# Patient Record
Sex: MALE | Race: ASIAN | NOT HISPANIC OR LATINO | URBAN - METROPOLITAN AREA
[De-identification: names, ages, dates, MRNs, and addresses within clinical notes are randomized per-mention and may not be internally consistent; named-entity substitution may affect disease eponyms.]

---

## 2019-09-26 ENCOUNTER — EMERGENCY (EMERGENCY)
Facility: HOSPITAL | Age: 79
LOS: 1 days | Discharge: HOME | End: 2019-09-26
Attending: EMERGENCY MEDICINE
Payer: COMMERCIAL

## 2019-09-26 VITALS
DIASTOLIC BLOOD PRESSURE: 81 MMHG | HEART RATE: 75 BPM | SYSTOLIC BLOOD PRESSURE: 104 MMHG | TEMPERATURE: 98 F | RESPIRATION RATE: 18 BRPM | OXYGEN SATURATION: 97 %

## 2019-09-26 VITALS
SYSTOLIC BLOOD PRESSURE: 94 MMHG | TEMPERATURE: 99 F | OXYGEN SATURATION: 99 % | HEART RATE: 78 BPM | RESPIRATION RATE: 16 BRPM | HEIGHT: 68 IN | WEIGHT: 220.02 LBS | DIASTOLIC BLOOD PRESSURE: 60 MMHG

## 2019-09-26 DIAGNOSIS — Z90.79 ACQUIRED ABSENCE OF OTHER GENITAL ORGAN(S): Chronic | ICD-10-CM

## 2019-09-26 DIAGNOSIS — Z90.3 ACQUIRED ABSENCE OF STOMACH [PART OF]: Chronic | ICD-10-CM

## 2019-09-26 LAB
ALBUMIN SERPL ELPH-MCNC: 2.9 G/DL — LOW (ref 3.5–5)
ALP SERPL-CCNC: 96 U/L — SIGNIFICANT CHANGE UP (ref 40–120)
ALT FLD-CCNC: 22 U/L DA — SIGNIFICANT CHANGE UP (ref 10–60)
ANION GAP SERPL CALC-SCNC: 8 MMOL/L — SIGNIFICANT CHANGE UP (ref 5–17)
APPEARANCE UR: CLEAR — SIGNIFICANT CHANGE UP
AST SERPL-CCNC: 17 U/L — SIGNIFICANT CHANGE UP (ref 10–40)
BACTERIA # UR AUTO: ABNORMAL /HPF
BASOPHILS # BLD AUTO: 0 K/UL — SIGNIFICANT CHANGE UP (ref 0–0.2)
BASOPHILS NFR BLD AUTO: 0 % — SIGNIFICANT CHANGE UP (ref 0–2)
BILIRUB SERPL-MCNC: 0.6 MG/DL — SIGNIFICANT CHANGE UP (ref 0.2–1.2)
BILIRUB UR-MCNC: ABNORMAL
BUN SERPL-MCNC: 18 MG/DL — SIGNIFICANT CHANGE UP (ref 7–18)
BURR CELLS BLD QL SMEAR: PRESENT — SIGNIFICANT CHANGE UP
CALCIUM SERPL-MCNC: 9 MG/DL — SIGNIFICANT CHANGE UP (ref 8.4–10.5)
CHLORIDE SERPL-SCNC: 105 MMOL/L — SIGNIFICANT CHANGE UP (ref 96–108)
CO2 SERPL-SCNC: 27 MMOL/L — SIGNIFICANT CHANGE UP (ref 22–31)
COLOR SPEC: YELLOW — SIGNIFICANT CHANGE UP
CREAT SERPL-MCNC: 1.32 MG/DL — HIGH (ref 0.5–1.3)
DIFF PNL FLD: NEGATIVE — SIGNIFICANT CHANGE UP
EOSINOPHIL # BLD AUTO: 0 K/UL — SIGNIFICANT CHANGE UP (ref 0–0.5)
EOSINOPHIL NFR BLD AUTO: 0 % — SIGNIFICANT CHANGE UP (ref 0–6)
EPI CELLS # UR: ABNORMAL /HPF
GLUCOSE SERPL-MCNC: 101 MG/DL — HIGH (ref 70–99)
GLUCOSE UR QL: NEGATIVE — SIGNIFICANT CHANGE UP
HCT VFR BLD CALC: 41.1 % — SIGNIFICANT CHANGE UP (ref 39–50)
HGB BLD-MCNC: 13.4 G/DL — SIGNIFICANT CHANGE UP (ref 13–17)
HYALINE CASTS # UR AUTO: ABNORMAL /LPF
KETONES UR-MCNC: NEGATIVE — SIGNIFICANT CHANGE UP
LEUKOCYTE ESTERASE UR-ACNC: NEGATIVE — SIGNIFICANT CHANGE UP
LYMPHOCYTES # BLD AUTO: 0.39 K/UL — LOW (ref 1–3.3)
LYMPHOCYTES # BLD AUTO: 5 % — LOW (ref 13–44)
MAGNESIUM SERPL-MCNC: 1.8 MG/DL — SIGNIFICANT CHANGE UP (ref 1.6–2.6)
MANUAL SMEAR VERIFICATION: SIGNIFICANT CHANGE UP
MCHC RBC-ENTMCNC: 30.9 PG — SIGNIFICANT CHANGE UP (ref 27–34)
MCHC RBC-ENTMCNC: 32.6 GM/DL — SIGNIFICANT CHANGE UP (ref 32–36)
MCV RBC AUTO: 94.9 FL — SIGNIFICANT CHANGE UP (ref 80–100)
MONOCYTES # BLD AUTO: 0.24 K/UL — SIGNIFICANT CHANGE UP (ref 0–0.9)
MONOCYTES NFR BLD AUTO: 3 % — SIGNIFICANT CHANGE UP (ref 2–14)
NEUTROPHILS # BLD AUTO: 7.26 K/UL — SIGNIFICANT CHANGE UP (ref 1.8–7.4)
NEUTROPHILS NFR BLD AUTO: 92 % — HIGH (ref 43–77)
NITRITE UR-MCNC: NEGATIVE — SIGNIFICANT CHANGE UP
NRBC # BLD: 0 /100 — SIGNIFICANT CHANGE UP (ref 0–0)
NT-PROBNP SERPL-SCNC: 281 PG/ML — SIGNIFICANT CHANGE UP (ref 0–450)
PH UR: 5 — SIGNIFICANT CHANGE UP (ref 5–8)
PLAT MORPH BLD: NORMAL — SIGNIFICANT CHANGE UP
PLATELET # BLD AUTO: 120 K/UL — LOW (ref 150–400)
POTASSIUM SERPL-MCNC: 4.2 MMOL/L — SIGNIFICANT CHANGE UP (ref 3.5–5.3)
POTASSIUM SERPL-SCNC: 4.2 MMOL/L — SIGNIFICANT CHANGE UP (ref 3.5–5.3)
PROT SERPL-MCNC: 6.2 G/DL — SIGNIFICANT CHANGE UP (ref 6–8.3)
PROT UR-MCNC: 100
RBC # BLD: 4.33 M/UL — SIGNIFICANT CHANGE UP (ref 4.2–5.8)
RBC # FLD: 13.4 % — SIGNIFICANT CHANGE UP (ref 10.3–14.5)
RBC BLD AUTO: ABNORMAL
RBC CASTS # UR COMP ASSIST: SIGNIFICANT CHANGE UP /HPF (ref 0–2)
SODIUM SERPL-SCNC: 140 MMOL/L — SIGNIFICANT CHANGE UP (ref 135–145)
SP GR SPEC: 1.02 — SIGNIFICANT CHANGE UP (ref 1.01–1.02)
UROBILINOGEN FLD QL: NEGATIVE — SIGNIFICANT CHANGE UP
WBC # BLD: 7.89 K/UL — SIGNIFICANT CHANGE UP (ref 3.8–10.5)
WBC # FLD AUTO: 7.89 K/UL — SIGNIFICANT CHANGE UP (ref 3.8–10.5)
WBC UR QL: SIGNIFICANT CHANGE UP /HPF (ref 0–5)

## 2019-09-26 PROCEDURE — 36415 COLL VENOUS BLD VENIPUNCTURE: CPT

## 2019-09-26 PROCEDURE — 99285 EMERGENCY DEPT VISIT HI MDM: CPT

## 2019-09-26 PROCEDURE — 83735 ASSAY OF MAGNESIUM: CPT

## 2019-09-26 PROCEDURE — 71045 X-RAY EXAM CHEST 1 VIEW: CPT

## 2019-09-26 PROCEDURE — 83880 ASSAY OF NATRIURETIC PEPTIDE: CPT

## 2019-09-26 PROCEDURE — 71045 X-RAY EXAM CHEST 1 VIEW: CPT | Mod: 26

## 2019-09-26 PROCEDURE — 87086 URINE CULTURE/COLONY COUNT: CPT

## 2019-09-26 PROCEDURE — 81001 URINALYSIS AUTO W/SCOPE: CPT

## 2019-09-26 PROCEDURE — 96360 HYDRATION IV INFUSION INIT: CPT

## 2019-09-26 PROCEDURE — 99284 EMERGENCY DEPT VISIT MOD MDM: CPT | Mod: 25

## 2019-09-26 PROCEDURE — 82962 GLUCOSE BLOOD TEST: CPT

## 2019-09-26 PROCEDURE — 93005 ELECTROCARDIOGRAM TRACING: CPT

## 2019-09-26 PROCEDURE — 80053 COMPREHEN METABOLIC PANEL: CPT

## 2019-09-26 PROCEDURE — 85027 COMPLETE CBC AUTOMATED: CPT

## 2019-09-26 RX ORDER — SODIUM CHLORIDE 9 MG/ML
1000 INJECTION INTRAMUSCULAR; INTRAVENOUS; SUBCUTANEOUS
Refills: 0 | Status: DISCONTINUED | OUTPATIENT
Start: 2019-09-26 | End: 2019-10-06

## 2019-09-26 RX ADMIN — SODIUM CHLORIDE 1000 MILLILITER(S): 9 INJECTION INTRAMUSCULAR; INTRAVENOUS; SUBCUTANEOUS at 17:00

## 2019-09-26 RX ADMIN — SODIUM CHLORIDE 125 MILLILITER(S): 9 INJECTION INTRAMUSCULAR; INTRAVENOUS; SUBCUTANEOUS at 16:24

## 2019-09-26 NOTE — ED PROVIDER NOTE - PMH
CAD (coronary artery disease)    DM (diabetes mellitus)    HTN (hypertension)    Hypercholesterolemia

## 2019-09-26 NOTE — ED PROVIDER NOTE - PATIENT PORTAL LINK FT
You can access the FollowMyHealth Patient Portal offered by [facility] by registering at the following website: [email or web address] By joining [facility]’s FollowMyHealth portal, you will also be able to view your health information using other applications (apps) compatible with our system.

## 2019-09-26 NOTE — ED ADULT NURSE NOTE - NSIMPLEMENTINTERV_GEN_ALL_ED
Implemented All Fall Risk Interventions:  [place] to call system. Call bell, personal items and telephone within reach. Instruct patient to call for assistance. Room bathroom lighting operational. Non-slip footwear when patient is off stretcher. Physically safe environment: no spills, clutter or unnecessary equipment. Stretcher in lowest position, wheels locked, appropriate side rails in place. Provide visual cue, wrist band, yellow gown, etc. Monitor gait and stability. Monitor for mental status changes and reorient to person, place, and time. Review medications for side effects contributing to fall risk. Reinforce activity limits and safety measures with patient and family.

## 2019-09-26 NOTE — ED ADULT NURSE NOTE - CHPI ED NUR SYMPTOMS NEG
no weakness/no decreased eating/drinking/no fever/no pain/no chills/no tingling/no dizziness/no nausea/no vomiting

## 2019-09-26 NOTE — ED ADULT NURSE REASSESSMENT NOTE - NS ED NURSE REASSESS COMMENT FT1
1550 Received pt eyes closed, easily arousable eyes open and says his name with IVF NS infusing on left acf color good skin warm and dry voided to dark nandini urine and sent to lab  1600 FS repeated 110mg/dl, Dr. Diaz informed

## 2019-09-26 NOTE — ED PROVIDER NOTE - OBJECTIVE STATEMENT
78 y.o. male with h/o NIDDM, last dose 7am, pt went to [facility], had breakfast, BIBA pt c/o feeling lightheadedness, weakness, decreased appetite for past few days, chronic cough, no LOC, palpitations, fever, dysuria.  Pt BS was 40, given D50 & sugar pill

## 2019-09-26 NOTE — ED ADULT TRIAGE NOTE - CHIEF COMPLAINT QUOTE
coming from day care, c/o presyncopal episode, found to have low FS in 40's, amp of D50 ,a glucose tab given by ems.

## 2019-09-26 NOTE — ED PROVIDER NOTE - PROGRESS NOTE DETAILS
pt's BS stable, pt ate, no distress, will d/c home,.  Spoke with SW from [facility] [phone], will send a  here to hospital to  pt.

## 2019-09-28 LAB
CULTURE RESULTS: SIGNIFICANT CHANGE UP
SPECIMEN SOURCE: SIGNIFICANT CHANGE UP

## 2020-01-02 NOTE — ED ADULT NURSE NOTE - NSFALLRSKINDICTYPE_ED_ALL_ED
Quality 226: Preventive Care And Screening: Tobacco Use: Screening And Cessation Intervention: Patient screened for tobacco use and is an ex/non-smoker Quality 131: Pain Assessment And Follow-Up: Pain assessment NOT documented as being performed, documentation the patient is not eligible for a pain assessment using a standardized tool Quality 130: Documentation Of Current Medications In The Medical Record: Current Medications Documented Quality 431: Preventive Care And Screening: Unhealthy Alcohol Use - Screening: Patient screened for unhealthy alcohol use using a single question and scores less than 2 times per year Detail Level: Generalized Impaired Gait

## 2020-03-06 ENCOUNTER — INPATIENT (INPATIENT)
Facility: HOSPITAL | Age: 80
LOS: 3 days | Discharge: HOME | DRG: 312 | End: 2020-03-10
Attending: INTERNAL MEDICINE | Admitting: INTERNAL MEDICINE
Payer: COMMERCIAL

## 2020-03-06 VITALS
OXYGEN SATURATION: 96 % | RESPIRATION RATE: 16 BRPM | HEART RATE: 80 BPM | DIASTOLIC BLOOD PRESSURE: 66 MMHG | SYSTOLIC BLOOD PRESSURE: 103 MMHG

## 2020-03-06 DIAGNOSIS — Z86.79 PERSONAL HISTORY OF OTHER DISEASES OF THE CIRCULATORY SYSTEM: ICD-10-CM

## 2020-03-06 DIAGNOSIS — R53.1 WEAKNESS: ICD-10-CM

## 2020-03-06 DIAGNOSIS — E11.9 TYPE 2 DIABETES MELLITUS WITHOUT COMPLICATIONS: ICD-10-CM

## 2020-03-06 DIAGNOSIS — I95.1 ORTHOSTATIC HYPOTENSION: ICD-10-CM

## 2020-03-06 DIAGNOSIS — Z29.9 ENCOUNTER FOR PROPHYLACTIC MEASURES, UNSPECIFIED: ICD-10-CM

## 2020-03-06 DIAGNOSIS — Z90.79 ACQUIRED ABSENCE OF OTHER GENITAL ORGAN(S): Chronic | ICD-10-CM

## 2020-03-06 DIAGNOSIS — Z90.3 ACQUIRED ABSENCE OF STOMACH [PART OF]: Chronic | ICD-10-CM

## 2020-03-06 DIAGNOSIS — N17.9 ACUTE KIDNEY FAILURE, UNSPECIFIED: ICD-10-CM

## 2020-03-06 DIAGNOSIS — R42 DIZZINESS AND GIDDINESS: ICD-10-CM

## 2020-03-06 PROBLEM — E78.00 PURE HYPERCHOLESTEROLEMIA, UNSPECIFIED: Chronic | Status: ACTIVE | Noted: 2019-09-26

## 2020-03-06 PROBLEM — I10 ESSENTIAL (PRIMARY) HYPERTENSION: Chronic | Status: ACTIVE | Noted: 2019-09-26

## 2020-03-06 PROBLEM — I25.10 ATHEROSCLEROTIC HEART DISEASE OF NATIVE CORONARY ARTERY WITHOUT ANGINA PECTORIS: Chronic | Status: ACTIVE | Noted: 2019-09-26

## 2020-03-06 LAB
ALBUMIN SERPL ELPH-MCNC: 3.1 G/DL — LOW (ref 3.5–5)
ALP SERPL-CCNC: 118 U/L — SIGNIFICANT CHANGE UP (ref 40–120)
ALT FLD-CCNC: 44 U/L DA — SIGNIFICANT CHANGE UP (ref 10–60)
ANION GAP SERPL CALC-SCNC: 3 MMOL/L — LOW (ref 5–17)
APPEARANCE UR: CLEAR — SIGNIFICANT CHANGE UP
AST SERPL-CCNC: 26 U/L — SIGNIFICANT CHANGE UP (ref 10–40)
BACTERIA # UR AUTO: ABNORMAL /HPF
BASOPHILS # BLD AUTO: 0.02 K/UL — SIGNIFICANT CHANGE UP (ref 0–0.2)
BASOPHILS NFR BLD AUTO: 0.2 % — SIGNIFICANT CHANGE UP (ref 0–2)
BILIRUB SERPL-MCNC: 0.9 MG/DL — SIGNIFICANT CHANGE UP (ref 0.2–1.2)
BILIRUB UR-MCNC: NEGATIVE — SIGNIFICANT CHANGE UP
BUN SERPL-MCNC: 21 MG/DL — HIGH (ref 7–18)
CALCIUM SERPL-MCNC: 8.7 MG/DL — SIGNIFICANT CHANGE UP (ref 8.4–10.5)
CHLORIDE SERPL-SCNC: 107 MMOL/L — SIGNIFICANT CHANGE UP (ref 96–108)
CO2 SERPL-SCNC: 30 MMOL/L — SIGNIFICANT CHANGE UP (ref 22–31)
COLOR SPEC: YELLOW — SIGNIFICANT CHANGE UP
COMMENT - URINE: SIGNIFICANT CHANGE UP
CREAT SERPL-MCNC: 1.55 MG/DL — HIGH (ref 0.5–1.3)
DIFF PNL FLD: NEGATIVE — SIGNIFICANT CHANGE UP
EOSINOPHIL # BLD AUTO: 0.09 K/UL — SIGNIFICANT CHANGE UP (ref 0–0.5)
EOSINOPHIL NFR BLD AUTO: 1 % — SIGNIFICANT CHANGE UP (ref 0–6)
EPI CELLS # UR: SIGNIFICANT CHANGE UP /HPF
FLU A RESULT: SIGNIFICANT CHANGE UP
FLU A RESULT: SIGNIFICANT CHANGE UP
FLUAV AG NPH QL: SIGNIFICANT CHANGE UP
FLUBV AG NPH QL: SIGNIFICANT CHANGE UP
GLUCOSE BLDC GLUCOMTR-MCNC: 143 MG/DL — HIGH (ref 70–99)
GLUCOSE SERPL-MCNC: 111 MG/DL — HIGH (ref 70–99)
GLUCOSE UR QL: 50 MG/DL
HCT VFR BLD CALC: 42.1 % — SIGNIFICANT CHANGE UP (ref 39–50)
HGB BLD-MCNC: 14 G/DL — SIGNIFICANT CHANGE UP (ref 13–17)
HYALINE CASTS # UR AUTO: ABNORMAL /LPF
IMM GRANULOCYTES NFR BLD AUTO: 0.5 % — SIGNIFICANT CHANGE UP (ref 0–1.5)
KETONES UR-MCNC: ABNORMAL
LACTATE SERPL-SCNC: 2.1 MMOL/L — HIGH (ref 0.7–2)
LACTATE SERPL-SCNC: 2.2 MMOL/L — HIGH (ref 0.7–2)
LEUKOCYTE ESTERASE UR-ACNC: NEGATIVE — SIGNIFICANT CHANGE UP
LIDOCAIN IGE QN: 75 U/L — SIGNIFICANT CHANGE UP (ref 73–393)
LYMPHOCYTES # BLD AUTO: 0.42 K/UL — LOW (ref 1–3.3)
LYMPHOCYTES # BLD AUTO: 4.6 % — LOW (ref 13–44)
MCHC RBC-ENTMCNC: 31.7 PG — SIGNIFICANT CHANGE UP (ref 27–34)
MCHC RBC-ENTMCNC: 33.3 GM/DL — SIGNIFICANT CHANGE UP (ref 32–36)
MCV RBC AUTO: 95.2 FL — SIGNIFICANT CHANGE UP (ref 80–100)
MONOCYTES # BLD AUTO: 0.57 K/UL — SIGNIFICANT CHANGE UP (ref 0–0.9)
MONOCYTES NFR BLD AUTO: 6.2 % — SIGNIFICANT CHANGE UP (ref 2–14)
NEUTROPHILS # BLD AUTO: 8 K/UL — HIGH (ref 1.8–7.4)
NEUTROPHILS NFR BLD AUTO: 87.5 % — HIGH (ref 43–77)
NITRITE UR-MCNC: NEGATIVE — SIGNIFICANT CHANGE UP
NRBC # BLD: 0 /100 WBCS — SIGNIFICANT CHANGE UP (ref 0–0)
PH UR: 5 — SIGNIFICANT CHANGE UP (ref 5–8)
PLATELET # BLD AUTO: 144 K/UL — LOW (ref 150–400)
POTASSIUM SERPL-MCNC: 4.6 MMOL/L — SIGNIFICANT CHANGE UP (ref 3.5–5.3)
POTASSIUM SERPL-SCNC: 4.6 MMOL/L — SIGNIFICANT CHANGE UP (ref 3.5–5.3)
PROT SERPL-MCNC: 6.4 G/DL — SIGNIFICANT CHANGE UP (ref 6–8.3)
PROT UR-MCNC: 100
RBC # BLD: 4.42 M/UL — SIGNIFICANT CHANGE UP (ref 4.2–5.8)
RBC # FLD: 12.6 % — SIGNIFICANT CHANGE UP (ref 10.3–14.5)
RBC CASTS # UR COMP ASSIST: SIGNIFICANT CHANGE UP /HPF (ref 0–2)
RSV RESULT: SIGNIFICANT CHANGE UP
RSV RNA RESP QL NAA+PROBE: SIGNIFICANT CHANGE UP
SODIUM SERPL-SCNC: 140 MMOL/L — SIGNIFICANT CHANGE UP (ref 135–145)
SP GR SPEC: 1.01 — SIGNIFICANT CHANGE UP (ref 1.01–1.02)
TROPONIN I SERPL-MCNC: <0.015 NG/ML — SIGNIFICANT CHANGE UP (ref 0–0.04)
UROBILINOGEN FLD QL: NEGATIVE — SIGNIFICANT CHANGE UP
WBC # BLD: 9.15 K/UL — SIGNIFICANT CHANGE UP (ref 3.8–10.5)
WBC # FLD AUTO: 9.15 K/UL — SIGNIFICANT CHANGE UP (ref 3.8–10.5)
WBC UR QL: SIGNIFICANT CHANGE UP /HPF (ref 0–5)

## 2020-03-06 PROCEDURE — 99285 EMERGENCY DEPT VISIT HI MDM: CPT

## 2020-03-06 PROCEDURE — 71045 X-RAY EXAM CHEST 1 VIEW: CPT | Mod: 26

## 2020-03-06 PROCEDURE — 70450 CT HEAD/BRAIN W/O DYE: CPT | Mod: 26

## 2020-03-06 PROCEDURE — 93010 ELECTROCARDIOGRAM REPORT: CPT

## 2020-03-06 RX ORDER — HEPARIN SODIUM 5000 [USP'U]/ML
5000 INJECTION INTRAVENOUS; SUBCUTANEOUS EVERY 8 HOURS
Refills: 0 | Status: DISCONTINUED | OUTPATIENT
Start: 2020-03-06 | End: 2020-03-10

## 2020-03-06 RX ORDER — ATORVASTATIN CALCIUM 80 MG/1
20 TABLET, FILM COATED ORAL AT BEDTIME
Refills: 0 | Status: DISCONTINUED | OUTPATIENT
Start: 2020-03-06 | End: 2020-03-10

## 2020-03-06 RX ORDER — SENNA PLUS 8.6 MG/1
2 TABLET ORAL AT BEDTIME
Refills: 0 | Status: DISCONTINUED | OUTPATIENT
Start: 2020-03-06 | End: 2020-03-10

## 2020-03-06 RX ORDER — OXYBUTYNIN CHLORIDE 5 MG
5 TABLET ORAL DAILY
Refills: 0 | Status: DISCONTINUED | OUTPATIENT
Start: 2020-03-06 | End: 2020-03-10

## 2020-03-06 RX ORDER — INSULIN LISPRO 100/ML
VIAL (ML) SUBCUTANEOUS
Refills: 0 | Status: DISCONTINUED | OUTPATIENT
Start: 2020-03-06 | End: 2020-03-10

## 2020-03-06 RX ORDER — ACETAMINOPHEN 500 MG
650 TABLET ORAL EVERY 6 HOURS
Refills: 0 | Status: DISCONTINUED | OUTPATIENT
Start: 2020-03-06 | End: 2020-03-10

## 2020-03-06 RX ORDER — TAMSULOSIN HYDROCHLORIDE 0.4 MG/1
0.4 CAPSULE ORAL AT BEDTIME
Refills: 0 | Status: DISCONTINUED | OUTPATIENT
Start: 2020-03-06 | End: 2020-03-10

## 2020-03-06 RX ORDER — SODIUM CHLORIDE 9 MG/ML
1000 INJECTION INTRAMUSCULAR; INTRAVENOUS; SUBCUTANEOUS
Refills: 0 | Status: COMPLETED | OUTPATIENT
Start: 2020-03-06 | End: 2020-03-07

## 2020-03-06 RX ORDER — MECLIZINE HCL 12.5 MG
25 TABLET ORAL THREE TIMES A DAY
Refills: 0 | Status: DISCONTINUED | OUTPATIENT
Start: 2020-03-06 | End: 2020-03-10

## 2020-03-06 RX ORDER — SODIUM CHLORIDE 9 MG/ML
1000 INJECTION INTRAMUSCULAR; INTRAVENOUS; SUBCUTANEOUS ONCE
Refills: 0 | Status: COMPLETED | OUTPATIENT
Start: 2020-03-06 | End: 2020-03-06

## 2020-03-06 RX ORDER — ATENOLOL 25 MG/1
25 TABLET ORAL DAILY
Refills: 0 | Status: DISCONTINUED | OUTPATIENT
Start: 2020-03-06 | End: 2020-03-06

## 2020-03-06 RX ORDER — PANTOPRAZOLE SODIUM 20 MG/1
40 TABLET, DELAYED RELEASE ORAL
Refills: 0 | Status: DISCONTINUED | OUTPATIENT
Start: 2020-03-06 | End: 2020-03-10

## 2020-03-06 RX ORDER — ASPIRIN/CALCIUM CARB/MAGNESIUM 324 MG
81 TABLET ORAL DAILY
Refills: 0 | Status: DISCONTINUED | OUTPATIENT
Start: 2020-03-06 | End: 2020-03-10

## 2020-03-06 RX ORDER — FINASTERIDE 5 MG/1
5 TABLET, FILM COATED ORAL DAILY
Refills: 0 | Status: DISCONTINUED | OUTPATIENT
Start: 2020-03-06 | End: 2020-03-10

## 2020-03-06 RX ADMIN — SODIUM CHLORIDE 100 MILLILITER(S): 9 INJECTION INTRAMUSCULAR; INTRAVENOUS; SUBCUTANEOUS at 20:40

## 2020-03-06 RX ADMIN — SODIUM CHLORIDE 1000 MILLILITER(S): 9 INJECTION INTRAMUSCULAR; INTRAVENOUS; SUBCUTANEOUS at 12:30

## 2020-03-06 RX ADMIN — HEPARIN SODIUM 5000 UNIT(S): 5000 INJECTION INTRAVENOUS; SUBCUTANEOUS at 22:06

## 2020-03-06 RX ADMIN — Medication 25 MILLIGRAM(S): at 22:06

## 2020-03-06 RX ADMIN — TAMSULOSIN HYDROCHLORIDE 0.4 MILLIGRAM(S): 0.4 CAPSULE ORAL at 22:06

## 2020-03-06 RX ADMIN — SENNA PLUS 2 TABLET(S): 8.6 TABLET ORAL at 22:06

## 2020-03-06 RX ADMIN — ATORVASTATIN CALCIUM 20 MILLIGRAM(S): 80 TABLET, FILM COATED ORAL at 22:06

## 2020-03-06 NOTE — ED ADULT NURSE NOTE - NSIMPLEMENTINTERV_GEN_ALL_ED
Implemented All Fall Risk Interventions:  [place] to call system. Call bell, personal items and telephone within reach. Instruct patient to call for assistance. Room bathroom lighting operational. Non-slip footwear when patient is off stretcher. Physically safe environment: no spills, clutter or unnecessary equipment. Stretcher in lowest position, wheels locked, appropriate side rails in place. Provide visual cue, wrist band, yellow gown, etc. Monitor gait and stability. Monitor for mental status changes and reorient to person, place, and time. Review medications for side effects contributing to fall risk. Reinforce activity limits and safety measures with patient and family.

## 2020-03-06 NOTE — H&P ADULT - PROBLEM SELECTOR PLAN 1
Cr/BUN: 1.55/21  Likely pre renal from dehydration  However, possible CKD  F/u Urine electrolytes  Monitor BMP  Avoid nephrotoxic medications

## 2020-03-06 NOTE — H&P ADULT - ASSESSMENT
Pt is an 78 y/o Cantonese speaking male, from home, walks independently,  with PMH of  CAD, DM, HTN, HLD who presented with lightheadedness since today.

## 2020-03-06 NOTE — ED PROVIDER NOTE - PHYSICAL EXAMINATION
Pt moving all extremities, gait not evaluated until after hydration, no focal deficits noted at this time

## 2020-03-06 NOTE — H&P ADULT - NSHPSOCIALHISTORY_GEN_ALL_CORE
Pt lives with family. Goes to day care center . He is a former smoker. He smoked cigarettes for 20 years. Quit about 10 years ago.

## 2020-03-06 NOTE — H&P ADULT - NEGATIVE OPHTHALMOLOGIC SYMPTOMS
no photophobia/no blurred vision L/no discharge L/no lacrimation L/no lacrimation R/no diplopia/no blurred vision R/no discharge R

## 2020-03-06 NOTE — H&P ADULT - ATTENDING COMMENTS
Seen and examined earlier . Agree with above A/P . Likely cause Orthostasis so IVF . CT6 head noted . TTE pending.

## 2020-03-06 NOTE — H&P ADULT - NSHPLABSRESULTS_GEN_ALL_CORE
14.0   9.15  )-----------( 144      ( 06 Mar 2020 12:28 )             42.1       03-06    140  |  107  |  21<H>  ----------------------------<  111<H>  4.6   |  30  |  1.55<H>    Ca    8.7      06 Mar 2020 12:28    TPro  6.4  /  Alb  3.1<L>  /  TBili  0.9  /  DBili  x   /  AST  26  /  ALT  44  /  AlkPhos  118  03-06

## 2020-03-06 NOTE — H&P ADULT - NSICDXNOFAMILYHX_GEN_ALL_CORE
Addendum  created 05/29/18 0727 by Ankit Angiln MD    Diagnosis association updated, Order list changed       <-- Click to add NO pertinent Family History

## 2020-03-06 NOTE — H&P ADULT - NEGATIVE CARDIOVASCULAR SYMPTOMS
no dyspnea on exertion/no peripheral edema/no paroxysmal nocturnal dyspnea/no palpitations/no orthopnea/no chest pain

## 2020-03-06 NOTE — H&P ADULT - PROBLEM SELECTOR PLAN 5
Pt takes Janumet at home  Continue Sliding scale Humalog  F/u HBA1C Pt takes Janumet, treseba and Humalog at home  Continue Sliding scale Humalog  F/u HBA1C Pt takes Janumet, treseba 50 U once a week. Also takkes Novolog 17 U at morning and 20 U at bedtime.  Continue Sliding scale Humalog and lantus 8 U at bedtime   F/u HBA1C

## 2020-03-06 NOTE — H&P ADULT - HISTORY OF PRESENT ILLNESS
Pt is an 78 y/o Cantonese speaking male, from home, walks independently,  with PMH of  CAD, DM, HTN, HLD who presented with lightheadedness since today. According to the pt, since today  morning he has been experiencing lightheadedness, vertigo and generalized weakness.   he denied palpitation fever, chills, chest pain, runny nose, abdominal pain and other complains. Pt goes to senior care center. As per senior day care center his blood pressure was low while he was experiencing dizziness.

## 2020-03-06 NOTE — ED ADULT NURSE NOTE - OBJECTIVE STATEMENT
pt is here for hypotension.  pt stated that dizziness, denied pain or sob, difficulty walking, denied chest pain, no distress noted at this time.

## 2020-03-06 NOTE — H&P ADULT - NEUROLOGICAL DETAILS
responds to verbal commands/alert and oriented x 3/superficial reflexes intact/sensation intact/normal strength

## 2020-03-06 NOTE — H&P ADULT - NSICDXPASTMEDICALHX_GEN_ALL_CORE_FT
PAST MEDICAL HISTORY:  CAD (coronary artery disease)     DM (diabetes mellitus)     HTN (hypertension)     Hypercholesterolemia

## 2020-03-06 NOTE — H&P ADULT - PROBLEM SELECTOR PLAN 8
IMPROVE VTE Individual Risk Assessment   RISK                                                          Points  [  ] Previous VTE                                                3  [  ] Thrombophilia                                             2  [  ] Lower limb paralysis                                    2        (unable to hold up >15 seconds)    [  ] Current Cancer                                             2         (within 6 months)  [  x] Immobilization > 24 hrs                              1  [  ] ICU/CCU stay > 24 hours                            1  [x  ] Age > 60                                                    1  IMPROVE VTE Score _________2  Heparin s/c for DVT prophylaxis

## 2020-03-06 NOTE — ED PROVIDER NOTE - OBJECTIVE STATEMENT
78 y/o male with PMHx of CAD, DM, HTN, HLD presents to the ED c/o dizziness x today. Pt notes room-spinning dizziness and generalized weakness. Pt denies N/V/D, fever, chest pain, shortness of breath, or any other complaints. NKDA.

## 2020-03-06 NOTE — ED PROVIDER NOTE - MUSCULOSKELETAL, MLM
Jt, ERT is at the bedside for direct observation.    Spine appears normal, range of motion is not limited, no muscle or joint tenderness

## 2020-03-06 NOTE — H&P ADULT - NEGATIVE ENMT SYMPTOMS
no nasal congestion/no hearing difficulty/no vertigo/no ear pain/no tinnitus/no nasal discharge/no sinus symptoms

## 2020-03-07 LAB
-  COAGULASE NEGATIVE STAPHYLOCOCCUS: SIGNIFICANT CHANGE UP
24R-OH-CALCIDIOL SERPL-MCNC: 22.1 NG/ML — LOW (ref 30–80)
ALBUMIN SERPL ELPH-MCNC: 2.7 G/DL — LOW (ref 3.5–5)
ALP SERPL-CCNC: 95 U/L — SIGNIFICANT CHANGE UP (ref 40–120)
ALT FLD-CCNC: 34 U/L DA — SIGNIFICANT CHANGE UP (ref 10–60)
ANION GAP SERPL CALC-SCNC: 5 MMOL/L — SIGNIFICANT CHANGE UP (ref 5–17)
AST SERPL-CCNC: 20 U/L — SIGNIFICANT CHANGE UP (ref 10–40)
BASOPHILS # BLD AUTO: 0.03 K/UL — SIGNIFICANT CHANGE UP (ref 0–0.2)
BASOPHILS NFR BLD AUTO: 0.5 % — SIGNIFICANT CHANGE UP (ref 0–2)
BILIRUB SERPL-MCNC: 1 MG/DL — SIGNIFICANT CHANGE UP (ref 0.2–1.2)
BUN SERPL-MCNC: 19 MG/DL — HIGH (ref 7–18)
CALCIUM SERPL-MCNC: 8.3 MG/DL — LOW (ref 8.4–10.5)
CHLORIDE SERPL-SCNC: 107 MMOL/L — SIGNIFICANT CHANGE UP (ref 96–108)
CHLORIDE UR-SCNC: 149 MMOL/L — SIGNIFICANT CHANGE UP
CHOLEST SERPL-MCNC: 75 MG/DL — SIGNIFICANT CHANGE UP (ref 10–199)
CO2 SERPL-SCNC: 26 MMOL/L — SIGNIFICANT CHANGE UP (ref 22–31)
CREAT ?TM UR-MCNC: 85 MG/DL — SIGNIFICANT CHANGE UP
CREAT SERPL-MCNC: 1.18 MG/DL — SIGNIFICANT CHANGE UP (ref 0.5–1.3)
EOSINOPHIL # BLD AUTO: 0.3 K/UL — SIGNIFICANT CHANGE UP (ref 0–0.5)
EOSINOPHIL NFR BLD AUTO: 4.6 % — SIGNIFICANT CHANGE UP (ref 0–6)
FOLATE SERPL-MCNC: 6.6 NG/ML — SIGNIFICANT CHANGE UP
GLUCOSE BLDC GLUCOMTR-MCNC: 192 MG/DL — HIGH (ref 70–99)
GLUCOSE BLDC GLUCOMTR-MCNC: 195 MG/DL — HIGH (ref 70–99)
GLUCOSE BLDC GLUCOMTR-MCNC: 216 MG/DL — HIGH (ref 70–99)
GLUCOSE BLDC GLUCOMTR-MCNC: 231 MG/DL — HIGH (ref 70–99)
GLUCOSE SERPL-MCNC: 168 MG/DL — HIGH (ref 70–99)
GRAM STN FLD: SIGNIFICANT CHANGE UP
HBA1C BLD-MCNC: 8 % — HIGH (ref 4–5.6)
HCT VFR BLD CALC: 37.3 % — LOW (ref 39–50)
HDLC SERPL-MCNC: 31 MG/DL — LOW
HGB BLD-MCNC: 12.6 G/DL — LOW (ref 13–17)
IMM GRANULOCYTES NFR BLD AUTO: 0.6 % — SIGNIFICANT CHANGE UP (ref 0–1.5)
LACTATE SERPL-SCNC: 1.7 MMOL/L — SIGNIFICANT CHANGE UP (ref 0.7–2)
LIPID PNL WITH DIRECT LDL SERPL: 20 MG/DL — SIGNIFICANT CHANGE UP
LYMPHOCYTES # BLD AUTO: 0.85 K/UL — LOW (ref 1–3.3)
LYMPHOCYTES # BLD AUTO: 13.1 % — SIGNIFICANT CHANGE UP (ref 13–44)
MAGNESIUM SERPL-MCNC: 1.6 MG/DL — SIGNIFICANT CHANGE UP (ref 1.6–2.6)
MCHC RBC-ENTMCNC: 31.8 PG — SIGNIFICANT CHANGE UP (ref 27–34)
MCHC RBC-ENTMCNC: 33.8 GM/DL — SIGNIFICANT CHANGE UP (ref 32–36)
MCV RBC AUTO: 94.2 FL — SIGNIFICANT CHANGE UP (ref 80–100)
METHOD TYPE: SIGNIFICANT CHANGE UP
MONOCYTES # BLD AUTO: 0.52 K/UL — SIGNIFICANT CHANGE UP (ref 0–0.9)
MONOCYTES NFR BLD AUTO: 8 % — SIGNIFICANT CHANGE UP (ref 2–14)
NEUTROPHILS # BLD AUTO: 4.77 K/UL — SIGNIFICANT CHANGE UP (ref 1.8–7.4)
NEUTROPHILS NFR BLD AUTO: 73.2 % — SIGNIFICANT CHANGE UP (ref 43–77)
NRBC # BLD: 0 /100 WBCS — SIGNIFICANT CHANGE UP (ref 0–0)
OSMOLALITY UR: 626 MOS/KG — SIGNIFICANT CHANGE UP (ref 50–1200)
PHOSPHATE SERPL-MCNC: 2.6 MG/DL — SIGNIFICANT CHANGE UP (ref 2.5–4.5)
PLATELET # BLD AUTO: 112 K/UL — LOW (ref 150–400)
POTASSIUM SERPL-MCNC: 4 MMOL/L — SIGNIFICANT CHANGE UP (ref 3.5–5.3)
POTASSIUM SERPL-SCNC: 4 MMOL/L — SIGNIFICANT CHANGE UP (ref 3.5–5.3)
PROT ?TM UR-MCNC: 31 MG/DL — HIGH (ref 0–12)
PROT SERPL-MCNC: 5.5 G/DL — LOW (ref 6–8.3)
RBC # BLD: 3.96 M/UL — LOW (ref 4.2–5.8)
RBC # FLD: 12.5 % — SIGNIFICANT CHANGE UP (ref 10.3–14.5)
SODIUM SERPL-SCNC: 138 MMOL/L — SIGNIFICANT CHANGE UP (ref 135–145)
SODIUM UR-SCNC: 135 MMOL/L — SIGNIFICANT CHANGE UP
SPECIMEN SOURCE: SIGNIFICANT CHANGE UP
TOTAL CHOLESTEROL/HDL RATIO MEASUREMENT: 2.4 RATIO — LOW (ref 3.4–9.6)
TRIGL SERPL-MCNC: 118 MG/DL — SIGNIFICANT CHANGE UP (ref 10–149)
TSH SERPL-MCNC: 1.26 UU/ML — SIGNIFICANT CHANGE UP (ref 0.34–4.82)
VIT B12 SERPL-MCNC: 290 PG/ML — SIGNIFICANT CHANGE UP (ref 232–1245)
WBC # BLD: 6.51 K/UL — SIGNIFICANT CHANGE UP (ref 3.8–10.5)
WBC # FLD AUTO: 6.51 K/UL — SIGNIFICANT CHANGE UP (ref 3.8–10.5)

## 2020-03-07 RX ORDER — INSULIN DEGLUDEC 100 U/ML
0 INJECTION, SOLUTION SUBCUTANEOUS
Qty: 0 | Refills: 0 | DISCHARGE

## 2020-03-07 RX ORDER — INSULIN GLARGINE 100 [IU]/ML
8 INJECTION, SOLUTION SUBCUTANEOUS AT BEDTIME
Refills: 0 | Status: DISCONTINUED | OUTPATIENT
Start: 2020-03-07 | End: 2020-03-10

## 2020-03-07 RX ADMIN — SODIUM CHLORIDE 100 MILLILITER(S): 9 INJECTION INTRAMUSCULAR; INTRAVENOUS; SUBCUTANEOUS at 10:43

## 2020-03-07 RX ADMIN — HEPARIN SODIUM 5000 UNIT(S): 5000 INJECTION INTRAVENOUS; SUBCUTANEOUS at 21:35

## 2020-03-07 RX ADMIN — SENNA PLUS 2 TABLET(S): 8.6 TABLET ORAL at 21:35

## 2020-03-07 RX ADMIN — Medication 81 MILLIGRAM(S): at 11:31

## 2020-03-07 RX ADMIN — Medication 5 MILLIGRAM(S): at 11:31

## 2020-03-07 RX ADMIN — HEPARIN SODIUM 5000 UNIT(S): 5000 INJECTION INTRAVENOUS; SUBCUTANEOUS at 05:11

## 2020-03-07 RX ADMIN — Medication 1: at 08:19

## 2020-03-07 RX ADMIN — Medication 2: at 11:36

## 2020-03-07 RX ADMIN — SODIUM CHLORIDE 100 MILLILITER(S): 9 INJECTION INTRAMUSCULAR; INTRAVENOUS; SUBCUTANEOUS at 21:36

## 2020-03-07 RX ADMIN — HEPARIN SODIUM 5000 UNIT(S): 5000 INJECTION INTRAVENOUS; SUBCUTANEOUS at 13:13

## 2020-03-07 RX ADMIN — INSULIN GLARGINE 8 UNIT(S): 100 INJECTION, SOLUTION SUBCUTANEOUS at 21:35

## 2020-03-07 RX ADMIN — Medication 1: at 21:36

## 2020-03-07 RX ADMIN — ATORVASTATIN CALCIUM 20 MILLIGRAM(S): 80 TABLET, FILM COATED ORAL at 21:35

## 2020-03-07 RX ADMIN — Medication 25 MILLIGRAM(S): at 13:13

## 2020-03-07 RX ADMIN — Medication 25 MILLIGRAM(S): at 21:35

## 2020-03-07 RX ADMIN — FINASTERIDE 5 MILLIGRAM(S): 5 TABLET, FILM COATED ORAL at 11:31

## 2020-03-07 RX ADMIN — TAMSULOSIN HYDROCHLORIDE 0.4 MILLIGRAM(S): 0.4 CAPSULE ORAL at 21:35

## 2020-03-07 RX ADMIN — Medication 2: at 16:16

## 2020-03-07 RX ADMIN — PANTOPRAZOLE SODIUM 40 MILLIGRAM(S): 20 TABLET, DELAYED RELEASE ORAL at 05:11

## 2020-03-07 RX ADMIN — Medication 25 MILLIGRAM(S): at 05:11

## 2020-03-07 NOTE — PHYSICAL THERAPY INITIAL EVALUATION ADULT - GENERAL OBSERVATIONS, REHAB EVAL
Pt. found lying supine; on IV; cooperative and motivated during eval. Pt. speaks Cantonese. Offered and accepted  phone services (Betty ID #[id number])

## 2020-03-07 NOTE — CONSULT NOTE ADULT - SUBJECTIVE AND OBJECTIVE BOX
Hayden Ocampo MD  Interventional Cardiology / Advance Heart Failure and Cardiac Transplant Specialist  [place] Office : [phone] [place] [address] [place]. [address]  Tel:   [place] Office : [address] [facility] [place] [address]  Tel: [phone]  Cell : [phone]      HISTORY OF PRESENTING ILLNESS:  HPI:  Pt is an 80 y/o Cantonese speaking male, from home, walks independently,  with PMH of  CAD, DM, HTN, HLD who presented with lightheadedness since today. According to the pt, since today  morning he has been experiencing lightheadedness, vertigo and generalized weakness.  he denied palpitation fever, chills, chest pain, runny nose, abdominal pain and other complains. Pt goes to [facility]. As per [facility] day care center his blood pressure was low while he was experiencing dizziness.  orthostatic +    PAST MEDICAL & SURGICAL HISTORY:  CAD (coronary artery disease)  Hypercholesterolemia  HTN (hypertension)  DM (diabetes mellitus)  S/P gastrectomy  S/P TURP      SOCIAL HISTORY: Substance Use (street drugs): ( x ) never used  (  ) other:    FAMILY HISTORY:  No pertinent family history in first degree relatives      MEDICATIONS:  aspirin enteric coated 81 milliGRAM(s) Oral daily  heparin  Injectable 5000 Unit(s) SubCutaneous every 8 hours  tamsulosin 0.4 milliGRAM(s) Oral at bedtime  acetaminophen   Tablet .. 650 milliGRAM(s) Oral every 6 hours PRN  meclizine 25 milliGRAM(s) Oral three times a day  pantoprazole    Tablet 40 milliGRAM(s) Oral before breakfast  senna 2 Tablet(s) Oral at bedtime  atorvastatin 20 milliGRAM(s) Oral at bedtime  finasteride 5 milliGRAM(s) Oral daily  insulin glargine Injectable (LANTUS) 8 Unit(s) SubCutaneous at bedtime  insulin lispro (HumaLOG) corrective regimen sliding scale   SubCutaneous Before meals and at bedtime    oxybutynin 5 milliGRAM(s) Oral daily  sodium chloride 0.9%. 1000 milliLiter(s) IV Continuous <Continuous>      FAMILY HISTORY:  No pertinent family history in first degree relatives        Allergies    No Known Allergies    Intolerances    	      PHYSICAL EXAM:  T(C): 36.6 (03-07-20 @ 12:58), Max: 36.9 (03-06-20 @ 16:36)  HR: 83 (03-07-20 @ 12:59) (79 - 95)  BP: 148/80 (03-07-20 @ 12:59) (127/94 - 155/98)  RR: 18 (03-07-20 @ 12:58) (17 - 18)  SpO2: 99% (03-07-20 @ 12:59) (98% - 100%)  Wt(kg): --  I&O's Summary        EYES: EOMI, PERRLA, conjunctiva and sclera clear  ENMT: No tonsillar erythema, exudates, or enlargement; Moist mucous membranes, Good dentition, No lesions  Cardiovascular: Normal S1 S2, No JVD, No murmurs, No edema  Respiratory: Lungs clear to auscultation	  Gastrointestinal:  Soft, Non-tender, + BS	  Extremities: no edema      LABS:	 	    CARDIAC MARKERS:                                  12.6   6.51  )-----------( 112      ( 07 Mar 2020 06:30 )             37.3     03-07    138  |  107  |  19<H>  ----------------------------<  168<H>  4.0   |  26  |  1.18    Ca    8.3<L>      07 Mar 2020 06:30  Phos  2.6     03-07  Mg     1.6     03-07    TPro  5.5<L>  /  Alb  2.7<L>  /  TBili  1.0  /  DBili  x   /  AST  20  /  ALT  34  /  AlkPhos  95  03-07    proBNP:   Lipid Profile:   HgA1c: Hemoglobin A1C, Whole Blood: 8.0 % (03-07 @ 10:00)    TSH: Thyroid Stimulating Hormone, Serum: 1.26 uU/mL (03-07 @ 06:30)      Consultant(s) Notes Reviewed:  [x ] YES  [ ] NO    Care Discussed with Consultants/Other Providers [ x] YES  [ ] NO    Imaging Personally Reviewed independently:  [x] YES  [ ] NO    All labs, radiologic studies, vitals, orders and medications list reviewed. Patient is seen and examined at bedside. Case discussed with medical team.    ASSESSMENT/PLAN:

## 2020-03-07 NOTE — PROGRESS NOTE ADULT - ASSESSMENT
80 y/o Cantonese speaking male, from home, walks independently,  with PMH of  CAD, DM, HTN, HLD who presented with lightheadedness since today.     Problem/Plan - 1:  ·  Problem: ANTON (acute kidney injury).  Plan: Resolved.   Likely pre renal from dehydration  F/u Urine electrolytes  Avoid nephrotoxic medications.      Problem/Plan - 2:  ·  Problem: Generalized weakness.  Plan: Pt feels weak and dehydrated  Continue IV fluids  PT evaluation.      Problem/Plan - 3:  ·  Problem: Vertigo.  Plan: Continue home dose of meclizine.      Problem/Plan - 4:  ·  Problem: Orthostatic hypotension.  Plan: Pt was orthostatic positive  Continue IV fluids  Monitor orthostatics.      Problem/Plan - 5:  ·  Problem: DM (diabetes mellitus).  Plan: Sugars fine.   Pt takes Janumet, treseba 50 U once a week. Also takkes Novolog 17 U at morning and 20 U at bedtime.  Continue Sliding scale Humalog and lantus 8 U at bedtime   F/u HBA1C.     Problem/Plan - 6:  Problem: History of hypertension. Plan: Now Orthostatic .   Held atenolol as pt has orthostatic hypotension  Monitor blood presure.     Problem/Plan - 7:  ·  Problem: History of coronary artery disease. Plan : No cp or sob. ASA,Statin but holding BB.      Problem/Plan - 8:  ·  Problem: Prophylactic measure.  Plan:                                              1  IMPROVE VTE Score _________2  Heparin s/c for DVT prophylaxis.

## 2020-03-07 NOTE — PROGRESS NOTE ADULT - SUBJECTIVE AND OBJECTIVE BOX
INTERVAL HPI/OVERNIGHT EVENTS: Ate breakfast and doing good.   Vital Signs Last 24 Hrs  T(C): 36.4 (07 Mar 2020 05:12), Max: 37.2 (06 Mar 2020 11:24)  T(F): 97.5 (07 Mar 2020 05:12), Max: 99 (06 Mar 2020 11:24)  HR: 95 (07 Mar 2020 05:12) (79 - 95)  BP: 130/82 (07 Mar 2020 05:12) (103/66 - 155/98)  BP(mean): --  RR: 17 (07 Mar 2020 05:12) (16 - 18)  SpO2: 98% (07 Mar 2020 05:12) (96% - 100%)  I&O's Summary    MEDICATIONS  (STANDING):  aspirin enteric coated 81 milliGRAM(s) Oral daily  atorvastatin 20 milliGRAM(s) Oral at bedtime  finasteride 5 milliGRAM(s) Oral daily  heparin  Injectable 5000 Unit(s) SubCutaneous every 8 hours  insulin glargine Injectable (LANTUS) 8 Unit(s) SubCutaneous at bedtime  insulin lispro (HumaLOG) corrective regimen sliding scale   SubCutaneous Before meals and at bedtime  meclizine 25 milliGRAM(s) Oral three times a day  oxybutynin 5 milliGRAM(s) Oral daily  pantoprazole    Tablet 40 milliGRAM(s) Oral before breakfast  senna 2 Tablet(s) Oral at bedtime  sodium chloride 0.9%. 1000 milliLiter(s) (100 mL/Hr) IV Continuous <Continuous>  tamsulosin 0.4 milliGRAM(s) Oral at bedtime    MEDICATIONS  (PRN):  acetaminophen   Tablet .. 650 milliGRAM(s) Oral every 6 hours PRN Mild Pain (1 - 3)    LABS:                        12.6   6.51  )-----------( 112      ( 07 Mar 2020 06:30 )             37.3     03-07    138  |  107  |  19<H>  ----------------------------<  168<H>  4.0   |  26  |  1.18    Ca    8.3<L>      07 Mar 2020 06:30  Phos  2.6     03-07  Mg     1.6     03-07    TPro  5.5<L>  /  Alb  2.7<L>  /  TBili  1.0  /  DBili  x   /  AST  20  /  ALT  34  /  AlkPhos  95  03-07      Urinalysis Basic - ( 06 Mar 2020 16:09 )    Color: Yellow / Appearance: Clear / S.015 / pH: x  Gluc: x / Ketone: Trace  / Bili: Negative / Urobili: Negative   Blood: x / Protein: 100 / Nitrite: Negative   Leuk Esterase: Negative / RBC: 0-2 /HPF / WBC 3-5 /HPF   Sq Epi: x / Non Sq Epi: Few /HPF / Bacteria: Trace /HPF      CAPILLARY BLOOD GLUCOSE      POCT Blood Glucose.: 192 mg/dL (07 Mar 2020 07:45)  POCT Blood Glucose.: 143 mg/dL (06 Mar 2020 21:55)        Urinalysis Basic - ( 06 Mar 2020 16:09 )    Color: Yellow / Appearance: Clear / S.015 / pH: x  Gluc: x / Ketone: Trace  / Bili: Negative / Urobili: Negative   Blood: x / Protein: 100 / Nitrite: Negative   Leuk Esterase: Negative / RBC: 0-2 /HPF / WBC 3-5 /HPF   Sq Epi: x / Non Sq Epi: Few /HPF / Bacteria: Trace /HPF      REVIEW OF SYSTEMS:  CONSTITUTIONAL: No fever, weight loss, or fatigue  EYES: No eye pain, visual disturbances, or discharge  ENMT:  No difficulty hearing, tinnitus, vertigo; No sinus or throat pain  RESPIRATORY: No cough, wheezing, chills or hemoptysis; No shortness of breath  CARDIOVASCULAR: No chest pain, palpitations, dizziness, or leg swelling  GASTROINTESTINAL: No abdominal or epigastric pain. No nausea, vomiting, or hematemesis; No diarrhea or constipation. No melena or hematochezia.  GENITOURINARY: No dysuria, frequency, hematuria, or incontinence  NEUROLOGICAL: No headaches, memory loss, loss of strength, numbness, or tremors      Consultant(s) Notes Reviewed:  [x ] YES  [ ] NO    PHYSICAL EXAM:  GENERAL: NAD, well-groomed, well-developed,not in any distress ,  HEAD:  Atraumatic, Normocephalic  EYES: EOMI, PERRLA, conjunctiva and sclera clear  ENMT: No tonsillar erythema, exudates, or enlargement; Moist mucous membranes, Good dentition, No lesions  NECK: Supple, No JVD, Normal thyroid  NERVOUS SYSTEM:  Alert & Oriented X3, No focal deficit   CHEST/LUNG: Good air entry bilateral with no  rales, rhonchi, wheezing, or rubs  HEART: Regular rate and rhythm; No murmurs, rubs, or gallops  ABDOMEN: Soft, Nontender, Nondistended; Bowel sounds present  EXTREMITIES:  2+ Peripheral Pulses, No clubbing, cyanosis, or edema  SKIN: No rashes or lesions    Care Discussed with Consultants/Other Providers [ x] YES  [ ] NO

## 2020-03-07 NOTE — CONSULT NOTE ADULT - ASSESSMENT
ekg - nsr non specific stt changes  echo - pending     a/p     1) Presyncope - orthostatic + , give IV fluids, get 2d echo , pt told to drink 4 - 6 cups of water daily , is orthostatics stay + d/c flomax     2) BPH - on flomax     3) DVT prophylasix - sc heparin

## 2020-03-07 NOTE — PHYSICAL THERAPY INITIAL EVALUATION ADULT - CRITERIA FOR SKILLED THERAPEUTIC INTERVENTIONS
anticipated equipment needs at discharge/impairments found/functional limitations in following categories/risk reduction/prevention/therapy frequency/rehab potential/predicted duration of therapy intervention/anticipated discharge recommendation

## 2020-03-08 LAB
ALBUMIN SERPL ELPH-MCNC: 2.6 G/DL — LOW (ref 3.5–5)
ALP SERPL-CCNC: 104 U/L — SIGNIFICANT CHANGE UP (ref 40–120)
ALT FLD-CCNC: 42 U/L DA — SIGNIFICANT CHANGE UP (ref 10–60)
ANION GAP SERPL CALC-SCNC: 6 MMOL/L — SIGNIFICANT CHANGE UP (ref 5–17)
AST SERPL-CCNC: 28 U/L — SIGNIFICANT CHANGE UP (ref 10–40)
BASOPHILS # BLD AUTO: 0.01 K/UL — SIGNIFICANT CHANGE UP (ref 0–0.2)
BASOPHILS NFR BLD AUTO: 0.2 % — SIGNIFICANT CHANGE UP (ref 0–2)
BILIRUB SERPL-MCNC: 1.2 MG/DL — SIGNIFICANT CHANGE UP (ref 0.2–1.2)
BUN SERPL-MCNC: 17 MG/DL — SIGNIFICANT CHANGE UP (ref 7–18)
CALCIUM SERPL-MCNC: 8.4 MG/DL — SIGNIFICANT CHANGE UP (ref 8.4–10.5)
CHLORIDE SERPL-SCNC: 107 MMOL/L — SIGNIFICANT CHANGE UP (ref 96–108)
CO2 SERPL-SCNC: 25 MMOL/L — SIGNIFICANT CHANGE UP (ref 22–31)
CREAT SERPL-MCNC: 1.14 MG/DL — SIGNIFICANT CHANGE UP (ref 0.5–1.3)
CULTURE RESULTS: SIGNIFICANT CHANGE UP
EOSINOPHIL # BLD AUTO: 0.27 K/UL — SIGNIFICANT CHANGE UP (ref 0–0.5)
EOSINOPHIL NFR BLD AUTO: 5 % — SIGNIFICANT CHANGE UP (ref 0–6)
GLUCOSE BLDC GLUCOMTR-MCNC: 173 MG/DL — HIGH (ref 70–99)
GLUCOSE BLDC GLUCOMTR-MCNC: 207 MG/DL — HIGH (ref 70–99)
GLUCOSE BLDC GLUCOMTR-MCNC: 210 MG/DL — HIGH (ref 70–99)
GLUCOSE BLDC GLUCOMTR-MCNC: 224 MG/DL — HIGH (ref 70–99)
GLUCOSE SERPL-MCNC: 171 MG/DL — HIGH (ref 70–99)
HCT VFR BLD CALC: 36.5 % — LOW (ref 39–50)
HGB BLD-MCNC: 12.2 G/DL — LOW (ref 13–17)
IMM GRANULOCYTES NFR BLD AUTO: 0.6 % — SIGNIFICANT CHANGE UP (ref 0–1.5)
LYMPHOCYTES # BLD AUTO: 0.76 K/UL — LOW (ref 1–3.3)
LYMPHOCYTES # BLD AUTO: 14.1 % — SIGNIFICANT CHANGE UP (ref 13–44)
MAGNESIUM SERPL-MCNC: 1.7 MG/DL — SIGNIFICANT CHANGE UP (ref 1.6–2.6)
MCHC RBC-ENTMCNC: 31 PG — SIGNIFICANT CHANGE UP (ref 27–34)
MCHC RBC-ENTMCNC: 33.4 GM/DL — SIGNIFICANT CHANGE UP (ref 32–36)
MCV RBC AUTO: 92.9 FL — SIGNIFICANT CHANGE UP (ref 80–100)
MONOCYTES # BLD AUTO: 0.56 K/UL — SIGNIFICANT CHANGE UP (ref 0–0.9)
MONOCYTES NFR BLD AUTO: 10.4 % — SIGNIFICANT CHANGE UP (ref 2–14)
NEUTROPHILS # BLD AUTO: 3.77 K/UL — SIGNIFICANT CHANGE UP (ref 1.8–7.4)
NEUTROPHILS NFR BLD AUTO: 69.7 % — SIGNIFICANT CHANGE UP (ref 43–77)
NRBC # BLD: 0 /100 WBCS — SIGNIFICANT CHANGE UP (ref 0–0)
ORGANISM # SPEC MICROSCOPIC CNT: SIGNIFICANT CHANGE UP
ORGANISM # SPEC MICROSCOPIC CNT: SIGNIFICANT CHANGE UP
PHOSPHATE SERPL-MCNC: 2.7 MG/DL — SIGNIFICANT CHANGE UP (ref 2.5–4.5)
PLATELET # BLD AUTO: 115 K/UL — LOW (ref 150–400)
POTASSIUM SERPL-MCNC: 3.9 MMOL/L — SIGNIFICANT CHANGE UP (ref 3.5–5.3)
POTASSIUM SERPL-SCNC: 3.9 MMOL/L — SIGNIFICANT CHANGE UP (ref 3.5–5.3)
PROT SERPL-MCNC: 5.6 G/DL — LOW (ref 6–8.3)
RBC # BLD: 3.93 M/UL — LOW (ref 4.2–5.8)
RBC # FLD: 12.5 % — SIGNIFICANT CHANGE UP (ref 10.3–14.5)
SODIUM SERPL-SCNC: 138 MMOL/L — SIGNIFICANT CHANGE UP (ref 135–145)
SPECIMEN SOURCE: SIGNIFICANT CHANGE UP
WBC # BLD: 5.4 K/UL — SIGNIFICANT CHANGE UP (ref 3.8–10.5)
WBC # FLD AUTO: 5.4 K/UL — SIGNIFICANT CHANGE UP (ref 3.8–10.5)

## 2020-03-08 RX ORDER — VANCOMYCIN HCL 1 G
750 VIAL (EA) INTRAVENOUS ONCE
Refills: 0 | Status: COMPLETED | OUTPATIENT
Start: 2020-03-08 | End: 2020-03-08

## 2020-03-08 RX ORDER — VANCOMYCIN HCL 1 G
750 VIAL (EA) INTRAVENOUS EVERY 12 HOURS
Refills: 0 | Status: DISCONTINUED | OUTPATIENT
Start: 2020-03-08 | End: 2020-03-08

## 2020-03-08 RX ORDER — VANCOMYCIN HCL 1 G
VIAL (EA) INTRAVENOUS
Refills: 0 | Status: DISCONTINUED | OUTPATIENT
Start: 2020-03-08 | End: 2020-03-08

## 2020-03-08 RX ORDER — CHOLECALCIFEROL (VITAMIN D3) 125 MCG
400 CAPSULE ORAL DAILY
Refills: 0 | Status: DISCONTINUED | OUTPATIENT
Start: 2020-03-08 | End: 2020-03-10

## 2020-03-08 RX ADMIN — Medication 25 MILLIGRAM(S): at 06:00

## 2020-03-08 RX ADMIN — TAMSULOSIN HYDROCHLORIDE 0.4 MILLIGRAM(S): 0.4 CAPSULE ORAL at 22:09

## 2020-03-08 RX ADMIN — ATORVASTATIN CALCIUM 20 MILLIGRAM(S): 80 TABLET, FILM COATED ORAL at 22:09

## 2020-03-08 RX ADMIN — FINASTERIDE 5 MILLIGRAM(S): 5 TABLET, FILM COATED ORAL at 11:13

## 2020-03-08 RX ADMIN — Medication 250 MILLIGRAM(S): at 03:37

## 2020-03-08 RX ADMIN — Medication 2: at 16:30

## 2020-03-08 RX ADMIN — Medication 81 MILLIGRAM(S): at 11:13

## 2020-03-08 RX ADMIN — PANTOPRAZOLE SODIUM 40 MILLIGRAM(S): 20 TABLET, DELAYED RELEASE ORAL at 06:00

## 2020-03-08 RX ADMIN — HEPARIN SODIUM 5000 UNIT(S): 5000 INJECTION INTRAVENOUS; SUBCUTANEOUS at 22:09

## 2020-03-08 RX ADMIN — Medication 400 UNIT(S): at 11:13

## 2020-03-08 RX ADMIN — Medication 5 MILLIGRAM(S): at 11:13

## 2020-03-08 RX ADMIN — Medication 2: at 11:59

## 2020-03-08 RX ADMIN — SENNA PLUS 2 TABLET(S): 8.6 TABLET ORAL at 22:09

## 2020-03-08 RX ADMIN — Medication 2: at 22:20

## 2020-03-08 RX ADMIN — HEPARIN SODIUM 5000 UNIT(S): 5000 INJECTION INTRAVENOUS; SUBCUTANEOUS at 13:07

## 2020-03-08 RX ADMIN — INSULIN GLARGINE 8 UNIT(S): 100 INJECTION, SOLUTION SUBCUTANEOUS at 22:19

## 2020-03-08 RX ADMIN — Medication 25 MILLIGRAM(S): at 22:09

## 2020-03-08 RX ADMIN — Medication 1: at 07:59

## 2020-03-08 RX ADMIN — Medication 25 MILLIGRAM(S): at 13:07

## 2020-03-08 RX ADMIN — HEPARIN SODIUM 5000 UNIT(S): 5000 INJECTION INTRAVENOUS; SUBCUTANEOUS at 06:00

## 2020-03-08 NOTE — CONSULT NOTE ADULT - SUBJECTIVE AND OBJECTIVE BOX
Patient is a 79y old  Male, from home, walks independently,  with PMH of  CAD, DM, HTN, HLD who presented to the ER for evaluation of lightheadedness, vertigo and generalized weakness.   He has no palpitation, fever, or chest pain. Pt goes to [facility]. As per [facility] center his blood pressure was low while he was experiencing dizziness. On admission, he has no fever or Leukocytosis. The ID consult requested today, 3/7/20, to assist with further evaluation of positive blood culture.      REVIEW OF SYSTEMS: Total of twelve systems have been reviewed with patient and found to be negative unless mentioned in HPI      PAST MEDICAL & SURGICAL HISTORY:  CAD (coronary artery disease)  Hypercholesterolemia  HTN (hypertension)  DM (diabetes mellitus)  S/P gastrectomy  S/P TURP        SOCIAL HISTORY  Alcohol: Does not drink  Tobacco: Does not smoke  Illicit substance use: None      FAMILY HISTORY: Non contributory to the present illness        ALLERGIES: No Known Allergies        Vital Signs Last 24 Hrs  T(C): 36.6 (08 Mar 2020 12:56), Max: 37 (07 Mar 2020 20:38)  T(F): 97.8 (08 Mar 2020 12:56), Max: 98.6 (07 Mar 2020 20:38)  HR: 75 (08 Mar 2020 12:56) (72 - 75)  BP: 155/94 (08 Mar 2020 12:56) (132/74 - 155/94)  BP(mean): --  RR: 18 (08 Mar 2020 12:56) (18 - 18)  SpO2: 95% (08 Mar 2020 12:56) (95% - 97%)      PHYSICAL EXAM:  GENERAL: Not in distress   CHEST/LUNG:  Air entry bilaterally  HEART: s1 and s2 present  ABDOMEN:  Nontender and  Nondistended  EXTREMITIES: No pedal  edema  CNS: Awake and Alert      LABS:                        12.2   5.40  )-----------( 115      ( 08 Mar 2020 07:24 )             36.5         03-08    138  |  107  |  17  ----------------------------<  171<H>  3.9   |  25  |  1.14    Ca    8.4      08 Mar 2020 07:24  Phos  2.7     03-08  Mg     1.7     03-08    TPro  5.6<L>  /  Alb  2.6<L>  /  TBili  1.2  /  DBili  x   /  AST  28  /  ALT  42  /  AlkPhos  104  03-08      CAPILLARY BLOOD GLUCOSE  POCT Blood Glucose.: 207 mg/dL (08 Mar 2020 16:23)  POCT Blood Glucose.: 210 mg/dL (08 Mar 2020 11:40)  POCT Blood Glucose.: 173 mg/dL (08 Mar 2020 07:38)  POCT Blood Glucose.: 195 mg/dL (07 Mar 2020 21:22)      MEDICATIONS  (STANDING):  aspirin enteric coated 81 milliGRAM(s) Oral daily  atorvastatin 20 milliGRAM(s) Oral at bedtime  cholecalciferol 400 Unit(s) Oral daily  finasteride 5 milliGRAM(s) Oral daily  heparin  Injectable 5000 Unit(s) SubCutaneous every 8 hours  insulin glargine Injectable (LANTUS) 8 Unit(s) SubCutaneous at bedtime  insulin lispro (HumaLOG) corrective regimen sliding scale   SubCutaneous Before meals and at bedtime  meclizine 25 milliGRAM(s) Oral three times a day  oxybutynin 5 milliGRAM(s) Oral daily  pantoprazole    Tablet 40 milliGRAM(s) Oral before breakfast  senna 2 Tablet(s) Oral at bedtime  tamsulosin 0.4 milliGRAM(s) Oral at bedtime    MEDICATIONS  (PRN):  acetaminophen   Tablet .. 650 milliGRAM(s) Oral every 6 hours PRN Mild Pain (1 - 3)        RADIOLOGY & ADDITIONAL TESTS:    3/6/20 : CT Head No Cont (03.06.20 @ 15:29) No acute intracranial findings.      3/6/20 : Xray Chest 1 View-PORTABLE IMMEDIATE (03.06.20 @ 12:49) : Grossly clear lungs.        MICROBIOLOGY DATA:    Culture - Blood (03.07.20 @ 00:48)    Gram Stain:   Growth in aerobic bottle: Gram Positive Cocci in Clusters    -  Coagulase negative Staphylococcus: Detec    Specimen Source: .Blood    Organism: Blood Culture PCR    Culture Results:   Growth in aerobic bottle: Gram Positive Cocci in Clusters  ***Blood Panel PCR results on this specimen are available  approximately 3 hours after the Gram stain result.***  Gram stain, PCR, and/or culture results may not always  correspond due to difference in methodologies.  ************************************************************  This PCR assay was performed using [employer].  The following targets are tested for: Enterococcus,  vancomycin resistant enterococci, Listeria monocytogenes,  coagulase negative staphylococci, S. aureus,  methicillin resistant S. aureus, Streptococcus agalactiae  (Group B), S. pneumoniae, S. pyogenes (Group A),  Acinetobacter baumannii, Enterobacter cloacae, E. coli,  Klebsiella oxytoca, K. pneumoniae, Proteus sp.,  Serratia marcescens, Haemophilus influenzae,  Neisseria meningitidis, Pseudomonas aeruginosa, Candida  albicans, C. glabrata, C krusei, C parapsilosis,  C. tropicalis and the KPC resistance gene.    Organism Identification: Blood Culture PCR    Method Type: PCR      Culture - Blood (03.07.20 @ 00:48)    Specimen Source: .Blood    Culture Results:   No growth to date.    FLU A B RSV Detection by PCR (03.06.20 @ 20:47)    Flu A Result: NotDetec: The Flu A B RSV assay is a Real-Time PCR test for the qualitative  detection and differentiation of Influenza A, Influenza B, and  Respiratory Syncytial Virus on nasopharyngeal swabs. The results should  be interpreted in the context of all clinical and laboratory findings.    Flu B Result: NotDetec    RSV Result: NotDetec Patient is a 79y old  Male, from home, walks independently,  with PMH of  CAD, DM, HTN, HLD who presented to the ER for evaluation of lightheadedness, vertigo and generalized weakness. He has no palpitation, fever, or chest pain. Pt goes to . As per [facility] center his blood pressure was low while he was experiencing dizziness. On admission, he has no fever or Leukocytosis. The ID consult requested today, 3/7/20, to assist with further evaluation of positive blood culture.      REVIEW OF SYSTEMS: Total of twelve systems have been reviewed with patient and found to be negative unless mentioned in HPI      PAST MEDICAL & SURGICAL HISTORY:  CAD (coronary artery disease)  Hypercholesterolemia  HTN (hypertension)  DM (diabetes mellitus)  S/P gastrectomy  S/P TURP        SOCIAL HISTORY  Alcohol: Does not drink  Tobacco: Does not smoke  Illicit substance use: None      FAMILY HISTORY: Non contributory to the present illness        ALLERGIES: No Known Allergies        Vital Signs Last 24 Hrs  T(C): 36.6 (08 Mar 2020 12:56), Max: 37 (07 Mar 2020 20:38)  T(F): 97.8 (08 Mar 2020 12:56), Max: 98.6 (07 Mar 2020 20:38)  HR: 75 (08 Mar 2020 12:56) (72 - 75)  BP: 155/94 (08 Mar 2020 12:56) (132/74 - 155/94)  BP(mean): --  RR: 18 (08 Mar 2020 12:56) (18 - 18)  SpO2: 95% (08 Mar 2020 12:56) (95% - 97%)      PHYSICAL EXAM:  GENERAL: Not in distress   CHEST/LUNG:  Air entry bilaterally  HEART: s1 and s2 present  ABDOMEN:  Nontender and  Nondistended  EXTREMITIES: No pedal  edema  CNS: Awake and Alert      LABS:                        12.2   5.40  )-----------( 115      ( 08 Mar 2020 07:24 )             36.5         03-08    138  |  107  |  17  ----------------------------<  171<H>  3.9   |  25  |  1.14    Ca    8.4      08 Mar 2020 07:24  Phos  2.7     03-08  Mg     1.7     03-08    TPro  5.6<L>  /  Alb  2.6<L>  /  TBili  1.2  /  DBili  x   /  AST  28  /  ALT  42  /  AlkPhos  104  03-08      CAPILLARY BLOOD GLUCOSE  POCT Blood Glucose.: 207 mg/dL (08 Mar 2020 16:23)  POCT Blood Glucose.: 210 mg/dL (08 Mar 2020 11:40)  POCT Blood Glucose.: 173 mg/dL (08 Mar 2020 07:38)  POCT Blood Glucose.: 195 mg/dL (07 Mar 2020 21:22)      MEDICATIONS  (STANDING):  aspirin enteric coated 81 milliGRAM(s) Oral daily  atorvastatin 20 milliGRAM(s) Oral at bedtime  cholecalciferol 400 Unit(s) Oral daily  finasteride 5 milliGRAM(s) Oral daily  heparin  Injectable 5000 Unit(s) SubCutaneous every 8 hours  insulin glargine Injectable (LANTUS) 8 Unit(s) SubCutaneous at bedtime  insulin lispro (HumaLOG) corrective regimen sliding scale   SubCutaneous Before meals and at bedtime  meclizine 25 milliGRAM(s) Oral three times a day  oxybutynin 5 milliGRAM(s) Oral daily  pantoprazole    Tablet 40 milliGRAM(s) Oral before breakfast  senna 2 Tablet(s) Oral at bedtime  tamsulosin 0.4 milliGRAM(s) Oral at bedtime    MEDICATIONS  (PRN):  acetaminophen   Tablet .. 650 milliGRAM(s) Oral every 6 hours PRN Mild Pain (1 - 3)        RADIOLOGY & ADDITIONAL TESTS:    3/6/20 : CT Head No Cont (03.06.20 @ 15:29) No acute intracranial findings.      3/6/20 : Xray Chest 1 View-PORTABLE IMMEDIATE (03.06.20 @ 12:49) : Grossly clear lungs.        MICROBIOLOGY DATA:    Culture - Blood (03.07.20 @ 00:48)    Gram Stain:   Growth in aerobic bottle: Gram Positive Cocci in Clusters    -  Coagulase negative Staphylococcus: Detec    Specimen Source: .Blood    Organism: Blood Culture PCR    Culture Results:   Growth in aerobic bottle: Gram Positive Cocci in Clusters  ***Blood Panel PCR results on this specimen are available  approximately 3 hours after the Gram stain result.***  Gram stain, PCR, and/or culture results may not always  correspond due to difference in methodologies.  ************************************************************  This PCR assay was performed using [employer].  The following targets are tested for: Enterococcus,  vancomycin resistant enterococci, Listeria monocytogenes,  coagulase negative staphylococci, S. aureus,  methicillin resistant S. aureus, Streptococcus agalactiae  (Group B), S. pneumoniae, S. pyogenes (Group A),  Acinetobacter baumannii, Enterobacter cloacae, E. coli,  Klebsiella oxytoca, K. pneumoniae, Proteus sp.,  Serratia marcescens, Haemophilus influenzae,  Neisseria meningitidis, Pseudomonas aeruginosa, Candida  albicans, C. glabrata, C krusei, C parapsilosis,  C. tropicalis and the KPC resistance gene.    Organism Identification: Blood Culture PCR    Method Type: PCR      Culture - Blood (03.07.20 @ 00:48)    Specimen Source: .Blood    Culture Results:   No growth to date.    FLU A B RSV Detection by PCR (03.06.20 @ 20:47)    Flu A Result: NotDetec: The Flu A B RSV assay is a Real-Time PCR test for the qualitative  detection and differentiation of Influenza A, Influenza B, and  Respiratory Syncytial Virus on nasopharyngeal swabs. The results should  be interpreted in the context of all clinical and laboratory findings.    Flu B Result: NotDetec    RSV Result: NotDetec

## 2020-03-08 NOTE — CONSULT NOTE ADULT - ASSESSMENT
Patient is a 79y old  Male, from home, walks independently,  with PMH of  CAD, DM, HTN, HLD who presented to the ER for evaluation of lightheadedness, vertigo and generalized weakness.   He has no palpitation, fever, or chest pain. Pt goes to [facility]. As per [facility] center his blood pressure was low while he was experiencing dizziness. On admission, he has no fever or Leukocytosis. The ID consult requested today, 3/7/20, to assist with further evaluation of positive blood culture.    # Bacteremia- Coagulase Negative staph.- most likely a contaminant in the setting of No implanted prosthesis  # ANTON  # Dizziness - CT head is negative    would recommend;    1. Repeat Blood cultures x 2 in AM to document clearing the blood stream  2. Obtain TTE  3. Monitor kidney function and IVF  4. management of Dizziness as per Primary    will follow the patient with you and make further recommendation based on the clinical course and Lab results  Thank you for the opportunity to participate in Mr. CALDERON's care Patient is a 79y old  Male, from home, walks independently,  with PMH of  CAD, DM, HTN, HLD who presented to the ER for evaluation of lightheadedness, vertigo and generalized weakness. He has no palpitation, fever, or chest pain. Pt goes to [facility]. As per [facility] center his blood pressure was low while he was experiencing dizziness. On admission, he has no fever or Leukocytosis. The ID consult requested today, 3/7/20, to assist with further evaluation of positive blood culture.    # Bacteremia- Coagulase Negative staph.- most likely a contaminant in the setting of No implanted prosthesis   # ANTON  # Dizziness - CT head is negative    would recommend;    1. Repeat Blood cultures x 2 in AM to document clearing the blood stream  2. Obtain TTE  3. Monitor kidney function and IVF  4. Management of Dizziness as per Primary    will follow the patient with you and make further recommendation based on the clinical course and Lab results  Thank you for the opportunity to participate in Mr. CALDERON's care      Attending Attestation:    Spent more than 65 minutes on total encounter, more than 50 % of the visit was spent counseling and/or coordinating care by the Attending physician.

## 2020-03-08 NOTE — PROGRESS NOTE ADULT - SUBJECTIVE AND OBJECTIVE BOX
Hayden Ocampo MD  Interventional Cardiology / Advance Heart Failure and Cardiac Transplant Specialist  [place] Office : [phone] [place] [address] [place] [address]  Tel:   [place] Office : [phone] [facility] [place] [address]  Tel: [phone]  Cell : [phone]    Pt is lying in bed comfortable not in distress, no chest pains no SOB no palpitations  	  MEDICATIONS:  aspirin enteric coated 81 milliGRAM(s) Oral daily  heparin  Injectable 5000 Unit(s) SubCutaneous every 8 hours  tamsulosin 0.4 milliGRAM(s) Oral at bedtime  acetaminophen   Tablet .. 650 milliGRAM(s) Oral every 6 hours PRN  meclizine 25 milliGRAM(s) Oral three times a day  pantoprazole    Tablet 40 milliGRAM(s) Oral before breakfast  senna 2 Tablet(s) Oral at bedtime  atorvastatin 20 milliGRAM(s) Oral at bedtime  finasteride 5 milliGRAM(s) Oral daily  insulin glargine Injectable (LANTUS) 8 Unit(s) SubCutaneous at bedtime  insulin lispro (HumaLOG) corrective regimen sliding scale   SubCutaneous Before meals and at bedtime  cholecalciferol 400 Unit(s) Oral daily  oxybutynin 5 milliGRAM(s) Oral daily    PAST MEDICAL/SURGICAL HISTORY  PAST MEDICAL & SURGICAL HISTORY:  CAD (coronary artery disease)  Hypercholesterolemia  HTN (hypertension)  DM (diabetes mellitus)  S/P gastrectomy  S/P TURP      SOCIAL HISTORY: Substance Use (street drugs): ( x ) never used  (  ) other:    FAMILY HISTORY:  No pertinent family history in first degree relatives      REVIEW OF SYSTEMS:  CONSTITUTIONAL: No fever, weight loss, or fatigue  EYES: No eye pain, visual disturbances, or discharge  ENMT:  No difficulty hearing, tinnitus, vertigo; No sinus or throat pain  BREASTS: No pain, masses, or nipple discharge  GASTROINTESTINAL: No abdominal or epigastric pain. No nausea, vomiting, or hematemesis; No diarrhea or constipation. No melena or hematochezia.  GENITOURINARY: No dysuria, frequency, hematuria, or incontinence  NEUROLOGICAL: No headaches, memory loss, loss of strength, numbness, or tremors  ENDOCRINE: No heat or cold intolerance; No hair loss  MUSCULOSKELETAL: No joint pain or swelling; No muscle, back, or extremity pain  PSYCHIATRIC: No depression, anxiety, mood swings, or difficulty sleeping  HEME/LYMPH: No easy bruising, or bleeding gums  All others negative    PHYSICAL EXAM:  T(C): 36.6 (03-08-20 @ 12:56), Max: 37 (03-07-20 @ 20:38)  HR: 75 (03-08-20 @ 12:56) (72 - 75)  BP: 155/94 (03-08-20 @ 12:56) (132/74 - 155/94)  RR: 18 (03-08-20 @ 12:56) (18 - 18)  SpO2: 95% (03-08-20 @ 12:56) (95% - 97%)  Wt(kg): --  I&O's Summary          EYES: EOMI, PERRLA, conjunctiva and sclera clear  ENMT: No tonsillar erythema, exudates, or enlargement; Moist mucous membranes, Good dentition, No lesions  Cardiovascular: Normal S1 S2, No JVD, No murmurs, No edema  Respiratory: Lungs clear to auscultation	  Gastrointestinal:  Soft, Non-tender, + BS	  Extremities: no edema                                    12.2   5.40  )-----------( 115      ( 08 Mar 2020 07:24 )             36.5     03-08    138  |  107  |  17  ----------------------------<  171<H>  3.9   |  25  |  1.14    Ca    8.4      08 Mar 2020 07:24  Phos  2.7     03-08  Mg     1.7     03-08    TPro  5.6<L>  /  Alb  2.6<L>  /  TBili  1.2  /  DBili  x   /  AST  28  /  ALT  42  /  AlkPhos  104  03-08    proBNP:   Lipid Profile:   HgA1c:   TSH:     Consultant(s) Notes Reviewed:  [x ] YES  [ ] NO    Care Discussed with Consultants/Other Providers [ x] YES  [ ] NO    Imaging Personally Reviewed independently:  [x] YES  [ ] NO    All labs, radiologic studies, vitals, orders and medications list reviewed. Patient is seen and examined at bedside. Case discussed with medical team.

## 2020-03-08 NOTE — PROGRESS NOTE ADULT - ASSESSMENT
78 y/o Cantonese speaking male, from home, walks independently,  with PMH of  CAD, DM, HTN, HLD who presented with lightheadedness since today.     Problem/Plan - 1:  ·  Problem: ANTON (acute kidney injury).  Plan: Resolved.   Likely pre renal from dehydration  F/u Urine electrolytes  Avoid nephrotoxic medications.      Problem/Plan - 2:  ·  Problem: Bacteremia   Plan: Likely contaminant so rpting blood cultures .     ID helping.       Problem/Plan - 3:  ·  Problem: Dizziness.  Plan: Sec to Orthostasis .      Problem/Plan - 4:  ·  Problem: Orthostatic hypotension.  Plan: Pt was orthostatic positive  Continue IV fluids  Monitor orthostatics.      Problem/Plan - 5:  ·  Problem: DM (diabetes mellitus).  Plan: Sugars fine.   Pt takes Janumet, treseba 50 U once a week. Also takkes Novolog 17 U at morning and 20 U at bedtime.  Continue Sliding scale Humalog and lantus 8 U at bedtime   F/u HBA1C.     Problem/Plan - 6:  Problem: History of hypertension. Plan: Now Orthostatic .   Held atenolol as pt has orthostatic hypotension  Monitor blood presure.     Problem/Plan - 7:  ·  Problem: History of coronary artery disease. Plan : No cp or sob. ASA,Statin but holding BB.      Problem/Plan - 8:  ·  Problem: Prophylactic measure.  Plan:                                              1  IMPROVE VTE Score _________2  Heparin s/c for DVT prophylaxis.

## 2020-03-08 NOTE — PROGRESS NOTE ADULT - SUBJECTIVE AND OBJECTIVE BOX
INTERVAL HPI/OVERNIGHT EVENTS: Still feeling dizzy   Vital Signs Last 24 Hrs  T(C): 36.6 (08 Mar 2020 12:56), Max: 37 (07 Mar 2020 20:38)  T(F): 97.8 (08 Mar 2020 12:56), Max: 98.6 (07 Mar 2020 20:38)  HR: 75 (08 Mar 2020 12:56) (72 - 75)  BP: 155/94 (08 Mar 2020 12:56) (132/74 - 155/94)  BP(mean): --  RR: 18 (08 Mar 2020 12:56) (18 - 18)  SpO2: 95% (08 Mar 2020 12:56) (95% - 97%)  I&O's Summary    MEDICATIONS  (STANDING):  aspirin enteric coated 81 milliGRAM(s) Oral daily  atorvastatin 20 milliGRAM(s) Oral at bedtime  cholecalciferol 400 Unit(s) Oral daily  finasteride 5 milliGRAM(s) Oral daily  heparin  Injectable 5000 Unit(s) SubCutaneous every 8 hours  insulin glargine Injectable (LANTUS) 8 Unit(s) SubCutaneous at bedtime  insulin lispro (HumaLOG) corrective regimen sliding scale   SubCutaneous Before meals and at bedtime  meclizine 25 milliGRAM(s) Oral three times a day  oxybutynin 5 milliGRAM(s) Oral daily  pantoprazole    Tablet 40 milliGRAM(s) Oral before breakfast  senna 2 Tablet(s) Oral at bedtime  tamsulosin 0.4 milliGRAM(s) Oral at bedtime    MEDICATIONS  (PRN):  acetaminophen   Tablet .. 650 milliGRAM(s) Oral every 6 hours PRN Mild Pain (1 - 3)    LABS:                        12.2   5.40  )-----------( 115      ( 08 Mar 2020 07:24 )             36.5     03-08    138  |  107  |  17  ----------------------------<  171<H>  3.9   |  25  |  1.14    Ca    8.4      08 Mar 2020 07:24  Phos  2.7     03-08  Mg     1.7     03-08    TPro  5.6<L>  /  Alb  2.6<L>  /  TBili  1.2  /  DBili  x   /  AST  28  /  ALT  42  /  AlkPhos  104  03-08      Urinalysis Basic - ( 06 Mar 2020 16:09 )    Color: Yellow / Appearance: Clear / S.015 / pH: x  Gluc: x / Ketone: Trace  / Bili: Negative / Urobili: Negative   Blood: x / Protein: 100 / Nitrite: Negative   Leuk Esterase: Negative / RBC: 0-2 /HPF / WBC 3-5 /HPF   Sq Epi: x / Non Sq Epi: Few /HPF / Bacteria: Trace /HPF      CAPILLARY BLOOD GLUCOSE      POCT Blood Glucose.: 210 mg/dL (08 Mar 2020 11:40)  POCT Blood Glucose.: 173 mg/dL (08 Mar 2020 07:38)  POCT Blood Glucose.: 195 mg/dL (07 Mar 2020 21:22)  POCT Blood Glucose.: 231 mg/dL (07 Mar 2020 16:12)        Urinalysis Basic - ( 06 Mar 2020 16:09 )    Color: Yellow / Appearance: Clear / S.015 / pH: x  Gluc: x / Ketone: Trace  / Bili: Negative / Urobili: Negative   Blood: x / Protein: 100 / Nitrite: Negative   Leuk Esterase: Negative / RBC: 0-2 /HPF / WBC 3-5 /HPF   Sq Epi: x / Non Sq Epi: Few /HPF / Bacteria: Trace /HPF      REVIEW OF SYSTEMS:  CONSTITUTIONAL: No fever, weight loss, or fatigue  EYES: No eye pain, visual disturbances, or discharge  BREASTS: No pain, masses, or nipple discharge  RESPIRATORY: No cough, wheezing, chills or hemoptysis; No shortness of breath  CARDIOVASCULAR: No chest pain, palpitations, dizziness, or leg swelling  GASTROINTESTINAL: No abdominal or epigastric pain. No nausea, vomiting, or hematemesis; No diarrhea or constipation. No melena or hematochezia.  GENITOURINARY: No dysuria, frequency, hematuria, or incontinence  NEUROLOGICAL: No headaches, memory loss, loss of strength, numbness, or tremors      Consultant(s) Notes Reviewed:  [x ] YES  [ ] NO    PHYSICAL EXAM:  GENERAL: NAD, well-groomed, well-developed,not in any distress ,  HEAD:  Atraumatic, Normocephalic  EYES: EOMI, PERRLA, conjunctiva and sclera clear  ENMT: No tonsillar erythema, exudates, or enlargement; Moist mucous membranes, Good dentition, No lesions  NECK: Supple, No JVD, Normal thyroid  NERVOUS SYSTEM:  Alert & Oriented X3, No focal deficit   CHEST/LUNG: Good air entry bilateral with no  rales, rhonchi, wheezing, or rubs  HEART: Regular rate and rhythm; No murmurs, rubs, or gallops  ABDOMEN: Soft, Nontender, Nondistended; Bowel sounds present  EXTREMITIES:  2+ Peripheral Pulses, No clubbing, cyanosis, or edema    Care Discussed with Consultants/Other Providers [ x] YES  [ ] NO

## 2020-03-08 NOTE — CHART NOTE - NSCHARTNOTEFT_GEN_A_CORE
EVENT:  Culture - Blood (collected 07 Mar 2020 00:48) Growth in aerobic bottle: Gram Positive Cocci in Clusters            OBJECTIVE:  Vital Signs Last 24 Hrs  T(C): 37 (07 Mar 2020 20:38), Max: 37 (07 Mar 2020 20:38)  T(F): 98.6 (07 Mar 2020 20:38), Max: 98.6 (07 Mar 2020 20:38)  HR: 75 (07 Mar 2020 20:38) (75 - 95)  BP: 132/74 (07 Mar 2020 20:38) (130/82 - 154/79)  BP(mean): --  RR: 18 (07 Mar 2020 20:38) (17 - 18)  SpO2: 97% (07 Mar 2020 20:38) (97% - 99%)    FOCUSED PHYSICAL EXAM:    LABS:                        12.6   6.51  )-----------( 112      ( 07 Mar 2020 06:30 )             37.3   CARDIAC MARKERS ( 06 Mar 2020 12:28 )  <0.015 ng/mL / x     / x     / x     / x        03-07    138  |  107  |  19<H>  ----------------------------<  168<H>  4.0   |  26  |  1.18    Ca    8.3<L>      07 Mar 2020 06:30  Phos  2.6     03-07  Mg     1.6     03-07    TPro  5.5<L>  /  Alb  2.7<L>  /  TBili  1.0  /  DBili  x   /  AST  20  /  ALT  34  /  AlkPhos  95  03-07      EKG:   IMGAGING:    ASSESSMENT:  HPI:  Pt is an 78 y/o Cantonese speaking male, from home, walks independently,  with PMH of  CAD, DM, HTN, HLD who presented with lightheadedness since today. According to the pt, since today  morning he has been experiencing lightheadedness, vertigo and generalized weakness.   he denied palpitation fever, chills, chest pain, runny nose, abdominal pain and other complains. [facility]. As per senior day care center his blood pressure was low while he was experiencing dizziness. (06 Mar 2020 19:05)      PLAN:   MEDICATIONS  (STANDING):  aspirin enteric coated 81 milliGRAM(s) Oral daily  atorvastatin 20 milliGRAM(s) Oral at bedtime  finasteride 5 milliGRAM(s) Oral daily  heparin  Injectable 5000 Unit(s) SubCutaneous every 8 hours  insulin glargine Injectable (LANTUS) 8 Unit(s) SubCutaneous at bedtime  insulin lispro (HumaLOG) corrective regimen sliding scale   SubCutaneous Before meals and at bedtime  meclizine 25 milliGRAM(s) Oral three times a day  oxybutynin 5 milliGRAM(s) Oral daily  pantoprazole    Tablet 40 milliGRAM(s) Oral before breakfast  senna 2 Tablet(s) Oral at bedtime  tamsulosin 0.4 milliGRAM(s) Oral at bedtime  vancomycin  IVPB 750 milliGRAM(s) IV Intermittent once  vancomycin  IVPB 750 milliGRAM(s) IV Intermittent every 12 hours  vancomycin  IVPB        MEDICATIONS  (PRN):  acetaminophen   Tablet .. 650 milliGRAM(s) Oral every 6 hours PRN Mild Pain (1 - 3)    FOLLOW UP / RESULT: EVENT:  Culture - Blood (collected 07 Mar 2020 00:48) Growth in aerobic bottle: Gram Positive Cocci in Clusters - coag neg staph    BRIEF HPI: 78 y/o Cantonese speaking male, from home, walks independently,  with PMH of  CAD, DM, HTN, HLD who presented with lightheadedness since today.  probably due to dehydration. PTA vetigo on meclizine      OBJECTIVE:  Vital Signs Last 24 Hrs  T(C): 37 (07 Mar 2020 20:38), Max: 37 (07 Mar 2020 20:38)  T(F): 98.6 (07 Mar 2020 20:38), Max: 98.6 (07 Mar 2020 20:38)  HR: 75 (07 Mar 2020 20:38) (75 - 95)  BP: 132/74 (07 Mar 2020 20:38) (130/82 - 154/79)  BP(mean): --  RR: 18 (07 Mar 2020 20:38) (17 - 18)  SpO2: 97% (07 Mar 2020 20:38) (97% - 99%)    FOCUSED PHYSICAL EXAM:    LABS:                        12.6   6.51  )-----------( 112      ( 07 Mar 2020 06:30 )             37.3   CARDIAC MARKERS ( 06 Mar 2020 12:28 )  <0.015 ng/mL / x     / x     / x     / x        03-07    138  |  107  |  19<H>  ----------------------------<  168<H>  4.0   |  26  |  1.18    Ca    8.3<L>      07 Mar 2020 06:30  Phos  2.6     03-07  Mg     1.6     03-07    TPro  5.5<L>  /  Alb  2.7<L>  /  TBili  1.0  /  DBili  x   /  AST  20  /  ALT  34  /  AlkPhos  95  03-07      EKG:   IMGAGING:    ASSESSMENT:  HPI:  Pt is an 78 y/o Cantonese speaking male, from home, walks independently,  with PMH of  CAD, DM, HTN, HLD who presented with lightheadedness since today. According to the pt, since today  morning he has been experiencing lightheadedness, vertigo and generalized weakness.   he denied palpitation fever, chills, chest pain, runny nose, abdominal pain and other complains. Pt goes to [facility]. As per senior day care center his blood pressure was low while he was experiencing dizziness. (06 Mar 2020 19:05)      PLAN:   MEDICATIONS  (STANDING):  aspirin enteric coated 81 milliGRAM(s) Oral daily  atorvastatin 20 milliGRAM(s) Oral at bedtime  finasteride 5 milliGRAM(s) Oral daily  heparin  Injectable 5000 Unit(s) SubCutaneous every 8 hours  insulin glargine Injectable (LANTUS) 8 Unit(s) SubCutaneous at bedtime  insulin lispro (HumaLOG) corrective regimen sliding scale   SubCutaneous Before meals and at bedtime  meclizine 25 milliGRAM(s) Oral three times a day  oxybutynin 5 milliGRAM(s) Oral daily  pantoprazole    Tablet 40 milliGRAM(s) Oral before breakfast  senna 2 Tablet(s) Oral at bedtime  tamsulosin 0.4 milliGRAM(s) Oral at bedtime  vancomycin  IVPB 750 milliGRAM(s) IV Intermittent once  vancomycin  IVPB 750 milliGRAM(s) IV Intermittent every 12 hours  vancomycin  IVPB        MEDICATIONS  (PRN):  acetaminophen   Tablet .. 650 milliGRAM(s) Oral every 6 hours PRN Mild Pain (1 - 3)    FOLLOW UP / RESULT: EVENT:  Culture - Blood (collected 07 Mar 2020 00:48) Growth in aerobic bottle: Gram Positive Cocci in Clusters - coag neg staph    BRIEF HPI: 80 y/o Cantonese speaking male, from home, walks independently,  with PMH of  CAD, DM, HTN, HLD who presented with lightheadedness X 1 day probably due to dehydration. PTA vertigo on meclizine. Blood culture coag neg staph, sensitivity pending.    OBJECTIVE:  Vital Signs Last 24 Hrs  T(C): 37 (07 Mar 2020 20:38), Max: 37 (07 Mar 2020 20:38)  T(F): 98.6 (07 Mar 2020 20:38), Max: 98.6 (07 Mar 2020 20:38)  HR: 75 (07 Mar 2020 20:38) (75 - 95)  BP: 132/74 (07 Mar 2020 20:38) (130/82 - 154/79)  BP(mean): --  RR: 18 (07 Mar 2020 20:38) (17 - 18)  SpO2: 97% (07 Mar 2020 20:38) (97% - 99%)    FOCUSED PHYSICAL EXAM:  GENERAL: Obese male sitting up in bed  HEAD:  Atraumatic, Normocephalic  EYES: EOMI, PERRLA, conjunctiva and sclera clear  NECK: Supple, No JVD  CHEST/LUNG: Clear to auscultation bilaterally; No wheezes or rales  HEART: Regular rate and rhythm; No murmurs, rubs, or gallops  ABDOMEN: Soft, Nontender, Nondistended, Normal bowel sounds  EXTREMITIES:  2+ Peripheral Pulses, No clubbing, cyanosis, or edema  Neurological: AOx3  Skin: Warm and dry  Psychiatric: Normal affect    LABS:                        12.6   6.51  )-----------( 112      ( 07 Mar 2020 06:30 )             37.3   CARDIAC MARKERS ( 06 Mar 2020 12:28 )  <0.015 ng/mL / x     / x     / x     / x        03-07    138  |  107  |  19<H>  ----------------------------<  168<H>  4.0   |  26  |  1.18    Ca    8.3<L>      07 Mar 2020 06:30  Phos  2.6     03-07  Mg     1.6     03-07    TPro  5.5<L>  /  Alb  2.7<L>  /  TBili  1.0  /  DBili  x   /  AST  20  /  ALT  34  /  AlkPhos  95  03-07    EKG: 3/6/20  Ventricular Rate 79 BPM  Atrial Rate 79 BPM  P-R Interval 174 ms  QRS Duration 90 ms  Q-T Interval 406 ms  QTC Calculation(Bezet) 465 ms  P Axis 10 degrees  R Axis 7 degrees  T Axis 67 degrees  Diagnosis Line Normal sinus rhythm  Incomplete right bundle branch block  Nonspecific T wave abnormality  Abnormal ECG    IMAGING:  EXAM:  XR CHEST PORTABLE IMMED 1V                        PROCEDURE DATE:  03/06/2020    INTERPRETATION:  Weakness and dizziness.  AP chest. Prior 9/26/2019.  Heart enlarged despite AP film shallow inspiration. Calcified aortic arch. Grossly clear lungs. No consolidation or effusion.  Impression: Grossly clear lungs.    PROBLEM: Gram Positive Cocci in Clusters - coag neg staph probably due to contamination vs ?  PLAN:   1. Vancomycin  IVPB 750 milliGRAM(s) IV Intermittent once then vancomycin  IVPB 750 milliGRAM(s) IV Intermittent every 12 hours ordered    FOLLOW UP / RESULT: BC sensitivity results EVENT:  Culture - Blood (collected 07 Mar 2020 00:48) Growth in aerobic bottle: Gram Positive Cocci in Clusters - coag neg staph    BRIEF HPI: 78 y/o Cantonese speaking male, from home, walks independently,  with PMH of  CAD, DM, HTN, HLD who presented with lightheadedness X 1 day probably due to dehydration. PTA vertigo on meclizine. Blood culture coag neg staph, sensitivity pending.    OBJECTIVE:  Vital Signs Last 24 Hrs  T(C): 37 (07 Mar 2020 20:38), Max: 37 (07 Mar 2020 20:38)  T(F): 98.6 (07 Mar 2020 20:38), Max: 98.6 (07 Mar 2020 20:38)  HR: 75 (07 Mar 2020 20:38) (75 - 95)  BP: 132/74 (07 Mar 2020 20:38) (130/82 - 154/79)  BP(mean): --  RR: 18 (07 Mar 2020 20:38) (17 - 18)  SpO2: 97% (07 Mar 2020 20:38) (97% - 99%)    FOCUSED PHYSICAL EXAM:  GENERAL: Obese male sitting up in bed  HEAD:  Atraumatic, Normocephalic  EYES: EOMI, PERRLA, conjunctiva and sclera clear  NECK: Supple, No JVD  CHEST/LUNG: Clear to auscultation bilaterally; No wheezes or rales  HEART: Regular rate and rhythm; No murmurs, rubs, or gallops  ABDOMEN: Soft, Nontender, Nondistended, Normal bowel sounds  EXTREMITIES:  2+ Peripheral Pulses, No clubbing, cyanosis, or edema  Neurological: AOx3  Skin: Warm and dry  Psychiatric: Normal affect    LABS:                        12.6   6.51  )-----------( 112      ( 07 Mar 2020 06:30 )             37.3   CARDIAC MARKERS ( 06 Mar 2020 12:28 )  <0.015 ng/mL / x     / x     / x     / x        03-07    138  |  107  |  19<H>  ----------------------------<  168<H>  4.0   |  26  |  1.18    Ca    8.3<L>      07 Mar 2020 06:30  Phos  2.6     03-07  Mg     1.6     03-07    TPro  5.5<L>  /  Alb  2.7<L>  /  TBili  1.0  /  DBili  x   /  AST  20  /  ALT  34  /  AlkPhos  95  03-07    EKG: 3/6/20  Ventricular Rate 79 BPM  Atrial Rate 79 BPM  P-R Interval 174 ms  QRS Duration 90 ms  Q-T Interval 406 ms  QTC Calculation(Bezet) 465 ms  P Axis 10 degrees  R Axis 7 degrees  T Axis 67 degrees  Diagnosis Line Normal sinus rhythm  Incomplete right bundle branch block  Nonspecific T wave abnormality  Abnormal ECG    IMAGING:  EXAM:  XR CHEST PORTABLE IMMED 1V                        PROCEDURE DATE:  03/06/2020    INTERPRETATION:  Weakness and dizziness.  AP chest. Prior 9/26/2019.  Heart enlarged despite AP film shallow inspiration. Calcified aortic arch. Grossly clear lungs. No consolidation or effusion.  Impression: Grossly clear lungs.    PROBLEM: Gram Positive Cocci in Clusters - coag neg staph probably due to contamination vs ?  PLAN:   1. Vancomycin  IVPB 750 milliGRAM(s) IV Intermittent once then vancomycin  IVPB 750 milliGRAM(s) IV Intermittent every 12 hours ordered  2. Vanco trough after 3rd dose    FOLLOW UP / RESULT: BC sensitivity results EVENT:  Culture - Blood (collected 07 Mar 2020 00:48) Growth in aerobic bottle: Gram Positive Cocci in Clusters - coag neg staph    BRIEF HPI: 78 y/o Cantonese speaking male, from home, walks independently,  with PMH of  CAD, DM, HTN, HLD who presented with lightheadedness X 1 day probably due to dehydration. PTA vertigo on meclizine. Blood culture coag neg staph, sensitivity pending.    OBJECTIVE:  Vital Signs Last 24 Hrs  T(C): 37 (07 Mar 2020 20:38), Max: 37 (07 Mar 2020 20:38)  T(F): 98.6 (07 Mar 2020 20:38), Max: 98.6 (07 Mar 2020 20:38)  HR: 75 (07 Mar 2020 20:38) (75 - 95)  BP: 132/74 (07 Mar 2020 20:38) (130/82 - 154/79)  BP(mean): --  RR: 18 (07 Mar 2020 20:38) (17 - 18)  SpO2: 97% (07 Mar 2020 20:38) (97% - 99%)    FOCUSED PHYSICAL EXAM:  GENERAL: Obese male sitting up in bed  HEAD:  Atraumatic, Normocephalic  EYES: EOMI, PERRLA, conjunctiva and sclera clear  NECK: Supple, No JVD  CHEST/LUNG: Clear to auscultation bilaterally; No wheezes or rales  HEART: Regular rate and rhythm; No murmurs, rubs, or gallops  ABDOMEN: Soft, Nontender, Nondistended, Normal bowel sounds  EXTREMITIES:  2+ Peripheral Pulses, No clubbing, cyanosis, or edema  Neurological: AOx3  Skin: Warm and dry  Psychiatric: Normal affect    LABS:                        12.6   6.51  )-----------( 112      ( 07 Mar 2020 06:30 )             37.3   CARDIAC MARKERS ( 06 Mar 2020 12:28 )  <0.015 ng/mL / x     / x     / x     / x        03-07    138  |  107  |  19<H>  ----------------------------<  168<H>  4.0   |  26  |  1.18    Ca    8.3<L>      07 Mar 2020 06:30  Phos  2.6     03-07  Mg     1.6     03-07    TPro  5.5<L>  /  Alb  2.7<L>  /  TBili  1.0  /  DBili  x   /  AST  20  /  ALT  34  /  AlkPhos  95  03-07    EKG: 3/6/20  Ventricular Rate 79 BPM  Atrial Rate 79 BPM  P-R Interval 174 ms  QRS Duration 90 ms  Q-T Interval 406 ms  QTC Calculation(Bezet) 465 ms  P Axis 10 degrees  R Axis 7 degrees  T Axis 67 degrees  Diagnosis Line Normal sinus rhythm  Incomplete right bundle branch block  Nonspecific T wave abnormality  Abnormal ECG    IMAGING:  EXAM:  XR CHEST PORTABLE IMMED 1V                        PROCEDURE DATE:  03/06/2020    INTERPRETATION:  Weakness and dizziness.  AP chest. Prior 9/26/2019.  Heart enlarged despite AP film shallow inspiration. Calcified aortic arch. Grossly clear lungs. No consolidation or effusion.  Impression: Grossly clear lungs.    PROBLEM: Gram Positive Cocci in Clusters - coag neg staph probably due to contamination vs nosocomial  PLAN:   1. Vancomycin  IVPB 750 milliGRAM(s) IV Intermittent once then vancomycin  IVPB 750 milliGRAM(s) IV Intermittent every 12 hours ordered  2. Vanco trough after 3rd dose    FOLLOW UP / RESULT: BC sensitivity results

## 2020-03-08 NOTE — CHART NOTE - NSCHARTNOTEFT_GEN_A_CORE
EVENT:  Culture - Blood (collected 07 Mar 2020 00:48) Growth in aerobic bottle: Gram Positive Cocci in Clusters - coag neg staph.    BRIEF HPI: 78 y/o Cantonese speaking male, from home, walks independently,  with PMH of  CAD, DM, HTN, HLD who presented with lightheadedness X 1 day probably due to dehydration. PTA vertigo on meclizine. Blood culture coag neg staph, sensitivity pending.  Pt was started on Vanc.      Objective:  Vital Signs Last 24 Hrs  T(C): 36.6 (08 Mar 2020 05:09), Max: 37 (07 Mar 2020 20:38)  T(F): 97.8 (08 Mar 2020 05:09), Max: 98.6 (07 Mar 2020 20:38)  HR: 72 (08 Mar 2020 05:09) (72 - 83)  BP: 150/85 (08 Mar 2020 05:09) (132/74 - 154/79)  BP(mean): --  RR: 18 (08 Mar 2020 05:09) (18 - 18)  SpO2: 97% (08 Mar 2020 05:09) (97% - 99%)    Labs:             12.2   5.40  )-----------( 115      ( 08 Mar 2020 07:24 )             36.5   03-08    138  |  107  |  17  ----------------------------<  171<H>  3.9   |  25  |  1.14    Ca    8.4      08 Mar 2020 07:24  Phos  2.7     03-08  Mg     1.7     03-08    TPro  5.6<L>  /  Alb  2.6<L>  /  TBili  1.2  /  DBili  x   /  AST  28  /  ALT  42  /  AlkPhos  104  03-08    A/P- 78 y/o male w/ hx as above admitted for weakness w/ Staph coag neg blood culture w/o leukocytosis and a relatively okay lactate  Plan:  Blood culture result discussed w/ Dr. Rocha at time of his evaluation- bld cx is likely contaminant. Recs to D/C antibiotic.             TTE ordered per Attending's request.           Will continue to monitor.

## 2020-03-08 NOTE — PROGRESS NOTE ADULT - ASSESSMENT
ekg - nsr non specific stt changes  echo - pending     a/p     1) Presyncope - orthostatic + , give IV fluids, get 2d echo , pt told to drink 4 - 6 cups of water daily      2) BPH - on flomax     3) DVT prophylasix - sc heparin

## 2020-03-09 DIAGNOSIS — R78.81 BACTEREMIA: ICD-10-CM

## 2020-03-09 DIAGNOSIS — N40.0 BENIGN PROSTATIC HYPERPLASIA WITHOUT LOWER URINARY TRACT SYMPTOMS: ICD-10-CM

## 2020-03-09 LAB
ALBUMIN SERPL ELPH-MCNC: 2.9 G/DL — LOW (ref 3.5–5)
ALP SERPL-CCNC: 95 U/L — SIGNIFICANT CHANGE UP (ref 40–120)
ALT FLD-CCNC: 38 U/L DA — SIGNIFICANT CHANGE UP (ref 10–60)
ANION GAP SERPL CALC-SCNC: 5 MMOL/L — SIGNIFICANT CHANGE UP (ref 5–17)
AST SERPL-CCNC: 25 U/L — SIGNIFICANT CHANGE UP (ref 10–40)
BASOPHILS # BLD AUTO: 0.01 K/UL — SIGNIFICANT CHANGE UP (ref 0–0.2)
BASOPHILS NFR BLD AUTO: 0.2 % — SIGNIFICANT CHANGE UP (ref 0–2)
BILIRUB SERPL-MCNC: 1.2 MG/DL — SIGNIFICANT CHANGE UP (ref 0.2–1.2)
BUN SERPL-MCNC: 18 MG/DL — SIGNIFICANT CHANGE UP (ref 7–18)
CALCIUM SERPL-MCNC: 8.7 MG/DL — SIGNIFICANT CHANGE UP (ref 8.4–10.5)
CHLORIDE SERPL-SCNC: 107 MMOL/L — SIGNIFICANT CHANGE UP (ref 96–108)
CO2 SERPL-SCNC: 28 MMOL/L — SIGNIFICANT CHANGE UP (ref 22–31)
CREAT SERPL-MCNC: 1.14 MG/DL — SIGNIFICANT CHANGE UP (ref 0.5–1.3)
EOSINOPHIL # BLD AUTO: 0.23 K/UL — SIGNIFICANT CHANGE UP (ref 0–0.5)
EOSINOPHIL NFR BLD AUTO: 4.2 % — SIGNIFICANT CHANGE UP (ref 0–6)
GLUCOSE BLDC GLUCOMTR-MCNC: 154 MG/DL — HIGH (ref 70–99)
GLUCOSE BLDC GLUCOMTR-MCNC: 189 MG/DL — HIGH (ref 70–99)
GLUCOSE BLDC GLUCOMTR-MCNC: 191 MG/DL — HIGH (ref 70–99)
GLUCOSE BLDC GLUCOMTR-MCNC: 247 MG/DL — HIGH (ref 70–99)
GLUCOSE SERPL-MCNC: 164 MG/DL — HIGH (ref 70–99)
HCT VFR BLD CALC: 37.6 % — LOW (ref 39–50)
HGB BLD-MCNC: 12.7 G/DL — LOW (ref 13–17)
IMM GRANULOCYTES NFR BLD AUTO: 0.6 % — SIGNIFICANT CHANGE UP (ref 0–1.5)
LYMPHOCYTES # BLD AUTO: 0.73 K/UL — LOW (ref 1–3.3)
LYMPHOCYTES # BLD AUTO: 13.4 % — SIGNIFICANT CHANGE UP (ref 13–44)
MAGNESIUM SERPL-MCNC: 1.7 MG/DL — SIGNIFICANT CHANGE UP (ref 1.6–2.6)
MCHC RBC-ENTMCNC: 31.4 PG — SIGNIFICANT CHANGE UP (ref 27–34)
MCHC RBC-ENTMCNC: 33.8 GM/DL — SIGNIFICANT CHANGE UP (ref 32–36)
MCV RBC AUTO: 93.1 FL — SIGNIFICANT CHANGE UP (ref 80–100)
MONOCYTES # BLD AUTO: 0.56 K/UL — SIGNIFICANT CHANGE UP (ref 0–0.9)
MONOCYTES NFR BLD AUTO: 10.3 % — SIGNIFICANT CHANGE UP (ref 2–14)
NEUTROPHILS # BLD AUTO: 3.89 K/UL — SIGNIFICANT CHANGE UP (ref 1.8–7.4)
NEUTROPHILS NFR BLD AUTO: 71.3 % — SIGNIFICANT CHANGE UP (ref 43–77)
NRBC # BLD: 0 /100 WBCS — SIGNIFICANT CHANGE UP (ref 0–0)
PHOSPHATE SERPL-MCNC: 2.7 MG/DL — SIGNIFICANT CHANGE UP (ref 2.5–4.5)
PLATELET # BLD AUTO: 114 K/UL — LOW (ref 150–400)
POTASSIUM SERPL-MCNC: 3.8 MMOL/L — SIGNIFICANT CHANGE UP (ref 3.5–5.3)
POTASSIUM SERPL-SCNC: 3.8 MMOL/L — SIGNIFICANT CHANGE UP (ref 3.5–5.3)
PROT SERPL-MCNC: 5.7 G/DL — LOW (ref 6–8.3)
RBC # BLD: 4.04 M/UL — LOW (ref 4.2–5.8)
RBC # FLD: 12.6 % — SIGNIFICANT CHANGE UP (ref 10.3–14.5)
SODIUM SERPL-SCNC: 140 MMOL/L — SIGNIFICANT CHANGE UP (ref 135–145)
WBC # BLD: 5.45 K/UL — SIGNIFICANT CHANGE UP (ref 3.8–10.5)
WBC # FLD AUTO: 5.45 K/UL — SIGNIFICANT CHANGE UP (ref 3.8–10.5)

## 2020-03-09 RX ADMIN — Medication 25 MILLIGRAM(S): at 15:31

## 2020-03-09 RX ADMIN — TAMSULOSIN HYDROCHLORIDE 0.4 MILLIGRAM(S): 0.4 CAPSULE ORAL at 21:50

## 2020-03-09 RX ADMIN — Medication 1: at 21:50

## 2020-03-09 RX ADMIN — Medication 1: at 08:15

## 2020-03-09 RX ADMIN — Medication 1: at 11:54

## 2020-03-09 RX ADMIN — HEPARIN SODIUM 5000 UNIT(S): 5000 INJECTION INTRAVENOUS; SUBCUTANEOUS at 21:50

## 2020-03-09 RX ADMIN — SENNA PLUS 2 TABLET(S): 8.6 TABLET ORAL at 21:50

## 2020-03-09 RX ADMIN — FINASTERIDE 5 MILLIGRAM(S): 5 TABLET, FILM COATED ORAL at 11:54

## 2020-03-09 RX ADMIN — HEPARIN SODIUM 5000 UNIT(S): 5000 INJECTION INTRAVENOUS; SUBCUTANEOUS at 15:32

## 2020-03-09 RX ADMIN — Medication 5 MILLIGRAM(S): at 11:54

## 2020-03-09 RX ADMIN — HEPARIN SODIUM 5000 UNIT(S): 5000 INJECTION INTRAVENOUS; SUBCUTANEOUS at 06:29

## 2020-03-09 RX ADMIN — PANTOPRAZOLE SODIUM 40 MILLIGRAM(S): 20 TABLET, DELAYED RELEASE ORAL at 06:29

## 2020-03-09 RX ADMIN — INSULIN GLARGINE 8 UNIT(S): 100 INJECTION, SOLUTION SUBCUTANEOUS at 21:50

## 2020-03-09 RX ADMIN — Medication 2: at 17:00

## 2020-03-09 RX ADMIN — Medication 25 MILLIGRAM(S): at 06:29

## 2020-03-09 RX ADMIN — Medication 81 MILLIGRAM(S): at 11:54

## 2020-03-09 RX ADMIN — ATORVASTATIN CALCIUM 20 MILLIGRAM(S): 80 TABLET, FILM COATED ORAL at 21:55

## 2020-03-09 RX ADMIN — Medication 25 MILLIGRAM(S): at 21:55

## 2020-03-09 RX ADMIN — Medication 400 UNIT(S): at 11:54

## 2020-03-09 NOTE — PROGRESS NOTE ADULT - PROBLEM SELECTOR PLAN 6
HgnA1C 8.0, 's  -Pt takes Janumet, treseba 50 U once a week. Also takkes Novolog 17 U at morning and 20 U at bedtime.  -Continue Sliding scale Humalog and lantus 8 U at bedtime

## 2020-03-09 NOTE — PROGRESS NOTE ADULT - PROBLEM SELECTOR PLAN 3
Denies dizziness, mild orthostatic changes at this time  -Resume IVF if orthostatic hypotension worsens

## 2020-03-09 NOTE — PROGRESS NOTE ADULT - SUBJECTIVE AND OBJECTIVE BOX
INTERVAL HPI/OVERNIGHT EVENTS: I feel less dizzy   Vital Signs Last 24 Hrs  T(C): 36.4 (09 Mar 2020 12:49), Max: 36.4 (09 Mar 2020 05:10)  T(F): 97.5 (09 Mar 2020 12:49), Max: 97.5 (09 Mar 2020 05:10)  HR: 95 (09 Mar 2020 12:49) (68 - 95)  BP: 151/88 (09 Mar 2020 12:49) (147/97 - 154/97)  BP(mean): --  RR: 18 (09 Mar 2020 12:49) (18 - 18)  SpO2: 94% (09 Mar 2020 12:49) (94% - 96%)  I&O's Summary    MEDICATIONS  (STANDING):  aspirin enteric coated 81 milliGRAM(s) Oral daily  atorvastatin 20 milliGRAM(s) Oral at bedtime  cholecalciferol 400 Unit(s) Oral daily  finasteride 5 milliGRAM(s) Oral daily  heparin  Injectable 5000 Unit(s) SubCutaneous every 8 hours  insulin glargine Injectable (LANTUS) 8 Unit(s) SubCutaneous at bedtime  insulin lispro (HumaLOG) corrective regimen sliding scale   SubCutaneous Before meals and at bedtime  meclizine 25 milliGRAM(s) Oral three times a day  oxybutynin 5 milliGRAM(s) Oral daily  pantoprazole    Tablet 40 milliGRAM(s) Oral before breakfast  senna 2 Tablet(s) Oral at bedtime  tamsulosin 0.4 milliGRAM(s) Oral at bedtime    MEDICATIONS  (PRN):  acetaminophen   Tablet .. 650 milliGRAM(s) Oral every 6 hours PRN Mild Pain (1 - 3)    LABS:                        12.7   5.45  )-----------( 114      ( 09 Mar 2020 07:13 )             37.6     03-09    140  |  107  |  18  ----------------------------<  164<H>  3.8   |  28  |  1.14    Ca    8.7      09 Mar 2020 07:13  Phos  2.7     03-09  Mg     1.7     03-09    TPro  5.7<L>  /  Alb  2.9<L>  /  TBili  1.2  /  DBili  x   /  AST  25  /  ALT  38  /  AlkPhos  95  03-09        CAPILLARY BLOOD GLUCOSE      POCT Blood Glucose.: 189 mg/dL (09 Mar 2020 11:28)  POCT Blood Glucose.: 154 mg/dL (09 Mar 2020 07:44)  POCT Blood Glucose.: 224 mg/dL (08 Mar 2020 21:06)  POCT Blood Glucose.: 207 mg/dL (08 Mar 2020 16:23)          REVIEW OF SYSTEMS:  CONSTITUTIONAL: No fever, weight loss, or fatigue  EYES: No eye pain, visual disturbances, or discharge  ENMT:  No difficulty hearing, tinnitus, vertigo; No sinus or throat pain  NECK: No pain or stiffness  RESPIRATORY: No cough, wheezing, chills or hemoptysis; No shortness of breath  CARDIOVASCULAR: No chest pain, palpitations, dizziness, or leg swelling  GASTROINTESTINAL: No abdominal or epigastric pain. No nausea, vomiting, or hematemesis; No diarrhea or constipation. No melena or hematochezia.  GENITOURINARY: No dysuria, frequency, hematuria, or incontinence  NEUROLOGICAL: No headaches, memory loss, loss of strength, numbness, or tremors    Consultant(s) Notes Reviewed:  [x ] YES  [ ] NO    PHYSICAL EXAM:  GENERAL: NAD, well-groomed, well-developed,not in any distress ,  HEAD:  Atraumatic, Normocephalic  EYES: EOMI, PERRLA, conjunctiva and sclera clear  ENMT: No tonsillar erythema, exudates, or enlargement; Moist mucous membranes, Good dentition, No lesions  NECK: Supple, No JVD, Normal thyroid  NERVOUS SYSTEM:  Alert & Oriented X3, No focal deficit   CHEST/LUNG: Good air entry bilateral with no  rales, rhonchi, wheezing, or rubs  HEART: Regular rate and rhythm; No murmurs, rubs, or gallops  ABDOMEN: Soft, Nontender, Nondistended; Bowel sounds present  EXTREMITIES:  2+ Peripheral Pulses, No clubbing, cyanosis, or edema  SKIN: No rashes or lesions    Care Discussed with Consultants/Other Providers [ x] YES  [ ] NO

## 2020-03-09 NOTE — SWALLOW BEDSIDE ASSESSMENT ADULT - ORAL PHASE
Within functional limits Stasis in anterior sulcus/Stasis in lateral sulci/Lingual stasis Lingual stasis

## 2020-03-09 NOTE — PROGRESS NOTE ADULT - ASSESSMENT
Pt is an 78 y/o Cantonese speaking male, from home, walks independently,  with PMH of  CAD, DM, HTN, HLD who presented with lightheadedness since today. According to the pt, since today  morning he has been experiencing lightheadedness, vertigo and generalized weakness.   he denied palpitation fever, chills, chest pain, runny nose, abdominal pain and other complains. Pt goes to [facility]. As per [facility] day [facility] center his blood pressure was low while he was experiencing dizziness.  Pt. admitted to medicine for further evaluation of lightheadedness, noted with orthostatic hypotension and ANTON, both improved with IVF hydration.  Pt. with positive blood culture, likely contaminated as he is asymptomatic for bacteremia, not currently on Abx, awaiting repeat BC results, ID Dr. Olivo following.  Pt. for possible discharge to home tomorrow.

## 2020-03-09 NOTE — SWALLOW BEDSIDE ASSESSMENT ADULT - ASR SWALLOW ASPIRATION MONITOR
change of breathing pattern/position upright (90Y)/oral hygiene/fever/upper respiratory infection/cough

## 2020-03-09 NOTE — SWALLOW BEDSIDE ASSESSMENT ADULT - SLP PERTINENT HISTORY OF CURRENT PROBLEM
79y old  Male, from home, walks independently,  with PMH of  CAD, DM, HTN, HLD who presented to the ER for evaluation of lightheadedness, vertigo and generalized weakness, possibly due to dehydration. He has no palpitation, fever, or chest pain. [facility] to senior care center. As per senior day care center his blood pressure was low while he was experiencing dizziness. On admission, he has no fever or Leukocytosis.

## 2020-03-09 NOTE — PROGRESS NOTE ADULT - PROBLEM SELECTOR PLAN 7
Pt was on atenolol at home   -atenolol on hold for now due to orthostatic hypotension  -Resume atenolol when b/p stable

## 2020-03-09 NOTE — SWALLOW BEDSIDE ASSESSMENT ADULT - SWALLOW EVAL: DIAGNOSIS
Pt p/w s&s oropharyngeal dysphagia, c/b prolonged mastication on regualr solids, oral stasis, and cough seen post-swallow on regular-crunchy solids only. No overt s/s aspiration on puree, mechanical soft, or thin liquid trials

## 2020-03-09 NOTE — PROGRESS NOTE ADULT - ASSESSMENT
78 y/o Cantonese speaking male, from home, walks independently,  with PMH of  CAD, DM, HTN, HLD who presented with lightheadedness since today.     Problem/Plan - 1:  ·  Problem: ANTON (acute kidney injury).  Plan: Resolved.   Likely pre renal from dehydration  F/u Urine electrolytes  Avoid nephrotoxic medications.      Problem/Plan - 2:  ·  Problem: Bacteremia   Plan: Likely contaminant so rpting blood cultures .     ID helping.    Rpt Blood cultures pending.      Problem/Plan - 3:  ·  Problem: Dizziness.  Plan: Sec to Orthostasis .      Problem/Plan - 4:  ·  Problem: Orthostatic hypotension.  Plan: Pt was orthostatic positive.   Continue IV fluids  Monitor orthostatics.      Problem/Plan - 5:  ·  Problem: DM (diabetes mellitus).  Plan: Sugars fine.   Pt takes Janumet, treseba 50 U once a week. Also takkes Novolog 17 U at morning and 20 U at bedtime.  Continue Sliding scale Humalog and lantus 8 U at bedtime   F/u HBA1C.     Problem/Plan - 6:  Problem: History of hypertension. Plan: Now Orthostatic .   Held atenolol as pt has orthostatic hypotension  Monitor blood presure.     Problem/Plan - 7:  ·  Problem: History of coronary artery disease. Plan : No cp or sob. ASA,Statin but holding BB. Cardiology helping.      Problem/Plan - 8:  ·  Problem: Prophylactic measure.  Plan:                                              1  IMPROVE VTE Score _________2  Heparin s/c for DVT prophylaxis.

## 2020-03-09 NOTE — PROGRESS NOTE ADULT - SUBJECTIVE AND OBJECTIVE BOX
Patient is seen and examined at the bed side, is afebrile.      REVIEW OF SYSTEMS: All other review systems are negative      ALLERGIES: No Known Allergies        Vital Signs Last 24 Hrs  T(C): 36.4 (09 Mar 2020 12:49), Max: 36.4 (09 Mar 2020 05:10)  T(F): 97.5 (09 Mar 2020 12:49), Max: 97.5 (09 Mar 2020 05:10)  HR: 95 (09 Mar 2020 12:49) (68 - 95)  BP: 151/88 (09 Mar 2020 12:49) (147/97 - 154/97)  BP(mean): --  RR: 18 (09 Mar 2020 12:49) (18 - 18)  SpO2: 94% (09 Mar 2020 12:49) (94% - 96%)      PHYSICAL EXAM:  GENERAL: Not in distress   CHEST/LUNG:  Air entry bilaterally  HEART: s1 and s2 present  ABDOMEN:  Nontender and  Nondistended  EXTREMITIES: No pedal  edema  CNS: Awake and Alert      LABS:                        12.7   5.45  )-----------( 114      ( 09 Mar 2020 07:13 )             37.6                           12.2   5.40  )-----------( 115      ( 08 Mar 2020 07:24 )             36.5         03-09    140  |  107  |  18  ----------------------------<  164<H>  3.8   |  28  |  1.14    Ca    8.7      09 Mar 2020 07:13  Phos  2.7     03-09  Mg     1.7     03-09    TPro  5.7<L>  /  Alb  2.9<L>  /  TBili  1.2  /  DBili  x   /  AST  25  /  ALT  38  /  AlkPhos  95  03-09    03-08    138  |  107  |  17  ----------------------------<  171<H>  3.9   |  25  |  1.14    Ca    8.4      08 Mar 2020 07:24  Phos  2.7     03-08  Mg     1.7     03-08    TPro  5.6<L>  /  Alb  2.6<L>  /  TBili  1.2  /  DBili  x   /  AST  28  /  ALT  42  /  AlkPhos  104  03-08      CAPILLARY BLOOD GLUCOSE  POCT Blood Glucose.: 207 mg/dL (08 Mar 2020 16:23)  POCT Blood Glucose.: 210 mg/dL (08 Mar 2020 11:40)  POCT Blood Glucose.: 173 mg/dL (08 Mar 2020 07:38)  POCT Blood Glucose.: 195 mg/dL (07 Mar 2020 21:22)      MEDICATIONS  (STANDING):  aspirin enteric coated 81 milliGRAM(s) Oral daily  atorvastatin 20 milliGRAM(s) Oral at bedtime  cholecalciferol 400 Unit(s) Oral daily  finasteride 5 milliGRAM(s) Oral daily  heparin  Injectable 5000 Unit(s) SubCutaneous every 8 hours  insulin glargine Injectable (LANTUS) 8 Unit(s) SubCutaneous at bedtime  insulin lispro (HumaLOG) corrective regimen sliding scale   SubCutaneous Before meals and at bedtime  meclizine 25 milliGRAM(s) Oral three times a day  oxybutynin 5 milliGRAM(s) Oral daily  pantoprazole    Tablet 40 milliGRAM(s) Oral before breakfast  senna 2 Tablet(s) Oral at bedtime  tamsulosin 0.4 milliGRAM(s) Oral at bedtime    MEDICATIONS  (PRN):  acetaminophen   Tablet .. 650 milliGRAM(s) Oral every 6 hours PRN Mild Pain (1 - 3)        RADIOLOGY & ADDITIONAL TESTS:    3/6/20 : CT Head No Cont (03.06.20 @ 15:29) No acute intracranial findings.      3/6/20 : Xray Chest 1 View-PORTABLE IMMEDIATE (03.06.20 @ 12:49) : Grossly clear lungs.        MICROBIOLOGY DATA:        Culture - Blood (03.07.20 @ 00:48)    Gram Stain:   Growth in aerobic bottle: Gram Positive Cocci in Clusters    -  Coagulase negative Staphylococcus: Detec    Specimen Source: .Blood    Organism: Blood Culture PCR    Culture Results:   Growth in aerobic bottle: Gram Positive Cocci in Clusters  ***Blood Panel PCR results on this specimen are available  approximately 3 hours after the Gram stain result.***  Gram stain, PCR, and/or culture results may not always  correspond due to difference in methodologies.  ************************************************************  This PCR assay was performed using [employer].  The following targets are tested for: Enterococcus,  vancomycin resistant enterococci, Listeria monocytogenes,  coagulase negative staphylococci, S. aureus,  methicillin resistant S. aureus, Streptococcus agalactiae  (Group B), S. pneumoniae, S. pyogenes (Group A),  Acinetobacter baumannii, Enterobacter cloacae, E. coli,  Klebsiella oxytoca, K. pneumoniae, Proteus sp.,  Serratia marcescens, Haemophilus influenzae,  Neisseria meningitidis, Pseudomonas aeruginosa, Candida  albicans, C. glabrata, C krusei, C parapsilosis,  C. tropicalis and the KPC resistance gene.    Organism Identification: Blood Culture PCR    Method Type: PCR      Culture - Blood (03.07.20 @ 00:48)    Specimen Source: .Blood    Culture Results:   No growth to date.    FLU A B RSV Detection by PCR (03.06.20 @ 20:47)    Flu A Result: NotDetec: The Flu A B RSV assay is a Real-Time PCR test for the qualitative  detection and differentiation of Influenza A, Influenza B, and  Respiratory Syncytial Virus on nasopharyngeal swabs. The results should  be interpreted in the context of all clinical and laboratory findings.    Flu B Result: NotDetec    RSV Result: NotDetec Patient is seen and examined at the bed side, is afebrile. He is doing better, no new complaints.       REVIEW OF SYSTEMS: All other review systems are negative      ALLERGIES: No Known Allergies        Vital Signs Last 24 Hrs  T(C): 36.4 (09 Mar 2020 12:49), Max: 36.4 (09 Mar 2020 05:10)  T(F): 97.5 (09 Mar 2020 12:49), Max: 97.5 (09 Mar 2020 05:10)  HR: 95 (09 Mar 2020 12:49) (68 - 95)  BP: 151/88 (09 Mar 2020 12:49) (147/97 - 154/97)  BP(mean): --  RR: 18 (09 Mar 2020 12:49) (18 - 18)  SpO2: 94% (09 Mar 2020 12:49) (94% - 96%)      PHYSICAL EXAM:  GENERAL: Not in distress   CHEST/LUNG:  Air entry bilaterally  HEART: s1 and s2 present  ABDOMEN:  Nontender and  Nondistended  EXTREMITIES: No pedal  edema  CNS: Awake and Alert        LABS:                        12.7   5.45  )-----------( 114      ( 09 Mar 2020 07:13 )             37.6                           12.2   5.40  )-----------( 115      ( 08 Mar 2020 07:24 )             36.5         03-09    140  |  107  |  18  ----------------------------<  164<H>  3.8   |  28  |  1.14    Ca    8.7      09 Mar 2020 07:13  Phos  2.7     03-09  Mg     1.7     03-09    TPro  5.7<L>  /  Alb  2.9<L>  /  TBili  1.2  /  DBili  x   /  AST  25  /  ALT  38  /  AlkPhos  95  03-09    03-08    138  |  107  |  17  ----------------------------<  171<H>  3.9   |  25  |  1.14    Ca    8.4      08 Mar 2020 07:24  Phos  2.7     03-08  Mg     1.7     03-08    TPro  5.6<L>  /  Alb  2.6<L>  /  TBili  1.2  /  DBili  x   /  AST  28  /  ALT  42  /  AlkPhos  104  03-08        CAPILLARY BLOOD GLUCOSE  POCT Blood Glucose.: 207 mg/dL (08 Mar 2020 16:23)  POCT Blood Glucose.: 210 mg/dL (08 Mar 2020 11:40)  POCT Blood Glucose.: 173 mg/dL (08 Mar 2020 07:38)  POCT Blood Glucose.: 195 mg/dL (07 Mar 2020 21:22)        MEDICATIONS  (STANDING):  aspirin enteric coated 81 milliGRAM(s) Oral daily  atorvastatin 20 milliGRAM(s) Oral at bedtime  cholecalciferol 400 Unit(s) Oral daily  finasteride 5 milliGRAM(s) Oral daily  heparin  Injectable 5000 Unit(s) SubCutaneous every 8 hours  insulin glargine Injectable (LANTUS) 8 Unit(s) SubCutaneous at bedtime  insulin lispro (HumaLOG) corrective regimen sliding scale   SubCutaneous Before meals and at bedtime  meclizine 25 milliGRAM(s) Oral three times a day  oxybutynin 5 milliGRAM(s) Oral daily  pantoprazole    Tablet 40 milliGRAM(s) Oral before breakfast  senna 2 Tablet(s) Oral at bedtime  tamsulosin 0.4 milliGRAM(s) Oral at bedtime    MEDICATIONS  (PRN):  acetaminophen   Tablet .. 650 milliGRAM(s) Oral every 6 hours PRN Mild Pain (1 - 3)        RADIOLOGY & ADDITIONAL TESTS:    3/6/20 : CT Head No Cont (03.06.20 @ 15:29) No acute intracranial findings.      3/6/20 : Xray Chest 1 View-PORTABLE IMMEDIATE (03.06.20 @ 12:49) : Grossly clear lungs.        MICROBIOLOGY DATA:        Culture - Blood (03.07.20 @ 00:48)    Gram Stain:   Growth in aerobic bottle: Gram Positive Cocci in Clusters    -  Coagulase negative Staphylococcus: Detec    Specimen Source: .Blood    Organism: Blood Culture PCR    Culture Results:   Growth in aerobic bottle: Gram Positive Cocci in Clusters  ***Blood Panel PCR results on this specimen are available  approximately 3 hours after the Gram stain result.***  Gram stain, PCR, and/or culture results may not always  correspond due to difference in methodologies.  ************************************************************  This PCR assay was performed using [employer].  The following targets are tested for: Enterococcus,  vancomycin resistant enterococci, Listeria monocytogenes,  coagulase negative staphylococci, S. aureus,  methicillin resistant S. aureus, Streptococcus agalactiae  (Group B), S. pneumoniae, S. pyogenes (Group A),  Acinetobacter baumannii, Enterobacter cloacae, E. coli,  Klebsiella oxytoca, K. pneumoniae, Proteus sp.,  Serratia marcescens, Haemophilus influenzae,  Neisseria meningitidis, Pseudomonas aeruginosa, Candida  albicans, C. glabrata, C krusei, C parapsilosis,  C. tropicalis and the KPC resistance gene.    Organism Identification: Blood Culture PCR    Method Type: PCR      Culture - Blood (03.07.20 @ 00:48)    Specimen Source: .Blood    Culture Results:   No growth to date.    FLU A B RSV Detection by PCR (03.06.20 @ 20:47)    Flu A Result: NotDetec: The Flu A B RSV assay is a Real-Time PCR test for the qualitative  detection and differentiation of Influenza A, Influenza B, and  Respiratory Syncytial Virus on nasopharyngeal swabs. The results should  be interpreted in the context of all clinical and laboratory findings.    Flu B Result: NotDetec    RSV Result: NotDetec

## 2020-03-09 NOTE — PROGRESS NOTE ADULT - PROBLEM SELECTOR PLAN 1
Blood Cx 3/7 Coagulase Negative Staph likely contamination as pt. is afebrile with no leukocytosis  -ID Dr. Olivo  -f/u repeat B/C  -f/u TTE  -f/u CBC

## 2020-03-09 NOTE — PROGRESS NOTE ADULT - ASSESSMENT
Patient is a 79y old  Male, from home, walks independently,  with PMH of  CAD, DM, HTN, HLD who presented to the ER for evaluation of lightheadedness, vertigo and generalized weakness. He has no palpitation, fever, or chest pain. Pt goes to [facility]. As per [facility] his blood pressure was low while he was experiencing dizziness. On admission, he has no fever or Leukocytosis. The ID consult requested today, 3/7/20, to assist with further evaluation of positive blood culture.    # Bacteremia- Coagulase Negative staph.- most likely a contaminant in the setting of No implanted prosthesis   # ANTON  # Dizziness - CT head is negative    would recommend;    1. Follow up Repeat Blood cultures  to document clearing the blood stream  2. Obtain TTE  3. Monitor kidney function and IVF  4. Management of Dizziness as per Primary      Attending Attestation:   Spent  40 minutes on total encounter, more than 50 % of the visit was spent counseling and/or coordinating care by the Attending physician.

## 2020-03-09 NOTE — PROGRESS NOTE ADULT - SUBJECTIVE AND OBJECTIVE BOX
NP Note discussed with  Primary Attending    Patient is a 79y old  Male who presents with a chief complaint of Lightheadedness (09 Mar 2020 09:48)      INTERVAL HPI/OVERNIGHT EVENTS: no new complaints    MEDICATIONS  (STANDING):  aspirin enteric coated 81 milliGRAM(s) Oral daily  atorvastatin 20 milliGRAM(s) Oral at bedtime  cholecalciferol 400 Unit(s) Oral daily  finasteride 5 milliGRAM(s) Oral daily  heparin  Injectable 5000 Unit(s) SubCutaneous every 8 hours  insulin glargine Injectable (LANTUS) 8 Unit(s) SubCutaneous at bedtime  insulin lispro (HumaLOG) corrective regimen sliding scale   SubCutaneous Before meals and at bedtime  meclizine 25 milliGRAM(s) Oral three times a day  oxybutynin 5 milliGRAM(s) Oral daily  pantoprazole    Tablet 40 milliGRAM(s) Oral before breakfast  senna 2 Tablet(s) Oral at bedtime  tamsulosin 0.4 milliGRAM(s) Oral at bedtime    MEDICATIONS  (PRN):  acetaminophen   Tablet .. 650 milliGRAM(s) Oral every 6 hours PRN Mild Pain (1 - 3)      __________________________________________________  REVIEW OF SYSTEMS:    CONSTITUTIONAL: No fever,   EYES: no acute visual disturbances  NECK: No pain or stiffness  RESPIRATORY: No cough; No shortness of breath  CARDIOVASCULAR: No chest pain, no palpitations  GASTROINTESTINAL: No pain. No nausea or vomiting; No diarrhea   NEUROLOGICAL: No headache or numbness, no tremors  MUSCULOSKELETAL: No joint pain, no muscle pain  GENITOURINARY: no dysuria, no frequency, no hesitancy  PSYCHIATRY: no depression , no anxiety  ALL OTHER  ROS negative        Vital Signs Last 24 Hrs  T(C): 36.4 (09 Mar 2020 12:49), Max: 36.4 (09 Mar 2020 05:10)  T(F): 97.5 (09 Mar 2020 12:49), Max: 97.5 (09 Mar 2020 05:10)  HR: 95 (09 Mar 2020 12:49) (68 - 95)  BP: 151/88 (09 Mar 2020 12:49) (147/97 - 154/97)  BP(mean): --  RR: 18 (09 Mar 2020 12:49) (18 - 18)  SpO2: 94% (09 Mar 2020 12:49) (94% - 96%)    ________________________________________________  PHYSICAL EXAM:  obese, well groomed, Cantonese speaking   GENERAL: NAD  HEENT: Normocephalic;  conjunctivae and sclerae clear; moist mucous membranes;   NECK : supple  CHEST/LUNG: Clear to auscultation bilaterally with good air entry   HEART: S1 S2  regular; no murmurs, gallops or rubs  ABDOMEN: Soft, Nontender, Nondistended; Bowel sounds present  EXTREMITIES: no cyanosis; no edema; no calf tenderness  SKIN: warm and dry; no rash  NERVOUS SYSTEM:  Awake and alert; Oriented  to place, person and time ; no new deficits    _________________________________________________  LABS:                        12.7   5.45  )-----------( 114      ( 09 Mar 2020 07:13 )             37.6     03-09    140  |  107  |  18  ----------------------------<  164<H>  3.8   |  28  |  1.14    Ca    8.7      09 Mar 2020 07:13  Phos  2.7     03-09  Mg     1.7     03-09    TPro  5.7<L>  /  Alb  2.9<L>  /  TBili  1.2  /  DBili  x   /  AST  25  /  ALT  38  /  AlkPhos  95  03-09        CAPILLARY BLOOD GLUCOSE      POCT Blood Glucose.: 247 mg/dL (09 Mar 2020 16:17)  POCT Blood Glucose.: 189 mg/dL (09 Mar 2020 11:28)  POCT Blood Glucose.: 154 mg/dL (09 Mar 2020 07:44)  POCT Blood Glucose.: 224 mg/dL (08 Mar 2020 21:06)    RADIOLOGY & ADDITIONAL TESTS:    CT Head No Cont (03.06.20 @ 15:29) >  EXAM:  CT BRAIN                            PROCEDURE DATE:  03/06/2020          INTERPRETATION:  CLINICAL INDICATION: Dizziness and weakness.    TECHNIQUE: CT of the head was performed without the administration of intravenous contrast.    COMPARISON: None available.    FINDINGS:    There is no evidence of acute infarction, intracranial hemorrhage or mass lesion.     There is hypoattenuation of the subcortical and periventricular white matter, which is nonspecific finding, but most likely represents sequela of moderate chronic microvascular ischemic disease. There are atherosclerotic calcifications of the cavernous and supraclinoid internal carotid arteries bilaterally. 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     There is diffuse prominence of the cortical sulci related to underlying brain parenchymal volume loss.  There is no evidence of hydrocephalus. There are no extra-axial fluid collections.    The patient is status post right lens replacement. Visualized intraorbital contents are otherwise unremarkable. There are polyps/mucus retention cysts in the bilateral maxillary sinuses and mild mucosal thickening in the left frontal sinus and left anterior ethmoid air cells. The mastoid air cells are clear.The visualized soft tissues and osseous structures appear unremarkable.      IMPRESSION:    No acute intracranial findings.    < end of copied text >    Imaging Personally Reviewed:  YES/NO    Consultant(s) Notes Reviewed:   YES/ No    Care Discussed with Consultants :     Plan of care was discussed with patient and /or primary care giver; all questions and concerns were addressed and care was aligned with patient's wishes.

## 2020-03-09 NOTE — SWALLOW BEDSIDE ASSESSMENT ADULT - SWALLOW EVAL: RECOMMENDED FEEDING/EATING TECHNIQUES
oral hygiene/alternate food with liquid/allow for swallow between intakes/position upright (90 degrees)/small sips/bites

## 2020-03-10 ENCOUNTER — TRANSCRIPTION ENCOUNTER (OUTPATIENT)
Age: 80
End: 2020-03-10

## 2020-03-10 VITALS
RESPIRATION RATE: 17 BRPM | HEART RATE: 76 BPM | TEMPERATURE: 98 F | SYSTOLIC BLOOD PRESSURE: 130 MMHG | DIASTOLIC BLOOD PRESSURE: 82 MMHG | OXYGEN SATURATION: 96 %

## 2020-03-10 LAB
ANION GAP SERPL CALC-SCNC: 7 MMOL/L — SIGNIFICANT CHANGE UP (ref 5–17)
BUN SERPL-MCNC: 17 MG/DL — SIGNIFICANT CHANGE UP (ref 7–18)
CALCIUM SERPL-MCNC: 9.2 MG/DL — SIGNIFICANT CHANGE UP (ref 8.4–10.5)
CHLORIDE SERPL-SCNC: 106 MMOL/L — SIGNIFICANT CHANGE UP (ref 96–108)
CO2 SERPL-SCNC: 27 MMOL/L — SIGNIFICANT CHANGE UP (ref 22–31)
CREAT SERPL-MCNC: 1.17 MG/DL — SIGNIFICANT CHANGE UP (ref 0.5–1.3)
GLUCOSE BLDC GLUCOMTR-MCNC: 162 MG/DL — HIGH (ref 70–99)
GLUCOSE BLDC GLUCOMTR-MCNC: 231 MG/DL — HIGH (ref 70–99)
GLUCOSE SERPL-MCNC: 165 MG/DL — HIGH (ref 70–99)
HCT VFR BLD CALC: 38.6 % — LOW (ref 39–50)
HGB BLD-MCNC: 12.9 G/DL — LOW (ref 13–17)
MCHC RBC-ENTMCNC: 30.9 PG — SIGNIFICANT CHANGE UP (ref 27–34)
MCHC RBC-ENTMCNC: 33.4 GM/DL — SIGNIFICANT CHANGE UP (ref 32–36)
MCV RBC AUTO: 92.3 FL — SIGNIFICANT CHANGE UP (ref 80–100)
NRBC # BLD: 0 /100 WBCS — SIGNIFICANT CHANGE UP (ref 0–0)
PLATELET # BLD AUTO: 122 K/UL — LOW (ref 150–400)
POTASSIUM SERPL-MCNC: 3.8 MMOL/L — SIGNIFICANT CHANGE UP (ref 3.5–5.3)
POTASSIUM SERPL-SCNC: 3.8 MMOL/L — SIGNIFICANT CHANGE UP (ref 3.5–5.3)
RBC # BLD: 4.18 M/UL — LOW (ref 4.2–5.8)
RBC # FLD: 12.6 % — SIGNIFICANT CHANGE UP (ref 10.3–14.5)
SODIUM SERPL-SCNC: 140 MMOL/L — SIGNIFICANT CHANGE UP (ref 135–145)
WBC # BLD: 5.8 K/UL — SIGNIFICANT CHANGE UP (ref 3.8–10.5)
WBC # FLD AUTO: 5.8 K/UL — SIGNIFICANT CHANGE UP (ref 3.8–10.5)

## 2020-03-10 PROCEDURE — 82436 ASSAY OF URINE CHLORIDE: CPT

## 2020-03-10 PROCEDURE — 97116 GAIT TRAINING THERAPY: CPT

## 2020-03-10 PROCEDURE — 97530 THERAPEUTIC ACTIVITIES: CPT

## 2020-03-10 PROCEDURE — 84484 ASSAY OF TROPONIN QUANT: CPT

## 2020-03-10 PROCEDURE — 97110 THERAPEUTIC EXERCISES: CPT

## 2020-03-10 PROCEDURE — 83036 HEMOGLOBIN GLYCOSYLATED A1C: CPT

## 2020-03-10 PROCEDURE — 82962 GLUCOSE BLOOD TEST: CPT

## 2020-03-10 PROCEDURE — 80048 BASIC METABOLIC PNL TOTAL CA: CPT

## 2020-03-10 PROCEDURE — 87040 BLOOD CULTURE FOR BACTERIA: CPT

## 2020-03-10 PROCEDURE — 87150 DNA/RNA AMPLIFIED PROBE: CPT

## 2020-03-10 PROCEDURE — 81001 URINALYSIS AUTO W/SCOPE: CPT

## 2020-03-10 PROCEDURE — 83690 ASSAY OF LIPASE: CPT

## 2020-03-10 PROCEDURE — 80053 COMPREHEN METABOLIC PANEL: CPT

## 2020-03-10 PROCEDURE — 83605 ASSAY OF LACTIC ACID: CPT

## 2020-03-10 PROCEDURE — 80061 LIPID PANEL: CPT

## 2020-03-10 PROCEDURE — 83935 ASSAY OF URINE OSMOLALITY: CPT

## 2020-03-10 PROCEDURE — 87631 RESP VIRUS 3-5 TARGETS: CPT

## 2020-03-10 PROCEDURE — 82570 ASSAY OF URINE CREATININE: CPT

## 2020-03-10 PROCEDURE — 82746 ASSAY OF FOLIC ACID SERUM: CPT

## 2020-03-10 PROCEDURE — 92610 EVALUATE SWALLOWING FUNCTION: CPT

## 2020-03-10 PROCEDURE — 84156 ASSAY OF PROTEIN URINE: CPT

## 2020-03-10 PROCEDURE — 93005 ELECTROCARDIOGRAM TRACING: CPT

## 2020-03-10 PROCEDURE — 71045 X-RAY EXAM CHEST 1 VIEW: CPT

## 2020-03-10 PROCEDURE — 97162 PT EVAL MOD COMPLEX 30 MIN: CPT

## 2020-03-10 PROCEDURE — 70450 CT HEAD/BRAIN W/O DYE: CPT

## 2020-03-10 PROCEDURE — 99285 EMERGENCY DEPT VISIT HI MDM: CPT

## 2020-03-10 PROCEDURE — 36415 COLL VENOUS BLD VENIPUNCTURE: CPT

## 2020-03-10 PROCEDURE — 82306 VITAMIN D 25 HYDROXY: CPT

## 2020-03-10 PROCEDURE — 93306 TTE W/DOPPLER COMPLETE: CPT

## 2020-03-10 PROCEDURE — 82607 VITAMIN B-12: CPT

## 2020-03-10 PROCEDURE — 84443 ASSAY THYROID STIM HORMONE: CPT

## 2020-03-10 PROCEDURE — 85027 COMPLETE CBC AUTOMATED: CPT

## 2020-03-10 PROCEDURE — 84300 ASSAY OF URINE SODIUM: CPT

## 2020-03-10 PROCEDURE — 84100 ASSAY OF PHOSPHORUS: CPT

## 2020-03-10 PROCEDURE — 83735 ASSAY OF MAGNESIUM: CPT

## 2020-03-10 RX ORDER — INSULIN DEGLUDEC 100 U/ML
50 INJECTION, SOLUTION SUBCUTANEOUS
Qty: 0 | Refills: 0 | DISCHARGE

## 2020-03-10 RX ORDER — MULTIVIT WITH MIN/MFOLATE/K2 340-15/3 G
1 POWDER (GRAM) ORAL ONCE
Refills: 0 | Status: COMPLETED | OUTPATIENT
Start: 2020-03-10 | End: 2020-03-10

## 2020-03-10 RX ORDER — ASPIRIN/CALCIUM CARB/MAGNESIUM 324 MG
1 TABLET ORAL
Qty: 30 | Refills: 0
Start: 2020-03-10 | End: 2020-04-08

## 2020-03-10 RX ORDER — CHOLECALCIFEROL (VITAMIN D3) 125 MCG
400 CAPSULE ORAL
Qty: 0 | Refills: 0 | DISCHARGE
Start: 2020-03-10

## 2020-03-10 RX ORDER — ATENOLOL 25 MG/1
1 TABLET ORAL
Qty: 0 | Refills: 0 | DISCHARGE

## 2020-03-10 RX ORDER — TAMSULOSIN HYDROCHLORIDE 0.4 MG/1
1 CAPSULE ORAL
Qty: 0 | Refills: 0 | DISCHARGE

## 2020-03-10 RX ADMIN — Medication 5 MILLIGRAM(S): at 11:59

## 2020-03-10 RX ADMIN — FINASTERIDE 5 MILLIGRAM(S): 5 TABLET, FILM COATED ORAL at 12:00

## 2020-03-10 RX ADMIN — Medication 81 MILLIGRAM(S): at 11:59

## 2020-03-10 RX ADMIN — PANTOPRAZOLE SODIUM 40 MILLIGRAM(S): 20 TABLET, DELAYED RELEASE ORAL at 05:42

## 2020-03-10 RX ADMIN — HEPARIN SODIUM 5000 UNIT(S): 5000 INJECTION INTRAVENOUS; SUBCUTANEOUS at 14:09

## 2020-03-10 RX ADMIN — Medication 25 MILLIGRAM(S): at 05:41

## 2020-03-10 RX ADMIN — Medication 2: at 12:00

## 2020-03-10 RX ADMIN — Medication 25 MILLIGRAM(S): at 14:09

## 2020-03-10 RX ADMIN — Medication 1: at 07:39

## 2020-03-10 RX ADMIN — Medication 400 UNIT(S): at 11:59

## 2020-03-10 RX ADMIN — HEPARIN SODIUM 5000 UNIT(S): 5000 INJECTION INTRAVENOUS; SUBCUTANEOUS at 05:41

## 2020-03-10 NOTE — PROGRESS NOTE ADULT - ASSESSMENT
78 y/o Cantonese speaking male, from home, walks independently,  with PMH of  CAD, DM, HTN, HLD who presented with lightheadedness since today.     Problem/Plan - 1:  ·  Problem: ANTON (acute kidney injury).  Plan: Resolved.   Likely pre renal from dehydration  F/u Urine electrolytes  Avoid nephrotoxic medications.      Problem/Plan - 2:  ·  Problem: Bacteremia   Plan: Likely contaminant so rpting blood cultures .     ID helping.    Rpt Blood cultures pending.      Problem/Plan - 3:  ·  Problem: Dizziness.  Plan: Sec to Orthostasis . Resolved . TTE noted and mild aortic dilatation . Spoke to cardiologist and outpt CTA .      Problem/Plan - 4:  ·  Problem: Orthostatic hypotension.  Plan: Resolved and walked fine today .Pt was orthostatic positive.   Continue IV fluids  Monitor orthostatics.      Problem/Plan - 5:  ·  Problem: DM (diabetes mellitus).  Plan: Sugars fine.   Pt takes Janumet, treseba 50 U once a week. Also takkes Novolog 17 U at morning and 20 U at bedtime.  Continue Sliding scale Humalog and lantus 8 U at bedtime   F/u HBA1C.     Problem/Plan - 6:  Problem: History of hypertension. Plan: Now Orthostatic .   Held atenolol as pt has orthostatic hypotension  Monitor blood presure.     Problem/Plan - 7:  ·  Problem: History of coronary artery disease. Plan : No cp or sob. ASA,Statin but holding BB. Cardiology helping.      Problem/Plan - 8:  ·  Problem: Prophylactic measure.  Plan:                                              1  IMPROVE VTE Score _________2  Heparin s/c for DVT prophylaxis.

## 2020-03-10 NOTE — DISCHARGE NOTE PROVIDER - NSDCCPCAREPLAN_GEN_ALL_CORE_FT
PRINCIPAL DISCHARGE DIAGNOSIS  Diagnosis: Orthostatic hypotension  Assessment and Plan of Treatment: you were admitted for complaints of weakness which was likely due to orthostatic hypotenion which is drop in BP while standing. your BP meds were held and you were started on IV fluids  continue current medications   follow up with PCP and cardiology within 1 week of discharge   hese steps can help manage your condition:  -Rest in bed and ask for help with daily activities until you feel better. You may need to slowly increase the amount of time you spend sitting or doing light activity.  -Don’t drive while your blood pressure is not controlled.  -Be careful when you get up from sitting or lying down.Take your time. Sudden movements can cause dizziness or fainting.  -When you first sit up after lying down, be sure to sit in bed for 30 seconds or so before getting up to walk.  -Tell your health care provider about the medicines you are taking. some medicines trigger low blood pressure.  -Prevent dehydration by drinking plenty of fluids, unless otherwise instructed by your health care provider.  -Learn to take your own blood pressure. Keep a record of your results. Ask your health care provider which readings mean that you need medical attention.        SECONDARY DISCHARGE DIAGNOSES  Diagnosis: BPH (benign prostatic hyperplasia)  Assessment and Plan of Treatment: You have an enlarged prostate gland which gets bigger as men get older - it is a very common problem and has nothing to do with prostate cancer  Call your doctor if you are urinating more frequently, have trouble starting to urinate, have weak stream, urine leaking or dribbling, and feeling as though bladder is not empty after urination  Take your medication as prescribed - one medication helps to relax the muscle around the urethra and the other medication you may take prevents the prostate from growing more or even shrinking the prostate      Diagnosis: ANTON (acute kidney injury)  Assessment and Plan of Treatment: your kidney function was elevated on admission which was likely due to dehydration. ypu received IV fluids and your kidney function improved and remained stable   follow up with PCP within 1 week    Diagnosis: Hypotension, unspecified hypotension type  Assessment and Plan of Treatment: PRINCIPAL DISCHARGE DIAGNOSIS  Diagnosis: Orthostatic hypotension  Assessment and Plan of Treatment: you were admitted for complaints of weakness which was likely due to orthostatic hypotenion which is drop in BP while standing. your BP meds were held and you were started on IV fluids  continue current medications   follow up with PCP and cardiology within 1 week of discharge   hese steps can help manage your condition:  -Rest in bed and ask for help with daily activities until you feel better. You may need to slowly increase the amount of time you spend sitting or doing light activity.  -Don’t drive while your blood pressure is not controlled.  -Be careful when you get up from sitting or lying down.Take your time. Sudden movements can cause dizziness or fainting.  -When you first sit up after lying down, be sure to sit in bed for 30 seconds or so before getting up to walk.  -Tell your health care provider about the medicines you are taking. some medicines trigger low blood pressure.  -Prevent dehydration by drinking plenty of fluids, unless otherwise instructed by your health care provider.  -Learn to take your own blood pressure. Keep a record of your results. Ask your health care provider which readings mean that you need medical attention.        SECONDARY DISCHARGE DIAGNOSES  Diagnosis: BPH (benign prostatic hyperplasia)  Assessment and Plan of Treatment: You have an enlarged prostate gland which gets bigger as men get older - it is a very common problem and has nothing to do with prostate cancer  Call your doctor if you are urinating more frequently, have trouble starting to urinate, have weak stream, urine leaking or dribbling, and feeling as though bladder is not empty after urination  Take your medication as prescribed - one medication helps to relax the muscle around the urethra and the other medication you may take prevents the prostate from growing more or even shrinking the prostate      Diagnosis: ANTON (acute kidney injury)  Assessment and Plan of Treatment: your kidney function was elevated on admission which was likely due to dehydration. you received IV fluids and your kidney function improved and remained stable   follow up with PCP within 1 week    Diagnosis: Hypotension, unspecified hypotension type  Assessment and Plan of Treatment:

## 2020-03-10 NOTE — DISCHARGE NOTE PROVIDER - HOSPITAL COURSE
80 y/o Cantonese speaking male, from home, walks independently,  with PMH of  CAD, DM, HTN, HLD who presented with lightheadedness since today. According to the pt, since today  morning he has been experiencing lightheadedness, vertigo and generalized weakness.     Pt. admitted to medicine for further evaluation of lightheadedness, noted with orthostatic hypotension and ANTON, both improved with IVF hydration.  followed by cardio dr. Ocampo.     Echo completed and showed     1. Normal mitral valve.    2. Normal trileaflet aortic valve. Mild aortic    regurgitation.    3. Mild aortic root dilatation, consider CT of chest for    further evaluation.    4. Normal left atrium.    5. Normal left ventricular internal dimensions and wall    thicknesses.    6. Endocardium not well visualized; grossly normal left    ventricular systolic function.    7. Grade I diastolic dysfunction (Impaired relaxation).    8. Normal right atrium.    9. Normal right ventricular size and systolic function    (TAPSE 2.2 cm).    10. Unable to estimate RVSP.    11. Normal tricuspid valve.    12. Normal pulmonic valve.    13. Normal pericardium with no pericardial effusion.        also found to have  positive blood culture with coag negative staph,  likely contaminated as he is asymptomatic for bacteremia, not currently on Abx, repeat BC sent ID Dr. Olivo followed    clinically improving, orthostatics and ANTON resolved, medically optimized for discharge with outpt follow up 80 y/o Cantonese speaking male, from home, walks independently,  with PMH of  CAD, DM, HTN, HLD who presented with lightheadedness since today. According to the pt, since today  morning he has been experiencing lightheadedness, vertigo and generalized weakness.     Pt. admitted to medicine for further evaluation of lightheadedness, noted with orthostatic hypotension and ANTON, both improved with IVF hydration.  followed by cardio dr. Ocampo.     Echo completed and showed     1. Normal mitral valve.    2. Normal trileaflet aortic valve. Mild aortic    regurgitation.    3. Mild aortic root dilatation, consider CT of chest for    further evaluation.    4. Normal left atrium.    5. Normal left ventricular internal dimensions and wall    thicknesses.    6. Endocardium not well visualized; grossly normal left    ventricular systolic function.    7. Grade I diastolic dysfunction (Impaired relaxation).    8. Normal right atrium.    9. Normal right ventricular size and systolic function    (TAPSE 2.2 cm).    10. Unable to estimate RVSP.    11. Normal tricuspid valve.    12. Normal pulmonic valve.    13. Normal pericardium with no pericardial effusion.        also found to have  positive blood culture with coag negative staph,  likely contaminated as he is asymptomatic for bacteremia, not currently on Abx, repeat BC sent ID Dr. Olivo following.    clinically improving, orthostatics and ANTON resolved, medically optimized for discharge with outpt follow up

## 2020-03-10 NOTE — PROGRESS NOTE ADULT - SUBJECTIVE AND OBJECTIVE BOX
Patient is seen and examined at the bed side, is afebrile. He is doing better, no new complaints.       REVIEW OF SYSTEMS: All other review systems are negative      ALLERGIES: No Known Allergies      Vital Signs Last 24 Hrs  T(C): 36.7 (10 Mar 2020 16:15), Max: 36.8 (10 Mar 2020 12:46)  T(F): 98 (10 Mar 2020 16:15), Max: 98.3 (10 Mar 2020 12:46)  HR: 76 (10 Mar 2020 16:15) (76 - 97)  BP: 130/82 (10 Mar 2020 16:15) (112/65 - 130/82)  BP(mean): --  RR: 17 (10 Mar 2020 16:15) (17 - 17)  SpO2: 96% (10 Mar 2020 16:15) (96% - 99%)    PHYSICAL EXAM:  GENERAL: Not in distress   CHEST/LUNG:  Air entry bilaterally  HEART: s1 and s2 present  ABDOMEN:  Nontender and  Nondistended  EXTREMITIES: No pedal  edema  CNS: Awake and Alert        LABS:                        12.9   5.80  )-----------( 122      ( 10 Mar 2020 06:31 )             38.6                           12.7   5.45  )-----------( 114      ( 09 Mar 2020 07:13 )             37.6                  03-10    140  |  106  |  17  ----------------------------<  165<H>  3.8   |  27  |  1.17    Ca    9.2      10 Mar 2020 06:31  Phos  2.7     03-09  Mg     1.7     03-09    TPro  5.7<L>  /  Alb  2.9<L>  /  TBili  1.2  /  DBili  x   /  AST  25  /  ALT  38  /  AlkPhos  95  03-09      03-09    140  |  107  |  18  ----------------------------<  164<H>  3.8   |  28  |  1.14    Ca    8.7      09 Mar 2020 07:13  Phos  2.7     03-09  Mg     1.7     03-09    TPro  5.7<L>  /  Alb  2.9<L>  /  TBili  1.2  /  DBili  x   /  AST  25  /  ALT  38  /  AlkPhos  95  03-09          CAPILLARY BLOOD GLUCOSE  POCT Blood Glucose.: 207 mg/dL (08 Mar 2020 16:23)  POCT Blood Glucose.: 210 mg/dL (08 Mar 2020 11:40)  POCT Blood Glucose.: 173 mg/dL (08 Mar 2020 07:38)  POCT Blood Glucose.: 195 mg/dL (07 Mar 2020 21:22)        MEDICATIONS  (STANDING):    MEDICATIONS  (STANDING):  aspirin enteric coated 81 milliGRAM(s) Oral daily  atorvastatin 20 milliGRAM(s) Oral at bedtime  cholecalciferol 400 Unit(s) Oral daily  finasteride 5 milliGRAM(s) Oral daily  heparin  Injectable 5000 Unit(s) SubCutaneous every 8 hours  insulin glargine Injectable (LANTUS) 8 Unit(s) SubCutaneous at bedtime  insulin lispro (HumaLOG) corrective regimen sliding scale   SubCutaneous Before meals and at bedtime  meclizine 25 milliGRAM(s) Oral three times a day  oxybutynin 5 milliGRAM(s) Oral daily  pantoprazole    Tablet 40 milliGRAM(s) Oral before breakfast  senna 2 Tablet(s) Oral at bedtime  tamsulosin 0.4 milliGRAM(s) Oral at bedtime    MEDICATIONS  (PRN):  acetaminophen   Tablet .. 650 milliGRAM(s) Oral every 6 hours PRN Mild Pain (1 - 3)        RADIOLOGY & ADDITIONAL TESTS:    3/6/20 : CT Head No Cont (03.06.20 @ 15:29) No acute intracranial findings.      3/6/20 : Xray Chest 1 View-PORTABLE IMMEDIATE (03.06.20 @ 12:49) : Grossly clear lungs.        MICROBIOLOGY DATA:    Culture - Blood in AM (03.09.20 @ 11:07)    Specimen Source: .Blood    Culture Results:   No growth to date.        Culture - Blood in AM (03.09.20 @ 11:07)    Specimen Source: .Blood    Culture Results:   No growth to date.      Culture - Blood (03.07.20 @ 00:48)    Gram Stain:   Growth in aerobic bottle: Gram Positive Cocci in Clusters    -  Coagulase negative Staphylococcus: Detec    Specimen Source: .Blood    Organism: Blood Culture PCR    Culture Results:   Growth in aerobic bottle: Gram Positive Cocci in Clusters  ***Blood Panel PCR results on this specimen are available  approximately 3 hours after the Gram stain result.***  Gram stain, PCR, and/or culture results may not always  correspond due to difference in methodologies.  ************************************************************  This PCR assay was performed using [employer].  The following targets are tested for: Enterococcus,  vancomycin resistant enterococci, Listeria monocytogenes,  coagulase negative staphylococci, S. aureus,  methicillin resistant S. aureus, Streptococcus agalactiae  (Group B), S. pneumoniae, S. pyogenes (Group A),  Acinetobacter baumannii, Enterobacter cloacae, E. coli,  Klebsiella oxytoca, K. pneumoniae, Proteus sp.,  Serratia marcescens, Haemophilus influenzae,  Neisseria meningitidis, Pseudomonas aeruginosa, Candida  albicans, C. glabrata, C krusei, C parapsilosis,  C. tropicalis and the KPC resistance gene.    Organism Identification: Blood Culture PCR    Method Type: PCR      Culture - Blood (03.07.20 @ 00:48)    Specimen Source: .Blood    Culture Results:   No growth to date.    FLU A B RSV Detection by PCR (03.06.20 @ 20:47)    Flu A Result: NotDetec: The Flu A B RSV assay is a Real-Time PCR test for the qualitative  detection and differentiation of Influenza A, Influenza B, and  Respiratory Syncytial Virus on nasopharyngeal swabs. The results should  be interpreted in the context of all clinical and laboratory findings.    Flu B Result: NotDetec    RSV Result: NotDetec Patient is seen and examined at the bed side, is afebrile. No new events. The repeat Blood cultures from 3/9/20 are negative to date. The TTE is negative for vegetation.        REVIEW OF SYSTEMS: All other review systems are negative        ALLERGIES: No Known Allergies      Vital Signs Last 24 Hrs  T(C): 36.7 (10 Mar 2020 16:15), Max: 36.8 (10 Mar 2020 12:46)  T(F): 98 (10 Mar 2020 16:15), Max: 98.3 (10 Mar 2020 12:46)  HR: 76 (10 Mar 2020 16:15) (76 - 97)  BP: 130/82 (10 Mar 2020 16:15) (112/65 - 130/82)  BP(mean): --  RR: 17 (10 Mar 2020 16:15) (17 - 17)  SpO2: 96% (10 Mar 2020 16:15) (96% - 99%)      PHYSICAL EXAM:  GENERAL: Not in distress   CHEST/LUNG:  Air entry bilaterally  HEART: s1 and s2 present  ABDOMEN:  Nontender and  Nondistended  EXTREMITIES: No pedal  edema  CNS: Awake and Alert      < from: Transthoracic Echocardiogram (03.09.20 @ 11:11) >  CONCLUSIONS:  1. Normal mitral valve.  2. Normal trileaflet aortic valve. Mild aortic  regurgitation.  3. Mild aortic root dilatation, consider CT of chest for  further evaluation.  4. Normal left atrium.  5. Normal left ventricular internal dimensions and wall  thicknesses.  6. Endocardium not well visualized; grossly normal left  ventricular systolic function.  7. Grade I diastolic dysfunction (Impaired relaxation).  8. Normal right atrium.  9. Normal right ventricular size and systolic function  (TAPSE 2.2 cm).  10. Unable to estimate RVSP.  11. Normal tricuspid valve.  12. Normal pulmonic valve.  13. Normal pericardium with no pericardial effusion.    < end of copied text >        LABS:                        12.9   5.80  )-----------( 122      ( 10 Mar 2020 06:31 )             38.6                           12.7   5.45  )-----------( 114      ( 09 Mar 2020 07:13 )             37.6                  03-10    140  |  106  |  17  ----------------------------<  165<H>  3.8   |  27  |  1.17    Ca    9.2      10 Mar 2020 06:31  Phos  2.7     03-09  Mg     1.7     03-09    TPro  5.7<L>  /  Alb  2.9<L>  /  TBili  1.2  /  DBili  x   /  AST  25  /  ALT  38  /  AlkPhos  95  03-09      03-09    140  |  107  |  18  ----------------------------<  164<H>  3.8   |  28  |  1.14    Ca    8.7      09 Mar 2020 07:13  Phos  2.7     03-09  Mg     1.7     03-09    TPro  5.7<L>  /  Alb  2.9<L>  /  TBili  1.2  /  DBili  x   /  AST  25  /  ALT  38  /  AlkPhos  95  03-09          CAPILLARY BLOOD GLUCOSE  POCT Blood Glucose.: 207 mg/dL (08 Mar 2020 16:23)  POCT Blood Glucose.: 210 mg/dL (08 Mar 2020 11:40)  POCT Blood Glucose.: 173 mg/dL (08 Mar 2020 07:38)  POCT Blood Glucose.: 195 mg/dL (07 Mar 2020 21:22)        MEDICATIONS  (STANDING):    MEDICATIONS  (STANDING):  aspirin enteric coated 81 milliGRAM(s) Oral daily  atorvastatin 20 milliGRAM(s) Oral at bedtime  cholecalciferol 400 Unit(s) Oral daily  finasteride 5 milliGRAM(s) Oral daily  heparin  Injectable 5000 Unit(s) SubCutaneous every 8 hours  insulin glargine Injectable (LANTUS) 8 Unit(s) SubCutaneous at bedtime  insulin lispro (HumaLOG) corrective regimen sliding scale   SubCutaneous Before meals and at bedtime  meclizine 25 milliGRAM(s) Oral three times a day  oxybutynin 5 milliGRAM(s) Oral daily  pantoprazole    Tablet 40 milliGRAM(s) Oral before breakfast  senna 2 Tablet(s) Oral at bedtime  tamsulosin 0.4 milliGRAM(s) Oral at bedtime    MEDICATIONS  (PRN):  acetaminophen   Tablet .. 650 milliGRAM(s) Oral every 6 hours PRN Mild Pain (1 - 3)        RADIOLOGY & ADDITIONAL TESTS:    3/6/20 : CT Head No Cont (03.06.20 @ 15:29) No acute intracranial findings.      3/6/20 : Xray Chest 1 View-PORTABLE IMMEDIATE (03.06.20 @ 12:49) : Grossly clear lungs.        MICROBIOLOGY DATA:    Culture - Blood in AM (03.09.20 @ 11:07)    Specimen Source: .Blood    Culture Results:   No growth to date.        Culture - Blood in AM (03.09.20 @ 11:07)    Specimen Source: .Blood    Culture Results:   No growth to date.      Culture - Blood (03.07.20 @ 00:48)    Gram Stain:   Growth in aerobic bottle: Gram Positive Cocci in Clusters    -  Coagulase negative Staphylococcus: Detec    Specimen Source: .Blood    Organism: Blood Culture PCR    Culture Results:   Growth in aerobic bottle: Gram Positive Cocci in Clusters  ***Blood Panel PCR results on this specimen are available  approximately 3 hours after the Gram stain result.***  Gram stain, PCR, and/or culture results may not always  correspond due to difference in methodologies.  ************************************************************  This PCR assay was performed using [employer].  The following targets are tested for: Enterococcus,  vancomycin resistant enterococci, Listeria monocytogenes,  coagulase negative staphylococci, S. aureus,  methicillin resistant S. aureus, Streptococcus agalactiae  (Group B), S. pneumoniae, S. pyogenes (Group A),  Acinetobacter baumannii, Enterobacter cloacae, E. coli,  Klebsiella oxytoca, K. pneumoniae, Proteus sp.,  Serratia marcescens, Haemophilus influenzae,  Neisseria meningitidis, Pseudomonas aeruginosa, Candida  albicans, C. glabrata, C krusei, C parapsilosis,  C. tropicalis and the KPC resistance gene.    Organism Identification: Blood Culture PCR    Method Type: PCR      Culture - Blood (03.07.20 @ 00:48)    Specimen Source: .Blood    Culture Results:   No growth to date.    FLU A B RSV Detection by PCR (03.06.20 @ 20:47)    Flu A Result: NotDetec: The Flu A B RSV assay is a Real-Time PCR test for the qualitative  detection and differentiation of Influenza A, Influenza B, and  Respiratory Syncytial Virus on nasopharyngeal swabs. The results should  be interpreted in the context of all clinical and laboratory findings.    Flu B Result: NotDetec    RSV Result: NotDetec

## 2020-03-10 NOTE — PROGRESS NOTE ADULT - SUBJECTIVE AND OBJECTIVE BOX
INTERVAL HPI/OVERNIGHT EVENTS: I feel fine and was excited to go home.  #[id number]  Vital Signs Last 24 Hrs  T(C): 36.4 (10 Mar 2020 05:50), Max: 36.7 (09 Mar 2020 20:53)  T(F): 97.5 (10 Mar 2020 05:50), Max: 98.1 (09 Mar 2020 20:53)  HR: 95 (10 Mar 2020 11:50) (78 - 95)  BP: 112/65 (10 Mar 2020 11:50) (112/65 - 151/88)  BP(mean): --  RR: 17 (10 Mar 2020 05:50) (17 - 18)  SpO2: 98% (10 Mar 2020 11:50) (94% - 99%)  I&O's Summary    MEDICATIONS  (STANDING):  aspirin enteric coated 81 milliGRAM(s) Oral daily  atorvastatin 20 milliGRAM(s) Oral at bedtime  cholecalciferol 400 Unit(s) Oral daily  finasteride 5 milliGRAM(s) Oral daily  heparin  Injectable 5000 Unit(s) SubCutaneous every 8 hours  insulin glargine Injectable (LANTUS) 8 Unit(s) SubCutaneous at bedtime  insulin lispro (HumaLOG) corrective regimen sliding scale   SubCutaneous Before meals and at bedtime  magnesium citrate Oral Solution 1 Bottle Oral once  meclizine 25 milliGRAM(s) Oral three times a day  oxybutynin 5 milliGRAM(s) Oral daily  pantoprazole    Tablet 40 milliGRAM(s) Oral before breakfast  senna 2 Tablet(s) Oral at bedtime  tamsulosin 0.4 milliGRAM(s) Oral at bedtime    MEDICATIONS  (PRN):  acetaminophen   Tablet .. 650 milliGRAM(s) Oral every 6 hours PRN Mild Pain (1 - 3)    LABS:                        12.9   5.80  )-----------( 122      ( 10 Mar 2020 06:31 )             38.6     03-10    140  |  106  |  17  ----------------------------<  165<H>  3.8   |  27  |  1.17    Ca    9.2      10 Mar 2020 06:31  Phos  2.7     03-09  Mg     1.7     03-09    TPro  5.7<L>  /  Alb  2.9<L>  /  TBili  1.2  /  DBili  x   /  AST  25  /  ALT  38  /  AlkPhos  95  03-09        CAPILLARY BLOOD GLUCOSE      POCT Blood Glucose.: 231 mg/dL (10 Mar 2020 11:30)  POCT Blood Glucose.: 162 mg/dL (10 Mar 2020 07:14)  POCT Blood Glucose.: 191 mg/dL (09 Mar 2020 21:25)  POCT Blood Glucose.: 247 mg/dL (09 Mar 2020 16:17)          REVIEW OF SYSTEMS:  CONSTITUTIONAL: No fever, weight loss, or fatigue  EYES: No eye pain, visual disturbances, or discharge  ENMT:  No difficulty hearing, tinnitus, vertigo; No sinus or throat pain  NECK: No pain or stiffness  RESPIRATORY: No cough, wheezing, chills or hemoptysis; No shortness of breath  CARDIOVASCULAR: No chest pain, palpitations, dizziness, or leg swelling  GASTROINTESTINAL: No abdominal or epigastric pain. No nausea, vomiting, or hematemesis; No diarrhea or constipation. No melena or hematochezia.  GENITOURINARY: No dysuria, frequency, hematuria, or incontinence  NEUROLOGICAL: No headaches, memory loss, loss of strength, numbness, or tremors    Consultant(s) Notes Reviewed:  [x ] YES  [ ] NO    PHYSICAL EXAM:  GENERAL: NAD, well-groomed, well-developed ,not in any distress ,  HEAD:  Atraumatic, Normocephalic  EYES: EOMI, PERRLA, conjunctiva and sclera clear  ENMT: No tonsillar erythema, exudates, or enlargement; Moist mucous membranes, Good dentition, No lesions  NECK: Supple, No JVD, Normal thyroid  NERVOUS SYSTEM:  Alert & Oriented X3, No focal deficit   CHEST/LUNG: Good air entry bilateral with no  rales, rhonchi, wheezing, or rubs  HEART: Regular rate and rhythm; No murmurs, rubs, or gallops  ABDOMEN: Soft, Nontender, Nondistended; Bowel sounds present  EXTREMITIES:  2+ Peripheral Pulses, No clubbing, cyanosis, or edema    Care Discussed with Consultants/Other Providers [ x] YES  [ ] NO

## 2020-03-10 NOTE — DISCHARGE NOTE NURSING/CASE MANAGEMENT/SOCIAL WORK - PATIENT PORTAL LINK FT
You can access the FollowMyHealth Patient Portal offered by [facility] by registering at the following website: [email or web address]. By joining [employer]’s FollowMyHealth portal, you will also be able to view your health information using other applications (apps) compatible with our system.

## 2020-03-10 NOTE — PROGRESS NOTE ADULT - ASSESSMENT
Patient is a 79y old  Male, from home, walks independently,  with PMH of  CAD, DM, HTN, HLD who presented to the ER for evaluation of lightheadedness, vertigo and generalized weakness. He has no palpitation, fever, or chest pain. Pt goes to [facility]. As per [facility] his blood pressure was low while he was experiencing dizziness. On admission, he has no fever or Leukocytosis. The ID consult requested today, 3/7/20, to assist with further evaluation of positive blood culture.    # Bacteremia- Coagulase Negative staph.- most likely a contaminant in the setting of No implanted prosthesis   # ANTON  # Dizziness - CT head is negative    would recommend;    1. Follow up Repeat Blood cultures  to document clearing the blood stream  2. Obtain TTE  3. Monitor kidney function and IVF  4. Management of Dizziness as per Primary      Attending Attestation:   Spent  40 minutes on total encounter, more than 50 % of the visit was spent counseling and/or coordinating care by the Attending physician. Patient is a 79y old  Male, from home, walks independently,  with PMH of  CAD, DM, HTN, HLD who presented to the ER for evaluation of lightheadedness, vertigo and generalized weakness. He has no palpitation, fever, or chest pain. Pt goes to [facility]. As per [facility] his blood pressure was low while he was experiencing dizziness. On admission, he has no fever or Leukocytosis. The ID consult requested today, 3/7/20, to assist with further evaluation of positive blood culture.    # Bacteremia- Coagulase Negative staph.- most likely a contaminant in the setting of No implanted prosthesis   # ANTON  # Dizziness - CT head is negative    would recommend;    1. Monitor OFF Abx  2. Monitor kidney function and IVF  3. Management of Dizziness as per Primary  4. OOB to chair       Attending Attestation:   Spent  35 minutes on total encounter, more than 50 % of the visit was spent counseling and/or coordinating care by the Attending physician.

## 2020-03-10 NOTE — DISCHARGE NOTE PROVIDER - CARE PROVIDER_API CALL
Edda Mae; PhD)  Medicine  Follow Up Time: 1 week Edda Mae; PhD)  Medicine  Follow Up Time: 1 week    Rachel Heller  Phone: [phone]  Fax: (   )    -  Follow Up Time:

## 2020-03-10 NOTE — PROGRESS NOTE ADULT - REASON FOR ADMISSION
Lightheadedness

## 2020-03-10 NOTE — DISCHARGE NOTE PROVIDER - PROVIDER TOKENS
PROVIDER:[TOKEN:[[id number]],FOLLOWUP:[1 week]] PROVIDER:[TOKEN:[[id number]],FOLLOWUP:[1 week]],FREE:[LAST:[Gu],FIRST:[Rachel],PHONE:[phone]],FAX:[(   )    -]]

## 2020-03-10 NOTE — DISCHARGE NOTE PROVIDER - NSDCMRMEDTOKEN_GEN_ALL_CORE_FT
atenolol 25 mg oral tablet: 1 tab(s) orally once a day  atorvastatin 20 mg oral tablet: 1 tab(s) orally once a day  cholecalciferol oral tablet: 400 unit(s) orally once a day  finasteride 5 mg oral tablet: 1 tab(s) orally once a day  Janumet XR 50 mg-500 mg oral tablet, extended release: 1 tab(s) orally once a day (in the evening)  meclizine 25 mg oral tablet: 1 tab(s) orally 3 times a day  Myrbetriq 50 mg oral tablet, extended release: 1 tab(s) orally once a day  NovoLOG FlexPen 100 units/mL injectable solution: 20 unit(s) injectable once a day (at bedtime)  NovoLOG FlexPen 100 units/mL injectable solution: 17 unit(s) injectable once a day at AM  omeprazole 20 mg oral delayed release capsule: 1 cap(s) orally once a day  Senna 8.6 mg oral tablet: 1 tab(s) orally once a day (at bedtime)  tamsulosin 0.4 mg oral capsule: 1 cap(s) orally once a day  Tresiba 100 units/mL subcutaneous solution: 50  subcutaneous once a week aspirin 81 mg oral delayed release tablet: 1 tab(s) orally once a day  atorvastatin 20 mg oral tablet: 1 tab(s) orally once a day  cholecalciferol oral tablet: 400 unit(s) orally once a day  finasteride 5 mg oral tablet: 1 tab(s) orally once a day  Janumet XR 50 mg-500 mg oral tablet, extended release: 1 tab(s) orally once a day (in the evening)  meclizine 25 mg oral tablet: 1 tab(s) orally 3 times a day  Myrbetriq 50 mg oral tablet, extended release: 1 tab(s) orally once a day  NovoLOG FlexPen 100 units/mL injectable solution: 17 unit(s) injectable once a day at AM  NovoLOG FlexPen 100 units/mL injectable solution: 20 unit(s) injectable once a day (at bedtime)  omeprazole 20 mg oral delayed release capsule: 1 cap(s) orally once a day  Senna 8.6 mg oral tablet: 1 tab(s) orally once a day (at bedtime)  tamsoulosin: 2 cap(s) orally once a day  Tresiba 100 units/mL subcutaneous solution: 50  subcutaneous once a day

## 2020-03-12 LAB
CULTURE RESULTS: SIGNIFICANT CHANGE UP
SPECIMEN SOURCE: SIGNIFICANT CHANGE UP

## 2020-03-14 LAB
CULTURE RESULTS: SIGNIFICANT CHANGE UP
CULTURE RESULTS: SIGNIFICANT CHANGE UP
SPECIMEN SOURCE: SIGNIFICANT CHANGE UP
SPECIMEN SOURCE: SIGNIFICANT CHANGE UP

## 2020-10-26 NOTE — ED PROVIDER NOTE - CROS ED RESP ALL NEG
Latitude alert for 3 shocks on 10- - latitude consult. On Lopressor. Presenting: AP/AS with VS and PVC's. Phone call to patient who states he was walking across his yard and got shocked 3 times on 10-. States he may have missed some doses of medications. Went to [facility] (latitude consult). States he feels fine now. Reinforced the importance of strict medication compliance for the avoidance of therapies. Also reviewed shock protocol and in the future should go to [facility]. He verbalized understanding. Will review with Dr. Ean Kelley.      Adair Younger RN, BSN  [facility] negative...

## 2021-03-05 NOTE — PROVIDER CONTACT NOTE (CRITICAL VALUE NOTIFICATION) - NAME OF MD/NP/PA/DO NOTIFIED:
MICHELLE Hansen made awared Cephalexin Counseling: I counseled the patient regarding use of cephalexin as an antibiotic for prophylactic and/or therapeutic purposes. Cephalexin (commonly prescribed under brand name Keflex) is a cephalosporin antibiotic which is active against numerous classes of bacteria, including most skin bacteria. Side effects may include nausea, diarrhea, gastrointestinal upset, rash, hives, yeast infections, and in rare cases, hepatitis, kidney disease, seizures, fever, confusion, neurologic symptoms, and others. Patients with severe allergies to penicillin medications are cautioned that there is about a 10% incidence of cross-reactivity with cephalosporins. When possible, patients with penicillin allergies should use alternatives to cephalosporins for antibiotic therapy.

## 2021-07-27 NOTE — ED ADULT TRIAGE NOTE - NS ED NURSE BANDS TYPE
TRANSFER - OUT REPORT:    Verbal report given to Jen H Street on [employer] Page  being transferred to [address] [place] [address] for routine progression of care       Report consisted of patients Situation, Background, Assessment and   Recommendations(SBAR). Information from the following report(s) SBAR, ED Summary, MAR, Recent Results and Quality Measures was reviewed with the receiving nurse. Lines:   Peripheral IV 07/27/21 Right Antecubital (Active)       Peripheral IV 07/27/21 Left Antecubital (Active)   Site Assessment Clean, dry, & intact 07/27/21 1849   Phlebitis Assessment 0 07/27/21 1849   Infiltration Assessment 0 07/27/21 1849   Action Taken Blood drawn 07/27/21 1849        Opportunity for questions and clarification was provided.       Patient transported with:   O2 @ 2 liters Name band;

## 2021-10-13 ENCOUNTER — EMERGENCY (EMERGENCY)
Facility: HOSPITAL | Age: 81
LOS: 1 days | Discharge: HOME | End: 2021-10-13
Attending: EMERGENCY MEDICINE
Payer: COMMERCIAL

## 2021-10-13 VITALS
HEIGHT: 70 IN | RESPIRATION RATE: 16 BRPM | HEART RATE: 83 BPM | OXYGEN SATURATION: 93 % | WEIGHT: 190.04 LBS | TEMPERATURE: 98 F | SYSTOLIC BLOOD PRESSURE: 131 MMHG | DIASTOLIC BLOOD PRESSURE: 83 MMHG

## 2021-10-13 VITALS
DIASTOLIC BLOOD PRESSURE: 99 MMHG | SYSTOLIC BLOOD PRESSURE: 154 MMHG | OXYGEN SATURATION: 94 % | HEART RATE: 88 BPM | TEMPERATURE: 98 F | RESPIRATION RATE: 16 BRPM

## 2021-10-13 DIAGNOSIS — Z90.3 ACQUIRED ABSENCE OF STOMACH [PART OF]: Chronic | ICD-10-CM

## 2021-10-13 DIAGNOSIS — Z90.79 ACQUIRED ABSENCE OF OTHER GENITAL ORGAN(S): Chronic | ICD-10-CM

## 2021-10-13 LAB
ANION GAP SERPL CALC-SCNC: 5 MMOL/L — SIGNIFICANT CHANGE UP (ref 5–17)
APPEARANCE UR: ABNORMAL
BACTERIA # UR AUTO: ABNORMAL /HPF
BILIRUB UR-MCNC: NEGATIVE — SIGNIFICANT CHANGE UP
BUN SERPL-MCNC: 21 MG/DL — HIGH (ref 7–18)
CALCIUM SERPL-MCNC: 9.1 MG/DL — SIGNIFICANT CHANGE UP (ref 8.4–10.5)
CHLORIDE SERPL-SCNC: 103 MMOL/L — SIGNIFICANT CHANGE UP (ref 96–108)
CO2 SERPL-SCNC: 31 MMOL/L — SIGNIFICANT CHANGE UP (ref 22–31)
COLOR SPEC: YELLOW — SIGNIFICANT CHANGE UP
CREAT SERPL-MCNC: 1.49 MG/DL — HIGH (ref 0.5–1.3)
DIFF PNL FLD: ABNORMAL
EPI CELLS # UR: SIGNIFICANT CHANGE UP /HPF
GLUCOSE SERPL-MCNC: 152 MG/DL — HIGH (ref 70–99)
GLUCOSE UR QL: 250
HCT VFR BLD CALC: 46 % — SIGNIFICANT CHANGE UP (ref 39–50)
HGB BLD-MCNC: 15.2 G/DL — SIGNIFICANT CHANGE UP (ref 13–17)
HYALINE CASTS # UR AUTO: ABNORMAL /LPF
KETONES UR-MCNC: ABNORMAL
LEUKOCYTE ESTERASE UR-ACNC: NEGATIVE — SIGNIFICANT CHANGE UP
MCHC RBC-ENTMCNC: 30.2 PG — SIGNIFICANT CHANGE UP (ref 27–34)
MCHC RBC-ENTMCNC: 33 GM/DL — SIGNIFICANT CHANGE UP (ref 32–36)
MCV RBC AUTO: 91.5 FL — SIGNIFICANT CHANGE UP (ref 80–100)
NITRITE UR-MCNC: NEGATIVE — SIGNIFICANT CHANGE UP
NRBC # BLD: 0 /100 WBCS — SIGNIFICANT CHANGE UP (ref 0–0)
PH UR: 6.5 — SIGNIFICANT CHANGE UP (ref 5–8)
PLATELET # BLD AUTO: 138 K/UL — LOW (ref 150–400)
POTASSIUM SERPL-MCNC: 4.3 MMOL/L — SIGNIFICANT CHANGE UP (ref 3.5–5.3)
POTASSIUM SERPL-SCNC: 4.3 MMOL/L — SIGNIFICANT CHANGE UP (ref 3.5–5.3)
PROT UR-MCNC: 500 MG/DL
RBC # BLD: 5.03 M/UL — SIGNIFICANT CHANGE UP (ref 4.2–5.8)
RBC # FLD: 12.6 % — SIGNIFICANT CHANGE UP (ref 10.3–14.5)
RBC CASTS # UR COMP ASSIST: ABNORMAL /HPF (ref 0–2)
SODIUM SERPL-SCNC: 139 MMOL/L — SIGNIFICANT CHANGE UP (ref 135–145)
SP GR SPEC: 1.01 — SIGNIFICANT CHANGE UP (ref 1.01–1.02)
TROPONIN I SERPL-MCNC: <0.015 NG/ML — SIGNIFICANT CHANGE UP (ref 0–0.04)
TROPONIN I SERPL-MCNC: <0.015 NG/ML — SIGNIFICANT CHANGE UP (ref 0–0.04)
UROBILINOGEN FLD QL: NEGATIVE — SIGNIFICANT CHANGE UP
WBC # BLD: 10.49 K/UL — SIGNIFICANT CHANGE UP (ref 3.8–10.5)
WBC # FLD AUTO: 10.49 K/UL — SIGNIFICANT CHANGE UP (ref 3.8–10.5)
WBC UR QL: SIGNIFICANT CHANGE UP /HPF (ref 0–5)

## 2021-10-13 PROCEDURE — 70450 CT HEAD/BRAIN W/O DYE: CPT | Mod: MG

## 2021-10-13 PROCEDURE — 80048 BASIC METABOLIC PNL TOTAL CA: CPT

## 2021-10-13 PROCEDURE — 74177 CT ABD & PELVIS W/CONTRAST: CPT | Mod: MG

## 2021-10-13 PROCEDURE — 83690 ASSAY OF LIPASE: CPT

## 2021-10-13 PROCEDURE — 74177 CT ABD & PELVIS W/CONTRAST: CPT | Mod: 26,MG

## 2021-10-13 PROCEDURE — G1004: CPT

## 2021-10-13 PROCEDURE — 81001 URINALYSIS AUTO W/SCOPE: CPT

## 2021-10-13 PROCEDURE — 82962 GLUCOSE BLOOD TEST: CPT

## 2021-10-13 PROCEDURE — 93005 ELECTROCARDIOGRAM TRACING: CPT

## 2021-10-13 PROCEDURE — 36415 COLL VENOUS BLD VENIPUNCTURE: CPT

## 2021-10-13 PROCEDURE — 80076 HEPATIC FUNCTION PANEL: CPT

## 2021-10-13 PROCEDURE — 84484 ASSAY OF TROPONIN QUANT: CPT

## 2021-10-13 PROCEDURE — 85027 COMPLETE CBC AUTOMATED: CPT

## 2021-10-13 PROCEDURE — 70450 CT HEAD/BRAIN W/O DYE: CPT | Mod: 26,MG

## 2021-10-13 PROCEDURE — 99284 EMERGENCY DEPT VISIT MOD MDM: CPT | Mod: 25

## 2021-10-13 PROCEDURE — 93010 ELECTROCARDIOGRAM REPORT: CPT

## 2021-10-13 PROCEDURE — 99284 EMERGENCY DEPT VISIT MOD MDM: CPT

## 2021-10-13 NOTE — ED PROVIDER NOTE - ATTENDING CONTRIBUTION TO CARE
I conducted a face-to-face interaction with patient and I agree with resident documentation and plan.  Pt presents with N/V, generalized weakness  Exam:  General:  NAD, well-appearing  Heart:  RRR, normal S1/S2  Lungs:  CTAB  Abd:  soft, mild upper abdominal and periumbilical tenderness  Neuro:  no weakness/numbness  A/P:  N/V, generalized weakness--will check CT head/abd/pelvis, labs, EKG, urine, reassess I conducted a face-to-face interaction with patient and I agree with resident documentation and plan.  Pt presents with N/V, generalized weakness;  [employer]  #[id number]  Exam:  General:  NAD, well-appearing  Heart:  RRR, normal S1/S2  Lungs:  CTAB  Abd:  soft, mild upper abdominal and periumbilical tenderness  Neuro:  no weakness/numbness  A/P:  N/V, generalized weakness--will check CT head/abd/pelvis, labs, EKG, urine, reassess

## 2021-10-13 NOTE — ED ADULT NURSE NOTE - NSIMPLEMENTINTERV_GEN_ALL_ED
Implemented All Fall Risk Interventions:  [place] to call system. Call bell, personal items and telephone within reach. Instruct patient to call for assistance. Room bathroom lighting operational. Non-slip footwear when patient is off stretcher. Physically safe environment: no spills, clutter or unnecessary equipment. Stretcher in lowest position, wheels locked, appropriate side rails in place. Provide visual cue, wrist band, yellow gown, etc. Monitor gait and stability. Monitor for mental status changes and reorient to person, place, and time. Review medications for side effects contributing to fall risk. Reinforce activity limits and safety measures with patient and family.

## 2021-10-13 NOTE — ED PROVIDER NOTE - CLINICAL SUMMARY MEDICAL DECISION MAKING FREE TEXT BOX
78 y/o male, from home, ambulates with cane, with PMH of CAD, DM, HTN, HLD p/w nausea, vomiting, weakness and dizziness. 78 y/o male, from home, ambulates with cane, with PMH of CAD, DM, HTN, HLD p/w nausea, vomiting, weakness and dizziness. Denies CP, SOB. Poor historian. Will get labs, trop and CT

## 2021-10-13 NOTE — ED PROVIDER NOTE - NSFOLLOWUPINSTRUCTIONS_ED_ALL_ED_FT
Abdominal Pain, Adult     Many things can cause belly (abdominal) pain. Most times, belly pain is not dangerous. Many cases of belly pain can be watched and treated at home. Sometimes belly pain is serious, though. Your doctor will try to find the cause of your belly pain.    Follow these instructions at home:  Take over-the-counter and prescription medicines only as told by your doctor. Do not take medicines that help you poop (laxatives) unless told to by your doctor. Drink enough fluid to keep your pee (urine) clear or pale yellow. Watch your belly pain for any changes. Keep all follow-up visits as told by your doctor. This is important.    Contact a doctor if:  Your belly pain changes or gets worse. You are not hungry, or you lose weight without trying. You are having trouble pooping (constipated) or have watery poop (diarrhea) for more than 2–3 days. You have pain when you pee or poop. Your belly pain wakes you up at night. Your pain gets worse with meals, after eating, or with certain foods. You are throwing up and cannot keep anything down. You have a fever.     Get help right away if:  Your pain does not go away as soon as your doctor says it should. You cannot stop throwing up. Your pain is only in areas of your belly, such as the right side or the left lower part of the belly. You have bloody or black poop, or poop that looks like tar. You have very bad pain, cramping, or bloating in your belly.   You have signs of not having enough fluid or water in your body (dehydration), such as:  Dark pee, very little pee, or no pee. Cracked lips. Dry mouth. Sunken eyes. Sleepiness. Weakness.     This information is not intended to replace advice given to you by your health care provider. Make sure you discuss any questions you have with your health care provider.    Document Released: 06/05/2009 Document Revised: 07/07/2017 Document Reviewed: 05/31/2017  [employer] Patient Education © 2020 [employer]

## 2021-10-13 NOTE — ED PROVIDER NOTE - PROGRESS NOTE DETAILS
(Alejandro) - s/o to fu CT which shows trace pericardial effusion and CT head shows tortuosity of vertebrobasilar system  Repeat trop negative  Pt ate meal, no vomiting. Abdo soft, repeat neuro exam wnl. Ambulatory around ED asking to go home. Will dc. Discussed indications for patient return to ED. Patient understood.

## 2021-10-13 NOTE — ED ADULT NURSE NOTE - OBJECTIVE STATEMENT
BIB EMS from half-way with c/o generalized weakness x days, nausea dizziness x today, no chest pain/ back pain, extremities weakness noted.

## 2021-10-13 NOTE — ED PROVIDER NOTE - PATIENT PORTAL LINK FT
You can access the FollowMyHealth Patient Portal offered by [facility] by registering at the following website: [email or web address]. By joining [employer]’s FollowMyHealth portal, you will also be able to view your health information using other applications (apps) compatible with our system.

## 2021-10-13 NOTE — ED PROVIDER NOTE - OBJECTIVE STATEMENT
80 y/o male, from home, ambulates with cane, with PMH of CAD, DM, HTN, HLD p/w nausea, vomiting, weakness and dizziness since eating breakfast this morning. Denies fall, head trauma, CP, SOB, cough, diarrhea, hematemesis

## 2022-01-11 ENCOUNTER — INPATIENT (INPATIENT)
Facility: HOSPITAL | Age: 82
LOS: 5 days | Discharge: NURSING, REHAB OR LONG-TERM CARE | DRG: 871 | End: 2022-01-17
Attending: INTERNAL MEDICINE | Admitting: INTERNAL MEDICINE
Payer: COMMERCIAL

## 2022-01-11 VITALS
HEART RATE: 98 BPM | HEIGHT: 71 IN | RESPIRATION RATE: 20 BRPM | TEMPERATURE: 99 F | WEIGHT: 250 LBS | SYSTOLIC BLOOD PRESSURE: 129 MMHG | DIASTOLIC BLOOD PRESSURE: 84 MMHG

## 2022-01-11 DIAGNOSIS — Z90.79 ACQUIRED ABSENCE OF OTHER GENITAL ORGAN(S): Chronic | ICD-10-CM

## 2022-01-11 DIAGNOSIS — N39.0 URINARY TRACT INFECTION, SITE NOT SPECIFIED: ICD-10-CM

## 2022-01-11 DIAGNOSIS — Z90.3 ACQUIRED ABSENCE OF STOMACH [PART OF]: Chronic | ICD-10-CM

## 2022-01-11 LAB
ALBUMIN SERPL ELPH-MCNC: 3.5 G/DL — SIGNIFICANT CHANGE UP (ref 3.3–5)
ALP SERPL-CCNC: 153 U/L — HIGH (ref 40–120)
ALT FLD-CCNC: 40 U/L — SIGNIFICANT CHANGE UP (ref 10–45)
ANION GAP SERPL CALC-SCNC: 17 MMOL/L — SIGNIFICANT CHANGE UP (ref 5–17)
APPEARANCE UR: CLEAR — SIGNIFICANT CHANGE UP
AST SERPL-CCNC: 37 U/L — SIGNIFICANT CHANGE UP (ref 10–40)
BACTERIA # UR AUTO: ABNORMAL
BASOPHILS # BLD AUTO: 0.02 K/UL — SIGNIFICANT CHANGE UP (ref 0–0.2)
BASOPHILS NFR BLD AUTO: 0.2 % — SIGNIFICANT CHANGE UP (ref 0–2)
BILIRUB SERPL-MCNC: 1.8 MG/DL — HIGH (ref 0.2–1.2)
BILIRUB UR-MCNC: NEGATIVE — SIGNIFICANT CHANGE UP
BUN SERPL-MCNC: 22 MG/DL — SIGNIFICANT CHANGE UP (ref 7–23)
CALCIUM SERPL-MCNC: 9.1 MG/DL — SIGNIFICANT CHANGE UP (ref 8.4–10.5)
CHLORIDE SERPL-SCNC: 96 MMOL/L — SIGNIFICANT CHANGE UP (ref 96–108)
CO2 SERPL-SCNC: 20 MMOL/L — LOW (ref 22–31)
COLOR SPEC: YELLOW — SIGNIFICANT CHANGE UP
CREAT SERPL-MCNC: 1.4 MG/DL — HIGH (ref 0.5–1.3)
DIFF PNL FLD: ABNORMAL
EOSINOPHIL # BLD AUTO: 0.02 K/UL — SIGNIFICANT CHANGE UP (ref 0–0.5)
EOSINOPHIL NFR BLD AUTO: 0.2 % — SIGNIFICANT CHANGE UP (ref 0–6)
EPI CELLS # UR: SIGNIFICANT CHANGE UP
GLUCOSE BLDC GLUCOMTR-MCNC: 285 MG/DL — HIGH (ref 70–99)
GLUCOSE SERPL-MCNC: 303 MG/DL — HIGH (ref 70–99)
GLUCOSE UR QL: ABNORMAL
HCT VFR BLD CALC: 43.7 % — SIGNIFICANT CHANGE UP (ref 39–50)
HGB BLD-MCNC: 14.4 G/DL — SIGNIFICANT CHANGE UP (ref 13–17)
HYALINE CASTS # UR AUTO: 5 /LPF — HIGH (ref 0–7)
IMM GRANULOCYTES NFR BLD AUTO: 0.8 % — SIGNIFICANT CHANGE UP (ref 0–1.5)
KETONES UR-MCNC: NEGATIVE — SIGNIFICANT CHANGE UP
LEUKOCYTE ESTERASE UR-ACNC: ABNORMAL
LIDOCAIN IGE QN: 11 U/L — SIGNIFICANT CHANGE UP (ref 7–60)
LYMPHOCYTES # BLD AUTO: 0.34 K/UL — LOW (ref 1–3.3)
LYMPHOCYTES # BLD AUTO: 2.7 % — LOW (ref 13–44)
MCHC RBC-ENTMCNC: 29.8 PG — SIGNIFICANT CHANGE UP (ref 27–34)
MCHC RBC-ENTMCNC: 33 GM/DL — SIGNIFICANT CHANGE UP (ref 32–36)
MCV RBC AUTO: 90.5 FL — SIGNIFICANT CHANGE UP (ref 80–100)
MONOCYTES # BLD AUTO: 0.98 K/UL — HIGH (ref 0–0.9)
MONOCYTES NFR BLD AUTO: 7.9 % — SIGNIFICANT CHANGE UP (ref 2–14)
NEUTROPHILS # BLD AUTO: 10.98 K/UL — HIGH (ref 1.8–7.4)
NEUTROPHILS NFR BLD AUTO: 88.2 % — HIGH (ref 43–77)
NITRITE UR-MCNC: NEGATIVE — SIGNIFICANT CHANGE UP
NRBC # BLD: 0 /100 WBCS — SIGNIFICANT CHANGE UP (ref 0–0)
PH UR: 6.5 — SIGNIFICANT CHANGE UP (ref 5–8)
PLATELET # BLD AUTO: 120 K/UL — LOW (ref 150–400)
POTASSIUM SERPL-MCNC: 4.5 MMOL/L — SIGNIFICANT CHANGE UP (ref 3.5–5.3)
POTASSIUM SERPL-SCNC: 4.5 MMOL/L — SIGNIFICANT CHANGE UP (ref 3.5–5.3)
PROT SERPL-MCNC: 6.6 G/DL — SIGNIFICANT CHANGE UP (ref 6–8.3)
PROT UR-MCNC: 100 — SIGNIFICANT CHANGE UP
RBC # BLD: 4.83 M/UL — SIGNIFICANT CHANGE UP (ref 4.2–5.8)
RBC # FLD: 13 % — SIGNIFICANT CHANGE UP (ref 10.3–14.5)
RBC CASTS # UR COMP ASSIST: 50 /HPF — HIGH (ref 0–4)
SARS-COV-2 RNA SPEC QL NAA+PROBE: SIGNIFICANT CHANGE UP
SODIUM SERPL-SCNC: 133 MMOL/L — LOW (ref 135–145)
SP GR SPEC: 1.05 — HIGH (ref 1.01–1.02)
UROBILINOGEN FLD QL: ABNORMAL
WBC # BLD: 12.44 K/UL — HIGH (ref 3.8–10.5)
WBC # FLD AUTO: 12.44 K/UL — HIGH (ref 3.8–10.5)
WBC UR QL: 58 /HPF — HIGH (ref 0–5)

## 2022-01-11 PROCEDURE — 71045 X-RAY EXAM CHEST 1 VIEW: CPT | Mod: 26

## 2022-01-11 PROCEDURE — 70450 CT HEAD/BRAIN W/O DYE: CPT | Mod: 26,MA

## 2022-01-11 PROCEDURE — 93010 ELECTROCARDIOGRAM REPORT: CPT

## 2022-01-11 PROCEDURE — 74177 CT ABD & PELVIS W/CONTRAST: CPT | Mod: 26,MA

## 2022-01-11 PROCEDURE — 99285 EMERGENCY DEPT VISIT HI MDM: CPT

## 2022-01-11 RX ORDER — FINASTERIDE 5 MG/1
5 TABLET, FILM COATED ORAL DAILY
Refills: 0 | Status: DISCONTINUED | OUTPATIENT
Start: 2022-01-11 | End: 2022-01-17

## 2022-01-11 RX ORDER — METOPROLOL TARTRATE 50 MG
25 TABLET ORAL DAILY
Refills: 0 | Status: DISCONTINUED | OUTPATIENT
Start: 2022-01-11 | End: 2022-01-17

## 2022-01-11 RX ORDER — INSULIN LISPRO 100/ML
VIAL (ML) SUBCUTANEOUS
Refills: 0 | Status: DISCONTINUED | OUTPATIENT
Start: 2022-01-11 | End: 2022-01-17

## 2022-01-11 RX ORDER — SODIUM CHLORIDE 9 MG/ML
1000 INJECTION INTRAMUSCULAR; INTRAVENOUS; SUBCUTANEOUS ONCE
Refills: 0 | Status: COMPLETED | OUTPATIENT
Start: 2022-01-11 | End: 2022-01-11

## 2022-01-11 RX ORDER — DEXTROSE 50 % IN WATER 50 %
12.5 SYRINGE (ML) INTRAVENOUS ONCE
Refills: 0 | Status: DISCONTINUED | OUTPATIENT
Start: 2022-01-11 | End: 2022-01-17

## 2022-01-11 RX ORDER — HEPARIN SODIUM 5000 [USP'U]/ML
5000 INJECTION INTRAVENOUS; SUBCUTANEOUS EVERY 8 HOURS
Refills: 0 | Status: DISCONTINUED | OUTPATIENT
Start: 2022-01-11 | End: 2022-01-17

## 2022-01-11 RX ORDER — CEFTRIAXONE 500 MG/1
1000 INJECTION, POWDER, FOR SOLUTION INTRAMUSCULAR; INTRAVENOUS ONCE
Refills: 0 | Status: COMPLETED | OUTPATIENT
Start: 2022-01-11 | End: 2022-01-11

## 2022-01-11 RX ORDER — GLUCAGON INJECTION, SOLUTION 0.5 MG/.1ML
1 INJECTION, SOLUTION SUBCUTANEOUS ONCE
Refills: 0 | Status: DISCONTINUED | OUTPATIENT
Start: 2022-01-11 | End: 2022-01-17

## 2022-01-11 RX ORDER — SODIUM CHLORIDE 9 MG/ML
1000 INJECTION, SOLUTION INTRAVENOUS
Refills: 0 | Status: DISCONTINUED | OUTPATIENT
Start: 2022-01-11 | End: 2022-01-17

## 2022-01-11 RX ORDER — DEXTROSE 50 % IN WATER 50 %
25 SYRINGE (ML) INTRAVENOUS ONCE
Refills: 0 | Status: DISCONTINUED | OUTPATIENT
Start: 2022-01-11 | End: 2022-01-17

## 2022-01-11 RX ORDER — ACETAMINOPHEN 500 MG
650 TABLET ORAL ONCE
Refills: 0 | Status: COMPLETED | OUTPATIENT
Start: 2022-01-11 | End: 2022-01-11

## 2022-01-11 RX ORDER — ATORVASTATIN CALCIUM 80 MG/1
20 TABLET, FILM COATED ORAL AT BEDTIME
Refills: 0 | Status: DISCONTINUED | OUTPATIENT
Start: 2022-01-11 | End: 2022-01-17

## 2022-01-11 RX ORDER — TAMSULOSIN HYDROCHLORIDE 0.4 MG/1
0.4 CAPSULE ORAL AT BEDTIME
Refills: 0 | Status: DISCONTINUED | OUTPATIENT
Start: 2022-01-11 | End: 2022-01-17

## 2022-01-11 RX ORDER — CEFTRIAXONE 500 MG/1
1000 INJECTION, POWDER, FOR SOLUTION INTRAMUSCULAR; INTRAVENOUS EVERY 24 HOURS
Refills: 0 | Status: DISCONTINUED | OUTPATIENT
Start: 2022-01-11 | End: 2022-01-17

## 2022-01-11 RX ORDER — ONDANSETRON 8 MG/1
4 TABLET, FILM COATED ORAL ONCE
Refills: 0 | Status: COMPLETED | OUTPATIENT
Start: 2022-01-11 | End: 2022-01-11

## 2022-01-11 RX ORDER — DEXTROSE 50 % IN WATER 50 %
15 SYRINGE (ML) INTRAVENOUS ONCE
Refills: 0 | Status: DISCONTINUED | OUTPATIENT
Start: 2022-01-11 | End: 2022-01-17

## 2022-01-11 RX ADMIN — ONDANSETRON 4 MILLIGRAM(S): 8 TABLET, FILM COATED ORAL at 18:12

## 2022-01-11 RX ADMIN — Medication 3: at 23:28

## 2022-01-11 RX ADMIN — Medication 650 MILLIGRAM(S): at 09:09

## 2022-01-11 RX ADMIN — SODIUM CHLORIDE 1000 MILLILITER(S): 9 INJECTION INTRAMUSCULAR; INTRAVENOUS; SUBCUTANEOUS at 09:08

## 2022-01-11 RX ADMIN — CEFTRIAXONE 100 MILLIGRAM(S): 500 INJECTION, POWDER, FOR SOLUTION INTRAMUSCULAR; INTRAVENOUS at 18:10

## 2022-01-11 NOTE — H&P ADULT - ASSESSMENT
82 yo male h/o htn, dm, here with ams, found to have uti    metabolic encephalopathy 2/2 uti  treat uti    uti  ceftriax iv  f/u cult and sens    dm  iss  monitor fs    cont other home meds      PT      Advanced care planning was discussed with patient and family.  Advanced care planning forms were reviewed and discussed as appropriate.  Differential diagnosis and plan of care discussed with patient after the evaluation.   Pain assessed and judicious use of narcotics when appropriate was discussed.  Importance of Fall prevention discussed.  Counseling on Smoking and Alcohol cessation was offered when appropriate.  Counseling on Diet, exercise, and medication compliance was done.       Approx 65 minutes spent. 82 yo male h/o htn, dm, here with ams, found to have uti  pt with sepsis present on admission     metabolic encephalopathy 2/2 uti  treat uti    uti  ceftriax iv  f/u cult and sens    dm  iss  monitor fs    cont other home meds      PT      Advanced care planning was discussed with patient and family.  Advanced care planning forms were reviewed and discussed as appropriate.  Differential diagnosis and plan of care discussed with patient after the evaluation.   Pain assessed and judicious use of narcotics when appropriate was discussed.  Importance of Fall prevention discussed.  Counseling on Smoking and Alcohol cessation was offered when appropriate.  Counseling on Diet, exercise, and medication compliance was done.       Approx 65 minutes spent.

## 2022-01-11 NOTE — ED PROVIDER NOTE - NS ED ROS FT
CONST: no fevers, no chills  EYES: no pain, no vision changes  ENT: no sore throat, no ear pain, no change in hearing  CV: no chest pain, no leg swelling  RESP: + SOB. EMS refers cough during transfer. Patient denies  ABD: +abd pain, n/v/d  : no dysuria, no flank pain, no hematuria  MSK: no back pain, no extremity pain  NEURO: no headache or additional neurologic complaints  SKIN:  no rash

## 2022-01-11 NOTE — ED ADULT NURSE NOTE - NSIMPLEMENTINTERV_GEN_ALL_ED
Implemented All Universal Safety Interventions:  [place] to call system. Call bell, personal items and telephone within reach. Instruct patient to call for assistance. Room bathroom lighting operational. Non-slip footwear when patient is off stretcher. Physically safe environment: no spills, clutter or unnecessary equipment. Stretcher in lowest position, wheels locked, appropriate side rails in place.

## 2022-01-11 NOTE — ED PROVIDER NOTE - PROGRESS NOTE DETAILS
UA shows acute UTI at this time will start antibiotics. Will admit for further management given not able to take care of himself; affected daily living CT imaging neg for stroke or intraabdominal pathology. Covid neg. Awaiting UA

## 2022-01-11 NOTE — ED PROVIDER NOTE - CLINICAL SUMMARY MEDICAL DECISION MAKING FREE TEXT BOX
Per EMS story patient w 3 day hx of lethargy, body aches, cough, and now febrile. O2 sat decreased on the way here and started on non rebreather. Now breathing @ RA comfortably. Will acquire collateral from family members. Already attempted x2. Most likely viral illness/covid vs sepsis/infectious vs Per EMS story patient w 3 day hx of lethargy, body aches, cough, and now febrile. O2 sat decreased on the way here and started on non rebreather. Now breathing @ RA comfortably. Will acquire collateral from family members. Already attempted x2. Family refers unwitnessed fall. Will assess with imaging of head and abdomen given abd pain, n/v/d. Most likely viral illness/covid vs sepsis/infectious vs

## 2022-01-11 NOTE — ED ADULT NURSE NOTE - OBJECTIVE STATEMENT
Patient presents to ED via EMS from home due to lethargy + cough, generalized body aches. Patient was found febrile on arrival. 2x vaccinated. Patient AAOx2, poor historian. Safety parameters in place. Will continue to assess.

## 2022-01-11 NOTE — ED PROVIDER NOTE - PHYSICAL EXAMINATION
GENERAL: brought in sleeping and was arousable on exam. Alert and awake now. Brought in w non rebreather. But sustaining O2 sat above 92 @ RA  HEENT: NC/AT, EOMI  LUNGS: CTAB, no wheezes or crackles   CARDIAC: RRR, no m/r/g  ABDOMEN: Soft, non tender, no rebound, no guarding  BACK: No midline spinal tenderness   EXT: No edema, no calf tenderness, 2+ DP pulses bilaterally, no deformities.  NEURO:  Moving all extremities.  SKIN: Warm and sweaty

## 2022-01-11 NOTE — ED PROVIDER NOTE - ATTENDING CONTRIBUTION TO CARE
I personally saw the patient and performed a substantive portion of the visit including all aspects of the medical decision making.     Patient presenting complaining of malaise, fevers, cough, weakness.  Vaccinated against COVID-19 but no booster.  Noted by EMS to be satting in low 90's.  On exam satting low 90's tachypneic, rales at bases, warm to touch.  Suspect COVID-19 vs other respiratory infection - plan for labs, CXR, RVP, admission.

## 2022-01-11 NOTE — ED ADULT TRIAGE NOTE - CHIEF COMPLAINT QUOTE
"weakness for 3 days, today could not get out of bed."  cough noticed "weakness for 3 days, today could not get out of bed."   cough noticed / temp 99.3 axiliary

## 2022-01-11 NOTE — H&P ADULT - NSHPPHYSICALEXAM_GEN_ALL_CORE
Vital Signs Last 24 Hrs  T(C): 36.9 (11 Jan 2022 19:52), Max: 37.4 (11 Jan 2022 08:38)  T(F): 98.4 (11 Jan 2022 19:52), Max: 99.3 (11 Jan 2022 08:38)  HR: 76 (11 Jan 2022 19:52) (69 - 98)  BP: 127/76 (11 Jan 2022 19:52) (114/79 - 129/84)  BP(mean): --  RR: 19 (11 Jan 2022 19:52) (19 - 24)  SpO2: 97% (11 Jan 2022 19:52) (94% - 97%)    PHYSICAL EXAM:  GENERAL: NAD, well-developed  HEAD:  Atraumatic, Normocephalic  EYES: EOMI, PERRLA, conjunctiva and sclera clear  NECK: Supple, No JVD  CHEST/LUNG: Clear to auscultation bilaterally; No wheeze  HEART: Regular rate and rhythm; No murmurs, rubs, or gallops  ABDOMEN: Soft, Nontender, Nondistended; Bowel sounds present  EXTREMITIES:  2+ Peripheral Pulses, No clubbing, cyanosis, or edema  PSYCH: AAOx3  NEUROLOGY: non-focal  SKIN: No rashes or lesions

## 2022-01-11 NOTE — H&P ADULT - HISTORY OF PRESENT ILLNESS
80 yo male pt PMH DM, HTN who presents to ED BIBEMS from home for lethargy 3 days. Per EMS pt had been coughing during transfer. Found diaphoretic and febrile. Pt at home had been complaining of body aches. Pfizer x2. Patient refers n/v/d since 3 days ago. Poor appetite 2/2 abdominal pain that is diffuse. Complaining of SOB but breathing comfortable at RA. Denies sick contacts. Nonbloody emesis/BM. Denies chest pain, fever. Collateral from family: patient called family after fall on his way to toilet this morning. Unwitnessed fall. unknown in head trauma or LOC  found to have uti

## 2022-01-11 NOTE — ED PROVIDER NOTE - OBJECTIVE STATEMENT
82 yo male pt PMH DM, HTN who presents to ED BIBEMS from home for lethargy 3 days. Per EMS pt had been coughing during transfer. Found diaphoretic and febrile. Pt at home had been complaining of body aches. Pfizer x2. 80 yo male pt PMH DM, HTN who presents to ED BIBEMS from home for lethargy 3 days. Per EMS pt had been coughing during transfer. Found diaphoretic and febrile. Pt at home had been complaining of body aches. Pfizer x2. Patient refers n/v/d since 3 days ago. Poor appetite 2/2 abdominal pain that is diffuse. Complaining of SOB but breathing comfortable at RA. Denies sick contacts. Nonbloody emesis/BM. Denies chest pain, fever. Collateral from family: patient called family after fall on his way to toilet this morning. Unwitnessed fall. unknown in head trauma or LOC.

## 2022-01-11 NOTE — H&P ADULT - NSICDXPASTMEDICALHX_GEN_ALL_CORE_FT
Patient/Caregiver provided printed discharge information. PAST MEDICAL HISTORY:  DM (diabetes mellitus)     HTN (hypertension)      No

## 2022-01-12 DIAGNOSIS — I10 ESSENTIAL (PRIMARY) HYPERTENSION: ICD-10-CM

## 2022-01-12 DIAGNOSIS — N39.0 URINARY TRACT INFECTION, SITE NOT SPECIFIED: ICD-10-CM

## 2022-01-12 DIAGNOSIS — E11.9 TYPE 2 DIABETES MELLITUS WITHOUT COMPLICATIONS: ICD-10-CM

## 2022-01-12 LAB
A1C WITH ESTIMATED AVERAGE GLUCOSE RESULT: 11.1 % — HIGH (ref 4–5.6)
ANION GAP SERPL CALC-SCNC: 10 MMOL/L — SIGNIFICANT CHANGE UP (ref 5–17)
BUN SERPL-MCNC: 23 MG/DL — SIGNIFICANT CHANGE UP (ref 7–23)
CALCIUM SERPL-MCNC: 8.8 MG/DL — SIGNIFICANT CHANGE UP (ref 8.4–10.5)
CHLORIDE SERPL-SCNC: 100 MMOL/L — SIGNIFICANT CHANGE UP (ref 96–108)
CO2 SERPL-SCNC: 24 MMOL/L — SIGNIFICANT CHANGE UP (ref 22–31)
CREAT SERPL-MCNC: 1.41 MG/DL — HIGH (ref 0.5–1.3)
ESTIMATED AVERAGE GLUCOSE: 272 MG/DL — HIGH (ref 68–114)
GLUCOSE BLDC GLUCOMTR-MCNC: 205 MG/DL — HIGH (ref 70–99)
GLUCOSE BLDC GLUCOMTR-MCNC: 225 MG/DL — HIGH (ref 70–99)
GLUCOSE BLDC GLUCOMTR-MCNC: 320 MG/DL — HIGH (ref 70–99)
GLUCOSE BLDC GLUCOMTR-MCNC: 328 MG/DL — HIGH (ref 70–99)
GLUCOSE SERPL-MCNC: 222 MG/DL — HIGH (ref 70–99)
HCT VFR BLD CALC: 39.8 % — SIGNIFICANT CHANGE UP (ref 39–50)
HGB BLD-MCNC: 13.2 G/DL — SIGNIFICANT CHANGE UP (ref 13–17)
MCHC RBC-ENTMCNC: 29.9 PG — SIGNIFICANT CHANGE UP (ref 27–34)
MCHC RBC-ENTMCNC: 33.2 GM/DL — SIGNIFICANT CHANGE UP (ref 32–36)
MCV RBC AUTO: 90.2 FL — SIGNIFICANT CHANGE UP (ref 80–100)
NRBC # BLD: 0 /100 WBCS — SIGNIFICANT CHANGE UP (ref 0–0)
PLATELET # BLD AUTO: 118 K/UL — LOW (ref 150–400)
POTASSIUM SERPL-MCNC: 4.1 MMOL/L — SIGNIFICANT CHANGE UP (ref 3.5–5.3)
POTASSIUM SERPL-SCNC: 4.1 MMOL/L — SIGNIFICANT CHANGE UP (ref 3.5–5.3)
RBC # BLD: 4.41 M/UL — SIGNIFICANT CHANGE UP (ref 4.2–5.8)
RBC # FLD: 12.9 % — SIGNIFICANT CHANGE UP (ref 10.3–14.5)
SODIUM SERPL-SCNC: 134 MMOL/L — LOW (ref 135–145)
WBC # BLD: 8.48 K/UL — SIGNIFICANT CHANGE UP (ref 3.8–10.5)
WBC # FLD AUTO: 8.48 K/UL — SIGNIFICANT CHANGE UP (ref 3.8–10.5)

## 2022-01-12 RX ORDER — INSULIN GLARGINE 100 [IU]/ML
13 INJECTION, SOLUTION SUBCUTANEOUS AT BEDTIME
Refills: 0 | Status: DISCONTINUED | OUTPATIENT
Start: 2022-01-12 | End: 2022-01-13

## 2022-01-12 RX ORDER — SENNA PLUS 8.6 MG/1
2 TABLET ORAL AT BEDTIME
Refills: 0 | Status: DISCONTINUED | OUTPATIENT
Start: 2022-01-12 | End: 2022-01-17

## 2022-01-12 RX ORDER — POLYETHYLENE GLYCOL 3350 17 G/17G
17 POWDER, FOR SOLUTION ORAL ONCE
Refills: 0 | Status: COMPLETED | OUTPATIENT
Start: 2022-01-12 | End: 2022-01-12

## 2022-01-12 RX ORDER — SIMETHICONE 80 MG/1
80 TABLET, CHEWABLE ORAL ONCE
Refills: 0 | Status: COMPLETED | OUTPATIENT
Start: 2022-01-12 | End: 2022-01-12

## 2022-01-12 RX ORDER — INSULIN LISPRO 100/ML
7 VIAL (ML) SUBCUTANEOUS
Refills: 0 | Status: DISCONTINUED | OUTPATIENT
Start: 2022-01-12 | End: 2022-01-13

## 2022-01-12 RX ORDER — INSULIN LISPRO 100/ML
VIAL (ML) SUBCUTANEOUS AT BEDTIME
Refills: 0 | Status: DISCONTINUED | OUTPATIENT
Start: 2022-01-12 | End: 2022-01-17

## 2022-01-12 RX ADMIN — Medication 25 MILLIGRAM(S): at 06:34

## 2022-01-12 RX ADMIN — ATORVASTATIN CALCIUM 20 MILLIGRAM(S): 80 TABLET, FILM COATED ORAL at 21:47

## 2022-01-12 RX ADMIN — INSULIN GLARGINE 13 UNIT(S): 100 INJECTION, SOLUTION SUBCUTANEOUS at 21:47

## 2022-01-12 RX ADMIN — Medication 2: at 12:40

## 2022-01-12 RX ADMIN — Medication 2: at 08:43

## 2022-01-12 RX ADMIN — POLYETHYLENE GLYCOL 3350 17 GRAM(S): 17 POWDER, FOR SOLUTION ORAL at 15:04

## 2022-01-12 RX ADMIN — TAMSULOSIN HYDROCHLORIDE 0.4 MILLIGRAM(S): 0.4 CAPSULE ORAL at 21:47

## 2022-01-12 RX ADMIN — Medication 2: at 22:15

## 2022-01-12 RX ADMIN — HEPARIN SODIUM 5000 UNIT(S): 5000 INJECTION INTRAVENOUS; SUBCUTANEOUS at 13:24

## 2022-01-12 RX ADMIN — CEFTRIAXONE 100 MILLIGRAM(S): 500 INJECTION, POWDER, FOR SOLUTION INTRAMUSCULAR; INTRAVENOUS at 17:35

## 2022-01-12 RX ADMIN — SIMETHICONE 80 MILLIGRAM(S): 80 TABLET, CHEWABLE ORAL at 15:05

## 2022-01-12 RX ADMIN — Medication 4: at 18:49

## 2022-01-12 RX ADMIN — HEPARIN SODIUM 5000 UNIT(S): 5000 INJECTION INTRAVENOUS; SUBCUTANEOUS at 06:34

## 2022-01-12 RX ADMIN — FINASTERIDE 5 MILLIGRAM(S): 5 TABLET, FILM COATED ORAL at 11:19

## 2022-01-12 RX ADMIN — POLYETHYLENE GLYCOL 3350 17 GRAM(S): 17 POWDER, FOR SOLUTION ORAL at 01:19

## 2022-01-12 RX ADMIN — SENNA PLUS 2 TABLET(S): 8.6 TABLET ORAL at 21:47

## 2022-01-12 RX ADMIN — HEPARIN SODIUM 5000 UNIT(S): 5000 INJECTION INTRAVENOUS; SUBCUTANEOUS at 21:48

## 2022-01-12 NOTE — CONSULT NOTE ADULT - PROBLEM SELECTOR RECOMMENDATION 9
Will start Lantus 13 units at bed time.  Will start Admelog 7 units before each meal in addition to Admelog correction scale coverage.  Patient counseled for compliance with consistent low carb diet.

## 2022-01-12 NOTE — CONSULT NOTE ADULT - SUBJECTIVE AND OBJECTIVE BOX
HPI:  82 yo male pt PMH DM, HT[facility]. Per EMS pt had been coughing during transfer. Found diaphoretic and febrile. Pt at home had been complaining of body aches. Pfizer x2. Patient refers n/v/d since 3 days ago. Poor appetite 2/2 abdominal pain that is diffuse. Complaining of SOB but breathing comfortable at RA. Denies sick contacts. Nonbloody emesis/BM. Denies chest pain, fever. Collateral from family: patient called family after fall on his way to toilet this morning. Unwitnessed fall. unknown in head trauma or LOC  found to have uti (11 Jan 2022 21:57)  Patient has history of diabetes, on oral meds at home, no recent hypoglycemic episodes, no polyuria polydipsia. Patient follows up with PCP for diabetes management.    PAST MEDICAL & SURGICAL HISTORY:  HTN (hypertension)    DM (diabetes mellitus)        FAMILY HISTORY:      Social History:    Outpatient Medications:    MEDICATIONS  (STANDING):  atorvastatin 20 milliGRAM(s) Oral at bedtime  cefTRIAXone   IVPB 1000 milliGRAM(s) IV Intermittent every 24 hours  dextrose 40% Gel 15 Gram(s) Oral once  dextrose 5%. 1000 milliLiter(s) (50 mL/Hr) IV Continuous <Continuous>  dextrose 5%. 1000 milliLiter(s) (100 mL/Hr) IV Continuous <Continuous>  dextrose 50% Injectable 25 Gram(s) IV Push once  dextrose 50% Injectable 12.5 Gram(s) IV Push once  dextrose 50% Injectable 25 Gram(s) IV Push once  finasteride 5 milliGRAM(s) Oral daily  glucagon  Injectable 1 milliGRAM(s) IntraMuscular once  heparin   Injectable 5000 Unit(s) SubCutaneous every 8 hours  insulin lispro (ADMELOG) corrective regimen sliding scale   SubCutaneous three times a day before meals  metoprolol succinate ER 25 milliGRAM(s) Oral daily  senna 2 Tablet(s) Oral at bedtime  tamsulosin 0.4 milliGRAM(s) Oral at bedtime    MEDICATIONS  (PRN):      Allergies    No Known Allergies    Intolerances      Review of Systems:  Constitutional: No fever, no chills  Eyes: No blurry vision  Neuro: No tremors  HEENT: No pain, no neck swelling  Cardiovascular: No chest pain, no palpitations  Respiratory: No SOB, no cough  GI: No nausea, vomiting, abdominal pain  : No dysuria  Skin: no rash  MSK: Has leg swelling.  Psych: no depression  Endocrine: no polyuria, polydipsia    UNABLE TO OBTAIN    ALL OTHER SYSTEMS REVIEWED AND NEGATIVE        PHYSICAL EXAM:  VITALS: T(C): 37.2 (01-12-22 @ 16:47)  T(F): 99 (01-12-22 @ 16:47), Max: 99 (01-12-22 @ 16:47)  HR: 83 (01-12-22 @ 16:47) (62 - 90)  BP: 136/87 (01-12-22 @ 16:47) (122/71 - 137/80)  RR:  (17 - 18)  SpO2:  (94% - 96%)  Wt(kg): --  GENERAL: NAD, well-groomed, well-developed  EYES: No proptosis, no lid lag  HEENT:  Atraumatic, Normocephalic  THYROID: Normal size, no palpable nodules  RESPIRATORY: Clear to auscultation bilaterally; No rales, rhonchi, wheezing  CARDIOVASCULAR: Si S2, No murmurs;  GI: Soft, non distended, normal bowel sounds  SKIN: Dry, intact, No rashes or lesions  MUSCULOSKELETAL: Has BL lower extremity edema.  NEURO:  no tremor, sensation decreased in feet BL,    POCT Blood Glucose.: 328 mg/dL (01-12-22 @ 18:46)  POCT Blood Glucose.: 225 mg/dL (01-12-22 @ 12:34)  POCT Blood Glucose.: 205 mg/dL (01-12-22 @ 08:33)  POCT Blood Glucose.: 285 mg/dL (01-11-22 @ 23:16)  POCT Blood Glucose.: 276 mg/dL (01-11-22 @ 08:36)                            13.2   8.48  )-----------( 118      ( 12 Jan 2022 06:12 )             39.8       01-12    134<L>  |  100  |  23  ----------------------------<  222<H>  4.1   |  24  |  1.41<H>    EGFR if : 53<L>  EGFR if non : 46<L>    Ca    8.8      01-12    TPro  6.6  /  Alb  3.5  /  TBili  1.8<H>  /  DBili  x   /  AST  37  /  ALT  40  /  AlkPhos  153<H>  01-11      Thyroid Function Tests:              Radiology:

## 2022-01-12 NOTE — CONSULT NOTE ADULT - CONSULT REQUESTED DATE/TIME
pt presents to ED due to complaints of flu like sxs with fever and cough  Patient is an employee of [employer]. Pt has given verbal consent for employee health to call them with results.
12-Jan-2022 15:56
12-Jan-2022 20:33

## 2022-01-12 NOTE — PROGRESS NOTE ADULT - SUBJECTIVE AND OBJECTIVE BOX
KAITLIN CALDERON  81y Male  MRN:[id number]    Patient is a 81y old  Male who presents with a chief complaint of   HPI:  82 yo male pt PMH DM, HTN who presents to ED BIBEMS from home for lethargy 3 days. Per EMS pt had been coughing during transfer. Found diaphoretic and febrile. Pt at home had been complaining of body aches. Pfizer x2. Patient refers n/v/d since 3 days ago. Poor appetite 2/2 abdominal pain that is diffuse. Complaining of SOB but breathing comfortable at RA. Denies sick contacts. Nonbloody emesis/BM. Denies chest pain, fever. Collateral from family: patient called family after fall on his way to toilet this morning. Unwitnessed fall. unknown in head trauma or LOC  found to have uti (2022 21:57)      Patient seen and evaluated at bedside. No acute events overnight except as noted.    Interval HPI:  no acute events o/n     PAST MEDICAL & SURGICAL HISTORY:  HTN (hypertension)    DM (diabetes mellitus)        REVIEW OF SYSTEMS:  as per hpi         VITALS:  Vital Signs Last 24 Hrs  T(C): 36.6 (2022 07:55), Max: 37.2 (2022 13:05)  T(F): 97.9 (2022 07:55), Max: 99 (2022 13:05)  HR: 85 (2022 07:55) (62 - 90)  BP: 125/80 (2022 07:55) (122/71 - 137/80)  BP(mean): --  RR: 18 (2022 07:55) (17 - 24)  SpO2: 96% (2022 07:55) (94% - 97%)  CAPILLARY BLOOD GLUCOSE      POCT Blood Glucose.: 205 mg/dL (2022 08:33)  POCT Blood Glucose.: 285 mg/dL (2022 23:16)    I&O's Summary      PHYSICAL EXAM:  GENERAL: NAD, well-developed  HEAD:  Atraumatic, Normocephalic  EYES: EOMI, PERRLA, conjunctiva and sclera clear  NECK: Supple, No JVD  CHEST/LUNG: Clear to auscultation bilaterally; No wheeze  HEART: S1, S2; No murmurs, rubs, or gallops  ABDOMEN: Soft, Nontender, Nondistended; Bowel sounds present  EXTREMITIES:  2+ Peripheral Pulses, No clubbing, cyanosis, or edema  PSYCH: Normal affect  NEUROLOGY: AAOX3; non-focal  SKIN: No rashes or lesions    Consultant(s) Notes Reviewed:  [x ] YES  [ ] NO  Care Discussed with Consultants/Other Providers [ x] YES  [ ] NO    MEDS:  MEDICATIONS  (STANDING):  atorvastatin 20 milliGRAM(s) Oral at bedtime  cefTRIAXone   IVPB 1000 milliGRAM(s) IV Intermittent every 24 hours  dextrose 40% Gel 15 Gram(s) Oral once  dextrose 5%. 1000 milliLiter(s) (50 mL/Hr) IV Continuous <Continuous>  dextrose 5%. 1000 milliLiter(s) (100 mL/Hr) IV Continuous <Continuous>  dextrose 50% Injectable 25 Gram(s) IV Push once  dextrose 50% Injectable 12.5 Gram(s) IV Push once  dextrose 50% Injectable 25 Gram(s) IV Push once  finasteride 5 milliGRAM(s) Oral daily  glucagon  Injectable 1 milliGRAM(s) IntraMuscular once  heparin   Injectable 5000 Unit(s) SubCutaneous every 8 hours  insulin lispro (ADMELOG) corrective regimen sliding scale   SubCutaneous three times a day before meals  metoprolol succinate ER 25 milliGRAM(s) Oral daily  senna 2 Tablet(s) Oral at bedtime  tamsulosin 0.4 milliGRAM(s) Oral at bedtime    MEDICATIONS  (PRN):    ALLERGIES:  No Known Allergies      LABS:                        13.2   8.48  )-----------( 118      ( 2022 06:12 )             39.8     01-12    134<L>  |  100  |  23  ----------------------------<  222<H>  4.1   |  24  |  1.41<H>    Ca    8.8      2022 06:12    TPro  6.6  /  Alb  3.5  /  TBili  1.8<H>  /  DBili  x   /  AST  37  /  ALT  40  /  AlkPhos  153<H>  11          LIVER FUNCTIONS - ( 2022 09:22 )  Alb: 3.5 g/dL / Pro: 6.6 g/dL / ALK PHOS: 153 U/L / ALT: 40 U/L / AST: 37 U/L / GGT: x           Urinalysis Basic - ( 2022 16:30 )    Color: Yellow / Appearance: Clear / S.052 / pH: x  Gluc: x / Ketone: Negative  / Bili: Negative / Urobili: 2 mg/dL   Blood: x / Protein: 100 / Nitrite: Negative   Leuk Esterase: Moderate / RBC: 50 /hpf / WBC 58 /HPF   Sq Epi: x / Non Sq Epi: Few / Bacteria: Many     < from: CT Abdomen and Pelvis w/ IV Cont (22 @ 12:16) >  IMPRESSION:  No bowel obstruction.    Aneurysmal dilatation of the ascending thoracic aorta to 4.7 cm.    Bilateral indeterminate subcentimeter pulmonary nodules. This could be   further evaluated with dedicated chest CT.    Small pericardial effusion.      < end of copied text >  < from: CT Head No Cont (22 @ 12:13) >  IMPRESSION: Age-appropriate involutional and ischemic gliotic changes. No   hemorrhage.    --- End of Report ---    < end of copied text >

## 2022-01-12 NOTE — PROGRESS NOTE ADULT - ASSESSMENT
82 yo male h/o htn, dm, here with ams, found to have uti  pt with sepsis present on admission     metabolic encephalopathy 2/2 uti  treat uti    uti  ceftriax iv  f/u cult and sens    dm  iss  monitor fs  endo consulted     cont other home meds      PT      Advanced care planning was discussed with patient and family.  Advanced care planning forms were reviewed and discussed as appropriate.  Differential diagnosis and plan of care discussed with patient after the evaluation.   Pain assessed and judicious use of narcotics when appropriate was discussed.  Importance of Fall prevention discussed.  Counseling on Smoking and Alcohol cessation was offered when appropriate.  Counseling on Diet, exercise, and medication compliance was done.       Approx 65 minutes spent. 82 yo male h/o htn, dm, here with ams, found to have uti  pt with sepsis present on admission     metabolic encephalopathy 2/2 uti  treat uti    uti  ceftriax iv  f/u cult and sens    dm  uncontrolled with a1c 11  iss  monitor fs  endo consulted     chanel  unknown baseline  likely ckd  avoid nephrotoxins  renal f/u    cont other home meds      PT      Advanced care planning was discussed with patient and family.  Advanced care planning forms were reviewed and discussed as appropriate.  Differential diagnosis and plan of care discussed with patient after the evaluation.   Pain assessed and judicious use of narcotics when appropriate was discussed.  Importance of Fall prevention discussed.  Counseling on Smoking and Alcohol cessation was offered when appropriate.  Counseling on Diet, exercise, and medication compliance was done.       Approx 65 minutes spent.

## 2022-01-12 NOTE — CONSULT NOTE ADULT - ASSESSMENT
Assessment  DMT2: 81y Male with DM T2 with hyperglycemia admitted with UTI , A1C is 11.1%, patient was on oral hypoglycemic agents at home, now on insulin coverage, blood sugars running high, no hypoglycemic episode,  eating meals.  UTI: On treatment, monitored, stable.  HTN: On antihypertensive medications, monitored, asymptomatic.            Melba Reyes MD  Cell: [phone]  Office: [phone]

## 2022-01-12 NOTE — CONSULT NOTE ADULT - SUBJECTIVE AND OBJECTIVE BOX
HPI: Mr. Evans is an 81 year-old man with history of hypertension and diabetes mellitus, who presented yesterday to the [facility] ER s/p unwitnessed fall. He complained of lethargy for 3 days as well as myalgias. He complained as well of abdominal pain and shortness of breath. Laboratory workup was suggestive of UTI. Admission labwork was notable for azotemia with creatinine of 1.4mg/dL; in light of this, a renal consultation was requested.      PAST MEDICAL & SURGICAL HISTORY:  HTN (hypertension)  DM (diabetes mellitus)    Allergies  No Known Allergies    SOCIAL HISTORY:  Denies ETOh,Smoking,     FAMILY HISTORY:  No CKD    REVIEW OF SYSTEMS:  CONSTITUTIONAL: (+)generalized weakness, (+)myalgias, (+)sweats, (+)fever  EYES/ENT: No visual changes;  No vertigo or throat pain   NECK: No pain or stiffness  RESPIRATORY: No cough, wheezing, hemoptysis; (+) shortness of breath  CARDIOVASCULAR: No chest pain or palpitations  GASTROINTESTINAL: (+)abdominal pain  GENITOURINARY: No dysuria, frequency or hematuria  NEUROLOGICAL: No numbness or weakness  SKIN: No itching, burning, rashes, or lesions   All other review of systems is negative unless indicated above.    VITAL:  T(C): , Max: 36.9 (22 @ 19:52)  T(F): , Max: 98.4 (22 @ 19:52)  HR: 85 (22 @ 07:55)  BP: 125/80 (22 @ 07:55)  RR: 18 (22 @ 07:55)  SpO2: 96% (22 @ 07:55)    PHYSICAL EXAM:  Constitutional: NAD, Alert  HEENT: NCAT, MMM  Neck: Supple, No JVD  Respiratory: CTA-b/l  Cardiovascular: RRR s1s2, no m/r/g  Gastrointestinal: BS+, soft, NT/ND  Extremities: No peripheral edema b/l  Neurological: no focal deficits; strength grossly intact  Back: no CVAT b/l  Skin: No rashes, no nevi    LABS:                        13.2   8.48  )-----------( 118      ( 2022 06:12 )             39.8     Na(134)/K(4.1)/Cl(100)/HCO3(24)/BUN(23)/Cr(1.41)Glu(222)/Ca(8.8)/Mg(--)/PO4(--)     @ 06:12  Na(133)/K(4.5)/Cl(96)/HCO3(20)/BUN(22)/Cr(1.40)Glu(303)/Ca(9.1)/Mg(--)/PO4(--)     @ 09:22    Urinalysis Basic - ( 2022 16:30 )  Color: Yellow / Appearance: Clear / S.052 / pH: x  Gluc: x / Ketone: Negative  / Bili: Negative / Urobili: 2 mg/dL   Blood: x / Protein: 100 / Nitrite: Negative   Leuk Esterase: Moderate / RBC: 50 /hpf / WBC 58 /HPF   Sq Epi: x / Non Sq Epi: Few / Bacteria: Many    A1c 11.1    (2021) - BUN/creatinine: 21/1.49      IMAGING:  < from: CT Abdomen and Pelvis w/ IV Cont (22 @ 12:16) >  No bowel obstruction.  Aneurysmal dilatation of the ascending thoracic aorta to 4.7 cm.  Bilateral indeterminate subcentimeter pulmonary nodules. This could be further evaluated with dedicated chest CT.  Small pericardial effusion.      ASSESSMENT:  (1)Renal - CKD3 - diabetic nephropathy - no evidence to date of contrast nephropathy, s/p CT I+ yesterday  (2)Lytes - acceptable  (3)CV - acceptable BP/volume  (4)ID - UTI - on IV Rocephin    RECOMMEND:  (1)No need for further workup of azotemia for now  (2)No IVF/No diuretics  (3)Could add low-dose ACEI vs ARB as of tomorrow if creatinine remains stable  (4)Dose new meds for GFR 40-50ml/min (present dosing is acceptable)  (5)BMP daily    Thank you for involving [facility] in this patient's care.    With warm regards,    Maco Bhatia MD   [facility]  Office: [phone]  Cell: [phone]             HPI: Mr. Evans is an 81 year-old man with history of hypertension and diabetes mellitus, who presented yesterday to the [facility] ER s/p unwitnessed fall. He complained of lethargy for 3 days as well as myalgias. He complained as well of abdominal pain and shortness of breath. Laboratory workup was suggestive of UTI. Admission labwork was notable for azotemia with creatinine of 1.4mg/dL; in light of this, a renal consultation was requested.      PAST MEDICAL & SURGICAL HISTORY:  HTN (hypertension)  DM (diabetes mellitus)    Allergies  No Known Allergies    SOCIAL HISTORY:  Denies ETOh,Smoking,     FAMILY HISTORY:  No CKD    REVIEW OF SYSTEMS:  CONSTITUTIONAL: (+)generalized weakness, (+)myalgias, (+)sweats, (+)fever  EYES/ENT: No visual changes;  No vertigo or throat pain   NECK: No pain or stiffness  RESPIRATORY: No cough, wheezing, hemoptysis; (+) shortness of breath  CARDIOVASCULAR: No chest pain or palpitations  GASTROINTESTINAL: (+)abdominal pain  GENITOURINARY: No dysuria, frequency or hematuria  NEUROLOGICAL: No numbness or weakness  SKIN: No itching, burning, rashes, or lesions   All other review of systems is negative unless indicated above.    VITAL:  T(C): , Max: 36.9 (22 @ 19:52)  T(F): , Max: 98.4 (22 @ 19:52)  HR: 85 (22 @ 07:55)  BP: 125/80 (22 @ 07:55)  RR: 18 (22 @ 07:55)  SpO2: 96% (22 @ 07:55)    PHYSICAL EXAM:  Constitutional: NAD, Alert  HEENT: NCAT, MMM  Neck: Supple, No JVD  Respiratory: CTA-b/l  Cardiovascular: RRR s1s2, no m/r/g  Gastrointestinal: BS+, soft, NT/ND  Extremities: No peripheral edema b/l  Neurological: no focal deficits; strength grossly intact  Back: no CVAT b/l  Skin: No rashes, no nevi    LABS:                        13.2   8.48  )-----------( 118      ( 2022 06:12 )             39.8     Na(134)/K(4.1)/Cl(100)/HCO3(24)/BUN(23)/Cr(1.41)Glu(222)/Ca(8.8)/Mg(--)/PO4(--)     @ 06:12  Na(133)/K(4.5)/Cl(96)/HCO3(20)/BUN(22)/Cr(1.40)Glu(303)/Ca(9.1)/Mg(--)/PO4(--)     @ 09:22    Urinalysis Basic - ( 2022 16:30 )  Color: Yellow / Appearance: Clear / S.052 / pH: x  Gluc: x / Ketone: Negative  / Bili: Negative / Urobili: 2 mg/dL   Blood: x / Protein: 100 / Nitrite: Negative   Leuk Esterase: Moderate / RBC: 50 /hpf / WBC 58 /HPF   Sq Epi: x / Non Sq Epi: Few / Bacteria: Many    A1c 11.1    (2021) - BUN/creatinine: 21/1.49      IMAGING:  < from: CT Abdomen and Pelvis w/ IV Cont (22 @ 12:16) >  No bowel obstruction.  Aneurysmal dilatation of the ascending thoracic aorta to 4.7 cm.  Bilateral indeterminate subcentimeter pulmonary nodules. This could be further evaluated with dedicated chest CT.  Small pericardial effusion.      ASSESSMENT:  (1)Renal - CKD3 - diabetic nephropathy - no evidence to date of contrast nephropathy, s/p CT I+ yesterday  (2)Lytes - acceptable  (3)CV - acceptable BP/volume  (4)ID - UTI - on IV Rocephin    RECOMMEND:  (1)No need for further workup of azotemia for now  (2)No IVF/No diuretics  (3)Could add low-dose ACEI vs ARB as of tomorrow if creatinine remains stable  (4)Favor avoidance of SGLT2 inhibitors for now/in the near future, given that he is prone to UTIs  (5)Dose new meds for GFR 40-50ml/min (present dosing is acceptable)  (6)BMP daily    Thank you for involving [facility] in this patient's care.    With warm regards,    Maco Bhatia MD   [facility]  Office: [phone]  Cell: [phone]             HPI: Mr. Evans is an 81 year-old man with history of hypertension and diabetes mellitus, who presented yesterday to the [facility] ER s/p unwitnessed fall. He complained of lethargy for 3 days as well as myalgias. He complained as well of abdominal pain and shortness of breath. Laboratory workup was suggestive of UTI. Admission labwork was notable for azotemia with creatinine of 1.4mg/dL; in light of this, a renal consultation was requested.    Limited history due to delirium.    PAST MEDICAL & SURGICAL HISTORY:  HTN (hypertension)  DM (diabetes mellitus)    Allergies  No Known Allergies    SOCIAL HISTORY:  Denies ETOh,Smoking,     FAMILY HISTORY:  No CKD    REVIEW OF SYSTEMS:  CONSTITUTIONAL: (+)generalized weakness, (+)myalgias, (+)sweats, (+)fever  EYES/ENT: No visual changes;  No vertigo or throat pain   NECK: No pain or stiffness  RESPIRATORY: No cough, wheezing, hemoptysis; (+) shortness of breath  CARDIOVASCULAR: No chest pain or palpitations  GASTROINTESTINAL: (+)abdominal pain  GENITOURINARY: No dysuria, frequency or hematuria  NEUROLOGICAL: No numbness or weakness  SKIN: No itching, burning, rashes, or lesions   All other review of systems is negative unless indicated above.    VITAL:  T(C): , Max: 36.9 (22 @ 19:52)  T(F): , Max: 98.4 (22 @ 19:52)  HR: 85 (22 @ 07:55)  BP: 125/80 (22 @ 07:55)  RR: 18 (22 @ 07:55)  SpO2: 96% (22 @ 07:55)    PHYSICAL EXAM:  Constitutional: Lethargic/confused  HEENT: NCAT, DMM  Neck: Supple, No JVD  Respiratory: CTA-b/l  Cardiovascular: RRR s1s2, no m/r/g  Gastrointestinal: BS+, soft, NT/ND  Extremities: No peripheral edema b/l  Neurological: no focal deficits; strength grossly intact  Back: no CVAT b/l  Skin: No rashes, no nevi    LABS:                        13.2   8.48  )-----------( 118      ( 2022 06:12 )             39.8     Na(134)/K(4.1)/Cl(100)/HCO3(24)/BUN(23)/Cr(1.41)Glu(222)/Ca(8.8)/Mg(--)/PO4(--)     @ 06:12  Na(133)/K(4.5)/Cl(96)/HCO3(20)/BUN(22)/Cr(1.40)Glu(303)/Ca(9.1)/Mg(--)/PO4(--)     @ 09:22    Urinalysis Basic - ( 2022 16:30 )  Color: Yellow / Appearance: Clear / S.052 / pH: x  Gluc: x / Ketone: Negative  / Bili: Negative / Urobili: 2 mg/dL   Blood: x / Protein: 100 / Nitrite: Negative   Leuk Esterase: Moderate / RBC: 50 /hpf / WBC 58 /HPF   Sq Epi: x / Non Sq Epi: Few / Bacteria: Many    A1c 11.1    (2021) - BUN/creatinine: 21/1.49      IMAGING:  < from: CT Abdomen and Pelvis w/ IV Cont (22 @ 12:16) >  No bowel obstruction.  Aneurysmal dilatation of the ascending thoracic aorta to 4.7 cm.  Bilateral indeterminate subcentimeter pulmonary nodules. This could be further evaluated with dedicated chest CT.  Small pericardial effusion.      ASSESSMENT:  (1)Renal - CKD3 - diabetic nephropathy - no evidence to date of contrast nephropathy, s/p CT I+ yesterday  (2)Lytes - acceptable  (3)CV - acceptable BP/volume  (4)ID - UTI - on IV Rocephin    RECOMMEND:  (1)No need for further workup of azotemia for now  (2)No IVF/No diuretics  (3)Could add low-dose ACEI vs ARB as of tomorrow if creatinine remains stable  (4)Favor avoidance of SGLT2 inhibitors for now/in the near future, given that he is prone to UTIs  (5)Dose new meds for GFR 40-50ml/min (present dosing is acceptable)  (6)BMP daily    Thank you for involving [facility] in this patient's care.    With warm regards,    Maco Bhatia MD   [facility]  Office: [phone]  Cell: [phone]

## 2022-01-13 LAB
GLUCOSE BLDC GLUCOMTR-MCNC: 197 MG/DL — HIGH (ref 70–99)
GLUCOSE BLDC GLUCOMTR-MCNC: 225 MG/DL — HIGH (ref 70–99)
GLUCOSE BLDC GLUCOMTR-MCNC: 267 MG/DL — HIGH (ref 70–99)
GLUCOSE BLDC GLUCOMTR-MCNC: 304 MG/DL — HIGH (ref 70–99)
GLUCOSE BLDC GLUCOMTR-MCNC: 313 MG/DL — HIGH (ref 70–99)
GLUCOSE BLDC GLUCOMTR-MCNC: 322 MG/DL — HIGH (ref 70–99)
GLUCOSE BLDC GLUCOMTR-MCNC: 389 MG/DL — HIGH (ref 70–99)
T4 FREE SERPL-MCNC: 1.4 NG/DL — SIGNIFICANT CHANGE UP (ref 0.9–1.8)
TSH SERPL-MCNC: 1.77 UIU/ML — SIGNIFICANT CHANGE UP (ref 0.27–4.2)

## 2022-01-13 RX ORDER — INSULIN GLARGINE 100 [IU]/ML
18 INJECTION, SOLUTION SUBCUTANEOUS AT BEDTIME
Refills: 0 | Status: DISCONTINUED | OUTPATIENT
Start: 2022-01-13 | End: 2022-01-14

## 2022-01-13 RX ORDER — TAMSULOSIN HYDROCHLORIDE 0.4 MG/1
1 CAPSULE ORAL
Qty: 0 | Refills: 0 | DISCHARGE

## 2022-01-13 RX ORDER — INFLUENZA VIRUS VACCINE 15; 15; 15; 15 UG/.5ML; UG/.5ML; UG/.5ML; UG/.5ML
0.7 SUSPENSION INTRAMUSCULAR ONCE
Refills: 0 | Status: DISCONTINUED | OUTPATIENT
Start: 2022-01-13 | End: 2022-01-17

## 2022-01-13 RX ORDER — INSULIN LISPRO 100/ML
9 VIAL (ML) SUBCUTANEOUS
Refills: 0 | Status: DISCONTINUED | OUTPATIENT
Start: 2022-01-13 | End: 2022-01-14

## 2022-01-13 RX ORDER — POLYETHYLENE GLYCOL 3350 17 G/17G
17 POWDER, FOR SOLUTION ORAL ONCE
Refills: 0 | Status: COMPLETED | OUTPATIENT
Start: 2022-01-13 | End: 2022-01-13

## 2022-01-13 RX ORDER — LACTULOSE 10 G/15ML
10 SOLUTION ORAL ONCE
Refills: 0 | Status: COMPLETED | OUTPATIENT
Start: 2022-01-13 | End: 2022-01-13

## 2022-01-13 RX ADMIN — Medication 5: at 13:43

## 2022-01-13 RX ADMIN — Medication 2: at 22:10

## 2022-01-13 RX ADMIN — INSULIN GLARGINE 18 UNIT(S): 100 INJECTION, SOLUTION SUBCUTANEOUS at 22:09

## 2022-01-13 RX ADMIN — Medication 1: at 08:44

## 2022-01-13 RX ADMIN — CEFTRIAXONE 100 MILLIGRAM(S): 500 INJECTION, POWDER, FOR SOLUTION INTRAMUSCULAR; INTRAVENOUS at 18:23

## 2022-01-13 RX ADMIN — HEPARIN SODIUM 5000 UNIT(S): 5000 INJECTION INTRAVENOUS; SUBCUTANEOUS at 22:13

## 2022-01-13 RX ADMIN — POLYETHYLENE GLYCOL 3350 17 GRAM(S): 17 POWDER, FOR SOLUTION ORAL at 05:52

## 2022-01-13 RX ADMIN — SENNA PLUS 2 TABLET(S): 8.6 TABLET ORAL at 22:13

## 2022-01-13 RX ADMIN — FINASTERIDE 5 MILLIGRAM(S): 5 TABLET, FILM COATED ORAL at 12:21

## 2022-01-13 RX ADMIN — Medication 25 MILLIGRAM(S): at 05:47

## 2022-01-13 RX ADMIN — HEPARIN SODIUM 5000 UNIT(S): 5000 INJECTION INTRAVENOUS; SUBCUTANEOUS at 05:47

## 2022-01-13 RX ADMIN — Medication 2: at 18:21

## 2022-01-13 RX ADMIN — ATORVASTATIN CALCIUM 20 MILLIGRAM(S): 80 TABLET, FILM COATED ORAL at 22:13

## 2022-01-13 RX ADMIN — LACTULOSE 10 GRAM(S): 10 SOLUTION ORAL at 13:49

## 2022-01-13 RX ADMIN — Medication 9 UNIT(S): at 13:32

## 2022-01-13 RX ADMIN — TAMSULOSIN HYDROCHLORIDE 0.4 MILLIGRAM(S): 0.4 CAPSULE ORAL at 22:14

## 2022-01-13 RX ADMIN — HEPARIN SODIUM 5000 UNIT(S): 5000 INJECTION INTRAVENOUS; SUBCUTANEOUS at 13:49

## 2022-01-13 RX ADMIN — Medication 9 UNIT(S): at 18:21

## 2022-01-13 NOTE — PHYSICAL THERAPY INITIAL EVALUATION ADULT - DISCHARGE DISPOSITION, PT EVAL
TBD pending further mobility if patient returns home, will require home with home PT and assist for all functional mobility/rehabilitation facility

## 2022-01-13 NOTE — PROGRESS NOTE ADULT - SUBJECTIVE AND OBJECTIVE BOX
Chief complaint    Patient is a 81y old  Male who presents with a chief complaint of  Review of systems  Patient in bed, appears comfortable.    Labs and Fingersticks  CAPILLARY BLOOD GLUCOSE      POCT Blood Glucose.: 197 mg/dL (13 Jan 2022 08:20)  POCT Blood Glucose.: 320 mg/dL (12 Jan 2022 21:24)  POCT Blood Glucose.: 328 mg/dL (12 Jan 2022 18:46)  POCT Blood Glucose.: 267 mg/dL (12 Jan 2022 17:44)  POCT Blood Glucose.: 225 mg/dL (12 Jan 2022 12:34)      Anion Gap, Serum: 10 (01-12 @ 06:12)      Calcium, Total Serum: 8.8 (01-12 @ 06:12)          01-12    134<L>  |  100  |  23  ----------------------------<  222<H>  4.1   |  24  |  1.41<H>    Ca    8.8      12 Jan 2022 06:12                          13.2   8.48  )-----------( 118      ( 12 Jan 2022 06:12 )             39.8     Medications  MEDICATIONS  (STANDING):  atorvastatin 20 milliGRAM(s) Oral at bedtime  cefTRIAXone   IVPB 1000 milliGRAM(s) IV Intermittent every 24 hours  dextrose 40% Gel 15 Gram(s) Oral once  dextrose 5%. 1000 milliLiter(s) (50 mL/Hr) IV Continuous <Continuous>  dextrose 5%. 1000 milliLiter(s) (100 mL/Hr) IV Continuous <Continuous>  dextrose 50% Injectable 25 Gram(s) IV Push once  dextrose 50% Injectable 12.5 Gram(s) IV Push once  dextrose 50% Injectable 25 Gram(s) IV Push once  finasteride 5 milliGRAM(s) Oral daily  glucagon  Injectable 1 milliGRAM(s) IntraMuscular once  heparin   Injectable 5000 Unit(s) SubCutaneous every 8 hours  influenza  Vaccine (HIGH DOSE) 0.7 milliLiter(s) IntraMuscular once  insulin glargine Injectable (LANTUS) 18 Unit(s) SubCutaneous at bedtime  insulin lispro (ADMELOG) corrective regimen sliding scale   SubCutaneous at bedtime  insulin lispro (ADMELOG) corrective regimen sliding scale   SubCutaneous three times a day before meals  insulin lispro Injectable (ADMELOG) 9 Unit(s) SubCutaneous three times a day before meals  metoprolol succinate ER 25 milliGRAM(s) Oral daily  senna 2 Tablet(s) Oral at bedtime  tamsulosin 0.4 milliGRAM(s) Oral at bedtime      Physical Exam  General: Patient comfortable in bed  Vital Signs Last 12 Hrs  T(F): 98 (01-13-22 @ 04:21), Max: 98 (01-13-22 @ 04:21)  HR: 81 (01-13-22 @ 04:21) (81 - 96)  BP: 148/78 (01-13-22 @ 04:21) (142/85 - 148/78)  BP(mean): --  RR: 18 (01-13-22 @ 04:21) (18 - 19)  SpO2: 93% (01-13-22 @ 04:21) (93% - 94%)  Neck: No palpable thyroid nodules.  CVS: S1S2, No murmurs  Respiratory: No wheezing, no crepitations  GI: Abdomen soft, bowel sounds positive  Musculoskeletal:  edema lower extremities.     Diagnostics    Free Thyroxine, Serum: AM Sched. Collection: 13-Jan-2022 06:00 (01-12 @ 20:37)  Thyroid Stimulating Hormone, Serum: AM Sched. Collection: 13-Jan-2022 06:00 (01-12 @ 20:37)

## 2022-01-13 NOTE — PATIENT PROFILE ADULT - FALL HARM RISK - HARM RISK INTERVENTIONS
Assistance with ambulation/Assistance OOB with selected safe patient handling equipment/Communicate Risk of Fall with Harm to all staff/Discuss with provider need for PT consult/Monitor gait and stability/Provide patient with walking aids - walker, cane, crutches/Reinforce activity limits and safety measures with patient and family/Tailored Fall Risk Interventions/Use of alarms - bed, chair and/or voice tab/Visual Cue: Yellow wristband and red socks/Bed in lowest position, wheels locked, appropriate side rails in place/Call bell, personal items and telephone in reach/Instruct patient to call for assistance before getting out of bed or chair/Non-slip footwear when patient is out of bed/[place] to call system/Physically safe environment - no spills, clutter or unnecessary equipment/Purposeful Proactive Rounding/Room/bathroom lighting operational, light cord in reach

## 2022-01-13 NOTE — PROGRESS NOTE ADULT - SUBJECTIVE AND OBJECTIVE BOX
Overnight events noted      VITAL:  T(C): , Max: 37.2 (22 @ 16:47)  T(F): , Max: 99 (22 @ 16:47)  HR: 81 (22 @ 04:21)  BP: 148/78 (22 @ 04:21)  BP(mean): --  RR: 18 (22 @ 04:21)  SpO2: 93% (22 @ 04:21)      PHYSICAL EXAM:  Constitutional: Lethargic/confused  HEENT: NCAT, DMM  Neck: Supple, No JVD  Respiratory: CTA-b/l  Cardiovascular: RRR s1s2, no m/r/g  Gastrointestinal: BS+, soft, NT/ND  Extremities: No peripheral edema b/l  Neurological: no focal deficits; strength grossly intact  Back: no CVAT b/l  Skin: No rashes, no nevi    LABS:                        13.2   8.48  )-----------( 118      ( 2022 06:12 )             39.8     Na(134)/K(4.1)/Cl(100)/HCO3(24)/BUN(23)/Cr(1.41)Glu(222)/Ca(8.8)/Mg(--)/PO4(--)     @ 06:12  Na(133)/K(4.5)/Cl(96)/HCO3(20)/BUN(22)/Cr(1.40)Glu(303)/Ca(9.1)/Mg(--)/PO4(--)     @ 09:22    Urinalysis Basic - ( 2022 16:30 )    Color: Yellow / Appearance: Clear / S.052 / pH: x  Gluc: x / Ketone: Negative  / Bili: Negative / Urobili: 2 mg/dL   Blood: x / Protein: 100 / Nitrite: Negative   Leuk Esterase: Moderate / RBC: 50 /hpf / WBC 58 /HPF   Sq Epi: x / Non Sq Epi: Few / Bacteria: Many      IMPRESSION: 81M w/ HTN, DM2, and CKD3, 22 presented s/p fall    (1)Renal - CKD3 - diabetic nephropathy - no evidence to date of contrast nephropathy, s/p CT I+ ; labs pending for today  (2)Lytes - acceptable  (3)CV - acceptable BP/volume  (4)ID - UTI - on IV Rocephin    RECOMMEND:  (1)No need for further workup of azotemia for now  (2)No IVF/No diuretics  (3)Could add low-dose ACEI vs ARB today if creat from today <1.5  (4)Favor avoidance of SGLT2 inhibitors for now/in the near future, given that he is prone to UTIs  (5)Dose new meds for GFR 40-50ml/min (present dosing is acceptable)  (6)BMP daily        Maco Bhatia MD  [facility]  Office: [phone]  Cell: [phone]       no complaints      VITAL:  T(C): , Max: 37.2 (22 @ 16:47)  T(F): , Max: 99 (22 @ 16:47)  HR: 81 (22 @ 04:21)  BP: 148/78 (22 @ 04:21)  BP(mean): --  RR: 18 (22 @ 04:21)  SpO2: 93% (22 @ 04:21)      PHYSICAL EXAM:  Constitutional: pleasantly demented  HEENT: NCAT, DMM  Neck: Supple, No JVD  Respiratory: CTA-b/l  Cardiovascular: RRR s1s2, no m/r/g  Gastrointestinal: BS+, soft, NT/ND  Extremities: No peripheral edema b/l  Neurological: no focal deficits; strength grossly intact  Back: no CVAT b/l  Skin: No rashes, no nevi    LABS:                        13.2   8.48  )-----------( 118      ( 2022 06:12 )             39.8     Na(134)/K(4.1)/Cl(100)/HCO3(24)/BUN(23)/Cr(1.41)Glu(222)/Ca(8.8)/Mg(--)/PO4(--)     @ 06:12  Na(133)/K(4.5)/Cl(96)/HCO3(20)/BUN(22)/Cr(1.40)Glu(303)/Ca(9.1)/Mg(--)/PO4(--)     @ 09:22    Urinalysis Basic - ( 2022 16:30 )    Color: Yellow / Appearance: Clear / S.052 / pH: x  Gluc: x / Ketone: Negative  / Bili: Negative / Urobili: 2 mg/dL   Blood: x / Protein: 100 / Nitrite: Negative   Leuk Esterase: Moderate / RBC: 50 /hpf / WBC 58 /HPF   Sq Epi: x / Non Sq Epi: Few / Bacteria: Many      IMPRESSION: 81M w/ HTN, DM2, and CKD3, 22 presented s/p fall    (1)Renal - CKD3 - diabetic nephropathy - no evidence to date of contrast nephropathy, s/p CT I+ ; labs pending for today  (2)Lytes - acceptable  (3)CV - acceptable BP/volume  (4)ID - UTI - on IV Rocephin    RECOMMEND:  (1)No need for further workup of azotemia for now  (2)No IVF/No diuretics  (3)Could add low-dose ACEI vs ARB today if creat from today <1.5  (4)Favor avoidance of SGLT2 inhibitors for now/in the near future, given that he is prone to UTIs  (5)Dose new meds for GFR 40-50ml/min (present dosing is acceptable)  (6)BMP daily        Maco Bhatia MD  [facility]  Office: [phone]  Cell: [phone]

## 2022-01-13 NOTE — PROGRESS NOTE ADULT - ASSESSMENT
Assessment  DMT2: 81y Male with DM T2 with hyperglycemia admitted with UTI , A1C is 11.1%, patient was on oral hypoglycemic agents at home, started on basal bolus insulin  blood sugars are trending down now, no hypoglycemic episode,  eating meals.  UTI: On treatment, monitored, stable.  HTN: On antihypertensive medications, monitored, asymptomatic.            Melba Reyes MD  Cell: [phone]  Office: [phone]

## 2022-01-13 NOTE — PROGRESS NOTE ADULT - SUBJECTIVE AND OBJECTIVE BOX
KAITLIN CALDERON  81y Male  MRN:[id number]    Patient is a 81y old  Male who presents with a chief complaint of   HPI:  82 yo male pt PMH DM, HTN who presents to ED BIBEMS from home for lethargy 3 days. Per EMS pt had been coughing during transfer. Found diaphoretic and febrile. Pt at home had been complaining of body aches. Pfizer x2. Patient refers n/v/d since 3 days ago. Poor appetite 2/2 abdominal pain that is diffuse. Complaining of SOB but breathing comfortable at RA. Denies sick contacts. Nonbloody emesis/BM. Denies chest pain, fever. Collateral from family: patient called family after fall on his way to toilet this morning. Unwitnessed fall. unknown in head trauma or LOC  found to have uti (11 Jan 2022 21:57)      Patient seen and evaluated at bedside. No acute events overnight except as noted.    Interval HPI:  no acute events o/n     PAST MEDICAL & SURGICAL HISTORY:  HTN (hypertension)    DM (diabetes mellitus)        REVIEW OF SYSTEMS:  as per hpi         VITALS:   Vital Signs Last 24 Hrs  T(C): 36.7 (13 Jan 2022 04:21), Max: 37.2 (12 Jan 2022 16:47)  T(F): 98 (13 Jan 2022 04:21), Max: 99 (12 Jan 2022 16:47)  HR: 81 (13 Jan 2022 04:21) (70 - 96)  BP: 148/78 (13 Jan 2022 04:21) (122/70 - 148/78)  BP(mean): --  RR: 18 (13 Jan 2022 04:21) (18 - 19)  SpO2: 93% (13 Jan 2022 04:21) (93% - 94%)    PHYSICAL EXAM:  GENERAL: NAD, well-developed  HEAD:  Atraumatic, Normocephalic  EYES: EOMI, PERRLA, conjunctiva and sclera clear  NECK: Supple, No JVD  CHEST/LUNG: Clear to auscultation bilaterally; No wheeze  HEART: S1, S2; No murmurs, rubs, or gallops  ABDOMEN: Soft, Nontender, Nondistended; Bowel sounds present  EXTREMITIES:  2+ Peripheral Pulses, No clubbing, cyanosis, or edema  PSYCH: Normal affect  NEUROLOGY: AAOX3; non-focal  SKIN: No rashes or lesions    Consultant(s) Notes Reviewed:  [x ] YES  [ ] NO  Care Discussed with Consultants/Other Providers [ x] YES  [ ] NO    MEDS:   MEDICATIONS  (STANDING):  atorvastatin 20 milliGRAM(s) Oral at bedtime  cefTRIAXone   IVPB 1000 milliGRAM(s) IV Intermittent every 24 hours  dextrose 40% Gel 15 Gram(s) Oral once  dextrose 5%. 1000 milliLiter(s) (50 mL/Hr) IV Continuous <Continuous>  dextrose 5%. 1000 milliLiter(s) (100 mL/Hr) IV Continuous <Continuous>  dextrose 50% Injectable 25 Gram(s) IV Push once  dextrose 50% Injectable 12.5 Gram(s) IV Push once  dextrose 50% Injectable 25 Gram(s) IV Push once  finasteride 5 milliGRAM(s) Oral daily  glucagon  Injectable 1 milliGRAM(s) IntraMuscular once  heparin   Injectable 5000 Unit(s) SubCutaneous every 8 hours  influenza  Vaccine (HIGH DOSE) 0.7 milliLiter(s) IntraMuscular once  insulin glargine Injectable (LANTUS) 18 Unit(s) SubCutaneous at bedtime  insulin lispro (ADMELOG) corrective regimen sliding scale   SubCutaneous at bedtime  insulin lispro (ADMELOG) corrective regimen sliding scale   SubCutaneous three times a day before meals  insulin lispro Injectable (ADMELOG) 9 Unit(s) SubCutaneous three times a day before meals  metoprolol succinate ER 25 milliGRAM(s) Oral daily  senna 2 Tablet(s) Oral at bedtime  tamsulosin 0.4 milliGRAM(s) Oral at bedtime    MEDICATIONS  (PRN):      ALLERGIES:  No Known Allergies      LABS:                                             13.2   8.48  )-----------( 118      ( 12 Jan 2022 06:12 )             39.8   01-12    134<L>  |  100  |  23  ----------------------------<  222<H>  4.1   |  24  |  1.41<H>    Ca    8.8      12 Jan 2022 06:12              < from: CT Abdomen and Pelvis w/ IV Cont (01.11.22 @ 12:16) >  IMPRESSION:  No bowel obstruction.    Aneurysmal dilatation of the ascending thoracic aorta to 4.7 cm.    Bilateral indeterminate subcentimeter pulmonary nodules. This could be   further evaluated with dedicated chest CT.    Small pericardial effusion.      < end of copied text >  < from: CT Head No Cont (01.11.22 @ 12:13) >  IMPRESSION: Age-appropriate involutional and ischemic gliotic changes. No   hemorrhage.    --- End of Report ---    < end of copied text >

## 2022-01-13 NOTE — PHYSICAL THERAPY INITIAL EVALUATION ADULT - PERTINENT HX OF CURRENT PROBLEM, REHAB EVAL
82 y/o M h/o htn, dm, here with AMS, found to have metabolic encephalopathy in setting of sepsis due to UTI. CTH: Age-appropriate involutional and ischemic gliotic changes. No

## 2022-01-13 NOTE — PHYSICAL THERAPY INITIAL EVALUATION ADULT - ADDITIONAL COMMENTS
PTA patient  lives alone in a basement apartment, daughter in law & son lives upstairs. 5  Steps to enter private house & 1 flight down to apartment. Patient ambulates  with a RW or cane as needed. Pt lives alone in a basement apt, son and family lives upstairs, pt has no steps at entry once inside 1 flight to the basement, pt ambulated w/ a SC/RW as needed. Pt has 6hrs of HHA x3days a week.

## 2022-01-13 NOTE — PROGRESS NOTE ADULT - ASSESSMENT
80 yo male h/o htn, dm, here with ams, found to have uti  pt with sepsis present on admission     metabolic encephalopathy 2/2 uti  treat uti  improved mental status    uti  ceftriax iv  f/u cult and sens    dm  uncontrolled with a1c 11  iss  monitor fs  endo consulted     chanel  unknown baseline  likely ckd  avoid nephrotoxins  renal f/u    cont other home meds      PT      Advanced care planning was discussed with patient and family.  Advanced care planning forms were reviewed and discussed as appropriate.  Differential diagnosis and plan of care discussed with patient after the evaluation.   Pain assessed and judicious use of narcotics when appropriate was discussed.  Importance of Fall prevention discussed.  Counseling on Smoking and Alcohol cessation was offered when appropriate.  Counseling on Diet, exercise, and medication compliance was done.       Approx 65 minutes spent.

## 2022-01-13 NOTE — PATIENT PROFILE ADULT - NSPROHMDIABETBLDGLCTST_GEN_A_NUR
Female born at 38.1 weeks gestation via a spontaneous vaginal delivery to a 22 y/o  mother. Mother with adequate prenatal care. All maternal labs negative. GBS unknown, untreated prior to delivery. Mother's blood type O+. Mother with no significant past medical history. No maternal pyrexia noted during/after delivery. Membranes ruptured 30 minutes prior to delivery, noted to be clear. EOS 0.03. Delivery complicated by nuchal x1. Apgars 9 and 9 at 1 and 5 minutes of life. Erythromycin and Vitamin K given by OB team. Admitted to  nursery for routine care.    Daily Height/Length in cm: 51 (2022 20:17)    Daily Weight Gm: 2930 (2022 20:24)  Head Circumference (cm): 32 (2022 20:24)    Vital Signs Last 24 Hrs  T(C): 36.8 (2022 00:41), Max: 36.8 (2022 00:41)  T(F): 98.2 (2022 00:41), Max: 98.2 (2022 00:41)  HR: 136 (2022 00:41) (130 - 146)  RR: 40 (2022 00:41) (38 - 46)    General: no apparent distress, pink   HEENT: AFOF, Eyes: RR+ b/l, Ears: normal set bilaterally, no pits or tags, Nose: patent, Mouth: clear, no cleft lip or palate, tongue normal, Neck: clavicles intact bilaterally  Lungs: Clear to auscultation bilaterally, no wheezes, no crackles  CVS: S1,S2 normal, no murmur, femoral pulses palpable bilaterally, cap refill <2 seconds  Abdomen: soft, no masses, no organomegaly, not distended, umbilical stump intact, dry, without erythema  :  edson 1, normal for sex, anus patent  Extremities: FROM x 4, no hip clicks bilaterally, Back: spine straight, no dimples/pits  Skin: intact, no rashes  Neuro: awake, alert, reactive, symmetric dariela, good tone, + suck reflex, + grasp reflex Female born at 38.1 weeks gestation via a spontaneous vaginal delivery to a 22 y/o  mother. Mother with adequate prenatal care. All maternal labs negative. GBS unknown, untreated prior to delivery. Mother's blood type O+. Mother with no significant past medical history. No maternal pyrexia noted during/after delivery. Membranes ruptured 30 minutes prior to delivery, noted to be clear. EOS 0.03. Delivery complicated by nuchal x1. Apgars 9 and 9 at 1 and 5 minutes of life. Erythromycin and Vitamin K given by OB team. Admitted to  nursery for routine care.    Mother with positive COVID-19 PCR, currently asymptomatic, no history of previous symptoms or exposures.     Daily Height/Length in cm: 51 (2022 20:17)    Daily Weight Gm: 2930 (2022 20:24)  Head Circumference (cm): 32 (2022 20:24)    Vital Signs Last 24 Hrs  T(C): 36.8 (2022 00:41), Max: 36.8 (2022 00:41)  T(F): 98.2 (2022 00:41), Max: 98.2 (2022 00:41)  HR: 136 (2022 00:41) (130 - 146)  RR: 40 (2022 00:41) (38 - 46)    General: no apparent distress, pink   HEENT: AFOF, Eyes: RR+ b/l, Ears: normal set bilaterally, no pits or tags, Nose: patent, Mouth: clear, no cleft lip or palate, tongue normal, Neck: clavicles intact bilaterally  Lungs: Clear to auscultation bilaterally, no wheezes, no crackles  CVS: S1,S2 normal, no murmur, femoral pulses palpable bilaterally, cap refill <2 seconds  Abdomen: soft, no masses, no organomegaly, not distended, umbilical stump intact, dry, without erythema  :  edson 1, normal for sex, anus patent  Extremities: FROM x 4, no hip clicks bilaterally, Back: spine straight, no dimples/pits  Skin: intact, no rashes  Neuro: awake, alert, reactive, symmetric dariela, good tone, + suck reflex, + grasp reflex Pt states he no longer checks his blood sugar.

## 2022-01-13 NOTE — PATIENT PROFILE ADULT - VISION (WITH CORRECTIVE LENSES IF THE PATIENT USUALLY WEARS THEM):
Pt states he wears reading glasses./Normal vision: sees adequately in most situations; can see medication labels, newsprint

## 2022-01-13 NOTE — PHYSICAL THERAPY INITIAL EVALUATION ADULT - PLANNED THERAPY INTERVENTIONS, PT EVAL
GOAL: Pt will perform 10 steps with U HR as needed within 1-2 weeks/balance training/bed mobility training/gait training/transfer training

## 2022-01-14 LAB
-  AMIKACIN: SIGNIFICANT CHANGE UP
-  AMOXICILLIN/CLAVULANIC ACID: SIGNIFICANT CHANGE UP
-  AMPICILLIN/SULBACTAM: SIGNIFICANT CHANGE UP
-  AMPICILLIN: SIGNIFICANT CHANGE UP
-  AZTREONAM: SIGNIFICANT CHANGE UP
-  CEFAZOLIN: SIGNIFICANT CHANGE UP
-  CEFEPIME: SIGNIFICANT CHANGE UP
-  CEFOXITIN: SIGNIFICANT CHANGE UP
-  CEFTRIAXONE: SIGNIFICANT CHANGE UP
-  CIPROFLOXACIN: SIGNIFICANT CHANGE UP
-  ERTAPENEM: SIGNIFICANT CHANGE UP
-  GENTAMICIN: SIGNIFICANT CHANGE UP
-  IMIPENEM: SIGNIFICANT CHANGE UP
-  LEVOFLOXACIN: SIGNIFICANT CHANGE UP
-  MEROPENEM: SIGNIFICANT CHANGE UP
-  NITROFURANTOIN: SIGNIFICANT CHANGE UP
-  PIPERACILLIN/TAZOBACTAM: SIGNIFICANT CHANGE UP
-  TIGECYCLINE: SIGNIFICANT CHANGE UP
-  TOBRAMYCIN: SIGNIFICANT CHANGE UP
-  TRIMETHOPRIM/SULFAMETHOXAZOLE: SIGNIFICANT CHANGE UP
ANION GAP SERPL CALC-SCNC: 14 MMOL/L — SIGNIFICANT CHANGE UP (ref 5–17)
BUN SERPL-MCNC: 22 MG/DL — SIGNIFICANT CHANGE UP (ref 7–23)
CALCIUM SERPL-MCNC: 9.5 MG/DL — SIGNIFICANT CHANGE UP (ref 8.4–10.5)
CHLORIDE SERPL-SCNC: 102 MMOL/L — SIGNIFICANT CHANGE UP (ref 96–108)
CO2 SERPL-SCNC: 22 MMOL/L — SIGNIFICANT CHANGE UP (ref 22–31)
CREAT SERPL-MCNC: 1.38 MG/DL — HIGH (ref 0.5–1.3)
CULTURE RESULTS: SIGNIFICANT CHANGE UP
GLUCOSE BLDC GLUCOMTR-MCNC: 195 MG/DL — HIGH (ref 70–99)
GLUCOSE BLDC GLUCOMTR-MCNC: 248 MG/DL — HIGH (ref 70–99)
GLUCOSE BLDC GLUCOMTR-MCNC: 278 MG/DL — HIGH (ref 70–99)
GLUCOSE BLDC GLUCOMTR-MCNC: 290 MG/DL — HIGH (ref 70–99)
GLUCOSE SERPL-MCNC: 274 MG/DL — HIGH (ref 70–99)
HCT VFR BLD CALC: 41.8 % — SIGNIFICANT CHANGE UP (ref 39–50)
HGB BLD-MCNC: 13.6 G/DL — SIGNIFICANT CHANGE UP (ref 13–17)
MCHC RBC-ENTMCNC: 29.4 PG — SIGNIFICANT CHANGE UP (ref 27–34)
MCHC RBC-ENTMCNC: 32.5 GM/DL — SIGNIFICANT CHANGE UP (ref 32–36)
MCV RBC AUTO: 90.3 FL — SIGNIFICANT CHANGE UP (ref 80–100)
METHOD TYPE: SIGNIFICANT CHANGE UP
NRBC # BLD: 0 /100 WBCS — SIGNIFICANT CHANGE UP (ref 0–0)
ORGANISM # SPEC MICROSCOPIC CNT: SIGNIFICANT CHANGE UP
ORGANISM # SPEC MICROSCOPIC CNT: SIGNIFICANT CHANGE UP
PLATELET # BLD AUTO: 147 K/UL — LOW (ref 150–400)
POTASSIUM SERPL-MCNC: 4.2 MMOL/L — SIGNIFICANT CHANGE UP (ref 3.5–5.3)
POTASSIUM SERPL-SCNC: 4.2 MMOL/L — SIGNIFICANT CHANGE UP (ref 3.5–5.3)
RBC # BLD: 4.63 M/UL — SIGNIFICANT CHANGE UP (ref 4.2–5.8)
RBC # FLD: 13 % — SIGNIFICANT CHANGE UP (ref 10.3–14.5)
SODIUM SERPL-SCNC: 138 MMOL/L — SIGNIFICANT CHANGE UP (ref 135–145)
SPECIMEN SOURCE: SIGNIFICANT CHANGE UP
WBC # BLD: 6.8 K/UL — SIGNIFICANT CHANGE UP (ref 3.8–10.5)
WBC # FLD AUTO: 6.8 K/UL — SIGNIFICANT CHANGE UP (ref 3.8–10.5)

## 2022-01-14 RX ORDER — INSULIN GLARGINE 100 [IU]/ML
24 INJECTION, SOLUTION SUBCUTANEOUS AT BEDTIME
Refills: 0 | Status: DISCONTINUED | OUTPATIENT
Start: 2022-01-14 | End: 2022-01-17

## 2022-01-14 RX ORDER — INSULIN LISPRO 100/ML
11 VIAL (ML) SUBCUTANEOUS
Refills: 0 | Status: DISCONTINUED | OUTPATIENT
Start: 2022-01-14 | End: 2022-01-17

## 2022-01-14 RX ADMIN — Medication 1: at 09:06

## 2022-01-14 RX ADMIN — Medication 3: at 17:36

## 2022-01-14 RX ADMIN — Medication 25 MILLIGRAM(S): at 05:26

## 2022-01-14 RX ADMIN — HEPARIN SODIUM 5000 UNIT(S): 5000 INJECTION INTRAVENOUS; SUBCUTANEOUS at 21:51

## 2022-01-14 RX ADMIN — Medication 11 UNIT(S): at 09:19

## 2022-01-14 RX ADMIN — Medication 11 UNIT(S): at 17:36

## 2022-01-14 RX ADMIN — ATORVASTATIN CALCIUM 20 MILLIGRAM(S): 80 TABLET, FILM COATED ORAL at 21:51

## 2022-01-14 RX ADMIN — HEPARIN SODIUM 5000 UNIT(S): 5000 INJECTION INTRAVENOUS; SUBCUTANEOUS at 14:21

## 2022-01-14 RX ADMIN — Medication 3: at 13:32

## 2022-01-14 RX ADMIN — FINASTERIDE 5 MILLIGRAM(S): 5 TABLET, FILM COATED ORAL at 11:14

## 2022-01-14 RX ADMIN — CEFTRIAXONE 100 MILLIGRAM(S): 500 INJECTION, POWDER, FOR SOLUTION INTRAMUSCULAR; INTRAVENOUS at 17:35

## 2022-01-14 RX ADMIN — TAMSULOSIN HYDROCHLORIDE 0.4 MILLIGRAM(S): 0.4 CAPSULE ORAL at 21:51

## 2022-01-14 RX ADMIN — INSULIN GLARGINE 24 UNIT(S): 100 INJECTION, SOLUTION SUBCUTANEOUS at 21:51

## 2022-01-14 RX ADMIN — HEPARIN SODIUM 5000 UNIT(S): 5000 INJECTION INTRAVENOUS; SUBCUTANEOUS at 05:26

## 2022-01-14 RX ADMIN — Medication 11 UNIT(S): at 13:33

## 2022-01-14 RX ADMIN — SENNA PLUS 2 TABLET(S): 8.6 TABLET ORAL at 21:51

## 2022-01-14 NOTE — PROGRESS NOTE ADULT - SUBJECTIVE AND OBJECTIVE BOX
KAITLIN CALDERON  81y Male  MRN:[id number]    Patient is a 81y old  Male who presents with a chief complaint of   HPI:  82 yo male pt PMH DM, HTN who presents to ED BIBEMS from home for lethargy 3 days. Per EMS pt had been coughing during transfer. Found diaphoretic and febrile. Pt at home had been complaining of body aches. Pfizer x2. Patient refers n/v/d since 3 days ago. Poor appetite 2/2 abdominal pain that is diffuse. Complaining of SOB but breathing comfortable at RA. Denies sick contacts. Nonbloody emesis/BM. Denies chest pain, fever. Collateral from family: patient called family after fall on his way to toilet this morning. Unwitnessed fall. unknown in head trauma or LOC  found to have uti (11 Jan 2022 21:57)      Patient seen and evaluated at bedside. No acute events overnight except as noted.    Interval HPI:  no acute events o/n     PAST MEDICAL & SURGICAL HISTORY:  HTN (hypertension)    DM (diabetes mellitus)        REVIEW OF SYSTEMS:  as per hpi         VITALS:   Vital Signs Last 24 Hrs  T(C): 36.7 (14 Jan 2022 04:33), Max: 36.7 (13 Jan 2022 20:57)  T(F): 98 (14 Jan 2022 04:33), Max: 98.1 (13 Jan 2022 20:57)  HR: 72 (14 Jan 2022 04:33) (72 - 85)  BP: 133/77 (14 Jan 2022 04:33) (133/77 - 144/84)  BP(mean): --  RR: 18 (14 Jan 2022 04:33) (17 - 18)  SpO2: 93% (14 Jan 2022 04:33) (93% - 94%)    PHYSICAL EXAM:  GENERAL: NAD, well-developed  HEAD:  Atraumatic, Normocephalic  EYES: EOMI, PERRLA, conjunctiva and sclera clear  NECK: Supple, No JVD  CHEST/LUNG: Clear to auscultation bilaterally; No wheeze  HEART: S1, S2; No murmurs, rubs, or gallops  ABDOMEN: Soft, Nontender, Nondistended; Bowel sounds present  EXTREMITIES:  2+ Peripheral Pulses, No clubbing, cyanosis, or edema  PSYCH: Normal affect  NEUROLOGY: AAOX3; non-focal  SKIN: No rashes or lesions    Consultant(s) Notes Reviewed:  [x ] YES  [ ] NO  Care Discussed with Consultants/Other Providers [ x] YES  [ ] NO    MEDS:   MEDICATIONS  (STANDING):  atorvastatin 20 milliGRAM(s) Oral at bedtime  cefTRIAXone   IVPB 1000 milliGRAM(s) IV Intermittent every 24 hours  dextrose 40% Gel 15 Gram(s) Oral once  dextrose 5%. 1000 milliLiter(s) (50 mL/Hr) IV Continuous <Continuous>  dextrose 5%. 1000 milliLiter(s) (100 mL/Hr) IV Continuous <Continuous>  dextrose 50% Injectable 25 Gram(s) IV Push once  dextrose 50% Injectable 12.5 Gram(s) IV Push once  dextrose 50% Injectable 25 Gram(s) IV Push once  finasteride 5 milliGRAM(s) Oral daily  glucagon  Injectable 1 milliGRAM(s) IntraMuscular once  heparin   Injectable 5000 Unit(s) SubCutaneous every 8 hours  influenza  Vaccine (HIGH DOSE) 0.7 milliLiter(s) IntraMuscular once  insulin glargine Injectable (LANTUS) 24 Unit(s) SubCutaneous at bedtime  insulin lispro (ADMELOG) corrective regimen sliding scale   SubCutaneous at bedtime  insulin lispro (ADMELOG) corrective regimen sliding scale   SubCutaneous three times a day before meals  insulin lispro Injectable (ADMELOG) 11 Unit(s) SubCutaneous three times a day before meals  metoprolol succinate ER 25 milliGRAM(s) Oral daily  senna 2 Tablet(s) Oral at bedtime  tamsulosin 0.4 milliGRAM(s) Oral at bedtime    MEDICATIONS  (PRN):        ALLERGIES:  No Known Allergies      LABS:                                           < from: CT Abdomen and Pelvis w/ IV Cont (01.11.22 @ 12:16) >  IMPRESSION:  No bowel obstruction.    Aneurysmal dilatation of the ascending thoracic aorta to 4.7 cm.    Bilateral indeterminate subcentimeter pulmonary nodules. This could be   further evaluated with dedicated chest CT.    Small pericardial effusion.      < end of copied text >  < from: CT Head No Cont (01.11.22 @ 12:13) >  IMPRESSION: Age-appropriate involutional and ischemic gliotic changes. No   hemorrhage.    --- End of Report ---    < end of copied text >

## 2022-01-14 NOTE — PROGRESS NOTE ADULT - SUBJECTIVE AND OBJECTIVE BOX
[facility] [phone]        Patient seen and examined on room air no distress noted.         MEDICATIONS  (STANDING):  atorvastatin 20 milliGRAM(s) Oral at bedtime  cefTRIAXone   IVPB 1000 milliGRAM(s) IV Intermittent every 24 hours  dextrose 40% Gel 15 Gram(s) Oral once  dextrose 5%. 1000 milliLiter(s) (50 mL/Hr) IV Continuous <Continuous>  dextrose 5%. 1000 milliLiter(s) (100 mL/Hr) IV Continuous <Continuous>  dextrose 50% Injectable 25 Gram(s) IV Push once  dextrose 50% Injectable 12.5 Gram(s) IV Push once  dextrose 50% Injectable 25 Gram(s) IV Push once  finasteride 5 milliGRAM(s) Oral daily  glucagon  Injectable 1 milliGRAM(s) IntraMuscular once  heparin   Injectable 5000 Unit(s) SubCutaneous every 8 hours  influenza  Vaccine (HIGH DOSE) 0.7 milliLiter(s) IntraMuscular once  insulin glargine Injectable (LANTUS) 24 Unit(s) SubCutaneous at bedtime  insulin lispro (ADMELOG) corrective regimen sliding scale   SubCutaneous at bedtime  insulin lispro (ADMELOG) corrective regimen sliding scale   SubCutaneous three times a day before meals  insulin lispro Injectable (ADMELOG) 11 Unit(s) SubCutaneous three times a day before meals  metoprolol succinate ER 25 milliGRAM(s) Oral daily  senna 2 Tablet(s) Oral at bedtime  tamsulosin 0.4 milliGRAM(s) Oral at bedtime      VITAL:  T(C): , Max: 36.7 (01-13-22 @ 20:57)  T(F): , Max: 98.1 (01-13-22 @ 20:57)  HR: 72 (01-14-22 @ 04:33)  BP: 133/77 (01-14-22 @ 04:33)  BP(mean): --  RR: 18 (01-14-22 @ 04:33)  SpO2: 93% (01-14-22 @ 04:33)  Wt(kg): --    I and O's:    01-13 @ 07:01  -  01-14 @ 07:00  --------------------------------------------------------  IN: 240 mL / OUT: 1 mL / NET: 239 mL          PHYSICAL EXAM:    Constitutional: NAD, pleasantly confused   Neck:  No JVD  Respiratory: CTAB/L  Cardiovascular: S1 and S2  Gastrointestinal: BS+, soft, NT/ND  Extremities: No peripheral edema  Neurological: strength intact   : No Allen  Skin: No rashes  Access: Not applicable

## 2022-01-14 NOTE — PROGRESS NOTE ADULT - ASSESSMENT
Assessment  DMT2: 81y Male with DM T2 with hyperglycemia admitted with UTI , A1C is 11.1%, patient was on oral hypoglycemic agents at home, started on basal bolus insulin  blood sugars are running high, no hypoglycemic episode,  eating meals.  UTI: On treatment, monitored, stable.  HTN: On antihypertensive medications, monitored, asymptomatic.            Melba Reyes MD  Cell: [phone]  Office: [phone]

## 2022-01-14 NOTE — PROGRESS NOTE ADULT - SUBJECTIVE AND OBJECTIVE BOX
Chief complaint    Patient is a 81y old  Male who presents with a chief complaint of  Review of systems  Patient in bed, appears comfortable.    Labs and Fingersticks  CAPILLARY BLOOD GLUCOSE      POCT Blood Glucose.: 278 mg/dL (14 Jan 2022 17:24)  POCT Blood Glucose.: 290 mg/dL (14 Jan 2022 13:22)  POCT Blood Glucose.: 195 mg/dL (14 Jan 2022 09:02)  POCT Blood Glucose.: 304 mg/dL (13 Jan 2022 22:03)  POCT Blood Glucose.: 225 mg/dL (13 Jan 2022 17:50)      Anion Gap, Serum: 14 (01-14 @ 11:43)      Calcium, Total Serum: 9.5 (01-14 @ 11:43)          01-14    138  |  102  |  22  ----------------------------<  274<H>  4.2   |  22  |  1.38<H>    Ca    9.5      14 Jan 2022 11:43                          13.6   6.80  )-----------( 147      ( 14 Jan 2022 13:15 )             41.8     Medications  MEDICATIONS  (STANDING):  atorvastatin 20 milliGRAM(s) Oral at bedtime  cefTRIAXone   IVPB 1000 milliGRAM(s) IV Intermittent every 24 hours  dextrose 40% Gel 15 Gram(s) Oral once  dextrose 5%. 1000 milliLiter(s) (50 mL/Hr) IV Continuous <Continuous>  dextrose 5%. 1000 milliLiter(s) (100 mL/Hr) IV Continuous <Continuous>  dextrose 50% Injectable 25 Gram(s) IV Push once  dextrose 50% Injectable 12.5 Gram(s) IV Push once  dextrose 50% Injectable 25 Gram(s) IV Push once  finasteride 5 milliGRAM(s) Oral daily  glucagon  Injectable 1 milliGRAM(s) IntraMuscular once  heparin   Injectable 5000 Unit(s) SubCutaneous every 8 hours  influenza  Vaccine (HIGH DOSE) 0.7 milliLiter(s) IntraMuscular once  insulin glargine Injectable (LANTUS) 24 Unit(s) SubCutaneous at bedtime  insulin lispro (ADMELOG) corrective regimen sliding scale   SubCutaneous at bedtime  insulin lispro (ADMELOG) corrective regimen sliding scale   SubCutaneous three times a day before meals  insulin lispro Injectable (ADMELOG) 11 Unit(s) SubCutaneous three times a day before meals  metoprolol succinate ER 25 milliGRAM(s) Oral daily  senna 2 Tablet(s) Oral at bedtime  tamsulosin 0.4 milliGRAM(s) Oral at bedtime      Physical Exam  General: Patient comfortable in bed  Vital Signs Last 12 Hrs  T(F): 98 (01-14-22 @ 13:52), Max: 98 (01-14-22 @ 13:52)  HR: 82 (01-14-22 @ 13:52) (82 - 82)  BP: 149/85 (01-14-22 @ 13:52) (149/85 - 149/85)  BP(mean): --  RR: 18 (01-14-22 @ 13:52) (18 - 18)  SpO2: 92% (01-14-22 @ 13:52) (92% - 92%)  Neck: No palpable thyroid nodules.  CVS: S1S2, No murmurs  Respiratory: No wheezing, no crepitations  GI: Abdomen soft, bowel sounds positive  Musculoskeletal:  edema lower extremities.     Diagnostics    Free Thyroxine, Serum: AM Sched. Collection: 13-Jan-2022 06:00 (01-12 @ 20:37)  Thyroid Stimulating Hormone, Serum: AM Sched. Collection: 13-Jan-2022 06:00 (01-12 @ 20:37)

## 2022-01-14 NOTE — PROGRESS NOTE ADULT - ASSESSMENT
IMPRESSION: 81M w/ HTN, DM2, and CKD3, 1/11/22 presented s/p fall    Labs pending     (1)Renal - CKD3 - diabetic nephropathy - no evidence to date of contrast nephropathy, s/p CT I+ 1/11; labs pending for today  (2)Lytes - acceptable as of late   (3)CV - acceptable BP/volume   (4)ID - UTI - on IV Rocephin    RECOMMEND:  (1)No need for further workup of azotemia for now  (2)No IVF/No diuretics  (3)No objection to adding low-dose ACEI vs ARB today if creat from today <1.5  (4)Favor avoidance of SGLT2 inhibitors for now/in the near future, given that he is prone to UTIs  (5)Dose new meds for GFR 40-50ml/min (present dosing is acceptable)  (6)BMP daily    Rema Lay NP  [facility]  Office: [phone]

## 2022-01-15 LAB
ANION GAP SERPL CALC-SCNC: 12 MMOL/L — SIGNIFICANT CHANGE UP (ref 5–17)
BUN SERPL-MCNC: 25 MG/DL — HIGH (ref 7–23)
CALCIUM SERPL-MCNC: 9.3 MG/DL — SIGNIFICANT CHANGE UP (ref 8.4–10.5)
CHLORIDE SERPL-SCNC: 101 MMOL/L — SIGNIFICANT CHANGE UP (ref 96–108)
CO2 SERPL-SCNC: 22 MMOL/L — SIGNIFICANT CHANGE UP (ref 22–31)
CREAT SERPL-MCNC: 1.4 MG/DL — HIGH (ref 0.5–1.3)
GLUCOSE BLDC GLUCOMTR-MCNC: 238 MG/DL — HIGH (ref 70–99)
GLUCOSE BLDC GLUCOMTR-MCNC: 238 MG/DL — HIGH (ref 70–99)
GLUCOSE BLDC GLUCOMTR-MCNC: 248 MG/DL — HIGH (ref 70–99)
GLUCOSE BLDC GLUCOMTR-MCNC: 261 MG/DL — HIGH (ref 70–99)
GLUCOSE SERPL-MCNC: 224 MG/DL — HIGH (ref 70–99)
POTASSIUM SERPL-MCNC: 4 MMOL/L — SIGNIFICANT CHANGE UP (ref 3.5–5.3)
POTASSIUM SERPL-SCNC: 4 MMOL/L — SIGNIFICANT CHANGE UP (ref 3.5–5.3)
SARS-COV-2 RNA SPEC QL NAA+PROBE: SIGNIFICANT CHANGE UP
SODIUM SERPL-SCNC: 135 MMOL/L — SIGNIFICANT CHANGE UP (ref 135–145)

## 2022-01-15 RX ADMIN — HEPARIN SODIUM 5000 UNIT(S): 5000 INJECTION INTRAVENOUS; SUBCUTANEOUS at 21:40

## 2022-01-15 RX ADMIN — Medication 2: at 08:49

## 2022-01-15 RX ADMIN — HEPARIN SODIUM 5000 UNIT(S): 5000 INJECTION INTRAVENOUS; SUBCUTANEOUS at 12:32

## 2022-01-15 RX ADMIN — FINASTERIDE 5 MILLIGRAM(S): 5 TABLET, FILM COATED ORAL at 12:33

## 2022-01-15 RX ADMIN — INSULIN GLARGINE 24 UNIT(S): 100 INJECTION, SOLUTION SUBCUTANEOUS at 21:39

## 2022-01-15 RX ADMIN — HEPARIN SODIUM 5000 UNIT(S): 5000 INJECTION INTRAVENOUS; SUBCUTANEOUS at 05:22

## 2022-01-15 RX ADMIN — Medication 11 UNIT(S): at 12:33

## 2022-01-15 RX ADMIN — Medication 3: at 17:28

## 2022-01-15 RX ADMIN — Medication 11 UNIT(S): at 17:29

## 2022-01-15 RX ADMIN — TAMSULOSIN HYDROCHLORIDE 0.4 MILLIGRAM(S): 0.4 CAPSULE ORAL at 21:40

## 2022-01-15 RX ADMIN — Medication 650 MILLIGRAM(S): at 12:26

## 2022-01-15 RX ADMIN — CEFTRIAXONE 100 MILLIGRAM(S): 500 INJECTION, POWDER, FOR SOLUTION INTRAMUSCULAR; INTRAVENOUS at 17:28

## 2022-01-15 RX ADMIN — SENNA PLUS 2 TABLET(S): 8.6 TABLET ORAL at 21:40

## 2022-01-15 RX ADMIN — ATORVASTATIN CALCIUM 20 MILLIGRAM(S): 80 TABLET, FILM COATED ORAL at 21:39

## 2022-01-15 RX ADMIN — Medication 2: at 12:33

## 2022-01-15 RX ADMIN — Medication 25 MILLIGRAM(S): at 05:22

## 2022-01-15 NOTE — DIETITIAN INITIAL EVALUATION ADULT. - PERSON TAUGHT/METHOD
verbal instruction/written material/patient instructed/son instructed/teach back - (Patient repeats in own words)

## 2022-01-15 NOTE — PROGRESS NOTE ADULT - ASSESSMENT
Assessment  DMT2: 81y Male with DM T2 with hyperglycemia admitted with UTI , A1C is 11.1%, patient was on oral hypoglycemic agents at home, on basal bolus insulin  blood sugars are trending down, no hypoglycemic episode, eating meals.  UTI: On treatment, monitored, stable.  HTN: On antihypertensive medications, monitored, asymptomatic.            Melba Reyes MD  Cell: [phone]  Office: [phone]

## 2022-01-15 NOTE — DIETITIAN INITIAL EVALUATION ADULT. - PERTINENT LABORATORY DATA
01-15 @ 07:36: Na 135, BUN 25<H>, Cr 1.40<H>, <H>, K+ 4.0, Phos --, Mg --, Alk Phos --, ALT/SGPT --, AST/SGOT --, HbA1c --  01-14 @ 11:43: Na 138, BUN 22, Cr 1.38<H>, <H>, K+ 4.2, Phos --, Mg --, Alk Phos --, ALT/SGPT --, AST/SGOT --, HbA1c --

## 2022-01-15 NOTE — PROGRESS NOTE ADULT - SUBJECTIVE AND OBJECTIVE BOX
KAITLIN CALDERON  81y Male  MRN:[id number]    Patient is a 81y old  Male who presents with a chief complaint of   HPI:  82 yo male pt PMH DM, HTN who presents to ED BIBEMS from home for lethargy 3 days. Per EMS pt had been coughing during transfer. Found diaphoretic and febrile. Pt at home had been complaining of body aches. Pfizer x2. Patient refers n/v/d since 3 days ago. Poor appetite 2/2 abdominal pain that is diffuse. Complaining of SOB but breathing comfortable at RA. Denies sick contacts. Nonbloody emesis/BM. Denies chest pain, fever. Collateral from family: patient called family after fall on his way to toilet this morning. Unwitnessed fall. unknown in head trauma or LOC  found to have uti (11 Jan 2022 21:57)      Patient seen and evaluated at bedside. No acute events overnight except as noted.    Interval HPI:  no acute events o/n     PAST MEDICAL & SURGICAL HISTORY:  HTN (hypertension)    DM (diabetes mellitus)        REVIEW OF SYSTEMS:  as per hpi         VITALS:    Vital Signs Last 24 Hrs  T(C): 36.8 (15 Chuy 2022 14:46), Max: 36.8 (15 Chuy 2022 14:46)  T(F): 98.2 (15 Chuy 2022 14:46), Max: 98.2 (15 Chuy 2022 14:46)  HR: 82 (15 Chuy 2022 14:46) (71 - 84)  BP: 146/82 (15 Chuy 2022 14:46) (129/79 - 146/82)  BP(mean): --  RR: 18 (15 Chuy 2022 14:46) (18 - 18)  SpO2: 92% (15 Chuy 2022 14:46) (92% - 95%)    PHYSICAL EXAM:  GENERAL: NAD, well-developed  HEAD:  Atraumatic, Normocephalic  EYES: EOMI, PERRLA, conjunctiva and sclera clear  NECK: Supple, No JVD  CHEST/LUNG: Clear to auscultation bilaterally; No wheeze  HEART: S1, S2; No murmurs, rubs, or gallops  ABDOMEN: Soft, Nontender, Nondistended; Bowel sounds present  EXTREMITIES:  2+ Peripheral Pulses, No clubbing, cyanosis, or edema  PSYCH: Normal affect  NEUROLOGY: AAOX3; non-focal  SKIN: No rashes or lesions    Consultant(s) Notes Reviewed:  [x ] YES  [ ] NO  Care Discussed with Consultants/Other Providers [ x] YES  [ ] NO    MEDS:   MEDICATIONS  (STANDING):  atorvastatin 20 milliGRAM(s) Oral at bedtime  cefTRIAXone   IVPB 1000 milliGRAM(s) IV Intermittent every 24 hours  dextrose 40% Gel 15 Gram(s) Oral once  dextrose 5%. 1000 milliLiter(s) (50 mL/Hr) IV Continuous <Continuous>  dextrose 5%. 1000 milliLiter(s) (100 mL/Hr) IV Continuous <Continuous>  dextrose 50% Injectable 25 Gram(s) IV Push once  dextrose 50% Injectable 12.5 Gram(s) IV Push once  dextrose 50% Injectable 25 Gram(s) IV Push once  finasteride 5 milliGRAM(s) Oral daily  glucagon  Injectable 1 milliGRAM(s) IntraMuscular once  heparin   Injectable 5000 Unit(s) SubCutaneous every 8 hours  influenza  Vaccine (HIGH DOSE) 0.7 milliLiter(s) IntraMuscular once  insulin glargine Injectable (LANTUS) 24 Unit(s) SubCutaneous at bedtime  insulin lispro (ADMELOG) corrective regimen sliding scale   SubCutaneous at bedtime  insulin lispro (ADMELOG) corrective regimen sliding scale   SubCutaneous three times a day before meals  insulin lispro Injectable (ADMELOG) 11 Unit(s) SubCutaneous three times a day before meals  metoprolol succinate ER 25 milliGRAM(s) Oral daily  senna 2 Tablet(s) Oral at bedtime  tamsulosin 0.4 milliGRAM(s) Oral at bedtime    MEDICATIONS  (PRN):        ALLERGIES:  No Known Allergies      LABS:                                                         13.6   6.80  )-----------( 147      ( 14 Jan 2022 13:15 )             41.8   01-15    135  |  101  |  25<H>  ----------------------------<  224<H>  4.0   |  22  |  1.40<H>    Ca    9.3      15 Chuy 2022 07:36            < from: CT Abdomen and Pelvis w/ IV Cont (01.11.22 @ 12:16) >  IMPRESSION:  No bowel obstruction.    Aneurysmal dilatation of the ascending thoracic aorta to 4.7 cm.    Bilateral indeterminate subcentimeter pulmonary nodules. This could be   further evaluated with dedicated chest CT.    Small pericardial effusion.      < end of copied text >  < from: CT Head No Cont (01.11.22 @ 12:13) >  IMPRESSION: Age-appropriate involutional and ischemic gliotic changes. No   hemorrhage.    --- End of Report ---    < end of copied text >   Pharmacy has started a Prior Authorization request via Cover My Meds for -Testosterone Cypionate 200MG/ML intramuscular solution, Key: [id number]

## 2022-01-15 NOTE — DIETITIAN INITIAL EVALUATION ADULT. - PERTINENT MEDS FT
MEDICATIONS  (STANDING):  atorvastatin 20 milliGRAM(s) Oral at bedtime  cefTRIAXone   IVPB 1000 milliGRAM(s) IV Intermittent every 24 hours  dextrose 40% Gel 15 Gram(s) Oral once  dextrose 5%. 1000 milliLiter(s) (50 mL/Hr) IV Continuous <Continuous>  dextrose 5%. 1000 milliLiter(s) (100 mL/Hr) IV Continuous <Continuous>  dextrose 50% Injectable 25 Gram(s) IV Push once  dextrose 50% Injectable 12.5 Gram(s) IV Push once  dextrose 50% Injectable 25 Gram(s) IV Push once  finasteride 5 milliGRAM(s) Oral daily  glucagon  Injectable 1 milliGRAM(s) IntraMuscular once  heparin   Injectable 5000 Unit(s) SubCutaneous every 8 hours  influenza  Vaccine (HIGH DOSE) 0.7 milliLiter(s) IntraMuscular once  insulin glargine Injectable (LANTUS) 24 Unit(s) SubCutaneous at bedtime  insulin lispro (ADMELOG) corrective regimen sliding scale   SubCutaneous at bedtime  insulin lispro (ADMELOG) corrective regimen sliding scale   SubCutaneous three times a day before meals  insulin lispro Injectable (ADMELOG) 11 Unit(s) SubCutaneous three times a day before meals  metoprolol succinate ER 25 milliGRAM(s) Oral daily  senna 2 Tablet(s) Oral at bedtime  tamsulosin 0.4 milliGRAM(s) Oral at bedtime    MEDICATIONS  (PRN):

## 2022-01-15 NOTE — DIETITIAN INITIAL EVALUATION ADULT. - OTHER INFO
No nausea/vomit, difficulty chewing /swallow, diarrhea/constipation noted  Last BM : none noted since admission  NKFA  Ht:  Ht taken from   Dosing ht:  Dosing wt:  Ht used for BMI  Wt used for BMI  Education Provided : pt was educated on the importance of supplements to increase calorie and protein intake in light of RD's nutritional findings.   pressure injury: pt speaks Cantonese,  used.   No nausea/vomit, difficulty chewing /swallow, diarrhea/constipation noted  Last BM : none noted since admission  NKFA   Ht: 180.3cm  Ht taken from dosing  Dosing wt: 113.4kg  Ht used for BMI dosing  Wt used for .4kg  Education Provided : pt was educated on the importance of supplements to increase calorie and protein intake in light of RD's nutritional findings. CHO counting (chinese) , diabetes label reading tips (english) -pt lives with his son and his son is able to translate for pt   pressure injury:

## 2022-01-15 NOTE — PROGRESS NOTE ADULT - ASSESSMENT
82 yo male h/o htn, dm, here with ams, found to have uti  pt with sepsis present on admission     metabolic encephalopathy 2/2 uti  treat uti  improved mental status    uti  ceftriax iv  cult noted  treat 7 days    dm  uncontrolled with a1c 11  iss  monitor fs  endo consulted     chanel  unknown baseline  likely ckd  avoid nephrotoxins  renal f/u    cont other home meds      PT  dc planning rehab    Advanced care planning was discussed with patient and family.  Advanced care planning forms were reviewed and discussed as appropriate.  Differential diagnosis and plan of care discussed with patient after the evaluation.   Pain assessed and judicious use of narcotics when appropriate was discussed.  Importance of Fall prevention discussed.  Counseling on Smoking and Alcohol cessation was offered when appropriate.  Counseling on Diet, exercise, and medication compliance was done.       Approx 65 minutes spent.

## 2022-01-15 NOTE — PROGRESS NOTE ADULT - ASSESSMENT
81M w/ HTN, DM2, and CKD3, 1/11/22 presented s/p fall    (1)Renal - CKD3 - diabetic nephropathy - no evidence to date of contrast nephropathy, s/p CT,  crt slightly up today  (2)Lytes - acceptable  (3)CV - bp stable   (4)ID - UTI - on IV Rocephin    RECOMMEND:  BMP daily  Hold IVF/No diuretics  No objection to adding low-dose ACEI vs ARB   Favor avoidance of SGLT2 inhibitors for now/in the near future, given that he is prone to UTIs  Dose new meds for GFR 40-50ml/min (present dosing is acceptable)      January Snell NP  [facility]  Office: [phone]

## 2022-01-15 NOTE — PROGRESS NOTE ADULT - SUBJECTIVE AND OBJECTIVE BOX
Chief complaint    Patient is a 81y old  Male who presents with a chief complaint of  Review of systems  Patient in bed, appears comfortable.    Labs and Fingersticks  CAPILLARY BLOOD GLUCOSE      POCT Blood Glucose.: 248 mg/dL (15 Chuy 2022 12:28)  POCT Blood Glucose.: 238 mg/dL (15 Chuy 2022 08:44)  POCT Blood Glucose.: 248 mg/dL (14 Jan 2022 21:44)  POCT Blood Glucose.: 278 mg/dL (14 Jan 2022 17:24)      Anion Gap, Serum: 12 (01-15 @ 07:36)  Anion Gap, Serum: 14 (01-14 @ 11:43)      Calcium, Total Serum: 9.3 (01-15 @ 07:36)  Calcium, Total Serum: 9.5 (01-14 @ 11:43)          01-15    135  |  101  |  25<H>  ----------------------------<  224<H>  4.0   |  22  |  1.40<H>    Ca    9.3      15 Chuy 2022 07:36                          13.6   6.80  )-----------( 147      ( 14 Jan 2022 13:15 )             41.8     Medications  MEDICATIONS  (STANDING):  atorvastatin 20 milliGRAM(s) Oral at bedtime  cefTRIAXone   IVPB 1000 milliGRAM(s) IV Intermittent every 24 hours  dextrose 40% Gel 15 Gram(s) Oral once  dextrose 5%. 1000 milliLiter(s) (50 mL/Hr) IV Continuous <Continuous>  dextrose 5%. 1000 milliLiter(s) (100 mL/Hr) IV Continuous <Continuous>  dextrose 50% Injectable 25 Gram(s) IV Push once  dextrose 50% Injectable 12.5 Gram(s) IV Push once  dextrose 50% Injectable 25 Gram(s) IV Push once  finasteride 5 milliGRAM(s) Oral daily  glucagon  Injectable 1 milliGRAM(s) IntraMuscular once  heparin   Injectable 5000 Unit(s) SubCutaneous every 8 hours  influenza  Vaccine (HIGH DOSE) 0.7 milliLiter(s) IntraMuscular once  insulin glargine Injectable (LANTUS) 24 Unit(s) SubCutaneous at bedtime  insulin lispro (ADMELOG) corrective regimen sliding scale   SubCutaneous at bedtime  insulin lispro (ADMELOG) corrective regimen sliding scale   SubCutaneous three times a day before meals  insulin lispro Injectable (ADMELOG) 11 Unit(s) SubCutaneous three times a day before meals  metoprolol succinate ER 25 milliGRAM(s) Oral daily  senna 2 Tablet(s) Oral at bedtime  tamsulosin 0.4 milliGRAM(s) Oral at bedtime      Physical Exam  General: Patient comfortable in bed  Vital Signs Last 12 Hrs  T(F): 98.2 (01-15-22 @ 14:46), Max: 98.2 (01-15-22 @ 14:46)  HR: 82 (01-15-22 @ 14:46) (71 - 84)  BP: 146/82 (01-15-22 @ 14:46) (129/79 - 146/82)  BP(mean): --  RR: 18 (01-15-22 @ 14:46) (18 - 18)  SpO2: 92% (01-15-22 @ 14:46) (92% - 95%)  Neck: No palpable thyroid nodules.  CVS: S1S2, No murmurs  Respiratory: No wheezing, no crepitations  GI: Abdomen soft, bowel sounds positive  Musculoskeletal:  edema lower extremities.     Diagnostics    Free Thyroxine, Serum: AM Sched. Collection: 13-Jan-2022 06:00 (01-12 @ 20:37)  Thyroid Stimulating Hormone, Serum: AM Sched. Collection: 13-Jan-2022 06:00 (01-12 @ 20:37)

## 2022-01-15 NOTE — PROGRESS NOTE ADULT - SUBJECTIVE AND OBJECTIVE BOX
Nephrology Progress Note    Patient is a 81y male with    Allergies:  No Known Allergies    Hospital Medications:   MEDICATIONS  (STANDING):  atorvastatin 20 milliGRAM(s) Oral at bedtime  cefTRIAXone   IVPB 1000 milliGRAM(s) IV Intermittent every 24 hours  dextrose 40% Gel 15 Gram(s) Oral once  dextrose 5%. 1000 milliLiter(s) (50 mL/Hr) IV Continuous <Continuous>  dextrose 5%. 1000 milliLiter(s) (100 mL/Hr) IV Continuous <Continuous>  dextrose 50% Injectable 25 Gram(s) IV Push once  dextrose 50% Injectable 12.5 Gram(s) IV Push once  dextrose 50% Injectable 25 Gram(s) IV Push once  finasteride 5 milliGRAM(s) Oral daily  glucagon  Injectable 1 milliGRAM(s) IntraMuscular once  heparin   Injectable 5000 Unit(s) SubCutaneous every 8 hours  influenza  Vaccine (HIGH DOSE) 0.7 milliLiter(s) IntraMuscular once  insulin glargine Injectable (LANTUS) 24 Unit(s) SubCutaneous at bedtime  insulin lispro (ADMELOG) corrective regimen sliding scale   SubCutaneous at bedtime  insulin lispro (ADMELOG) corrective regimen sliding scale   SubCutaneous three times a day before meals  insulin lispro Injectable (ADMELOG) 11 Unit(s) SubCutaneous three times a day before meals  metoprolol succinate ER 25 milliGRAM(s) Oral daily  senna 2 Tablet(s) Oral at bedtime  tamsulosin 0.4 milliGRAM(s) Oral at bedtime      VITALS:  T(F): 97.8 (01-15-22 @ 04:58), Max: 98 (22 @ 13:52)  HR: 71 (01-15-22 @ 04:58)  BP: 136/93 (01-15-22 @ 04:58)  RR: 18 (01-15-22 @ 04:58)  SpO2: 95% (01-15-22 @ 04:58)       @ 07:  -   @ 07:00  --------------------------------------------------------  IN: 240 mL / OUT: 1 mL / NET: 239 mL        PHYSICAL EXAM:  Constitutional: NAD  HEENT: anicteric sclera, oropharynx clear, MMM  Neck: No JVD  Respiratory: CTAB, no wheezes, rales or rhonchi  Cardiovascular: S1, S2, RRR  Gastrointestinal: BS+, soft, NT/ND  Extremities: No cyanosis or clubbing. No peripheral edema  Psychiatric: Normal mood, normal affect  : No CVA tenderness. No traore.   Skin: No rashes    LABS:  01-15    135  |  101  |  25<H>  ----------------------------<  224<H>  4.0   |  22  |  1.40<H>    Ca    9.3      15 Chuy 2022 07:36                            13.6   6.80  )-----------( 147      ( 2022 13:15 )             41.8       Urine Studies:  Urinalysis Basic - ( 2022 16:30 )    Color: Yellow / Appearance: Clear / S.052 / pH:   Gluc:  / Ketone: Negative  / Bili: Negative / Urobili: 2 mg/dL   Blood:  / Protein: 100 / Nitrite: Negative   Leuk Esterase: Moderate / RBC: 50 /hpf / WBC 58 /HPF   Sq Epi:  / Non Sq Epi: Few / Bacteria: Many        RADIOLOGY & ADDITIONAL STUDIES:  ACC: [id number] EXAM:  CT ABDOMEN AND PELVIS IC                          PROCEDURE DATE:  2022          INTERPRETATION:  CLINICAL INFORMATION: Abdominal pain, nausea vomiting   and diarrhea    COMPARISON: None.    CONTRAST/COMPLICATIONS:  IV Contrast: Omnipaque 350  90 cc administered   10 cc discarded  Oral Contrast: NONE  Complications: None reported at time of study completion    PROCEDURE:  CT of the Abdomen and Pelvis was performed.  Sagittal and coronal reformats were performed.    FINDINGS:  LOWER CHEST: Limited by respiratory motion artifact. A 0.4 cm pulmonary   nodule in the right lower lobe (series 3, image 8) and a 0.6 cm   subpleural pulmonary nodule in the left lower lobe (series 3, image 6).   Bibasilar linear atelectasis. Coronary artery calcification. Small   pericardial effusion. Aneurysmal dilatation of the ascending thoracic   aorta to 4.7 cm.    LIVER: Within normal limits.  BILE DUCTS: Normal caliber.  GALLBLADDER: Within normal limits.  SPLEEN: Within normal limits.  PANCREAS: Diffusely atrophic.  ADRENALS: Within normal limits.  KIDNEYS/URETERS: No hydronephrosis. Multiple bilateral renal cysts, the   largest of which measures 6.7 cm in the left upper pole. Additional   bilateral subcentimeter hypodense foci are too small to characterize.    BLADDER: Minimally distended.  REPRODUCTIVE ORGANS: Prostate gland is mildly enlarged.    BOWEL: No bowel obstruction. Appendix is normal.  PERITONEUM: No ascites.  VESSELS: Atherosclerotic changes.  RETROPERITONEUM/LYMPH NODES: No lymphadenopathy.  ABDOMINAL WALL: Bilateral fat-containing inguinal hernias.  BONES: Degenerative changes.    IMPRESSION:  No bowel obstruction.    Aneurysmal dilatation of the ascending thoracic aorta to 4.7 cm.    Bilateral indeterminate subcentimeter pulmonary nodules. This could be   further evaluated with dedicated chest CT.     Nephrology Progress Note    Patient is a 81y male with no issues overnight.    Allergies:  No Known Allergies    Hospital Medications:   MEDICATIONS  (STANDING):  atorvastatin 20 milliGRAM(s) Oral at bedtime  cefTRIAXone   IVPB 1000 milliGRAM(s) IV Intermittent every 24 hours  dextrose 40% Gel 15 Gram(s) Oral once  dextrose 5%. 1000 milliLiter(s) (50 mL/Hr) IV Continuous <Continuous>  dextrose 5%. 1000 milliLiter(s) (100 mL/Hr) IV Continuous <Continuous>  dextrose 50% Injectable 25 Gram(s) IV Push once  dextrose 50% Injectable 12.5 Gram(s) IV Push once  dextrose 50% Injectable 25 Gram(s) IV Push once  finasteride 5 milliGRAM(s) Oral daily  glucagon  Injectable 1 milliGRAM(s) IntraMuscular once  heparin   Injectable 5000 Unit(s) SubCutaneous every 8 hours  influenza  Vaccine (HIGH DOSE) 0.7 milliLiter(s) IntraMuscular once  insulin glargine Injectable (LANTUS) 24 Unit(s) SubCutaneous at bedtime  insulin lispro (ADMELOG) corrective regimen sliding scale   SubCutaneous at bedtime  insulin lispro (ADMELOG) corrective regimen sliding scale   SubCutaneous three times a day before meals  insulin lispro Injectable (ADMELOG) 11 Unit(s) SubCutaneous three times a day before meals  metoprolol succinate ER 25 milliGRAM(s) Oral daily  senna 2 Tablet(s) Oral at bedtime  tamsulosin 0.4 milliGRAM(s) Oral at bedtime      VITALS:  T(F): 97.8 (01-15-22 @ 04:58), Max: 98 (22 @ 13:52)  HR: 71 (01-15-22 @ 04:58)  BP: 136/93 (01-15-22 @ 04:58)  RR: 18 (01-15-22 @ 04:58)  SpO2: 95% (01-15-22 @ 04:58)       @ 07:  -   @ 07:00  --------------------------------------------------------  IN: 240 mL / OUT: 1 mL / NET: 239 mL        PHYSICAL EXAM:  Constitutional: NAD  HEENT: anicteric sclera, oropharynx clear, MMM  Neck: No JVD  Respiratory: CTAB, no wheezes, rales or rhonchi  Cardiovascular: S1, S2, RRR  Gastrointestinal: BS+, soft, NT/ND  Extremities: No cyanosis or clubbing. No peripheral edema  Psychiatric: Normal mood, normal affect  : No CVA tenderness. No traore.   Skin: No rashes    LABS:  01-15    135  |  101  |  25<H>  ----------------------------<  224<H>  4.0   |  22  |  1.40<H>    Ca    9.3      15 Chuy 2022 07:36                            13.6   6.80  )-----------( 147      ( 2022 13:15 )             41.8       Urine Studies:  Urinalysis Basic - ( 2022 16:30 )    Color: Yellow / Appearance: Clear / S.052 / pH:   Gluc:  / Ketone: Negative  / Bili: Negative / Urobili: 2 mg/dL   Blood:  / Protein: 100 / Nitrite: Negative   Leuk Esterase: Moderate / RBC: 50 /hpf / WBC 58 /HPF   Sq Epi:  / Non Sq Epi: Few / Bacteria: Many        RADIOLOGY & ADDITIONAL STUDIES:  ACC: [id number] EXAM:  CT ABDOMEN AND PELVIS IC                          PROCEDURE DATE:  2022          INTERPRETATION:  CLINICAL INFORMATION: Abdominal pain, nausea vomiting   and diarrhea    COMPARISON: None.    CONTRAST/COMPLICATIONS:  IV Contrast: Omnipaque 350  90 cc administered   10 cc discarded  Oral Contrast: NONE  Complications: None reported at time of study completion    PROCEDURE:  CT of the Abdomen and Pelvis was performed.  Sagittal and coronal reformats were performed.    FINDINGS:  LOWER CHEST: Limited by respiratory motion artifact. A 0.4 cm pulmonary   nodule in the right lower lobe (series 3, image 8) and a 0.6 cm   subpleural pulmonary nodule in the left lower lobe (series 3, image 6).   Bibasilar linear atelectasis. Coronary artery calcification. Small   pericardial effusion. Aneurysmal dilatation of the ascending thoracic   aorta to 4.7 cm.    LIVER: Within normal limits.  BILE DUCTS: Normal caliber.  GALLBLADDER: Within normal limits.  SPLEEN: Within normal limits.  PANCREAS: Diffusely atrophic.  ADRENALS: Within normal limits.  KIDNEYS/URETERS: No hydronephrosis. Multiple bilateral renal cysts, the   largest of which measures 6.7 cm in the left upper pole. Additional   bilateral subcentimeter hypodense foci are too small to characterize.    BLADDER: Minimally distended.  REPRODUCTIVE ORGANS: Prostate gland is mildly enlarged.    BOWEL: No bowel obstruction. Appendix is normal.  PERITONEUM: No ascites.  VESSELS: Atherosclerotic changes.  RETROPERITONEUM/LYMPH NODES: No lymphadenopathy.  ABDOMINAL WALL: Bilateral fat-containing inguinal hernias.  BONES: Degenerative changes.    IMPRESSION:  No bowel obstruction.    Aneurysmal dilatation of the ascending thoracic aorta to 4.7 cm.    Bilateral indeterminate subcentimeter pulmonary nodules. This could be   further evaluated with dedicated chest CT.

## 2022-01-16 LAB
ANION GAP SERPL CALC-SCNC: 14 MMOL/L — SIGNIFICANT CHANGE UP (ref 5–17)
BUN SERPL-MCNC: 22 MG/DL — SIGNIFICANT CHANGE UP (ref 7–23)
CALCIUM SERPL-MCNC: 9.2 MG/DL — SIGNIFICANT CHANGE UP (ref 8.4–10.5)
CHLORIDE SERPL-SCNC: 96 MMOL/L — SIGNIFICANT CHANGE UP (ref 96–108)
CO2 SERPL-SCNC: 21 MMOL/L — LOW (ref 22–31)
CREAT SERPL-MCNC: 1.36 MG/DL — HIGH (ref 0.5–1.3)
GLUCOSE BLDC GLUCOMTR-MCNC: 231 MG/DL — HIGH (ref 70–99)
GLUCOSE BLDC GLUCOMTR-MCNC: 238 MG/DL — HIGH (ref 70–99)
GLUCOSE BLDC GLUCOMTR-MCNC: 244 MG/DL — HIGH (ref 70–99)
GLUCOSE BLDC GLUCOMTR-MCNC: 271 MG/DL — HIGH (ref 70–99)
GLUCOSE BLDC GLUCOMTR-MCNC: 274 MG/DL — HIGH (ref 70–99)
GLUCOSE SERPL-MCNC: 264 MG/DL — HIGH (ref 70–99)
POTASSIUM SERPL-MCNC: 3.9 MMOL/L — SIGNIFICANT CHANGE UP (ref 3.5–5.3)
POTASSIUM SERPL-SCNC: 3.9 MMOL/L — SIGNIFICANT CHANGE UP (ref 3.5–5.3)
SODIUM SERPL-SCNC: 131 MMOL/L — LOW (ref 135–145)

## 2022-01-16 RX ADMIN — HEPARIN SODIUM 5000 UNIT(S): 5000 INJECTION INTRAVENOUS; SUBCUTANEOUS at 14:14

## 2022-01-16 RX ADMIN — SENNA PLUS 2 TABLET(S): 8.6 TABLET ORAL at 22:25

## 2022-01-16 RX ADMIN — TAMSULOSIN HYDROCHLORIDE 0.4 MILLIGRAM(S): 0.4 CAPSULE ORAL at 22:24

## 2022-01-16 RX ADMIN — HEPARIN SODIUM 5000 UNIT(S): 5000 INJECTION INTRAVENOUS; SUBCUTANEOUS at 05:14

## 2022-01-16 RX ADMIN — ATORVASTATIN CALCIUM 20 MILLIGRAM(S): 80 TABLET, FILM COATED ORAL at 22:24

## 2022-01-16 RX ADMIN — INSULIN GLARGINE 24 UNIT(S): 100 INJECTION, SOLUTION SUBCUTANEOUS at 22:52

## 2022-01-16 RX ADMIN — Medication 11 UNIT(S): at 08:58

## 2022-01-16 RX ADMIN — Medication 3: at 17:36

## 2022-01-16 RX ADMIN — CEFTRIAXONE 100 MILLIGRAM(S): 500 INJECTION, POWDER, FOR SOLUTION INTRAMUSCULAR; INTRAVENOUS at 17:35

## 2022-01-16 RX ADMIN — Medication 25 MILLIGRAM(S): at 05:14

## 2022-01-16 RX ADMIN — Medication 11 UNIT(S): at 12:24

## 2022-01-16 RX ADMIN — Medication 11 UNIT(S): at 17:37

## 2022-01-16 RX ADMIN — Medication 2: at 08:58

## 2022-01-16 RX ADMIN — Medication 3: at 12:24

## 2022-01-16 RX ADMIN — HEPARIN SODIUM 5000 UNIT(S): 5000 INJECTION INTRAVENOUS; SUBCUTANEOUS at 22:24

## 2022-01-16 RX ADMIN — FINASTERIDE 5 MILLIGRAM(S): 5 TABLET, FILM COATED ORAL at 12:24

## 2022-01-16 NOTE — PROGRESS NOTE ADULT - ASSESSMENT
80 yo male h/o htn, dm, here with ams, found to have uti  pt with sepsis present on admission     metabolic encephalopathy 2/2 uti  treat uti  improved mental status    uti  ceftriax iv  cult noted  treat 7 days    dm  uncontrolled with a1c 11  iss  monitor fs  endo consulted     chanel  unknown baseline  likely ckd  avoid nephrotoxins  renal f/u    cont other home meds      PT  dc planning. pending rehab    Advanced care planning was discussed with patient and family.  Advanced care planning forms were reviewed and discussed as appropriate.  Differential diagnosis and plan of care discussed with patient after the evaluation.   Pain assessed and judicious use of narcotics when appropriate was discussed.  Importance of Fall prevention discussed.  Counseling on Smoking and Alcohol cessation was offered when appropriate.  Counseling on Diet, exercise, and medication compliance was done.       Approx 65 minutes spent.

## 2022-01-16 NOTE — PROGRESS NOTE ADULT - SUBJECTIVE AND OBJECTIVE BOX
Chief complaint    Patient is a 81y old  Male who presents with a chief complaint of  Review of systems  Patient in bed, appears comfortable.    Labs and Fingersticks  CAPILLARY BLOOD GLUCOSE      POCT Blood Glucose.: 271 mg/dL (16 Jan 2022 17:23)  POCT Blood Glucose.: 274 mg/dL (16 Jan 2022 11:58)  POCT Blood Glucose.: 238 mg/dL (16 Jan 2022 08:14)  POCT Blood Glucose.: 238 mg/dL (15 Chuy 2022 21:30)      Anion Gap, Serum: 14 (01-16 @ 07:37)  Anion Gap, Serum: 12 (01-15 @ 07:36)      Calcium, Total Serum: 9.2 (01-16 @ 07:37)  Calcium, Total Serum: 9.3 (01-15 @ 07:36)          01-16    131<L>  |  96  |  22  ----------------------------<  264<H>  3.9   |  21<L>  |  1.36<H>    Ca    9.2      16 Jan 2022 07:37      Medications  MEDICATIONS  (STANDING):  atorvastatin 20 milliGRAM(s) Oral at bedtime  cefTRIAXone   IVPB 1000 milliGRAM(s) IV Intermittent every 24 hours  dextrose 40% Gel 15 Gram(s) Oral once  dextrose 5%. 1000 milliLiter(s) (50 mL/Hr) IV Continuous <Continuous>  dextrose 5%. 1000 milliLiter(s) (100 mL/Hr) IV Continuous <Continuous>  dextrose 50% Injectable 25 Gram(s) IV Push once  dextrose 50% Injectable 12.5 Gram(s) IV Push once  dextrose 50% Injectable 25 Gram(s) IV Push once  finasteride 5 milliGRAM(s) Oral daily  glucagon  Injectable 1 milliGRAM(s) IntraMuscular once  heparin   Injectable 5000 Unit(s) SubCutaneous every 8 hours  influenza  Vaccine (HIGH DOSE) 0.7 milliLiter(s) IntraMuscular once  insulin glargine Injectable (LANTUS) 24 Unit(s) SubCutaneous at bedtime  insulin lispro (ADMELOG) corrective regimen sliding scale   SubCutaneous three times a day before meals  insulin lispro (ADMELOG) corrective regimen sliding scale   SubCutaneous at bedtime  insulin lispro Injectable (ADMELOG) 11 Unit(s) SubCutaneous three times a day before meals  metoprolol succinate ER 25 milliGRAM(s) Oral daily  senna 2 Tablet(s) Oral at bedtime  tamsulosin 0.4 milliGRAM(s) Oral at bedtime      Physical Exam  General: Patient comfortable in bed  Vital Signs Last 12 Hrs  T(F): 97.9 (01-16-22 @ 13:31), Max: 97.9 (01-16-22 @ 13:31)  HR: 84 (01-16-22 @ 13:31) (84 - 84)  BP: 118/71 (01-16-22 @ 13:31) (118/71 - 118/71)  BP(mean): --  RR: 18 (01-16-22 @ 13:31) (18 - 18)  SpO2: 93% (01-16-22 @ 13:31) (93% - 93%)  Neck: No palpable thyroid nodules.  CVS: S1S2, No murmurs  Respiratory: No wheezing, no crepitations  GI: Abdomen soft, bowel sounds positive  Musculoskeletal:  edema lower extremities.     Diagnostics    Free Thyroxine, Serum: AM Sched. Collection: 13-Jan-2022 06:00 (01-12 @ 20:37)  Thyroid Stimulating Hormone, Serum: AM Sched. Collection: 13-Jan-2022 06:00 (01-12 @ 20:37)

## 2022-01-16 NOTE — PROGRESS NOTE ADULT - SUBJECTIVE AND OBJECTIVE BOX
KAITLIN CALDERON  81y Male  MRN:[id number]    Patient is a 81y old  Male who presents with a chief complaint of   HPI:  82 yo male pt PMH DM, HTN who presents to ED BIBEMS from home for lethargy 3 days. Per EMS pt had been coughing during transfer. Found diaphoretic and febrile. Pt at home had been complaining of body aches. Pfizer x2. Patient refers n/v/d since 3 days ago. Poor appetite 2/2 abdominal pain that is diffuse. Complaining of SOB but breathing comfortable at RA. Denies sick contacts. Nonbloody emesis/BM. Denies chest pain, fever. Collateral from family: patient called family after fall on his way to toilet this morning. Unwitnessed fall. unknown in head trauma or LOC  found to have uti (11 Jan 2022 21:57)      Patient seen and evaluated at bedside. No acute events overnight except as noted.    Interval HPI:  no acute events o/n     PAST MEDICAL & SURGICAL HISTORY:  HTN (hypertension)    DM (diabetes mellitus)        REVIEW OF SYSTEMS:  as per hpi         VITALS:   Vital Signs Last 24 Hrs  T(C): 36.3 (16 Jan 2022 05:10), Max: 37.3 (15 Chuy 2022 20:51)  T(F): 97.3 (16 Jan 2022 05:10), Max: 99.1 (15 Chuy 2022 20:51)  HR: 82 (16 Jan 2022 05:10) (82 - 84)  BP: 123/84 (16 Jan 2022 05:10) (116/82 - 146/82)  BP(mean): --  RR: 17 (16 Jan 2022 05:10) (17 - 18)  SpO2: 93% (16 Jan 2022 05:10) (92% - 93%)      PHYSICAL EXAM:  GENERAL: NAD, well-developed  HEAD:  Atraumatic, Normocephalic  EYES: EOMI, PERRLA, conjunctiva and sclera clear  NECK: Supple, No JVD  CHEST/LUNG: Clear to auscultation bilaterally; No wheeze  HEART: S1, S2; No murmurs, rubs, or gallops  ABDOMEN: Soft, Nontender, Nondistended; Bowel sounds present  EXTREMITIES:  2+ Peripheral Pulses, No clubbing, cyanosis, or edema  PSYCH: Normal affect  NEUROLOGY: AAOX3; non-focal  SKIN: No rashes or lesions    Consultant(s) Notes Reviewed:  [x ] YES  [ ] NO  Care Discussed with Consultants/Other Providers [ x] YES  [ ] NO    MEDS:   MEDICATIONS  (STANDING):  atorvastatin 20 milliGRAM(s) Oral at bedtime  cefTRIAXone   IVPB 1000 milliGRAM(s) IV Intermittent every 24 hours  dextrose 40% Gel 15 Gram(s) Oral once  dextrose 5%. 1000 milliLiter(s) (50 mL/Hr) IV Continuous <Continuous>  dextrose 5%. 1000 milliLiter(s) (100 mL/Hr) IV Continuous <Continuous>  dextrose 50% Injectable 25 Gram(s) IV Push once  dextrose 50% Injectable 12.5 Gram(s) IV Push once  dextrose 50% Injectable 25 Gram(s) IV Push once  finasteride 5 milliGRAM(s) Oral daily  glucagon  Injectable 1 milliGRAM(s) IntraMuscular once  heparin   Injectable 5000 Unit(s) SubCutaneous every 8 hours  influenza  Vaccine (HIGH DOSE) 0.7 milliLiter(s) IntraMuscular once  insulin glargine Injectable (LANTUS) 24 Unit(s) SubCutaneous at bedtime  insulin lispro (ADMELOG) corrective regimen sliding scale   SubCutaneous at bedtime  insulin lispro (ADMELOG) corrective regimen sliding scale   SubCutaneous three times a day before meals  insulin lispro Injectable (ADMELOG) 11 Unit(s) SubCutaneous three times a day before meals  metoprolol succinate ER 25 milliGRAM(s) Oral daily  senna 2 Tablet(s) Oral at bedtime  tamsulosin 0.4 milliGRAM(s) Oral at bedtime    MEDICATIONS  (PRN):        ALLERGIES:  No Known Allergies      LABS:                                                                             13.6   6.80  )-----------( 147      ( 14 Jan 2022 13:15 )             41.8   01-16    131<L>  |  96  |  22  ----------------------------<  264<H>  3.9   |  21<L>  |  1.36<H>    Ca    9.2      16 Jan 2022 07:37              < from: CT Abdomen and Pelvis w/ IV Cont (01.11.22 @ 12:16) >  IMPRESSION:  No bowel obstruction.    Aneurysmal dilatation of the ascending thoracic aorta to 4.7 cm.    Bilateral indeterminate subcentimeter pulmonary nodules. This could be   further evaluated with dedicated chest CT.    Small pericardial effusion.      < end of copied text >  < from: CT Head No Cont (01.11.22 @ 12:13) >  IMPRESSION: Age-appropriate involutional and ischemic gliotic changes. No   hemorrhage.    --- End of Report ---    < end of copied text >

## 2022-01-16 NOTE — PROGRESS NOTE ADULT - ASSESSMENT
Assessment  DMT2: 81y Male with DM T2 with hyperglycemia admitted with UTI , A1C is 11.1%, patient was on oral hypoglycemic agents at home, on basal bolus insulin  blood sugars, no hypoglycemic episode, eating meals.  UTI: On treatment, monitored, stable.  HTN: On antihypertensive medications, monitored, asymptomatic.            Melba Reyes MD  Cell: [phone]  Office: [phone]

## 2022-01-17 ENCOUNTER — TRANSCRIPTION ENCOUNTER (OUTPATIENT)
Age: 82
End: 2022-01-17

## 2022-01-17 VITALS
RESPIRATION RATE: 18 BRPM | TEMPERATURE: 98 F | SYSTOLIC BLOOD PRESSURE: 117 MMHG | OXYGEN SATURATION: 98 % | DIASTOLIC BLOOD PRESSURE: 84 MMHG | HEART RATE: 75 BPM

## 2022-01-17 LAB
ANION GAP SERPL CALC-SCNC: 11 MMOL/L — SIGNIFICANT CHANGE UP (ref 5–17)
BUN SERPL-MCNC: 18 MG/DL — SIGNIFICANT CHANGE UP (ref 7–23)
CALCIUM SERPL-MCNC: 9.4 MG/DL — SIGNIFICANT CHANGE UP (ref 8.4–10.5)
CHLORIDE SERPL-SCNC: 98 MMOL/L — SIGNIFICANT CHANGE UP (ref 96–108)
CO2 SERPL-SCNC: 24 MMOL/L — SIGNIFICANT CHANGE UP (ref 22–31)
CREAT SERPL-MCNC: 1.24 MG/DL — SIGNIFICANT CHANGE UP (ref 0.5–1.3)
GLUCOSE BLDC GLUCOMTR-MCNC: 179 MG/DL — HIGH (ref 70–99)
GLUCOSE BLDC GLUCOMTR-MCNC: 292 MG/DL — HIGH (ref 70–99)
GLUCOSE SERPL-MCNC: 217 MG/DL — HIGH (ref 70–99)
POTASSIUM SERPL-MCNC: 3.8 MMOL/L — SIGNIFICANT CHANGE UP (ref 3.5–5.3)
POTASSIUM SERPL-SCNC: 3.8 MMOL/L — SIGNIFICANT CHANGE UP (ref 3.5–5.3)
SODIUM SERPL-SCNC: 133 MMOL/L — LOW (ref 135–145)

## 2022-01-17 PROCEDURE — 36415 COLL VENOUS BLD VENIPUNCTURE: CPT

## 2022-01-17 PROCEDURE — 71045 X-RAY EXAM CHEST 1 VIEW: CPT

## 2022-01-17 PROCEDURE — 84439 ASSAY OF FREE THYROXINE: CPT

## 2022-01-17 PROCEDURE — 87186 SC STD MICRODIL/AGAR DIL: CPT

## 2022-01-17 PROCEDURE — 70450 CT HEAD/BRAIN W/O DYE: CPT | Mod: MA

## 2022-01-17 PROCEDURE — 97530 THERAPEUTIC ACTIVITIES: CPT

## 2022-01-17 PROCEDURE — 80053 COMPREHEN METABOLIC PANEL: CPT

## 2022-01-17 PROCEDURE — 83690 ASSAY OF LIPASE: CPT

## 2022-01-17 PROCEDURE — 97162 PT EVAL MOD COMPLEX 30 MIN: CPT

## 2022-01-17 PROCEDURE — 99285 EMERGENCY DEPT VISIT HI MDM: CPT

## 2022-01-17 PROCEDURE — 83036 HEMOGLOBIN GLYCOSYLATED A1C: CPT

## 2022-01-17 PROCEDURE — 74177 CT ABD & PELVIS W/CONTRAST: CPT | Mod: MA

## 2022-01-17 PROCEDURE — 87086 URINE CULTURE/COLONY COUNT: CPT

## 2022-01-17 PROCEDURE — U0003: CPT

## 2022-01-17 PROCEDURE — 80048 BASIC METABOLIC PNL TOTAL CA: CPT

## 2022-01-17 PROCEDURE — 96374 THER/PROPH/DIAG INJ IV PUSH: CPT

## 2022-01-17 PROCEDURE — 87077 CULTURE AEROBIC IDENTIFY: CPT

## 2022-01-17 PROCEDURE — 97116 GAIT TRAINING THERAPY: CPT

## 2022-01-17 PROCEDURE — 96372 THER/PROPH/DIAG INJ SC/IM: CPT | Mod: XU

## 2022-01-17 PROCEDURE — 84443 ASSAY THYROID STIM HORMONE: CPT

## 2022-01-17 PROCEDURE — 85027 COMPLETE CBC AUTOMATED: CPT

## 2022-01-17 PROCEDURE — 96375 TX/PRO/DX INJ NEW DRUG ADDON: CPT

## 2022-01-17 PROCEDURE — 81001 URINALYSIS AUTO W/SCOPE: CPT

## 2022-01-17 PROCEDURE — 82962 GLUCOSE BLOOD TEST: CPT

## 2022-01-17 PROCEDURE — U0005: CPT

## 2022-01-17 PROCEDURE — 85025 COMPLETE CBC W/AUTO DIFF WBC: CPT

## 2022-01-17 RX ORDER — CEFUROXIME AXETIL 250 MG
500 TABLET ORAL EVERY 12 HOURS
Refills: 0 | Status: DISCONTINUED | OUTPATIENT
Start: 2022-01-18 | End: 2022-01-17

## 2022-01-17 RX ADMIN — Medication 11 UNIT(S): at 08:34

## 2022-01-17 RX ADMIN — Medication 3: at 12:26

## 2022-01-17 RX ADMIN — CEFTRIAXONE 100 MILLIGRAM(S): 500 INJECTION, POWDER, FOR SOLUTION INTRAMUSCULAR; INTRAVENOUS at 12:59

## 2022-01-17 RX ADMIN — Medication 1: at 08:34

## 2022-01-17 RX ADMIN — HEPARIN SODIUM 5000 UNIT(S): 5000 INJECTION INTRAVENOUS; SUBCUTANEOUS at 05:37

## 2022-01-17 RX ADMIN — Medication 25 MILLIGRAM(S): at 05:37

## 2022-01-17 RX ADMIN — Medication 11 UNIT(S): at 12:27

## 2022-01-17 RX ADMIN — FINASTERIDE 5 MILLIGRAM(S): 5 TABLET, FILM COATED ORAL at 12:28

## 2022-01-17 NOTE — PROGRESS NOTE ADULT - SUBJECTIVE AND OBJECTIVE BOX
Overnight events noted      VITAL:  T(C): , Max: 36.6 (01-16-22 @ 20:43)  T(F): , Max: 97.9 (01-16-22 @ 20:43)  HR: 73 (01-17-22 @ 05:04)  BP: 146/87 (01-17-22 @ 05:04)  RR: 18 (01-17-22 @ 05:04)  SpO2: 95% (01-17-22 @ 05:04)      PHYSICAL EXAM:  Constitutional: NAD, pleasantly confused   Neck:  No JVD  Respiratory: CTAB/L  Cardiovascular: S1 and S2  Gastrointestinal: BS+, soft, NT/ND  Extremities: No peripheral edema  Neurological: strength intact   : No Allen  Skin: No rashes    LABS:    Na(133)/K(3.8)/Cl(98)/HCO3(24)/BUN(18)/Cr(1.24)Glu(217)/Ca(9.4)/Mg(--)/PO4(--)    01-17 @ 06:27  Na(131)/K(3.9)/Cl(96)/HCO3(21)/BUN(22)/Cr(1.36)Glu(264)/Ca(9.2)/Mg(--)/PO4(--)    01-16 @ 07:37  Na(135)/K(4.0)/Cl(101)/HCO3(22)/BUN(25)/Cr(1.40)Glu(224)/Ca(9.3)/Mg(--)/PO4(--)    01-15 @ 07:36      IMPRESSION: 81M w/ HTN, DM2, and CKD3, 1/11/22 presented s/p fall    (1)Renal - CKD3 - diabetic nephropathy - fluctuating numbers based on hemodynamic status   (2)ID - UTI - now advanced to PO regimen      RECOMMEND:  (1)No renal objection to d/c; can f/u at my office on a prn basis              Maco Bhatia MD  [facility]  Office: [phone]  Cell: [phone]       No complaints      VITAL:  T(C): , Max: 36.6 (01-16-22 @ 20:43)  T(F): , Max: 97.9 (01-16-22 @ 20:43)  HR: 73 (01-17-22 @ 05:04)  BP: 146/87 (01-17-22 @ 05:04)  RR: 18 (01-17-22 @ 05:04)  SpO2: 95% (01-17-22 @ 05:04)      PHYSICAL EXAM:  Constitutional: NAD, pleasantly confused   Neck:  No JVD  Respiratory: CTAB/L  Cardiovascular: S1 and S2  Gastrointestinal: BS+, soft, NT/ND  Extremities: No peripheral edema  Neurological: strength intact   : No Allen  Skin: No rashes    LABS:    Na(133)/K(3.8)/Cl(98)/HCO3(24)/BUN(18)/Cr(1.24)Glu(217)/Ca(9.4)/Mg(--)/PO4(--)    01-17 @ 06:27  Na(131)/K(3.9)/Cl(96)/HCO3(21)/BUN(22)/Cr(1.36)Glu(264)/Ca(9.2)/Mg(--)/PO4(--)    01-16 @ 07:37  Na(135)/K(4.0)/Cl(101)/HCO3(22)/BUN(25)/Cr(1.40)Glu(224)/Ca(9.3)/Mg(--)/PO4(--)    01-15 @ 07:36      IMPRESSION: 81M w/ HTN, DM2, and CKD3, 1/11/22 presented s/p fall    (1)Renal - CKD3 - diabetic nephropathy - fluctuating numbers based on hemodynamic status   (2)ID - UTI - now advanced to PO regimen      RECOMMEND:  (1)No renal objection to d/c; can f/u at my office on a prn basis              Maco Bhatia MD  [facility]  Office: [phone]  Cell: [phone]

## 2022-01-17 NOTE — DISCHARGE NOTE NURSING/CASE MANAGEMENT/SOCIAL WORK - PATIENT PORTAL LINK FT
You can access the FollowMyHealth Patient Portal offered by [facility] by registering at the following website: [email or web address]. By joining [facility]’s FollowMyHealth portal, you will also be able to view your health information using other applications (apps) compatible with our system.

## 2022-01-17 NOTE — PROGRESS NOTE ADULT - PROBLEM SELECTOR PLAN 3
Continue medications, monitoring, FU primary team recommendations. .

## 2022-01-17 NOTE — PROGRESS NOTE ADULT - ASSESSMENT
Assessment  DMT2: 81y Male with DM T2 with hyperglycemia admitted with UTI , A1C is 11.1%, patient was on oral hypoglycemic agents at home, on basal bolus insulin  blood sugars are fluctuating due to inconsistent carb intake,  no hypoglycemic episode, eating meals.  UTI: On treatment, monitored, stable.,   HTN: On antihypertensive medications, monitored, asymptomatic.            Melba Reyes MD  Cell: [phone]  Office: [phone]

## 2022-01-17 NOTE — DISCHARGE NOTE PROVIDER - HOSPITAL COURSE
80 yo male pt PMH DM, HTN who presents to ED [facility] from home for lethargy 3 days. Per EMS pt had been coughing during transfer. Found diaphoretic and febrile. Pt at home had been complaining of body aches. Pfizer x2. Patient refers n/v/d since 3 days ago. Poor appetite 2/2 abdominal pain that is diffuse. Complaining of SOB but breathing comfortable at RA. Denies sick contacts. Nonbloody emesis/BM. Denies chest pain, fever. Collateral from family: patient called family after fall on his way to toilet this morning. Unwitnessed fall. unknown in head trauma or LOC  found to have uti    Advanced care planning was discussed with patient and family.  Advanced care planning forms were reviewed and discussed as appropriate.  Differential diagnosis and plan of care discussed with patient after the evaluation.   Pain assessed and judicious use of narcotics when appropriate was discussed.  Importance of Fall prevention discussed.      metabolic encephalopathy 2/2 uti -> 1/17: mentation has improved.  treat uti -> 1/17: treated for 7 day course of IVAbx of Rocephin. Will be discharged with one dose of Ceftin 500mg to complete antibiotics.      dm - uncontrolled with a1c 11  iss - monitored closely with finger sticks; sugars have ranged from 179-271 and has received   coverage with insulin   endo consulted - pt to resume home oral hypoglycemics upon discharge    chanel - unknown baseline -> 1/17: discharge creatinine 1.24 improved from admission  avoid nephrotoxins  renal f/u     Labs:  COVID-19 PCR . (01.15.22 @ 17:40)    COVID-19 PCR: NotDetec: You can help in the fight against COVID-19. [facility] may contact  you to see if you are interested in voluntarily participating in one of  our clinical trials.  Testing is performed using polymerase chain reaction (PCR) or  transcription mediated amplification (TMA). This COVID-19 (SARS-CoV-2)  nucleic acid amplification test was validated by [facility] and is  in use under the FDA Emergency Use Authorization (EUA) for clinical labs  CLIA-certified to perform high complexity testing. Test results should be  correlated with clinical presentation, patient history, and epidemiology.                   13.6   6.80  )-----------( 147      ( 01-14 @ 13:15 )             41.8     Vital Signs Last 24 Hrs  T(C): 36.4 (17 Jan 2022 05:04), Max: 36.6 (16 Jan 2022 13:31)  T(F): 97.5 (17 Jan 2022 05:04), Max: 97.9 (16 Jan 2022 13:31)  HR: 73 (17 Jan 2022 05:04) (73 - 84)  BP: 146/87 (17 Jan 2022 05:04) (118/71 - 146/87)  RR: 18 (17 Jan 2022 05:04) (18 - 18)  SpO2: 95% (17 Jan 2022 05:04) (93% - 95%)

## 2022-01-17 NOTE — PROGRESS NOTE ADULT - PROBLEM SELECTOR PROBLEM 4
R/O HTN (hypertension)

## 2022-01-17 NOTE — DISCHARGE NOTE PROVIDER - CARE PROVIDER_API CALL
Endocrine,   Please follow up with your out patient endocrinologist  Phone: (   )    -  Fax: (   )    -  Follow Up Time:    Endocrine,   Please follow up with your out patient endocrinologist  Phone: (   )    -  Fax: (   )    -  Follow Up Time:     primary care provider,   follow up with your primary care provider in 1-2 weeks after discharge  Phone: (   )    -  Fax: (   )    -  Follow Up Time:

## 2022-01-17 NOTE — DISCHARGE NOTE PROVIDER - NSDCCPCAREPLAN_GEN_ALL_CORE_FT
PRINCIPAL DISCHARGE DIAGNOSIS  Diagnosis: Acute UTI  Assessment and Plan of Treatment: resolved

## 2022-01-17 NOTE — DISCHARGE NOTE NURSING/CASE MANAGEMENT/SOCIAL WORK - NSDCPEFALRISK_GEN_ALL_CORE
For information on Fall & Injury Prevention, visit: [email or web address] OR  [email or web address] OR  https://www.cdc.gov/steadi/patient.html

## 2022-01-17 NOTE — DISCHARGE NOTE PROVIDER - NSDCMRMEDTOKEN_GEN_ALL_CORE_FT
aspirin 81 mg oral delayed release tablet: 1 tab(s) orally once a day  atorvastatin 20 mg oral tablet: 1 tab(s) orally once a day  atorvastatin 20 mg oral tablet: 1 tab(s) orally once a day  cetirizine: 1 tab(s) orally once a day  cholecalciferol oral tablet: 400 unit(s) orally once a day  Dexilant 60 mg oral delayed release capsule: 1 cap(s) orally once a day  finasteride 5 mg oral tablet: 1 tab(s) orally once a day  finasteride 5 mg oral tablet: 1 tab(s) orally once a day  Janumet XR 50 mg-500 mg oral tablet, extended release: 1 tab(s) orally once a day (in the evening)  Janumet XR 50 mg-500 mg oral tablet, extended release: 1 tab(s) orally once a day (in the evening)  meclizine 25 mg oral tablet: 1 tab(s) orally 3 times a day  metoprolol: 1 tab(s) orally once a day (25 mg)    NOTE: Unable to determine if succinate or tartrate.   Myrbetriq 50 mg oral tablet, extended release: 1 tab(s) orally once a day  NovoLOG FlexPen 100 units/mL injectable solution: 17 unit(s) injectable once a day at AM  NovoLOG FlexPen 100 units/mL injectable solution: 20 unit(s) injectable once a day (at bedtime)  NovoLOG FlexPen 100 units/mL injectable solution: 50 unit(s) injectable in the morning and at bedtime    NOTE: DIrections above as per patient. Prescription label was in another language but shows the number 17, 20, 20  omeprazole 20 mg oral delayed release capsule: 1 cap(s) orally once a day  Senna 8.6 mg oral tablet: 1 tab(s) orally once a day (at bedtime)  Senna 8.6 mg oral tablet: 1 tab(s) orally once a day (at bedtime)  tamsoulosin: 2 cap(s) orally once a day  tamsulosin 0.4 mg oral capsule: 2 cap(s) orally once a day  Tresiba 100 units/mL subcutaneous solution: 50  subcutaneous once a day   aspirin 81 mg oral delayed release tablet: 1 tab(s) orally once a day  atorvastatin 20 mg oral tablet: 1 tab(s) orally once a day  atorvastatin 20 mg oral tablet: 1 tab(s) orally once a day  cefuroxime 500 mg oral tablet: 1 tab(s) orally every 12 hours starting 1/18/2022 for 2 doses only.  cetirizine: 1 tab(s) orally once a day  cholecalciferol oral tablet: 400 unit(s) orally once a day  Dexilant 60 mg oral delayed release capsule: 1 cap(s) orally once a day  finasteride 5 mg oral tablet: 1 tab(s) orally once a day  finasteride 5 mg oral tablet: 1 tab(s) orally once a day  Janumet XR 50 mg-500 mg oral tablet, extended release: 1 tab(s) orally once a day (in the evening)  Janumet XR 50 mg-500 mg oral tablet, extended release: 1 tab(s) orally once a day (in the evening)  meclizine 25 mg oral tablet: 1 tab(s) orally 3 times a day  metoprolol: 1 tab(s) orally once a day (25 mg)    NOTE: Unable to determine if succinate or tartrate.   Myrbetriq 50 mg oral tablet, extended release: 1 tab(s) orally once a day  NovoLOG FlexPen 100 units/mL injectable solution: 17 unit(s) injectable once a day at AM  NovoLOG FlexPen 100 units/mL injectable solution: 20 unit(s) injectable once a day (at bedtime)  NovoLOG FlexPen 100 units/mL injectable solution: 50 unit(s) injectable in the morning and at bedtime    NOTE: DIrections above as per patient. Prescription label was in another language but shows the number 17, 20, 20  omeprazole 20 mg oral delayed release capsule: 1 cap(s) orally once a day  Senna 8.6 mg oral tablet: 1 tab(s) orally once a day (at bedtime)  Senna 8.6 mg oral tablet: 1 tab(s) orally once a day (at bedtime)  tamsoulosin: 2 cap(s) orally once a day  tamsulosin 0.4 mg oral capsule: 2 cap(s) orally once a day  Tresiba 100 units/mL subcutaneous solution: 50  subcutaneous once a day

## 2022-01-17 NOTE — PROGRESS NOTE ADULT - PROBLEM SELECTOR PLAN 2
Suggest to continue medications, monitoring, FU primary team recommendations. .

## 2022-01-17 NOTE — DISCHARGE NOTE PROVIDER - PROVIDER TOKENS
FREE:[LAST:[Endocrine],PHONE:[(   )    -],FAX:[(   )    -],ADDRESS:[Please follow up with your out patient endocrinologist]] FREE:[LAST:[Endocrine],PHONE:[(   )    -],FAX:[(   )    -],ADDRESS:[Please follow up with your out patient endocrinologist]],FREE:[LAST:[primary care provider],PHONE:[(   )    -],FAX:[(   )    -],ADDRESS:[follow up with your primary care provider in 1-2 weeks after discharge]]

## 2022-01-17 NOTE — PROGRESS NOTE ADULT - PROVIDER SPECIALTY LIST ADULT
Internal Medicine
Nephrology
Internal Medicine
Internal Medicine
Nephrology
Internal Medicine
Internal Medicine
Nephrology
Nephrology
Endocrinology

## 2022-01-17 NOTE — DISCHARGE NOTE PROVIDER - NSDCFUADDINST_GEN_ALL_CORE_FT
1. Please take Ceftin 500mg by mouth on 1/18/2022 for one dose only. This will complete the 7 day course.  2. Activity as tolerated    1. Please take Ceftin 500mg by mouth on 1/18/2022 for one dose only. This will complete the 7 day course.  2. Activity as tolerated   3. monitor your finger sticks; goal is between 140-200

## 2022-01-17 NOTE — PROGRESS NOTE ADULT - PROBLEM SELECTOR PLAN 1
Will continue current insulin regimen for now. Will continue monitoring  blood sugars, will Follow up.  Patient counseled for compliance with consistent low carb diet.
Will increase Lantus to 24 units at bed time.  Will increase decrease Admelog to 11 units before each meal in addition to Admelog correction scale coverage.  Patient counseled for compliance with consistent low carb diet.
Will continue current insulin regimen for now. Will continue monitoring  blood sugars, will Follow up.  Patient counseled for compliance with consistent low carb diet.

## 2022-01-17 NOTE — PROGRESS NOTE ADULT - SUBJECTIVE AND OBJECTIVE BOX
Chief complaint    Patient is a 81y old  Male who presents with a chief complaint of UTI (17 Jan 2022 11:48)   Review of systems  Patient in bed, appears comfortable.    Labs and Fingersticks  CAPILLARY BLOOD GLUCOSE      POCT Blood Glucose.: 292 mg/dL (17 Jan 2022 12:20)  POCT Blood Glucose.: 179 mg/dL (17 Jan 2022 08:28)  POCT Blood Glucose.: 231 mg/dL (16 Jan 2022 22:35)  POCT Blood Glucose.: 244 mg/dL (16 Jan 2022 21:38)  POCT Blood Glucose.: 271 mg/dL (16 Jan 2022 17:23)      Anion Gap, Serum: 11 (01-17 @ 06:27)  Anion Gap, Serum: 14 (01-16 @ 07:37)      Calcium, Total Serum: 9.4 (01-17 @ 06:27)  Calcium, Total Serum: 9.2 (01-16 @ 07:37)          01-17    133<L>  |  98  |  18  ----------------------------<  217<H>  3.8   |  24  |  1.24    Ca    9.4      17 Jan 2022 06:27      Medications  MEDICATIONS  (STANDING):  atorvastatin 20 milliGRAM(s) Oral at bedtime  dextrose 40% Gel 15 Gram(s) Oral once  dextrose 5%. 1000 milliLiter(s) (50 mL/Hr) IV Continuous <Continuous>  dextrose 5%. 1000 milliLiter(s) (100 mL/Hr) IV Continuous <Continuous>  dextrose 50% Injectable 25 Gram(s) IV Push once  dextrose 50% Injectable 12.5 Gram(s) IV Push once  dextrose 50% Injectable 25 Gram(s) IV Push once  finasteride 5 milliGRAM(s) Oral daily  glucagon  Injectable 1 milliGRAM(s) IntraMuscular once  heparin   Injectable 5000 Unit(s) SubCutaneous every 8 hours  influenza  Vaccine (HIGH DOSE) 0.7 milliLiter(s) IntraMuscular once  insulin glargine Injectable (LANTUS) 24 Unit(s) SubCutaneous at bedtime  insulin lispro (ADMELOG) corrective regimen sliding scale   SubCutaneous three times a day before meals  insulin lispro (ADMELOG) corrective regimen sliding scale   SubCutaneous at bedtime  insulin lispro Injectable (ADMELOG) 11 Unit(s) SubCutaneous three times a day before meals  metoprolol succinate ER 25 milliGRAM(s) Oral daily  senna 2 Tablet(s) Oral at bedtime  tamsulosin 0.4 milliGRAM(s) Oral at bedtime      Physical Exam  General: Patient comfortable in bed  Vital Signs Last 12 Hrs  T(F): 97.8 (01-17-22 @ 14:36), Max: 97.8 (01-17-22 @ 14:36)  HR: 75 (01-17-22 @ 14:36) (73 - 75)  BP: 117/84 (01-17-22 @ 14:36) (117/84 - 146/87)  BP(mean): --  RR: 18 (01-17-22 @ 14:36) (18 - 18)  SpO2: 98% (01-17-22 @ 14:36) (95% - 98%)  Neck: No palpable thyroid nodules.  CVS: S1S2, No murmurs  Respiratory: No wheezing, no crepitations  GI: Abdomen soft, bowel sounds positive  Musculoskeletal:  edema lower extremities.     Diagnostics    Free Thyroxine, Serum: AM Sched. Collection: 13-Jan-2022 06:00 (01-12 @ 20:37)  Thyroid Stimulating Hormone, Serum: AM Sched. Collection: 13-Jan-2022 06:00 (01-12 @ 20:37)

## 2022-01-18 RX ORDER — CEFUROXIME AXETIL 250 MG
1 TABLET ORAL
Qty: 0 | Refills: 0 | DISCHARGE
Start: 2022-01-18 | End: 2022-01-18

## 2022-02-16 ENCOUNTER — INPATIENT (INPATIENT)
Facility: HOSPITAL | Age: 82
LOS: 11 days | Discharge: NURSING, REHAB OR LONG-TERM CARE | DRG: 871 | End: 2022-02-28
Attending: INTERNAL MEDICINE | Admitting: HOSPITALIST
Payer: COMMERCIAL

## 2022-02-16 VITALS
HEIGHT: 71 IN | TEMPERATURE: 98 F | SYSTOLIC BLOOD PRESSURE: 145 MMHG | RESPIRATION RATE: 24 BRPM | HEART RATE: 125 BPM | DIASTOLIC BLOOD PRESSURE: 79 MMHG | OXYGEN SATURATION: 93 %

## 2022-02-16 DIAGNOSIS — Z90.3 ACQUIRED ABSENCE OF STOMACH [PART OF]: Chronic | ICD-10-CM

## 2022-02-16 DIAGNOSIS — Z90.79 ACQUIRED ABSENCE OF OTHER GENITAL ORGAN(S): Chronic | ICD-10-CM

## 2022-02-16 LAB
ALBUMIN SERPL ELPH-MCNC: 3.2 G/DL — LOW (ref 3.3–5)
ALP SERPL-CCNC: 135 U/L — HIGH (ref 40–120)
ALT FLD-CCNC: 20 U/L — SIGNIFICANT CHANGE UP (ref 10–45)
ANION GAP SERPL CALC-SCNC: 14 MMOL/L — SIGNIFICANT CHANGE UP (ref 5–17)
APPEARANCE UR: ABNORMAL
AST SERPL-CCNC: 22 U/L — SIGNIFICANT CHANGE UP (ref 10–40)
B-OH-BUTYR SERPL-SCNC: 0.6 MMOL/L — HIGH
BILIRUB SERPL-MCNC: 1.7 MG/DL — HIGH (ref 0.2–1.2)
BILIRUB UR-MCNC: NEGATIVE — SIGNIFICANT CHANGE UP
BUN SERPL-MCNC: 24 MG/DL — HIGH (ref 7–23)
CALCIUM SERPL-MCNC: 8.6 MG/DL — SIGNIFICANT CHANGE UP (ref 8.4–10.5)
CHLORIDE SERPL-SCNC: 98 MMOL/L — SIGNIFICANT CHANGE UP (ref 96–108)
CO2 SERPL-SCNC: 20 MMOL/L — LOW (ref 22–31)
COLOR SPEC: YELLOW — SIGNIFICANT CHANGE UP
CREAT SERPL-MCNC: 1.32 MG/DL — HIGH (ref 0.5–1.3)
DIFF PNL FLD: ABNORMAL
GAS PNL BLDV: SIGNIFICANT CHANGE UP
GLUCOSE SERPL-MCNC: 299 MG/DL — HIGH (ref 70–99)
GLUCOSE UR QL: ABNORMAL
HCT VFR BLD CALC: 40.7 % — SIGNIFICANT CHANGE UP (ref 39–50)
HGB BLD-MCNC: 13.4 G/DL — SIGNIFICANT CHANGE UP (ref 13–17)
KETONES UR-MCNC: ABNORMAL
LEUKOCYTE ESTERASE UR-ACNC: ABNORMAL
MCHC RBC-ENTMCNC: 29.3 PG — SIGNIFICANT CHANGE UP (ref 27–34)
MCHC RBC-ENTMCNC: 32.9 GM/DL — SIGNIFICANT CHANGE UP (ref 32–36)
MCV RBC AUTO: 89.1 FL — SIGNIFICANT CHANGE UP (ref 80–100)
NITRITE UR-MCNC: POSITIVE
PH UR: 6 — SIGNIFICANT CHANGE UP (ref 5–8)
PLATELET # BLD AUTO: 101 K/UL — LOW (ref 150–400)
POTASSIUM SERPL-MCNC: 3.6 MMOL/L — SIGNIFICANT CHANGE UP (ref 3.5–5.3)
POTASSIUM SERPL-SCNC: 3.6 MMOL/L — SIGNIFICANT CHANGE UP (ref 3.5–5.3)
PROT SERPL-MCNC: 6.3 G/DL — SIGNIFICANT CHANGE UP (ref 6–8.3)
PROT UR-MCNC: ABNORMAL
RBC # BLD: 4.57 M/UL — SIGNIFICANT CHANGE UP (ref 4.2–5.8)
RBC # FLD: 13 % — SIGNIFICANT CHANGE UP (ref 10.3–14.5)
SODIUM SERPL-SCNC: 132 MMOL/L — LOW (ref 135–145)
SP GR SPEC: 1.01 — SIGNIFICANT CHANGE UP (ref 1.01–1.02)
UROBILINOGEN FLD QL: NEGATIVE — SIGNIFICANT CHANGE UP
WBC # BLD: 8.42 K/UL — SIGNIFICANT CHANGE UP (ref 3.8–10.5)
WBC # FLD AUTO: 8.42 K/UL — SIGNIFICANT CHANGE UP (ref 3.8–10.5)

## 2022-02-16 PROCEDURE — 71045 X-RAY EXAM CHEST 1 VIEW: CPT | Mod: 26

## 2022-02-16 PROCEDURE — 99285 EMERGENCY DEPT VISIT HI MDM: CPT | Mod: 25

## 2022-02-16 PROCEDURE — 93010 ELECTROCARDIOGRAM REPORT: CPT

## 2022-02-16 RX ORDER — SODIUM CHLORIDE 9 MG/ML
1000 INJECTION, SOLUTION INTRAVENOUS ONCE
Refills: 0 | Status: COMPLETED | OUTPATIENT
Start: 2022-02-16 | End: 2022-02-16

## 2022-02-16 RX ORDER — ACETAMINOPHEN 500 MG
1000 TABLET ORAL ONCE
Refills: 0 | Status: COMPLETED | OUTPATIENT
Start: 2022-02-16 | End: 2022-02-16

## 2022-02-16 RX ORDER — PIPERACILLIN AND TAZOBACTAM 4; .5 G/20ML; G/20ML
3.38 INJECTION, POWDER, LYOPHILIZED, FOR SOLUTION INTRAVENOUS ONCE
Refills: 0 | Status: COMPLETED | OUTPATIENT
Start: 2022-02-16 | End: 2022-02-16

## 2022-02-16 RX ADMIN — PIPERACILLIN AND TAZOBACTAM 200 GRAM(S): 4; .5 INJECTION, POWDER, LYOPHILIZED, FOR SOLUTION INTRAVENOUS at 23:11

## 2022-02-16 RX ADMIN — SODIUM CHLORIDE 1000 MILLILITER(S): 9 INJECTION, SOLUTION INTRAVENOUS at 23:10

## 2022-02-16 RX ADMIN — SODIUM CHLORIDE 1000 MILLILITER(S): 9 INJECTION, SOLUTION INTRAVENOUS at 23:36

## 2022-02-16 RX ADMIN — Medication 400 MILLIGRAM(S): at 23:37

## 2022-02-16 NOTE — ED PROVIDER NOTE - PHYSICAL EXAMINATION
Physical Exam:  General: minimally responsive, alert to painful stimuli  Eyes: EOMI, Conjunctiva and sclera clear  Neck: No JVD  Lungs: R. lower Rhonchi  Heart: Normal S1, S2, no murmurs  Abdomen: Soft, nontender, nondistended  Extremities: 2+ peripheral pulses, no edema  Psych: AAO X0  Neurologic: Non responsive, NARVAEZ X4,

## 2022-02-16 NOTE — ED PROVIDER NOTE - OBJECTIVE STATEMENT
81 Y M H/O intermittent UTIs with AMs presenting with AMs. Per EMS and family patient has had 1 day of worsened AMS, disorientation similar to prior episode of UTIs. Presenting minimally responsive, unable to converse, responsive to painful stimuli. Unable to interact with ROS, Per EMS concern for potential aspiration while in transport

## 2022-02-16 NOTE — ED ADULT NURSE NOTE - CHIEF COMPLAINT QUOTE
EXAM:

MRI BRAIN WITHOUT CONTRAST

 

EXAM DATE: 6/27/2017 10:47 AM.

 

CLINICAL HISTORY: 81-year-old woman with altered mental status and facial droop. Concern for stroke.

 

COMPARISON: Noncontrast head CT on 06/26/2017.

 

TECHNIQUE: Multiplanar, multisequence T1-weighted and fluid-sensitive MR sequences of the brain were 
performed. Sequences optimized for routine evaluation. Other: None. IV Contrast: None.

 

FINDINGS:

Parenchyma: Scattered restricted diffusion with corresponding FLAIR hyperintensity is present in the 
right area trigonal white matter and right posterior basal ganglia and adjacent corona radiata/fronta
l paraventricular white matter. No evidence of corresponding hemorrhage on susceptibility weighted se
quence.

 

The remainder of the parenchyma demonstrates minimal periventricular white matter, less than commonly
 seen in this age group. Susceptibility weighted sequence demonstrates a remote microhemorrhage in th
e right medial temporal lobe.  

 

Pituitary: Unremarkable.

 

Ventricles and Extra-axial Spaces: Ventricles are symmetric and normal in size for age. Extra-axial s
paces are unremarkable. 

 

Orbits: Unremarkable except for left-sided lens or placement surgery.

 

Sinuses: Paranasal sinuses and mastoid air cells are clear.

 

Major Vascular Flow Voids: The right MCA flow void is not visualized, concerning for occlusion. 

 

IMPRESSION: 

1. Scattered acute infarct (6 hours to 7 days) in the right frontal periventricular white matter and 
right peritrigonal white matter. Distribution corresponds to right MCA and/or right MCA watershed ter
ritories. No evidence of corresponding hemorrhage. 

2. Absence of the right MCA flow void, concerning for occlusion.

 

RAD[facility]

 

The above findings  were discussed with  by Dr. Maxime Vargas at 11:50 hrs on 6/27/17.

Referring Provider Line: [phone]

 

SITE ID: [id number] vomiting, diarrhea and no urine output for 1 day

## 2022-02-16 NOTE — ED PROVIDER NOTE - ATTENDING CONTRIBUTION TO CARE
Attending MD Blank:  I personally have seen and examined this patient. I have performed a substantive portion of the visit including all aspects of the medical decision making.  Resident note reviewed and agree on plan of care and except where noted.  See HPI, PE, and MDM for details.

## 2022-02-16 NOTE — ED PROVIDER NOTE - PROGRESS NOTE DETAILS
Franki Zapata MD:  Patient significantly improved S/P antibiotics fluids, able to converse, improved AMS.

## 2022-02-16 NOTE — ED PROVIDER NOTE - CLINICAL SUMMARY MEDICAL DECISION MAKING FREE TEXT BOX
Attending MD Blank:  81M with DM, HTN presenting with confusion, n/v/d. Arrives with EMS sp multiple episode of emesis, patient lethargic but protecting airway. Rouses to voice, follows simple commands, moves all extremities spontaneously, covered in urine and feces. Feels warm to the touch, concern for sepsis mediated encephalopathy. Plan for pan culture, rectal temp, CT head, CT a/p, empiric abx, attempt to obtain collateral from family given limited history      *The above represents an initial assessment/impression. Please refer to progress notes for potential changes in patient clinical course*

## 2022-02-16 NOTE — ED ADULT NURSE NOTE - OBJECTIVE STATEMENT
Pt is an 80 y/o male BIBEMS for weakness, decreased urinary output, lethargy Pt is an 80 y/o male BIBEMS for weakness, decreased urinary output, lethargy, nausea/vomiting. Pt lethargic on arrival, only responding to some touch (sternal rub). EMS states pt appeared to have possibly aspirated prior to their arrival. Pt arrived in ED with emesis covering shirt and urine/stool on pants. Pt hypoxic on RA placed on o2 and cardiac monitor. EMS states family were poor historians, unable to provide any history. EMS did not have phone # for pt family. EMS did not know language spoken by pt, attempted Mandarin  who also spoke Cantonese. Attempted to use  iPad x2, on second attempt pt slightly more awake, responding to touch but falling asleep while  talking to pt,  states only word pt verbalized when asked what was wrong is "fever". Only other response pt gave was moaning.  Straight cath performed with second RN to confirm sterility, approx 100ml cloudy yellow urine drainage present.

## 2022-02-16 NOTE — ED ADULT NURSE NOTE - NSIMPLEMENTINTERV_GEN_ALL_ED
Implemented All Fall Risk Interventions:  [place] to call system. Call bell, personal items and telephone within reach. Instruct patient to call for assistance. Room bathroom lighting operational. Non-slip footwear when patient is off stretcher. Physically safe environment: no spills, clutter or unnecessary equipment. Stretcher in lowest position, wheels locked, appropriate side rails in place. Provide visual cue, wrist band, yellow gown, etc. Monitor gait and stability. Monitor for mental status changes and reorient to person, place, and time. Review medications for side effects contributing to fall risk. Reinforce activity limits and safety measures with patient and family.

## 2022-02-17 DIAGNOSIS — E11.9 TYPE 2 DIABETES MELLITUS WITHOUT COMPLICATIONS: ICD-10-CM

## 2022-02-17 DIAGNOSIS — I10 ESSENTIAL (PRIMARY) HYPERTENSION: ICD-10-CM

## 2022-02-17 DIAGNOSIS — N39.0 URINARY TRACT INFECTION, SITE NOT SPECIFIED: ICD-10-CM

## 2022-02-17 DIAGNOSIS — N18.9 CHRONIC KIDNEY DISEASE, UNSPECIFIED: ICD-10-CM

## 2022-02-17 DIAGNOSIS — G93.40 ENCEPHALOPATHY, UNSPECIFIED: ICD-10-CM

## 2022-02-17 DIAGNOSIS — R50.9 FEVER, UNSPECIFIED: ICD-10-CM

## 2022-02-17 LAB
ANION GAP SERPL CALC-SCNC: 10 MMOL/L — SIGNIFICANT CHANGE UP (ref 5–17)
BASOPHILS # BLD AUTO: 0 K/UL — SIGNIFICANT CHANGE UP (ref 0–0.2)
BASOPHILS NFR BLD AUTO: 0 % — SIGNIFICANT CHANGE UP (ref 0–2)
BUN SERPL-MCNC: 21 MG/DL — SIGNIFICANT CHANGE UP (ref 7–23)
CALCIUM SERPL-MCNC: 8.6 MG/DL — SIGNIFICANT CHANGE UP (ref 8.4–10.5)
CHLORIDE SERPL-SCNC: 101 MMOL/L — SIGNIFICANT CHANGE UP (ref 96–108)
CO2 SERPL-SCNC: 23 MMOL/L — SIGNIFICANT CHANGE UP (ref 22–31)
CREAT SERPL-MCNC: 1.28 MG/DL — SIGNIFICANT CHANGE UP (ref 0.5–1.3)
EOSINOPHIL # BLD AUTO: 0 K/UL — SIGNIFICANT CHANGE UP (ref 0–0.5)
EOSINOPHIL NFR BLD AUTO: 0 % — SIGNIFICANT CHANGE UP (ref 0–6)
FLUAV AG NPH QL: SIGNIFICANT CHANGE UP
FLUBV AG NPH QL: SIGNIFICANT CHANGE UP
GLUCOSE BLDC GLUCOMTR-MCNC: 173 MG/DL — HIGH (ref 70–99)
GLUCOSE BLDC GLUCOMTR-MCNC: 232 MG/DL — HIGH (ref 70–99)
GLUCOSE BLDC GLUCOMTR-MCNC: 243 MG/DL — HIGH (ref 70–99)
GLUCOSE BLDC GLUCOMTR-MCNC: 266 MG/DL — HIGH (ref 70–99)
GLUCOSE SERPL-MCNC: 220 MG/DL — HIGH (ref 70–99)
GRAM STN FLD: SIGNIFICANT CHANGE UP
HCT VFR BLD CALC: 40.8 % — SIGNIFICANT CHANGE UP (ref 39–50)
HGB BLD-MCNC: 12.9 G/DL — LOW (ref 13–17)
LYMPHOCYTES # BLD AUTO: 0 % — LOW (ref 13–44)
LYMPHOCYTES # BLD AUTO: 0 K/UL — LOW (ref 1–3.3)
MCHC RBC-ENTMCNC: 30 PG — SIGNIFICANT CHANGE UP (ref 27–34)
MCHC RBC-ENTMCNC: 31.6 GM/DL — LOW (ref 32–36)
MCV RBC AUTO: 94.9 FL — SIGNIFICANT CHANGE UP (ref 80–100)
MONOCYTES # BLD AUTO: 0.67 K/UL — SIGNIFICANT CHANGE UP (ref 0–0.9)
MONOCYTES NFR BLD AUTO: 7.9 % — SIGNIFICANT CHANGE UP (ref 2–14)
NEUTROPHILS # BLD AUTO: 7.75 K/UL — HIGH (ref 1.8–7.4)
NEUTROPHILS NFR BLD AUTO: 85.1 % — HIGH (ref 43–77)
NRBC # BLD: 0 /100 WBCS — SIGNIFICANT CHANGE UP (ref 0–0)
PLATELET # BLD AUTO: 103 K/UL — LOW (ref 150–400)
POTASSIUM SERPL-MCNC: 4.5 MMOL/L — SIGNIFICANT CHANGE UP (ref 3.5–5.3)
POTASSIUM SERPL-SCNC: 4.5 MMOL/L — SIGNIFICANT CHANGE UP (ref 3.5–5.3)
RBC # BLD: 4.3 M/UL — SIGNIFICANT CHANGE UP (ref 4.2–5.8)
RBC # FLD: 13.2 % — SIGNIFICANT CHANGE UP (ref 10.3–14.5)
RSV RNA NPH QL NAA+NON-PROBE: SIGNIFICANT CHANGE UP
SARS-COV-2 RNA SPEC QL NAA+PROBE: SIGNIFICANT CHANGE UP
SODIUM SERPL-SCNC: 134 MMOL/L — LOW (ref 135–145)
SPECIMEN SOURCE: SIGNIFICANT CHANGE UP
WBC # BLD: 8.05 K/UL — SIGNIFICANT CHANGE UP (ref 3.8–10.5)
WBC # FLD AUTO: 8.05 K/UL — SIGNIFICANT CHANGE UP (ref 3.8–10.5)

## 2022-02-17 PROCEDURE — 74177 CT ABD & PELVIS W/CONTRAST: CPT | Mod: 26,MA

## 2022-02-17 PROCEDURE — 99222 1ST HOSP IP/OBS MODERATE 55: CPT

## 2022-02-17 RX ORDER — DEXTROSE 50 % IN WATER 50 %
15 SYRINGE (ML) INTRAVENOUS ONCE
Refills: 0 | Status: DISCONTINUED | OUTPATIENT
Start: 2022-02-17 | End: 2022-02-28

## 2022-02-17 RX ORDER — DEXTROSE 50 % IN WATER 50 %
25 SYRINGE (ML) INTRAVENOUS ONCE
Refills: 0 | Status: DISCONTINUED | OUTPATIENT
Start: 2022-02-17 | End: 2022-02-28

## 2022-02-17 RX ORDER — METOPROLOL TARTRATE 50 MG
25 TABLET ORAL DAILY
Refills: 0 | Status: DISCONTINUED | OUTPATIENT
Start: 2022-02-17 | End: 2022-02-28

## 2022-02-17 RX ORDER — CETIRIZINE HYDROCHLORIDE 10 MG/1
1 TABLET ORAL
Qty: 0 | Refills: 0 | DISCHARGE

## 2022-02-17 RX ORDER — SENNA PLUS 8.6 MG/1
1 TABLET ORAL
Qty: 0 | Refills: 0 | DISCHARGE

## 2022-02-17 RX ORDER — PANTOPRAZOLE SODIUM 20 MG/1
40 TABLET, DELAYED RELEASE ORAL
Refills: 0 | Status: DISCONTINUED | OUTPATIENT
Start: 2022-02-17 | End: 2022-02-28

## 2022-02-17 RX ORDER — INSULIN GLARGINE 100 [IU]/ML
16 INJECTION, SOLUTION SUBCUTANEOUS AT BEDTIME
Refills: 0 | Status: DISCONTINUED | OUTPATIENT
Start: 2022-02-17 | End: 2022-02-20

## 2022-02-17 RX ORDER — SODIUM CHLORIDE 9 MG/ML
1000 INJECTION, SOLUTION INTRAVENOUS
Refills: 0 | Status: DISCONTINUED | OUTPATIENT
Start: 2022-02-17 | End: 2022-02-28

## 2022-02-17 RX ORDER — FINASTERIDE 5 MG/1
1 TABLET, FILM COATED ORAL
Qty: 0 | Refills: 0 | DISCHARGE

## 2022-02-17 RX ORDER — SENNA PLUS 8.6 MG/1
2 TABLET ORAL AT BEDTIME
Refills: 0 | Status: DISCONTINUED | OUTPATIENT
Start: 2022-02-17 | End: 2022-02-28

## 2022-02-17 RX ORDER — DEXTROSE 50 % IN WATER 50 %
12.5 SYRINGE (ML) INTRAVENOUS ONCE
Refills: 0 | Status: DISCONTINUED | OUTPATIENT
Start: 2022-02-17 | End: 2022-02-28

## 2022-02-17 RX ORDER — MIRABEGRON 50 MG/1
1 TABLET, EXTENDED RELEASE ORAL
Qty: 0 | Refills: 0 | DISCHARGE

## 2022-02-17 RX ORDER — ATORVASTATIN CALCIUM 80 MG/1
1 TABLET, FILM COATED ORAL
Qty: 0 | Refills: 0 | DISCHARGE

## 2022-02-17 RX ORDER — GLUCAGON INJECTION, SOLUTION 0.5 MG/.1ML
1 INJECTION, SOLUTION SUBCUTANEOUS ONCE
Refills: 0 | Status: DISCONTINUED | OUTPATIENT
Start: 2022-02-17 | End: 2022-02-28

## 2022-02-17 RX ORDER — INFLUENZA VIRUS VACCINE 15; 15; 15; 15 UG/.5ML; UG/.5ML; UG/.5ML; UG/.5ML
0.7 SUSPENSION INTRAMUSCULAR ONCE
Refills: 0 | Status: DISCONTINUED | OUTPATIENT
Start: 2022-02-17 | End: 2022-02-28

## 2022-02-17 RX ORDER — INSULIN ASPART 100 [IU]/ML
20 INJECTION, SOLUTION SUBCUTANEOUS
Qty: 0 | Refills: 0 | DISCHARGE

## 2022-02-17 RX ORDER — ASPIRIN/CALCIUM CARB/MAGNESIUM 324 MG
81 TABLET ORAL DAILY
Refills: 0 | Status: DISCONTINUED | OUTPATIENT
Start: 2022-02-17 | End: 2022-02-28

## 2022-02-17 RX ORDER — SITAGLIPTIN AND METFORMIN HYDROCHLORIDE 500; 50 MG/1; MG/1
1 TABLET, FILM COATED ORAL
Qty: 0 | Refills: 0 | DISCHARGE

## 2022-02-17 RX ORDER — INSULIN LISPRO 100/ML
VIAL (ML) SUBCUTANEOUS
Refills: 0 | Status: DISCONTINUED | OUTPATIENT
Start: 2022-02-17 | End: 2022-02-28

## 2022-02-17 RX ORDER — METOPROLOL TARTRATE 50 MG
1 TABLET ORAL
Qty: 0 | Refills: 0 | DISCHARGE

## 2022-02-17 RX ORDER — DEXLANSOPRAZOLE 30 MG/1
1 CAPSULE, DELAYED RELEASE ORAL
Qty: 0 | Refills: 0 | DISCHARGE

## 2022-02-17 RX ORDER — HEPARIN SODIUM 5000 [USP'U]/ML
5000 INJECTION INTRAVENOUS; SUBCUTANEOUS EVERY 8 HOURS
Refills: 0 | Status: DISCONTINUED | OUTPATIENT
Start: 2022-02-17 | End: 2022-02-28

## 2022-02-17 RX ORDER — INSULIN DEGLUDEC 100 U/ML
50 INJECTION, SOLUTION SUBCUTANEOUS
Qty: 0 | Refills: 0 | DISCHARGE

## 2022-02-17 RX ORDER — CEFTRIAXONE 500 MG/1
1000 INJECTION, POWDER, FOR SOLUTION INTRAMUSCULAR; INTRAVENOUS EVERY 24 HOURS
Refills: 0 | Status: DISCONTINUED | OUTPATIENT
Start: 2022-02-17 | End: 2022-02-19

## 2022-02-17 RX ORDER — TAMSULOSIN HYDROCHLORIDE 0.4 MG/1
0.8 CAPSULE ORAL AT BEDTIME
Refills: 0 | Status: DISCONTINUED | OUTPATIENT
Start: 2022-02-17 | End: 2022-02-28

## 2022-02-17 RX ORDER — INSULIN LISPRO 100/ML
7 VIAL (ML) SUBCUTANEOUS
Refills: 0 | Status: DISCONTINUED | OUTPATIENT
Start: 2022-02-17 | End: 2022-02-18

## 2022-02-17 RX ORDER — BUDESONIDE AND FORMOTEROL FUMARATE DIHYDRATE 160; 4.5 UG/1; UG/1
2 AEROSOL RESPIRATORY (INHALATION)
Refills: 0 | Status: DISCONTINUED | OUTPATIENT
Start: 2022-02-17 | End: 2022-02-28

## 2022-02-17 RX ORDER — INSULIN ASPART 100 [IU]/ML
50 INJECTION, SOLUTION SUBCUTANEOUS
Qty: 0 | Refills: 0 | DISCHARGE

## 2022-02-17 RX ORDER — MECLIZINE HCL 12.5 MG
1 TABLET ORAL
Qty: 0 | Refills: 0 | DISCHARGE

## 2022-02-17 RX ORDER — INSULIN ASPART 100 [IU]/ML
17 INJECTION, SOLUTION SUBCUTANEOUS
Qty: 0 | Refills: 0 | DISCHARGE

## 2022-02-17 RX ORDER — FINASTERIDE 5 MG/1
5 TABLET, FILM COATED ORAL DAILY
Refills: 0 | Status: DISCONTINUED | OUTPATIENT
Start: 2022-02-17 | End: 2022-02-28

## 2022-02-17 RX ADMIN — HEPARIN SODIUM 5000 UNIT(S): 5000 INJECTION INTRAVENOUS; SUBCUTANEOUS at 14:15

## 2022-02-17 RX ADMIN — PANTOPRAZOLE SODIUM 40 MILLIGRAM(S): 20 TABLET, DELAYED RELEASE ORAL at 11:18

## 2022-02-17 RX ADMIN — Medication 1000 MILLIGRAM(S): at 01:30

## 2022-02-17 RX ADMIN — TAMSULOSIN HYDROCHLORIDE 0.8 MILLIGRAM(S): 0.4 CAPSULE ORAL at 21:58

## 2022-02-17 RX ADMIN — BUDESONIDE AND FORMOTEROL FUMARATE DIHYDRATE 2 PUFF(S): 160; 4.5 AEROSOL RESPIRATORY (INHALATION) at 17:14

## 2022-02-17 RX ADMIN — PIPERACILLIN AND TAZOBACTAM 3.38 GRAM(S): 4; .5 INJECTION, POWDER, LYOPHILIZED, FOR SOLUTION INTRAVENOUS at 00:00

## 2022-02-17 RX ADMIN — FINASTERIDE 5 MILLIGRAM(S): 5 TABLET, FILM COATED ORAL at 11:18

## 2022-02-17 RX ADMIN — SODIUM CHLORIDE 1000 MILLILITER(S): 9 INJECTION, SOLUTION INTRAVENOUS at 01:30

## 2022-02-17 RX ADMIN — SENNA PLUS 2 TABLET(S): 8.6 TABLET ORAL at 21:58

## 2022-02-17 RX ADMIN — Medication 7 UNIT(S): at 18:08

## 2022-02-17 RX ADMIN — Medication 25 MILLIGRAM(S): at 11:18

## 2022-02-17 RX ADMIN — Medication 4: at 13:50

## 2022-02-17 RX ADMIN — HEPARIN SODIUM 5000 UNIT(S): 5000 INJECTION INTRAVENOUS; SUBCUTANEOUS at 21:58

## 2022-02-17 RX ADMIN — INSULIN GLARGINE 16 UNIT(S): 100 INJECTION, SOLUTION SUBCUTANEOUS at 21:58

## 2022-02-17 RX ADMIN — Medication 81 MILLIGRAM(S): at 11:18

## 2022-02-17 RX ADMIN — CEFTRIAXONE 100 MILLIGRAM(S): 500 INJECTION, POWDER, FOR SOLUTION INTRAMUSCULAR; INTRAVENOUS at 17:14

## 2022-02-17 RX ADMIN — Medication 4: at 18:09

## 2022-02-17 RX ADMIN — Medication 7 UNIT(S): at 14:15

## 2022-02-17 NOTE — H&P ADULT - NSHPPHYSICALEXAM_GEN_ALL_CORE
Vital Signs Last 24 Hrs  T(C): 36.5 (17 Feb 2022 10:55), Max: 38.9 (16 Feb 2022 22:30)  T(F): 97.7 (17 Feb 2022 10:55), Max: 102.1 (16 Feb 2022 22:30)  HR: 81 (17 Feb 2022 10:55) (77 - 135)  BP: 132/85 (17 Feb 2022 10:55) (100/62 - 145/79)  BP(mean): --  RR: 20 (17 Feb 2022 10:55) (20 - 24)  SpO2: 98% (17 Feb 2022 10:55) (89% - 100%) Vital Signs Last 24 Hrs  T(C): 36.5 (17 Feb 2022 10:55), Max: 38.9 (16 Feb 2022 22:30)  T(F): 97.7 (17 Feb 2022 10:55), Max: 102.1 (16 Feb 2022 22:30)  HR: 81 (17 Feb 2022 10:55) (77 - 135)  BP: 132/85 (17 Feb 2022 10:55) (100/62 - 145/79)  BP(mean): --  RR: 20 (17 Feb 2022 10:55) (20 - 24)  SpO2: 98% (17 Feb 2022 10:55) (89% - 100%)      PHYSICAL EXAM:  GENERAL: NAD, well-developed, lethargic  HEAD:  Atraumatic, Normocephalic  EYES: EOMI, PERRLA, conjunctiva and sclera clear  NECK: Supple, No JVD  CHEST/LUNG: Clear to auscultation bilaterally; No wheeze  HEART: Regular rate and rhythm; No murmurs, rubs, or gallops  ABDOMEN: Soft, Nontender, Nondistended; Bowel sounds present  EXTREMITIES:  2+ Peripheral Pulses, No clubbing, cyanosis, or edema  PSYCH: AAOx2-3  NEUROLOGY: non-focal  SKIN: No rashes or lesions

## 2022-02-17 NOTE — PROVIDER CONTACT NOTE (CRITICAL VALUE NOTIFICATION) - ACTION/TREATMENT ORDERED:
PA made aware, PA ordered to continue to give ordered antibiotics and will wait for other results of BC.

## 2022-02-17 NOTE — CONSULT NOTE ADULT - SUBJECTIVE AND OBJECTIVE BOX
HPI:   81 Y M H/O DM, bph, htn, chol, ,intermittent UTIs with AMs presenting with AMs. Per EMS and family patient has had 1 day of worsened AMS, disorientation similar to prior episode of UTIs. in er pt was minimally responsive, unable to converse, responsive to painful stimuli. Unable to interact with ROS, Per EMS concern for potential aspiration while in transport.  mental status improved weith iv abx and fluids.  being admitted for sepsis 2/2 uti (17 Feb 2022 11:06)  Patient has history of diabetes, A1C 11.1%, per chart-review was on oral meds and insulin at home, ?compliance.  Endo was consulted for glycemic control.    PAST MEDICAL & SURGICAL HISTORY:  DM (diabetes mellitus)    HTN (hypertension)    Hypercholesterolemia    CAD (coronary artery disease)    HTN (hypertension)    DM (diabetes mellitus)    S/P TURP    S/P gastrectomy        FAMILY HISTORY:      Social History:    Outpatient Medications:    MEDICATIONS  (STANDING):  aspirin enteric coated 81 milliGRAM(s) Oral daily  budesonide 160 MICROgram(s)/formoterol 4.5 MICROgram(s) Inhaler 2 Puff(s) Inhalation two times a day  dextrose 40% Gel 15 Gram(s) Oral once  dextrose 5%. 1000 milliLiter(s) (50 mL/Hr) IV Continuous <Continuous>  dextrose 5%. 1000 milliLiter(s) (100 mL/Hr) IV Continuous <Continuous>  dextrose 50% Injectable 25 Gram(s) IV Push once  dextrose 50% Injectable 12.5 Gram(s) IV Push once  dextrose 50% Injectable 25 Gram(s) IV Push once  finasteride 5 milliGRAM(s) Oral daily  glucagon  Injectable 1 milliGRAM(s) IntraMuscular once  heparin   Injectable 5000 Unit(s) SubCutaneous every 8 hours  influenza  Vaccine (HIGH DOSE) 0.7 milliLiter(s) IntraMuscular once  insulin glargine Injectable (LANTUS) 16 Unit(s) SubCutaneous at bedtime  insulin lispro (ADMELOG) corrective regimen sliding scale   SubCutaneous three times a day before meals  insulin lispro Injectable (ADMELOG) 7 Unit(s) SubCutaneous three times a day before meals  metoprolol succinate ER 25 milliGRAM(s) Oral daily  pantoprazole    Tablet 40 milliGRAM(s) Oral before breakfast  senna 2 Tablet(s) Oral at bedtime  tamsulosin 0.8 milliGRAM(s) Oral at bedtime    MEDICATIONS  (PRN):      Allergies    No Known Allergies    Intolerances      Review of Systems:  Constitutional: No fever, no chills  Eyes: No blurry vision  Neuro: No tremors  HEENT: No pain, no neck swelling  Cardiovascular: No chest pain, no palpitations  Respiratory: Has SOB, no cough  GI: No nausea, vomiting, abdominal pain  : No dysuria  Skin: no rash  MSK: Has leg swelling.  Psych: no depression  Endocrine: no polyuria, polydipsia    ALL OTHER SYSTEMS REVIEWED AND NEGATIVE    UNABLE TO OBTAIN    PHYSICAL EXAM:  VITALS: T(C): 36.5 (02-17-22 @ 10:55)  T(F): 97.7 (02-17-22 @ 10:55), Max: 102.1 (02-16-22 @ 22:30)  HR: 81 (02-17-22 @ 10:55) (77 - 135)  BP: 132/85 (02-17-22 @ 10:55) (100/62 - 145/79)  RR:  (20 - 24)  SpO2:  (89% - 100%)  Wt(kg): --  GENERAL: NAD, well-groomed, well-developed  EYES: No proptosis, no lid lag  HEENT:  Atraumatic, Normocephalic  THYROID: Normal size, no palpable nodules  RESPIRATORY: Clear to auscultation bilaterally; No rales, rhonchi, wheezing  CARDIOVASCULAR: Si S2, No murmurs;  GI: Soft, non distended, normal bowel sounds  SKIN: Dry, intact, No rashes or lesions  MUSCULOSKELETAL: Has BL lower extremity edema.  NEURO:  no tremor, sensation decreased in feet BL,    POCT Blood Glucose.: 243 mg/dL (02-17-22 @ 13:28)  POCT Blood Glucose.: 173 mg/dL (02-17-22 @ 09:52)  POCT Blood Glucose.: 283 mg/dL (02-16-22 @ 22:21)                            12.9   8.05  )-----------( 103      ( 17 Feb 2022 10:55 )             40.8       02-17    134<L>  |  101  |  21  ----------------------------<  220<H>  4.5   |  23  |  1.28    EGFR if : 60  EGFR if non : 51<L>    Ca    8.6      02-17    TPro  6.3  /  Alb  3.2<L>  /  TBili  1.7<H>  /  DBili  x   /  AST  22  /  ALT  20  /  AlkPhos  135<H>  02-16      Thyroid Function Tests:          02-17 Chol 102 Direct LDL -- LDL calculated 34 HDL 48 Trig 100    Radiology:                HPI:   81 Y M H/O DM, bph, htn, chol, ,intermittent UTIs with AMs presenting with AMs. Per EMS and family patient has had 1 day of worsened AMS, disorientation similar to prior episode of UTIs. in er pt was minimally responsive, unable to converse, responsive to painful stimuli. Unable to interact with ROS, Per EMS concern for potential aspiration while in transport.  mental status improved weith iv abx and fluids.  being admitted for sepsis 2/2 uti (17 Feb 2022 11:06)  Patient has history of diabetes, A1C 11.1%, per chart-review  was on oral meds and insulin at home, ?compliance.  Endo was consulted for glycemic control.    PAST MEDICAL & SURGICAL HISTORY:  DM (diabetes mellitus)    HTN (hypertension)    Hypercholesterolemia    CAD (coronary artery disease)    HTN (hypertension)    DM (diabetes mellitus)    S/P TURP    S/P gastrectomy        FAMILY HISTORY:      Social History:    Outpatient Medications:    MEDICATIONS  (STANDING):  aspirin enteric coated 81 milliGRAM(s) Oral daily  budesonide 160 MICROgram(s)/formoterol 4.5 MICROgram(s) Inhaler 2 Puff(s) Inhalation two times a day  dextrose 40% Gel 15 Gram(s) Oral once  dextrose 5%. 1000 milliLiter(s) (50 mL/Hr) IV Continuous <Continuous>  dextrose 5%. 1000 milliLiter(s) (100 mL/Hr) IV Continuous <Continuous>  dextrose 50% Injectable 25 Gram(s) IV Push once  dextrose 50% Injectable 12.5 Gram(s) IV Push once  dextrose 50% Injectable 25 Gram(s) IV Push once  finasteride 5 milliGRAM(s) Oral daily  glucagon  Injectable 1 milliGRAM(s) IntraMuscular once  heparin   Injectable 5000 Unit(s) SubCutaneous every 8 hours  influenza  Vaccine (HIGH DOSE) 0.7 milliLiter(s) IntraMuscular once  insulin glargine Injectable (LANTUS) 16 Unit(s) SubCutaneous at bedtime  insulin lispro (ADMELOG) corrective regimen sliding scale   SubCutaneous three times a day before meals  insulin lispro Injectable (ADMELOG) 7 Unit(s) SubCutaneous three times a day before meals  metoprolol succinate ER 25 milliGRAM(s) Oral daily  pantoprazole    Tablet 40 milliGRAM(s) Oral before breakfast  senna 2 Tablet(s) Oral at bedtime  tamsulosin 0.8 milliGRAM(s) Oral at bedtime    MEDICATIONS  (PRN):      Allergies    No Known Allergies    Intolerances      Review of Systems:  Constitutional: No fever, no chills  Eyes: No blurry vision  Neuro: No tremors  HEENT: No pain, no neck swelling  Cardiovascular: No chest pain, no palpitations  Respiratory: Has SOB, no cough  GI: No nausea, vomiting, abdominal pain  : No dysuria  Skin: no rash  MSK: Has leg swelling.  Psych: no depression  Endocrine: no polyuria, polydipsia    ALL OTHER SYSTEMS REVIEWED AND NEGATIVE    UNABLE TO OBTAIN    PHYSICAL EXAM:  VITALS: T(C): 36.5 (02-17-22 @ 10:55)  T(F): 97.7 (02-17-22 @ 10:55), Max: 102.1 (02-16-22 @ 22:30)  HR: 81 (02-17-22 @ 10:55) (77 - 135)  BP: 132/85 (02-17-22 @ 10:55) (100/62 - 145/79)  RR:  (20 - 24)  SpO2:  (89% - 100%)  Wt(kg): --  GENERAL: NAD, well-groomed, well-developed  EYES: No proptosis, no lid lag  HEENT:  Atraumatic, Normocephalic  THYROID: Normal size, no palpable nodules  RESPIRATORY: Clear to auscultation bilaterally; No rales, rhonchi, wheezing  CARDIOVASCULAR: Si S2, No murmurs;  GI: Soft, non distended, normal bowel sounds  SKIN: Dry, intact, No rashes or lesions  MUSCULOSKELETAL: Has BL lower extremity edema.  NEURO:  no tremor, sensation decreased in feet BL,    POCT Blood Glucose.: 243 mg/dL (02-17-22 @ 13:28)  POCT Blood Glucose.: 173 mg/dL (02-17-22 @ 09:52)  POCT Blood Glucose.: 283 mg/dL (02-16-22 @ 22:21)                            12.9   8.05  )-----------( 103      ( 17 Feb 2022 10:55 )             40.8       02-17    134<L>  |  101  |  21  ----------------------------<  220<H>  4.5   |  23  |  1.28    EGFR if : 60  EGFR if non : 51<L>    Ca    8.6      02-17    TPro  6.3  /  Alb  3.2<L>  /  TBili  1.7<H>  /  DBili  x   /  AST  22  /  ALT  20  /  AlkPhos  135<H>  02-16      Thyroid Function Tests:          02-17 Chol 102 Direct LDL -- LDL calculated 34 HDL 48 Trig 100    Radiology:

## 2022-02-17 NOTE — CONSULT NOTE ADULT - ASSESSMENT
81 Y M H/O intermittent UTIs with AMs presenting with AMS with encephalopathy, UTI, bandemia, fever    Naren Cabezas  Attending Physician   Division of Infectious Disease  Office #[phone]  Available on Microsoft Teams also  After 5pm/weekend or no response, call #[phone]

## 2022-02-17 NOTE — CONSULT NOTE ADULT - SUBJECTIVE AND OBJECTIVE BOX
KAITLIN CALDERON 81y Male  MRN-[id number]    Patient is a 81y old  Male who presents with a chief complaint of AMS    HPI:   81 Y M H/O intermittent UTIs with AMs presenting with AMs. Per EMS and family patient has had 1 day of worsened AMS, disorientation similar to prior episode of UTIs. Presenting minimally responsive, unable to converse, responsive to painful stimuli. Unable to interact with ROS, Per EMS concern for potential aspiration while in transport (2022 11:06)      PAST MEDICAL & SURGICAL HISTORY:  DM (diabetes mellitus)    HTN (hypertension)    Hypercholesterolemia    CAD (coronary artery disease)    HTN (hypertension)    DM (diabetes mellitus)    S/P TURP    S/P gastrectomy        Allergies    No Known Allergies    Intolerances        ANTIMICROBIALS:      MEDICATIONS  (STANDING):  aspirin enteric coated 81 milliGRAM(s) Oral daily  budesonide 160 MICROgram(s)/formoterol 4.5 MICROgram(s) Inhaler 2 Puff(s) Inhalation two times a day  dextrose 40% Gel 15 Gram(s) Oral once  dextrose 5%. 1000 milliLiter(s) (50 mL/Hr) IV Continuous <Continuous>  dextrose 5%. 1000 milliLiter(s) (100 mL/Hr) IV Continuous <Continuous>  dextrose 50% Injectable 25 Gram(s) IV Push once  dextrose 50% Injectable 12.5 Gram(s) IV Push once  dextrose 50% Injectable 25 Gram(s) IV Push once  finasteride 5 milliGRAM(s) Oral daily  glucagon  Injectable 1 milliGRAM(s) IntraMuscular once  influenza  Vaccine (HIGH DOSE) 0.7 milliLiter(s) IntraMuscular once  insulin glargine Injectable (LANTUS) 16 Unit(s) SubCutaneous at bedtime  insulin lispro (ADMELOG) corrective regimen sliding scale   SubCutaneous three times a day before meals  insulin lispro Injectable (ADMELOG) 7 Unit(s) SubCutaneous three times a day before meals  metoprolol succinate ER 25 milliGRAM(s) Oral daily  pantoprazole    Tablet 40 milliGRAM(s) Oral before breakfast  senna 2 Tablet(s) Oral at bedtime  tamsulosin 0.8 milliGRAM(s) Oral at bedtime      Social History  Smoking:  Etoh:  Drug use:      FAMILY HISTORY:      Vital Signs Last 24 Hrs  T(C): 36.5 (2022 10:55), Max: 38.9 (2022 22:30)  T(F): 97.7 (2022 10:55), Max: 102.1 (2022 22:30)  HR: 81 (2022 10:55) (77 - 135)  BP: 132/85 (2022 10:55) (100/62 - 145/79)  BP(mean): --  RR: 20 (2022 10:55) (20 - 24)  SpO2: 98% (2022 10:55) (89% - 100%)    CBC Full  -  ( 2022 10:55 )  WBC Count : 8.05 K/uL  RBC Count : 4.30 M/uL  Hemoglobin : 12.9 g/dL  Hematocrit : 40.8 %  Platelet Count - Automated : 103 K/uL  Mean Cell Volume : 94.9 fl  Mean Cell Hemoglobin : 30.0 pg  Mean Cell Hemoglobin Concentration : 31.6 gm/dL  Auto Neutrophil # : x  Auto Lymphocyte # : x  Auto Monocyte # : x  Auto Eosinophil # : x  Auto Basophil # : x  Auto Neutrophil % : x  Auto Lymphocyte % : x  Auto Monocyte % : x  Auto Eosinophil % : x  Auto Basophil % : x        134<L>  |  101  |  21  ----------------------------<  220<H>  4.5   |  23  |  1.28    Ca    8.6      2022 10:55    TPro  6.3  /  Alb  3.2<L>  /  TBili  1.7<H>  /  DBili  x   /  AST  22  /  ALT  20  /  AlkPhos  135<H>      LIVER FUNCTIONS - ( 2022 22:50 )  Alb: 3.2 g/dL / Pro: 6.3 g/dL / ALK PHOS: 135 U/L / ALT: 20 U/L / AST: 22 U/L / GGT: x           Urinalysis Basic - ( 2022 23:26 )    Color: Yellow / Appearance: Turbid / S.015 / pH: x  Gluc: x / Ketone: Small  / Bili: Negative / Urobili: Negative   Blood: x / Protein: 300 mg/dL / Nitrite: Positive   Leuk Esterase: Large / RBC: 31 /hpf /  /HPF   Sq Epi: x / Non Sq Epi: 5 /hpf / Bacteria: Many        MICROBIOLOGY:        RADIOLOGY  < from: CT Abdomen and Pelvis w/ IV Cont (22 @ 01:59) >  Findings suggest cystitis. No evidence of pyelonephritis at this time.    < end of copied text >  < from: Xray Chest 1 View- PORTABLE-Urgent (22 @ 22:40) >  Clear lungs.    < end of copied text >   KAITLIN CALDERON 81y Male  MRN-[id number]    Patient is a 81y old  Male who presents with a chief complaint of AMS    HPI:   81 Y M H/O intermittent UTIs with AMs presenting with AMs. Per EMS and family patient has had 1 day of worsened AMS, disorientation similar to prior episode of UTIs. Presenting minimally responsive, unable to converse, responsive to painful stimuli. Unable to interact with ROS, Per EMS concern for potential aspiration while in transport (2022 11:06)    Poor historian     PAST MEDICAL & SURGICAL HISTORY:  DM (diabetes mellitus)    HTN (hypertension)    Hypercholesterolemia    CAD (coronary artery disease)    HTN (hypertension)    DM (diabetes mellitus)    S/P TURP    S/P gastrectomy        Allergies    No Known Allergies    Intolerances        ANTIMICROBIALS:      MEDICATIONS  (STANDING):  aspirin enteric coated 81 milliGRAM(s) Oral daily  budesonide 160 MICROgram(s)/formoterol 4.5 MICROgram(s) Inhaler 2 Puff(s) Inhalation two times a day  dextrose 40% Gel 15 Gram(s) Oral once  dextrose 5%. 1000 milliLiter(s) (50 mL/Hr) IV Continuous <Continuous>  dextrose 5%. 1000 milliLiter(s) (100 mL/Hr) IV Continuous <Continuous>  dextrose 50% Injectable 25 Gram(s) IV Push once  dextrose 50% Injectable 12.5 Gram(s) IV Push once  dextrose 50% Injectable 25 Gram(s) IV Push once  finasteride 5 milliGRAM(s) Oral daily  glucagon  Injectable 1 milliGRAM(s) IntraMuscular once  influenza  Vaccine (HIGH DOSE) 0.7 milliLiter(s) IntraMuscular once  insulin glargine Injectable (LANTUS) 16 Unit(s) SubCutaneous at bedtime  insulin lispro (ADMELOG) corrective regimen sliding scale   SubCutaneous three times a day before meals  insulin lispro Injectable (ADMELOG) 7 Unit(s) SubCutaneous three times a day before meals  metoprolol succinate ER 25 milliGRAM(s) Oral daily  pantoprazole    Tablet 40 milliGRAM(s) Oral before breakfast  senna 2 Tablet(s) Oral at bedtime  tamsulosin 0.8 milliGRAM(s) Oral at bedtime      Social History  Smoking: no  Etoh: no        FAMILY HISTORY: No pertinent hx      Vital Signs Last 24 Hrs  T(C): 36.5 (2022 10:55), Max: 38.9 (2022 22:30)  T(F): 97.7 (2022 10:55), Max: 102.1 (2022 22:30)  HR: 81 (2022 10:55) (77 - 135)  BP: 132/85 (2022 10:55) (100/62 - 145/79)  BP(mean): --  RR: 20 (2022 10:55) (20 - 24)  SpO2: 98% (2022 10:55) (89% - 100%)    CBC Full  -  ( 2022 10:55 )  WBC Count : 8.05 K/uL  RBC Count : 4.30 M/uL  Hemoglobin : 12.9 g/dL  Hematocrit : 40.8 %  Platelet Count - Automated : 103 K/uL  Mean Cell Volume : 94.9 fl  Mean Cell Hemoglobin : 30.0 pg  Mean Cell Hemoglobin Concentration : 31.6 gm/dL  Auto Neutrophil # : x  Auto Lymphocyte # : x  Auto Monocyte # : x  Auto Eosinophil # : x  Auto Basophil # : x  Auto Neutrophil % : x  Auto Lymphocyte % : x  Auto Monocyte % : x  Auto Eosinophil % : x  Auto Basophil % : x        134<L>  |  101  |  21  ----------------------------<  220<H>  4.5   |  23  |  1.28    Ca    8.6      2022 10:55    TPro  6.3  /  Alb  3.2<L>  /  TBili  1.7<H>  /  DBili  x   /  AST  22  /  ALT  20  /  AlkPhos  135<H>  -16    LIVER FUNCTIONS - ( 2022 22:50 )  Alb: 3.2 g/dL / Pro: 6.3 g/dL / ALK PHOS: 135 U/L / ALT: 20 U/L / AST: 22 U/L / GGT: x           Urinalysis Basic - ( 2022 23:26 )    Color: Yellow / Appearance: Turbid / S.015 / pH: x  Gluc: x / Ketone: Small  / Bili: Negative / Urobili: Negative   Blood: x / Protein: 300 mg/dL / Nitrite: Positive   Leuk Esterase: Large / RBC: 31 /hpf /  /HPF   Sq Epi: x / Non Sq Epi: 5 /hpf / Bacteria: Many        MICROBIOLOGY:        RADIOLOGY  < from: CT Abdomen and Pelvis w/ IV Cont (22 @ 01:59) >  Findings suggest cystitis. No evidence of pyelonephritis at this time.    < end of copied text >  < from: Xray Chest 1 View- PORTABLE-Urgent (22 @ 22:40) >  Clear lungs.    < end of copied text >

## 2022-02-17 NOTE — CONSULT NOTE ADULT - ASSESSMENT
Assessment  DMT2: 81y Male with DM T2 with hyperglycemia, A1C 11.1%, per chart was on oral meds and insulin at home, ?compliance, now on insulin coverage, blood sugars are fluctuating/running high and not at target, elevated bhb, no hypoglycemias, NAD.  UTI: On meds, monitored.  HTN: Controlled,  on antihypertensive medications.  CKD: Monitor labs/BMP,         Melba Reyes MD  Cell: [phone]  Office: [phone]               Assessment  DMT2: 81y Male with DM T2 with hyperglycemia, A1C 11.1%, per chart was on oral meds and insulin at home, ?compliance, now on insulin coverage, blood sugars are fluctuating/running  high and not at target, elevated bhb, no hypoglycemias, NAD.  UTI: On meds, monitored.  HTN: Controlled,  on antihypertensive medications.  CKD: Monitor labs/BMP,         Melba Reyes MD  Cell: [phone]  Office: [phone]

## 2022-02-17 NOTE — CONSULT NOTE ADULT - SUBJECTIVE AND OBJECTIVE BOX
[facility]  Sudhir Duffy PA-C  [address] [place] [address]  [phone]      Chief Complaint:  Patient is a 81y old  Male who presents with a chief complaint of     HPI: 81 Y M H/O DM, bph, htn, chol, ,intermittent UTIs with AMs presenting with AMs. Per EMS and family patient has had 1 day of worsened AMS, disorientation similar to prior episode of UTIs. in er pt was minimally responsive, unable to converse, responsive to painful stimuli. Unable to interact with ROS, Per EMS concern for potential aspiration while in transport.  mental status improved weith iv abx and fluids.  being admitted for sepsis 2/2 uti    Allergies:  No Known Allergies      Medications:  aspirin enteric coated 81 milliGRAM(s) Oral daily  budesonide 160 MICROgram(s)/formoterol 4.5 MICROgram(s) Inhaler 2 Puff(s) Inhalation two times a day  cefTRIAXone   IVPB 1000 milliGRAM(s) IV Intermittent every 24 hours  dextrose 40% Gel 15 Gram(s) Oral once  dextrose 5%. 1000 milliLiter(s) IV Continuous <Continuous>  dextrose 5%. 1000 milliLiter(s) IV Continuous <Continuous>  dextrose 50% Injectable 25 Gram(s) IV Push once  dextrose 50% Injectable 12.5 Gram(s) IV Push once  dextrose 50% Injectable 25 Gram(s) IV Push once  finasteride 5 milliGRAM(s) Oral daily  glucagon  Injectable 1 milliGRAM(s) IntraMuscular once  heparin   Injectable 5000 Unit(s) SubCutaneous every 8 hours  influenza  Vaccine (HIGH DOSE) 0.7 milliLiter(s) IntraMuscular once  insulin glargine Injectable (LANTUS) 16 Unit(s) SubCutaneous at bedtime  insulin lispro (ADMELOG) corrective regimen sliding scale   SubCutaneous three times a day before meals  insulin lispro Injectable (ADMELOG) 7 Unit(s) SubCutaneous three times a day before meals  metoprolol succinate ER 25 milliGRAM(s) Oral daily  pantoprazole    Tablet 40 milliGRAM(s) Oral before breakfast  senna 2 Tablet(s) Oral at bedtime  tamsulosin 0.8 milliGRAM(s) Oral at bedtime      PMHX/PSHX:  DM (diabetes mellitus)    HTN (hypertension)    Hypercholesterolemia    CAD (coronary artery disease)    HTN (hypertension)    DM (diabetes mellitus)    S/P TURP    S/P gastrectomy        Family history:  No pertinent family history in first degree relatives        Social History:     ROS:     unable to obtain        PHYSICAL EXAM:   Vital Signs:  Vital Signs Last 24 Hrs  T(C): 36.5 (2022 10:55), Max: 38.9 (2022 22:30)  T(F): 97.7 (2022 10:55), Max: 102.1 (2022 22:30)  HR: 84 (2022 17:08) (77 - 135)  BP: 140/80 (2022 17:08) (100/62 - 145/79)  BP(mean): --  RR: 20 (2022 10:55) (20 - 24)  SpO2: 98% (2022 10:55) (89% - 100%)  Daily Height in cm: 180.34 (2022 22:07)    Daily     nad  confused  frail  non toxic  soft, nt  no edema      LABS:                        12.9   8.05  )-----------( 103      ( 2022 10:55 )             40.8     02-17    134<L>  |  101  |  21  ----------------------------<  220<H>  4.5   |  23  |  1.28    Ca    8.6      2022 10:55    TPro  6.3  /  Alb  3.2<L>  /  TBili  1.7<H>  /  DBili  x   /  AST  22  /  ALT  20  /  AlkPhos  135<H>  02-16    LIVER FUNCTIONS - ( 2022 22:50 )  Alb: 3.2 g/dL / Pro: 6.3 g/dL / ALK PHOS: 135 U/L / ALT: 20 U/L / AST: 22 U/L / GGT: x             Urinalysis Basic - ( 2022 23:26 )    Color: Yellow / Appearance: Turbid / S.015 / pH: x  Gluc: x / Ketone: Small  / Bili: Negative / Urobili: Negative   Blood: x / Protein: 300 mg/dL / Nitrite: Positive   Leuk Esterase: Large / RBC: 31 /hpf /  /HPF   Sq Epi: x / Non Sq Epi: 5 /hpf / Bacteria: Many          Imaging:

## 2022-02-17 NOTE — CONSULT NOTE ADULT - ASSESSMENT
nausea/vomiting  UTI  cysitits    anti-emetics as needed  n/v likely related to UTI  diet as tolerated  antibiotics per primary  will follow

## 2022-02-17 NOTE — CONSULT NOTE ADULT - PROBLEM SELECTOR RECOMMENDATION 9
-CTX 1 gm iv q24  -f/u blood and urine cx  -prior urine cx with Klebsiella
IV hydration as tolerated. Continue monitoring bhb.  Will start Lantus 16u at bedtime.  Will start Admelog 7u before each meal and continue Admelog correction scale coverage. Will continue monitoring FS and FU.   for compliance with consistent low-carb diet.

## 2022-02-17 NOTE — ED ADULT NURSE REASSESSMENT NOTE - NS ED NURSE REASSESS COMMENT FT1
Em Campos #[id number] used as pt now appears more alert. Pt responding to verbal, A&Ox2 disoriented to time. Able to state his urinary tract is painful, denies any other complaints. VSS/NAD, made aware that he will be admitted.

## 2022-02-17 NOTE — H&P ADULT - HISTORY OF PRESENT ILLNESS
81 Y M H/O intermittent UTIs with AMs presenting with AMs. Per EMS and family patient has had 1 day of worsened AMS, disorientation similar to prior episode of UTIs. Presenting minimally responsive, unable to converse, responsive to painful stimuli. Unable to interact with ROS, Per EMS concern for potential aspiration while in transport  81 Y M H/O DM, bph, htn, chol, ,intermittent UTIs with AMs presenting with AMs. Per EMS and family patient has had 1 day of worsened AMS, disorientation similar to prior episode of UTIs. in er pt was minimally responsive, unable to converse, responsive to painful stimuli. Unable to interact with ROS, Per EMS concern for potential aspiration while in transport.  mental status improved weith iv abx and fluids.  being admitted for sepsis 2/2 uti

## 2022-02-17 NOTE — PATIENT PROFILE ADULT - FALL HARM RISK - HARM RISK INTERVENTIONS
Assistance with ambulation/Assistance OOB with selected safe patient handling equipment/Communicate Risk of Fall with Harm to all staff/Discuss with provider need for PT consult/Monitor gait and stability/Provide patient with walking aids - walker, cane, crutches/Reinforce activity limits and safety measures with patient and family/Tailored Fall Risk Interventions/Use of alarms - bed, chair and/or voice tab/Visual Cue: Yellow wristband and red socks/Bed in lowest position, wheels locked, appropriate side rails in place/Call bell, personal items and telephone in reach/Instruct patient to call for assistance before getting out of bed or chair/Non-slip footwear when patient is out of bed/[place] to call system/Physically safe environment - no spills, clutter or unnecessary equipment/Purposeful Proactive Rounding/Room/bathroom lighting operational, light cord in reach

## 2022-02-17 NOTE — H&P ADULT - NSICDXPASTMEDICALHX_GEN_ALL_CORE_FT
PAST MEDICAL HISTORY:  CAD (coronary artery disease)     DM (diabetes mellitus)     DM (diabetes mellitus)     HTN (hypertension)     HTN (hypertension)     Hypercholesterolemia

## 2022-02-17 NOTE — CONSULT NOTE ADULT - PROBLEM SELECTOR RECOMMENDATION 2
Suggest to continue medications, monitoring, FU primary team recommendations.
-likely from UTI  -CXR clear

## 2022-02-17 NOTE — H&P ADULT - ASSESSMENT
82 yo male h/o DM, htn, chol, bph, p/w sepsis, 2/2 uti  pt with sepsis present on admission    sepsis 2/2 uti  cont zosyn  f/u cult and sens  ID consulted    metabolic encephalopathy  2/2 sepsis    DM  on insulin  monitor fs  endo f/u    vomiting  likely 2/2 uti  CT abd/pel noted  zofran prn  gi consulted  diet as tolerated    ckd  creat near baseline  monitor  avoid nephrotoxins      cont other home meds      dvt ppx      Advanced care planning was discussed with patient and family.  Advanced care planning forms were reviewed and discussed as appropriate.  Differential diagnosis and plan of care discussed with patient after the evaluation.   Pain assessed and judicious use of narcotics when appropriate was discussed.  Importance of Fall prevention discussed.  Counseling on Smoking and Alcohol cessation was offered when appropriate.  Counseling on Diet, exercise, and medication compliance was done.         Approx 70 minutes spent.

## 2022-02-18 DIAGNOSIS — R78.81 BACTEREMIA: ICD-10-CM

## 2022-02-18 LAB
ANION GAP SERPL CALC-SCNC: 11 MMOL/L — SIGNIFICANT CHANGE UP (ref 5–17)
BUN SERPL-MCNC: 18 MG/DL — SIGNIFICANT CHANGE UP (ref 7–23)
CALCIUM SERPL-MCNC: 8.6 MG/DL — SIGNIFICANT CHANGE UP (ref 8.4–10.5)
CHLORIDE SERPL-SCNC: 102 MMOL/L — SIGNIFICANT CHANGE UP (ref 96–108)
CO2 SERPL-SCNC: 22 MMOL/L — SIGNIFICANT CHANGE UP (ref 22–31)
CREAT SERPL-MCNC: 1.26 MG/DL — SIGNIFICANT CHANGE UP (ref 0.5–1.3)
E COLI DNA BLD POS QL NAA+NON-PROBE: SIGNIFICANT CHANGE UP
GLUCOSE BLDC GLUCOMTR-MCNC: 162 MG/DL — HIGH (ref 70–99)
GLUCOSE BLDC GLUCOMTR-MCNC: 256 MG/DL — HIGH (ref 70–99)
GLUCOSE BLDC GLUCOMTR-MCNC: 278 MG/DL — HIGH (ref 70–99)
GLUCOSE BLDC GLUCOMTR-MCNC: 400 MG/DL — HIGH (ref 70–99)
GLUCOSE SERPL-MCNC: 181 MG/DL — HIGH (ref 70–99)
HCT VFR BLD CALC: 36.9 % — LOW (ref 39–50)
HGB BLD-MCNC: 12.2 G/DL — LOW (ref 13–17)
MCHC RBC-ENTMCNC: 30 PG — SIGNIFICANT CHANGE UP (ref 27–34)
MCHC RBC-ENTMCNC: 33.1 GM/DL — SIGNIFICANT CHANGE UP (ref 32–36)
MCV RBC AUTO: 90.9 FL — SIGNIFICANT CHANGE UP (ref 80–100)
METHOD TYPE: SIGNIFICANT CHANGE UP
NRBC # BLD: 0 /100 WBCS — SIGNIFICANT CHANGE UP (ref 0–0)
PLATELET # BLD AUTO: 99 K/UL — LOW (ref 150–400)
POTASSIUM SERPL-MCNC: 3.9 MMOL/L — SIGNIFICANT CHANGE UP (ref 3.5–5.3)
POTASSIUM SERPL-SCNC: 3.9 MMOL/L — SIGNIFICANT CHANGE UP (ref 3.5–5.3)
RBC # BLD: 4.06 M/UL — LOW (ref 4.2–5.8)
RBC # FLD: 13.1 % — SIGNIFICANT CHANGE UP (ref 10.3–14.5)
SODIUM SERPL-SCNC: 135 MMOL/L — SIGNIFICANT CHANGE UP (ref 135–145)
WBC # BLD: 7.05 K/UL — SIGNIFICANT CHANGE UP (ref 3.8–10.5)
WBC # FLD AUTO: 7.05 K/UL — SIGNIFICANT CHANGE UP (ref 3.8–10.5)

## 2022-02-18 PROCEDURE — 99232 SBSQ HOSP IP/OBS MODERATE 35: CPT

## 2022-02-18 RX ORDER — INSULIN LISPRO 100/ML
8 VIAL (ML) SUBCUTANEOUS
Refills: 0 | Status: DISCONTINUED | OUTPATIENT
Start: 2022-02-18 | End: 2022-02-23

## 2022-02-18 RX ADMIN — TAMSULOSIN HYDROCHLORIDE 0.8 MILLIGRAM(S): 0.4 CAPSULE ORAL at 21:20

## 2022-02-18 RX ADMIN — Medication 7 UNIT(S): at 10:16

## 2022-02-18 RX ADMIN — Medication 8 UNIT(S): at 18:25

## 2022-02-18 RX ADMIN — HEPARIN SODIUM 5000 UNIT(S): 5000 INJECTION INTRAVENOUS; SUBCUTANEOUS at 05:49

## 2022-02-18 RX ADMIN — CEFTRIAXONE 100 MILLIGRAM(S): 500 INJECTION, POWDER, FOR SOLUTION INTRAMUSCULAR; INTRAVENOUS at 17:43

## 2022-02-18 RX ADMIN — INSULIN GLARGINE 16 UNIT(S): 100 INJECTION, SOLUTION SUBCUTANEOUS at 21:19

## 2022-02-18 RX ADMIN — Medication 2: at 10:15

## 2022-02-18 RX ADMIN — Medication 10: at 18:25

## 2022-02-18 RX ADMIN — Medication 81 MILLIGRAM(S): at 14:13

## 2022-02-18 RX ADMIN — SENNA PLUS 2 TABLET(S): 8.6 TABLET ORAL at 21:20

## 2022-02-18 RX ADMIN — Medication 6: at 14:13

## 2022-02-18 RX ADMIN — BUDESONIDE AND FORMOTEROL FUMARATE DIHYDRATE 2 PUFF(S): 160; 4.5 AEROSOL RESPIRATORY (INHALATION) at 05:49

## 2022-02-18 RX ADMIN — Medication 25 MILLIGRAM(S): at 05:49

## 2022-02-18 RX ADMIN — HEPARIN SODIUM 5000 UNIT(S): 5000 INJECTION INTRAVENOUS; SUBCUTANEOUS at 21:20

## 2022-02-18 RX ADMIN — FINASTERIDE 5 MILLIGRAM(S): 5 TABLET, FILM COATED ORAL at 14:13

## 2022-02-18 RX ADMIN — HEPARIN SODIUM 5000 UNIT(S): 5000 INJECTION INTRAVENOUS; SUBCUTANEOUS at 14:13

## 2022-02-18 RX ADMIN — BUDESONIDE AND FORMOTEROL FUMARATE DIHYDRATE 2 PUFF(S): 160; 4.5 AEROSOL RESPIRATORY (INHALATION) at 17:44

## 2022-02-18 RX ADMIN — PANTOPRAZOLE SODIUM 40 MILLIGRAM(S): 20 TABLET, DELAYED RELEASE ORAL at 05:49

## 2022-02-18 NOTE — PROGRESS NOTE ADULT - SUBJECTIVE AND OBJECTIVE BOX
Chief complaint  Patient is a 81y old  Male who presents with a chief complaint of  Review of systems  Patient in bed, looks comfortable, no hypoglycemic episodes.    Labs and Fingersticks  CAPILLARY BLOOD GLUCOSE      POCT Blood Glucose.: 162 mg/dL (18 Feb 2022 10:07)  POCT Blood Glucose.: 266 mg/dL (17 Feb 2022 21:44)  POCT Blood Glucose.: 232 mg/dL (17 Feb 2022 17:31)  POCT Blood Glucose.: 243 mg/dL (17 Feb 2022 13:28)      Anion Gap, Serum: 11 (02-18 @ 06:35)  Anion Gap, Serum: 10 (02-17 @ 10:55)  Anion Gap, Serum: 14 (02-16 @ 22:50)      Calcium, Total Serum: 8.6 (02-18 @ 06:35)  Calcium, Total Serum: 8.6 (02-17 @ 10:55)  Calcium, Total Serum: 8.6 (02-16 @ 22:50)  Albumin, Serum: 3.2 *L* (02-16 @ 22:50)    Alanine Aminotransferase (ALT/SGPT): 20 (02-16 @ 22:50)  Alkaline Phosphatase, Serum: 135 *H* (02-16 @ 22:50)  Aspartate Aminotransferase (AST/SGOT): 22 (02-16 @ 22:50)        02-18    135  |  102  |  18  ----------------------------<  181<H>  3.9   |  22  |  1.26    Ca    8.6      18 Feb 2022 06:35    TPro  6.3  /  Alb  3.2<L>  /  TBili  1.7<H>  /  DBili  x   /  AST  22  /  ALT  20  /  AlkPhos  135<H>  02-16                        12.2   7.05  )-----------( 99       ( 18 Feb 2022 06:35 )             36.9     Medications  MEDICATIONS  (STANDING):  aspirin enteric coated 81 milliGRAM(s) Oral daily  budesonide 160 MICROgram(s)/formoterol 4.5 MICROgram(s) Inhaler 2 Puff(s) Inhalation two times a day  cefTRIAXone   IVPB 1000 milliGRAM(s) IV Intermittent every 24 hours  dextrose 40% Gel 15 Gram(s) Oral once  dextrose 5%. 1000 milliLiter(s) (50 mL/Hr) IV Continuous <Continuous>  dextrose 5%. 1000 milliLiter(s) (100 mL/Hr) IV Continuous <Continuous>  dextrose 50% Injectable 25 Gram(s) IV Push once  dextrose 50% Injectable 12.5 Gram(s) IV Push once  dextrose 50% Injectable 25 Gram(s) IV Push once  finasteride 5 milliGRAM(s) Oral daily  glucagon  Injectable 1 milliGRAM(s) IntraMuscular once  heparin   Injectable 5000 Unit(s) SubCutaneous every 8 hours  influenza  Vaccine (HIGH DOSE) 0.7 milliLiter(s) IntraMuscular once  insulin glargine Injectable (LANTUS) 16 Unit(s) SubCutaneous at bedtime  insulin lispro (ADMELOG) corrective regimen sliding scale   SubCutaneous three times a day before meals  insulin lispro Injectable (ADMELOG) 7 Unit(s) SubCutaneous three times a day before meals  metoprolol succinate ER 25 milliGRAM(s) Oral daily  pantoprazole    Tablet 40 milliGRAM(s) Oral before breakfast  senna 2 Tablet(s) Oral at bedtime  tamsulosin 0.8 milliGRAM(s) Oral at bedtime      Physical Exam  General: Patient comfortable in bed  Vital Signs Last 12 Hrs  T(F): 98.3 (02-18-22 @ 08:38), Max: 98.7 (02-18-22 @ 05:33)  HR: 83 (02-18-22 @ 08:38) (79 - 83)  BP: 138/93 (02-18-22 @ 08:38) (137/89 - 138/93)  BP(mean): --  RR: 18 (02-18-22 @ 08:38) (18 - 18)  SpO2: 93% (02-18-22 @ 08:38) (93% - 97%)  Neck: No palpable thyroid nodules.  CVS: S1S2, No murmurs  Respiratory: No wheezing, no crepitations  GI: Abdomen soft, bowel sounds positive  Musculoskeletal:  edema lower extremities.   Skin: No skin rashes, no ecchymosis    Diagnostics             Chief complaint  Patient is a 81y old  Male who presents with a chief complaint of  Review of systems  Patient in bed, looks comfortable, no hypoglycemic episodes.    Labs and Fingersticks  CAPILLARY BLOOD GLUCOSE      POCT Blood Glucose.: 162 mg/dL (18 Feb 2022 10:07)  POCT Blood Glucose.: 266 mg/dL (17 Feb 2022 21:44)  POCT Blood Glucose.: 232 mg/dL (17 Feb 2022 17:31)  POCT Blood Glucose.: 243 mg/dL (17 Feb 2022 13:28)      Anion Gap, Serum: 11 (02-18 @ 06:35)  Anion Gap, Serum: 10 (02-17 @ 10:55)  Anion Gap, Serum: 14 (02-16 @ 22:50)      Calcium, Total Serum: 8.6 (02-18 @ 06:35)  Calcium, Total Serum: 8.6 (02-17 @ 10:55)  Calcium, Total Serum: 8.6 (02-16 @ 22:50)  Albumin, Serum: 3.2 *L* (02-16 @ 22:50)    Alanine Aminotransferase (ALT/SGPT): 20 (02-16 @ 22:50)  Alkaline Phosphatase, Serum: 135 *H* (02-16 @ 22:50)  Aspartate Aminotransferase (AST/SGOT): 22 (02-16 @ 22:50)        02-18    135  |  102  |  18  ----------------------------<  181<H>  3.9   |  22  |  1.26    Ca    8.6      18 Feb 2022 06:35    TPro  6.3  /  Alb  3.2<L>  /  TBili  1.7<H>  /  DBili  x   /  AST  22  /  ALT  20  /  AlkPhos  135<H>  02-16                        12.2   7.05  )-----------( 99       ( 18 Feb 2022 06:35 )             36.9     Medications  MEDICATIONS  (STANDING):  aspirin enteric coated 81 milliGRAM(s) Oral daily  budesonide 160 MICROgram(s)/formoterol 4.5 MICROgram(s) Inhaler 2 Puff(s) Inhalation two times a day  cefTRIAXone   IVPB 1000 milliGRAM(s) IV Intermittent every 24 hours  dextrose 40% Gel 15 Gram(s) Oral once  dextrose 5%. 1000 milliLiter(s) (50 mL/Hr) IV Continuous <Continuous>  dextrose 5%. 1000 milliLiter(s) (100 mL/Hr) IV Continuous <Continuous>  dextrose 50% Injectable 25 Gram(s) IV Push once  dextrose 50% Injectable 12.5 Gram(s) IV Push once  dextrose 50% Injectable 25 Gram(s) IV Push once  finasteride 5 milliGRAM(s) Oral daily  glucagon  Injectable 1 milliGRAM(s) IntraMuscular once  heparin   Injectable 5000 Unit(s) SubCutaneous every 8 hours  influenza  Vaccine (HIGH DOSE) 0.7 milliLiter(s) IntraMuscular once  insulin glargine Injectable (LANTUS) 16 Unit(s) SubCutaneous at bedtime  insulin lispro (ADMELOG) corrective regimen sliding scale   SubCutaneous three times a day before meals  insulin lispro Injectable (ADMELOG) 7 Unit(s) SubCutaneous three times a day before meals  metoprolol succinate ER 25 milliGRAM(s) Oral daily  pantoprazole    Tablet 40 milliGRAM(s) Oral before breakfast  senna 2 Tablet(s) Oral at bedtime  tamsulosin 0.8 milliGRAM(s) Oral at bedtime      Physical Exam  General: Patient comfortable in bed  Vital Signs Last 12 Hrs  T(F): 98.3 (02-18-22 @ 08:38), Max: 98.7 (02-18-22 @ 05:33)  HR: 83 (02-18-22 @ 08:38) (79 - 83)  BP: 138/93 (02-18-22 @ 08:38) (137/89 - 138/93)  BP(mean): --  RR: 18 (02-18-22 @ 08:38) (18 - 18)  SpO2: 93% (02-18-22 @ 08:38) (93% - 97%)  Neck: No palpable thyroid nodules.  CVS: S1S2, No murmurs  Respiratory: No wheezing, no crepitations  GI: Abdomen soft, bowel sounds positive  Musculoskeletal:  edema lower extremities.   Skin: No skin rashes, no ecchymosis    Diagnostics

## 2022-02-18 NOTE — PROGRESS NOTE ADULT - SUBJECTIVE AND OBJECTIVE BOX
[facility]  Sudhir Duffy PA-C  [address] [place] [address]  [phone]      INTERVAL HPI/OVERNIGHT EVENTS:    patient s/e  feels better  no new events    MEDICATIONS  (STANDING):  aspirin enteric coated 81 milliGRAM(s) Oral daily  budesonide 160 MICROgram(s)/formoterol 4.5 MICROgram(s) Inhaler 2 Puff(s) Inhalation two times a day  cefTRIAXone   IVPB 1000 milliGRAM(s) IV Intermittent every 24 hours  dextrose 40% Gel 15 Gram(s) Oral once  dextrose 5%. 1000 milliLiter(s) (50 mL/Hr) IV Continuous <Continuous>  dextrose 5%. 1000 milliLiter(s) (100 mL/Hr) IV Continuous <Continuous>  dextrose 50% Injectable 25 Gram(s) IV Push once  dextrose 50% Injectable 12.5 Gram(s) IV Push once  dextrose 50% Injectable 25 Gram(s) IV Push once  finasteride 5 milliGRAM(s) Oral daily  glucagon  Injectable 1 milliGRAM(s) IntraMuscular once  heparin   Injectable 5000 Unit(s) SubCutaneous every 8 hours  influenza  Vaccine (HIGH DOSE) 0.7 milliLiter(s) IntraMuscular once  insulin glargine Injectable (LANTUS) 16 Unit(s) SubCutaneous at bedtime  insulin lispro (ADMELOG) corrective regimen sliding scale   SubCutaneous three times a day before meals  insulin lispro Injectable (ADMELOG) 8 Unit(s) SubCutaneous three times a day before meals  metoprolol succinate ER 25 milliGRAM(s) Oral daily  pantoprazole    Tablet 40 milliGRAM(s) Oral before breakfast  senna 2 Tablet(s) Oral at bedtime  tamsulosin 0.8 milliGRAM(s) Oral at bedtime    MEDICATIONS  (PRN):      Allergies    No Known Allergies    Intolerances        ROS:   General:  No wt loss, fevers, chills, night sweats, fatigue,   Eyes:  Good vision, no reported pain  ENT:  No sore throat, pain, runny nose, dysphagia  CV:  No pain, palpitations, hypo/hypertension  Resp:  No dyspnea, cough, tachypnea, wheezing  GI:  No pain, No nausea, No vomiting, No diarrhea, No constipation, No weight loss, No fever, No pruritis, No rectal bleeding, No tarry stools, No dysphagia,  :  No pain, bleeding, incontinence, nocturia  Muscle:  No pain, weakness  Neuro:  No weakness, tingling, memory problems  Psych:  No fatigue, insomnia, mood problems, depression  Endocrine:  No polyuria, polydipsia, cold/heat intolerance  Heme:  No petechiae, ecchymosis, easy bruisability  Skin:  No rash, tattoos, scars, edema      PHYSICAL EXAM:   Vital Signs:  Vital Signs Last 24 Hrs  T(C): 36.7 (2022 13:38), Max: 37.4 (2022 21:53)  T(F): 98 (2022 13:38), Max: 99.3 (2022 21:53)  HR: 94 (2022 13:38) (75 - 94)  BP: 130/83 (2022 13:38) (130/83 - 140/80)  BP(mean): --  RR: 18 (2022 13:38) (18 - 20)  SpO2: 94% (2022 13:38) (93% - 97%)  Daily     Daily     GENERAL:  Appears stated age,   HEENT:  NC/AT,    CHEST:  Full & symmetric excursion,   HEART:  Regular rhythm,  ABDOMEN:  Soft, non-tender, non-distended,  EXTEREMITIES:  no cyanosis  SKIN:  No rash  NEURO:  Alert,       LABS:                        12.2   7.05  )-----------( 99       ( 2022 06:35 )             36.9     -18    135  |  102  |  18  ----------------------------<  181<H>  3.9   |  22  |  1.26    Ca    8.6      2022 06:35    TPro  6.3  /  Alb  3.2<L>  /  TBili  1.7<H>  /  DBili  x   /  AST  22  /  ALT  20  /  AlkPhos  135<H>  02-16      Urinalysis Basic - ( 2022 23:26 )    Color: Yellow / Appearance: Turbid / S.015 / pH: x  Gluc: x / Ketone: Small  / Bili: Negative / Urobili: Negative   Blood: x / Protein: 300 mg/dL / Nitrite: Positive   Leuk Esterase: Large / RBC: 31 /hpf /  /HPF   Sq Epi: x / Non Sq Epi: 5 /hpf / Bacteria: Many        RADIOLOGY & ADDITIONAL TESTS:

## 2022-02-18 NOTE — PROGRESS NOTE ADULT - SUBJECTIVE AND OBJECTIVE BOX
KAITLIN CALDERON  81y Male  MRN:[id number]    Patient is a 81y old  Male who presents with a chief complaint of   HPI:   81 Y M H/O DM, bph, htn, chol, ,intermittent UTIs with AMs presenting with AMs. Per EMS and family patient has had 1 day of worsened AMS, disorientation similar to prior episode of UTIs. in er pt was minimally responsive, unable to converse, responsive to painful stimuli. Unable to interact with ROS, Per EMS concern for potential aspiration while in transport.  mental status improved weith iv abx and fluids.  being admitted for sepsis 2/2 uti (2022 11:06)      Patient seen and evaluated at bedside. No acute events overnight except as noted.    Interval HPI:  positive blood cultures     PAST MEDICAL & SURGICAL HISTORY:  DM (diabetes mellitus)    HTN (hypertension)    Hypercholesterolemia    CAD (coronary artery disease)    HTN (hypertension)    DM (diabetes mellitus)    S/P TURP    S/P gastrectomy        REVIEW OF SYSTEMS:  as per hpi     VITALS:  Vital Signs Last 24 Hrs  T(C): 36.7 (2022 13:38), Max: 37.4 (2022 21:53)  T(F): 98 (2022 13:38), Max: 99.3 (2022 21:53)  HR: 94 (2022 13:38) (75 - 94)  BP: 130/83 (2022 13:38) (130/83 - 140/80)  BP(mean): --  RR: 18 (2022 13:38) (18 - 20)  SpO2: 94% (2022 13:38) (93% - 97%)  CAPILLARY BLOOD GLUCOSE      POCT Blood Glucose.: 162 mg/dL (2022 10:07)  POCT Blood Glucose.: 266 mg/dL (2022 21:44)  POCT Blood Glucose.: 232 mg/dL (2022 17:31)    I&O's Summary    2022 07:01  -  2022 07:00  --------------------------------------------------------  IN: 240 mL / OUT: 1200 mL / NET: -960 mL    2022 07:01  -  2022 13:42  --------------------------------------------------------  IN: 580 mL / OUT: 450 mL / NET: 130 mL        PHYSICAL EXAM:  GENERAL: NAD, well-developed, lethargic   HEAD:  Atraumatic, Normocephalic  EYES: EOMI, PERRLA, conjunctiva and sclera clear  NECK: Supple, No JVD  CHEST/LUNG: Clear to auscultation bilaterally; No wheeze  HEART: S1, S2; No murmurs, rubs, or gallops  ABDOMEN: Soft, Nontender, Nondistended; Bowel sounds present  EXTREMITIES:  2+ Peripheral Pulses, No clubbing, cyanosis, or edema  PSYCH: Normal affect  NEUROLOGY: AAOX3; non-focal  SKIN: No rashes or lesions    Consultant(s) Notes Reviewed:  [x ] YES  [ ] NO  Care Discussed with Consultants/Other Providers [ x] YES  [ ] NO    MEDS:  MEDICATIONS  (STANDING):  aspirin enteric coated 81 milliGRAM(s) Oral daily  budesonide 160 MICROgram(s)/formoterol 4.5 MICROgram(s) Inhaler 2 Puff(s) Inhalation two times a day  cefTRIAXone   IVPB 1000 milliGRAM(s) IV Intermittent every 24 hours  dextrose 40% Gel 15 Gram(s) Oral once  dextrose 5%. 1000 milliLiter(s) (50 mL/Hr) IV Continuous <Continuous>  dextrose 5%. 1000 milliLiter(s) (100 mL/Hr) IV Continuous <Continuous>  dextrose 50% Injectable 25 Gram(s) IV Push once  dextrose 50% Injectable 12.5 Gram(s) IV Push once  dextrose 50% Injectable 25 Gram(s) IV Push once  finasteride 5 milliGRAM(s) Oral daily  glucagon  Injectable 1 milliGRAM(s) IntraMuscular once  heparin   Injectable 5000 Unit(s) SubCutaneous every 8 hours  influenza  Vaccine (HIGH DOSE) 0.7 milliLiter(s) IntraMuscular once  insulin glargine Injectable (LANTUS) 16 Unit(s) SubCutaneous at bedtime  insulin lispro (ADMELOG) corrective regimen sliding scale   SubCutaneous three times a day before meals  insulin lispro Injectable (ADMELOG) 8 Unit(s) SubCutaneous three times a day before meals  metoprolol succinate ER 25 milliGRAM(s) Oral daily  pantoprazole    Tablet 40 milliGRAM(s) Oral before breakfast  senna 2 Tablet(s) Oral at bedtime  tamsulosin 0.8 milliGRAM(s) Oral at bedtime    MEDICATIONS  (PRN):    ALLERGIES:  No Known Allergies      LABS:                        12.2   7.05  )-----------( 99       ( 2022 06:35 )             36.9         135  |  102  |  18  ----------------------------<  181<H>  3.9   |  22  |  1.26    Ca    8.6      2022 06:35    TPro  6.3  /  Alb  3.2<L>  /  TBili  1.7<H>  /  DBili  x   /  AST  22  /  ALT  20  /  AlkPhos  135<H>            LIVER FUNCTIONS - ( 2022 22:50 )  Alb: 3.2 g/dL / Pro: 6.3 g/dL / ALK PHOS: 135 U/L / ALT: 20 U/L / AST: 22 U/L / GGT: x           Urinalysis Basic - ( 2022 23:26 )    Color: Yellow / Appearance: Turbid / S.015 / pH: x  Gluc: x / Ketone: Small  / Bili: Negative / Urobili: Negative   Blood: x / Protein: 300 mg/dL / Nitrite: Positive   Leuk Esterase: Large / RBC: 31 /hpf /  /HPF   Sq Epi: x / Non Sq Epi: 5 /hpf / Bacteria: Many      TSH:   A1c:  BNP:  Lipid panel:   cultures: Culture Results:   No growth to date. ( @ 06:58)  Culture Results:   Growth in aerobic bottle: Gram Negative Rods  ***Blood Panel PCR results on this specimen are available  approximately 3 hours after the Gram stain result.***  Gram stain, PCR, and/or culture results may not always  correspond due to difference in methodologies.  ************************************************************  This PCR assay was performed by multiplex PCR. This  Assay tests for 66 bacterial and resistance gene targets.  Please refer to the [facility] test directory  at [email or web address] for details. ( @ 06:58)  Culture Results:   >100,000 CFU/ml Gram Negative Rods ( @ 04:03)      RADIOLOGY & ADDITIONAL TESTS:    Imaging Personally Reviewed:  [ ] YES  [ ] NO

## 2022-02-18 NOTE — PROGRESS NOTE ADULT - SUBJECTIVE AND OBJECTIVE BOX
KAITLIN CALDERON 81y [id number]    Patient is a 81y old  Male who presents with a chief complaint of     Follow Up/CC:  ID following for fever    Interval History/ROS:    Allergies    No Known Allergies    Intolerances        ANTIMICROBIALS:  cefTRIAXone   IVPB 1000 every 24 hours      MEDICATIONS  (STANDING):  aspirin enteric coated 81 milliGRAM(s) Oral daily  budesonide 160 MICROgram(s)/formoterol 4.5 MICROgram(s) Inhaler 2 Puff(s) Inhalation two times a day  cefTRIAXone   IVPB 1000 milliGRAM(s) IV Intermittent every 24 hours  dextrose 40% Gel 15 Gram(s) Oral once  dextrose 5%. 1000 milliLiter(s) (50 mL/Hr) IV Continuous <Continuous>  dextrose 5%. 1000 milliLiter(s) (100 mL/Hr) IV Continuous <Continuous>  dextrose 50% Injectable 25 Gram(s) IV Push once  dextrose 50% Injectable 12.5 Gram(s) IV Push once  dextrose 50% Injectable 25 Gram(s) IV Push once  finasteride 5 milliGRAM(s) Oral daily  glucagon  Injectable 1 milliGRAM(s) IntraMuscular once  heparin   Injectable 5000 Unit(s) SubCutaneous every 8 hours  influenza  Vaccine (HIGH DOSE) 0.7 milliLiter(s) IntraMuscular once  insulin glargine Injectable (LANTUS) 16 Unit(s) SubCutaneous at bedtime  insulin lispro (ADMELOG) corrective regimen sliding scale   SubCutaneous three times a day before meals  insulin lispro Injectable (ADMELOG) 7 Unit(s) SubCutaneous three times a day before meals  metoprolol succinate ER 25 milliGRAM(s) Oral daily  pantoprazole    Tablet 40 milliGRAM(s) Oral before breakfast  senna 2 Tablet(s) Oral at bedtime  tamsulosin 0.8 milliGRAM(s) Oral at bedtime    MEDICATIONS  (PRN):        Vital Signs Last 24 Hrs  T(C): 36.8 (2022 08:38), Max: 37.4 (2022 21:53)  T(F): 98.3 (2022 08:38), Max: 99.3 (2022 21:53)  HR: 83 (2022 08:38) (75 - 84)  BP: 138/93 (2022 08:38) (131/73 - 140/80)  BP(mean): --  RR: 18 (2022 08:38) (18 - 20)  SpO2: 93% (2022 08:38) (93% - 98%)    CBC Full  -  ( 2022 06:35 )  WBC Count : 7.05 K/uL  RBC Count : 4.06 M/uL  Hemoglobin : 12.2 g/dL  Hematocrit : 36.9 %  Platelet Count - Automated : 99 K/uL  Mean Cell Volume : 90.9 fl  Mean Cell Hemoglobin : 30.0 pg  Mean Cell Hemoglobin Concentration : 33.1 gm/dL  Auto Neutrophil # : x  Auto Lymphocyte # : x  Auto Monocyte # : x  Auto Eosinophil # : x  Auto Basophil # : x  Auto Neutrophil % : x  Auto Lymphocyte % : x  Auto Monocyte % : x  Auto Eosinophil % : x  Auto Basophil % : x        135  |  102  |  18  ----------------------------<  181<H>  3.9   |  22  |  1.26    Ca    8.6      2022 06:35    TPro  6.3  /  Alb  3.2<L>  /  TBili  1.7<H>  /  DBili  x   /  AST  22  /  ALT  20  /  AlkPhos  135<H>      LIVER FUNCTIONS - ( 2022 22:50 )  Alb: 3.2 g/dL / Pro: 6.3 g/dL / ALK PHOS: 135 U/L / ALT: 20 U/L / AST: 22 U/L / GGT: x           Urinalysis Basic - ( 2022 23:26 )    Color: Yellow / Appearance: Turbid / S.015 / pH: x  Gluc: x / Ketone: Small  / Bili: Negative / Urobili: Negative   Blood: x / Protein: 300 mg/dL / Nitrite: Positive   Leuk Esterase: Large / RBC: 31 /hpf /  /HPF   Sq Epi: x / Non Sq Epi: 5 /hpf / Bacteria: Many        MICROBIOLOGY:  .Blood Blood-Peripheral  22   No growth to date.  --  Blood Culture PCR      Clean Catch Clean Catch (Midstream)  22   >100,000 CFU/ml Gram Negative Rods  --  --              v            RADIOLOGY     KAITLIN CALDERON 81y [id number]    Patient is a 81y old  Male who presents with a chief complaint of     Follow Up/CC:  ID following for fever    Interval History/ROS: no fever    Allergies    No Known Allergies    Intolerances        ANTIMICROBIALS:  cefTRIAXone   IVPB 1000 every 24 hours      MEDICATIONS  (STANDING):  aspirin enteric coated 81 milliGRAM(s) Oral daily  budesonide 160 MICROgram(s)/formoterol 4.5 MICROgram(s) Inhaler 2 Puff(s) Inhalation two times a day  cefTRIAXone   IVPB 1000 milliGRAM(s) IV Intermittent every 24 hours  dextrose 40% Gel 15 Gram(s) Oral once  dextrose 5%. 1000 milliLiter(s) (50 mL/Hr) IV Continuous <Continuous>  dextrose 5%. 1000 milliLiter(s) (100 mL/Hr) IV Continuous <Continuous>  dextrose 50% Injectable 25 Gram(s) IV Push once  dextrose 50% Injectable 12.5 Gram(s) IV Push once  dextrose 50% Injectable 25 Gram(s) IV Push once  finasteride 5 milliGRAM(s) Oral daily  glucagon  Injectable 1 milliGRAM(s) IntraMuscular once  heparin   Injectable 5000 Unit(s) SubCutaneous every 8 hours  influenza  Vaccine (HIGH DOSE) 0.7 milliLiter(s) IntraMuscular once  insulin glargine Injectable (LANTUS) 16 Unit(s) SubCutaneous at bedtime  insulin lispro (ADMELOG) corrective regimen sliding scale   SubCutaneous three times a day before meals  insulin lispro Injectable (ADMELOG) 7 Unit(s) SubCutaneous three times a day before meals  metoprolol succinate ER 25 milliGRAM(s) Oral daily  pantoprazole    Tablet 40 milliGRAM(s) Oral before breakfast  senna 2 Tablet(s) Oral at bedtime  tamsulosin 0.8 milliGRAM(s) Oral at bedtime    MEDICATIONS  (PRN):        Vital Signs Last 24 Hrs  T(C): 36.8 (2022 08:38), Max: 37.4 (2022 21:53)  T(F): 98.3 (2022 08:38), Max: 99.3 (2022 21:53)  HR: 83 (2022 08:38) (75 - 84)  BP: 138/93 (2022 08:38) (131/73 - 140/80)  BP(mean): --  RR: 18 (2022 08:38) (18 - 20)  SpO2: 93% (2022 08:38) (93% - 98%)    CBC Full  -  ( 2022 06:35 )  WBC Count : 7.05 K/uL  RBC Count : 4.06 M/uL  Hemoglobin : 12.2 g/dL  Hematocrit : 36.9 %  Platelet Count - Automated : 99 K/uL  Mean Cell Volume : 90.9 fl  Mean Cell Hemoglobin : 30.0 pg  Mean Cell Hemoglobin Concentration : 33.1 gm/dL  Auto Neutrophil # : x  Auto Lymphocyte # : x  Auto Monocyte # : x  Auto Eosinophil # : x  Auto Basophil # : x  Auto Neutrophil % : x  Auto Lymphocyte % : x  Auto Monocyte % : x  Auto Eosinophil % : x  Auto Basophil % : x        135  |  102  |  18  ----------------------------<  181<H>  3.9   |  22  |  1.26    Ca    8.6      2022 06:35    TPro  6.3  /  Alb  3.2<L>  /  TBili  1.7<H>  /  DBili  x   /  AST  22  /  ALT  20  /  AlkPhos  135<H>      LIVER FUNCTIONS - ( 2022 22:50 )  Alb: 3.2 g/dL / Pro: 6.3 g/dL / ALK PHOS: 135 U/L / ALT: 20 U/L / AST: 22 U/L / GGT: x           Urinalysis Basic - ( 2022 23:26 )    Color: Yellow / Appearance: Turbid / S.015 / pH: x  Gluc: x / Ketone: Small  / Bili: Negative / Urobili: Negative   Blood: x / Protein: 300 mg/dL / Nitrite: Positive   Leuk Esterase: Large / RBC: 31 /hpf /  /HPF   Sq Epi: x / Non Sq Epi: 5 /hpf / Bacteria: Many        MICROBIOLOGY:  .Blood Blood-Peripheral  22   No growth to date.  --  Blood Culture PCR      Clean Catch Clean Catch (Midstream)  22   >100,000 CFU/ml Gram Negative Rods  --  --              v            RADIOLOGY

## 2022-02-18 NOTE — PROGRESS NOTE ADULT - ASSESSMENT
Assessment  DMT2: 81y Male with DM T2 with hyperglycemia, A1C 11.1%, per chart was on oral meds and insulin at home, ?compliance, started on basal bolus insulin, blood sugars are improving though still not at target, no hypoglycemic episodes, eating meals, NAD.  UTI: On meds, monitored.  HTN: Controlled,  on antihypertensive medications.  CKD: Monitor labs/BMP,         Melba Reyes MD  Cell: [phone]  Office: [phone]               Assessment  DMT2: 81y Male with DM T2 with hyperglycemia, A1C 11.1%, per chart was on oral meds and insulin at home, ?compliance, started  on basal bolus insulin, blood sugars are improving though still not at target, no hypoglycemic episodes, eating meals, NAD.  UTI: On meds, monitored.  HTN: Controlled,  on antihypertensive medications.  CKD: Monitor labs/BMP,         Melba Reyes MD  Cell: [phone]  Office: [phone]

## 2022-02-18 NOTE — PROGRESS NOTE ADULT - ASSESSMENT
81 Y M H/O intermittent UTIs with AMs presenting with AMS with encephalopathy, UTI, bandemia, fever    Naren Cabezas  Attending Physician   Division of Infectious Disease  Office #[phone]  Available on Microsoft Teams also  After 5pm/weekend or no response, call #[phone]

## 2022-02-18 NOTE — PROGRESS NOTE ADULT - ASSESSMENT
82 yo male h/o DM, htn, chol, bph, p/w sepsis, 2/2 uti  pt with sepsis present on admission    sepsis 2/2 uti  ceftriax as per id  f/u cult and sens  ID consulted    bacteremia  likely 2/2 uti  cont ceftriax  f/u cult and sens  check repeat cult    metabolic encephalopathy  2/2 sepsis  improved     DM  on insulin  monitor fs  endo f/u    vomiting  likely 2/2 uti  CT abd/pel noted  zofran prn  gi consulted  diet as tolerated    ckd  creat near baseline  monitor  avoid nephrotoxins      cont other home meds      dvt ppx      Advanced care planning was discussed with patient and family.  Advanced care planning forms were reviewed and discussed as appropriate.  Differential diagnosis and plan of care discussed with patient after the evaluation.   Pain assessed and judicious use of narcotics when appropriate was discussed.  Importance of Fall prevention discussed.  Counseling on Smoking and Alcohol cessation was offered when appropriate.  Counseling on Diet, exercise, and medication compliance was done.         Approx 70 minutes spent.

## 2022-02-18 NOTE — PROGRESS NOTE ADULT - NSPROGADDITIONALINFOA_GEN_ALL_CORE
Please call the ID service [phone] with questions or concerns over the weekend.    I am away from 2/19 and will return on 2/28. ID coverage available. Call ID office @ [phone] with questions.    D/w Dr. Kim

## 2022-02-19 LAB
-  AMIKACIN: SIGNIFICANT CHANGE UP
-  AMIKACIN: SIGNIFICANT CHANGE UP
-  AMOXICILLIN/CLAVULANIC ACID: SIGNIFICANT CHANGE UP
-  AMPICILLIN/SULBACTAM: SIGNIFICANT CHANGE UP
-  AMPICILLIN/SULBACTAM: SIGNIFICANT CHANGE UP
-  AMPICILLIN: SIGNIFICANT CHANGE UP
-  AMPICILLIN: SIGNIFICANT CHANGE UP
-  AZTREONAM: SIGNIFICANT CHANGE UP
-  AZTREONAM: SIGNIFICANT CHANGE UP
-  CEFAZOLIN: SIGNIFICANT CHANGE UP
-  CEFAZOLIN: SIGNIFICANT CHANGE UP
-  CEFEPIME: SIGNIFICANT CHANGE UP
-  CEFEPIME: SIGNIFICANT CHANGE UP
-  CEFTRIAXONE: SIGNIFICANT CHANGE UP
-  CEFTRIAXONE: SIGNIFICANT CHANGE UP
-  CIPROFLOXACIN: SIGNIFICANT CHANGE UP
-  CIPROFLOXACIN: SIGNIFICANT CHANGE UP
-  ERTAPENEM: SIGNIFICANT CHANGE UP
-  ERTAPENEM: SIGNIFICANT CHANGE UP
-  GENTAMICIN: SIGNIFICANT CHANGE UP
-  GENTAMICIN: SIGNIFICANT CHANGE UP
-  IMIPENEM: SIGNIFICANT CHANGE UP
-  IMIPENEM: SIGNIFICANT CHANGE UP
-  LEVOFLOXACIN: SIGNIFICANT CHANGE UP
-  LEVOFLOXACIN: SIGNIFICANT CHANGE UP
-  MEROPENEM: SIGNIFICANT CHANGE UP
-  MEROPENEM: SIGNIFICANT CHANGE UP
-  NITROFURANTOIN: SIGNIFICANT CHANGE UP
-  PIPERACILLIN/TAZOBACTAM: SIGNIFICANT CHANGE UP
-  PIPERACILLIN/TAZOBACTAM: SIGNIFICANT CHANGE UP
-  TIGECYCLINE: SIGNIFICANT CHANGE UP
-  TOBRAMYCIN: SIGNIFICANT CHANGE UP
-  TOBRAMYCIN: SIGNIFICANT CHANGE UP
-  TRIMETHOPRIM/SULFAMETHOXAZOLE: SIGNIFICANT CHANGE UP
-  TRIMETHOPRIM/SULFAMETHOXAZOLE: SIGNIFICANT CHANGE UP
ALBUMIN SERPL ELPH-MCNC: 2.9 G/DL — LOW (ref 3.3–5)
ALP SERPL-CCNC: 214 U/L — HIGH (ref 40–120)
ALT FLD-CCNC: 34 U/L — SIGNIFICANT CHANGE UP (ref 10–45)
ANION GAP SERPL CALC-SCNC: 15 MMOL/L — SIGNIFICANT CHANGE UP (ref 5–17)
AST SERPL-CCNC: 55 U/L — HIGH (ref 10–40)
BILIRUB SERPL-MCNC: 1.3 MG/DL — HIGH (ref 0.2–1.2)
BUN SERPL-MCNC: 21 MG/DL — SIGNIFICANT CHANGE UP (ref 7–23)
CALCIUM SERPL-MCNC: 8.9 MG/DL — SIGNIFICANT CHANGE UP (ref 8.4–10.5)
CHLORIDE SERPL-SCNC: 102 MMOL/L — SIGNIFICANT CHANGE UP (ref 96–108)
CO2 SERPL-SCNC: 17 MMOL/L — LOW (ref 22–31)
CREAT SERPL-MCNC: 1.38 MG/DL — HIGH (ref 0.5–1.3)
CULTURE RESULTS: SIGNIFICANT CHANGE UP
CULTURE RESULTS: SIGNIFICANT CHANGE UP
GLUCOSE BLDC GLUCOMTR-MCNC: 161 MG/DL — HIGH (ref 70–99)
GLUCOSE BLDC GLUCOMTR-MCNC: 166 MG/DL — HIGH (ref 70–99)
GLUCOSE BLDC GLUCOMTR-MCNC: 170 MG/DL — HIGH (ref 70–99)
GLUCOSE BLDC GLUCOMTR-MCNC: 189 MG/DL — HIGH (ref 70–99)
GLUCOSE BLDC GLUCOMTR-MCNC: 235 MG/DL — HIGH (ref 70–99)
GLUCOSE SERPL-MCNC: 208 MG/DL — HIGH (ref 70–99)
HCT VFR BLD CALC: 40.4 % — SIGNIFICANT CHANGE UP (ref 39–50)
HGB BLD-MCNC: 13.5 G/DL — SIGNIFICANT CHANGE UP (ref 13–17)
LACTATE SERPL-SCNC: 1.4 MMOL/L — SIGNIFICANT CHANGE UP (ref 0.7–2)
MAGNESIUM SERPL-MCNC: 1.5 MG/DL — LOW (ref 1.6–2.6)
MCHC RBC-ENTMCNC: 29.6 PG — SIGNIFICANT CHANGE UP (ref 27–34)
MCHC RBC-ENTMCNC: 33.4 GM/DL — SIGNIFICANT CHANGE UP (ref 32–36)
MCV RBC AUTO: 88.6 FL — SIGNIFICANT CHANGE UP (ref 80–100)
METHOD TYPE: SIGNIFICANT CHANGE UP
METHOD TYPE: SIGNIFICANT CHANGE UP
NRBC # BLD: 0 /100 WBCS — SIGNIFICANT CHANGE UP (ref 0–0)
ORGANISM # SPEC MICROSCOPIC CNT: SIGNIFICANT CHANGE UP
PHOSPHATE SERPL-MCNC: 1.9 MG/DL — LOW (ref 2.5–4.5)
PLATELET # BLD AUTO: 128 K/UL — LOW (ref 150–400)
POTASSIUM SERPL-MCNC: 4 MMOL/L — SIGNIFICANT CHANGE UP (ref 3.5–5.3)
POTASSIUM SERPL-SCNC: 4 MMOL/L — SIGNIFICANT CHANGE UP (ref 3.5–5.3)
PROT SERPL-MCNC: 6.5 G/DL — SIGNIFICANT CHANGE UP (ref 6–8.3)
RBC # BLD: 4.56 M/UL — SIGNIFICANT CHANGE UP (ref 4.2–5.8)
RBC # FLD: 13 % — SIGNIFICANT CHANGE UP (ref 10.3–14.5)
SODIUM SERPL-SCNC: 134 MMOL/L — LOW (ref 135–145)
SPECIMEN SOURCE: SIGNIFICANT CHANGE UP
SPECIMEN SOURCE: SIGNIFICANT CHANGE UP
WBC # BLD: 10.64 K/UL — HIGH (ref 3.8–10.5)
WBC # FLD AUTO: 10.64 K/UL — HIGH (ref 3.8–10.5)

## 2022-02-19 PROCEDURE — 71045 X-RAY EXAM CHEST 1 VIEW: CPT | Mod: 26

## 2022-02-19 PROCEDURE — 74018 RADEX ABDOMEN 1 VIEW: CPT | Mod: 26

## 2022-02-19 RX ORDER — ACETAMINOPHEN 500 MG
1000 TABLET ORAL ONCE
Refills: 0 | Status: COMPLETED | OUTPATIENT
Start: 2022-02-19 | End: 2022-02-19

## 2022-02-19 RX ORDER — ERTAPENEM SODIUM 1 G/1
1000 INJECTION, POWDER, LYOPHILIZED, FOR SOLUTION INTRAMUSCULAR; INTRAVENOUS ONCE
Refills: 0 | Status: COMPLETED | OUTPATIENT
Start: 2022-02-19 | End: 2022-02-19

## 2022-02-19 RX ORDER — POTASSIUM PHOSPHATE, MONOBASIC POTASSIUM PHOSPHATE, DIBASIC 236; 224 MG/ML; MG/ML
15 INJECTION, SOLUTION INTRAVENOUS ONCE
Refills: 0 | Status: COMPLETED | OUTPATIENT
Start: 2022-02-19 | End: 2022-02-19

## 2022-02-19 RX ORDER — SODIUM CHLORIDE 9 MG/ML
1000 INJECTION INTRAMUSCULAR; INTRAVENOUS; SUBCUTANEOUS
Refills: 0 | Status: DISCONTINUED | OUTPATIENT
Start: 2022-02-19 | End: 2022-02-23

## 2022-02-19 RX ORDER — ERTAPENEM SODIUM 1 G/1
1000 INJECTION, POWDER, LYOPHILIZED, FOR SOLUTION INTRAMUSCULAR; INTRAVENOUS EVERY 24 HOURS
Refills: 0 | Status: COMPLETED | OUTPATIENT
Start: 2022-02-20 | End: 2022-02-27

## 2022-02-19 RX ORDER — ERTAPENEM SODIUM 1 G/1
INJECTION, POWDER, LYOPHILIZED, FOR SOLUTION INTRAMUSCULAR; INTRAVENOUS
Refills: 0 | Status: COMPLETED | OUTPATIENT
Start: 2022-02-19 | End: 2022-02-28

## 2022-02-19 RX ORDER — MAGNESIUM SULFATE 500 MG/ML
1 VIAL (ML) INJECTION ONCE
Refills: 0 | Status: COMPLETED | OUTPATIENT
Start: 2022-02-19 | End: 2022-02-19

## 2022-02-19 RX ADMIN — Medication 81 MILLIGRAM(S): at 12:31

## 2022-02-19 RX ADMIN — Medication 4: at 13:03

## 2022-02-19 RX ADMIN — TAMSULOSIN HYDROCHLORIDE 0.8 MILLIGRAM(S): 0.4 CAPSULE ORAL at 23:07

## 2022-02-19 RX ADMIN — Medication 8 UNIT(S): at 13:04

## 2022-02-19 RX ADMIN — Medication 100 GRAM(S): at 18:39

## 2022-02-19 RX ADMIN — INSULIN GLARGINE 16 UNIT(S): 100 INJECTION, SOLUTION SUBCUTANEOUS at 21:58

## 2022-02-19 RX ADMIN — ERTAPENEM SODIUM 1000 MILLIGRAM(S): 1 INJECTION, POWDER, LYOPHILIZED, FOR SOLUTION INTRAMUSCULAR; INTRAVENOUS at 12:29

## 2022-02-19 RX ADMIN — FINASTERIDE 5 MILLIGRAM(S): 5 TABLET, FILM COATED ORAL at 12:29

## 2022-02-19 RX ADMIN — HEPARIN SODIUM 5000 UNIT(S): 5000 INJECTION INTRAVENOUS; SUBCUTANEOUS at 21:02

## 2022-02-19 RX ADMIN — SODIUM CHLORIDE 75 MILLILITER(S): 9 INJECTION INTRAMUSCULAR; INTRAVENOUS; SUBCUTANEOUS at 21:00

## 2022-02-19 RX ADMIN — SENNA PLUS 2 TABLET(S): 8.6 TABLET ORAL at 23:07

## 2022-02-19 RX ADMIN — PANTOPRAZOLE SODIUM 40 MILLIGRAM(S): 20 TABLET, DELAYED RELEASE ORAL at 05:57

## 2022-02-19 RX ADMIN — BUDESONIDE AND FORMOTEROL FUMARATE DIHYDRATE 2 PUFF(S): 160; 4.5 AEROSOL RESPIRATORY (INHALATION) at 05:57

## 2022-02-19 RX ADMIN — Medication 8 UNIT(S): at 19:47

## 2022-02-19 RX ADMIN — Medication 1000 MILLIGRAM(S): at 21:15

## 2022-02-19 RX ADMIN — HEPARIN SODIUM 5000 UNIT(S): 5000 INJECTION INTRAVENOUS; SUBCUTANEOUS at 13:05

## 2022-02-19 RX ADMIN — Medication 8 UNIT(S): at 09:11

## 2022-02-19 RX ADMIN — Medication 2: at 09:08

## 2022-02-19 RX ADMIN — Medication 25 MILLIGRAM(S): at 05:57

## 2022-02-19 RX ADMIN — Medication 2: at 19:47

## 2022-02-19 RX ADMIN — BUDESONIDE AND FORMOTEROL FUMARATE DIHYDRATE 2 PUFF(S): 160; 4.5 AEROSOL RESPIRATORY (INHALATION) at 19:46

## 2022-02-19 RX ADMIN — HEPARIN SODIUM 5000 UNIT(S): 5000 INJECTION INTRAVENOUS; SUBCUTANEOUS at 05:57

## 2022-02-19 RX ADMIN — Medication 400 MILLIGRAM(S): at 21:00

## 2022-02-19 RX ADMIN — POTASSIUM PHOSPHATE, MONOBASIC POTASSIUM PHOSPHATE, DIBASIC 62.5 MILLIMOLE(S): 236; 224 INJECTION, SOLUTION INTRAVENOUS at 23:58

## 2022-02-19 NOTE — RAPID RESPONSE TEAM SUMMARY - NSMEDICATIONSRRT_GEN_ALL_CORE
IV tylenol 1g x1   Albuterol treatment for wheezing   CBC, CMP, BCx x1 (unable to achieve 2nd set due to poor access)   ABG with lactate   Coags

## 2022-02-19 NOTE — PROGRESS NOTE ADULT - ASSESSMENT
Imp/rx:  ESBL UTI  GNR bacteremia--likely to be the same pathogen.  In view of above will change the ceftriaxone to invanz.  follow full id/sens

## 2022-02-19 NOTE — PROGRESS NOTE ADULT - ASSESSMENT
Assessment  DMT2: 81y Male with DM T2 with hyperglycemia, A1C 11.1%, per chart was on oral meds and insulin at home, ?compliance,  on basal bolus insulin, blood sugars are fluctuating due to inconsistent food intake,  no hypoglycemic episodes, eating meals, appears comfortable.  UTI: On meds, monitored.  HTN: Controlled,  on antihypertensive medications.  CKD: Monitor labs/BMP,         Melba Reyes MD  Cell: [phone]  Office: [phone]

## 2022-02-19 NOTE — PROGRESS NOTE ADULT - ASSESSMENT
82 yo male h/o DM, htn, chol, bph, p/w sepsis, 2/2 uti  pt with sepsis present on admission    sepsis 2/2 uti  cultures growing Ecoli.    resistant to ceftriax.   abx changed from ceftriax to invanz now   ID f/u apprec    bacteremia  likely 2/2 uti  now on invanz  cult and sens noted.   f/u repeat blood cultures to ensure clearance.       vomiting  likely 2/2 sepsis and uti  CT and abd xray noted.  no acute gi pathology  keep npo for now  iv hydration  gi f/u      metabolic encephalopathy  2/2 sepsis  improved     DM  on insulin  monitor fs  endo f/u    ckd  creat near baseline  monitor  avoid nephrotoxins      cont other home meds      dvt ppx      Advanced care planning was discussed with patient and family.  Advanced care planning forms were reviewed and discussed as appropriate.  Differential diagnosis and plan of care discussed with patient after the evaluation.   Pain assessed and judicious use of narcotics when appropriate was discussed.  Importance of Fall prevention discussed.  Counseling on Smoking and Alcohol cessation was offered when appropriate.  Counseling on Diet, exercise, and medication compliance was done.         Approx 60 minutes spent. 80 yo male h/o DM, htn, chol, bph, p/w sepsis, 2/2 uti  pt with sepsis present on admission    sepsis 2/2 uti  urine and blood cultures growing Ecoli.    resistant to ceftriax.   abx changed from ceftriax to invanz now   ID f/u apprec    bacteremia  likely 2/2 uti  now on invanz  cult and sens noted.   f/u repeat blood cultures to ensure clearance.       vomiting  likely 2/2 sepsis and uti  CT and abd xray noted.  no acute gi pathology  keep npo for now  iv hydration  gi f/u      metabolic encephalopathy  2/2 sepsis  improved     DM  on insulin  monitor fs  endo f/u    ckd  creat near baseline  monitor  avoid nephrotoxins      cont other home meds      dvt ppx    d/w RN at bedside     Advanced care planning was discussed with patient and family.  Advanced care planning forms were reviewed and discussed as appropriate.  Differential diagnosis and plan of care discussed with patient after the evaluation.   Pain assessed and judicious use of narcotics when appropriate was discussed.  Importance of Fall prevention discussed.  Counseling on Smoking and Alcohol cessation was offered when appropriate.  Counseling on Diet, exercise, and medication compliance was done.         Approx 60 minutes spent.

## 2022-02-19 NOTE — RAPID RESPONSE TEAM SUMMARY - NSADDTLFINDINGSRRT_GEN_ALL_CORE
CT abd/pelvis done on 2/17 w/o any notable SBO, ascites or ileus. Only notable for inguinal hernia.

## 2022-02-19 NOTE — PROGRESS NOTE ADULT - SUBJECTIVE AND OBJECTIVE BOX
Chief complaint    Patient is a 81y old  Male who presents with a chief complaint of  Review of systems  Patient in bed, appears comfortable.    Labs and Fingersticks  CAPILLARY BLOOD GLUCOSE      POCT Blood Glucose.: 235 mg/dL (19 Feb 2022 12:42)  POCT Blood Glucose.: 161 mg/dL (19 Feb 2022 08:44)  POCT Blood Glucose.: 256 mg/dL (18 Feb 2022 21:14)  POCT Blood Glucose.: 400 mg/dL (18 Feb 2022 18:23)      Anion Gap, Serum: 11 (02-18 @ 06:35)      Calcium, Total Serum: 8.6 (02-18 @ 06:35)          02-18    135  |  102  |  18  ----------------------------<  181<H>  3.9   |  22  |  1.26    Ca    8.6      18 Feb 2022 06:35                          12.2   7.05  )-----------( 99       ( 18 Feb 2022 06:35 )             36.9     Medications  MEDICATIONS  (STANDING):  aspirin enteric coated 81 milliGRAM(s) Oral daily  budesonide 160 MICROgram(s)/formoterol 4.5 MICROgram(s) Inhaler 2 Puff(s) Inhalation two times a day  dextrose 40% Gel 15 Gram(s) Oral once  dextrose 5%. 1000 milliLiter(s) (50 mL/Hr) IV Continuous <Continuous>  dextrose 5%. 1000 milliLiter(s) (100 mL/Hr) IV Continuous <Continuous>  dextrose 50% Injectable 25 Gram(s) IV Push once  dextrose 50% Injectable 12.5 Gram(s) IV Push once  dextrose 50% Injectable 25 Gram(s) IV Push once  ertapenem  IVPB      finasteride 5 milliGRAM(s) Oral daily  glucagon  Injectable 1 milliGRAM(s) IntraMuscular once  heparin   Injectable 5000 Unit(s) SubCutaneous every 8 hours  influenza  Vaccine (HIGH DOSE) 0.7 milliLiter(s) IntraMuscular once  insulin glargine Injectable (LANTUS) 16 Unit(s) SubCutaneous at bedtime  insulin lispro (ADMELOG) corrective regimen sliding scale   SubCutaneous three times a day before meals  insulin lispro Injectable (ADMELOG) 8 Unit(s) SubCutaneous three times a day before meals  metoprolol succinate ER 25 milliGRAM(s) Oral daily  pantoprazole    Tablet 40 milliGRAM(s) Oral before breakfast  senna 2 Tablet(s) Oral at bedtime  tamsulosin 0.8 milliGRAM(s) Oral at bedtime      Physical Exam  General: Patient comfortable in bed  Vital Signs Last 12 Hrs  T(F): 98.8 (02-19-22 @ 13:00), Max: 99.7 (02-19-22 @ 06:01)  HR: 98 (02-19-22 @ 13:00) (96 - 98)  BP: 159/96 (02-19-22 @ 13:00) (129/79 - 159/96)  BP(mean): --  RR: 18 (02-19-22 @ 13:00) (18 - 18)  SpO2: 97% (02-19-22 @ 13:00) (94% - 97%)  Neck: No palpable thyroid nodules.  CVS: S1S2, No murmurs  Respiratory: No wheezing, no crepitations  GI: Abdomen soft, bowel sounds positive  Musculoskeletal:  edema lower extremities.     Diagnostics

## 2022-02-19 NOTE — PROGRESS NOTE ADULT - SUBJECTIVE AND OBJECTIVE BOX
INFECTIOUS DISEASES FOLLOW UP--Lei Ortiz MD  Pager [phone]    This is a follow up note for this  81y Male with  Urinary tract infection and bacteremia.  in the urine  + ESBL e. coli  Full isolate/sens pending    Further ROS:  limited from Mental state    Allergies  No Known Allergies    ANTIBIOTICS/RELEVANT:  antimicrobials  cefTRIAXone   IVPB 1000 milliGRAM(s) IV Intermittent every 24 hours    immunologic:  influenza  Vaccine (HIGH DOSE) 0.7 milliLiter(s) IntraMuscular once    OTHER:  aspirin enteric coated 81 milliGRAM(s) Oral daily  budesonide 160 MICROgram(s)/formoterol 4.5 MICROgram(s) Inhaler 2 Puff(s) Inhalation two times a day  dextrose 40% Gel 15 Gram(s) Oral once  dextrose 5%. 1000 milliLiter(s) IV Continuous <Continuous>  dextrose 5%. 1000 milliLiter(s) IV Continuous <Continuous>  dextrose 50% Injectable 25 Gram(s) IV Push once  dextrose 50% Injectable 12.5 Gram(s) IV Push once  dextrose 50% Injectable 25 Gram(s) IV Push once  finasteride 5 milliGRAM(s) Oral daily  glucagon  Injectable 1 milliGRAM(s) IntraMuscular once  heparin   Injectable 5000 Unit(s) SubCutaneous every 8 hours  insulin glargine Injectable (LANTUS) 16 Unit(s) SubCutaneous at bedtime  insulin lispro (ADMELOG) corrective regimen sliding scale   SubCutaneous three times a day before meals  insulin lispro Injectable (ADMELOG) 8 Unit(s) SubCutaneous three times a day before meals  metoprolol succinate ER 25 milliGRAM(s) Oral daily  pantoprazole    Tablet 40 milliGRAM(s) Oral before breakfast  senna 2 Tablet(s) Oral at bedtime  tamsulosin 0.8 milliGRAM(s) Oral at bedtime    Objective:  Vital Signs Last 24 Hrs  T(C): 37.6 (19 Feb 2022 10:15), Max: 37.6 (18 Feb 2022 19:16)  T(F): 99.7 (19 Feb 2022 10:15), Max: 99.7 (19 Feb 2022 06:01)  HR: 96 (19 Feb 2022 10:15) (89 - 96)  BP: 129/79 (19 Feb 2022 10:15) (115/94 - 152/89)  BP(mean): --  RR: 18 (19 Feb 2022 10:15) (18 - 18)  SpO2: 94% (19 Feb 2022 10:15) (94% - 97%)    PHYSICAL EXAM:  Constitutional:no acute distress  Respiratory: clear BL  Cardiovascular: S1S2  Gastrointestinal:soft,Extremities:no e/e/c  No Lymphadenopathy  IV sites not inflammed.    LABS:                        12.2   7.05  )-----------( 99       ( 18 Feb 2022 06:35 )             36.9     02-18    135  |  102  |  18  ----------------------------<  181<H>  3.9   |  22  |  1.26    Ca    8.6      18 Feb 2022 06:35    MICROBIOLOGY: esbl in urine    RADIOLOGY & ADDITIONAL STUDIES:  < from: CT Abdomen and Pelvis w/ IV Cont (02.17.22 @ 01:59) >    Findings suggest cystitis. No evidence of pyelonephritis at this time.      < end of copied text >

## 2022-02-19 NOTE — RAPID RESPONSE TEAM SUMMARY - NSOTHERINTERVENTIONSRRT_GEN_ALL_CORE
Plan   - f/u blood cultures and adjust antibiotics according; c/w ertapenem   - monitor abdomen, may benefit from bowel regimen given moderate stool burden noted on imaging; if worsening or notable vomiting would place NG tube ot LWIS/ decompression

## 2022-02-19 NOTE — PROGRESS NOTE ADULT - SUBJECTIVE AND OBJECTIVE BOX
KAITLIN CALDERON  81y Male  MRN:[id number]    Patient is a 81y old  Male who presents with a chief complaint of   HPI:   81 Y M H/O DM, bph, htn, chol, ,intermittent UTIs with AMs presenting with AMs. Per EMS and family patient has had 1 day of worsened AMS, disorientation similar to prior episode of UTIs. in er pt was minimally responsive, unable to converse, responsive to painful stimuli. Unable to interact with ROS, Per EMS concern for potential aspiration while in transport.  mental status improved weith iv abx and fluids.  being admitted for sepsis 2/2 uti (17 Feb 2022 11:06)      Patient seen and evaluated at bedside.  overnight events noted     Interval HPI:  +fevers. RRT for tachycardia. +vomiting.   currently more comfortable. no acute c/o     PAST MEDICAL & SURGICAL HISTORY:  DM (diabetes mellitus)    HTN (hypertension)    Hypercholesterolemia    CAD (coronary artery disease)    HTN (hypertension)    DM (diabetes mellitus)    S/P TURP    S/P gastrectomy        REVIEW OF SYSTEMS:  as per hpi     VITALS:   Vital Signs Last 24 Hrs  T(C): 38 (19 Feb 2022 18:14), Max: 38 (19 Feb 2022 18:14)  T(F): 100.4 (19 Feb 2022 18:14), Max: 100.4 (19 Feb 2022 18:14)  HR: 98 (19 Feb 2022 18:14) (95 - 98)  BP: 103/68 (19 Feb 2022 18:14) (103/68 - 159/96)  BP(mean): --  RR: 18 (19 Feb 2022 18:14) (18 - 18)  SpO2: 97% (19 Feb 2022 18:14) (94% - 97%)      PHYSICAL EXAM:  GENERAL: NAD, well-developed,  comfortable   HEAD:  Atraumatic, Normocephalic  EYES: EOMI, PERRLA, conjunctiva and sclera clear  NECK: Supple, No JVD  CHEST/LUNG: Clear to auscultation bilaterally; No wheeze  HEART: S1, S2; No murmurs, rubs, or gallops  ABDOMEN: Soft, Nontender, +distended; Bowel sounds present  EXTREMITIES:  2+ Peripheral Pulses, No clubbing, cyanosis, or edema  PSYCH: Normal affect  NEUROLOGY: AAOX3; non-focal  SKIN: No rashes or lesions    Consultant(s) Notes Reviewed:  [x ] YES  [ ] NO  Care Discussed with Consultants/Other Providers [ x] YES  [ ] NO    MEDS:   MEDICATIONS  (STANDING):  aspirin enteric coated 81 milliGRAM(s) Oral daily  budesonide 160 MICROgram(s)/formoterol 4.5 MICROgram(s) Inhaler 2 Puff(s) Inhalation two times a day  dextrose 40% Gel 15 Gram(s) Oral once  dextrose 5%. 1000 milliLiter(s) (50 mL/Hr) IV Continuous <Continuous>  dextrose 5%. 1000 milliLiter(s) (100 mL/Hr) IV Continuous <Continuous>  dextrose 50% Injectable 25 Gram(s) IV Push once  dextrose 50% Injectable 12.5 Gram(s) IV Push once  dextrose 50% Injectable 25 Gram(s) IV Push once  ertapenem  IVPB      finasteride 5 milliGRAM(s) Oral daily  glucagon  Injectable 1 milliGRAM(s) IntraMuscular once  heparin   Injectable 5000 Unit(s) SubCutaneous every 8 hours  influenza  Vaccine (HIGH DOSE) 0.7 milliLiter(s) IntraMuscular once  insulin glargine Injectable (LANTUS) 16 Unit(s) SubCutaneous at bedtime  insulin lispro (ADMELOG) corrective regimen sliding scale   SubCutaneous three times a day before meals  insulin lispro Injectable (ADMELOG) 8 Unit(s) SubCutaneous three times a day before meals  metoprolol succinate ER 25 milliGRAM(s) Oral daily  pantoprazole    Tablet 40 milliGRAM(s) Oral before breakfast  potassium phosphate IVPB 15 milliMole(s) IV Intermittent once  senna 2 Tablet(s) Oral at bedtime  tamsulosin 0.8 milliGRAM(s) Oral at bedtime    MEDICATIONS  (PRN):      ALLERGIES:  No Known Allergies      LABS:                                     13.5   10.64 )-----------( 128      ( 19 Feb 2022 16:26 )             40.4   02-19    134<L>  |  102  |  21  ----------------------------<  208<H>  4.0   |  17<L>  |  1.38<H>    Ca    8.9      19 Feb 2022 16:26  Phos  1.9     02-19  Mg     1.5     02-19    TPro  6.5  /  Alb  2.9<L>  /  TBili  1.3<H>  /  DBili  x   /  AST  55<H>  /  ALT  34  /  AlkPhos  214<H>  02-19    < from: Xray Chest 1 View-PORTABLE IMMEDIATE (Xray Chest 1 View-PORTABLE IMMEDIATE .) (02.19.22 @ 15:52) >    IMPRESSION:    Clear lungs.    < end of copied text >  < from: Xray Abdomen 1 View Portable, IMMEDIATE (Xray Abdomen 1 View Portable, IMMEDIATE .) (02.19.22 @ 15:52) >  IMPRESSION:    Nonobstructive bowel gas pattern.    < end of copied text >       :   cultures: Culture Results:   No growth to date. (02-17 @ 06:58)  Culture Results:   Growth in aerobic bottle: Gram Negative Rods  ***Blood Panel PCR results on this specimen are available  approximately 3 hours after the Gram stain result.***  Gram stain, PCR, and/or culture results may not always  correspond due to difference in methodologies.  ************************************************************  This PCR assay was performed by multiplex PCR. This  Assay tests for 66 bacterial and resistance gene targets.  Please refer to the [facility] Labs test directory  at [email or web address] for details. (02-17 @ 06:58)  Culture Results:   >100,000 CFU/ml Gram Negative Rods (02-17 @ 04:03)      RADIOLOGY & ADDITIONAL TESTS:    Imaging Personally Reviewed:  [ ] YES  [ ] NO KAITLIN CALDERON  81y Male  MRN:[id number]    Patient is a 81y old  Male who presents with a chief complaint of AMS/ sepsis / uti    HPI:   81 Y M H/O DM, bph, htn, chol, ,intermittent UTIs with AMs presenting with AMs. Per EMS and family patient has had 1 day of worsened AMS, disorientation similar to prior episode of UTIs. in er pt was minimally responsive, unable to converse, responsive to painful stimuli. Unable to interact with ROS, Per EMS concern for potential aspiration while in transport.  mental status improved weith iv abx and fluids.  being admitted for sepsis 2/2 uti (17 Feb 2022 11:06)      Patient seen and evaluated at bedside.  interval events noted     Interval HPI:  +fevers. RRT for tachycardia. +vomiting.   currently more comfortable. no acute c/o     PAST MEDICAL & SURGICAL HISTORY:  DM (diabetes mellitus)    HTN (hypertension)    Hypercholesterolemia    CAD (coronary artery disease)    HTN (hypertension)    DM (diabetes mellitus)    S/P TURP    S/P gastrectomy        REVIEW OF SYSTEMS:  as per hpi     VITALS:   Vital Signs Last 24 Hrs  T(C): 38 (19 Feb 2022 18:14), Max: 38 (19 Feb 2022 18:14)  T(F): 100.4 (19 Feb 2022 18:14), Max: 100.4 (19 Feb 2022 18:14)  HR: 98 (19 Feb 2022 18:14) (95 - 98)  BP: 103/68 (19 Feb 2022 18:14) (103/68 - 159/96)  BP(mean): --  RR: 18 (19 Feb 2022 18:14) (18 - 18)  SpO2: 97% (19 Feb 2022 18:14) (94% - 97%)      PHYSICAL EXAM:  GENERAL: NAD, well-developed,  comfortable   HEAD:  Atraumatic, Normocephalic  EYES: EOMI, PERRLA, conjunctiva and sclera clear  NECK: Supple, No JVD  CHEST/LUNG: Clear to auscultation bilaterally; No wheeze  HEART: S1, S2; No murmurs, rubs, or gallops  ABDOMEN: Soft, Nontender, +distended; Bowel sounds present  EXTREMITIES:  2+ Peripheral Pulses, No clubbing, cyanosis, or edema  PSYCH: Normal affect  NEUROLOGY: AAOX3; non-focal  SKIN: No rashes or lesions    Consultant(s) Notes Reviewed:  [x ] YES  [ ] NO  Care Discussed with Consultants/Other Providers [ x] YES  [ ] NO    MEDS:   MEDICATIONS  (STANDING):  aspirin enteric coated 81 milliGRAM(s) Oral daily  budesonide 160 MICROgram(s)/formoterol 4.5 MICROgram(s) Inhaler 2 Puff(s) Inhalation two times a day  dextrose 40% Gel 15 Gram(s) Oral once  dextrose 5%. 1000 milliLiter(s) (50 mL/Hr) IV Continuous <Continuous>  dextrose 5%. 1000 milliLiter(s) (100 mL/Hr) IV Continuous <Continuous>  dextrose 50% Injectable 25 Gram(s) IV Push once  dextrose 50% Injectable 12.5 Gram(s) IV Push once  dextrose 50% Injectable 25 Gram(s) IV Push once  ertapenem  IVPB      finasteride 5 milliGRAM(s) Oral daily  glucagon  Injectable 1 milliGRAM(s) IntraMuscular once  heparin   Injectable 5000 Unit(s) SubCutaneous every 8 hours  influenza  Vaccine (HIGH DOSE) 0.7 milliLiter(s) IntraMuscular once  insulin glargine Injectable (LANTUS) 16 Unit(s) SubCutaneous at bedtime  insulin lispro (ADMELOG) corrective regimen sliding scale   SubCutaneous three times a day before meals  insulin lispro Injectable (ADMELOG) 8 Unit(s) SubCutaneous three times a day before meals  metoprolol succinate ER 25 milliGRAM(s) Oral daily  pantoprazole    Tablet 40 milliGRAM(s) Oral before breakfast  potassium phosphate IVPB 15 milliMole(s) IV Intermittent once  senna 2 Tablet(s) Oral at bedtime  tamsulosin 0.8 milliGRAM(s) Oral at bedtime    MEDICATIONS  (PRN):      ALLERGIES:  No Known Allergies      LABS:                                     13.5   10.64 )-----------( 128      ( 19 Feb 2022 16:26 )             40.4   02-19    134<L>  |  102  |  21  ----------------------------<  208<H>  4.0   |  17<L>  |  1.38<H>    Ca    8.9      19 Feb 2022 16:26  Phos  1.9     02-19  Mg     1.5     02-19    TPro  6.5  /  Alb  2.9<L>  /  TBili  1.3<H>  /  DBili  x   /  AST  55<H>  /  ALT  34  /  AlkPhos  214<H>  02-19    < from: Xray Chest 1 View-PORTABLE IMMEDIATE (Xray Chest 1 View-PORTABLE IMMEDIATE .) (02.19.22 @ 15:52) >    IMPRESSION:    Clear lungs.    < end of copied text >  < from: Xray Abdomen 1 View Portable, IMMEDIATE (Xray Abdomen 1 View Portable, IMMEDIATE .) (02.19.22 @ 15:52) >  IMPRESSION:    Nonobstructive bowel gas pattern.    < end of copied text >       :   cultures: Culture Results:   No growth to date. (02-17 @ 06:58)  Culture Results:   Growth in aerobic bottle: Gram Negative Rods  ***Blood Panel PCR results on this specimen are available  approximately 3 hours after the Gram stain result.***  Gram stain, PCR, and/or culture results may not always  correspond due to difference in methodologies.  ************************************************************  This PCR assay was performed by multiplex PCR. This  Assay tests for 66 bacterial and resistance gene targets.  Please refer to the [facility] Labs test directory  at [email or web address] for details. (02-17 @ 06:58)  Culture Results:   >100,000 CFU/ml Gram Negative Rods (02-17 @ 04:03)      RADIOLOGY & ADDITIONAL TESTS:    Imaging Personally Reviewed:  [ ] YES  [ ] NO

## 2022-02-19 NOTE — RAPID RESPONSE TEAM SUMMARY - NSSITUATIONBACKGROUNDRRT_GEN_ALL_CORE
80 y/o M with pmhx DM, HTN, hx of recurrent UTIs currently being admitted for E.colu Urosepsis. RRT called for tachycardia to 170s. On arrival patient visibly uncomfortable continuously shifting in bed. On exam, patient able to respond to questioning, notable for diffuse wheezing and significant abdominal distension. VS T 101.3, -140, /75, SpO2 %. Emergent CXR and AXR done showing no free air in abdomen or concern for ileus or SBO. CXR with haziness on left but no consolidations or effusions. Patient with one episode of emesis during RRT, mainly food products non bloody or bilious.   Given distension Allen was placed t

## 2022-02-20 LAB
ALBUMIN SERPL ELPH-MCNC: 2.8 G/DL — LOW (ref 3.3–5)
ALP SERPL-CCNC: 186 U/L — HIGH (ref 40–120)
ALT FLD-CCNC: 29 U/L — SIGNIFICANT CHANGE UP (ref 10–45)
ANION GAP SERPL CALC-SCNC: 11 MMOL/L — SIGNIFICANT CHANGE UP (ref 5–17)
AST SERPL-CCNC: 30 U/L — SIGNIFICANT CHANGE UP (ref 10–40)
BILIRUB SERPL-MCNC: 1.1 MG/DL — SIGNIFICANT CHANGE UP (ref 0.2–1.2)
BUN SERPL-MCNC: 22 MG/DL — SIGNIFICANT CHANGE UP (ref 7–23)
CALCIUM SERPL-MCNC: 8.9 MG/DL — SIGNIFICANT CHANGE UP (ref 8.4–10.5)
CHLORIDE SERPL-SCNC: 103 MMOL/L — SIGNIFICANT CHANGE UP (ref 96–108)
CO2 SERPL-SCNC: 22 MMOL/L — SIGNIFICANT CHANGE UP (ref 22–31)
CREAT SERPL-MCNC: 1.48 MG/DL — HIGH (ref 0.5–1.3)
GLUCOSE BLDC GLUCOMTR-MCNC: 161 MG/DL — HIGH (ref 70–99)
GLUCOSE BLDC GLUCOMTR-MCNC: 166 MG/DL — HIGH (ref 70–99)
GLUCOSE BLDC GLUCOMTR-MCNC: 255 MG/DL — HIGH (ref 70–99)
GLUCOSE SERPL-MCNC: 166 MG/DL — HIGH (ref 70–99)
HCT VFR BLD CALC: 41.7 % — SIGNIFICANT CHANGE UP (ref 39–50)
HGB BLD-MCNC: 13.5 G/DL — SIGNIFICANT CHANGE UP (ref 13–17)
MAGNESIUM SERPL-MCNC: 2 MG/DL — SIGNIFICANT CHANGE UP (ref 1.6–2.6)
MCHC RBC-ENTMCNC: 29.9 PG — SIGNIFICANT CHANGE UP (ref 27–34)
MCHC RBC-ENTMCNC: 32.4 GM/DL — SIGNIFICANT CHANGE UP (ref 32–36)
MCV RBC AUTO: 92.5 FL — SIGNIFICANT CHANGE UP (ref 80–100)
NRBC # BLD: 0 /100 WBCS — SIGNIFICANT CHANGE UP (ref 0–0)
PHOSPHATE SERPL-MCNC: 3.7 MG/DL — SIGNIFICANT CHANGE UP (ref 2.5–4.5)
PLATELET # BLD AUTO: 112 K/UL — LOW (ref 150–400)
POTASSIUM SERPL-MCNC: 4.4 MMOL/L — SIGNIFICANT CHANGE UP (ref 3.5–5.3)
POTASSIUM SERPL-SCNC: 4.4 MMOL/L — SIGNIFICANT CHANGE UP (ref 3.5–5.3)
PROT SERPL-MCNC: 6.3 G/DL — SIGNIFICANT CHANGE UP (ref 6–8.3)
RBC # BLD: 4.51 M/UL — SIGNIFICANT CHANGE UP (ref 4.2–5.8)
RBC # FLD: 13.2 % — SIGNIFICANT CHANGE UP (ref 10.3–14.5)
SODIUM SERPL-SCNC: 136 MMOL/L — SIGNIFICANT CHANGE UP (ref 135–145)
WBC # BLD: 10.11 K/UL — SIGNIFICANT CHANGE UP (ref 3.8–10.5)
WBC # FLD AUTO: 10.11 K/UL — SIGNIFICANT CHANGE UP (ref 3.8–10.5)

## 2022-02-20 PROCEDURE — 99232 SBSQ HOSP IP/OBS MODERATE 35: CPT

## 2022-02-20 RX ORDER — INSULIN GLARGINE 100 [IU]/ML
18 INJECTION, SOLUTION SUBCUTANEOUS AT BEDTIME
Refills: 0 | Status: DISCONTINUED | OUTPATIENT
Start: 2022-02-20 | End: 2022-02-22

## 2022-02-20 RX ORDER — ACETAMINOPHEN 500 MG
650 TABLET ORAL ONCE
Refills: 0 | Status: COMPLETED | OUTPATIENT
Start: 2022-02-20 | End: 2022-02-20

## 2022-02-20 RX ADMIN — HEPARIN SODIUM 5000 UNIT(S): 5000 INJECTION INTRAVENOUS; SUBCUTANEOUS at 05:55

## 2022-02-20 RX ADMIN — HEPARIN SODIUM 5000 UNIT(S): 5000 INJECTION INTRAVENOUS; SUBCUTANEOUS at 21:30

## 2022-02-20 RX ADMIN — BUDESONIDE AND FORMOTEROL FUMARATE DIHYDRATE 2 PUFF(S): 160; 4.5 AEROSOL RESPIRATORY (INHALATION) at 17:26

## 2022-02-20 RX ADMIN — BUDESONIDE AND FORMOTEROL FUMARATE DIHYDRATE 2 PUFF(S): 160; 4.5 AEROSOL RESPIRATORY (INHALATION) at 05:54

## 2022-02-20 RX ADMIN — Medication 8 UNIT(S): at 17:25

## 2022-02-20 RX ADMIN — PANTOPRAZOLE SODIUM 40 MILLIGRAM(S): 20 TABLET, DELAYED RELEASE ORAL at 05:55

## 2022-02-20 RX ADMIN — Medication 650 MILLIGRAM(S): at 22:00

## 2022-02-20 RX ADMIN — Medication 25 MILLIGRAM(S): at 05:55

## 2022-02-20 RX ADMIN — Medication 6: at 17:26

## 2022-02-20 RX ADMIN — SODIUM CHLORIDE 75 MILLILITER(S): 9 INJECTION INTRAMUSCULAR; INTRAVENOUS; SUBCUTANEOUS at 10:00

## 2022-02-20 RX ADMIN — Medication 81 MILLIGRAM(S): at 11:29

## 2022-02-20 RX ADMIN — ERTAPENEM SODIUM 120 MILLIGRAM(S): 1 INJECTION, POWDER, LYOPHILIZED, FOR SOLUTION INTRAMUSCULAR; INTRAVENOUS at 13:29

## 2022-02-20 RX ADMIN — FINASTERIDE 5 MILLIGRAM(S): 5 TABLET, FILM COATED ORAL at 11:30

## 2022-02-20 RX ADMIN — HEPARIN SODIUM 5000 UNIT(S): 5000 INJECTION INTRAVENOUS; SUBCUTANEOUS at 13:29

## 2022-02-20 RX ADMIN — TAMSULOSIN HYDROCHLORIDE 0.8 MILLIGRAM(S): 0.4 CAPSULE ORAL at 21:30

## 2022-02-20 RX ADMIN — Medication 650 MILLIGRAM(S): at 21:30

## 2022-02-20 RX ADMIN — INSULIN GLARGINE 18 UNIT(S): 100 INJECTION, SOLUTION SUBCUTANEOUS at 21:31

## 2022-02-20 RX ADMIN — SENNA PLUS 2 TABLET(S): 8.6 TABLET ORAL at 21:30

## 2022-02-20 NOTE — PROVIDER CONTACT NOTE (OTHER) - ACTION/TREATMENT ORDERED:
NP Comfort Semianow aware and ordered cooling measures and 2 tylenol for the fever.
NP made aware. NP ordered to begin IVPB Tylenol, NS @ 75ml/hr, and to will continue to monitor.

## 2022-02-20 NOTE — PROVIDER CONTACT NOTE (OTHER) - RECOMMENDATIONS
NP made aware. NP ordered to begin IVPB Tylenol, NS @ 75ml/hr, and to continue to monitor.
Notified MICHELLE Wagner Will continue to monitor.

## 2022-02-20 NOTE — PROVIDER CONTACT NOTE (OTHER) - ASSESSMENT
All other VSS. Pt's forehead is warm.
Pt abd distended, temperature 100.4 no chills no diaphoresis. VSS otherwise.

## 2022-02-20 NOTE — CONSULT NOTE ADULT - SUBJECTIVE AND OBJECTIVE BOX
HPI:   81 Y M with CAD, DM, HTN, BPH and CKD p/w AMS. He has a h/o intermittent UTIs with AMS. Per EMS and family patient has had 1 day of worsened AMS, disorientation similar to prior episode of UTIs. in er pt was minimally responsive, unable to converse, responsive to painful stimuli. Unable to interact with ROS. He is now being treated for UTI and bacteremia with Invanz. He is noted to have an elevated serum creatinine, and so a renal evaluation was requested. Of note, the PCA reports small amounts of urine output.    PAST MEDICAL & SURGICAL HISTORY:  DM (diabetes mellitus)    HTN (hypertension)    Hypercholesterolemia    CAD (coronary artery disease)    HTN (hypertension)    DM (diabetes mellitus)    S/P TURP    S/P gastrectomy      MEDICATIONS  (STANDING):  aspirin enteric coated 81 milliGRAM(s) Oral daily  budesonide 160 MICROgram(s)/formoterol 4.5 MICROgram(s) Inhaler 2 Puff(s) Inhalation two times a day  dextrose 40% Gel 15 Gram(s) Oral once  dextrose 5%. 1000 milliLiter(s) (50 mL/Hr) IV Continuous <Continuous>  dextrose 5%. 1000 milliLiter(s) (100 mL/Hr) IV Continuous <Continuous>  dextrose 50% Injectable 25 Gram(s) IV Push once  dextrose 50% Injectable 12.5 Gram(s) IV Push once  dextrose 50% Injectable 25 Gram(s) IV Push once  ertapenem  IVPB      ertapenem  IVPB 1000 milliGRAM(s) IV Intermittent every 24 hours  finasteride 5 milliGRAM(s) Oral daily  glucagon  Injectable 1 milliGRAM(s) IntraMuscular once  heparin   Injectable 5000 Unit(s) SubCutaneous every 8 hours  influenza  Vaccine (HIGH DOSE) 0.7 milliLiter(s) IntraMuscular once  insulin glargine Injectable (LANTUS) 18 Unit(s) SubCutaneous at bedtime  insulin lispro (ADMELOG) corrective regimen sliding scale   SubCutaneous three times a day before meals  insulin lispro Injectable (ADMELOG) 8 Unit(s) SubCutaneous three times a day before meals  metoprolol succinate ER 25 milliGRAM(s) Oral daily  pantoprazole    Tablet 40 milliGRAM(s) Oral before breakfast  senna 2 Tablet(s) Oral at bedtime  sodium chloride 0.9%. 1000 milliLiter(s) (75 mL/Hr) IV Continuous <Continuous>  tamsulosin 0.8 milliGRAM(s) Oral at bedtime    Allergies  No Known Allergies  Intolerances    SOCIAL HISTORY:  Denies EtOH, Smoking.    FAMILY HISTORY:  UTO    REVIEW OF SYSTEMS:  Unable to obtain due to confusion.    VITALS:  T(F): 97.6 (02-20-22 @ 17:00), Max: 98.2 (02-20-22 @ 13:05)  HR: 98 (02-20-22 @ 17:00) (67 - 98)  BP: 149/83 (02-20-22 @ 17:00) (107/57 - 149/83)  SpO2: 99% (02-20-22 @ 17:00) (97% - 100%)  Wt(kg): --      02-19 @ 07:01  -  02-20 @ 07:00  --------------------------------------------------------  IN: 1950 mL / OUT: 2080 mL / NET: -130 mL    02-20 @ 07:01  -  02-20 @ 18:44  --------------------------------------------------------  IN: 540 mL / OUT: 625 mL / NET: -85 mL      PHYSICAL EXAM:  General:  alert, cooperative and no distress  HEENT: no trauma  Lungs: clear to auscultation bilaterally  Heart: normal S1/S2  Abdomen: soft, non-tender not distended, + BS  Extremities: no clubbing, cyanosis or edema  Urologic: no traore  Neurologic: confusion  Skin: no rashes    LABS:                        13.5   10.11 )-----------( 112      ( 20 Feb 2022 07:19 )             41.7     02-20    136  |  103  |  22  ----------------------------<  166<H>  4.4   |  22  |  1.48<H>    Ca    8.9      20 Feb 2022 07:19  Phos  3.7     02-20  Mg     2.0     02-20    TPro  6.3  /  Alb  2.8<L>  /  TBili  1.1  /  DBili  x   /  AST  30  /  ALT  29  /  AlkPhos  186<H>  02-20    UA 2/16: prot 300, + blood    BASELINE LABS  Creatinine, Serum: 1.24 mg/dL (01.17.22 @ 06:27)   Creatinine, Serum: 1.49 mg/dL (10.13.21 @ 14:23)     ASSESSMENT:  Patient is a 81y Male with CAD, DM, HTN, BPH and CKD p/w AMS due to E.coli UTI and bacteremia.  - CKD: stage 3 with baseline 1.2-1.3. Suspect diabetic nephropathy and/or hypertensive nephrosclerosis  - HTN: BP acceptable  - ?urine hesitancy: R/o retention    RECOMMENDATIONS:  - check urine TP:CR  - check bladder scan  - continue Abx per primary team  - please dose any new medications for a CrCl of ~ 50 cc/min  - avoid NSAIDs/nephrotoxins as able    Thank you for the courtesy of this consultation.    Trevor Persaud M.D.  [facility], [facility]  [id number]  [place] [address] [phone]

## 2022-02-20 NOTE — PROGRESS NOTE ADULT - ASSESSMENT
82 yo male h/o DM, htn, chol, bph, p/w sepsis, 2/2 uti  pt with sepsis present on admission    sepsis 2/2 uti  urine and blood cultures growing Ecoli.    resistant to ceftriax.   abx changed from ceftriax to invanz. now day 2  ID f/u apprec    bacteremia  likely 2/2 uti  now on invanz day 2  cult and sens noted.   f/u repeat blood cultures to ensure clearance.       vomiting  likely 2/2 sepsis and uti  CT and abd xray noted.  no acute gi pathology  trial of clears as tolerated   zofran prn  gi f/u      metabolic encephalopathy  2/2 sepsis  improved     DM  on insulin  monitor fs  endo f/u    ckd  with chanel  creat rising today  cont iv fluids  avoid nephrotoxins  renal consulted     cont other home meds      dvt ppx    d/w RN at bedside     Advanced care planning was discussed with patient and family.  Advanced care planning forms were reviewed and discussed as appropriate.  Differential diagnosis and plan of care discussed with patient after the evaluation.   Pain assessed and judicious use of narcotics when appropriate was discussed.  Importance of Fall prevention discussed.  Counseling on Smoking and Alcohol cessation was offered when appropriate.  Counseling on Diet, exercise, and medication compliance was done.         Approx 60 minutes spent.

## 2022-02-20 NOTE — PROGRESS NOTE ADULT - SUBJECTIVE AND OBJECTIVE BOX
KAITLIN CALDERON  81y Male  MRN:[id number]    Patient is a 81y old  Male who presents with a chief complaint of AMS/ sepsis / uti    HPI:   81 Y M H/O DM, bph, htn, chol, ,intermittent UTIs with AMs presenting with AMs. Per EMS and family patient has had 1 day of worsened AMS, disorientation similar to prior episode of UTIs. in er pt was minimally responsive, unable to converse, responsive to painful stimuli. Unable to interact with ROS, Per EMS concern for potential aspiration while in transport.  mental status improved weith iv abx and fluids.  being admitted for sepsis 2/2 uti (17 Feb 2022 11:06)      Patient seen and evaluated at bedside.  interval events noted     Interval HPI: reports to be feeling better. wants to eat food. c/o dry mouth.     PAST MEDICAL & SURGICAL HISTORY:  DM (diabetes mellitus)    HTN (hypertension)    Hypercholesterolemia    CAD (coronary artery disease)    HTN (hypertension)    DM (diabetes mellitus)    S/P TURP    S/P gastrectomy        REVIEW OF SYSTEMS:  as per hpi     VITALS:   Vital Signs Last 24 Hrs  T(C): 36.3 (20 Feb 2022 09:05), Max: 38.5 (19 Feb 2022 15:30)  T(F): 97.3 (20 Feb 2022 09:05), Max: 101.3 (19 Feb 2022 15:30)  HR: 83 (20 Feb 2022 09:05) (67 - 185)  BP: 136/82 (20 Feb 2022 09:05) (103/68 - 159/96)  BP(mean): --  RR: 18 (20 Feb 2022 09:05) (18 - 28)  SpO2: 100% (20 Feb 2022 09:05) (90% - 100%)    PHYSICAL EXAM:  GENERAL: NAD, well-developed,  comfortable   HEAD:  Atraumatic, Normocephalic  EYES: EOMI, PERRLA, conjunctiva and sclera clear  NECK: Supple, No JVD  CHEST/LUNG: Clear to auscultation bilaterally; No wheeze  HEART: S1, S2; No murmurs, rubs, or gallops  ABDOMEN: Soft, Nontender, non distended; Bowel sounds present  EXTREMITIES:  2+ Peripheral Pulses, No clubbing, cyanosis, or edema  PSYCH: Normal affect  NEUROLOGY: AAOX3; non-focal  SKIN: No rashes or lesions    Consultant(s) Notes Reviewed:  [x ] YES  [ ] NO  Care Discussed with Consultants/Other Providers [ x] YES  [ ] NO    MEDS:   MEDICATIONS  (STANDING):  aspirin enteric coated 81 milliGRAM(s) Oral daily  budesonide 160 MICROgram(s)/formoterol 4.5 MICROgram(s) Inhaler 2 Puff(s) Inhalation two times a day  dextrose 40% Gel 15 Gram(s) Oral once  dextrose 5%. 1000 milliLiter(s) (50 mL/Hr) IV Continuous <Continuous>  dextrose 5%. 1000 milliLiter(s) (100 mL/Hr) IV Continuous <Continuous>  dextrose 50% Injectable 25 Gram(s) IV Push once  dextrose 50% Injectable 12.5 Gram(s) IV Push once  dextrose 50% Injectable 25 Gram(s) IV Push once  ertapenem  IVPB      ertapenem  IVPB 1000 milliGRAM(s) IV Intermittent every 24 hours  finasteride 5 milliGRAM(s) Oral daily  glucagon  Injectable 1 milliGRAM(s) IntraMuscular once  heparin   Injectable 5000 Unit(s) SubCutaneous every 8 hours  influenza  Vaccine (HIGH DOSE) 0.7 milliLiter(s) IntraMuscular once  insulin glargine Injectable (LANTUS) 18 Unit(s) SubCutaneous at bedtime  insulin lispro (ADMELOG) corrective regimen sliding scale   SubCutaneous three times a day before meals  insulin lispro Injectable (ADMELOG) 8 Unit(s) SubCutaneous three times a day before meals  metoprolol succinate ER 25 milliGRAM(s) Oral daily  pantoprazole    Tablet 40 milliGRAM(s) Oral before breakfast  senna 2 Tablet(s) Oral at bedtime  sodium chloride 0.9%. 1000 milliLiter(s) (75 mL/Hr) IV Continuous <Continuous>  tamsulosin 0.8 milliGRAM(s) Oral at bedtime    MEDICATIONS  (PRN):      ALLERGIES:  No Known Allergies      LABS:                                                          13.5   10.11 )-----------( 112      ( 20 Feb 2022 07:19 )             41.7   02-20    136  |  103  |  22  ----------------------------<  166<H>  4.4   |  22  |  1.48<H>    Ca    8.9      20 Feb 2022 07:19  Phos  3.7     02-20  Mg     2.0     02-20    TPro  6.3  /  Alb  2.8<L>  /  TBili  1.1  /  DBili  x   /  AST  30  /  ALT  29  /  AlkPhos  186<H>  02-20      < from: Xray Chest 1 View-PORTABLE IMMEDIATE (Xray Chest 1 View-PORTABLE IMMEDIATE .) (02.19.22 @ 15:52) >    IMPRESSION:    Clear lungs.    < end of copied text >  < from: Xray Abdomen 1 View Portable, IMMEDIATE (Xray Abdomen 1 View Portable, IMMEDIATE .) (02.19.22 @ 15:52) >  IMPRESSION:    Nonobstructive bowel gas pattern.    < end of copied text >       :   cultures: Culture Results:   No growth to date. (02-17 @ 06:58)  Culture Results:   Growth in aerobic bottle: Gram Negative Rods  ***Blood Panel PCR results on this specimen are available  approximately 3 hours after the Gram stain result.***  Gram stain, PCR, and/or culture results may not always  correspond due to difference in methodologies.  ************************************************************  This PCR assay was performed by multiplex PCR. This  Assay tests for 66 bacterial and resistance gene targets.  Please refer to the [facility] test directory  at [email or web address] for details. (02-17 @ 06:58)  Culture Results:   >100,000 CFU/ml Gram Negative Rods (02-17 @ 04:03)      RADIOLOGY & ADDITIONAL TESTS:    Imaging Personally Reviewed:  [ ] YES  [ ] NO

## 2022-02-20 NOTE — PROGRESS NOTE ADULT - ASSESSMENT
Assessment  DMT2: 81y Male with DM T2 with hyperglycemia, A1C 11.1%, per chart was on oral meds and insulin at home, ?compliance, now on basal bolus insulin, bolus dose held this morning 2/2 NPO status, blood sugars running slightly high, no hypoglycemic episodes. Patient is s/p RRT yesterday for tachycardia, appears comfortable in NAD, NPO.  UTI: On meds, monitored.  HTN: Controlled,  on antihypertensive medications.  CKD: Monitor labs/BMP,         Melba Reyes MD  Cell: [phone]  Office: [phone]               Assessment  DMT2: 81y Male with DM T2 with hyperglycemia, A1C 11.1%, per chart was on oral meds and insulin at home, ?compliance, now on basal bolus insulin, bolus dose held this morning 2/2 NPO status, blood sugars running slightly high, no hypoglycemic episodes. Patient is s/p RRT yesterday for tachycardia, appears comfortable in NAD, NPO.  UTI: On meds, monitored.  HTN: Controlled,  on antihypertensive medications.  CKD: Monitor labs/BMP,         nelda Kirk moya  [phone]

## 2022-02-20 NOTE — PROGRESS NOTE ADULT - SUBJECTIVE AND OBJECTIVE BOX
Chief complaint  Patient is a 81y old  Male who presents with a chief complaint of  Review of systems  Acute events noted, RRT yesterday for tachycardia, no hypoglycemic episodes.    Labs and Fingersticks  CAPILLARY BLOOD GLUCOSE      POCT Blood Glucose.: 161 mg/dL (20 Feb 2022 12:00)  POCT Blood Glucose.: 166 mg/dL (19 Feb 2022 21:20)  POCT Blood Glucose.: 189 mg/dL (19 Feb 2022 19:39)  POCT Blood Glucose.: 170 mg/dL (19 Feb 2022 15:24)  POCT Blood Glucose.: 235 mg/dL (19 Feb 2022 12:42)      Anion Gap, Serum: 11 (02-20 @ 07:19)  Anion Gap, Serum: 15 (02-19 @ 16:26)      Calcium, Total Serum: 8.9 (02-20 @ 07:19)  Calcium, Total Serum: 8.9 (02-19 @ 16:26)  Albumin, Serum: 2.8 *L* (02-20 @ 07:19)  Albumin, Serum: 2.9 *L* (02-19 @ 16:26)    Alanine Aminotransferase (ALT/SGPT): 29 (02-20 @ 07:19)  Alanine Aminotransferase (ALT/SGPT): 34 (02-19 @ 16:26)  Alkaline Phosphatase, Serum: 186 *H* (02-20 @ 07:19)  Alkaline Phosphatase, Serum: 214 *H* (02-19 @ 16:26)  Aspartate Aminotransferase (AST/SGOT): 30 (02-20 @ 07:19)  Aspartate Aminotransferase (AST/SGOT): 55 *H* (02-19 @ 16:26)        02-20    136  |  103  |  22  ----------------------------<  166<H>  4.4   |  22  |  1.48<H>    Ca    8.9      20 Feb 2022 07:19  Phos  3.7     02-20  Mg     2.0     02-20    TPro  6.3  /  Alb  2.8<L>  /  TBili  1.1  /  DBili  x   /  AST  30  /  ALT  29  /  AlkPhos  186<H>  02-20                        13.5   10.11 )-----------( 112      ( 20 Feb 2022 07:19 )             41.7     Medications  MEDICATIONS  (STANDING):  aspirin enteric coated 81 milliGRAM(s) Oral daily  budesonide 160 MICROgram(s)/formoterol 4.5 MICROgram(s) Inhaler 2 Puff(s) Inhalation two times a day  dextrose 40% Gel 15 Gram(s) Oral once  dextrose 5%. 1000 milliLiter(s) (50 mL/Hr) IV Continuous <Continuous>  dextrose 5%. 1000 milliLiter(s) (100 mL/Hr) IV Continuous <Continuous>  dextrose 50% Injectable 25 Gram(s) IV Push once  dextrose 50% Injectable 12.5 Gram(s) IV Push once  dextrose 50% Injectable 25 Gram(s) IV Push once  ertapenem  IVPB      ertapenem  IVPB 1000 milliGRAM(s) IV Intermittent every 24 hours  finasteride 5 milliGRAM(s) Oral daily  glucagon  Injectable 1 milliGRAM(s) IntraMuscular once  heparin   Injectable 5000 Unit(s) SubCutaneous every 8 hours  influenza  Vaccine (HIGH DOSE) 0.7 milliLiter(s) IntraMuscular once  insulin glargine Injectable (LANTUS) 18 Unit(s) SubCutaneous at bedtime  insulin lispro (ADMELOG) corrective regimen sliding scale   SubCutaneous three times a day before meals  insulin lispro Injectable (ADMELOG) 8 Unit(s) SubCutaneous three times a day before meals  metoprolol succinate ER 25 milliGRAM(s) Oral daily  pantoprazole    Tablet 40 milliGRAM(s) Oral before breakfast  senna 2 Tablet(s) Oral at bedtime  sodium chloride 0.9%. 1000 milliLiter(s) (75 mL/Hr) IV Continuous <Continuous>  tamsulosin 0.8 milliGRAM(s) Oral at bedtime      Physical Exam  General: Patient comfortable in bed  Vital Signs Last 12 Hrs  T(F): 97.3 (02-20-22 @ 09:05), Max: 97.4 (02-20-22 @ 00:48)  HR: 83 (02-20-22 @ 09:05) (67 - 83)  BP: 136/82 (02-20-22 @ 09:05) (123/85 - 136/82)  BP(mean): --  RR: 18 (02-20-22 @ 09:05) (18 - 18)  SpO2: 100% (02-20-22 @ 09:05) (100% - 100%)    Diagnostics    Beta Hydroxy-Butyrate: SHEILA Fuentes. Collection: 21-Feb-2022 06:00 (02-20 @ 09:00)

## 2022-02-20 NOTE — PROVIDER CONTACT NOTE (OTHER) - BACKGROUND
UTI
2/17 altered mental status. 2/19 tachycardic and vomiting, rapid response called. Urine cultures have e. coli. Blood cultures showed gram negative rods - e. coli. Bacteremia secondary to UTI.

## 2022-02-21 LAB
ALBUMIN SERPL ELPH-MCNC: 2.6 G/DL — LOW (ref 3.3–5)
ALP SERPL-CCNC: 159 U/L — HIGH (ref 40–120)
ALT FLD-CCNC: 19 U/L — SIGNIFICANT CHANGE UP (ref 10–45)
ANION GAP SERPL CALC-SCNC: 8 MMOL/L — SIGNIFICANT CHANGE UP (ref 5–17)
AST SERPL-CCNC: 13 U/L — SIGNIFICANT CHANGE UP (ref 10–40)
B-OH-BUTYR SERPL-SCNC: 0.3 MMOL/L — SIGNIFICANT CHANGE UP
BILIRUB SERPL-MCNC: 0.9 MG/DL — SIGNIFICANT CHANGE UP (ref 0.2–1.2)
BUN SERPL-MCNC: 19 MG/DL — SIGNIFICANT CHANGE UP (ref 7–23)
CALCIUM SERPL-MCNC: 8.2 MG/DL — LOW (ref 8.4–10.5)
CHLORIDE SERPL-SCNC: 105 MMOL/L — SIGNIFICANT CHANGE UP (ref 96–108)
CO2 SERPL-SCNC: 21 MMOL/L — LOW (ref 22–31)
CREAT ?TM UR-MCNC: 122 MG/DL — SIGNIFICANT CHANGE UP
CREAT SERPL-MCNC: 1.52 MG/DL — HIGH (ref 0.5–1.3)
GLUCOSE BLDC GLUCOMTR-MCNC: 107 MG/DL — HIGH (ref 70–99)
GLUCOSE BLDC GLUCOMTR-MCNC: 125 MG/DL — HIGH (ref 70–99)
GLUCOSE BLDC GLUCOMTR-MCNC: 132 MG/DL — HIGH (ref 70–99)
GLUCOSE BLDC GLUCOMTR-MCNC: 154 MG/DL — HIGH (ref 70–99)
GLUCOSE SERPL-MCNC: 156 MG/DL — HIGH (ref 70–99)
GRAM STN FLD: SIGNIFICANT CHANGE UP
HCT VFR BLD CALC: 34.9 % — LOW (ref 39–50)
HCT VFR BLD CALC: 35.8 % — LOW (ref 39–50)
HGB BLD-MCNC: 11.5 G/DL — LOW (ref 13–17)
HGB BLD-MCNC: 11.8 G/DL — LOW (ref 13–17)
MCHC RBC-ENTMCNC: 29.9 PG — SIGNIFICANT CHANGE UP (ref 27–34)
MCHC RBC-ENTMCNC: 30.1 PG — SIGNIFICANT CHANGE UP (ref 27–34)
MCHC RBC-ENTMCNC: 33 GM/DL — SIGNIFICANT CHANGE UP (ref 32–36)
MCHC RBC-ENTMCNC: 33 GM/DL — SIGNIFICANT CHANGE UP (ref 32–36)
MCV RBC AUTO: 90.6 FL — SIGNIFICANT CHANGE UP (ref 80–100)
MCV RBC AUTO: 91.3 FL — SIGNIFICANT CHANGE UP (ref 80–100)
NRBC # BLD: 0 /100 WBCS — SIGNIFICANT CHANGE UP (ref 0–0)
NRBC # BLD: 0 /100 WBCS — SIGNIFICANT CHANGE UP (ref 0–0)
PLATELET # BLD AUTO: 108 K/UL — LOW (ref 150–400)
PLATELET # BLD AUTO: SIGNIFICANT CHANGE UP K/UL (ref 150–400)
POTASSIUM SERPL-MCNC: 4.1 MMOL/L — SIGNIFICANT CHANGE UP (ref 3.5–5.3)
POTASSIUM SERPL-SCNC: 4.1 MMOL/L — SIGNIFICANT CHANGE UP (ref 3.5–5.3)
PROT ?TM UR-MCNC: 123 MG/DL — HIGH (ref 0–12)
PROT SERPL-MCNC: 5.6 G/DL — LOW (ref 6–8.3)
PROT/CREAT UR-RTO: 1 RATIO — HIGH (ref 0–0.2)
RBC # BLD: 3.85 M/UL — LOW (ref 4.2–5.8)
RBC # BLD: 3.92 M/UL — LOW (ref 4.2–5.8)
RBC # FLD: 13.2 % — SIGNIFICANT CHANGE UP (ref 10.3–14.5)
RBC # FLD: 13.4 % — SIGNIFICANT CHANGE UP (ref 10.3–14.5)
SODIUM SERPL-SCNC: 134 MMOL/L — LOW (ref 135–145)
WBC # BLD: 10.66 K/UL — HIGH (ref 3.8–10.5)
WBC # BLD: 11.65 K/UL — HIGH (ref 3.8–10.5)
WBC # FLD AUTO: 10.66 K/UL — HIGH (ref 3.8–10.5)
WBC # FLD AUTO: 11.65 K/UL — HIGH (ref 3.8–10.5)

## 2022-02-21 PROCEDURE — 99232 SBSQ HOSP IP/OBS MODERATE 35: CPT

## 2022-02-21 RX ADMIN — INSULIN GLARGINE 18 UNIT(S): 100 INJECTION, SOLUTION SUBCUTANEOUS at 21:37

## 2022-02-21 RX ADMIN — Medication 25 MILLIGRAM(S): at 05:14

## 2022-02-21 RX ADMIN — Medication 2: at 10:24

## 2022-02-21 RX ADMIN — BUDESONIDE AND FORMOTEROL FUMARATE DIHYDRATE 2 PUFF(S): 160; 4.5 AEROSOL RESPIRATORY (INHALATION) at 05:14

## 2022-02-21 RX ADMIN — Medication 8 UNIT(S): at 18:27

## 2022-02-21 RX ADMIN — SENNA PLUS 2 TABLET(S): 8.6 TABLET ORAL at 21:25

## 2022-02-21 RX ADMIN — TAMSULOSIN HYDROCHLORIDE 0.8 MILLIGRAM(S): 0.4 CAPSULE ORAL at 21:25

## 2022-02-21 RX ADMIN — Medication 8 UNIT(S): at 10:24

## 2022-02-21 RX ADMIN — HEPARIN SODIUM 5000 UNIT(S): 5000 INJECTION INTRAVENOUS; SUBCUTANEOUS at 05:14

## 2022-02-21 RX ADMIN — ERTAPENEM SODIUM 120 MILLIGRAM(S): 1 INJECTION, POWDER, LYOPHILIZED, FOR SOLUTION INTRAMUSCULAR; INTRAVENOUS at 10:25

## 2022-02-21 RX ADMIN — FINASTERIDE 5 MILLIGRAM(S): 5 TABLET, FILM COATED ORAL at 13:37

## 2022-02-21 RX ADMIN — Medication 8 UNIT(S): at 13:40

## 2022-02-21 RX ADMIN — BUDESONIDE AND FORMOTEROL FUMARATE DIHYDRATE 2 PUFF(S): 160; 4.5 AEROSOL RESPIRATORY (INHALATION) at 18:28

## 2022-02-21 RX ADMIN — Medication 81 MILLIGRAM(S): at 13:37

## 2022-02-21 RX ADMIN — HEPARIN SODIUM 5000 UNIT(S): 5000 INJECTION INTRAVENOUS; SUBCUTANEOUS at 21:25

## 2022-02-21 RX ADMIN — HEPARIN SODIUM 5000 UNIT(S): 5000 INJECTION INTRAVENOUS; SUBCUTANEOUS at 13:37

## 2022-02-21 RX ADMIN — PANTOPRAZOLE SODIUM 40 MILLIGRAM(S): 20 TABLET, DELAYED RELEASE ORAL at 05:14

## 2022-02-21 NOTE — PROGRESS NOTE ADULT - SUBJECTIVE AND OBJECTIVE BOX
INFECTIOUS DISEASES FOLLOW UP--Lei Ortiz MD  Pager [phone]    This is a follow up note for this  81y Male with  Urinary tract infection with esbl and bacteremia.  Remains on monitoring for mental state.  Lethargic.    Further ROS:  limited    Allergies  No Known Allergies    ANTIBIOTICS/RELEVANT:  antimicrobials  ertapenem  IVPB      ertapenem  IVPB 1000 milliGRAM(s) IV Intermittent every 24 hours    immunologic:  influenza  Vaccine (HIGH DOSE) 0.7 milliLiter(s) IntraMuscular once    OTHER:  aspirin enteric coated 81 milliGRAM(s) Oral daily  budesonide 160 MICROgram(s)/formoterol 4.5 MICROgram(s) Inhaler 2 Puff(s) Inhalation two times a day  dextrose 40% Gel 15 Gram(s) Oral once  dextrose 5%. 1000 milliLiter(s) IV Continuous <Continuous>  dextrose 5%. 1000 milliLiter(s) IV Continuous <Continuous>  dextrose 50% Injectable 25 Gram(s) IV Push once  dextrose 50% Injectable 12.5 Gram(s) IV Push once  dextrose 50% Injectable 25 Gram(s) IV Push once  finasteride 5 milliGRAM(s) Oral daily  glucagon  Injectable 1 milliGRAM(s) IntraMuscular once  heparin   Injectable 5000 Unit(s) SubCutaneous every 8 hours  insulin glargine Injectable (LANTUS) 18 Unit(s) SubCutaneous at bedtime  insulin lispro (ADMELOG) corrective regimen sliding scale   SubCutaneous three times a day before meals  insulin lispro Injectable (ADMELOG) 8 Unit(s) SubCutaneous three times a day before meals  metoprolol succinate ER 25 milliGRAM(s) Oral daily  pantoprazole    Tablet 40 milliGRAM(s) Oral before breakfast  senna 2 Tablet(s) Oral at bedtime  sodium chloride 0.9%. 1000 milliLiter(s) IV Continuous <Continuous>  tamsulosin 0.8 milliGRAM(s) Oral at bedtime    Objective:  Vital Signs Last 24 Hrs  T(C): 37.3 (21 Feb 2022 05:08), Max: 38 (20 Feb 2022 20:44)  T(F): 99.1 (21 Feb 2022 05:08), Max: 100.4 (20 Feb 2022 20:44)  HR: 96 (21 Feb 2022 05:08) (82 - 98)  BP: 111/58 (21 Feb 2022 05:08) (111/58 - 149/83)  BP(mean): --  RR: 18 (21 Feb 2022 05:08) (18 - 18)  SpO2: 98% (21 Feb 2022 05:08) (97% - 99%)    PHYSICAL EXAM:  Constitutional:no acute distress  Ear/Nose/Throat: no oral lesions, 	  Respiratory: clear BL  Cardiovascular: S1S2  Gastrointestinal:soft, (+) BS, no tenderness  Extremities:no e/e/c  No Lymphadenopathy  IV sites not inflammed.    LABS:                        11.5   11.65 )-----------( 108      ( 21 Feb 2022 06:58 )             34.9     02-21    134<L>  |  105  |  19  ----------------------------<  156<H>  4.1   |  21<L>  |  1.52<H>    Ca    8.2<L>      21 Feb 2022 06:58  Phos  3.7     02-20  Mg     2.0     02-20    TPro  5.6<L>  /  Alb  2.6<L>  /  TBili  0.9  /  DBili  x   /  AST  13  /  ALT  19  /  AlkPhos  159<H>  02-21    MICROBIOLOGY: bc esbl    RADIOLOGY & ADDITIONAL STUDIES:  < from: CT Abdomen and Pelvis w/ IV Cont (02.17.22 @ 01:59) >    BOWEL: No bowel obstruction. Appendix there is normal. No abnormal   colonic wall thickening or pericolonic inflammatory change.  PERITONEUM: No ascites or collection. No free air.  VESSELS: Within normal limits.  RETROPERITONEUM/LYMPH NODES: No lymphadenopathy.  ABDOMINAL WALL: Stable fat-containing inguinal hernias and tiny umbilical   fat-containing hernia.  BONES: Stable. No acute fracture. Mild degenerative changes.    IMPRESSION:  Findings suggest cystitis. No evidence of pyelonephritis at this time.    < end of copied text >

## 2022-02-21 NOTE — PROGRESS NOTE ADULT - SUBJECTIVE AND OBJECTIVE BOX
KAITLIN CALDERON  81y Male  MRN:[id number]    Patient is a 81y old  Male who presents with a chief complaint of AMS/ sepsis / uti    HPI:   81 Y M H/O DM, bph, htn, chol, ,intermittent UTIs with AMs presenting with AMs. Per EMS and family patient has had 1 day of worsened AMS, disorientation similar to prior episode of UTIs. in er pt was minimally responsive, unable to converse, responsive to painful stimuli. Unable to interact with ROS, Per EMS concern for potential aspiration while in transport.  mental status improved weith iv abx and fluids.  being admitted for sepsis 2/2 uti (17 Feb 2022 11:06)      Patient seen and evaluated at bedside.  interval events noted     Interval HPI: tmax o/n 100.4.   tolerating clears diet      PAST MEDICAL & SURGICAL HISTORY:  DM (diabetes mellitus)    HTN (hypertension)    Hypercholesterolemia    CAD (coronary artery disease)    HTN (hypertension)    DM (diabetes mellitus)    S/P TURP    S/P gastrectomy        REVIEW OF SYSTEMS:  as per hpi     VITALS:   Vital Signs Last 24 Hrs  T(C): 37.2 (21 Feb 2022 10:54), Max: 38 (20 Feb 2022 20:44)  T(F): 99 (21 Feb 2022 10:54), Max: 100.4 (20 Feb 2022 20:44)  HR: 93 (21 Feb 2022 10:54) (82 - 98)  BP: 128/83 (21 Feb 2022 10:54) (111/58 - 149/83)  BP(mean): --  RR: 18 (21 Feb 2022 10:54) (18 - 18)  SpO2: 99% (21 Feb 2022 10:54) (97% - 99%)    PHYSICAL EXAM:  GENERAL: NAD, well-developed,  lethargic but arousable.   HEAD:  Atraumatic, Normocephalic  EYES: EOMI, PERRLA, conjunctiva and sclera clear  NECK: Supple, No JVD  CHEST/LUNG: Clear to auscultation bilaterally; No wheeze  HEART: S1, S2; No murmurs, rubs, or gallops  ABDOMEN:  soft, non tender. mild distended.  +BS   EXTREMITIES:  2+ Peripheral Pulses, No clubbing, cyanosis, or edema  PSYCH: Normal affect  NEUROLOGY: AAOX3; non-focal  SKIN: No rashes or lesions    Consultant(s) Notes Reviewed:  [x ] YES  [ ] NO  Care Discussed with Consultants/Other Providers [ x] YES  [ ] NO    MEDS:   MEDICATIONS  (STANDING):  aspirin enteric coated 81 milliGRAM(s) Oral daily  budesonide 160 MICROgram(s)/formoterol 4.5 MICROgram(s) Inhaler 2 Puff(s) Inhalation two times a day  dextrose 40% Gel 15 Gram(s) Oral once  dextrose 5%. 1000 milliLiter(s) (50 mL/Hr) IV Continuous <Continuous>  dextrose 5%. 1000 milliLiter(s) (100 mL/Hr) IV Continuous <Continuous>  dextrose 50% Injectable 25 Gram(s) IV Push once  dextrose 50% Injectable 12.5 Gram(s) IV Push once  dextrose 50% Injectable 25 Gram(s) IV Push once  ertapenem  IVPB      ertapenem  IVPB 1000 milliGRAM(s) IV Intermittent every 24 hours  finasteride 5 milliGRAM(s) Oral daily  glucagon  Injectable 1 milliGRAM(s) IntraMuscular once  heparin   Injectable 5000 Unit(s) SubCutaneous every 8 hours  influenza  Vaccine (HIGH DOSE) 0.7 milliLiter(s) IntraMuscular once  insulin glargine Injectable (LANTUS) 18 Unit(s) SubCutaneous at bedtime  insulin lispro (ADMELOG) corrective regimen sliding scale   SubCutaneous three times a day before meals  insulin lispro Injectable (ADMELOG) 8 Unit(s) SubCutaneous three times a day before meals  metoprolol succinate ER 25 milliGRAM(s) Oral daily  pantoprazole    Tablet 40 milliGRAM(s) Oral before breakfast  senna 2 Tablet(s) Oral at bedtime  sodium chloride 0.9%. 1000 milliLiter(s) (75 mL/Hr) IV Continuous <Continuous>  tamsulosin 0.8 milliGRAM(s) Oral at bedtime    MEDICATIONS  (PRN):        ALLERGIES:  No Known Allergies      LABS:                                                        11.5   11.65 )-----------( 108      ( 21 Feb 2022 06:58 )             34.9   02-21    134<L>  |  105  |  19  ----------------------------<  156<H>  4.1   |  21<L>  |  1.52<H>    Ca    8.2<L>      21 Feb 2022 06:58  Phos  3.7     02-20  Mg     2.0     02-20    TPro  5.6<L>  /  Alb  2.6<L>  /  TBili  0.9  /  DBili  x   /  AST  13  /  ALT  19  /  AlkPhos  159<H>  02-21      < from: Xray Chest 1 View-PORTABLE IMMEDIATE (Xray Chest 1 View-PORTABLE IMMEDIATE .) (02.19.22 @ 15:52) >    IMPRESSION:    Clear lungs.    < end of copied text >  < from: Xray Abdomen 1 View Portable, IMMEDIATE (Xray Abdomen 1 View Portable, IMMEDIATE .) (02.19.22 @ 15:52) >  IMPRESSION:    Nonobstructive bowel gas pattern.    < end of copied text >       :   cultures: Culture Results:   No growth to date. (02-17 @ 06:58)  Culture Results:   Growth in aerobic bottle: Gram Negative Rods  ***Blood Panel PCR results on this specimen are available  approximately 3 hours after the Gram stain result.***  Gram stain, PCR, and/or culture results may not always  correspond due to difference in methodologies.  ************************************************************  This PCR assay was performed by multiplex PCR. This  Assay tests for 66 bacterial and resistance gene targets.  Please refer to the [facility] Labs test directory  at [email or web address] for details. (02-17 @ 06:58)  Culture Results:   >100,000 CFU/ml Gram Negative Rods (02-17 @ 04:03)      RADIOLOGY & ADDITIONAL TESTS:    Imaging Personally Reviewed:  [ ] YES  [ ] NO

## 2022-02-21 NOTE — PROGRESS NOTE ADULT - ASSESSMENT
80 yo male h/o DM, htn, chol, bph, p/w sepsis, 2/2 uti  pt with sepsis present on admission    sepsis 2/2 uti  urine and blood cultures growing Ecoli.   ESBL  resistant to ceftriax.   abx changed from ceftriax to invanz. now day 3  ID f/u apprec  will need long term abx till 3/4. plan for midline placement       bacteremia  likely 2/2 uti  now on invanz day 3  cult and sens noted.   f/u repeat blood cultures to ensure clearance.    long term abx as noted above.      vomiting  likely 2/2 sepsis and uti  CT and abd xray noted.  no acute gi pathology  tolerated clears.  advance diet as tolerated   zofran prn  gi f/u      metabolic encephalopathy  2/2 sepsis  improved     DM  on insulin  monitor fs  endo f/u    ckd  with chanel  creat still rising today  cont iv fluids  avoid nephrotoxins  renal f/u  check bladder scan    cont other home meds      dvt ppx    d/w RN at bedside     Advanced care planning was discussed with patient and family.  Advanced care planning forms were reviewed and discussed as appropriate.  Differential diagnosis and plan of care discussed with patient after the evaluation.   Pain assessed and judicious use of narcotics when appropriate was discussed.  Importance of Fall prevention discussed.  Counseling on Smoking and Alcohol cessation was offered when appropriate.  Counseling on Diet, exercise, and medication compliance was done.         Approx 60 minutes spent.

## 2022-02-21 NOTE — PROGRESS NOTE ADULT - SUBJECTIVE AND OBJECTIVE BOX
[facility]  Sudhir Duffy PA-C  [address] [place] [address]  [phone]      INTERVAL HPI/OVERNIGHT EVENTS:  patient s/e, feels better  no new events    MEDICATIONS  (STANDING):  aspirin enteric coated 81 milliGRAM(s) Oral daily  budesonide 160 MICROgram(s)/formoterol 4.5 MICROgram(s) Inhaler 2 Puff(s) Inhalation two times a day  cefTRIAXone   IVPB 1000 milliGRAM(s) IV Intermittent every 24 hours  dextrose 40% Gel 15 Gram(s) Oral once  dextrose 5%. 1000 milliLiter(s) (50 mL/Hr) IV Continuous <Continuous>  dextrose 5%. 1000 milliLiter(s) (100 mL/Hr) IV Continuous <Continuous>  dextrose 50% Injectable 25 Gram(s) IV Push once  dextrose 50% Injectable 12.5 Gram(s) IV Push once  dextrose 50% Injectable 25 Gram(s) IV Push once  finasteride 5 milliGRAM(s) Oral daily  glucagon  Injectable 1 milliGRAM(s) IntraMuscular once  heparin   Injectable 5000 Unit(s) SubCutaneous every 8 hours  influenza  Vaccine (HIGH DOSE) 0.7 milliLiter(s) IntraMuscular once  insulin glargine Injectable (LANTUS) 16 Unit(s) SubCutaneous at bedtime  insulin lispro (ADMELOG) corrective regimen sliding scale   SubCutaneous three times a day before meals  insulin lispro Injectable (ADMELOG) 8 Unit(s) SubCutaneous three times a day before meals  metoprolol succinate ER 25 milliGRAM(s) Oral daily  pantoprazole    Tablet 40 milliGRAM(s) Oral before breakfast  senna 2 Tablet(s) Oral at bedtime  tamsulosin 0.8 milliGRAM(s) Oral at bedtime    MEDICATIONS  (PRN):      Allergies    No Known Allergies    Intolerances        ROS:   General:  No wt loss, fevers, chills, night sweats, fatigue,   Eyes:  Good vision, no reported pain  ENT:  No sore throat, pain, runny nose, dysphagia  CV:  No pain, palpitations, hypo/hypertension  Resp:  No dyspnea, cough, tachypnea, wheezing  GI:  No pain, No nausea, No vomiting, No diarrhea, No constipation, No weight loss, No fever, No pruritis, No rectal bleeding, No tarry stools, No dysphagia,  :  No pain, bleeding, incontinence, nocturia  Muscle:  No pain, weakness  Neuro:  No weakness, tingling, memory problems  Psych:  No fatigue, insomnia, mood problems, depression  Endocrine:  No polyuria, polydipsia, cold/heat intolerance  Heme:  No petechiae, ecchymosis, easy bruisability  Skin:  No rash, tattoos, scars, edema      PHYSICAL EXAM:   Vital Signs Last 24 Hrs  T(C): 37.2 (2022 10:54), Max: 38 (2022 20:44)  T(F): 99 (2022 10:54), Max: 100.4 (2022 20:44)  HR: 93 (2022 10:54) (82 - 98)  BP: 128/83 (2022 10:54) (111/58 - 149/83)  BP(mean): --  RR: 18 (2022 10:54) (18 - 18)  SpO2: 99% (2022 10:54) (97% - 99%)  Daily     Daily   I&O's Summary    2022 07:01  -  2022 07:00  --------------------------------------------------------  IN: 1610 mL / OUT: 1100 mL / NET: 510 mL    2022 07:01  -  2022 11:04  --------------------------------------------------------  IN: 300 mL / OUT: 300 mL / NET: 0 mL        GENERAL:  Appears stated age,   HEENT:  NC/AT,    CHEST:  Full & symmetric excursion,   HEART:  Regular rhythm,  ABDOMEN:  Soft, non-tender, non-distended,  EXTEREMITIES:  no cyanosis  SKIN:  No rash  NEURO:  Alert,       LABS:                                   11.5   11.65 )-----------( 108      ( 2022 06:58 )             34.9   02-21    134<L>  |  105  |  19  ----------------------------<  156<H>  4.1   |  21<L>  |  1.52<H>    Ca    8.2<L>      2022 06:58  Phos  3.7     -  Mg     2.0         TPro  5.6<L>  /  Alb  2.6<L>  /  TBili  0.9  /  DBili  x   /  AST  13  /  ALT  19  /  AlkPhos  159<H>        Urinalysis Basic - ( 2022 23:26 )    Color: Yellow / Appearance: Turbid / S.015 / pH: x  Gluc: x / Ketone: Small  / Bili: Negative / Urobili: Negative   Blood: x / Protein: 300 mg/dL / Nitrite: Positive   Leuk Esterase: Large / RBC: 31 /hpf /  /HPF   Sq Epi: x / Non Sq Epi: 5 /hpf / Bacteria: Many        RADIOLOGY & ADDITIONAL TESTS:

## 2022-02-21 NOTE — PROGRESS NOTE ADULT - ASSESSMENT
Imp/rx:  The patient has a complicated UTI with bacteremia.  On appropriate abx-invanz.  No  obstruction on imaging.    Plan on invanz thru March 4th, 2022.  Midline or pICC placement.  DC planning.

## 2022-02-21 NOTE — PROGRESS NOTE ADULT - ASSESSMENT
Assessment  DMT2: 81y Male with DM T2 with hyperglycemia, A1C 11.1%, per chart was on insulin at home, ?compliance, now on basal bolus insulin, bolus doses held yesterday 2/2 NPO status, Lantus dose increased, blood sugars appear to be improving, no hypoglycemic episodes, diet advanced, NAD.  UTI: On meds, monitored.  HTN: Controlled,  on antihypertensive medications.  CKD: Monitor labs/BMP,         nelda Kirk moya  [phone]

## 2022-02-22 LAB
ANION GAP SERPL CALC-SCNC: 11 MMOL/L — SIGNIFICANT CHANGE UP (ref 5–17)
BUN SERPL-MCNC: 15 MG/DL — SIGNIFICANT CHANGE UP (ref 7–23)
CALCIUM SERPL-MCNC: 8.4 MG/DL — SIGNIFICANT CHANGE UP (ref 8.4–10.5)
CHLORIDE SERPL-SCNC: 104 MMOL/L — SIGNIFICANT CHANGE UP (ref 96–108)
CO2 SERPL-SCNC: 21 MMOL/L — LOW (ref 22–31)
CREAT SERPL-MCNC: 1.29 MG/DL — SIGNIFICANT CHANGE UP (ref 0.5–1.3)
CULTURE RESULTS: SIGNIFICANT CHANGE UP
GLUCOSE BLDC GLUCOMTR-MCNC: 169 MG/DL — HIGH (ref 70–99)
GLUCOSE BLDC GLUCOMTR-MCNC: 187 MG/DL — HIGH (ref 70–99)
GLUCOSE BLDC GLUCOMTR-MCNC: 207 MG/DL — HIGH (ref 70–99)
GLUCOSE BLDC GLUCOMTR-MCNC: 214 MG/DL — HIGH (ref 70–99)
GLUCOSE BLDC GLUCOMTR-MCNC: 216 MG/DL — HIGH (ref 70–99)
GLUCOSE SERPL-MCNC: 210 MG/DL — HIGH (ref 70–99)
HCT VFR BLD CALC: 36.9 % — LOW (ref 39–50)
HGB BLD-MCNC: 11.7 G/DL — LOW (ref 13–17)
MCHC RBC-ENTMCNC: 29.3 PG — SIGNIFICANT CHANGE UP (ref 27–34)
MCHC RBC-ENTMCNC: 31.7 GM/DL — LOW (ref 32–36)
MCV RBC AUTO: 92.5 FL — SIGNIFICANT CHANGE UP (ref 80–100)
NRBC # BLD: 0 /100 WBCS — SIGNIFICANT CHANGE UP (ref 0–0)
PLATELET # BLD AUTO: SIGNIFICANT CHANGE UP K/UL (ref 150–400)
POTASSIUM SERPL-MCNC: 4.2 MMOL/L — SIGNIFICANT CHANGE UP (ref 3.5–5.3)
POTASSIUM SERPL-SCNC: 4.2 MMOL/L — SIGNIFICANT CHANGE UP (ref 3.5–5.3)
RBC # BLD: 3.99 M/UL — LOW (ref 4.2–5.8)
RBC # FLD: 13.4 % — SIGNIFICANT CHANGE UP (ref 10.3–14.5)
SODIUM SERPL-SCNC: 136 MMOL/L — SIGNIFICANT CHANGE UP (ref 135–145)
SPECIMEN SOURCE: SIGNIFICANT CHANGE UP
WBC # BLD: 10.39 K/UL — SIGNIFICANT CHANGE UP (ref 3.8–10.5)
WBC # FLD AUTO: 10.39 K/UL — SIGNIFICANT CHANGE UP (ref 3.8–10.5)

## 2022-02-22 PROCEDURE — 99232 SBSQ HOSP IP/OBS MODERATE 35: CPT

## 2022-02-22 RX ORDER — MULTIVIT WITH MIN/MFOLATE/K2 340-15/3 G
1 POWDER (GRAM) ORAL ONCE
Refills: 0 | Status: DISCONTINUED | OUTPATIENT
Start: 2022-02-22 | End: 2022-02-23

## 2022-02-22 RX ORDER — INSULIN GLARGINE 100 [IU]/ML
20 INJECTION, SOLUTION SUBCUTANEOUS AT BEDTIME
Refills: 0 | Status: DISCONTINUED | OUTPATIENT
Start: 2022-02-22 | End: 2022-02-23

## 2022-02-22 RX ORDER — LACTULOSE 10 G/15ML
30 SOLUTION ORAL ONCE
Refills: 0 | Status: COMPLETED | OUTPATIENT
Start: 2022-02-22 | End: 2022-02-22

## 2022-02-22 RX ADMIN — PANTOPRAZOLE SODIUM 40 MILLIGRAM(S): 20 TABLET, DELAYED RELEASE ORAL at 05:23

## 2022-02-22 RX ADMIN — INSULIN GLARGINE 20 UNIT(S): 100 INJECTION, SOLUTION SUBCUTANEOUS at 21:32

## 2022-02-22 RX ADMIN — HEPARIN SODIUM 5000 UNIT(S): 5000 INJECTION INTRAVENOUS; SUBCUTANEOUS at 21:18

## 2022-02-22 RX ADMIN — Medication 4: at 18:56

## 2022-02-22 RX ADMIN — ERTAPENEM SODIUM 120 MILLIGRAM(S): 1 INJECTION, POWDER, LYOPHILIZED, FOR SOLUTION INTRAMUSCULAR; INTRAVENOUS at 16:02

## 2022-02-22 RX ADMIN — TAMSULOSIN HYDROCHLORIDE 0.8 MILLIGRAM(S): 0.4 CAPSULE ORAL at 21:19

## 2022-02-22 RX ADMIN — Medication 8 UNIT(S): at 18:56

## 2022-02-22 RX ADMIN — Medication 8 UNIT(S): at 09:49

## 2022-02-22 RX ADMIN — FINASTERIDE 5 MILLIGRAM(S): 5 TABLET, FILM COATED ORAL at 16:01

## 2022-02-22 RX ADMIN — LACTULOSE 30 GRAM(S): 10 SOLUTION ORAL at 10:02

## 2022-02-22 RX ADMIN — Medication 8 UNIT(S): at 14:00

## 2022-02-22 RX ADMIN — HEPARIN SODIUM 5000 UNIT(S): 5000 INJECTION INTRAVENOUS; SUBCUTANEOUS at 16:01

## 2022-02-22 RX ADMIN — SENNA PLUS 2 TABLET(S): 8.6 TABLET ORAL at 21:19

## 2022-02-22 RX ADMIN — BUDESONIDE AND FORMOTEROL FUMARATE DIHYDRATE 2 PUFF(S): 160; 4.5 AEROSOL RESPIRATORY (INHALATION) at 18:57

## 2022-02-22 RX ADMIN — Medication 81 MILLIGRAM(S): at 16:01

## 2022-02-22 RX ADMIN — Medication 25 MILLIGRAM(S): at 05:24

## 2022-02-22 RX ADMIN — HEPARIN SODIUM 5000 UNIT(S): 5000 INJECTION INTRAVENOUS; SUBCUTANEOUS at 05:23

## 2022-02-22 RX ADMIN — BUDESONIDE AND FORMOTEROL FUMARATE DIHYDRATE 2 PUFF(S): 160; 4.5 AEROSOL RESPIRATORY (INHALATION) at 05:23

## 2022-02-22 RX ADMIN — Medication 2: at 09:48

## 2022-02-22 RX ADMIN — Medication 2: at 14:00

## 2022-02-22 NOTE — PROGRESS NOTE ADULT - SUBJECTIVE AND OBJECTIVE BOX
KAITLIN CALDERON  81y Male  MRN:[id number]    Patient is a 81y old  Male who presents with a chief complaint of AMS/ sepsis / uti    HPI:   81 Y M H/O DM, bph, htn, chol, ,intermittent UTIs with AMs presenting with AMs. Per EMS and family patient has had 1 day of worsened AMS, disorientation similar to prior episode of UTIs. in er pt was minimally responsive, unable to converse, responsive to painful stimuli. Unable to interact with ROS, Per EMS concern for potential aspiration while in transport.  mental status improved weith iv abx and fluids.  being admitted for sepsis 2/2 uti (17 Feb 2022 11:06)      Patient seen and evaluated at bedside.  interval events noted     Interval HPI: no acute events o/n. tolerating diet. no BM    PAST MEDICAL & SURGICAL HISTORY:  DM (diabetes mellitus)    HTN (hypertension)    Hypercholesterolemia    CAD (coronary artery disease)    HTN (hypertension)    DM (diabetes mellitus)    S/P TURP    S/P gastrectomy        REVIEW OF SYSTEMS:  as per hpi     VITALS:   Vital Signs Last 24 Hrs  T(C): 36.5 (22 Feb 2022 08:45), Max: 37.4 (21 Feb 2022 21:51)  T(F): 97.7 (22 Feb 2022 08:45), Max: 99.3 (21 Feb 2022 21:51)  HR: 70 (22 Feb 2022 08:45) (70 - 90)  BP: 117/68 (22 Feb 2022 08:45) (117/68 - 140/66)  BP(mean): --  RR: 18 (22 Feb 2022 08:45) (18 - 18)  SpO2: 97% (22 Feb 2022 08:45) (96% - 98%)    PHYSICAL EXAM:  GENERAL: NAD, well-developed,  lethargic but arousable.   HEAD:  Atraumatic, Normocephalic  EYES: EOMI, PERRLA, conjunctiva and sclera clear  NECK: Supple, No JVD  CHEST/LUNG: Clear to auscultation bilaterally; No wheeze  HEART: S1, S2; No murmurs, rubs, or gallops  ABDOMEN:  soft, non tender. mild distended.  +BS   EXTREMITIES:  2+ Peripheral Pulses, No clubbing, cyanosis, or edema  PSYCH: Normal affect  NEUROLOGY: AAOX3; non-focal  SKIN: No rashes or lesions    Consultant(s) Notes Reviewed:  [x ] YES  [ ] NO  Care Discussed with Consultants/Other Providers [ x] YES  [ ] NO    MEDS:   MEDICATIONS  (STANDING):  aspirin enteric coated 81 milliGRAM(s) Oral daily  budesonide 160 MICROgram(s)/formoterol 4.5 MICROgram(s) Inhaler 2 Puff(s) Inhalation two times a day  dextrose 40% Gel 15 Gram(s) Oral once  dextrose 5%. 1000 milliLiter(s) (50 mL/Hr) IV Continuous <Continuous>  dextrose 5%. 1000 milliLiter(s) (100 mL/Hr) IV Continuous <Continuous>  dextrose 50% Injectable 25 Gram(s) IV Push once  dextrose 50% Injectable 12.5 Gram(s) IV Push once  dextrose 50% Injectable 25 Gram(s) IV Push once  ertapenem  IVPB      ertapenem  IVPB 1000 milliGRAM(s) IV Intermittent every 24 hours  finasteride 5 milliGRAM(s) Oral daily  glucagon  Injectable 1 milliGRAM(s) IntraMuscular once  heparin   Injectable 5000 Unit(s) SubCutaneous every 8 hours  influenza  Vaccine (HIGH DOSE) 0.7 milliLiter(s) IntraMuscular once  insulin glargine Injectable (LANTUS) 20 Unit(s) SubCutaneous at bedtime  insulin lispro (ADMELOG) corrective regimen sliding scale   SubCutaneous three times a day before meals  insulin lispro Injectable (ADMELOG) 8 Unit(s) SubCutaneous three times a day before meals  metoprolol succinate ER 25 milliGRAM(s) Oral daily  pantoprazole    Tablet 40 milliGRAM(s) Oral before breakfast  senna 2 Tablet(s) Oral at bedtime  sodium chloride 0.9%. 1000 milliLiter(s) (75 mL/Hr) IV Continuous <Continuous>  tamsulosin 0.8 milliGRAM(s) Oral at bedtime    MEDICATIONS  (PRN):        ALLERGIES:  No Known Allergies      LABS:                                               11.7   10.39 )-----------( Clumped    ( 22 Feb 2022 06:58 )             36.9   02-22    136  |  104  |  15  ----------------------------<  210<H>  4.2   |  21<L>  |  1.29    Ca    8.4      22 Feb 2022 06:56    TPro  5.6<L>  /  Alb  2.6<L>  /  TBili  0.9  /  DBili  x   /  AST  13  /  ALT  19  /  AlkPhos  159<H>  02-21        < from: Xray Chest 1 View-PORTABLE IMMEDIATE (Xray Chest 1 View-PORTABLE IMMEDIATE .) (02.19.22 @ 15:52) >    IMPRESSION:    Clear lungs.    < end of copied text >  < from: Xray Abdomen 1 View Portable, IMMEDIATE (Xray Abdomen 1 View Portable, IMMEDIATE .) (02.19.22 @ 15:52) >  IMPRESSION:    Nonobstructive bowel gas pattern.    < end of copied text >       :   cultures: Culture Results:   No growth to date. (02-17 @ 06:58)  Culture Results:   Growth in aerobic bottle: Gram Negative Rods  ***Blood Panel PCR results on this specimen are available  approximately 3 hours after the Gram stain result.***  Gram stain, PCR, and/or culture results may not always  correspond due to difference in methodologies.  ************************************************************  This PCR assay was performed by multiplex PCR. This  Assay tests for 66 bacterial and resistance gene targets.  Please refer to the [facility] Labs test directory  at [email or web address] for details. (02-17 @ 06:58)  Culture Results:   >100,000 CFU/ml Gram Negative Rods (02-17 @ 04:03)      RADIOLOGY & ADDITIONAL TESTS:    Imaging Personally Reviewed:  [ ] YES  [ ] NO

## 2022-02-22 NOTE — PROGRESS NOTE ADULT - SUBJECTIVE AND OBJECTIVE BOX
Chief complaint  Patient is a 81y old  Male who presents with a chief complaint of  Review of systems  Patient in bed, looks comfortable, no hypoglycemic episodes.    Labs and Fingersticks  CAPILLARY BLOOD GLUCOSE      POCT Blood Glucose.: 169 mg/dL (22 Feb 2022 13:39)  POCT Blood Glucose.: 187 mg/dL (22 Feb 2022 09:45)  POCT Blood Glucose.: 207 mg/dL (22 Feb 2022 09:22)  POCT Blood Glucose.: 107 mg/dL (21 Feb 2022 21:33)  POCT Blood Glucose.: 132 mg/dL (21 Feb 2022 18:09)      Anion Gap, Serum: 11 (02-22 @ 06:56)  Anion Gap, Serum: 8 (02-21 @ 06:58)      Calcium, Total Serum: 8.4 (02-22 @ 06:56)  Calcium, Total Serum: 8.2 *L* (02-21 @ 06:58)  Albumin, Serum: 2.6 *L* (02-21 @ 06:58)    Alanine Aminotransferase (ALT/SGPT): 19 (02-21 @ 06:58)  Alkaline Phosphatase, Serum: 159 *H* (02-21 @ 06:58)  Aspartate Aminotransferase (AST/SGOT): 13 (02-21 @ 06:58)        02-22    136  |  104  |  15  ----------------------------<  210<H>  4.2   |  21<L>  |  1.29    Ca    8.4      22 Feb 2022 06:56    TPro  5.6<L>  /  Alb  2.6<L>  /  TBili  0.9  /  DBili  x   /  AST  13  /  ALT  19  /  AlkPhos  159<H>  02-21                        11.7   10.39 )-----------( Clumped    ( 22 Feb 2022 06:58 )             36.9     Medications  MEDICATIONS  (STANDING):  aspirin enteric coated 81 milliGRAM(s) Oral daily  budesonide 160 MICROgram(s)/formoterol 4.5 MICROgram(s) Inhaler 2 Puff(s) Inhalation two times a day  dextrose 40% Gel 15 Gram(s) Oral once  dextrose 5%. 1000 milliLiter(s) (50 mL/Hr) IV Continuous <Continuous>  dextrose 5%. 1000 milliLiter(s) (100 mL/Hr) IV Continuous <Continuous>  dextrose 50% Injectable 25 Gram(s) IV Push once  dextrose 50% Injectable 12.5 Gram(s) IV Push once  dextrose 50% Injectable 25 Gram(s) IV Push once  ertapenem  IVPB      ertapenem  IVPB 1000 milliGRAM(s) IV Intermittent every 24 hours  finasteride 5 milliGRAM(s) Oral daily  glucagon  Injectable 1 milliGRAM(s) IntraMuscular once  heparin   Injectable 5000 Unit(s) SubCutaneous every 8 hours  influenza  Vaccine (HIGH DOSE) 0.7 milliLiter(s) IntraMuscular once  insulin glargine Injectable (LANTUS) 20 Unit(s) SubCutaneous at bedtime  insulin lispro (ADMELOG) corrective regimen sliding scale   SubCutaneous three times a day before meals  insulin lispro Injectable (ADMELOG) 8 Unit(s) SubCutaneous three times a day before meals  metoprolol succinate ER 25 milliGRAM(s) Oral daily  pantoprazole    Tablet 40 milliGRAM(s) Oral before breakfast  senna 2 Tablet(s) Oral at bedtime  sodium chloride 0.9%. 1000 milliLiter(s) (75 mL/Hr) IV Continuous <Continuous>  tamsulosin 0.8 milliGRAM(s) Oral at bedtime      Physical Exam  General: Patient comfortable in bed  Vital Signs Last 12 Hrs  T(F): 100.1 (02-22-22 @ 13:25), Max: 100.1 (02-22-22 @ 13:25)  HR: 89 (02-22-22 @ 13:25) (70 - 89)  BP: 131/70 (02-22-22 @ 13:25) (117/68 - 140/66)  BP(mean): --  RR: 20 (02-22-22 @ 13:25) (18 - 20)  SpO2: 98% (02-22-22 @ 13:25) (96% - 98%)  Neck: No palpable thyroid nodules.  CVS: S1S2, No murmurs  Respiratory: No wheezing, no crepitations  GI: Abdomen soft, bowel sounds positive  Musculoskeletal:  edema lower extremities.   Skin: No skin rashes, no ecchymosis    Diagnostics    Beta Hydroxy-Butyrate: AM Sched. Collection: 21-Feb-2022 06:00 (02-20 @ 09:00)

## 2022-02-22 NOTE — PROGRESS NOTE ADULT - ASSESSMENT
nausea/vomiting  UTI  cysitits    anti-emetics as needed  n/v likely related to UTI  diet as tolerated  antibiotics per primary  lactulose x 1  monitor stool count   will follow

## 2022-02-22 NOTE — PROGRESS NOTE ADULT - SUBJECTIVE AND OBJECTIVE BOX
[facility]  Sudhir Duffy PA-C  [address] [place] [address]  [phone]      INTERVAL HPI/OVERNIGHT EVENTS:  patient s/e, feels better, no N/V  tolerating full liquids, will advance to reg diet   pt without BM for a few days, Ordered lactulose x 1     MEDICATIONS  (STANDING):  aspirin enteric coated 81 milliGRAM(s) Oral daily  budesonide 160 MICROgram(s)/formoterol 4.5 MICROgram(s) Inhaler 2 Puff(s) Inhalation two times a day  cefTRIAXone   IVPB 1000 milliGRAM(s) IV Intermittent every 24 hours  dextrose 40% Gel 15 Gram(s) Oral once  dextrose 5%. 1000 milliLiter(s) (50 mL/Hr) IV Continuous <Continuous>  dextrose 5%. 1000 milliLiter(s) (100 mL/Hr) IV Continuous <Continuous>  dextrose 50% Injectable 25 Gram(s) IV Push once  dextrose 50% Injectable 12.5 Gram(s) IV Push once  dextrose 50% Injectable 25 Gram(s) IV Push once  finasteride 5 milliGRAM(s) Oral daily  glucagon  Injectable 1 milliGRAM(s) IntraMuscular once  heparin   Injectable 5000 Unit(s) SubCutaneous every 8 hours  influenza  Vaccine (HIGH DOSE) 0.7 milliLiter(s) IntraMuscular once  insulin glargine Injectable (LANTUS) 16 Unit(s) SubCutaneous at bedtime  insulin lispro (ADMELOG) corrective regimen sliding scale   SubCutaneous three times a day before meals  insulin lispro Injectable (ADMELOG) 8 Unit(s) SubCutaneous three times a day before meals  metoprolol succinate ER 25 milliGRAM(s) Oral daily  pantoprazole    Tablet 40 milliGRAM(s) Oral before breakfast  senna 2 Tablet(s) Oral at bedtime  tamsulosin 0.8 milliGRAM(s) Oral at bedtime    MEDICATIONS  (PRN):      Allergies    No Known Allergies    Intolerances        ROS:   General:  No wt loss, fevers, chills, night sweats, fatigue,   Eyes:  Good vision, no reported pain  ENT:  No sore throat, pain, runny nose, dysphagia  CV:  No pain, palpitations, hypo/hypertension  Resp:  No dyspnea, cough, tachypnea, wheezing  GI:  No pain, No nausea, No vomiting, No diarrhea, + constipation, No weight loss, No fever, No pruritis, No rectal bleeding, No tarry stools, No dysphagia,  :  No pain, bleeding, incontinence, nocturia  Muscle:  No pain, weakness  Neuro:  No weakness, tingling, memory problems  Psych:  No fatigue, insomnia, mood problems, depression  Endocrine:  No polyuria, polydipsia, cold/heat intolerance  Heme:  No petechiae, ecchymosis, easy bruisability  Skin:  No rash, tattoos, scars, edema      PHYSICAL EXAM:   Vital Signs Last 24 Hrs  T(C): 36.5 (2022 08:45), Max: 37.4 (2022 21:51)  T(F): 97.7 (2022 08:45), Max: 99.3 (2022 21:51)  HR: 70 (2022 08:45) (70 - 90)  BP: 117/68 (2022 08:45) (112/75 - 140/66)  BP(mean): --  RR: 18 (2022 08:45) (18 - 18)  SpO2: 97% (2022 08:45) (96% - 99%)  Daily     Daily   I&O's Summary    2022 07:  -  2022 07:00  --------------------------------------------------------  IN: 2200 mL / OUT: 1300 mL / NET: 900 mL    2022 07:01  -  2022 11:12  --------------------------------------------------------  IN: 240 mL / OUT: 100 mL / NET: 140 mL          GENERAL:  Appears stated age,   HEENT:  NC/AT,    CHEST:  Full & symmetric excursion,   HEART:  Regular rhythm,  ABDOMEN:  Soft, non-tender, non-distended,  EXTEREMITIES:  no cyanosis  SKIN:  No rash  NEURO:  Alert,       LABS:                                   11.7   10.39 )-----------( Clumped    ( 2022 06:58 )             36.9       136  |  104  |  15  ----------------------------<  210<H>  4.2   |  21<L>  |  1.29    Ca    8.4      2022 06:56    TPro  5.6<L>  /  Alb  2.6<L>  /  TBili  0.9  /  DBili  x   /  AST  13  /  ALT  19  /  AlkPhos  159<H>  02-        Urinalysis Basic - ( 2022 23:26 )    Color: Yellow / Appearance: Turbid / S.015 / pH: x  Gluc: x / Ketone: Small  / Bili: Negative / Urobili: Negative   Blood: x / Protein: 300 mg/dL / Nitrite: Positive   Leuk Esterase: Large / RBC: 31 /hpf /  /HPF   Sq Epi: x / Non Sq Epi: 5 /hpf / Bacteria: Many        RADIOLOGY & ADDITIONAL TESTS:

## 2022-02-22 NOTE — PROGRESS NOTE ADULT - ASSESSMENT
Assessment  DMT2: 81y Male with DM T2 with hyperglycemia, A1C 11.1%, per chart was on insulin at home, ?compliance, now on basal bolus insulin, blood sugars are fluctuating/running high this AM, no hypoglycemic episodes, eating meals, NAD.  UTI: On meds, monitored.  HTN: Controlled,  on antihypertensive medications.  CKD: Monitor labs/BMP,         nelda Kirk moya  [phone]

## 2022-02-22 NOTE — PROGRESS NOTE ADULT - SUBJECTIVE AND OBJECTIVE BOX
INFECTIOUS DISEASES FOLLOW UP--Lei Ortiz MD  Pager [phone]    This is a follow up note for this  81y Male with  Urinary tract infection/bacteremia.  Mental status still 'off'.  tolerating invanz--midline now in.    Further ROS:  cannot obtain    Allergies  No Known Allergies    ANTIBIOTICS/RELEVANT:  antimicrobials  ertapenem  IVPB      ertapenem  IVPB 1000 milliGRAM(s) IV Intermittent every 24 hours    immunologic:  influenza  Vaccine (HIGH DOSE) 0.7 milliLiter(s) IntraMuscular once    OTHER:  aspirin enteric coated 81 milliGRAM(s) Oral daily  budesonide 160 MICROgram(s)/formoterol 4.5 MICROgram(s) Inhaler 2 Puff(s) Inhalation two times a day  dextrose 40% Gel 15 Gram(s) Oral once  dextrose 5%. 1000 milliLiter(s) IV Continuous <Continuous>  dextrose 5%. 1000 milliLiter(s) IV Continuous <Continuous>  dextrose 50% Injectable 25 Gram(s) IV Push once  dextrose 50% Injectable 12.5 Gram(s) IV Push once  dextrose 50% Injectable 25 Gram(s) IV Push once  finasteride 5 milliGRAM(s) Oral daily  glucagon  Injectable 1 milliGRAM(s) IntraMuscular once  heparin   Injectable 5000 Unit(s) SubCutaneous every 8 hours  insulin glargine Injectable (LANTUS) 20 Unit(s) SubCutaneous at bedtime  insulin lispro (ADMELOG) corrective regimen sliding scale   SubCutaneous three times a day before meals  insulin lispro Injectable (ADMELOG) 8 Unit(s) SubCutaneous three times a day before meals  metoprolol succinate ER 25 milliGRAM(s) Oral daily  pantoprazole    Tablet 40 milliGRAM(s) Oral before breakfast  senna 2 Tablet(s) Oral at bedtime  sodium chloride 0.9%. 1000 milliLiter(s) IV Continuous <Continuous>  tamsulosin 0.8 milliGRAM(s) Oral at bedtime    Objective:  Vital Signs Last 24 Hrs  T(C): 37.8 (22 Feb 2022 13:25), Max: 37.8 (22 Feb 2022 13:25)  T(F): 100.1 (22 Feb 2022 13:25), Max: 100.1 (22 Feb 2022 13:25)  HR: 89 (22 Feb 2022 13:25) (70 - 90)  BP: 131/70 (22 Feb 2022 13:25) (117/68 - 140/66)  BP(mean): --  RR: 20 (22 Feb 2022 13:25) (18 - 20)  SpO2: 98% (22 Feb 2022 13:25) (96% - 98%)    PHYSICAL EXAM:  Constitutional:no acute distress  Ear/Nose/Throat: no oral lesions, 	  Respiratory: clear BL  Cardiovascular: S1S2  Gastrointestinal:soft, (+) BS, no tenderness  Extremities:no e/e/c  No Lymphadenopathy  IV sites not inflammed.    LABS:                        11.7   10.39 )-----------( Clumped    ( 22 Feb 2022 06:58 )             36.9     02-22    136  |  104  |  15  ----------------------------<  210<H>  4.2   |  21<L>  |  1.29    Ca    8.4      22 Feb 2022 06:56    TPro  5.6<L>  /  Alb  2.6<L>  /  TBili  0.9  /  DBili  x   /  AST  13  /  ALT  19  /  AlkPhos  159<H>  02-21    imp/rx:  Tolerating invanz  midline in place.  plan invanz thru 3/4.

## 2022-02-22 NOTE — PROGRESS NOTE ADULT - ASSESSMENT
80 yo male h/o DM, htn, chol, bph, p/w sepsis, 2/2 uti  pt with sepsis present on admission    sepsis 2/2 uti  urine and blood cultures growing Ecoli.   ESBL  resistant to ceftriax.   abx changed from ceftriax to invanz. now day 3  ID f/u apprec  will need long term abx till 3/4. plan for midline placement       bacteremia  likely 2/2 uti  now on invanz day 3  cult and sens noted.   f/u repeat blood cultures to ensure clearance.  - ngtd  long term abx as noted above.      vomiting  likely 2/2 sepsis and uti  CT and abd xray noted.  no acute gi pathology  tolerating diet .  advance diet as tolerated   zofran prn  gi f/u   bowel regimen for constipation      metabolic encephalopathy  2/2 sepsis  improved     DM  on insulin  monitor fs  endo f/u    ckd  with chanel  creat improved today    iv fluids as per renal  avoid nephrotoxins  renal f/u       cont other home meds      dvt ppx    d/w RN at bedside     Advanced care planning was discussed with patient and family.  Advanced care planning forms were reviewed and discussed as appropriate.  Differential diagnosis and plan of care discussed with patient after the evaluation.   Pain assessed and judicious use of narcotics when appropriate was discussed.  Importance of Fall prevention discussed.  Counseling on Smoking and Alcohol cessation was offered when appropriate.  Counseling on Diet, exercise, and medication compliance was done.         Approx 60 minutes spent.

## 2022-02-23 LAB
-  AMIKACIN: SIGNIFICANT CHANGE UP
-  AMPICILLIN/SULBACTAM: SIGNIFICANT CHANGE UP
-  AMPICILLIN: SIGNIFICANT CHANGE UP
-  AZTREONAM: SIGNIFICANT CHANGE UP
-  CEFAZOLIN: SIGNIFICANT CHANGE UP
-  CEFEPIME: SIGNIFICANT CHANGE UP
-  CEFTRIAXONE: SIGNIFICANT CHANGE UP
-  CIPROFLOXACIN: SIGNIFICANT CHANGE UP
-  ERTAPENEM: SIGNIFICANT CHANGE UP
-  GENTAMICIN: SIGNIFICANT CHANGE UP
-  IMIPENEM: SIGNIFICANT CHANGE UP
-  LEVOFLOXACIN: SIGNIFICANT CHANGE UP
-  MEROPENEM: SIGNIFICANT CHANGE UP
-  PIPERACILLIN/TAZOBACTAM: SIGNIFICANT CHANGE UP
-  TOBRAMYCIN: SIGNIFICANT CHANGE UP
-  TRIMETHOPRIM/SULFAMETHOXAZOLE: SIGNIFICANT CHANGE UP
ANION GAP SERPL CALC-SCNC: 10 MMOL/L — SIGNIFICANT CHANGE UP (ref 5–17)
BUN SERPL-MCNC: 15 MG/DL — SIGNIFICANT CHANGE UP (ref 7–23)
CALCIUM SERPL-MCNC: 8.8 MG/DL — SIGNIFICANT CHANGE UP (ref 8.4–10.5)
CHLORIDE SERPL-SCNC: 106 MMOL/L — SIGNIFICANT CHANGE UP (ref 96–108)
CO2 SERPL-SCNC: 23 MMOL/L — SIGNIFICANT CHANGE UP (ref 22–31)
CREAT SERPL-MCNC: 1.4 MG/DL — HIGH (ref 0.5–1.3)
CULTURE RESULTS: SIGNIFICANT CHANGE UP
GLUCOSE BLDC GLUCOMTR-MCNC: 157 MG/DL — HIGH (ref 70–99)
GLUCOSE BLDC GLUCOMTR-MCNC: 165 MG/DL — HIGH (ref 70–99)
GLUCOSE BLDC GLUCOMTR-MCNC: 174 MG/DL — HIGH (ref 70–99)
GLUCOSE BLDC GLUCOMTR-MCNC: 181 MG/DL — HIGH (ref 70–99)
GLUCOSE SERPL-MCNC: 166 MG/DL — HIGH (ref 70–99)
HCT VFR BLD CALC: 37.6 % — LOW (ref 39–50)
HGB BLD-MCNC: 12 G/DL — LOW (ref 13–17)
MCHC RBC-ENTMCNC: 29.9 PG — SIGNIFICANT CHANGE UP (ref 27–34)
MCHC RBC-ENTMCNC: 31.9 GM/DL — LOW (ref 32–36)
MCV RBC AUTO: 93.5 FL — SIGNIFICANT CHANGE UP (ref 80–100)
METHOD TYPE: SIGNIFICANT CHANGE UP
NRBC # BLD: 0 /100 WBCS — SIGNIFICANT CHANGE UP (ref 0–0)
ORGANISM # SPEC MICROSCOPIC CNT: SIGNIFICANT CHANGE UP
ORGANISM # SPEC MICROSCOPIC CNT: SIGNIFICANT CHANGE UP
PLATELET # BLD AUTO: SIGNIFICANT CHANGE UP K/UL (ref 150–400)
POTASSIUM SERPL-MCNC: 4.3 MMOL/L — SIGNIFICANT CHANGE UP (ref 3.5–5.3)
POTASSIUM SERPL-SCNC: 4.3 MMOL/L — SIGNIFICANT CHANGE UP (ref 3.5–5.3)
RBC # BLD: 4.02 M/UL — LOW (ref 4.2–5.8)
RBC # FLD: 13.4 % — SIGNIFICANT CHANGE UP (ref 10.3–14.5)
SARS-COV-2 RNA SPEC QL NAA+PROBE: SIGNIFICANT CHANGE UP
SODIUM SERPL-SCNC: 139 MMOL/L — SIGNIFICANT CHANGE UP (ref 135–145)
SPECIMEN SOURCE: SIGNIFICANT CHANGE UP
WBC # BLD: 12.28 K/UL — HIGH (ref 3.8–10.5)
WBC # FLD AUTO: 12.28 K/UL — HIGH (ref 3.8–10.5)

## 2022-02-23 PROCEDURE — 71045 X-RAY EXAM CHEST 1 VIEW: CPT | Mod: 26

## 2022-02-23 RX ORDER — INSULIN LISPRO 100/ML
10 VIAL (ML) SUBCUTANEOUS
Refills: 0 | Status: DISCONTINUED | OUTPATIENT
Start: 2022-02-23 | End: 2022-02-28

## 2022-02-23 RX ORDER — FUROSEMIDE 40 MG
40 TABLET ORAL ONCE
Refills: 0 | Status: COMPLETED | OUTPATIENT
Start: 2022-02-23 | End: 2022-02-23

## 2022-02-23 RX ORDER — INSULIN GLARGINE 100 [IU]/ML
22 INJECTION, SOLUTION SUBCUTANEOUS AT BEDTIME
Refills: 0 | Status: DISCONTINUED | OUTPATIENT
Start: 2022-02-23 | End: 2022-02-24

## 2022-02-23 RX ADMIN — TAMSULOSIN HYDROCHLORIDE 0.8 MILLIGRAM(S): 0.4 CAPSULE ORAL at 21:50

## 2022-02-23 RX ADMIN — Medication 10 UNIT(S): at 14:36

## 2022-02-23 RX ADMIN — Medication 8 UNIT(S): at 10:07

## 2022-02-23 RX ADMIN — SENNA PLUS 2 TABLET(S): 8.6 TABLET ORAL at 21:49

## 2022-02-23 RX ADMIN — INSULIN GLARGINE 22 UNIT(S): 100 INJECTION, SOLUTION SUBCUTANEOUS at 21:50

## 2022-02-23 RX ADMIN — ERTAPENEM SODIUM 120 MILLIGRAM(S): 1 INJECTION, POWDER, LYOPHILIZED, FOR SOLUTION INTRAMUSCULAR; INTRAVENOUS at 11:17

## 2022-02-23 RX ADMIN — Medication 40 MILLIGRAM(S): at 17:55

## 2022-02-23 RX ADMIN — HEPARIN SODIUM 5000 UNIT(S): 5000 INJECTION INTRAVENOUS; SUBCUTANEOUS at 05:32

## 2022-02-23 RX ADMIN — Medication 2: at 10:06

## 2022-02-23 RX ADMIN — BUDESONIDE AND FORMOTEROL FUMARATE DIHYDRATE 2 PUFF(S): 160; 4.5 AEROSOL RESPIRATORY (INHALATION) at 05:33

## 2022-02-23 RX ADMIN — Medication 25 MILLIGRAM(S): at 05:33

## 2022-02-23 RX ADMIN — Medication 81 MILLIGRAM(S): at 11:17

## 2022-02-23 RX ADMIN — Medication 2: at 14:37

## 2022-02-23 RX ADMIN — Medication 2: at 19:18

## 2022-02-23 RX ADMIN — BUDESONIDE AND FORMOTEROL FUMARATE DIHYDRATE 2 PUFF(S): 160; 4.5 AEROSOL RESPIRATORY (INHALATION) at 17:55

## 2022-02-23 RX ADMIN — FINASTERIDE 5 MILLIGRAM(S): 5 TABLET, FILM COATED ORAL at 11:17

## 2022-02-23 RX ADMIN — Medication 10 UNIT(S): at 19:17

## 2022-02-23 RX ADMIN — HEPARIN SODIUM 5000 UNIT(S): 5000 INJECTION INTRAVENOUS; SUBCUTANEOUS at 14:37

## 2022-02-23 RX ADMIN — HEPARIN SODIUM 5000 UNIT(S): 5000 INJECTION INTRAVENOUS; SUBCUTANEOUS at 21:49

## 2022-02-23 RX ADMIN — PANTOPRAZOLE SODIUM 40 MILLIGRAM(S): 20 TABLET, DELAYED RELEASE ORAL at 05:33

## 2022-02-23 NOTE — PROGRESS NOTE ADULT - SUBJECTIVE AND OBJECTIVE BOX
KAITLIN CALDERON  81y Male  MRN:[id number]    Patient is a 81y old  Male who presents with a chief complaint of AMS/ sepsis / uti    HPI:   81 Y M H/O DM, bph, htn, chol, ,intermittent UTIs with AMs presenting with AMs. Per EMS and family patient has had 1 day of worsened AMS, disorientation similar to prior episode of UTIs. in er pt was minimally responsive, unable to converse, responsive to painful stimuli. Unable to interact with ROS, Per EMS concern for potential aspiration while in transport.  mental status improved weith iv abx and fluids.  being admitted for sepsis 2/2 uti (17 Feb 2022 11:06)      Patient seen and evaluated at bedside.  interval events noted     Interval HPI: tolerating diet. +BM. remains on 2L nasal canula.     PAST MEDICAL & SURGICAL HISTORY:  DM (diabetes mellitus)    HTN (hypertension)    Hypercholesterolemia    CAD (coronary artery disease)    HTN (hypertension)    DM (diabetes mellitus)    S/P TURP    S/P gastrectomy        REVIEW OF SYSTEMS:  as per hpi     VITALS:   Vital Signs Last 24 Hrs  T(C): 37.2 (23 Feb 2022 10:29), Max: 37.8 (22 Feb 2022 13:25)  T(F): 99 (23 Feb 2022 10:29), Max: 100.1 (22 Feb 2022 13:25)  HR: 88 (23 Feb 2022 10:29) (78 - 90)  BP: 154/80 (23 Feb 2022 10:29) (119/72 - 154/80)  BP(mean): --  RR: 18 (23 Feb 2022 10:29) (18 - 20)  SpO2: 96% (23 Feb 2022 10:29) (94% - 98%)    PHYSICAL EXAM:  GENERAL: NAD, well-developed,  lethargic but arousable/responsive.   HEAD:  Atraumatic, Normocephalic  EYES: EOMI, PERRLA, conjunctiva and sclera clear  NECK: Supple, No JVD  CHEST/LUNG: b/l crackles  HEART: S1, S2; No murmurs, rubs, or gallops  ABDOMEN:  soft, non tender. +distended.  +BS   EXTREMITIES:  2+ Peripheral Pulses, No clubbing, cyanosis, or edema  NEUROLOGY: AAOX2-3; non-focal, sleepy  SKIN: No rashes or lesions    Consultant(s) Notes Reviewed:  [x ] YES  [ ] NO  Care Discussed with Consultants/Other Providers [ x] YES  [ ] NO    MEDS:   MEDICATIONS  (STANDING):  aspirin enteric coated 81 milliGRAM(s) Oral daily  budesonide 160 MICROgram(s)/formoterol 4.5 MICROgram(s) Inhaler 2 Puff(s) Inhalation two times a day  dextrose 40% Gel 15 Gram(s) Oral once  dextrose 5%. 1000 milliLiter(s) (50 mL/Hr) IV Continuous <Continuous>  dextrose 5%. 1000 milliLiter(s) (100 mL/Hr) IV Continuous <Continuous>  dextrose 50% Injectable 25 Gram(s) IV Push once  dextrose 50% Injectable 12.5 Gram(s) IV Push once  dextrose 50% Injectable 25 Gram(s) IV Push once  ertapenem  IVPB 1000 milliGRAM(s) IV Intermittent every 24 hours  ertapenem  IVPB      finasteride 5 milliGRAM(s) Oral daily  glucagon  Injectable 1 milliGRAM(s) IntraMuscular once  heparin   Injectable 5000 Unit(s) SubCutaneous every 8 hours  influenza  Vaccine (HIGH DOSE) 0.7 milliLiter(s) IntraMuscular once  insulin glargine Injectable (LANTUS) 22 Unit(s) SubCutaneous at bedtime  insulin lispro (ADMELOG) corrective regimen sliding scale   SubCutaneous three times a day before meals  insulin lispro Injectable (ADMELOG) 10 Unit(s) SubCutaneous three times a day before meals  metoprolol succinate ER 25 milliGRAM(s) Oral daily  pantoprazole    Tablet 40 milliGRAM(s) Oral before breakfast  senna 2 Tablet(s) Oral at bedtime  sodium chloride 0.9%. 1000 milliLiter(s) (75 mL/Hr) IV Continuous <Continuous>  tamsulosin 0.8 milliGRAM(s) Oral at bedtime    MEDICATIONS  (PRN):        ALLERGIES:  No Known Allergies      LABS:                           12.0   12.28 )-----------( Clumped    ( 23 Feb 2022 07:08 )             37.6   02-23    139  |  106  |  15  ----------------------------<  166<H>  4.3   |  23  |  1.40<H>    Ca    8.8      23 Feb 2022 07:08          < from: Xray Chest 1 View-PORTABLE IMMEDIATE (Xray Chest 1 View-PORTABLE IMMEDIATE .) (02.19.22 @ 15:52) >    IMPRESSION:    Clear lungs.    < end of copied text >  < from: Xray Abdomen 1 View Portable, IMMEDIATE (Xray Abdomen 1 View Portable, IMMEDIATE .) (02.19.22 @ 15:52) >  IMPRESSION:    Nonobstructive bowel gas pattern.    < end of copied text >       :   cultures: Culture Results:   No growth to date. (02-17 @ 06:58)  Culture Results:   Growth in aerobic bottle: Gram Negative Rods  ***Blood Panel PCR results on this specimen are available  approximately 3 hours after the Gram stain result.***  Gram stain, PCR, and/or culture results may not always  correspond due to difference in methodologies.  ************************************************************  This PCR assay was performed by multiplex PCR. This  Assay tests for 66 bacterial and resistance gene targets.  Please refer to the [facility] Labs test directory  at [email or web address] for details. (02-17 @ 06:58)  Culture Results:   >100,000 CFU/ml Gram Negative Rods (02-17 @ 04:03)      RADIOLOGY & ADDITIONAL TESTS:    Imaging Personally Reviewed:  [ ] YES  [ ] NO

## 2022-02-23 NOTE — PROGRESS NOTE ADULT - SUBJECTIVE AND OBJECTIVE BOX
Chief complaint  Patient is a 81y old  Male who presents with a chief complaint of  Review of systems  Patient in bed, looks comfortable, no hypoglycemic episodes.    Labs and Fingersticks  CAPILLARY BLOOD GLUCOSE      POCT Blood Glucose.: 165 mg/dL (23 Feb 2022 10:03)  POCT Blood Glucose.: 214 mg/dL (22 Feb 2022 21:18)  POCT Blood Glucose.: 216 mg/dL (22 Feb 2022 18:45)  POCT Blood Glucose.: 169 mg/dL (22 Feb 2022 13:39)      Anion Gap, Serum: 10 (02-23 @ 07:08)  Anion Gap, Serum: 11 (02-22 @ 06:56)      Calcium, Total Serum: 8.8 (02-23 @ 07:08)  Calcium, Total Serum: 8.4 (02-22 @ 06:56)          02-23    139  |  106  |  15  ----------------------------<  166<H>  4.3   |  23  |  1.40<H>    Ca    8.8      23 Feb 2022 07:08                          12.0   12.28 )-----------( Clumped    ( 23 Feb 2022 07:08 )             37.6     Medications  MEDICATIONS  (STANDING):  aspirin enteric coated 81 milliGRAM(s) Oral daily  budesonide 160 MICROgram(s)/formoterol 4.5 MICROgram(s) Inhaler 2 Puff(s) Inhalation two times a day  dextrose 40% Gel 15 Gram(s) Oral once  dextrose 5%. 1000 milliLiter(s) (50 mL/Hr) IV Continuous <Continuous>  dextrose 5%. 1000 milliLiter(s) (100 mL/Hr) IV Continuous <Continuous>  dextrose 50% Injectable 25 Gram(s) IV Push once  dextrose 50% Injectable 12.5 Gram(s) IV Push once  dextrose 50% Injectable 25 Gram(s) IV Push once  ertapenem  IVPB 1000 milliGRAM(s) IV Intermittent every 24 hours  ertapenem  IVPB      finasteride 5 milliGRAM(s) Oral daily  furosemide   Injectable 40 milliGRAM(s) IV Push once  glucagon  Injectable 1 milliGRAM(s) IntraMuscular once  heparin   Injectable 5000 Unit(s) SubCutaneous every 8 hours  influenza  Vaccine (HIGH DOSE) 0.7 milliLiter(s) IntraMuscular once  insulin glargine Injectable (LANTUS) 22 Unit(s) SubCutaneous at bedtime  insulin lispro (ADMELOG) corrective regimen sliding scale   SubCutaneous three times a day before meals  insulin lispro Injectable (ADMELOG) 10 Unit(s) SubCutaneous three times a day before meals  metoprolol succinate ER 25 milliGRAM(s) Oral daily  pantoprazole    Tablet 40 milliGRAM(s) Oral before breakfast  senna 2 Tablet(s) Oral at bedtime  tamsulosin 0.8 milliGRAM(s) Oral at bedtime      Physical Exam  General: Patient comfortable in bed  Vital Signs Last 12 Hrs  T(F): 98.6 (02-23-22 @ 13:24), Max: 99 (02-23-22 @ 10:29)  HR: 86 (02-23-22 @ 13:24) (86 - 88)  BP: 128/86 (02-23-22 @ 13:24) (128/86 - 154/80)  BP(mean): --  RR: 18 (02-23-22 @ 13:24) (18 - 18)  SpO2: 96% (02-23-22 @ 13:24) (94% - 96%)  Neck: No palpable thyroid nodules.  CVS: S1S2, No murmurs  Respiratory: No wheezing, no crepitations  GI: Abdomen soft, bowel sounds positive  Musculoskeletal:  edema lower extremities.   Skin: No skin rashes, no ecchymosis    Diagnostics    Beta Hydroxy-Butyrate: AM Sched. Collection: 21-Feb-2022 06:00 (02-20 @ 09:00)

## 2022-02-23 NOTE — PROGRESS NOTE ADULT - SUBJECTIVE AND OBJECTIVE BOX
[facility]  Sudhir Duffy PA-C  [address] [place] [address]  [phone]      INTERVAL HPI/OVERNIGHT EVENTS:  patient s/e, feels better, no N/V  tolerating reg diet   had large BM over night     MEDICATIONS  (STANDING):  aspirin enteric coated 81 milliGRAM(s) Oral daily  budesonide 160 MICROgram(s)/formoterol 4.5 MICROgram(s) Inhaler 2 Puff(s) Inhalation two times a day  cefTRIAXone   IVPB 1000 milliGRAM(s) IV Intermittent every 24 hours  dextrose 40% Gel 15 Gram(s) Oral once  dextrose 5%. 1000 milliLiter(s) (50 mL/Hr) IV Continuous <Continuous>  dextrose 5%. 1000 milliLiter(s) (100 mL/Hr) IV Continuous <Continuous>  dextrose 50% Injectable 25 Gram(s) IV Push once  dextrose 50% Injectable 12.5 Gram(s) IV Push once  dextrose 50% Injectable 25 Gram(s) IV Push once  finasteride 5 milliGRAM(s) Oral daily  glucagon  Injectable 1 milliGRAM(s) IntraMuscular once  heparin   Injectable 5000 Unit(s) SubCutaneous every 8 hours  influenza  Vaccine (HIGH DOSE) 0.7 milliLiter(s) IntraMuscular once  insulin glargine Injectable (LANTUS) 16 Unit(s) SubCutaneous at bedtime  insulin lispro (ADMELOG) corrective regimen sliding scale   SubCutaneous three times a day before meals  insulin lispro Injectable (ADMELOG) 8 Unit(s) SubCutaneous three times a day before meals  metoprolol succinate ER 25 milliGRAM(s) Oral daily  pantoprazole    Tablet 40 milliGRAM(s) Oral before breakfast  senna 2 Tablet(s) Oral at bedtime  tamsulosin 0.8 milliGRAM(s) Oral at bedtime    MEDICATIONS  (PRN):      Allergies    No Known Allergies    Intolerances        ROS:   General:  No wt loss, fevers, chills, night sweats, fatigue,   Eyes:  Good vision, no reported pain  ENT:  No sore throat, pain, runny nose, dysphagia  CV:  No pain, palpitations, hypo/hypertension  Resp:  No dyspnea, cough, tachypnea, wheezing  GI:  No pain, No nausea, No vomiting, No diarrhea, + constipation, No weight loss, No fever, No pruritis, No rectal bleeding, No tarry stools, No dysphagia,  :  No pain, bleeding, incontinence, nocturia  Muscle:  No pain, weakness  Neuro:  No weakness, tingling, memory problems  Psych:  No fatigue, insomnia, mood problems, depression  Endocrine:  No polyuria, polydipsia, cold/heat intolerance  Heme:  No petechiae, ecchymosis, easy bruisability  Skin:  No rash, tattoos, scars, edema      PHYSICAL EXAM:   Vital Signs Last 24 Hrs  T(C): 37.2 (2022 10:29), Max: 37.8 (2022 13:25)  T(F): 99 (2022 10:29), Max: 100.1 (2022 13:25)  HR: 88 (2022 10:29) (78 - 90)  BP: 154/80 (2022 10:29) (119/72 - 154/80)  BP(mean): --  RR: 18 (2022 10:29) (18 - 20)  SpO2: 96% (2022 10:29) (94% - 98%))  Daily     Daily   I&O's Summary    2022 07:01  -  2022 07:00  --------------------------------------------------------  IN: 2830 mL / OUT: 850 mL / NET: 1980 mL            GENERAL:  Appears stated age,   HEENT:  NC/AT,    CHEST:  Full & symmetric excursion,   HEART:  Regular rhythm,  ABDOMEN:  Soft, non-tender, non-distended,  EXTEREMITIES:  no cyanosis  SKIN:  No rash  NEURO:  Alert,       LABS:                                   12.0   12.28 )-----------( Clumped    ( 2022 07:08 )             37.6   02-    139  |  106  |  15  ----------------------------<  166<H>  4.3   |  23  |  1.40<H>    Ca    8.8      2022 07:08          Urinalysis Basic - ( 2022 23:26 )    Color: Yellow / Appearance: Turbid / S.015 / pH: x  Gluc: x / Ketone: Small  / Bili: Negative / Urobili: Negative   Blood: x / Protein: 300 mg/dL / Nitrite: Positive   Leuk Esterase: Large / RBC: 31 /hpf /  /HPF   Sq Epi: x / Non Sq Epi: 5 /hpf / Bacteria: Many        RADIOLOGY & ADDITIONAL TESTS:

## 2022-02-23 NOTE — PROGRESS NOTE ADULT - ASSESSMENT
nausea/vomiting  UTI  cysitits    anti-emetics as needed  n/v likely related to UTI  diet as tolerated  antibiotics per primary  bowel regimen   monitor stool count   will follow

## 2022-02-23 NOTE — PROGRESS NOTE ADULT - SUBJECTIVE AND OBJECTIVE BOX
Overnight events noted      VITAL:  T(C): , Max: 37.8 (02-22-22 @ 13:25)  T(F): , Max: 100.1 (02-22-22 @ 13:25)  HR: 88 (02-23-22 @ 10:29)  BP: 154/80 (02-23-22 @ 10:29)  RR: 18 (02-23-22 @ 10:29)  SpO2: 96% (02-23-22 @ 10:29)      PHYSICAL EXAM:  General:  alert, cooperative and no distress  HEENT: no trauma  Lungs: clear to auscultation bilaterally  Heart: normal S1/S2  Abdomen: soft, non-tender not distended, + BS  Extremities: no clubbing, cyanosis or edema  Urologic: no traore  Neurologic: confusion  Skin: no rashes    LABS:                        12.0   12.28 )-----------( Clumped    ( 23 Feb 2022 07:08 )             37.6     Na(139)/K(4.3)/Cl(106)/HCO3(23)/BUN(15)/Cr(1.40)Glu(166)/Ca(8.8)/Mg(--)/PO4(--)    02-23 @ 07:08  Na(136)/K(4.2)/Cl(104)/HCO3(21)/BUN(15)/Cr(1.29)Glu(210)/Ca(8.4)/Mg(--)/PO4(--)    02-22 @ 06:56  Na(134)/K(4.1)/Cl(105)/HCO3(21)/BUN(19)/Cr(1.52)Glu(156)/Ca(8.2)/Mg(--)/PO4(--)    02-21 @ 06:58    (2/21/22) - Up/cr 1g/g      IMAGING:  < from: CT Abdomen and Pelvis w/ IV Cont (02.17.22 @ 01:59) >  Findings suggest cystitis. No evidence of pyelonephritis at this time.      IMPRESSION: 81M w/ HTN, DM2, CAD, BPH, intermittent UTIs, and CKD, 2/17/22 p/w E Coli bacteremia    (1)CKD - stage 3 with non-nephrotic range proteinuria. Potentially diabetic nephropathy  (2)ANTON - mild - prerenally mediated   (3)ID - EColi UTI/bacteremia - on IV Invanz  (4)CV - appreciate Medicine team input - hypoxia/crackles on exam - fluid overloaded? IVF discontinued    RECOMMENDATIONS:  (1)Agree with d/c of IVF  (2)Agree with CXR  (3)No objection to Lasix 40mg iv x 1 this pm  (4)BMP daily    Maco Bhatia MD  [facility]  Office: [phone]  Cell: [phone]       no complaints    VITAL:  T(C): , Max: 37.8 (02-22-22 @ 13:25)  T(F): , Max: 100.1 (02-22-22 @ 13:25)  HR: 88 (02-23-22 @ 10:29)  BP: 154/80 (02-23-22 @ 10:29)  RR: 18 (02-23-22 @ 10:29)  SpO2: 96% (02-23-22 @ 10:29)      PHYSICAL EXAM:  General:  alert, cooperative and no distress  HEENT: no trauma  Lungs: clear to auscultation bilaterally  Heart: normal S1/S2  Abdomen: soft, non-tender not distended, + BS  Extremities: no clubbing, cyanosis or edema  Urologic: no traore  Skin: no rashes    LABS:                        12.0   12.28 )-----------( Clumped    ( 23 Feb 2022 07:08 )             37.6     Na(139)/K(4.3)/Cl(106)/HCO3(23)/BUN(15)/Cr(1.40)Glu(166)/Ca(8.8)/Mg(--)/PO4(--)    02-23 @ 07:08  Na(136)/K(4.2)/Cl(104)/HCO3(21)/BUN(15)/Cr(1.29)Glu(210)/Ca(8.4)/Mg(--)/PO4(--)    02-22 @ 06:56  Na(134)/K(4.1)/Cl(105)/HCO3(21)/BUN(19)/Cr(1.52)Glu(156)/Ca(8.2)/Mg(--)/PO4(--)    02-21 @ 06:58    (2/21/22) - Up/cr 1g/g      IMAGING:  < from: CT Abdomen and Pelvis w/ IV Cont (02.17.22 @ 01:59) >  Findings suggest cystitis. No evidence of pyelonephritis at this time.      IMPRESSION: 81M w/ HTN, DM2, CAD, BPH, intermittent UTIs, and CKD, 2/17/22 p/w E Coli bacteremia    (1)CKD - stage 3 with non-nephrotic range proteinuria. Potentially diabetic nephropathy  (2)ANTON - mild - prerenally mediated   (3)ID - EColi UTI/bacteremia - on IV Invanz  (4)CV - appreciate Medicine team input - hypoxia/crackles on exam - fluid overloaded? IVF discontinued    RECOMMENDATIONS:  (1)Agree with d/c of IVF  (2)Agree with CXR  (3)No objection to Lasix 40mg iv x 1 this pm  (4)BMP daily    Maco Bhatia MD  [facility]  Office: [phone]  Cell: [phone]

## 2022-02-23 NOTE — PROGRESS NOTE ADULT - ASSESSMENT
Assessment  DMT2: 81y Male with DM T2 with hyperglycemia, A1C 11.1%, per chart was on insulin at home, ?compliance, now on basal bolus insulin, increased basal dose yesterday, blood sugars are running high and not at target, no hypoglycemic episodes. Patient is eating meals with good appetite per discussion with nursing staff, NAD.  UTI: On meds, monitored.  HTN: Controlled,  on antihypertensive medications.  CKD: Monitor labs/BMP,         nelda Kirk moya  [phone]

## 2022-02-23 NOTE — PROGRESS NOTE ADULT - ASSESSMENT
80 yo male h/o DM, htn, chol, bph, p/w sepsis, 2/2 uti  pt with sepsis present on admission    sepsis 2/2 uti  urine and blood cultures growing Ecoli.  ESBL  resistant to ceftriax.   abx changed from ceftriax to invanz.   ID f/u apprec  will need long term abx till 3/4.  midline now in place     bacteremia  likely 2/2 uti  now on invanz   cult and sens noted.   f/u repeat blood cultures to ensure clearance.  - ngtd  long term abx as noted above via midline      vomiting  likely 2/2 sepsis and uti  CT and abd xray noted.  no acute gi pathology  tolerating diet .  advance diet as tolerated - tolerating regular diet   zofran prn  gi f/u   bowel regimen for constipation      metabolic encephalopathy  2/2 sepsis  improved   as per d/w family, this is close to his baseline mental status    DM  on insulin  monitor fs  endo following     ckd  with chanel  renal f/u  will dc ivf   creat stable  avoid nephrotoxins    hypoxia with some crackles on exam  check CXR - ordered   check o2 sat on RA     cont other home meds      dvt ppx        d/w family on phone     Advanced care planning was discussed with patient and family.  Advanced care planning forms were reviewed and discussed as appropriate.  Differential diagnosis and plan of care discussed with patient after the evaluation.   Pain assessed and judicious use of narcotics when appropriate was discussed.  Importance of Fall prevention discussed.  Counseling on Smoking and Alcohol cessation was offered when appropriate.  Counseling on Diet, exercise, and medication compliance was done.         Approx 60 minutes spent. 82 yo male h/o DM, htn, chol, bph, p/w sepsis, 2/2 uti  pt with sepsis present on admission    sepsis 2/2 uti  urine and blood cultures growing Ecoli.  ESBL  resistant to ceftriax.   abx changed from ceftriax to invanz.   ID f/u apprec  will need long term abx till 3/4.  midline now in place     bacteremia  likely 2/2 uti  now on invanz   cult and sens noted.   f/u repeat blood cultures to ensure clearance.  - ngtd  long term abx as noted above via midline      vomiting  likely 2/2 sepsis and uti  CT and abd xray noted.  no acute gi pathology  tolerating diet .  advance diet as tolerated - tolerating regular diet   zofran prn  gi f/u   bowel regimen for constipation      metabolic encephalopathy  2/2 sepsis  improved   as per d/w family, this is close to his baseline mental status    DM  on insulin  monitor fs  endo following     ckd  with chanel  renal f/u  will dc ivf   creat stable  avoid nephrotoxins    hypoxia with some crackles on exam  check CXR - ordered   check o2 sat on RA   dc ivf     cont other home meds    dvt ppx    PT eval for dc planning.  family does not want to go back to [facility]  interested in taking pt home if possible        d/w renal  d/w family on phone     Advanced care planning was discussed with patient and family.  Advanced care planning forms were reviewed and discussed as appropriate.  Differential diagnosis and plan of care discussed with patient after the evaluation.   Pain assessed and judicious use of narcotics when appropriate was discussed.  Importance of Fall prevention discussed.  Counseling on Smoking and Alcohol cessation was offered when appropriate.  Counseling on Diet, exercise, and medication compliance was done.         Approx 60 minutes spent.

## 2022-02-24 LAB
ANION GAP SERPL CALC-SCNC: 11 MMOL/L — SIGNIFICANT CHANGE UP (ref 5–17)
BUN SERPL-MCNC: 18 MG/DL — SIGNIFICANT CHANGE UP (ref 7–23)
CALCIUM SERPL-MCNC: 9.2 MG/DL — SIGNIFICANT CHANGE UP (ref 8.4–10.5)
CHLORIDE SERPL-SCNC: 103 MMOL/L — SIGNIFICANT CHANGE UP (ref 96–108)
CO2 SERPL-SCNC: 23 MMOL/L — SIGNIFICANT CHANGE UP (ref 22–31)
CREAT SERPL-MCNC: 1.39 MG/DL — HIGH (ref 0.5–1.3)
GLUCOSE BLDC GLUCOMTR-MCNC: 104 MG/DL — HIGH (ref 70–99)
GLUCOSE BLDC GLUCOMTR-MCNC: 126 MG/DL — HIGH (ref 70–99)
GLUCOSE BLDC GLUCOMTR-MCNC: 166 MG/DL — HIGH (ref 70–99)
GLUCOSE BLDC GLUCOMTR-MCNC: 182 MG/DL — HIGH (ref 70–99)
GLUCOSE SERPL-MCNC: 198 MG/DL — HIGH (ref 70–99)
HCT VFR BLD CALC: 40.5 % — SIGNIFICANT CHANGE UP (ref 39–50)
HGB BLD-MCNC: 12.8 G/DL — LOW (ref 13–17)
MCHC RBC-ENTMCNC: 29.2 PG — SIGNIFICANT CHANGE UP (ref 27–34)
MCHC RBC-ENTMCNC: 31.6 GM/DL — LOW (ref 32–36)
MCV RBC AUTO: 92.5 FL — SIGNIFICANT CHANGE UP (ref 80–100)
NRBC # BLD: 0 /100 WBCS — SIGNIFICANT CHANGE UP (ref 0–0)
PLATELET # BLD AUTO: SIGNIFICANT CHANGE UP K/UL (ref 150–400)
POTASSIUM SERPL-MCNC: 4.3 MMOL/L — SIGNIFICANT CHANGE UP (ref 3.5–5.3)
POTASSIUM SERPL-SCNC: 4.3 MMOL/L — SIGNIFICANT CHANGE UP (ref 3.5–5.3)
RBC # BLD: 4.38 M/UL — SIGNIFICANT CHANGE UP (ref 4.2–5.8)
RBC # FLD: 13.2 % — SIGNIFICANT CHANGE UP (ref 10.3–14.5)
SODIUM SERPL-SCNC: 137 MMOL/L — SIGNIFICANT CHANGE UP (ref 135–145)
WBC # BLD: 11.96 K/UL — HIGH (ref 3.8–10.5)
WBC # FLD AUTO: 11.96 K/UL — HIGH (ref 3.8–10.5)

## 2022-02-24 RX ORDER — IPRATROPIUM/ALBUTEROL SULFATE 18-103MCG
3 AEROSOL WITH ADAPTER (GRAM) INHALATION
Refills: 0 | Status: DISCONTINUED | OUTPATIENT
Start: 2022-02-24 | End: 2022-02-28

## 2022-02-24 RX ORDER — INSULIN GLARGINE 100 [IU]/ML
25 INJECTION, SOLUTION SUBCUTANEOUS AT BEDTIME
Refills: 0 | Status: DISCONTINUED | OUTPATIENT
Start: 2022-02-24 | End: 2022-02-25

## 2022-02-24 RX ORDER — POLYETHYLENE GLYCOL 3350 17 G/17G
17 POWDER, FOR SOLUTION ORAL
Refills: 0 | Status: DISCONTINUED | OUTPATIENT
Start: 2022-02-24 | End: 2022-02-28

## 2022-02-24 RX ADMIN — Medication 81 MILLIGRAM(S): at 11:30

## 2022-02-24 RX ADMIN — INSULIN GLARGINE 25 UNIT(S): 100 INJECTION, SOLUTION SUBCUTANEOUS at 22:07

## 2022-02-24 RX ADMIN — Medication 2: at 08:26

## 2022-02-24 RX ADMIN — BUDESONIDE AND FORMOTEROL FUMARATE DIHYDRATE 2 PUFF(S): 160; 4.5 AEROSOL RESPIRATORY (INHALATION) at 05:55

## 2022-02-24 RX ADMIN — Medication 3 MILLILITER(S): at 17:51

## 2022-02-24 RX ADMIN — ERTAPENEM SODIUM 120 MILLIGRAM(S): 1 INJECTION, POWDER, LYOPHILIZED, FOR SOLUTION INTRAMUSCULAR; INTRAVENOUS at 11:30

## 2022-02-24 RX ADMIN — SENNA PLUS 2 TABLET(S): 8.6 TABLET ORAL at 21:29

## 2022-02-24 RX ADMIN — Medication 10 UNIT(S): at 13:03

## 2022-02-24 RX ADMIN — Medication 25 MILLIGRAM(S): at 06:02

## 2022-02-24 RX ADMIN — Medication 2: at 13:02

## 2022-02-24 RX ADMIN — HEPARIN SODIUM 5000 UNIT(S): 5000 INJECTION INTRAVENOUS; SUBCUTANEOUS at 05:56

## 2022-02-24 RX ADMIN — TAMSULOSIN HYDROCHLORIDE 0.8 MILLIGRAM(S): 0.4 CAPSULE ORAL at 21:29

## 2022-02-24 RX ADMIN — BUDESONIDE AND FORMOTEROL FUMARATE DIHYDRATE 2 PUFF(S): 160; 4.5 AEROSOL RESPIRATORY (INHALATION) at 17:51

## 2022-02-24 RX ADMIN — Medication 10 UNIT(S): at 18:05

## 2022-02-24 RX ADMIN — FINASTERIDE 5 MILLIGRAM(S): 5 TABLET, FILM COATED ORAL at 11:30

## 2022-02-24 RX ADMIN — POLYETHYLENE GLYCOL 3350 17 GRAM(S): 17 POWDER, FOR SOLUTION ORAL at 18:04

## 2022-02-24 RX ADMIN — HEPARIN SODIUM 5000 UNIT(S): 5000 INJECTION INTRAVENOUS; SUBCUTANEOUS at 21:29

## 2022-02-24 RX ADMIN — PANTOPRAZOLE SODIUM 40 MILLIGRAM(S): 20 TABLET, DELAYED RELEASE ORAL at 05:56

## 2022-02-24 RX ADMIN — HEPARIN SODIUM 5000 UNIT(S): 5000 INJECTION INTRAVENOUS; SUBCUTANEOUS at 13:03

## 2022-02-24 RX ADMIN — Medication 10 UNIT(S): at 08:27

## 2022-02-24 NOTE — PROGRESS NOTE ADULT - ASSESSMENT
82 yo male h/o DM, htn, chol, bph, p/w sepsis, 2/2 uti  pt with sepsis present on admission    sepsis 2/2 uti  urine and blood cultures growing Ecoli.  ESBL  resistant to ceftriax.   abx changed from ceftriax to invanz.   ID f/u apprec  will need long term abx till 3/4.  midline now in place     bacteremia  likely 2/2 uti  now on invanz   cult and sens noted.   f/u repeat blood cultures to ensure clearance.  - ngtd  long term abx as noted above via midline      vomiting  likely 2/2 sepsis and uti  CT and abd xray noted.  no acute gi pathology  tolerating diet .  advance diet as tolerated - tolerating regular diet   zofran prn  gi f/u   bowel regimen for constipation      metabolic encephalopathy  2/2 sepsis  improved   as per d/w family, this is close to his baseline mental status    DM  on insulin  monitor fs  endo following     ckd  with chanel  renal f/u  creat stable  avoid nephrotoxins    hypoxia with some crackles on exam   CXR noted.  Rounded opacity in the right lower lung field. likely atelactasis. doubt pna. already on ABx   pt remains on supp o2.    incentive spirometer ordered   pulm consulted.     cont other home meds    dvt ppx    PT eval noted.    dispo - rehab       d/w renal  d/w family on phone     Advanced care planning was discussed with patient and family.  Advanced care planning forms were reviewed and discussed as appropriate.  Differential diagnosis and plan of care discussed with patient after the evaluation.   Pain assessed and judicious use of narcotics when appropriate was discussed.  Importance of Fall prevention discussed.  Counseling on Smoking and Alcohol cessation was offered when appropriate.  Counseling on Diet, exercise, and medication compliance was done.         Approx 60 minutes spent.

## 2022-02-24 NOTE — PROGRESS NOTE ADULT - ASSESSMENT
Assessment  DMT2: 81y Male with DM T2 with hyperglycemia, A1C 11.1%, per chart was on insulin at home, ?compliance, now on basal bolus insulin, increased dose yesterday, blood sugars are improving though still not at target, no hypoglycemic episodes. Patient is eating meals with good appetite per discussion with nursing staff, NAD, DC plan is for rehab.  UTI: On meds, monitored.  HTN: Controlled,  on antihypertensive medications.  CKD: Monitor labs/BMP,         nelda Kirk moya  [phone]

## 2022-02-24 NOTE — PROGRESS NOTE ADULT - SUBJECTIVE AND OBJECTIVE BOX
Chief complaint  Patient is a 81y old  Male who presents with a chief complaint of  Review of systems  Patient in bed, looks comfortable, no hypoglycemic episodes.    Labs and Fingersticks  CAPILLARY BLOOD GLUCOSE      POCT Blood Glucose.: 182 mg/dL (24 Feb 2022 08:21)  POCT Blood Glucose.: 181 mg/dL (23 Feb 2022 21:33)  POCT Blood Glucose.: 157 mg/dL (23 Feb 2022 18:56)  POCT Blood Glucose.: 174 mg/dL (23 Feb 2022 14:31)      Anion Gap, Serum: 11 (02-24 @ 09:51)  Anion Gap, Serum: 10 (02-23 @ 07:08)      Calcium, Total Serum: 9.2 (02-24 @ 09:51)  Calcium, Total Serum: 8.8 (02-23 @ 07:08)          02-24    137  |  103  |  18  ----------------------------<  198<H>  4.3   |  23  |  1.39<H>    Ca    9.2      24 Feb 2022 09:51                          12.8   11.96 )-----------( Clumped    ( 24 Feb 2022 09:51 )             40.5     Medications  MEDICATIONS  (STANDING):  aspirin enteric coated 81 milliGRAM(s) Oral daily  budesonide 160 MICROgram(s)/formoterol 4.5 MICROgram(s) Inhaler 2 Puff(s) Inhalation two times a day  dextrose 40% Gel 15 Gram(s) Oral once  dextrose 5%. 1000 milliLiter(s) (50 mL/Hr) IV Continuous <Continuous>  dextrose 5%. 1000 milliLiter(s) (100 mL/Hr) IV Continuous <Continuous>  dextrose 50% Injectable 25 Gram(s) IV Push once  dextrose 50% Injectable 12.5 Gram(s) IV Push once  dextrose 50% Injectable 25 Gram(s) IV Push once  ertapenem  IVPB 1000 milliGRAM(s) IV Intermittent every 24 hours  ertapenem  IVPB      finasteride 5 milliGRAM(s) Oral daily  glucagon  Injectable 1 milliGRAM(s) IntraMuscular once  heparin   Injectable 5000 Unit(s) SubCutaneous every 8 hours  influenza  Vaccine (HIGH DOSE) 0.7 milliLiter(s) IntraMuscular once  insulin glargine Injectable (LANTUS) 25 Unit(s) SubCutaneous at bedtime  insulin lispro (ADMELOG) corrective regimen sliding scale   SubCutaneous three times a day before meals  insulin lispro Injectable (ADMELOG) 10 Unit(s) SubCutaneous three times a day before meals  metoprolol succinate ER 25 milliGRAM(s) Oral daily  pantoprazole    Tablet 40 milliGRAM(s) Oral before breakfast  polyethylene glycol 3350 17 Gram(s) Oral two times a day  senna 2 Tablet(s) Oral at bedtime  tamsulosin 0.8 milliGRAM(s) Oral at bedtime      Physical Exam  General: Patient comfortable in bed  Vital Signs Last 12 Hrs  T(F): 98.1 (02-24-22 @ 10:38), Max: 98.5 (02-24-22 @ 01:08)  HR: 80 (02-24-22 @ 10:38) (76 - 100)  BP: 132/75 (02-24-22 @ 10:38) (132/75 - 156/96)  BP(mean): --  RR: 18 (02-24-22 @ 10:38) (18 - 18)  SpO2: 94% (02-24-22 @ 10:38) (90% - 94%)  Neck: No palpable thyroid nodules.  CVS: S1S2, No murmurs  Respiratory: No wheezing, no crepitations  GI: Abdomen soft, bowel sounds positive  Musculoskeletal:  edema lower extremities.   Skin: No skin rashes, no ecchymosis    Diagnostics    Beta Hydroxy-Butyrate: AM Sched. Collection: 21-Feb-2022 06:00 (02-20 @ 09:00)

## 2022-02-24 NOTE — CONSULT NOTE ADULT - ASSESSMENT
81M admitted with AMS found to have ESBL E COli urosepsis with bactereimia. Currently afebrile on Ertapenem. CXR 2/23 with new linear opcity in  R basilar lung: suff of atlectasis. CXR fully lordotic, AP portable. Exam remarkable for few wscattered rhonchi. Patient at risk for aspiration due to altered mental status on admission. He would be covered for pneumonia with Invanz. He is obese which may contribute to atelectasis risk at bases    REC    DUoneb qid ordered  Can continue symbicort, though may not coordinate well  F/U CXR 2/25 ordered  Taper nasal oxgyen as tolerated  Encourage IS  OOB chair/mobilize  Aspiration precuations

## 2022-02-24 NOTE — CONSULT NOTE ADULT - SUBJECTIVE AND OBJECTIVE BOX
PULMONARY CONSULT  Edmund Quiroga MD  [phone]    Initial HPI on admission:  HPI:   81 Y M H/O DM, bph, htn, chol, ,intermittent UTIs with AMs presenting with AMs. Per EMS and family patient has had 1 day of worsened AMS, disorientation similar to prior episode of UTIs. in er pt was minimally responsive, unable to converse, responsive to painful stimuli. Unable to interact with ROS, Per EMS concern for potential aspiration while in transport.  mental status improved weith iv abx and fluids.  being admitted for sepsis 2/2 uti     PAST MEDICAL & SURGICAL HISTORY:  DM (diabetes mellitus)    HTN (hypertension)    Hypercholesterolemia    CAD (coronary artery disease)    HTN (hypertension)    DM (diabetes mellitus)    S/P TURP    S/P gastrectomy    FAMILY HISTORY:    SH:  Non smoker        Review of Systems:  Other Review of Systems: All other review of systems negative, except as noted in HPI      Allergies and Intolerances:        Allergies:  	No Known Allergies:     Medications:  MEDICATIONS  (STANDING):  aspirin enteric coated 81 milliGRAM(s) Oral daily  budesonide 160 MICROgram(s)/formoterol 4.5 MICROgram(s) Inhaler 2 Puff(s) Inhalation two times a day  dextrose 40% Gel 15 Gram(s) Oral once  dextrose 5%. 1000 milliLiter(s) (50 mL/Hr) IV Continuous <Continuous>  dextrose 5%. 1000 milliLiter(s) (100 mL/Hr) IV Continuous <Continuous>  dextrose 50% Injectable 25 Gram(s) IV Push once  dextrose 50% Injectable 12.5 Gram(s) IV Push once  dextrose 50% Injectable 25 Gram(s) IV Push once  ertapenem  IVPB 1000 milliGRAM(s) IV Intermittent every 24 hours  ertapenem  IVPB      finasteride 5 milliGRAM(s) Oral daily  glucagon  Injectable 1 milliGRAM(s) IntraMuscular once  heparin   Injectable 5000 Unit(s) SubCutaneous every 8 hours  influenza  Vaccine (HIGH DOSE) 0.7 milliLiter(s) IntraMuscular once  insulin glargine Injectable (LANTUS) 25 Unit(s) SubCutaneous at bedtime  insulin lispro (ADMELOG) corrective regimen sliding scale   SubCutaneous three times a day before meals  insulin lispro Injectable (ADMELOG) 10 Unit(s) SubCutaneous three times a day before meals  metoprolol succinate ER 25 milliGRAM(s) Oral daily  pantoprazole    Tablet 40 milliGRAM(s) Oral before breakfast  polyethylene glycol 3350 17 Gram(s) Oral two times a day  senna 2 Tablet(s) Oral at bedtime  tamsulosin 0.8 milliGRAM(s) Oral at bedtime    MEDICATIONS  (PRN):    Vital Signs Last 24 Hrs  T(C): 37 (24 Feb 2022 13:18), Max: 37.1 (23 Feb 2022 21:14)  T(F): 98.6 (24 Feb 2022 13:18), Max: 98.7 (23 Feb 2022 21:14)  HR: 78 (24 Feb 2022 13:18) (76 - 100)  BP: 132/88 (24 Feb 2022 13:18) (132/75 - 156/96)  BP(mean): --  RR: 18 (24 Feb 2022 13:18) (18 - 18)  SpO2: 92% (24 Feb 2022 13:18) (90% - 95%)      02-23 @ 07:01  -  02-24 @ 07:00  --------------------------------------------------------  IN: 630 mL / OUT: 200 mL / NET: 430 mL      LABS:                        12.8   11.96 )-----------( Clumped    ( 24 Feb 2022 09:51 )             40.5     02-24    137  |  103  |  18  ----------------------------<  198<H>  4.3   |  23  |  1.39<H>    Ca    9.2      24 Feb 2022 09:51      CULTURES:  Culture Results:   No growth to date. (02-20 @ 00:31)  Culture Results:   Growth in anaerobic bottle: Escherichia coli ESBL (02-19 @ 22:01)    Physical Examination:    General: No acute distress.      HEENT: Pupils equal, reactive to light.  Symmetric.    PULM: Clear to auscultation bilaterally, no significant sputum production    CVS: Regular rate and rhythm, no murmurs, rubs, or gallops    ABD: Soft, nondistended, nontender, normoactive bowel sounds, no masses    EXT: No edema, nontender    SKIN: Warm and well perfused, no rashes noted.    NEURO: Alert, oriented, interactive, nonfocal    RADIOLOGY REVIEWED PERSONALLY  CXR:    CT chest:    PROCEDURE DATE:  02/23/2022          INTERPRETATION:  EXAMINATION: XR CHEST URGENT    CLINICAL INDICATION: Hypoxia.    TECHNIQUE: Single frontal, portable view of the chest was obtained.    COMPARISON: Chest x-ray 2/16/2022.    FINDINGS:  The heart appears enlarged however this may be projectional. Aortic   calcifications.  Rounded opacity in the right lower lung field. Right middle lung   atelectasis.  There is no pneumothorax or pleural effusion.    IMPRESSION:  Rounded opacity in the right lower lung field. This was not present on   the x-ray from one week earlier and may represent atelectasis or   confluence of shadows. Can obtain CT chest for further characterization   is clinically warranted.  Right middle lung linear atelectasis.    TTE:    Patient name: KAITLIN CALDERON  YOB: 1940   Age: 79 (M)   MR#: [id number]  Study Date: 3/9/2020  Location: [address] [place], [place] [address]Sonographer: Anthony Ambriz [facility]  Study quality: Technically difficult  Referring Physician: Breanna Rocha MD  Blood Pressure: 154/97 mmHg  Height: 170 cm  Weight: 94 kg  BSA: 2.1 m2  ------------------------------------------------------------------------    PROCEDURE: Transthoracic echocardiogram with 2-D, M-Mode  and complete spectral and color flow Doppler.  INDICATION:Syncope and Collapse (R55)  HISTORY:  ------------------------------------------------------------------------  DIMENSIONS:  Dimensions:     Normal Values:  LA:     3.9 cm    2.0 - 4.0 cm  Ao:     4.0 cm    2.0 - 3.8 cm  SEPTUM: 1.2 cm    0.6 - 1.2 cm  PWT:    1.2 cm    0.6 - 1.1 cm  LVIDd:  4.5 cm    3.0 - 5.6 cm  LVIDs:  3.2 cm    1.8 - 4.0 cm      Derived Variables:  LVMI: 97 g/m2  RWT: 0.53  Ejection Fraction Visual Estimate: 55 %    ------------------------------------------------------------------------  OBSERVATIONS:  Mitral Valve: Normal mitral valve.  Aortic Root: Mild aortic root dilatation, consider CT of  chest for further evaluation.  Aortic Valve: Normal trileaflet aortic valve. Mild aortic  regurgitation.  Left Atrium: Normal left atrium.  Left Ventricle: Endocardium not well visualized; grossly  normal left ventricular systolic function. Normal left  ventricular internal dimensions and wall thicknesses. Grade  I diastolic dysfunction (Impaired relaxation).  Right Heart: Normal right atrium. Normal right ventricular  size and systolic function (TAPSE 2.2 cm). Normal tricuspid  valve. Normal pulmonic valve.  Pericardium/PleuraNormal pericardium with no pericardial  effusion.  Hemodynamic: Unable to estimate RVSP.  ------------------------------------------------------------------------  CONCLUSIONS:  1. Normal mitral valve.  2. Normal trileaflet aortic valve. Mild aortic  regurgitation.  3. Mild aortic root dilatation, consider CT of chest for  further evaluation.  4. Normal left atrium.  5. Normal left ventricular internal dimensions and wall  thicknesses.  6. Endocardium not well visualized; grossly normal left  ventricular systolic function.  7. Grade I diastolic dysfunction (Impaired relaxation).  8. Normal right atrium.  9. Normal right ventricular size and systolic function  (TAPSE 2.2 cm).  10. Unable to estimate RVSP.  11. Normal tricuspid valve.  12. Normal pulmonic valve.  13. Normal pericardium with no pericardial effusion.     PULMONARY CONSULT  Edmund Quiroga MD  [phone]    Initial HPI on admission:  HPI:   81 Y M H/O DM, bph, htn, chol, ,intermittent UTIs with AMs presenting with AMs. Per EMS and family patient has had 1 day of worsened AMS, disorientation similar to prior episode of UTIs. in er pt was minimally responsive, unable to converse, responsive to painful stimuli. Unable to interact with ROS, Per EMS concern for potential aspiration while in transport.  mental status improved weith iv abx and fluids.  being admitted for sepsis 2/2 uti     ASked to evaluate need for nasal oxygen and new linear opacity in RLL portable xray  At time of visit: sleeping, rousable, comfortable on nasal cannula    PAST MEDICAL & SURGICAL HISTORY:  DM (diabetes mellitus)    HTN (hypertension)    Hypercholesterolemia    CAD (coronary artery disease)    HTN (hypertension)    DM (diabetes mellitus)    S/P TURP    S/P gastrectomy    FAMILY HISTORY:    SH:  Non smoker        Review of Systems:  Other Review of Systems: All other review of systems negative, except as noted in HPI      Allergies and Intolerances:        Allergies:  	No Known Allergies:     Medications:  MEDICATIONS  (STANDING):  aspirin enteric coated 81 milliGRAM(s) Oral daily  budesonide 160 MICROgram(s)/formoterol 4.5 MICROgram(s) Inhaler 2 Puff(s) Inhalation two times a day  dextrose 40% Gel 15 Gram(s) Oral once  dextrose 5%. 1000 milliLiter(s) (50 mL/Hr) IV Continuous <Continuous>  dextrose 5%. 1000 milliLiter(s) (100 mL/Hr) IV Continuous <Continuous>  dextrose 50% Injectable 25 Gram(s) IV Push once  dextrose 50% Injectable 12.5 Gram(s) IV Push once  dextrose 50% Injectable 25 Gram(s) IV Push once  ertapenem  IVPB 1000 milliGRAM(s) IV Intermittent every 24 hours  ertapenem  IVPB      finasteride 5 milliGRAM(s) Oral daily  glucagon  Injectable 1 milliGRAM(s) IntraMuscular once  heparin   Injectable 5000 Unit(s) SubCutaneous every 8 hours  influenza  Vaccine (HIGH DOSE) 0.7 milliLiter(s) IntraMuscular once  insulin glargine Injectable (LANTUS) 25 Unit(s) SubCutaneous at bedtime  insulin lispro (ADMELOG) corrective regimen sliding scale   SubCutaneous three times a day before meals  insulin lispro Injectable (ADMELOG) 10 Unit(s) SubCutaneous three times a day before meals  metoprolol succinate ER 25 milliGRAM(s) Oral daily  pantoprazole    Tablet 40 milliGRAM(s) Oral before breakfast  polyethylene glycol 3350 17 Gram(s) Oral two times a day  senna 2 Tablet(s) Oral at bedtime  tamsulosin 0.8 milliGRAM(s) Oral at bedtime    MEDICATIONS  (PRN):    Vital Signs Last 24 Hrs  T(C): 37 (24 Feb 2022 13:18), Max: 37.1 (23 Feb 2022 21:14)  T(F): 98.6 (24 Feb 2022 13:18), Max: 98.7 (23 Feb 2022 21:14)  HR: 78 (24 Feb 2022 13:18) (76 - 100)  BP: 132/88 (24 Feb 2022 13:18) (132/75 - 156/96)  BP(mean): --  RR: 18 (24 Feb 2022 13:18) (18 - 18)  SpO2: 92% (24 Feb 2022 13:18) (90% - 95%)      02-23 @ 07:01  -  02-24 @ 07:00  --------------------------------------------------------  IN: 630 mL / OUT: 200 mL / NET: 430 mL      LABS:                        12.8   11.96 )-----------( Clumped    ( 24 Feb 2022 09:51 )             40.5     02-24    137  |  103  |  18  ----------------------------<  198<H>  4.3   |  23  |  1.39<H>    Ca    9.2      24 Feb 2022 09:51      CULTURES:  Culture Results:   No growth to date. (02-20 @ 00:31)  Culture Results:   Growth in anaerobic bottle: Escherichia coli ESBL (02-19 @ 22:01)    Physical Examination:    General: Non toxic, sleeping/rousable No acute distress.      HEENT: Pupils equal, reactive to light.  Symmetric.    PULM: Few scattered rhonchi bilaterally; no wheeze    CVS: Regular rate and rhythm, no murmurs, rubs, or gallops    ABD: Soft, nondistended, nontender, normoactive bowel sounds, no masses    EXT: No edema, nontender    SKIN: Warm and well perfused, no rashes noted.    NEURO: Alert, oriented, interactive, nonfocal    RADIOLOGY REVIEWED PERSONALLY  CXR:    CT chest:    PROCEDURE DATE:  02/23/2022          INTERPRETATION:  EXAMINATION: XR CHEST URGENT    CLINICAL INDICATION: Hypoxia.    TECHNIQUE: Single frontal, portable view of the chest was obtained.    COMPARISON: Chest x-ray 2/16/2022.    FINDINGS:  The heart appears enlarged however this may be projectional. Aortic   calcifications.  Rounded opacity in the right lower lung field. Right middle lung   atelectasis.  There is no pneumothorax or pleural effusion.    IMPRESSION:  Rounded opacity in the right lower lung field. This was not present on   the x-ray from one week earlier and may represent atelectasis or   confluence of shadows. Can obtain CT chest for further characterization   is clinically warranted.  Right middle lung linear atelectasis.    TTE:    Patient name: KAITLIN CALDERON  YOB: 1940   Age: 79 (M)   MR#: [id number]  Study Date: 3/9/2020  Location: [address] [place], [place] [address]Sonographer: Anthony Ambriz RDCS  Study quality: Technically difficult  Referring Physician: Breanna Rocha MD  Blood Pressure: 154/97 mmHg  Height: 170 cm  Weight: 94 kg  BSA: 2.1 m2  ------------------------------------------------------------------------    PROCEDURE: Transthoracic echocardiogram with 2-D, M-Mode  and complete spectral and color flow Doppler.  INDICATION:Syncope and Collapse (R55)  HISTORY:  ------------------------------------------------------------------------  DIMENSIONS:  Dimensions:     Normal Values:  LA:     3.9 cm    2.0 - 4.0 cm  Ao:     4.0 cm    2.0 - 3.8 cm  SEPTUM: 1.2 cm    0.6 - 1.2 cm  PWT:    1.2 cm    0.6 - 1.1 cm  LVIDd:  4.5 cm    3.0 - 5.6 cm  LVIDs:  3.2 cm    1.8 - 4.0 cm      Derived Variables:  LVMI: 97 g/m2  RWT: 0.53  Ejection Fraction Visual Estimate: 55 %    ------------------------------------------------------------------------  OBSERVATIONS:  Mitral Valve: Normal mitral valve.  Aortic Root: Mild aortic root dilatation, consider CT of  chest for further evaluation.  Aortic Valve: Normal trileaflet aortic valve. Mild aortic  regurgitation.  Left Atrium: Normal left atrium.  Left Ventricle: Endocardium not well visualized; grossly  normal left ventricular systolic function. Normal left  ventricular internal dimensions and wall thicknesses. Grade  I diastolic dysfunction (Impaired relaxation).  Right Heart: Normal right atrium. Normal right ventricular  size and systolic function (TAPSE 2.2 cm). Normal tricuspid  valve. Normal pulmonic valve.  Pericardium/PleuraNormal pericardium with no pericardial  effusion.  Hemodynamic: Unable to estimate RVSP.  ------------------------------------------------------------------------  CONCLUSIONS:  1. Normal mitral valve.  2. Normal trileaflet aortic valve. Mild aortic  regurgitation.  3. Mild aortic root dilatation, consider CT of chest for  further evaluation.  4. Normal left atrium.  5. Normal left ventricular internal dimensions and wall  thicknesses.  6. Endocardium not well visualized; grossly normal left  ventricular systolic function.  7. Grade I diastolic dysfunction (Impaired relaxation).  8. Normal right atrium.  9. Normal right ventricular size and systolic function  (TAPSE 2.2 cm).  10. Unable to estimate RVSP.  11. Normal tricuspid valve.  12. Normal pulmonic valve.  13. Normal pericardium with no pericardial effusion.

## 2022-02-24 NOTE — PROGRESS NOTE ADULT - SUBJECTIVE AND OBJECTIVE BOX
[facility]  Sudhir Duffy PA-C  [address] [place] [address]  [phone]      INTERVAL HPI/OVERNIGHT EVENTS:  patient s/e, feels better  tolerating reg diet       MEDICATIONS  (STANDING):  aspirin enteric coated 81 milliGRAM(s) Oral daily  budesonide 160 MICROgram(s)/formoterol 4.5 MICROgram(s) Inhaler 2 Puff(s) Inhalation two times a day  cefTRIAXone   IVPB 1000 milliGRAM(s) IV Intermittent every 24 hours  dextrose 40% Gel 15 Gram(s) Oral once  dextrose 5%. 1000 milliLiter(s) (50 mL/Hr) IV Continuous <Continuous>  dextrose 5%. 1000 milliLiter(s) (100 mL/Hr) IV Continuous <Continuous>  dextrose 50% Injectable 25 Gram(s) IV Push once  dextrose 50% Injectable 12.5 Gram(s) IV Push once  dextrose 50% Injectable 25 Gram(s) IV Push once  finasteride 5 milliGRAM(s) Oral daily  glucagon  Injectable 1 milliGRAM(s) IntraMuscular once  heparin   Injectable 5000 Unit(s) SubCutaneous every 8 hours  influenza  Vaccine (HIGH DOSE) 0.7 milliLiter(s) IntraMuscular once  insulin glargine Injectable (LANTUS) 16 Unit(s) SubCutaneous at bedtime  insulin lispro (ADMELOG) corrective regimen sliding scale   SubCutaneous three times a day before meals  insulin lispro Injectable (ADMELOG) 8 Unit(s) SubCutaneous three times a day before meals  metoprolol succinate ER 25 milliGRAM(s) Oral daily  pantoprazole    Tablet 40 milliGRAM(s) Oral before breakfast  senna 2 Tablet(s) Oral at bedtime  tamsulosin 0.8 milliGRAM(s) Oral at bedtime    MEDICATIONS  (PRN):      Allergies    No Known Allergies    Intolerances        ROS:   General:  No wt loss, fevers, chills, night sweats, fatigue,   Eyes:  Good vision, no reported pain  ENT:  No sore throat, pain, runny nose, dysphagia  CV:  No pain, palpitations, hypo/hypertension  Resp:  No dyspnea, cough, tachypnea, wheezing  GI:  No pain, No nausea, No vomiting, No diarrhea, + constipation, No weight loss, No fever, No pruritis, No rectal bleeding, No tarry stools, No dysphagia,  :  No pain, bleeding, incontinence, nocturia  Muscle:  No pain, weakness  Neuro:  No weakness, tingling, memory problems  Psych:  No fatigue, insomnia, mood problems, depression  Endocrine:  No polyuria, polydipsia, cold/heat intolerance  Heme:  No petechiae, ecchymosis, easy bruisability  Skin:  No rash, tattoos, scars, edema      PHYSICAL EXAM:   Vital Signs Last 24 Hrs  T(C): 36.3 (2022 06:24), Max: 37.1 (2022 21:14)  T(F): 97.4 (2022 06:24), Max: 98.7 (2022 21:14)  HR: 76 (2022 09:02) (76 - 100)  BP: 156/96 (2022 09:02) (128/86 - 156/96)  BP(mean): --  RR: 18 (2022 06:24) (18 - 18)  SpO2: 90% (2022 09:02) (90% - 96%)  Daily     Daily   I&O's Summary    2022 07:01  -  2022 07:00  --------------------------------------------------------  IN: 630 mL / OUT: 200 mL / NET: 430 mL        GENERAL:  Appears stated age,   HEENT:  NC/AT,    CHEST:  Full & symmetric excursion,   HEART:  Regular rhythm,  ABDOMEN:  Soft, non-tender, non-distended,  EXTEREMITIES:  no cyanosis  SKIN:  No rash  NEURO:  Alert,       LABS:                        12.8   11.96 )-----------( Clumped    ( 2022 09:51 )             40.5       137  |  103  |  18  ----------------------------<  198<H>  4.3   |  23  |  1.39<H>    Ca    9.2      2022 09:51          Urinalysis Basic - ( 2022 23:26 )    Color: Yellow / Appearance: Turbid / S.015 / pH: x  Gluc: x / Ketone: Small  / Bili: Negative / Urobili: Negative   Blood: x / Protein: 300 mg/dL / Nitrite: Positive   Leuk Esterase: Large / RBC: 31 /hpf /  /HPF   Sq Epi: x / Non Sq Epi: 5 /hpf / Bacteria: Many        RADIOLOGY & ADDITIONAL TESTS:

## 2022-02-24 NOTE — PROGRESS NOTE ADULT - SUBJECTIVE AND OBJECTIVE BOX
Overnight events noted      VITAL:  T(C): , Max: 37.2 (02-23-22 @ 10:29)  T(F): , Max: 99 (02-23-22 @ 10:29)  HR: 97 (02-24-22 @ 06:24)  BP: 139/86 (02-24-22 @ 06:24)  BP(mean): --  RR: 18 (02-24-22 @ 06:24)  SpO2: 93% (02-24-22 @ 06:24)      PHYSICAL EXAM:  General:  alert, cooperative and no distress  HEENT: no trauma  Lungs: clear to auscultation bilaterally  Heart: normal S1/S2  Abdomen: soft, non-tender not distended, + BS  Extremities: no clubbing, cyanosis or edema  Urologic: no traore  Skin: no rashes    LABS:                        12.0   12.28 )-----------( Clumped    ( 23 Feb 2022 07:08 )             37.6     Na(139)/K(4.3)/Cl(106)/HCO3(23)/BUN(15)/Cr(1.40)Glu(166)/Ca(8.8)/Mg(--)/PO4(--)    02-23 @ 07:08  Na(136)/K(4.2)/Cl(104)/HCO3(21)/BUN(15)/Cr(1.29)Glu(210)/Ca(8.4)/Mg(--)/PO4(--)    02-22 @ 06:56      IMAGING:  < from: Xray Chest 1 View- PORTABLE-Urgent (Xray Chest 1 View- PORTABLE-Urgent .) (02.23.22 @ 13:34) >  Rounded opacity in the right lower lung field. This was not present on   the x-ray from one week earlier and may represent atelectasis or   confluence of shadows. Can obtain CT chest for further characterization   is clinically warranted.  Right middle lung linear atelectasis.          IMPRESSION: 81M w/ HTN, DM2, CAD, BPH, intermittent UTIs, and CKD, 2/17/22 p/w E Coli bacteremia    (1)CKD - stage 3 with non-nephrotic range proteinuria. Potentially diabetic nephropathy  (2)ANTON - prerenal - labs pending for today  (3)ID - EColi UTI/bacteremia - on IV Invanz  (4)Pulm - hypoxia of late - ?rounded opacity on CXR      RECOMMENDATIONS:  (1)No IVF/No diuretics for now  (2)CT chest as suggested by Radiology  (3)BMP daily  (4)Dose new meds for GFR 40-50ml/min (present dosing is acceptable)          Maco Bhatia MD  [facility]  Office: [phone]  Cell: [phone]       no complaints      VITAL:  T(C): , Max: 37.2 (02-23-22 @ 10:29)  T(F): , Max: 99 (02-23-22 @ 10:29)  HR: 97 (02-24-22 @ 06:24)  BP: 139/86 (02-24-22 @ 06:24)  BP(mean): --  RR: 18 (02-24-22 @ 06:24)  SpO2: 93% (02-24-22 @ 06:24)      PHYSICAL EXAM:  General:  lethargic/confused  HEENT: no trauma  Lungs: clear to auscultation bilaterally  Heart: irreg s1s2  Abdomen: soft, non-tender not distended, + BS  Extremities: no clubbing, cyanosis or edema  Urologic: no traore  Skin: no rashes    LABS:                        12.0   12.28 )-----------( Clumped    ( 23 Feb 2022 07:08 )             37.6     Na(139)/K(4.3)/Cl(106)/HCO3(23)/BUN(15)/Cr(1.40)Glu(166)/Ca(8.8)/Mg(--)/PO4(--)    02-23 @ 07:08  Na(136)/K(4.2)/Cl(104)/HCO3(21)/BUN(15)/Cr(1.29)Glu(210)/Ca(8.4)/Mg(--)/PO4(--)    02-22 @ 06:56      IMAGING:  < from: Xray Chest 1 View- PORTABLE-Urgent (Xray Chest 1 View- PORTABLE-Urgent .) (02.23.22 @ 13:34) >  Rounded opacity in the right lower lung field. This was not present on   the x-ray from one week earlier and may represent atelectasis or   confluence of shadows. Can obtain CT chest for further characterization   is clinically warranted.  Right middle lung linear atelectasis.          IMPRESSION: 81M w/ HTN, DM2, CAD, BPH, intermittent UTIs, and CKD, 2/17/22 p/w E Coli bacteremia    (1)CKD - stage 3 with non-nephrotic range proteinuria. Potentially diabetic nephropathy  (2)ANTON - prerenal - labs pending for today  (3)ID - EColi UTI/bacteremia - on IV Invanz  (4)Pulm - hypoxia of late - ?rounded opacity on CXR      RECOMMENDATIONS:  (1)No IVF/No diuretics for now  (2)CT chest as suggested by Radiology  (3)BMP daily  (4)Dose new meds for GFR 40-50ml/min (present dosing is acceptable)          Maco Bhatia MD  [facility]  Office: [phone]  Cell: [phone]       no complaints      VITAL:  T(C): , Max: 37.2 (02-23-22 @ 10:29)  T(F): , Max: 99 (02-23-22 @ 10:29)  HR: 97 (02-24-22 @ 06:24)  BP: 139/86 (02-24-22 @ 06:24)  BP(mean): --  RR: 18 (02-24-22 @ 06:24)  SpO2: 93% (02-24-22 @ 06:24)      PHYSICAL EXAM:  General:  lethargic/confused  HEENT: no trauma  Lungs: scant bibasilar crackles  Heart: irreg s1s2  Abdomen: soft, non-tender not distended, + BS  Extremities: no clubbing, cyanosis or edema  Urologic: no traore  Skin: no rashes    LABS:                        12.0   12.28 )-----------( Clumped    ( 23 Feb 2022 07:08 )             37.6     Na(139)/K(4.3)/Cl(106)/HCO3(23)/BUN(15)/Cr(1.40)Glu(166)/Ca(8.8)/Mg(--)/PO4(--)    02-23 @ 07:08  Na(136)/K(4.2)/Cl(104)/HCO3(21)/BUN(15)/Cr(1.29)Glu(210)/Ca(8.4)/Mg(--)/PO4(--)    02-22 @ 06:56      IMAGING:  < from: Xray Chest 1 View- PORTABLE-Urgent (Xray Chest 1 View- PORTABLE-Urgent .) (02.23.22 @ 13:34) >  Rounded opacity in the right lower lung field. This was not present on   the x-ray from one week earlier and may represent atelectasis or   confluence of shadows. Can obtain CT chest for further characterization   is clinically warranted.  Right middle lung linear atelectasis.          IMPRESSION: 81M w/ HTN, DM2, CAD, BPH, intermittent UTIs, and CKD, 2/17/22 p/w E Coli bacteremia    (1)CKD - stage 3 with non-nephrotic range proteinuria. Potentially diabetic nephropathy  (2)ANTON - prerenal - labs pending for today  (3)ID - EColi UTI/bacteremia - on IV Invanz  (4)Pulm - hypoxia of late - ?rounded opacity on CXR      RECOMMENDATIONS:  (1)No IVF/No diuretics for now  (2)CT chest as suggested by Radiology  (3)BMP daily  (4)Dose new meds for GFR 40-50ml/min (present dosing is acceptable)          Maco Bhatia MD  [facility]  Office: [phone]  Cell: [phone]

## 2022-02-24 NOTE — PHYSICAL THERAPY INITIAL EVALUATION ADULT - PERTINENT HX OF CURRENT PROBLEM, REHAB EVAL
Pt is 81M admitted 2/17/22 PMHx DM, bph, htn, chol, ,intermittent UTIs with AMs presenting with AMs. Per EMS and family patient has had 1 day of worsened AMS, disorientation similar to prior episode of UTIs.

## 2022-02-24 NOTE — PHYSICAL THERAPY INITIAL EVALUATION ADULT - GENERAL OBSERVATIONS, REHAB EVAL
received semisupine in bed, Cantonese speaking with  #[id number] used t/o, lethargic, minimally verbal, following ~10% of commands.

## 2022-02-24 NOTE — PHYSICAL THERAPY INITIAL EVALUATION ADULT - PRECAUTIONS/LIMITATIONS, REHAB EVAL
XRAY CHEST: Rounded opacity in the right lower lung field. This was not present on the x-ray from one week earlier and may represent atelectasis or/fall precautions

## 2022-02-24 NOTE — PHYSICAL THERAPY INITIAL EVALUATION ADULT - ADDITIONAL COMMENTS
As per care coordination notes, pt resides in private home with family, 5 steps to enter, owns RW & cane.

## 2022-02-24 NOTE — PROGRESS NOTE ADULT - SUBJECTIVE AND OBJECTIVE BOX
KAITLIN CALDERON  81y Male  MRN:[id number]    Patient is a 81y old  Male who presents with a chief complaint of AMS/ sepsis / uti    HPI:   81 Y M H/O DM, bph, htn, chol, ,intermittent UTIs with AMs presenting with AMs. Per EMS and family patient has had 1 day of worsened AMS, disorientation similar to prior episode of UTIs. in er pt was minimally responsive, unable to converse, responsive to painful stimuli. Unable to interact with ROS, Per EMS concern for potential aspiration while in transport.  mental status improved weith iv abx and fluids.  being admitted for sepsis 2/2 uti (17 Feb 2022 11:06)      Patient seen and evaluated at bedside.  interval events noted     Interval HPI: more alert today. sitting up in bed.  remains on 2L nasal canula.     PAST MEDICAL & SURGICAL HISTORY:  DM (diabetes mellitus)    HTN (hypertension)    Hypercholesterolemia    CAD (coronary artery disease)    HTN (hypertension)    DM (diabetes mellitus)    S/P TURP    S/P gastrectomy        REVIEW OF SYSTEMS:  as per hpi     VITALS:   Vital Signs Last 24 Hrs  T(C): 36.7 (24 Feb 2022 10:38), Max: 37.1 (23 Feb 2022 21:14)  T(F): 98.1 (24 Feb 2022 10:38), Max: 98.7 (23 Feb 2022 21:14)  HR: 80 (24 Feb 2022 10:38) (76 - 100)  BP: 132/75 (24 Feb 2022 10:38) (128/86 - 156/96)  BP(mean): --  RR: 18 (24 Feb 2022 10:38) (18 - 18)  SpO2: 94% (24 Feb 2022 10:38) (90% - 96%)    PHYSICAL EXAM:  GENERAL: NAD, well-developed,    HEAD:  Atraumatic, Normocephalic  EYES: EOMI, PERRLA, conjunctiva and sclera clear  NECK: Supple, No JVD  CHEST/LUNG: dec bs b/l bases  HEART: S1, S2; No murmurs, rubs, or gallops  ABDOMEN:  soft, non tender. +distended.  +BS   EXTREMITIES:  2+ Peripheral Pulses, No clubbing, cyanosis, or edema  NEUROLOGY: AAOX2-3; non-focal,    SKIN: No rashes or lesions    Consultant(s) Notes Reviewed:  [x ] YES  [ ] NO  Care Discussed with Consultants/Other Providers [ x] YES  [ ] NO    MEDS:   MEDICATIONS  (STANDING):  aspirin enteric coated 81 milliGRAM(s) Oral daily  budesonide 160 MICROgram(s)/formoterol 4.5 MICROgram(s) Inhaler 2 Puff(s) Inhalation two times a day  dextrose 40% Gel 15 Gram(s) Oral once  dextrose 5%. 1000 milliLiter(s) (50 mL/Hr) IV Continuous <Continuous>  dextrose 5%. 1000 milliLiter(s) (100 mL/Hr) IV Continuous <Continuous>  dextrose 50% Injectable 25 Gram(s) IV Push once  dextrose 50% Injectable 12.5 Gram(s) IV Push once  dextrose 50% Injectable 25 Gram(s) IV Push once  ertapenem  IVPB 1000 milliGRAM(s) IV Intermittent every 24 hours  ertapenem  IVPB      finasteride 5 milliGRAM(s) Oral daily  glucagon  Injectable 1 milliGRAM(s) IntraMuscular once  heparin   Injectable 5000 Unit(s) SubCutaneous every 8 hours  influenza  Vaccine (HIGH DOSE) 0.7 milliLiter(s) IntraMuscular once  insulin glargine Injectable (LANTUS) 22 Unit(s) SubCutaneous at bedtime  insulin lispro (ADMELOG) corrective regimen sliding scale   SubCutaneous three times a day before meals  insulin lispro Injectable (ADMELOG) 10 Unit(s) SubCutaneous three times a day before meals  metoprolol succinate ER 25 milliGRAM(s) Oral daily  pantoprazole    Tablet 40 milliGRAM(s) Oral before breakfast  polyethylene glycol 3350 17 Gram(s) Oral two times a day  senna 2 Tablet(s) Oral at bedtime  tamsulosin 0.8 milliGRAM(s) Oral at bedtime    MEDICATIONS  (PRN):        ALLERGIES:  No Known Allergies      LABS:                          12.8   11.96 )-----------( Clumped    ( 24 Feb 2022 09:51 )             40.5   02-24    137  |  103  |  18  ----------------------------<  198<H>  4.3   |  23  |  1.39<H>    Ca    9.2      24 Feb 2022 09:51            < from: Xray Chest 1 View-PORTABLE IMMEDIATE (Xray Chest 1 View-PORTABLE IMMEDIATE .) (02.19.22 @ 15:52) >    IMPRESSION:    Clear lungs.    < end of copied text >  < from: Xray Abdomen 1 View Portable, IMMEDIATE (Xray Abdomen 1 View Portable, IMMEDIATE .) (02.19.22 @ 15:52) >  IMPRESSION:    Nonobstructive bowel gas pattern.    < end of copied text >       :   cultures: Culture Results:   No growth to date. (02-17 @ 06:58)  Culture Results:   Growth in aerobic bottle: Gram Negative Rods  ***Blood Panel PCR results on this specimen are available  approximately 3 hours after the Gram stain result.***  Gram stain, PCR, and/or culture results may not always  correspond due to difference in methodologies.  ************************************************************  This PCR assay was performed by multiplex PCR. This  Assay tests for 66 bacterial and resistance gene targets.  Please refer to the [facility] Labs test directory  at [email or web address] for details. (02-17 @ 06:58)  Culture Results:   >100,000 CFU/ml Gram Negative Rods (02-17 @ 04:03)      RADIOLOGY & ADDITIONAL TESTS:    Imaging Personally Reviewed:  [ ] YES  [ ] NO

## 2022-02-25 LAB
ANION GAP SERPL CALC-SCNC: 13 MMOL/L — SIGNIFICANT CHANGE UP (ref 5–17)
BUN SERPL-MCNC: 23 MG/DL — SIGNIFICANT CHANGE UP (ref 7–23)
CALCIUM SERPL-MCNC: 9.5 MG/DL — SIGNIFICANT CHANGE UP (ref 8.4–10.5)
CHLORIDE SERPL-SCNC: 105 MMOL/L — SIGNIFICANT CHANGE UP (ref 96–108)
CO2 SERPL-SCNC: 26 MMOL/L — SIGNIFICANT CHANGE UP (ref 22–31)
CREAT SERPL-MCNC: 1.36 MG/DL — HIGH (ref 0.5–1.3)
CULTURE RESULTS: SIGNIFICANT CHANGE UP
GLUCOSE BLDC GLUCOMTR-MCNC: 123 MG/DL — HIGH (ref 70–99)
GLUCOSE BLDC GLUCOMTR-MCNC: 153 MG/DL — HIGH (ref 70–99)
GLUCOSE BLDC GLUCOMTR-MCNC: 187 MG/DL — HIGH (ref 70–99)
GLUCOSE BLDC GLUCOMTR-MCNC: 203 MG/DL — HIGH (ref 70–99)
GLUCOSE SERPL-MCNC: 190 MG/DL — HIGH (ref 70–99)
HCT VFR BLD CALC: 39.5 % — SIGNIFICANT CHANGE UP (ref 39–50)
HGB BLD-MCNC: 12.7 G/DL — LOW (ref 13–17)
MCHC RBC-ENTMCNC: 30.2 PG — SIGNIFICANT CHANGE UP (ref 27–34)
MCHC RBC-ENTMCNC: 32.2 GM/DL — SIGNIFICANT CHANGE UP (ref 32–36)
MCV RBC AUTO: 93.8 FL — SIGNIFICANT CHANGE UP (ref 80–100)
NRBC # BLD: 0 /100 WBCS — SIGNIFICANT CHANGE UP (ref 0–0)
PLATELET # BLD AUTO: SIGNIFICANT CHANGE UP K/UL (ref 150–400)
POTASSIUM SERPL-MCNC: 4.3 MMOL/L — SIGNIFICANT CHANGE UP (ref 3.5–5.3)
POTASSIUM SERPL-SCNC: 4.3 MMOL/L — SIGNIFICANT CHANGE UP (ref 3.5–5.3)
RBC # BLD: 4.21 M/UL — SIGNIFICANT CHANGE UP (ref 4.2–5.8)
RBC # FLD: 13.4 % — SIGNIFICANT CHANGE UP (ref 10.3–14.5)
SODIUM SERPL-SCNC: 144 MMOL/L — SIGNIFICANT CHANGE UP (ref 135–145)
SPECIMEN SOURCE: SIGNIFICANT CHANGE UP
WBC # BLD: 11.26 K/UL — HIGH (ref 3.8–10.5)
WBC # FLD AUTO: 11.26 K/UL — HIGH (ref 3.8–10.5)

## 2022-02-25 PROCEDURE — 71045 X-RAY EXAM CHEST 1 VIEW: CPT | Mod: 26

## 2022-02-25 RX ORDER — INSULIN GLARGINE 100 [IU]/ML
28 INJECTION, SOLUTION SUBCUTANEOUS AT BEDTIME
Refills: 0 | Status: DISCONTINUED | OUTPATIENT
Start: 2022-02-25 | End: 2022-02-28

## 2022-02-25 RX ADMIN — BUDESONIDE AND FORMOTEROL FUMARATE DIHYDRATE 2 PUFF(S): 160; 4.5 AEROSOL RESPIRATORY (INHALATION) at 05:10

## 2022-02-25 RX ADMIN — PANTOPRAZOLE SODIUM 40 MILLIGRAM(S): 20 TABLET, DELAYED RELEASE ORAL at 05:13

## 2022-02-25 RX ADMIN — SENNA PLUS 2 TABLET(S): 8.6 TABLET ORAL at 21:36

## 2022-02-25 RX ADMIN — Medication 10 UNIT(S): at 12:30

## 2022-02-25 RX ADMIN — Medication 3 MILLILITER(S): at 23:41

## 2022-02-25 RX ADMIN — Medication 3 MILLILITER(S): at 12:31

## 2022-02-25 RX ADMIN — HEPARIN SODIUM 5000 UNIT(S): 5000 INJECTION INTRAVENOUS; SUBCUTANEOUS at 05:15

## 2022-02-25 RX ADMIN — Medication 4: at 12:30

## 2022-02-25 RX ADMIN — HEPARIN SODIUM 5000 UNIT(S): 5000 INJECTION INTRAVENOUS; SUBCUTANEOUS at 17:59

## 2022-02-25 RX ADMIN — Medication 10 UNIT(S): at 08:20

## 2022-02-25 RX ADMIN — BUDESONIDE AND FORMOTEROL FUMARATE DIHYDRATE 2 PUFF(S): 160; 4.5 AEROSOL RESPIRATORY (INHALATION) at 18:00

## 2022-02-25 RX ADMIN — TAMSULOSIN HYDROCHLORIDE 0.8 MILLIGRAM(S): 0.4 CAPSULE ORAL at 21:37

## 2022-02-25 RX ADMIN — FINASTERIDE 5 MILLIGRAM(S): 5 TABLET, FILM COATED ORAL at 12:32

## 2022-02-25 RX ADMIN — ERTAPENEM SODIUM 120 MILLIGRAM(S): 1 INJECTION, POWDER, LYOPHILIZED, FOR SOLUTION INTRAMUSCULAR; INTRAVENOUS at 12:31

## 2022-02-25 RX ADMIN — Medication 3 MILLILITER(S): at 05:10

## 2022-02-25 RX ADMIN — Medication 2: at 17:58

## 2022-02-25 RX ADMIN — POLYETHYLENE GLYCOL 3350 17 GRAM(S): 17 POWDER, FOR SOLUTION ORAL at 18:00

## 2022-02-25 RX ADMIN — Medication 2: at 08:21

## 2022-02-25 RX ADMIN — Medication 10 UNIT(S): at 17:59

## 2022-02-25 RX ADMIN — Medication 81 MILLIGRAM(S): at 12:31

## 2022-02-25 RX ADMIN — INSULIN GLARGINE 28 UNIT(S): 100 INJECTION, SOLUTION SUBCUTANEOUS at 22:20

## 2022-02-25 RX ADMIN — POLYETHYLENE GLYCOL 3350 17 GRAM(S): 17 POWDER, FOR SOLUTION ORAL at 05:10

## 2022-02-25 RX ADMIN — Medication 3 MILLILITER(S): at 18:00

## 2022-02-25 RX ADMIN — Medication 25 MILLIGRAM(S): at 05:15

## 2022-02-25 RX ADMIN — HEPARIN SODIUM 5000 UNIT(S): 5000 INJECTION INTRAVENOUS; SUBCUTANEOUS at 21:37

## 2022-02-25 RX ADMIN — Medication 3 MILLILITER(S): at 00:24

## 2022-02-25 NOTE — PROGRESS NOTE ADULT - ASSESSMENT
Assessment  DMT2: 81y Male with DM T2 with hyperglycemia, A1C 11.1%, per chart was on insulin at home, ?compliance, now on basal bolus insulin, increased basal dose yesterday, blood sugars are improving though still not at target, FS elevated this AM, no hypoglycemic episodes. Patient is eating meals with good appetite per discussion with nursing staff, NAD, DC plan is for rehab.  UTI: On meds, monitored.  HTN: Controlled,  on antihypertensive medications.  CKD: Monitor labs/BMP,         nelda Kirk moya  [phone]

## 2022-02-25 NOTE — PROGRESS NOTE ADULT - SUBJECTIVE AND OBJECTIVE BOX
Follow-up Pulm Progress Note  Edmund Quiroga MD  [phone]    AFebrile  F/U CXR with resolution of linear atelectasis seen on prior film  OOB in chair: breathing comfortably on RA    Medications:  Vital Signs Last 24 Hrs  T(C): 36.5 (25 Feb 2022 13:27), Max: 36.8 (24 Feb 2022 22:03)  T(F): 97.7 (25 Feb 2022 13:27), Max: 98.3 (24 Feb 2022 22:03)  HR: 100 (25 Feb 2022 13:27) (83 - 100)  BP: 123/79 (25 Feb 2022 13:27) (123/79 - 169/94)  BP(mean): --  RR: 18 (25 Feb 2022 13:27) (18 - 18)  SpO2: 96% (25 Feb 2022 13:27) (90% - 96%)      02-24 @ 07:01  -  02-25 @ 07:00  --------------------------------------------------------  IN: 270 mL / OUT: 0 mL / NET: 270 mL        LABS:                        12.7   11.26 )-----------( Clumped    ( 25 Feb 2022 08:18 )             39.5     02-25    144  |  105  |  23  ----------------------------<  190<H>  4.3   |  26  |  1.36<H>    Ca    9.5      25 Feb 2022 08:20      CULTURES:  Culture Results:   No Growth Final (02-20 @ 00:31)  Culture Results:   Growth in anaerobic bottle: Escherichia coli ESBL (02-19 @ 22:01)      Physical Examination:  PULM: No wheeze or rhonchi  CVS: Regular rate and rhythm, no murmurs, rubs, or gallops  ABD: Soft, non-tender  EXT:  No clubbing, cyanosis, or edema    RADIOLOGY REVIEWED  CXR:    CT chest:    TTE:

## 2022-02-25 NOTE — PROGRESS NOTE ADULT - ASSESSMENT
Obesity with bibasilar atelectasis  ESBL E Coli urosepsi    REC    Monitor resp status  Mobilize  Encourage incentive spirometer  ABX per ID

## 2022-02-25 NOTE — PROGRESS NOTE ADULT - SUBJECTIVE AND OBJECTIVE BOX
KAITLIN CALDERON  81y Male  MRN:[id number]    Patient is a 81y old  Male who presents with a chief complaint of AMS/ sepsis / uti    HPI:   81 Y M H/O DM, bph, htn, chol, ,intermittent UTIs with AMs presenting with AMs. Per EMS and family patient has had 1 day of worsened AMS, disorientation similar to prior episode of UTIs. in er pt was minimally responsive, unable to converse, responsive to painful stimuli. Unable to interact with ROS, Per EMS concern for potential aspiration while in transport.  mental status improved weith iv abx and fluids.  being admitted for sepsis 2/2 uti (17 Feb 2022 11:06)      Patient seen and evaluated at bedside.  interval events noted     Interval HPI: no acute events o/n.     PAST MEDICAL & SURGICAL HISTORY:  DM (diabetes mellitus)    HTN (hypertension)    Hypercholesterolemia    CAD (coronary artery disease)    HTN (hypertension)    DM (diabetes mellitus)    S/P TURP    S/P gastrectomy        REVIEW OF SYSTEMS:  as per hpi     VITALS:   Vital Signs Last 24 Hrs  T(C): 36.7 (25 Feb 2022 08:53), Max: 37 (24 Feb 2022 13:18)  T(F): 98 (25 Feb 2022 08:53), Max: 98.6 (24 Feb 2022 13:18)  HR: 99 (25 Feb 2022 08:53) (78 - 99)  BP: 144/85 (25 Feb 2022 08:53) (129/89 - 169/94)  BP(mean): --  RR: 18 (25 Feb 2022 08:53) (18 - 18)  SpO2: 90% (25 Feb 2022 08:53) (90% - 93%)    PHYSICAL EXAM:  GENERAL: NAD, well-developed, sleepy but easily arousable   HEAD:  Atraumatic, Normocephalic  EYES: EOMI, PERRLA, conjunctiva and sclera clear  NECK: Supple, No JVD  CHEST/LUNG: clear b/l  HEART: S1, S2; No murmurs, rubs, or gallops  ABDOMEN:  soft, non tender. +distended.  +BS   EXTREMITIES:  2+ Peripheral Pulses, No clubbing, cyanosis, or edema  NEUROLOGY: AAOX2-3; non-focal,    SKIN: No rashes or lesions    Consultant(s) Notes Reviewed:  [x ] YES  [ ] NO  Care Discussed with Consultants/Other Providers [ x] YES  [ ] NO    MEDS:   MEDICATIONS  (STANDING):  albuterol/ipratropium for Nebulization 3 milliLiter(s) Nebulizer four times a day  aspirin enteric coated 81 milliGRAM(s) Oral daily  budesonide 160 MICROgram(s)/formoterol 4.5 MICROgram(s) Inhaler 2 Puff(s) Inhalation two times a day  dextrose 40% Gel 15 Gram(s) Oral once  dextrose 5%. 1000 milliLiter(s) (50 mL/Hr) IV Continuous <Continuous>  dextrose 5%. 1000 milliLiter(s) (100 mL/Hr) IV Continuous <Continuous>  dextrose 50% Injectable 25 Gram(s) IV Push once  dextrose 50% Injectable 12.5 Gram(s) IV Push once  dextrose 50% Injectable 25 Gram(s) IV Push once  ertapenem  IVPB 1000 milliGRAM(s) IV Intermittent every 24 hours  ertapenem  IVPB      finasteride 5 milliGRAM(s) Oral daily  glucagon  Injectable 1 milliGRAM(s) IntraMuscular once  heparin   Injectable 5000 Unit(s) SubCutaneous every 8 hours  influenza  Vaccine (HIGH DOSE) 0.7 milliLiter(s) IntraMuscular once  insulin glargine Injectable (LANTUS) 25 Unit(s) SubCutaneous at bedtime  insulin lispro (ADMELOG) corrective regimen sliding scale   SubCutaneous three times a day before meals  insulin lispro Injectable (ADMELOG) 10 Unit(s) SubCutaneous three times a day before meals  metoprolol succinate ER 25 milliGRAM(s) Oral daily  pantoprazole    Tablet 40 milliGRAM(s) Oral before breakfast  polyethylene glycol 3350 17 Gram(s) Oral two times a day  senna 2 Tablet(s) Oral at bedtime  tamsulosin 0.8 milliGRAM(s) Oral at bedtime    MEDICATIONS  (PRN):        ALLERGIES:  No Known Allergies      LABS:                                              12.7   11.26 )-----------( Clumped    ( 25 Feb 2022 08:18 )             39.5   02-25    144  |  105  |  23  ----------------------------<  190<H>  4.3   |  26  |  1.36<H>    Ca    9.5      25 Feb 2022 08:20            < from: Xray Chest 1 View-PORTABLE IMMEDIATE (Xray Chest 1 View-PORTABLE IMMEDIATE .) (02.19.22 @ 15:52) >    IMPRESSION:    Clear lungs.    < end of copied text >  < from: Xray Abdomen 1 View Portable, IMMEDIATE (Xray Abdomen 1 View Portable, IMMEDIATE .) (02.19.22 @ 15:52) >  IMPRESSION:    Nonobstructive bowel gas pattern.    < end of copied text >       :   cultures: Culture Results:   No growth to date. (02-17 @ 06:58)  Culture Results:   Growth in aerobic bottle: Gram Negative Rods  ***Blood Panel PCR results on this specimen are available  approximately 3 hours after the Gram stain result.***  Gram stain, PCR, and/or culture results may not always  correspond due to difference in methodologies.  ************************************************************  This PCR assay was performed by multiplex PCR. This  Assay tests for 66 bacterial and resistance gene targets.  Please refer to the [facility] Labs test directory  at [email or web address] for details. (02-17 @ 06:58)  Culture Results:   >100,000 CFU/ml Gram Negative Rods (02-17 @ 04:03)      RADIOLOGY & ADDITIONAL TESTS:    Imaging Personally Reviewed:  [ ] YES  [ ] NO

## 2022-02-25 NOTE — PROGRESS NOTE ADULT - ASSESSMENT
80 yo male h/o DM, htn, chol, bph, p/w sepsis, 2/2 uti  pt with sepsis present on admission    sepsis 2/2 uti  urine and blood cultures growing Ecoli.  ESBL  resistant to ceftriax.   abx changed from ceftriax to invanz.   ID f/u apprec  will need long term abx till 3/4.  midline now in place     bacteremia  likely 2/2 uti  now on invanz   cult and sens noted.   f/u repeat blood cultures to ensure clearance.  - ngtd  long term abx as noted above via midline      vomiting  likely 2/2 sepsis and uti  CT and abd xray noted.  no acute gi pathology  tolerating diet .  advance diet as tolerated - tolerating regular diet   zofran prn  gi f/u   bowel regimen for constipation      metabolic encephalopathy  2/2 sepsis  improved   as per d/w family, this is close to his baseline mental status    DM  on insulin  monitor fs  endo following     ckd  with chanel  renal f/u  creat stable  avoid nephrotoxins    hypoxia    CXR noted.  Rounded opacity in the right lower lung field. likely atelactasis. doubt pna. already on ABx   pt remains on supp o2.    incentive spirometer ordered   pulm consult apprec.     cont other home meds    dvt ppx    PT eval noted.    dispo - rehab  dc planning - pending rehab        d/w family on phone     Advanced care planning was discussed with patient and family.  Advanced care planning forms were reviewed and discussed as appropriate.  Differential diagnosis and plan of care discussed with patient after the evaluation.   Pain assessed and judicious use of narcotics when appropriate was discussed.  Importance of Fall prevention discussed.  Counseling on Smoking and Alcohol cessation was offered when appropriate.  Counseling on Diet, exercise, and medication compliance was done.         Approx 60 minutes spent.

## 2022-02-25 NOTE — PROGRESS NOTE ADULT - SUBJECTIVE AND OBJECTIVE BOX
Chief complaint  Patient is a 81y old  Male who presents with a chief complaint of AMS/Urosepsis (24 Feb 2022 14:21)   Review of systems  Patient in bed, looks comfortable, no hypoglycemic episodes.    Labs and Fingersticks  CAPILLARY BLOOD GLUCOSE      POCT Blood Glucose.: 187 mg/dL (25 Feb 2022 08:17)  POCT Blood Glucose.: 126 mg/dL (24 Feb 2022 21:51)  POCT Blood Glucose.: 104 mg/dL (24 Feb 2022 17:57)  POCT Blood Glucose.: 166 mg/dL (24 Feb 2022 12:44)      Anion Gap, Serum: 13 (02-25 @ 08:20)  Anion Gap, Serum: 11 (02-24 @ 09:51)      Calcium, Total Serum: 9.5 (02-25 @ 08:20)  Calcium, Total Serum: 9.2 (02-24 @ 09:51)          02-25    144  |  105  |  23  ----------------------------<  190<H>  4.3   |  26  |  1.36<H>    Ca    9.5      25 Feb 2022 08:20                          12.7   11.26 )-----------( Clumped    ( 25 Feb 2022 08:18 )             39.5     Medications  MEDICATIONS  (STANDING):  albuterol/ipratropium for Nebulization 3 milliLiter(s) Nebulizer four times a day  aspirin enteric coated 81 milliGRAM(s) Oral daily  budesonide 160 MICROgram(s)/formoterol 4.5 MICROgram(s) Inhaler 2 Puff(s) Inhalation two times a day  dextrose 40% Gel 15 Gram(s) Oral once  dextrose 5%. 1000 milliLiter(s) (50 mL/Hr) IV Continuous <Continuous>  dextrose 5%. 1000 milliLiter(s) (100 mL/Hr) IV Continuous <Continuous>  dextrose 50% Injectable 25 Gram(s) IV Push once  dextrose 50% Injectable 12.5 Gram(s) IV Push once  dextrose 50% Injectable 25 Gram(s) IV Push once  ertapenem  IVPB 1000 milliGRAM(s) IV Intermittent every 24 hours  ertapenem  IVPB      finasteride 5 milliGRAM(s) Oral daily  glucagon  Injectable 1 milliGRAM(s) IntraMuscular once  heparin   Injectable 5000 Unit(s) SubCutaneous every 8 hours  influenza  Vaccine (HIGH DOSE) 0.7 milliLiter(s) IntraMuscular once  insulin glargine Injectable (LANTUS) 28 Unit(s) SubCutaneous at bedtime  insulin lispro (ADMELOG) corrective regimen sliding scale   SubCutaneous three times a day before meals  insulin lispro Injectable (ADMELOG) 10 Unit(s) SubCutaneous three times a day before meals  metoprolol succinate ER 25 milliGRAM(s) Oral daily  pantoprazole    Tablet 40 milliGRAM(s) Oral before breakfast  polyethylene glycol 3350 17 Gram(s) Oral two times a day  senna 2 Tablet(s) Oral at bedtime  tamsulosin 0.8 milliGRAM(s) Oral at bedtime      Physical Exam  General: Patient comfortable in bed  Vital Signs Last 12 Hrs  T(F): 98 (02-25-22 @ 08:53), Max: 98 (02-25-22 @ 05:01)  HR: 99 (02-25-22 @ 08:53) (96 - 99)  BP: 144/85 (02-25-22 @ 08:53) (143/84 - 147/88)  BP(mean): --  RR: 18 (02-25-22 @ 08:53) (18 - 18)  SpO2: 90% (02-25-22 @ 08:53) (90% - 92%)  Neck: No palpable thyroid nodules.  CVS: S1S2, No murmurs  Respiratory: No wheezing, no crepitations  GI: Abdomen soft, bowel sounds positive  Musculoskeletal:  edema lower extremities.   Skin: No skin rashes, no ecchymosis    Diagnostics    Beta Hydroxy-Butyrate: AM Sched. Collection: 21-Feb-2022 06:00 (02-20 @ 09:00)

## 2022-02-25 NOTE — PROGRESS NOTE ADULT - SUBJECTIVE AND OBJECTIVE BOX
Overnight events noted      VITAL:  T(C): , Max: 37 (02-24-22 @ 13:18)  T(F): , Max: 98.6 (02-24-22 @ 13:18)  HR: 99 (02-25-22 @ 08:53)  BP: 144/85 (02-25-22 @ 08:53)  BP(mean): --  RR: 18 (02-25-22 @ 08:53)  SpO2: 90% (02-25-22 @ 08:53)      PHYSICAL EXAM:  General:  lethargic/confused  HEENT: no trauma  Lungs: scant bibasilar crackles  Heart: irreg s1s2  Abdomen: soft, non-tender not distended, + BS  Extremities: no clubbing, cyanosis or edema  Urologic: no traore  Skin: no rashes    LABS:                        12.7   11.26 )-----------( Clumped    ( 25 Feb 2022 08:18 )             39.5     Na(144)/K(4.3)/Cl(105)/HCO3(26)/BUN(23)/Cr(1.36)Glu(190)/Ca(9.5)/Mg(--)/PO4(--)    02-25 @ 08:20  Na(137)/K(4.3)/Cl(103)/HCO3(23)/BUN(18)/Cr(1.39)Glu(198)/Ca(9.2)/Mg(--)/PO4(--)    02-24 @ 09:51  Na(139)/K(4.3)/Cl(106)/HCO3(23)/BUN(15)/Cr(1.40)Glu(166)/Ca(8.8)/Mg(--)/PO4(--)    02-23 @ 07:08      IMPRESSION: 81M w/ HTN, DM2, CAD, BPH, intermittent UTIs, and CKD, 2/17/22 p/w E Coli bacteremia    (1)CKD - stage 3 with non-nephrotic range proteinuria. Potentially diabetic nephropathy  (2)ANTON - fluctuating numbers based on hemodynamic status; grossly stable over past couple days  (3)ID - EColi UTI/bacteremia - on IV Invanz  (4)Pulm - hypoxia of late - Pulm on board, input appreicated    RECOMMENDATIONS:  (1)No IVF/No diuretics for now  (2)BMP daily  (3)Dose new meds for GFR 40-50ml/min (present dosing is acceptable)              Maco Bhatia MD  [facility]  Office: [phone]  Cell: [phone]       no complaints      VITAL:  T(C): , Max: 37 (02-24-22 @ 13:18)  T(F): , Max: 98.6 (02-24-22 @ 13:18)  HR: 99 (02-25-22 @ 08:53)  BP: 144/85 (02-25-22 @ 08:53)  BP(mean): --  RR: 18 (02-25-22 @ 08:53)  SpO2: 90% (02-25-22 @ 08:53)      PHYSICAL EXAM:  General:  lethargic but alert, NAD  HEENT: no trauma  Lungs: transmitted upper airway sounds  Heart: irreg s1s2  Abdomen: soft, non-tender not distended, + BS  Extremities: no clubbing, cyanosis or edema  Urologic: no traore  Skin: no rashes    LABS:                        12.7   11.26 )-----------( Clumped    ( 25 Feb 2022 08:18 )             39.5     Na(144)/K(4.3)/Cl(105)/HCO3(26)/BUN(23)/Cr(1.36)Glu(190)/Ca(9.5)/Mg(--)/PO4(--)    02-25 @ 08:20  Na(137)/K(4.3)/Cl(103)/HCO3(23)/BUN(18)/Cr(1.39)Glu(198)/Ca(9.2)/Mg(--)/PO4(--)    02-24 @ 09:51  Na(139)/K(4.3)/Cl(106)/HCO3(23)/BUN(15)/Cr(1.40)Glu(166)/Ca(8.8)/Mg(--)/PO4(--)    02-23 @ 07:08      IMPRESSION: 81M w/ HTN, DM2, CAD, BPH, intermittent UTIs, and CKD, 2/17/22 p/w E Coli bacteremia    (1)CKD - stage 3 with non-nephrotic range proteinuria. Potentially diabetic nephropathy  (2)ANTON - fluctuating numbers based on hemodynamic status; grossly stable over past couple days  (3)ID - EColi UTI/bacteremia - on IV Invanz  (4)Pulm - hypoxia of late - Pulm on board, input appreicated    RECOMMENDATIONS:  (1)No IVF/No diuretics for now  (2)BMP daily  (3)Dose new meds for GFR 40-50ml/min (present dosing is acceptable)        Maco Bhatia MD  [facility]  Office: [phone]  Cell: [phone]       [Follow-up Visit ___] : a follow-up visit  for [unfilled]

## 2022-02-26 LAB
ANION GAP SERPL CALC-SCNC: 13 MMOL/L — SIGNIFICANT CHANGE UP (ref 5–17)
BUN SERPL-MCNC: 27 MG/DL — HIGH (ref 7–23)
CALCIUM SERPL-MCNC: 9.7 MG/DL — SIGNIFICANT CHANGE UP (ref 8.4–10.5)
CHLORIDE SERPL-SCNC: 103 MMOL/L — SIGNIFICANT CHANGE UP (ref 96–108)
CO2 SERPL-SCNC: 28 MMOL/L — SIGNIFICANT CHANGE UP (ref 22–31)
CREAT SERPL-MCNC: 1.51 MG/DL — HIGH (ref 0.5–1.3)
GLUCOSE BLDC GLUCOMTR-MCNC: 112 MG/DL — HIGH (ref 70–99)
GLUCOSE BLDC GLUCOMTR-MCNC: 172 MG/DL — HIGH (ref 70–99)
GLUCOSE BLDC GLUCOMTR-MCNC: 248 MG/DL — HIGH (ref 70–99)
GLUCOSE SERPL-MCNC: 167 MG/DL — HIGH (ref 70–99)
HCT VFR BLD CALC: 41.4 % — SIGNIFICANT CHANGE UP (ref 39–50)
HGB BLD-MCNC: 12.9 G/DL — LOW (ref 13–17)
MCHC RBC-ENTMCNC: 29.6 PG — SIGNIFICANT CHANGE UP (ref 27–34)
MCHC RBC-ENTMCNC: 31.2 GM/DL — LOW (ref 32–36)
MCV RBC AUTO: 95 FL — SIGNIFICANT CHANGE UP (ref 80–100)
NRBC # BLD: 0 /100 WBCS — SIGNIFICANT CHANGE UP (ref 0–0)
PLATELET # BLD AUTO: SIGNIFICANT CHANGE UP K/UL (ref 150–400)
POTASSIUM SERPL-MCNC: 4.3 MMOL/L — SIGNIFICANT CHANGE UP (ref 3.5–5.3)
POTASSIUM SERPL-SCNC: 4.3 MMOL/L — SIGNIFICANT CHANGE UP (ref 3.5–5.3)
RBC # BLD: 4.36 M/UL — SIGNIFICANT CHANGE UP (ref 4.2–5.8)
RBC # FLD: 13.2 % — SIGNIFICANT CHANGE UP (ref 10.3–14.5)
SODIUM SERPL-SCNC: 144 MMOL/L — SIGNIFICANT CHANGE UP (ref 135–145)
WBC # BLD: 10.74 K/UL — HIGH (ref 3.8–10.5)
WBC # FLD AUTO: 10.74 K/UL — HIGH (ref 3.8–10.5)

## 2022-02-26 RX ADMIN — TAMSULOSIN HYDROCHLORIDE 0.8 MILLIGRAM(S): 0.4 CAPSULE ORAL at 21:17

## 2022-02-26 RX ADMIN — PANTOPRAZOLE SODIUM 40 MILLIGRAM(S): 20 TABLET, DELAYED RELEASE ORAL at 05:38

## 2022-02-26 RX ADMIN — POLYETHYLENE GLYCOL 3350 17 GRAM(S): 17 POWDER, FOR SOLUTION ORAL at 18:02

## 2022-02-26 RX ADMIN — HEPARIN SODIUM 5000 UNIT(S): 5000 INJECTION INTRAVENOUS; SUBCUTANEOUS at 21:17

## 2022-02-26 RX ADMIN — Medication 3 MILLILITER(S): at 18:01

## 2022-02-26 RX ADMIN — FINASTERIDE 5 MILLIGRAM(S): 5 TABLET, FILM COATED ORAL at 12:53

## 2022-02-26 RX ADMIN — BUDESONIDE AND FORMOTEROL FUMARATE DIHYDRATE 2 PUFF(S): 160; 4.5 AEROSOL RESPIRATORY (INHALATION) at 18:02

## 2022-02-26 RX ADMIN — SENNA PLUS 2 TABLET(S): 8.6 TABLET ORAL at 21:16

## 2022-02-26 RX ADMIN — BUDESONIDE AND FORMOTEROL FUMARATE DIHYDRATE 2 PUFF(S): 160; 4.5 AEROSOL RESPIRATORY (INHALATION) at 05:38

## 2022-02-26 RX ADMIN — Medication 3 MILLILITER(S): at 12:52

## 2022-02-26 RX ADMIN — Medication 3 MILLILITER(S): at 23:52

## 2022-02-26 RX ADMIN — Medication 4: at 12:50

## 2022-02-26 RX ADMIN — Medication 3 MILLILITER(S): at 05:40

## 2022-02-26 RX ADMIN — Medication 81 MILLIGRAM(S): at 12:53

## 2022-02-26 RX ADMIN — Medication 2: at 18:00

## 2022-02-26 RX ADMIN — INSULIN GLARGINE 28 UNIT(S): 100 INJECTION, SOLUTION SUBCUTANEOUS at 21:47

## 2022-02-26 RX ADMIN — Medication 10 UNIT(S): at 18:00

## 2022-02-26 RX ADMIN — HEPARIN SODIUM 5000 UNIT(S): 5000 INJECTION INTRAVENOUS; SUBCUTANEOUS at 05:38

## 2022-02-26 RX ADMIN — ERTAPENEM SODIUM 120 MILLIGRAM(S): 1 INJECTION, POWDER, LYOPHILIZED, FOR SOLUTION INTRAMUSCULAR; INTRAVENOUS at 12:52

## 2022-02-26 RX ADMIN — HEPARIN SODIUM 5000 UNIT(S): 5000 INJECTION INTRAVENOUS; SUBCUTANEOUS at 15:01

## 2022-02-26 RX ADMIN — Medication 10 UNIT(S): at 12:50

## 2022-02-26 RX ADMIN — POLYETHYLENE GLYCOL 3350 17 GRAM(S): 17 POWDER, FOR SOLUTION ORAL at 05:37

## 2022-02-26 RX ADMIN — Medication 25 MILLIGRAM(S): at 05:38

## 2022-02-26 NOTE — PROGRESS NOTE ADULT - SUBJECTIVE AND OBJECTIVE BOX
Endocrinology Attending Covering for Dr. Reyes      Chief complaint  Patient is a 81y old  Male who presents with a chief complaint of AMS/Urosepsis (24 Feb 2022 14:21)   Review of systems  Patient in bed,  sleeping, looks comfortable, no fever,  had no hypoglycemia.  No Fs was done at 7 AM,  according  nurse the pt got breakfast late at 10 AM, did not get the pre meal Admelog  Labs and Fingersticks  CAPILLARY BLOOD GLUCOSE      POCT Blood Glucose.: 248 mg/dL (26 Feb 2022 12:48)  POCT Blood Glucose.: 123 mg/dL (25 Feb 2022 22:16)  POCT Blood Glucose.: 153 mg/dL (25 Feb 2022 17:39)      Anion Gap, Serum: 13 (02-26 @ 09:07)  Anion Gap, Serum: 13 (02-25 @ 08:20)      Calcium, Total Serum: 9.7 (02-26 @ 09:07)  Calcium, Total Serum: 9.5 (02-25 @ 08:20)          02-26    144  |  103  |  27<H>  ----------------------------<  167<H>  4.3   |  28  |  1.51<H>    Ca    9.7      26 Feb 2022 09:07                          12.9   10.74 )-----------( Clumped    ( 26 Feb 2022 09:07 )             41.4     Medications  MEDICATIONS  (STANDING):  albuterol/ipratropium for Nebulization 3 milliLiter(s) Nebulizer four times a day  aspirin enteric coated 81 milliGRAM(s) Oral daily  budesonide 160 MICROgram(s)/formoterol 4.5 MICROgram(s) Inhaler 2 Puff(s) Inhalation two times a day  dextrose 40% Gel 15 Gram(s) Oral once  dextrose 5%. 1000 milliLiter(s) (50 mL/Hr) IV Continuous <Continuous>  dextrose 5%. 1000 milliLiter(s) (100 mL/Hr) IV Continuous <Continuous>  dextrose 50% Injectable 25 Gram(s) IV Push once  dextrose 50% Injectable 12.5 Gram(s) IV Push once  dextrose 50% Injectable 25 Gram(s) IV Push once  ertapenem  IVPB      ertapenem  IVPB 1000 milliGRAM(s) IV Intermittent every 24 hours  finasteride 5 milliGRAM(s) Oral daily  glucagon  Injectable 1 milliGRAM(s) IntraMuscular once  heparin   Injectable 5000 Unit(s) SubCutaneous every 8 hours  influenza  Vaccine (HIGH DOSE) 0.7 milliLiter(s) IntraMuscular once  insulin glargine Injectable (LANTUS) 28 Unit(s) SubCutaneous at bedtime  insulin lispro (ADMELOG) corrective regimen sliding scale   SubCutaneous three times a day before meals  insulin lispro Injectable (ADMELOG) 10 Unit(s) SubCutaneous three times a day before meals  metoprolol succinate ER 25 milliGRAM(s) Oral daily  pantoprazole    Tablet 40 milliGRAM(s) Oral before breakfast  polyethylene glycol 3350 17 Gram(s) Oral two times a day  senna 2 Tablet(s) Oral at bedtime  tamsulosin 0.8 milliGRAM(s) Oral at bedtime      Physical Exam  General: Patient comfortable in bed  Vital Signs Last 12 Hrs  T(F): 97.8 (02-26-22 @ 13:29), Max: 97.8 (02-26-22 @ 13:29)  HR: 88 (02-26-22 @ 13:29) (84 - 88)  BP: 140/91 (02-26-22 @ 13:29) (124/81 - 140/91)  BP(mean): --  RR: 18 (02-26-22 @ 13:29) (18 - 18)  SpO2: 93% (02-26-22 @ 13:29) (93% - 93%)  Neck: No palpable thyroid nodules.  CVS: S1S2, No murmurs  Respiratory: No wheezing, no crepitations  GI: Abdomen soft, bowel sounds positive  Musculoskeletal:  edema lower extremities.   Skin: No skin rashes, no ecchymosis    Diagnostics

## 2022-02-26 NOTE — PROGRESS NOTE ADULT - SUBJECTIVE AND OBJECTIVE BOX
KAITLIN CALDERON  81y Male  MRN:[id number]    Patient is a 81y old  Male who presents with a chief complaint of AMS/ sepsis / uti    HPI:   81 Y M H/O DM, bph, htn, chol, ,intermittent UTIs with AMs presenting with AMs. Per EMS and family patient has had 1 day of worsened AMS, disorientation similar to prior episode of UTIs. in er pt was minimally responsive, unable to converse, responsive to painful stimuli. Unable to interact with ROS, Per EMS concern for potential aspiration while in transport.  mental status improved weith iv abx and fluids.  being admitted for sepsis 2/2 uti (17 Feb 2022 11:06)      Patient seen and evaluated at bedside.  interval events noted     Interval HPI: no acute events o/n.     PAST MEDICAL & SURGICAL HISTORY:  DM (diabetes mellitus)    HTN (hypertension)    Hypercholesterolemia    CAD (coronary artery disease)    HTN (hypertension)    DM (diabetes mellitus)    S/P TURP    S/P gastrectomy        REVIEW OF SYSTEMS:  as per hpi     VITALS:   Vital Signs Last 24 Hrs  T(C): 36.6 (26 Feb 2022 17:26), Max: 36.7 (26 Feb 2022 00:43)  T(F): 97.9 (26 Feb 2022 17:26), Max: 98 (26 Feb 2022 00:43)  HR: 84 (26 Feb 2022 17:26) (75 - 88)  BP: 137/90 (26 Feb 2022 17:26) (124/81 - 140/91)  BP(mean): --  RR: 18 (26 Feb 2022 17:26) (18 - 18)  SpO2: 93% (26 Feb 2022 17:26) (93% - 96%)    PHYSICAL EXAM:  GENERAL: NAD, well-developed   HEAD:  Atraumatic, Normocephalic  EYES: EOMI, PERRLA, conjunctiva and sclera clear  NECK: Supple, No JVD  CHEST/LUNG: clear b/l  HEART: S1, S2; No murmurs, rubs, or gallops  ABDOMEN:  soft, non tender. +distended.  +BS   EXTREMITIES:  2+ Peripheral Pulses, No clubbing, cyanosis, or edema  NEUROLOGY: AAOX2-3; non-focal,    SKIN: No rashes or lesions    Consultant(s) Notes Reviewed:  [x ] YES  [ ] NO  Care Discussed with Consultants/Other Providers [ x] YES  [ ] NO    MEDS:   MEDICATIONS  (STANDING):  albuterol/ipratropium for Nebulization 3 milliLiter(s) Nebulizer four times a day  aspirin enteric coated 81 milliGRAM(s) Oral daily  budesonide 160 MICROgram(s)/formoterol 4.5 MICROgram(s) Inhaler 2 Puff(s) Inhalation two times a day  dextrose 40% Gel 15 Gram(s) Oral once  dextrose 5%. 1000 milliLiter(s) (50 mL/Hr) IV Continuous <Continuous>  dextrose 5%. 1000 milliLiter(s) (100 mL/Hr) IV Continuous <Continuous>  dextrose 50% Injectable 25 Gram(s) IV Push once  dextrose 50% Injectable 12.5 Gram(s) IV Push once  dextrose 50% Injectable 25 Gram(s) IV Push once  ertapenem  IVPB      ertapenem  IVPB 1000 milliGRAM(s) IV Intermittent every 24 hours  finasteride 5 milliGRAM(s) Oral daily  glucagon  Injectable 1 milliGRAM(s) IntraMuscular once  heparin   Injectable 5000 Unit(s) SubCutaneous every 8 hours  influenza  Vaccine (HIGH DOSE) 0.7 milliLiter(s) IntraMuscular once  insulin glargine Injectable (LANTUS) 28 Unit(s) SubCutaneous at bedtime  insulin lispro (ADMELOG) corrective regimen sliding scale   SubCutaneous three times a day before meals  insulin lispro Injectable (ADMELOG) 10 Unit(s) SubCutaneous three times a day before meals  metoprolol succinate ER 25 milliGRAM(s) Oral daily  pantoprazole    Tablet 40 milliGRAM(s) Oral before breakfast  polyethylene glycol 3350 17 Gram(s) Oral two times a day  senna 2 Tablet(s) Oral at bedtime  tamsulosin 0.8 milliGRAM(s) Oral at bedtime    MEDICATIONS  (PRN):        ALLERGIES:  No Known Allergies      LABS:                                            12.9   10.74 )-----------( Clumped    ( 26 Feb 2022 09:07 )             41.4   02-26    144  |  103  |  27<H>  ----------------------------<  167<H>  4.3   |  28  |  1.51<H>    Ca    9.7      26 Feb 2022 09:07            < from: Xray Chest 1 View-PORTABLE IMMEDIATE (Xray Chest 1 View-PORTABLE IMMEDIATE .) (02.19.22 @ 15:52) >    IMPRESSION:    Clear lungs.    < end of copied text >  < from: Xray Abdomen 1 View Portable, IMMEDIATE (Xray Abdomen 1 View Portable, IMMEDIATE .) (02.19.22 @ 15:52) >  IMPRESSION:    Nonobstructive bowel gas pattern.    < end of copied text >       :   cultures: Culture Results:   No growth to date. (02-17 @ 06:58)  Culture Results:   Growth in aerobic bottle: Gram Negative Rods  ***Blood Panel PCR results on this specimen are available  approximately 3 hours after the Gram stain result.***  Gram stain, PCR, and/or culture results may not always  correspond due to difference in methodologies.  ************************************************************  This PCR assay was performed by multiplex PCR. This  Assay tests for 66 bacterial and resistance gene targets.  Please refer to the [facility] Labs test directory  at [email or web address] for details. (02-17 @ 06:58)  Culture Results:   >100,000 CFU/ml Gram Negative Rods (02-17 @ 04:03)      RADIOLOGY & ADDITIONAL TESTS:    Imaging Personally Reviewed:  [ ] YES  [ ] NO

## 2022-02-26 NOTE — PROGRESS NOTE ADULT - ASSESSMENT
Assessment  DMT2: 81y Male with DM T2 with hyperglycemia, A1C 11.1%, per chart was on insulin at home, ?compliance, now on basal bolus insulin, increased basal dose yesterday, blood sugars are improving though still not at target, FS elevated today at 12 noon, No Fs was done at 7 AM,  according  nurse the pt got breakfast late at 10 AM, did not get the pre meal Admelog no hypoglycemic episodes. Patient is eating meals with good appetite per discussion with nursing staff, NAD, DC plan is for rehab.  UTI: On meds, monitored.  HTN: Controlled,  on antihypertensive medications.  CKD: Monitor labs/BMP,         nelda Kirk moya  [phone]

## 2022-02-27 LAB
ANION GAP SERPL CALC-SCNC: 10 MMOL/L — SIGNIFICANT CHANGE UP (ref 5–17)
BUN SERPL-MCNC: 24 MG/DL — HIGH (ref 7–23)
CALCIUM SERPL-MCNC: 9.7 MG/DL — SIGNIFICANT CHANGE UP (ref 8.4–10.5)
CHLORIDE SERPL-SCNC: 105 MMOL/L — SIGNIFICANT CHANGE UP (ref 96–108)
CO2 SERPL-SCNC: 28 MMOL/L — SIGNIFICANT CHANGE UP (ref 22–31)
CREAT SERPL-MCNC: 1.25 MG/DL — SIGNIFICANT CHANGE UP (ref 0.5–1.3)
GLUCOSE BLDC GLUCOMTR-MCNC: 125 MG/DL — HIGH (ref 70–99)
GLUCOSE BLDC GLUCOMTR-MCNC: 145 MG/DL — HIGH (ref 70–99)
GLUCOSE BLDC GLUCOMTR-MCNC: 155 MG/DL — HIGH (ref 70–99)
GLUCOSE BLDC GLUCOMTR-MCNC: 189 MG/DL — HIGH (ref 70–99)
GLUCOSE SERPL-MCNC: 160 MG/DL — HIGH (ref 70–99)
HCT VFR BLD CALC: 41.9 % — SIGNIFICANT CHANGE UP (ref 39–50)
HGB BLD-MCNC: 13.1 G/DL — SIGNIFICANT CHANGE UP (ref 13–17)
MCHC RBC-ENTMCNC: 29.4 PG — SIGNIFICANT CHANGE UP (ref 27–34)
MCHC RBC-ENTMCNC: 31.3 GM/DL — LOW (ref 32–36)
MCV RBC AUTO: 94.2 FL — SIGNIFICANT CHANGE UP (ref 80–100)
NRBC # BLD: 0 /100 WBCS — SIGNIFICANT CHANGE UP (ref 0–0)
PLATELET # BLD AUTO: SIGNIFICANT CHANGE UP K/UL (ref 150–400)
POTASSIUM SERPL-MCNC: 4.4 MMOL/L — SIGNIFICANT CHANGE UP (ref 3.5–5.3)
POTASSIUM SERPL-SCNC: 4.4 MMOL/L — SIGNIFICANT CHANGE UP (ref 3.5–5.3)
RBC # BLD: 4.45 M/UL — SIGNIFICANT CHANGE UP (ref 4.2–5.8)
RBC # FLD: 13.2 % — SIGNIFICANT CHANGE UP (ref 10.3–14.5)
SARS-COV-2 RNA SPEC QL NAA+PROBE: SIGNIFICANT CHANGE UP
SODIUM SERPL-SCNC: 143 MMOL/L — SIGNIFICANT CHANGE UP (ref 135–145)
WBC # BLD: 10.58 K/UL — HIGH (ref 3.8–10.5)
WBC # FLD AUTO: 10.58 K/UL — HIGH (ref 3.8–10.5)

## 2022-02-27 RX ADMIN — POLYETHYLENE GLYCOL 3350 17 GRAM(S): 17 POWDER, FOR SOLUTION ORAL at 17:44

## 2022-02-27 RX ADMIN — Medication 10 UNIT(S): at 12:52

## 2022-02-27 RX ADMIN — BUDESONIDE AND FORMOTEROL FUMARATE DIHYDRATE 2 PUFF(S): 160; 4.5 AEROSOL RESPIRATORY (INHALATION) at 17:44

## 2022-02-27 RX ADMIN — Medication 3 MILLILITER(S): at 17:44

## 2022-02-27 RX ADMIN — BUDESONIDE AND FORMOTEROL FUMARATE DIHYDRATE 2 PUFF(S): 160; 4.5 AEROSOL RESPIRATORY (INHALATION) at 06:08

## 2022-02-27 RX ADMIN — INSULIN GLARGINE 28 UNIT(S): 100 INJECTION, SOLUTION SUBCUTANEOUS at 21:55

## 2022-02-27 RX ADMIN — Medication 10 UNIT(S): at 17:50

## 2022-02-27 RX ADMIN — Medication 3 MILLILITER(S): at 11:53

## 2022-02-27 RX ADMIN — Medication 10 UNIT(S): at 09:11

## 2022-02-27 RX ADMIN — POLYETHYLENE GLYCOL 3350 17 GRAM(S): 17 POWDER, FOR SOLUTION ORAL at 06:07

## 2022-02-27 RX ADMIN — Medication 2: at 09:11

## 2022-02-27 RX ADMIN — Medication 3 MILLILITER(S): at 06:08

## 2022-02-27 RX ADMIN — TAMSULOSIN HYDROCHLORIDE 0.8 MILLIGRAM(S): 0.4 CAPSULE ORAL at 21:55

## 2022-02-27 RX ADMIN — SENNA PLUS 2 TABLET(S): 8.6 TABLET ORAL at 21:55

## 2022-02-27 RX ADMIN — HEPARIN SODIUM 5000 UNIT(S): 5000 INJECTION INTRAVENOUS; SUBCUTANEOUS at 13:23

## 2022-02-27 RX ADMIN — Medication 81 MILLIGRAM(S): at 11:53

## 2022-02-27 RX ADMIN — Medication 25 MILLIGRAM(S): at 06:07

## 2022-02-27 RX ADMIN — HEPARIN SODIUM 5000 UNIT(S): 5000 INJECTION INTRAVENOUS; SUBCUTANEOUS at 06:08

## 2022-02-27 RX ADMIN — PANTOPRAZOLE SODIUM 40 MILLIGRAM(S): 20 TABLET, DELAYED RELEASE ORAL at 06:07

## 2022-02-27 RX ADMIN — ERTAPENEM SODIUM 120 MILLIGRAM(S): 1 INJECTION, POWDER, LYOPHILIZED, FOR SOLUTION INTRAMUSCULAR; INTRAVENOUS at 11:53

## 2022-02-27 RX ADMIN — FINASTERIDE 5 MILLIGRAM(S): 5 TABLET, FILM COATED ORAL at 11:53

## 2022-02-27 RX ADMIN — HEPARIN SODIUM 5000 UNIT(S): 5000 INJECTION INTRAVENOUS; SUBCUTANEOUS at 21:56

## 2022-02-27 NOTE — PROGRESS NOTE ADULT - ASSESSMENT
82 yo male h/o DM, htn, chol, bph, p/w sepsis, 2/2 uti  pt with sepsis present on admission    sepsis 2/2 uti  urine and blood cultures growing Ecoli.  ESBL  resistant to ceftriax.   abx changed from ceftriax to invanz.   ID f/u apprec  will need long term abx till 3/4.  midline now in place     bacteremia  likely 2/2 uti  now on invanz   cult and sens noted.   f/u repeat blood cultures to ensure clearance.  - ngtd  long term abx as noted above via midline      vomiting  likely 2/2 sepsis and uti  CT and abd xray noted.  no acute gi pathology  tolerating diet .  advance diet as tolerated - tolerating regular diet   zofran prn  gi f/u   bowel regimen for constipation      metabolic encephalopathy  2/2 sepsis  improved   as per d/w family, this is close to his baseline mental status    DM  on insulin  monitor fs  endo following     ckd  with chanel  renal f/u  creat stable  avoid nephrotoxins    hypoxia    CXR noted.  Rounded opacity in the right lower lung field. likely atelactasis. doubt pna. already on ABx   pt remains on supp o2.    incentive spirometer ordered   pulm consult apprec.     cont other home meds    dvt ppx    PT eval noted.    dispo - rehab  dc planning - pending rehab        d/w family on phone     Advanced care planning was discussed with patient and family.  Advanced care planning forms were reviewed and discussed as appropriate.  Differential diagnosis and plan of care discussed with patient after the evaluation.   Pain assessed and judicious use of narcotics when appropriate was discussed.  Importance of Fall prevention discussed.  Counseling on Smoking and Alcohol cessation was offered when appropriate.  Counseling on Diet, exercise, and medication compliance was done.         Approx 60 minutes spent.

## 2022-02-27 NOTE — PROGRESS NOTE ADULT - SUBJECTIVE AND OBJECTIVE BOX
Chief complaint    Patient is a 81y old  Male who presents with a chief complaint of AMS/Urosepsis (24 Feb 2022 14:21)   Review of systems  Patient in bed, appears comfortable.    Labs and Fingersticks  CAPILLARY BLOOD GLUCOSE      POCT Blood Glucose.: 189 mg/dL (27 Feb 2022 21:18)  POCT Blood Glucose.: 145 mg/dL (27 Feb 2022 17:48)  POCT Blood Glucose.: 125 mg/dL (27 Feb 2022 12:20)  POCT Blood Glucose.: 155 mg/dL (27 Feb 2022 09:08)      Anion Gap, Serum: 10 (02-27 @ 05:47)  Anion Gap, Serum: 13 (02-26 @ 09:07)      Calcium, Total Serum: 9.7 (02-27 @ 05:47)  Calcium, Total Serum: 9.7 (02-26 @ 09:07)          02-27    143  |  105  |  24<H>  ----------------------------<  160<H>  4.4   |  28  |  1.25    Ca    9.7      27 Feb 2022 05:47                          13.1   10.58 )-----------( Clumped    ( 27 Feb 2022 05:47 )             41.9     Medications  MEDICATIONS  (STANDING):  albuterol/ipratropium for Nebulization 3 milliLiter(s) Nebulizer four times a day  aspirin enteric coated 81 milliGRAM(s) Oral daily  budesonide 160 MICROgram(s)/formoterol 4.5 MICROgram(s) Inhaler 2 Puff(s) Inhalation two times a day  dextrose 40% Gel 15 Gram(s) Oral once  dextrose 5%. 1000 milliLiter(s) (50 mL/Hr) IV Continuous <Continuous>  dextrose 5%. 1000 milliLiter(s) (100 mL/Hr) IV Continuous <Continuous>  dextrose 50% Injectable 25 Gram(s) IV Push once  dextrose 50% Injectable 12.5 Gram(s) IV Push once  dextrose 50% Injectable 25 Gram(s) IV Push once  finasteride 5 milliGRAM(s) Oral daily  glucagon  Injectable 1 milliGRAM(s) IntraMuscular once  heparin   Injectable 5000 Unit(s) SubCutaneous every 8 hours  influenza  Vaccine (HIGH DOSE) 0.7 milliLiter(s) IntraMuscular once  insulin glargine Injectable (LANTUS) 28 Unit(s) SubCutaneous at bedtime  insulin lispro (ADMELOG) corrective regimen sliding scale   SubCutaneous three times a day before meals  insulin lispro Injectable (ADMELOG) 10 Unit(s) SubCutaneous three times a day before meals  metoprolol succinate ER 25 milliGRAM(s) Oral daily  pantoprazole    Tablet 40 milliGRAM(s) Oral before breakfast  polyethylene glycol 3350 17 Gram(s) Oral two times a day  senna 2 Tablet(s) Oral at bedtime  tamsulosin 0.8 milliGRAM(s) Oral at bedtime      Physical Exam  General: Patient comfortable in bed  Vital Signs Last 12 Hrs  T(F): 98.1 (02-27-22 @ 20:45), Max: 98.1 (02-27-22 @ 20:45)  HR: 87 (02-27-22 @ 20:45) (85 - 91)  BP: 134/84 (02-27-22 @ 20:45) (130/81 - 135/87)  BP(mean): --  RR: 18 (02-27-22 @ 20:45) (18 - 18)  SpO2: 94% (02-27-22 @ 20:45) (92% - 94%)  Neck: No palpable thyroid nodules.  CVS: S1S2, No murmurs  Respiratory: No wheezing, no crepitations  GI: Abdomen soft, bowel sounds positive  Musculoskeletal:  edema lower extremities.     Diagnostics

## 2022-02-27 NOTE — PROGRESS NOTE ADULT - ASSESSMENT
Assessment  DMT2: 81y Male with DM T2 with hyperglycemia, A1C 11.1%, per chart was on insulin at home, ?compliance, now on basal bolus insulin, blood sugars are improvin. Patient is eating meals with good appetite, DC plan is for rehab.  UTI: On meds, monitored.  HTN: Controlled,  on antihypertensive medications.  CKD: Monitor labs/BMP,         nelda Kirk omya  [phone]

## 2022-02-27 NOTE — PROGRESS NOTE ADULT - SUBJECTIVE AND OBJECTIVE BOX
KAITLIN CALDERON  81y Male  MRN:[id number]    Patient is a 81y old  Male who presents with a chief complaint of AMS/ sepsis / uti    HPI:   81 Y M H/O DM, bph, htn, chol, ,intermittent UTIs with AMs presenting with AMs. Per EMS and family patient has had 1 day of worsened AMS, disorientation similar to prior episode of UTIs. in er pt was minimally responsive, unable to converse, responsive to painful stimuli. Unable to interact with ROS, Per EMS concern for potential aspiration while in transport.  mental status improved weith iv abx and fluids.  being admitted for sepsis 2/2 uti (17 Feb 2022 11:06)      Patient seen and evaluated at bedside.  interval events noted     Interval HPI: no acute events o/n.     PAST MEDICAL & SURGICAL HISTORY:  DM (diabetes mellitus)    HTN (hypertension)    Hypercholesterolemia    CAD (coronary artery disease)    HTN (hypertension)    DM (diabetes mellitus)    S/P TURP    S/P gastrectomy        REVIEW OF SYSTEMS:  as per hpi     VITALS:   Vital Signs Last 24 Hrs  T(C): 36.6 (27 Feb 2022 14:11), Max: 36.7 (27 Feb 2022 01:10)  T(F): 97.9 (27 Feb 2022 14:11), Max: 98 (27 Feb 2022 01:10)  HR: 91 (27 Feb 2022 14:11) (81 - 95)  BP: 135/87 (27 Feb 2022 14:11) (120/86 - 156/95)  BP(mean): --  RR: 18 (27 Feb 2022 14:11) (18 - 18)  SpO2: 94% (27 Feb 2022 14:11) (93% - 96%)    PHYSICAL EXAM:  GENERAL: NAD, well-developed   HEAD:  Atraumatic, Normocephalic  EYES: EOMI, PERRLA, conjunctiva and sclera clear  NECK: Supple, No JVD  CHEST/LUNG: clear b/l  HEART: S1, S2; No murmurs, rubs, or gallops  ABDOMEN:  soft, non tender. +distended.  +BS   EXTREMITIES:  2+ Peripheral Pulses, No clubbing, cyanosis, or edema  NEUROLOGY: AAOX2-3; non-focal,    SKIN: No rashes or lesions    Consultant(s) Notes Reviewed:  [x ] YES  [ ] NO  Care Discussed with Consultants/Other Providers [ x] YES  [ ] NO    MEDS:    MEDICATIONS  (STANDING):  albuterol/ipratropium for Nebulization 3 milliLiter(s) Nebulizer four times a day  aspirin enteric coated 81 milliGRAM(s) Oral daily  budesonide 160 MICROgram(s)/formoterol 4.5 MICROgram(s) Inhaler 2 Puff(s) Inhalation two times a day  dextrose 40% Gel 15 Gram(s) Oral once  dextrose 5%. 1000 milliLiter(s) (50 mL/Hr) IV Continuous <Continuous>  dextrose 5%. 1000 milliLiter(s) (100 mL/Hr) IV Continuous <Continuous>  dextrose 50% Injectable 25 Gram(s) IV Push once  dextrose 50% Injectable 12.5 Gram(s) IV Push once  dextrose 50% Injectable 25 Gram(s) IV Push once  finasteride 5 milliGRAM(s) Oral daily  glucagon  Injectable 1 milliGRAM(s) IntraMuscular once  heparin   Injectable 5000 Unit(s) SubCutaneous every 8 hours  influenza  Vaccine (HIGH DOSE) 0.7 milliLiter(s) IntraMuscular once  insulin glargine Injectable (LANTUS) 28 Unit(s) SubCutaneous at bedtime  insulin lispro (ADMELOG) corrective regimen sliding scale   SubCutaneous three times a day before meals  insulin lispro Injectable (ADMELOG) 10 Unit(s) SubCutaneous three times a day before meals  metoprolol succinate ER 25 milliGRAM(s) Oral daily  pantoprazole    Tablet 40 milliGRAM(s) Oral before breakfast  polyethylene glycol 3350 17 Gram(s) Oral two times a day  senna 2 Tablet(s) Oral at bedtime  tamsulosin 0.8 milliGRAM(s) Oral at bedtime    MEDICATIONS  (PRN):        ALLERGIES:  No Known Allergies      LABS:                                             13.1   10.58 )-----------( Clumped    ( 27 Feb 2022 05:47 )             41.9   02-27    143  |  105  |  24<H>  ----------------------------<  160<H>  4.4   |  28  |  1.25    Ca    9.7      27 Feb 2022 05:47          < from: Xray Chest 1 View-PORTABLE IMMEDIATE (Xray Chest 1 View-PORTABLE IMMEDIATE .) (02.19.22 @ 15:52) >    IMPRESSION:    Clear lungs.    < end of copied text >  < from: Xray Abdomen 1 View Portable, IMMEDIATE (Xray Abdomen 1 View Portable, IMMEDIATE .) (02.19.22 @ 15:52) >  IMPRESSION:    Nonobstructive bowel gas pattern.    < end of copied text >       :   cultures: Culture Results:   No growth to date. (02-17 @ 06:58)  Culture Results:   Growth in aerobic bottle: Gram Negative Rods  ***Blood Panel PCR results on this specimen are available  approximately 3 hours after the Gram stain result.***  Gram stain, PCR, and/or culture results may not always  correspond due to difference in methodologies.  ************************************************************  This PCR assay was performed by multiplex PCR. This  Assay tests for 66 bacterial and resistance gene targets.  Please refer to the [facility] Labs test directory  at [email or web address] for details. (02-17 @ 06:58)  Culture Results:   >100,000 CFU/ml Gram Negative Rods (02-17 @ 04:03)      RADIOLOGY & ADDITIONAL TESTS:    Imaging Personally Reviewed:  [ ] YES  [ ] NO

## 2022-02-27 NOTE — PROGRESS NOTE ADULT - SUBJECTIVE AND OBJECTIVE BOX
Patient seen and examined in bed.  No new events.    REVIEW OF SYSTEMS:  UTO; limited    MEDICATIONS  (STANDING):  albuterol/ipratropium for Nebulization 3 milliLiter(s) Nebulizer four times a day  aspirin enteric coated 81 milliGRAM(s) Oral daily  budesonide 160 MICROgram(s)/formoterol 4.5 MICROgram(s) Inhaler 2 Puff(s) Inhalation two times a day  dextrose 40% Gel 15 Gram(s) Oral once  dextrose 5%. 1000 milliLiter(s) (50 mL/Hr) IV Continuous <Continuous>  dextrose 5%. 1000 milliLiter(s) (100 mL/Hr) IV Continuous <Continuous>  dextrose 50% Injectable 25 Gram(s) IV Push once  dextrose 50% Injectable 12.5 Gram(s) IV Push once  dextrose 50% Injectable 25 Gram(s) IV Push once  ertapenem  IVPB      ertapenem  IVPB 1000 milliGRAM(s) IV Intermittent every 24 hours  finasteride 5 milliGRAM(s) Oral daily  glucagon  Injectable 1 milliGRAM(s) IntraMuscular once  heparin   Injectable 5000 Unit(s) SubCutaneous every 8 hours  influenza  Vaccine (HIGH DOSE) 0.7 milliLiter(s) IntraMuscular once  insulin glargine Injectable (LANTUS) 28 Unit(s) SubCutaneous at bedtime  insulin lispro (ADMELOG) corrective regimen sliding scale   SubCutaneous three times a day before meals  insulin lispro Injectable (ADMELOG) 10 Unit(s) SubCutaneous three times a day before meals  metoprolol succinate ER 25 milliGRAM(s) Oral daily  pantoprazole    Tablet 40 milliGRAM(s) Oral before breakfast  polyethylene glycol 3350 17 Gram(s) Oral two times a day  senna 2 Tablet(s) Oral at bedtime  tamsulosin 0.8 milliGRAM(s) Oral at bedtime      VITAL:  T(C): , Max: 36.7 (02-27-22 @ 01:10)  T(F): , Max: 98 (02-27-22 @ 01:10)  HR: 95 (02-27-22 @ 09:46)  BP: 156/95 (02-27-22 @ 09:46)  BP(mean): --  RR: 18 (02-27-22 @ 09:46)  SpO2: 96% (02-27-22 @ 09:46)  Wt(kg): --    I and O's:    02-26 @ 07:01 - 02-27 @ 07:00  --------------------------------------------------------  IN: 240 mL / OUT: 0 mL / NET: 240 mL    02-27 @ 07:01 - 02-27 @ 10:33  --------------------------------------------------------  IN: 200 mL / OUT: 0 mL / NET: 200 mL          PHYSICAL EXAM:    Constitutional: NAD; lethargic (baseline)  HEENT: PERRLA, EOMI,  MMM  Neck: No LAD, No JVD  Respiratory: diminished   Cardiovascular: S1 and S2  Gastrointestinal: BS+, soft, NT/ND  Extremities: No peripheral edema  Neurological: UTO  : No Allen  Skin: No rashes  Access: Not applicable    LABS:                        13.1   10.58 )-----------( Clumped    ( 27 Feb 2022 05:47 )             41.9     02-27    143  |  105  |  24<H>  ----------------------------<  160<H>  4.4   |  28  |  1.25    Ca    9.7      27 Feb 2022 05:47        IMPRESSION: 81M w/ HTN, DM2, CAD, BPH, intermittent UTIs, and CKD, 2/17/22 p/w E Coli bacteremia    (1)CKD - stage 3 with non-nephrotic range proteinuria. Potentially diabetic nephropathy  (2)ANTON - fluctuating numbers based on hemodynamic status; improving/resolved  (3)ID - EColi UTI/bacteremia - on IV Invanz  (4)Pulm - hypoxia of late - Pulm on board; 02 acceptable at this time    RECOMMENDATIONS:  (1)No IVF/No diuretics for now  (2)BMP daily  (3)Dose new meds for GFR 40-50ml/min (present dosing is acceptable)  (4)No objection to continue IV abx as ordered  (5)Discharge planning to rehab per primary team    Chuck Mccormack NP  [facility]  Office: [phone]  Cell: [phone]

## 2022-02-28 ENCOUNTER — TRANSCRIPTION ENCOUNTER (OUTPATIENT)
Age: 82
End: 2022-02-28

## 2022-02-28 VITALS
RESPIRATION RATE: 20 BRPM | DIASTOLIC BLOOD PRESSURE: 83 MMHG | HEART RATE: 75 BPM | TEMPERATURE: 98 F | OXYGEN SATURATION: 93 % | SYSTOLIC BLOOD PRESSURE: 136 MMHG

## 2022-02-28 LAB
GLUCOSE BLDC GLUCOMTR-MCNC: 141 MG/DL — HIGH (ref 70–99)
GLUCOSE BLDC GLUCOMTR-MCNC: 192 MG/DL — HIGH (ref 70–99)

## 2022-02-28 PROCEDURE — 82435 ASSAY OF BLOOD CHLORIDE: CPT

## 2022-02-28 PROCEDURE — 83735 ASSAY OF MAGNESIUM: CPT

## 2022-02-28 PROCEDURE — 87086 URINE CULTURE/COLONY COUNT: CPT

## 2022-02-28 PROCEDURE — 82565 ASSAY OF CREATININE: CPT

## 2022-02-28 PROCEDURE — 87077 CULTURE AEROBIC IDENTIFY: CPT

## 2022-02-28 PROCEDURE — 97162 PT EVAL MOD COMPLEX 30 MIN: CPT

## 2022-02-28 PROCEDURE — U0003: CPT

## 2022-02-28 PROCEDURE — 85014 HEMATOCRIT: CPT

## 2022-02-28 PROCEDURE — 97116 GAIT TRAINING THERAPY: CPT

## 2022-02-28 PROCEDURE — 84295 ASSAY OF SERUM SODIUM: CPT

## 2022-02-28 PROCEDURE — C1751: CPT

## 2022-02-28 PROCEDURE — 96365 THER/PROPH/DIAG IV INF INIT: CPT

## 2022-02-28 PROCEDURE — 82947 ASSAY GLUCOSE BLOOD QUANT: CPT

## 2022-02-28 PROCEDURE — 82570 ASSAY OF URINE CREATININE: CPT

## 2022-02-28 PROCEDURE — 99285 EMERGENCY DEPT VISIT HI MDM: CPT

## 2022-02-28 PROCEDURE — 82962 GLUCOSE BLOOD TEST: CPT

## 2022-02-28 PROCEDURE — 83605 ASSAY OF LACTIC ACID: CPT

## 2022-02-28 PROCEDURE — 85027 COMPLETE CBC AUTOMATED: CPT

## 2022-02-28 PROCEDURE — 84100 ASSAY OF PHOSPHORUS: CPT

## 2022-02-28 PROCEDURE — 87637 SARSCOV2&INF A&B&RSV AMP PRB: CPT

## 2022-02-28 PROCEDURE — 81001 URINALYSIS AUTO W/SCOPE: CPT

## 2022-02-28 PROCEDURE — U0005: CPT

## 2022-02-28 PROCEDURE — 82330 ASSAY OF CALCIUM: CPT

## 2022-02-28 PROCEDURE — 85025 COMPLETE CBC W/AUTO DIFF WBC: CPT

## 2022-02-28 PROCEDURE — 80048 BASIC METABOLIC PNL TOTAL CA: CPT

## 2022-02-28 PROCEDURE — 36569 INSJ PICC 5 YR+ W/O IMAGING: CPT

## 2022-02-28 PROCEDURE — 82010 KETONE BODYS QUAN: CPT

## 2022-02-28 PROCEDURE — 94640 AIRWAY INHALATION TREATMENT: CPT

## 2022-02-28 PROCEDURE — 87186 SC STD MICRODIL/AGAR DIL: CPT

## 2022-02-28 PROCEDURE — 87040 BLOOD CULTURE FOR BACTERIA: CPT

## 2022-02-28 PROCEDURE — 93005 ELECTROCARDIOGRAM TRACING: CPT

## 2022-02-28 PROCEDURE — 87150 DNA/RNA AMPLIFIED PROBE: CPT

## 2022-02-28 PROCEDURE — 74177 CT ABD & PELVIS W/CONTRAST: CPT | Mod: MA

## 2022-02-28 PROCEDURE — 85018 HEMOGLOBIN: CPT

## 2022-02-28 PROCEDURE — 80061 LIPID PANEL: CPT

## 2022-02-28 PROCEDURE — 96367 TX/PROPH/DG ADDL SEQ IV INF: CPT

## 2022-02-28 PROCEDURE — 82803 BLOOD GASES ANY COMBINATION: CPT

## 2022-02-28 PROCEDURE — 36415 COLL VENOUS BLD VENIPUNCTURE: CPT

## 2022-02-28 PROCEDURE — 97110 THERAPEUTIC EXERCISES: CPT

## 2022-02-28 PROCEDURE — 84132 ASSAY OF SERUM POTASSIUM: CPT

## 2022-02-28 PROCEDURE — 96368 THER/DIAG CONCURRENT INF: CPT

## 2022-02-28 PROCEDURE — 99232 SBSQ HOSP IP/OBS MODERATE 35: CPT

## 2022-02-28 PROCEDURE — 84156 ASSAY OF PROTEIN URINE: CPT

## 2022-02-28 PROCEDURE — 74018 RADEX ABDOMEN 1 VIEW: CPT

## 2022-02-28 PROCEDURE — 80053 COMPREHEN METABOLIC PANEL: CPT

## 2022-02-28 PROCEDURE — 71045 X-RAY EXAM CHEST 1 VIEW: CPT

## 2022-02-28 RX ORDER — ERTAPENEM SODIUM 1 G/1
1 INJECTION, POWDER, LYOPHILIZED, FOR SOLUTION INTRAMUSCULAR; INTRAVENOUS
Qty: 0 | Refills: 0 | DISCHARGE
Start: 2022-02-28

## 2022-02-28 RX ORDER — ERTAPENEM SODIUM 1 G/1
1000 INJECTION, POWDER, LYOPHILIZED, FOR SOLUTION INTRAMUSCULAR; INTRAVENOUS EVERY 24 HOURS
Refills: 0 | Status: DISCONTINUED | OUTPATIENT
Start: 2022-02-28 | End: 2022-02-28

## 2022-02-28 RX ORDER — ICOSAPENT ETHYL 500 MG/1
2 CAPSULE, LIQUID FILLED ORAL
Qty: 0 | Refills: 0 | DISCHARGE

## 2022-02-28 RX ORDER — FINASTERIDE 5 MG/1
1 TABLET, FILM COATED ORAL
Qty: 0 | Refills: 0 | DISCHARGE
Start: 2022-02-28

## 2022-02-28 RX ORDER — MIRABEGRON 50 MG/1
1 TABLET, EXTENDED RELEASE ORAL
Qty: 0 | Refills: 0 | DISCHARGE

## 2022-02-28 RX ORDER — KETOCONAZOLE 20 MG/G
1 AEROSOL, FOAM TOPICAL
Qty: 0 | Refills: 0 | DISCHARGE

## 2022-02-28 RX ORDER — CEFDINIR 250 MG/5ML
1 POWDER, FOR SUSPENSION ORAL
Qty: 0 | Refills: 0 | DISCHARGE

## 2022-02-28 RX ORDER — ASPIRIN/CALCIUM CARB/MAGNESIUM 324 MG
1 TABLET ORAL
Qty: 0 | Refills: 0 | DISCHARGE
Start: 2022-02-28

## 2022-02-28 RX ORDER — ATORVASTATIN CALCIUM 80 MG/1
1 TABLET, FILM COATED ORAL
Qty: 0 | Refills: 0 | DISCHARGE

## 2022-02-28 RX ORDER — FINASTERIDE 5 MG/1
1 TABLET, FILM COATED ORAL
Qty: 0 | Refills: 0 | DISCHARGE

## 2022-02-28 RX ORDER — IPRATROPIUM/ALBUTEROL SULFATE 18-103MCG
3 AEROSOL WITH ADAPTER (GRAM) INHALATION
Qty: 0 | Refills: 0 | DISCHARGE
Start: 2022-02-28

## 2022-02-28 RX ORDER — SENNA PLUS 8.6 MG/1
2 TABLET ORAL
Qty: 0 | Refills: 0 | DISCHARGE
Start: 2022-02-28

## 2022-02-28 RX ORDER — METOPROLOL TARTRATE 50 MG
1 TABLET ORAL
Qty: 0 | Refills: 0 | DISCHARGE

## 2022-02-28 RX ORDER — SITAGLIPTIN AND METFORMIN HYDROCHLORIDE 500; 50 MG/1; MG/1
1 TABLET, FILM COATED ORAL
Qty: 0 | Refills: 0 | DISCHARGE

## 2022-02-28 RX ORDER — INSULIN DEGLUDEC 100 U/ML
50 INJECTION, SOLUTION SUBCUTANEOUS
Qty: 0 | Refills: 0 | DISCHARGE

## 2022-02-28 RX ORDER — ERTAPENEM SODIUM 1 G/1
1000 INJECTION, POWDER, LYOPHILIZED, FOR SOLUTION INTRAMUSCULAR; INTRAVENOUS EVERY 24 HOURS
Refills: 0 | Status: DISCONTINUED | OUTPATIENT
Start: 2022-03-01 | End: 2022-02-28

## 2022-02-28 RX ORDER — POLYETHYLENE GLYCOL 3350 17 G/17G
17 POWDER, FOR SOLUTION ORAL
Qty: 0 | Refills: 0 | DISCHARGE
Start: 2022-02-28

## 2022-02-28 RX ORDER — INSULIN ASPART 100 [IU]/ML
0 INJECTION, SOLUTION SUBCUTANEOUS
Qty: 0 | Refills: 0 | DISCHARGE

## 2022-02-28 RX ORDER — SENNA PLUS 8.6 MG/1
2 TABLET ORAL
Qty: 0 | Refills: 0 | DISCHARGE

## 2022-02-28 RX ORDER — TAMSULOSIN HYDROCHLORIDE 0.4 MG/1
2 CAPSULE ORAL
Qty: 0 | Refills: 0 | DISCHARGE

## 2022-02-28 RX ORDER — TAMSULOSIN HYDROCHLORIDE 0.4 MG/1
2 CAPSULE ORAL
Qty: 0 | Refills: 0 | DISCHARGE
Start: 2022-02-28

## 2022-02-28 RX ORDER — METOPROLOL TARTRATE 50 MG
1 TABLET ORAL
Qty: 0 | Refills: 0 | DISCHARGE
Start: 2022-02-28

## 2022-02-28 RX ORDER — DULAGLUTIDE 4.5 MG/.5ML
1.5 INJECTION, SOLUTION SUBCUTANEOUS
Qty: 0 | Refills: 0 | DISCHARGE

## 2022-02-28 RX ADMIN — Medication 10 UNIT(S): at 13:01

## 2022-02-28 RX ADMIN — HEPARIN SODIUM 5000 UNIT(S): 5000 INJECTION INTRAVENOUS; SUBCUTANEOUS at 06:12

## 2022-02-28 RX ADMIN — POLYETHYLENE GLYCOL 3350 17 GRAM(S): 17 POWDER, FOR SOLUTION ORAL at 06:12

## 2022-02-28 RX ADMIN — Medication 3 MILLILITER(S): at 12:01

## 2022-02-28 RX ADMIN — BUDESONIDE AND FORMOTEROL FUMARATE DIHYDRATE 2 PUFF(S): 160; 4.5 AEROSOL RESPIRATORY (INHALATION) at 06:13

## 2022-02-28 RX ADMIN — Medication 3 MILLILITER(S): at 06:13

## 2022-02-28 RX ADMIN — Medication 2: at 08:37

## 2022-02-28 RX ADMIN — HEPARIN SODIUM 5000 UNIT(S): 5000 INJECTION INTRAVENOUS; SUBCUTANEOUS at 13:01

## 2022-02-28 RX ADMIN — Medication 10 UNIT(S): at 08:36

## 2022-02-28 RX ADMIN — Medication 81 MILLIGRAM(S): at 12:01

## 2022-02-28 RX ADMIN — PANTOPRAZOLE SODIUM 40 MILLIGRAM(S): 20 TABLET, DELAYED RELEASE ORAL at 06:12

## 2022-02-28 RX ADMIN — Medication 3 MILLILITER(S): at 00:45

## 2022-02-28 RX ADMIN — FINASTERIDE 5 MILLIGRAM(S): 5 TABLET, FILM COATED ORAL at 12:02

## 2022-02-28 RX ADMIN — Medication 25 MILLIGRAM(S): at 06:12

## 2022-02-28 NOTE — PROGRESS NOTE ADULT - PROBLEM SELECTOR PLAN 2
Suggest to continue medications, monitoring, FU primary team recommendations.
-better  -from UTI/bacteremia   -cont abx  -monitor temps
-better  -from UTI/bacteremia   -cont abx  -monitor temps

## 2022-02-28 NOTE — PROGRESS NOTE ADULT - PROVIDER SPECIALTY LIST ADULT
Gastroenterology
Infectious Disease
Infectious Disease
Internal Medicine
Nephrology
Gastroenterology
Infectious Disease
Internal Medicine
Nephrology
Nephrology
Infectious Disease
Internal Medicine
Pulmonology
Endocrinology
Infectious Disease
Endocrinology

## 2022-02-28 NOTE — PROGRESS NOTE ADULT - PROBLEM SELECTOR PROBLEM 1
DM (diabetes mellitus)
Acute UTI
DM (diabetes mellitus)
Acute UTI

## 2022-02-28 NOTE — DISCHARGE NOTE NURSING/CASE MANAGEMENT/SOCIAL WORK - PATIENT PORTAL LINK FT
You can access the FollowMyHealth Patient Portal offered by [facility] by registering at the following website: [email or web address]. By joining [facility]’s FollowMyHealth portal, you will also be able to view your health information using other applications (apps) compatible with our system.

## 2022-02-28 NOTE — DISCHARGE NOTE PROVIDER - NSDCMRMEDTOKEN_GEN_ALL_CORE_FT
aspirin 81 mg oral delayed release tablet: 1 tab(s) orally once a day  ertapenem 1 g injection: 1 gram(s) intravenously once a day through 3/4/2022  finasteride 5 mg oral tablet: 1 tab(s) orally once a day  ipratropium-albuterol 0.5 mg-2.5 mg/3 mL inhalation solution: 3 milliliter(s) inhaled 4 times a day  Lantus 100 units/mL subcutaneous solution: 28 unit(s) subcutaneous once a day (at bedtime)  metoprolol succinate 25 mg oral tablet, extended release: 1 tab(s) orally once a day  omeprazole 20 mg oral delayed release capsule: 1 cap(s) orally once a day  polyethylene glycol 3350 oral powder for reconstitution: 17 gram(s) orally 2 times a day  senna oral tablet: 2 tab(s) orally once a day (at bedtime)  Symbicort 160 mcg-4.5 mcg/inh inhalation aerosol: 2 puff(s) inhaled 2 times a day  tamsulosin 0.4 mg oral capsule: 2 cap(s) orally once a day (at bedtime)

## 2022-02-28 NOTE — DISCHARGE NOTE PROVIDER - NSDCCPCAREPLAN_GEN_ALL_CORE_FT
PRINCIPAL DISCHARGE DIAGNOSIS  Diagnosis: E coli bacteremia  Assessment and Plan of Treatment: Continue with IV antibiotics in Rehab through 3/4/2022.  Follow up with your primary doctor after discharge from Rehab.      SECONDARY DISCHARGE DIAGNOSES  Diagnosis: Urinary tract infection  Assessment and Plan of Treatment: Continue with antibiotic as prescribed.  Follow up with your primary doctor after discharge from Rehab.  Call for an appointment.   If you develop a fever, burning on urination, difficulty voiding, call your healthcare provider.    Diagnosis: Encephalopathy  Assessment and Plan of Treatment: Mental status at baseline.    Diagnosis: Fever  Assessment and Plan of Treatment: Resolved.    Diagnosis: DM (diabetes mellitus)  Assessment and Plan of Treatment:

## 2022-02-28 NOTE — PROGRESS NOTE ADULT - SUBJECTIVE AND OBJECTIVE BOX
Overnight events noted      VITAL:  T(C): , Max: 36.7 (02-27-22 @ 20:45)  T(F): , Max: 98.1 (02-27-22 @ 20:45)  HR: 77 (02-28-22 @ 09:02)  BP: 128/77 (02-28-22 @ 09:02)  BP(mean): --  RR: 20 (02-28-22 @ 09:02)  SpO2: 93% (02-28-22 @ 09:02)      PHYSICAL EXAM:  General:  lethargic but alert, NAD  HEENT: no trauma  Lungs: transmitted upper airway sounds  Heart: irreg s1s2  Abdomen: soft, non-tender not distended, + BS  Extremities: no clubbing, cyanosis or edema  Urologic: no traore  Skin: no rashes  LABS:                        13.1   10.58 )-----------( Clumped    ( 27 Feb 2022 05:47 )             41.9     Na(143)/K(4.4)/Cl(105)/HCO3(28)/BUN(24)/Cr(1.25)Glu(160)/Ca(9.7)/Mg(--)/PO4(--)    02-27 @ 05:47  Na(144)/K(4.3)/Cl(103)/HCO3(28)/BUN(27)/Cr(1.51)Glu(167)/Ca(9.7)/Mg(--)/PO4(--)    02-26 @ 09:07      IMPRESSION: 81M w/ HTN, DM2, CAD, BPH, intermittent UTIs, and CKD, 2/17/22 p/w E Coli bacteremia    (1)CKD - stage 3a with non-nephrotic range proteinuria. Potentially diabetic nephropathy  (2)ANTON - fluctuating numbers based on hemodynamic status  (3)ID - EColi UTI/bacteremia - on IV Invanz      RECOMMENDATIONS:  (1)No IVF/No diuretics for now  (2)BMP daily  (3)Dose new meds for GFR 40-50ml/min (present dosing is acceptable)              Maco Bhatia MD  [facility]  Office: [phone]  Cell: [phone]

## 2022-02-28 NOTE — PROGRESS NOTE ADULT - SUBJECTIVE AND OBJECTIVE BOX
KAITLIN CALDERON  81y Male  MRN:[id number]    Patient is a 81y old  Male who presents with a chief complaint of AMS/ sepsis / uti    HPI:   81 Y M H/O DM, bph, htn, chol, ,intermittent UTIs with AMs presenting with AMs. Per EMS and family patient has had 1 day of worsened AMS, disorientation similar to prior episode of UTIs. in er pt was minimally responsive, unable to converse, responsive to painful stimuli. Unable to interact with ROS, Per EMS concern for potential aspiration while in transport.  mental status improved weith iv abx and fluids.  being admitted for sepsis 2/2 uti (17 Feb 2022 11:06)      Patient seen and evaluated at bedside.  interval events noted     Interval HPI: no acute events o/n.     PAST MEDICAL & SURGICAL HISTORY:  DM (diabetes mellitus)    HTN (hypertension)    Hypercholesterolemia    CAD (coronary artery disease)    HTN (hypertension)    DM (diabetes mellitus)    S/P TURP    S/P gastrectomy        REVIEW OF SYSTEMS:  as per hpi     VITALS:   Vital Signs Last 24 Hrs  T(C): 36.6 (28 Feb 2022 13:07), Max: 36.7 (27 Feb 2022 20:45)  T(F): 97.8 (28 Feb 2022 13:07), Max: 98.1 (27 Feb 2022 20:45)  HR: 75 (28 Feb 2022 13:07) (62 - 91)  BP: 136/83 (28 Feb 2022 13:07) (128/77 - 145/82)  BP(mean): --  RR: 20 (28 Feb 2022 13:07) (18 - 20)  SpO2: 93% (28 Feb 2022 13:07) (92% - 94%)    PHYSICAL EXAM:  GENERAL: NAD, well-developed   HEAD:  Atraumatic, Normocephalic  EYES: EOMI, PERRLA, conjunctiva and sclera clear  NECK: Supple, No JVD  CHEST/LUNG: clear b/l  HEART: S1, S2; No murmurs, rubs, or gallops  ABDOMEN:  soft, non tender. +distended.  +BS   EXTREMITIES:  2+ Peripheral Pulses, No clubbing, cyanosis, or edema  NEUROLOGY: AAOX2-3; non-focal,    SKIN: No rashes or lesions    Consultant(s) Notes Reviewed:  [x ] YES  [ ] NO  Care Discussed with Consultants/Other Providers [ x] YES  [ ] NO    MEDS:   MEDICATIONS  (STANDING):  albuterol/ipratropium for Nebulization 3 milliLiter(s) Nebulizer four times a day  aspirin enteric coated 81 milliGRAM(s) Oral daily  budesonide 160 MICROgram(s)/formoterol 4.5 MICROgram(s) Inhaler 2 Puff(s) Inhalation two times a day  dextrose 40% Gel 15 Gram(s) Oral once  dextrose 5%. 1000 milliLiter(s) (50 mL/Hr) IV Continuous <Continuous>  dextrose 5%. 1000 milliLiter(s) (100 mL/Hr) IV Continuous <Continuous>  dextrose 50% Injectable 25 Gram(s) IV Push once  dextrose 50% Injectable 12.5 Gram(s) IV Push once  dextrose 50% Injectable 25 Gram(s) IV Push once  finasteride 5 milliGRAM(s) Oral daily  glucagon  Injectable 1 milliGRAM(s) IntraMuscular once  heparin   Injectable 5000 Unit(s) SubCutaneous every 8 hours  influenza  Vaccine (HIGH DOSE) 0.7 milliLiter(s) IntraMuscular once  insulin glargine Injectable (LANTUS) 28 Unit(s) SubCutaneous at bedtime  insulin lispro (ADMELOG) corrective regimen sliding scale   SubCutaneous three times a day before meals  insulin lispro Injectable (ADMELOG) 10 Unit(s) SubCutaneous three times a day before meals  metoprolol succinate ER 25 milliGRAM(s) Oral daily  pantoprazole    Tablet 40 milliGRAM(s) Oral before breakfast  polyethylene glycol 3350 17 Gram(s) Oral two times a day  senna 2 Tablet(s) Oral at bedtime  tamsulosin 0.8 milliGRAM(s) Oral at bedtime    MEDICATIONS  (PRN):      ALLERGIES:  No Known Allergies      LABS:                                   13.1   10.58 )-----------( Clumped    ( 27 Feb 2022 05:47 )             41.9   02-27    143  |  105  |  24<H>  ----------------------------<  160<H>  4.4   |  28  |  1.25    Ca    9.7      27 Feb 2022 05:47          < from: Xray Chest 1 View-PORTABLE IMMEDIATE (Xray Chest 1 View-PORTABLE IMMEDIATE .) (02.19.22 @ 15:52) >    IMPRESSION:    Clear lungs.    < end of copied text >  < from: Xray Abdomen 1 View Portable, IMMEDIATE (Xray Abdomen 1 View Portable, IMMEDIATE .) (02.19.22 @ 15:52) >  IMPRESSION:    Nonobstructive bowel gas pattern.    < end of copied text >       :   cultures: Culture Results:   No growth to date. (02-17 @ 06:58)  Culture Results:   Growth in aerobic bottle: Gram Negative Rods  ***Blood Panel PCR results on this specimen are available  approximately 3 hours after the Gram stain result.***  Gram stain, PCR, and/or culture results may not always  correspond due to difference in methodologies.  ************************************************************  This PCR assay was performed by multiplex PCR. This  Assay tests for 66 bacterial and resistance gene targets.  Please refer to the [facility] test directory  at [email or web address] for details. (02-17 @ 06:58)  Culture Results:   >100,000 CFU/ml Gram Negative Rods (02-17 @ 04:03)      RADIOLOGY & ADDITIONAL TESTS:    Imaging Personally Reviewed:  [ ] YES  [ ] NO

## 2022-02-28 NOTE — PROGRESS NOTE ADULT - PROBLEM SELECTOR PROBLEM 3
Hypertension
Encephalopathy
Encephalopathy

## 2022-02-28 NOTE — PROGRESS NOTE ADULT - PROBLEM SELECTOR PLAN 4
Monitor labs, Renal FU.
-from UTI  -cont abx
-from UTI  -cont CTX  -f/u blood cx results

## 2022-02-28 NOTE — PROGRESS NOTE ADULT - SUBJECTIVE AND OBJECTIVE BOX
KAITLIN CALDERON 81y MRN-[id number]    Patient is a 81y old  Male who presents with a chief complaint of AMS/Urosepsis (24 Feb 2022 14:21)      Follow Up/CC:  ID following for bacteremia    Interval History/ROS: no fever    Allergies    No Known Allergies    Intolerances        ANTIMICROBIALS:      MEDICATIONS  (STANDING):  albuterol/ipratropium for Nebulization 3 milliLiter(s) Nebulizer four times a day  aspirin enteric coated 81 milliGRAM(s) Oral daily  budesonide 160 MICROgram(s)/formoterol 4.5 MICROgram(s) Inhaler 2 Puff(s) Inhalation two times a day  dextrose 40% Gel 15 Gram(s) Oral once  dextrose 5%. 1000 milliLiter(s) (50 mL/Hr) IV Continuous <Continuous>  dextrose 5%. 1000 milliLiter(s) (100 mL/Hr) IV Continuous <Continuous>  dextrose 50% Injectable 25 Gram(s) IV Push once  dextrose 50% Injectable 12.5 Gram(s) IV Push once  dextrose 50% Injectable 25 Gram(s) IV Push once  finasteride 5 milliGRAM(s) Oral daily  glucagon  Injectable 1 milliGRAM(s) IntraMuscular once  heparin   Injectable 5000 Unit(s) SubCutaneous every 8 hours  influenza  Vaccine (HIGH DOSE) 0.7 milliLiter(s) IntraMuscular once  insulin glargine Injectable (LANTUS) 28 Unit(s) SubCutaneous at bedtime  insulin lispro (ADMELOG) corrective regimen sliding scale   SubCutaneous three times a day before meals  insulin lispro Injectable (ADMELOG) 10 Unit(s) SubCutaneous three times a day before meals  metoprolol succinate ER 25 milliGRAM(s) Oral daily  pantoprazole    Tablet 40 milliGRAM(s) Oral before breakfast  polyethylene glycol 3350 17 Gram(s) Oral two times a day  senna 2 Tablet(s) Oral at bedtime  tamsulosin 0.8 milliGRAM(s) Oral at bedtime    MEDICATIONS  (PRN):        Vital Signs Last 24 Hrs  T(C): 36.7 (28 Feb 2022 09:02), Max: 36.7 (27 Feb 2022 20:45)  T(F): 98.1 (28 Feb 2022 09:02), Max: 98.1 (27 Feb 2022 20:45)  HR: 77 (28 Feb 2022 09:02) (62 - 91)  BP: 128/77 (28 Feb 2022 09:02) (128/77 - 145/82)  BP(mean): --  RR: 20 (28 Feb 2022 09:02) (18 - 20)  SpO2: 93% (28 Feb 2022 09:02) (92% - 94%)    CBC Full  -  ( 27 Feb 2022 05:47 )  WBC Count : 10.58 K/uL  RBC Count : 4.45 M/uL  Hemoglobin : 13.1 g/dL  Hematocrit : 41.9 %  Platelet Count - Automated : Clumped K/uL  Mean Cell Volume : 94.2 fl  Mean Cell Hemoglobin : 29.4 pg  Mean Cell Hemoglobin Concentration : 31.3 gm/dL  Auto Neutrophil # : x  Auto Lymphocyte # : x  Auto Monocyte # : x  Auto Eosinophil # : x  Auto Basophil # : x  Auto Neutrophil % : x  Auto Lymphocyte % : x  Auto Monocyte % : x  Auto Eosinophil % : x  Auto Basophil % : x    02-27    143  |  105  |  24<H>  ----------------------------<  160<H>  4.4   |  28  |  1.25    Ca    9.7      27 Feb 2022 05:47            MICROBIOLOGY:  .Blood Blood-Peripheral  02-20-22   No Growth Final  --  --      .Blood Blood-Peripheral  02-19-22   Growth in anaerobic bottle: Escherichia coli ESBL  --  Escherichia coli ESBL      .Blood Blood-Peripheral  02-17-22   No Growth Final  --  Blood Culture PCR  Escherichia coli ESBL      Clean Catch Clean Catch (Midstream)  02-17-22   >100,000 CFU/ml Escherichia coli ESBL  --  Escherichia coli ESBL        RADIOLOGY    < from: Xray Chest 1 View- PORTABLE-Routine (Xray Chest 1 View- PORTABLE-Routine in AM.) (02.25.22 @ 08:49) >  Progression of effusions.    < end of copied text >

## 2022-02-28 NOTE — PROGRESS NOTE ADULT - PROBLEM SELECTOR PROBLEM 2
Acute UTI
Fever
Fever

## 2022-02-28 NOTE — DISCHARGE NOTE PROVIDER - HOSPITAL COURSE
Patient is a 81y old  Male who presents with a chief complaint of AMS/ sepsis / uti    sepsis 2/2 uti  urine and blood cultures growing Ecoli.  ESBL  resistant to ceftriax.   abx changed from ceftriax to invanz.   ID f/u apprec  will need long term abx till 3/4.  midline now in place     bacteremia  likely 2/2 uti  now on invanz   cult and sens noted.   f/u repeat blood cultures to ensure clearance.  - ngtd  long term abx as noted above via midline      vomiting  likely 2/2 sepsis and uti  CT and abd xray noted.  no acute gi pathology  tolerating diet .  advance diet as tolerated - tolerating regular diet   zofran prn  gi f/u   bowel regimen for constipation      metabolic encephalopathy  2/2 sepsis  improved   as per d/w family, this is close to his baseline mental status    DM  on insulin  monitor fs  endo following     ckd  with chanel  renal f/u  creat stable  avoid nephrotoxins    hypoxia    CXR noted.  Rounded opacity in the right lower lung field. likely atelactasis. doubt pna. already on ABx   pt remains on supp o2.    incentive spirometer ordered   pulm consult apprec.      Patient is a 81y old  Male who presents with a chief complaint of AMS/ sepsis / uti    sepsis 2/2 uti  urine and blood cultures growing Ecoli.  ESBL  resistant to ceftriax.   abx changed from ceftriax to invanz.   ID f/u apprec  will need long term abx till 3/4.  midline now in place     bacteremia  likely 2/2 uti  now on invanz   cult and sens noted.   f/u repeat blood cultures to ensure clearance.  - ngtd  long term abx as noted above via midline      vomiting  likely 2/2 sepsis and uti  CT and abd xray noted.  no acute gi pathology  tolerating diet .  advance diet as tolerated - tolerating regular diet   zofran prn  gi f/u   bowel regimen for constipation      metabolic encephalopathy  2/2 sepsis  improved   as per d/w family, this is close to his baseline mental status    DM  on insulin  monitor fs  endo following     ckd  with chanel  renal f/u  creat stable  avoid nephrotoxins    hypoxia    CXR noted.  Rounded opacity in the right lower lung field. likely atelactasis. doubt pna. already on ABx   pt remains on supp o2.    incentive spirometer ordered   pulm consult apprec.       Advanced care planning was discussed with patient and family.  Advanced care planning forms were reviewed and discussed as appropriate.  Differential diagnosis and plan of care discussed with patient after the evaluation.   Pain assessed and judicious use of narcotics when appropriate was discussed.  Importance of Fall prevention discussed.  Counseling on Smoking and Alcohol cessation was offered when appropriate.  Counseling on Diet, exercise, and medication compliance was done.         Approx 65 minutes spent.

## 2022-02-28 NOTE — PROGRESS NOTE ADULT - SUBJECTIVE AND OBJECTIVE BOX
Chief complaint  Patient is a 81y old  Male who presents with a chief complaint of AMS/Urosepsis (24 Feb 2022 14:21)   Review of systems  Patient in bed, looks comfortable, no hypoglycemic episodes.    Labs and Fingersticks  CAPILLARY BLOOD GLUCOSE      POCT Blood Glucose.: 141 mg/dL (28 Feb 2022 12:53)  POCT Blood Glucose.: 192 mg/dL (28 Feb 2022 08:07)  POCT Blood Glucose.: 189 mg/dL (27 Feb 2022 21:18)  POCT Blood Glucose.: 145 mg/dL (27 Feb 2022 17:48)      Anion Gap, Serum: 10 (02-27 @ 05:47)      Calcium, Total Serum: 9.7 (02-27 @ 05:47)          02-27    143  |  105  |  24<H>  ----------------------------<  160<H>  4.4   |  28  |  1.25    Ca    9.7      27 Feb 2022 05:47                          13.1   10.58 )-----------( Clumped    ( 27 Feb 2022 05:47 )             41.9     Medications  MEDICATIONS  (STANDING):  albuterol/ipratropium for Nebulization 3 milliLiter(s) Nebulizer four times a day  aspirin enteric coated 81 milliGRAM(s) Oral daily  budesonide 160 MICROgram(s)/formoterol 4.5 MICROgram(s) Inhaler 2 Puff(s) Inhalation two times a day  dextrose 40% Gel 15 Gram(s) Oral once  dextrose 5%. 1000 milliLiter(s) (50 mL/Hr) IV Continuous <Continuous>  dextrose 5%. 1000 milliLiter(s) (100 mL/Hr) IV Continuous <Continuous>  dextrose 50% Injectable 25 Gram(s) IV Push once  dextrose 50% Injectable 12.5 Gram(s) IV Push once  dextrose 50% Injectable 25 Gram(s) IV Push once  finasteride 5 milliGRAM(s) Oral daily  glucagon  Injectable 1 milliGRAM(s) IntraMuscular once  heparin   Injectable 5000 Unit(s) SubCutaneous every 8 hours  influenza  Vaccine (HIGH DOSE) 0.7 milliLiter(s) IntraMuscular once  insulin glargine Injectable (LANTUS) 28 Unit(s) SubCutaneous at bedtime  insulin lispro (ADMELOG) corrective regimen sliding scale   SubCutaneous three times a day before meals  insulin lispro Injectable (ADMELOG) 10 Unit(s) SubCutaneous three times a day before meals  metoprolol succinate ER 25 milliGRAM(s) Oral daily  pantoprazole    Tablet 40 milliGRAM(s) Oral before breakfast  polyethylene glycol 3350 17 Gram(s) Oral two times a day  senna 2 Tablet(s) Oral at bedtime  tamsulosin 0.8 milliGRAM(s) Oral at bedtime      Physical Exam  General: Patient comfortable in bed  Vital Signs Last 12 Hrs  T(F): 97.8 (02-28-22 @ 13:07), Max: 98.1 (02-28-22 @ 09:02)  HR: 75 (02-28-22 @ 13:07) (62 - 77)  BP: 136/83 (02-28-22 @ 13:07) (128/77 - 136/83)  BP(mean): --  RR: 20 (02-28-22 @ 13:07) (18 - 20)  SpO2: 93% (02-28-22 @ 13:07) (92% - 93%)  Neck: No palpable thyroid nodules.  CVS: S1S2, No murmurs  Respiratory: No wheezing, no crepitations  GI: Abdomen soft, bowel sounds positive  Musculoskeletal:  edema lower extremities.   Skin: No skin rashes, no ecchymosis    Diagnostics    Beta Hydroxy-Butyrate: AM Sched. Collection: 21-Feb-2022 06:00 (02-20 @ 09:00)           Chief complaint  Patient is a 81y old  Male who presents with a chief complaint of AMS/Urosepsis (24 Feb 2022 14:21)   Review of systems  Patient in bed, looks comfortable, no hypoglycemic episodes.    Labs and Fingersticks  CAPILLARY BLOOD GLUCOSE      POCT Blood Glucose.: 141 mg/dL (28 Feb 2022 12:53)  POCT Blood Glucose.: 192 mg/dL (28 Feb 2022 08:07)  POCT Blood Glucose.: 189 mg/dL (27 Feb 2022 21:18)  POCT Blood Glucose.: 145 mg/dL (27 Feb 2022 17:48)      Anion Gap, Serum: 10 (02-27 @ 05:47)      Calcium, Total Serum: 9.7 (02-27 @ 05:47)          02-27    143  |  105  |  24<H>  ----------------------------<  160<H>  4.4   |  28  |  1.25    Ca    9.7      27 Feb 2022 05:47                          13.1   10.58 )-----------( Clumped    ( 27 Feb 2022 05:47 )             41.9     Medications  MEDICATIONS  (STANDING):  albuterol/ipratropium for Nebulization 3 milliLiter(s) Nebulizer four times a day  aspirin enteric coated 81 milliGRAM(s) Oral daily  budesonide 160 MICROgram(s)/formoterol 4.5 MICROgram(s) Inhaler 2 Puff(s) Inhalation two times a day  dextrose 40% Gel 15 Gram(s) Oral once  dextrose 5%. 1000 milliLiter(s) (50 mL/Hr) IV Continuous <Continuous>  dextrose 5%. 1000 milliLiter(s) (100 mL/Hr) IV Continuous <Continuous>  dextrose 50% Injectable 25 Gram(s) IV Push once  dextrose 50% Injectable 12.5 Gram(s) IV Push once  dextrose 50% Injectable 25 Gram(s) IV Push once  finasteride 5 milliGRAM(s) Oral daily  glucagon  Injectable 1 milliGRAM(s) IntraMuscular once  heparin   Injectable 5000 Unit(s) SubCutaneous every 8 hours  influenza  Vaccine (HIGH DOSE) 0.7 milliLiter(s) IntraMuscular once  insulin glargine Injectable (LANTUS) 28 Unit(s) SubCutaneous at bedtime  insulin lispro (ADMELOG) corrective regimen sliding scale   SubCutaneous three times a day before meals  insulin lispro Injectable (ADMELOG) 10 Unit(s) SubCutaneous three times a day before meals  metoprolol succinate ER 25 milliGRAM(s) Oral daily  pantoprazole    Tablet 40 milliGRAM(s) Oral before breakfast  polyethylene glycol 3350 17 Gram(s) Oral two times a day  senna 2 Tablet(s) Oral at bedtime  tamsulosin 0.8 milliGRAM(s) Oral at bedtime      Physical Exam  General: Patient comfortable in bed  Vital Signs Last 12 Hrs  T(F): 97.8 (02-28-22 @ 13:07), Max: 98.1 (02-28-22 @ 09:02)  HR: 75 (02-28-22 @ 13:07) (62 - 77)  BP: 136/83 (02-28-22 @ 13:07) (128/77 - 136/83)  BP(mean): --  RR: 20 (02-28-22 @ 13:07) (18 - 20)  SpO2: 93% (02-28-22 @ 13:07) (92% - 93%)  Neck: No palpable thyroid nodules.  CVS: S1S2, No murmurs  Respiratory: No wheezing, no crepitations  GI: Abdomen soft, bowel sounds positive  Musculoskeletal:  edema lower extremities.   Skin: No skin rashes, no ecchymosis    Diagnostics    Beta Hydroxy-Butyrate: AM Sched. Collection: 21-Feb-2022 06:00 (02-20 @ 09:00)

## 2022-02-28 NOTE — PROGRESS NOTE ADULT - PROBLEM SELECTOR PLAN 3
Suggest to continue medications, monitoring, FU primary team recommendations.
-more awake  -cont abx
-more awake  -cont abx

## 2022-02-28 NOTE — DISCHARGE NOTE NURSING/CASE MANAGEMENT/SOCIAL WORK - NSDCPEFALRISK_GEN_ALL_CORE
For information on Fall & Injury Prevention, visit: [email or web address] OR  [email or web address] OR  https://www.cdc.gov/steadi/patient.html

## 2022-02-28 NOTE — PROGRESS NOTE ADULT - ASSESSMENT
81 Y M H/O intermittent UTIs with AMs presenting with AMS with encephalopathy, UTI, bandemia, fever    Naren Cabezas  Attending Physician   Division of Infectious Disease  Office #[phone]  Available on Microsoft Teams also  After 5pm/weekend or no response, call #[phone]

## 2022-02-28 NOTE — DISCHARGE NOTE PROVIDER - NSDCQMPCI_CARD_ALL_CORE
Thank you for allowing Dr Juarez and our surgical team to participate in your care. Please call with any questions or concerns to our direct line at [phone].      Please contact the office or return for questions, concerns or new complaints.      Cologuard testing ordered.  Will contact with results.   No

## 2022-02-28 NOTE — PROGRESS NOTE ADULT - ASSESSMENT
Assessment  DMT2: 81y Male with DM T2 with hyperglycemia, A1C 11.1%, per chart was on insulin at home, ?compliance, now on basal bolus insulin, blood sugars are stable and trending within acceptable range, no hypoglycemias. Patient is eating meals with good appetite, NAD, planning DC to [facility].  UTI: On meds, monitored.  HTN: Controlled,  on antihypertensive medications.  CKD: Monitor labs/BMP,         nelda Kirk moya  [phone]               Assessment  DMT2: 81y Male with DM T2 with hyperglycemia, A1C 11.1%, per chart was on insulin at home,  ?compliance, now on basal bolus insulin, blood sugars are stable and trending within acceptable range, no hypoglycemias. Patient is eating meals with good appetite, NAD, planning DC to [facility].  UTI: On meds, monitored.  HTN: Controlled,  on antihypertensive medications.  CKD: Monitor labs/BMP,         nelda Kirk moya  [phone]

## 2022-02-28 NOTE — PROGRESS NOTE ADULT - PROBLEM SELECTOR PLAN 1
Will increase Lantus to 28u at bedtime.  Will continue Admelog 10u before each meal as well as Admelog correction scale coverage. Will continue monitoring FS and FU.  Patient previously on insulin (Tresiba & Novolog); Suggest DC on current insulin doses with Endo FU 4 weeks.   for compliance with consistent low carb diet.
Suggest to continue holding bolus dose if patient is NPO.  Will increase Lantus to 18u at bedtime.  Will continue Admelog 8u before each meal and Admelog correction scale coverage. Will continue monitoring FS and FU.
Will continue Lantus 16u at bedtime.  Will increase Admelog to 8u before each meal and continue Admelog correction scale coverage. Will continue monitoring FS and FU.  Patient counseled for compliance with consistent low carb diet.
Will continue current insulin regimen for now. Will continue monitoring  blood sugars, will Follow up.  Patient previously on insulin (Tresiba & Novolog); Suggest DC on current insulin doses with Endo FU 4 weeks.   for compliance with consistent low carb diet.
Will increase Lantus to 22u at bedtime.  Will increase Admelog to 10u before each meal and continue Admelog correction scale coverage. Will continue monitoring FS and FU.  Patient previously on insulin (Tresiba & Novolog); Suggest DC on current insulin doses with Endo FU 4 weeks.   for compliance with consistent low carb diet.
Will increase Lantus to 25u at bedtime.  Will continue Admelog 10u before each meal as well as Admelog correction scale coverage. Will continue monitoring FS and FU.  Patient previously on insulin (Tresiba & Novolog); Suggest DC on current insulin doses with Endo FU 4 weeks.   for compliance with consistent low carb diet.
Will continue current insulin regimen for now. Will continue monitoring FS and FU.  Patient previously on insulin (Tresiba & Novolog); Suggest DC on current insulin doses with Endo FU 4 weeks.   for compliance with consistent low carb diet.
Will continue current insulin regimen for now. Will continue monitoring FS, log, and FU.  Patient previously on insulin (Tresiba & Novolog); Suggest DC on current insulin doses with Endo FU 4 weeks.   for compliance with consistent low carb diet.
Will Continue  Lantus to 28u at bedtime.  Will continue Admelog 10u before each meal as well as Admelog correction scale coverage. Will continue monitoring FS and FU.  Patient previously on insulin (Tresiba & Novolog); Suggest DC on current insulin doses with Endo FU 4 weeks.   for compliance with consistent low carb diet.
Will increase Lantus to 20u at bedtime.  Will continue Admelog 8u before each meal and Admelog correction scale coverage. Will continue monitoring FS and FU.  Patient previously on insulin (Tresiba & Novolog); Suggest DC on current insulin doses with Endo FU 4 weeks.   for compliance with consistent low carb diet.
-cont invanz till 3/4
Will continue current insulin regimen for now. Will continue monitoring  blood sugars, will Follow up.  Patient counseled for compliance with consistent low carb diet.
-cont CTX  -león in  -f/u cx

## 2022-02-28 NOTE — DISCHARGE NOTE PROVIDER - CARE PROVIDER_API CALL
BRENT GAO  Family Practice  [address] [place] [address]  Phone: [phone]  Fax: [phone]  Follow Up Time:     Melba Reyes)  EndocrinologyMetabDiabetes; Internal Medicine  [address] [place] [address]  Phone: [phone]  Fax: [phone]  Follow Up Time:

## 2022-04-26 NOTE — PHYSICAL THERAPY INITIAL EVALUATION ADULT - IMPAIRMENTS FOUND, PT EVAL
[FreeTextEntry1] : This 4-year-old was seen for a follow-up visit.    \par \par He was receiving Symbicort 160/4.5 mcg, 2 puffs twice daily and montelukast.   Mother had been administering fluticasone routinely.  He receives cetirizine and vitamin D3.  He has a stuffy nose intermittently.  He rarely clears his throat.  He drinks 8 ounces of Lactaid milk a day but does take vitamin D3 supplements.\par He has clubbing of his right foot.  He has a half cast on his right leg and surgery is planned in a week.  After this he will be fitted for a brace for 4 to 6 weeks.  He was using a walker.  He had a sick visit on the day of this visit as he had developed a stye on his right eyelid.  He is now receiving home instruction because of the problem with the right clubfoot.  Speech and occupational therapy are to be set up at home.  He will receive physical therapy privately after surgery.\par \par He had had a neurology follow-up visit.  MRI of the brain and brainstem was normal.  He had normal male MicroArray on testing.\par He has daily bowel movements.  They have been decreasing his caloric intake and the size of his snacks.  He had gained 2 kg since seen December 2021.  His BMI however has been stable.   The plan is to perform a tendon lengthening procedure.  He was losing his balance and falling frequently.   He had received the Covid vaccine.   He was receiving physical therapy, occupational therapy and speech therapy at school.  He was not coughing at night.  He tolerates activity well if he receives albuterol prior to activity but will otherwise cough with activity. He has mild adductus of his feet.  Mother uses Jamil lotion intermittently for dry skin.  \par \par Sleep: He is falling asleep quickly.  He is not snoring or restless at night.  \par Respiratory allergy panel by the immunoCAP technique negative July 2020.\par He is trying new foods including fish.  Mother is trying to  decrease his caloric intake.   He had had a nutritionist  visit.  Mother plans to follow-up with a different nutritionist.\par \par He is in the [employer] program.\par \par The puppy remains in the hallway.\par \par   He develops a cough when exposed to the puppy.   Coughs with activity outdoors unless he receives albuterol prior..  Mother had Covid in November 2020.  At that time the family ate a lot of processed food and he showed significant weight gain.\par \par   His behavior problems are somewhat better.   25 hydroxy vitamin D level was 40 NG per mL.    Mother was no longer administering MiraLAX as his bowel movements were soft.  He was initially on fiber gummies, which have been discontinued.   He had not had any sick visits since last seen.  His headaches were decreased in frequency. \par Sleep hygiene measures had been introduced and he was able to sleep easily without need for melatonin. 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  He would cough at night 3-4 times a week.  With flare-ups, he would cough and vomit.  He would be short of breath and cough with activity.  He is symptomatic all year round with exacerbations every 2 to 3 months.  He had not received prednisone in the past year. \par \par \par He was constipated and would have diarrhea with whole milk.  \par Surgery: He has had a right Achilles tendon release November 2015.\par Hospitalizations: Never\par \par Emergency room visits: Once when he hit his head.\par \par He is followed by endocrinology as he is very large for his age, tall stature and overweight.  Testing has been negative.On 11/16/19 Cholesterol 170, HDL Chol 34, LDL Chol 119, TG 74, glucose 77, TSH 1.38, free T4 1.1, IGF-1 149, IGF-BP3 4.4, insulin 10.2, leptin 8.0.  \par He has right clubfoot which was treated with casting and Achilles tendon release.  Another surgical procedures to be performed in a week.    \par He follows up with developmental pediatrics as he has autism spectrum disorder.  \par \par \par He had an otolaryngology evaluation.  Overnight polysomnogram was planned but did not happen, again because of the coronavirus pandemic.\par \par He has dry skin especially in the winter.  This resolves with use of lotion.
aerobic capacity/endurance/gait, locomotion, and balance

## 2022-06-06 ENCOUNTER — INPATIENT (INPATIENT)
Facility: HOSPITAL | Age: 82
LOS: 3 days | Discharge: HOME | DRG: 871 | End: 2022-06-10
Attending: INTERNAL MEDICINE | Admitting: HOSPITALIST
Payer: COMMERCIAL

## 2022-06-06 VITALS
OXYGEN SATURATION: 97 % | HEART RATE: 96 BPM | HEIGHT: 71 IN | RESPIRATION RATE: 20 BRPM | SYSTOLIC BLOOD PRESSURE: 98 MMHG | DIASTOLIC BLOOD PRESSURE: 68 MMHG

## 2022-06-06 DIAGNOSIS — E16.2 HYPOGLYCEMIA, UNSPECIFIED: ICD-10-CM

## 2022-06-06 DIAGNOSIS — Z90.79 ACQUIRED ABSENCE OF OTHER GENITAL ORGAN(S): Chronic | ICD-10-CM

## 2022-06-06 DIAGNOSIS — Z90.3 ACQUIRED ABSENCE OF STOMACH [PART OF]: Chronic | ICD-10-CM

## 2022-06-06 LAB
ALBUMIN SERPL ELPH-MCNC: 2.7 G/DL — LOW (ref 3.3–5)
ALP SERPL-CCNC: 127 U/L — HIGH (ref 40–120)
ALT FLD-CCNC: 14 U/L — SIGNIFICANT CHANGE UP (ref 10–45)
ANION GAP SERPL CALC-SCNC: 13 MMOL/L — SIGNIFICANT CHANGE UP (ref 5–17)
ANISOCYTOSIS BLD QL: SLIGHT — SIGNIFICANT CHANGE UP
APPEARANCE UR: ABNORMAL
APTT BLD: 33.6 SEC — SIGNIFICANT CHANGE UP (ref 27.5–35.5)
AST SERPL-CCNC: 22 U/L — SIGNIFICANT CHANGE UP (ref 10–40)
BACTERIA # UR AUTO: ABNORMAL
BASE EXCESS BLDV CALC-SCNC: 0.5 MMOL/L — SIGNIFICANT CHANGE UP (ref -2–2)
BASOPHILS # BLD AUTO: 0 K/UL — SIGNIFICANT CHANGE UP (ref 0–0.2)
BASOPHILS NFR BLD AUTO: 0 % — SIGNIFICANT CHANGE UP (ref 0–2)
BILIRUB SERPL-MCNC: 0.5 MG/DL — SIGNIFICANT CHANGE UP (ref 0.2–1.2)
BILIRUB UR-MCNC: NEGATIVE — SIGNIFICANT CHANGE UP
BUN SERPL-MCNC: 16 MG/DL — SIGNIFICANT CHANGE UP (ref 7–23)
BURR CELLS BLD QL SMEAR: PRESENT — SIGNIFICANT CHANGE UP
CA-I SERPL-SCNC: 1.23 MMOL/L — SIGNIFICANT CHANGE UP (ref 1.15–1.33)
CALCIUM SERPL-MCNC: 9 MG/DL — SIGNIFICANT CHANGE UP (ref 8.4–10.5)
CHLORIDE BLDV-SCNC: 96 MMOL/L — SIGNIFICANT CHANGE UP (ref 96–108)
CHLORIDE SERPL-SCNC: 95 MMOL/L — LOW (ref 96–108)
CO2 BLDV-SCNC: 28 MMOL/L — HIGH (ref 22–26)
CO2 SERPL-SCNC: 23 MMOL/L — SIGNIFICANT CHANGE UP (ref 22–31)
COLOR SPEC: YELLOW — SIGNIFICANT CHANGE UP
CREAT SERPL-MCNC: 1.12 MG/DL — SIGNIFICANT CHANGE UP (ref 0.5–1.3)
DACRYOCYTES BLD QL SMEAR: SLIGHT — SIGNIFICANT CHANGE UP
DIFF PNL FLD: ABNORMAL
EGFR: 65 ML/MIN/1.73M2 — SIGNIFICANT CHANGE UP
ELLIPTOCYTES BLD QL SMEAR: SLIGHT — SIGNIFICANT CHANGE UP
EOSINOPHIL # BLD AUTO: 0 K/UL — SIGNIFICANT CHANGE UP (ref 0–0.5)
EOSINOPHIL NFR BLD AUTO: 0 % — SIGNIFICANT CHANGE UP (ref 0–6)
EPI CELLS # UR: 0 /HPF — SIGNIFICANT CHANGE UP
GAS PNL BLDV: 130 MMOL/L — LOW (ref 136–145)
GAS PNL BLDV: SIGNIFICANT CHANGE UP
GLUCOSE BLDV-MCNC: 180 MG/DL — HIGH (ref 70–99)
GLUCOSE SERPL-MCNC: 181 MG/DL — HIGH (ref 70–99)
GLUCOSE UR QL: ABNORMAL
HCO3 BLDV-SCNC: 26 MMOL/L — SIGNIFICANT CHANGE UP (ref 22–29)
HCT VFR BLD CALC: 34.6 % — LOW (ref 39–50)
HCT VFR BLDA CALC: 32 % — LOW (ref 39–51)
HGB BLD CALC-MCNC: 10.8 G/DL — LOW (ref 12.6–17.4)
HGB BLD-MCNC: 11 G/DL — LOW (ref 13–17)
HYALINE CASTS # UR AUTO: 1 /LPF — SIGNIFICANT CHANGE UP (ref 0–2)
INR BLD: 1.1 RATIO — SIGNIFICANT CHANGE UP (ref 0.88–1.16)
KETONES UR-MCNC: NEGATIVE — SIGNIFICANT CHANGE UP
LACTATE BLDV-MCNC: 2.9 MMOL/L — HIGH (ref 0.7–2)
LEUKOCYTE ESTERASE UR-ACNC: ABNORMAL
LIDOCAIN IGE QN: 9 U/L — SIGNIFICANT CHANGE UP (ref 7–60)
LYMPHOCYTES # BLD AUTO: 0.24 K/UL — LOW (ref 1–3.3)
LYMPHOCYTES # BLD AUTO: 2.6 % — LOW (ref 13–44)
MACROCYTES BLD QL: SLIGHT — SIGNIFICANT CHANGE UP
MAGNESIUM SERPL-MCNC: 1.5 MG/DL — LOW (ref 1.6–2.6)
MANUAL SMEAR VERIFICATION: SIGNIFICANT CHANGE UP
MCHC RBC-ENTMCNC: 27.8 PG — SIGNIFICANT CHANGE UP (ref 27–34)
MCHC RBC-ENTMCNC: 31.8 GM/DL — LOW (ref 32–36)
MCV RBC AUTO: 87.6 FL — SIGNIFICANT CHANGE UP (ref 80–100)
MONOCYTES # BLD AUTO: 0.65 K/UL — SIGNIFICANT CHANGE UP (ref 0–0.9)
MONOCYTES NFR BLD AUTO: 6.9 % — SIGNIFICANT CHANGE UP (ref 2–14)
NEUTROPHILS # BLD AUTO: 8.53 K/UL — HIGH (ref 1.8–7.4)
NEUTROPHILS NFR BLD AUTO: 89.6 % — HIGH (ref 43–77)
NEUTS BAND # BLD: 0.9 % — SIGNIFICANT CHANGE UP (ref 0–8)
NITRITE UR-MCNC: NEGATIVE — SIGNIFICANT CHANGE UP
PCO2 BLDV: 48 MMHG — SIGNIFICANT CHANGE UP (ref 42–55)
PH BLDV: 7.35 — SIGNIFICANT CHANGE UP (ref 7.32–7.43)
PH UR: 6.5 — SIGNIFICANT CHANGE UP (ref 5–8)
PHOSPHATE SERPL-MCNC: 2.3 MG/DL — LOW (ref 2.5–4.5)
PLAT MORPH BLD: NORMAL — SIGNIFICANT CHANGE UP
PLATELET # BLD AUTO: 177 K/UL — SIGNIFICANT CHANGE UP (ref 150–400)
PO2 BLDV: 38 MMHG — SIGNIFICANT CHANGE UP (ref 25–45)
POIKILOCYTOSIS BLD QL AUTO: SLIGHT — SIGNIFICANT CHANGE UP
POLYCHROMASIA BLD QL SMEAR: SLIGHT — SIGNIFICANT CHANGE UP
POTASSIUM BLDV-SCNC: 3.6 MMOL/L — SIGNIFICANT CHANGE UP (ref 3.5–5.1)
POTASSIUM SERPL-MCNC: 3.6 MMOL/L — SIGNIFICANT CHANGE UP (ref 3.5–5.3)
POTASSIUM SERPL-SCNC: 3.6 MMOL/L — SIGNIFICANT CHANGE UP (ref 3.5–5.3)
PROT SERPL-MCNC: 6.3 G/DL — SIGNIFICANT CHANGE UP (ref 6–8.3)
PROT UR-MCNC: 100 — SIGNIFICANT CHANGE UP
PROTHROM AB SERPL-ACNC: 12.7 SEC — SIGNIFICANT CHANGE UP (ref 10.5–13.4)
RBC # BLD: 3.95 M/UL — LOW (ref 4.2–5.8)
RBC # FLD: 13.9 % — SIGNIFICANT CHANGE UP (ref 10.3–14.5)
RBC BLD AUTO: ABNORMAL
RBC CASTS # UR COMP ASSIST: 3 /HPF — SIGNIFICANT CHANGE UP (ref 0–4)
SAO2 % BLDV: 58.2 % — LOW (ref 67–88)
SARS-COV-2 RNA SPEC QL NAA+PROBE: SIGNIFICANT CHANGE UP
SODIUM SERPL-SCNC: 131 MMOL/L — LOW (ref 135–145)
SP GR SPEC: 1.02 — SIGNIFICANT CHANGE UP (ref 1.01–1.02)
UROBILINOGEN FLD QL: NEGATIVE — SIGNIFICANT CHANGE UP
WBC # BLD: 9.42 K/UL — SIGNIFICANT CHANGE UP (ref 3.8–10.5)
WBC # FLD AUTO: 9.42 K/UL — SIGNIFICANT CHANGE UP (ref 3.8–10.5)
WBC UR QL: 567 /HPF — HIGH (ref 0–5)

## 2022-06-06 PROCEDURE — 99285 EMERGENCY DEPT VISIT HI MDM: CPT

## 2022-06-06 PROCEDURE — 71045 X-RAY EXAM CHEST 1 VIEW: CPT | Mod: 26

## 2022-06-06 PROCEDURE — 73502 X-RAY EXAM HIP UNI 2-3 VIEWS: CPT | Mod: 26,RT

## 2022-06-06 PROCEDURE — 70450 CT HEAD/BRAIN W/O DYE: CPT | Mod: 26,MA

## 2022-06-06 RX ORDER — MEROPENEM 1 G/30ML
1000 INJECTION INTRAVENOUS ONCE
Refills: 0 | Status: COMPLETED | OUTPATIENT
Start: 2022-06-06 | End: 2022-06-06

## 2022-06-06 RX ORDER — SODIUM CHLORIDE 9 MG/ML
1000 INJECTION, SOLUTION INTRAVENOUS
Refills: 0 | Status: DISCONTINUED | OUTPATIENT
Start: 2022-06-06 | End: 2022-06-10

## 2022-06-06 RX ORDER — POTASSIUM PHOSPHATE, MONOBASIC POTASSIUM PHOSPHATE, DIBASIC 236; 224 MG/ML; MG/ML
15 INJECTION, SOLUTION INTRAVENOUS ONCE
Refills: 0 | Status: COMPLETED | OUTPATIENT
Start: 2022-06-06 | End: 2022-06-06

## 2022-06-06 RX ORDER — MAGNESIUM SULFATE 500 MG/ML
1 VIAL (ML) INJECTION ONCE
Refills: 0 | Status: COMPLETED | OUTPATIENT
Start: 2022-06-06 | End: 2022-06-06

## 2022-06-06 RX ORDER — CEFTRIAXONE 500 MG/1
1000 INJECTION, POWDER, FOR SOLUTION INTRAMUSCULAR; INTRAVENOUS ONCE
Refills: 0 | Status: COMPLETED | OUTPATIENT
Start: 2022-06-06 | End: 2022-06-06

## 2022-06-06 RX ADMIN — SODIUM CHLORIDE 1000 MILLILITER(S): 9 INJECTION, SOLUTION INTRAVENOUS at 18:35

## 2022-06-06 RX ADMIN — Medication 100 GRAM(S): at 23:10

## 2022-06-06 RX ADMIN — CEFTRIAXONE 100 MILLIGRAM(S): 500 INJECTION, POWDER, FOR SOLUTION INTRAMUSCULAR; INTRAVENOUS at 20:38

## 2022-06-06 RX ADMIN — MEROPENEM 100 MILLIGRAM(S): 1 INJECTION INTRAVENOUS at 23:09

## 2022-06-06 RX ADMIN — SODIUM CHLORIDE 500 MILLILITER(S): 9 INJECTION, SOLUTION INTRAVENOUS at 17:32

## 2022-06-06 RX ADMIN — POTASSIUM PHOSPHATE, MONOBASIC POTASSIUM PHOSPHATE, DIBASIC 62.5 MILLIMOLE(S): 236; 224 INJECTION, SOLUTION INTRAVENOUS at 23:09

## 2022-06-06 NOTE — ED ADULT NURSE REASSESSMENT NOTE - NS ED NURSE REASSESS COMMENT FT1
Pt repeatedly saying "pee pee" and making motion at genital region. Called translation services (spoke with Oswaldo Bills [id number]). Pt provided urinal but unable to urinate. Bladder scanner battery dead. Pt trying to get out of stretcher, fall risk socks placed on pt and pt assisted to bathroom with 2 people. Pt provided clean catch urine sample, urinalysis and urine culture obtained and sent to the laboratory. PT placed in stretcher. Pt provided multiple warm blankets. Pt placed in position of comfort. Pt educated on call bell system and provided call bell. Bed in lowest position, wheels locked, appropriate side rails raised. Pt denies needs at this time.

## 2022-06-06 NOTE — ED PROVIDER NOTE - ATTENDING CONTRIBUTION TO CARE
Justin Trevino MD, FACEP: In this physician's medical judgement based on clinical history and physical exam: patient with grunting during a nap and unable to arouse by family with incontinence of urine. Patient was reported to have given himself insulin prior to taking the nap at 12:30-1pm.   patient was reported to be 41bgl on scene with ems arrival, given d50 and up to 256bgl. BP in field was 75/42. patient then given another 50 of d50 enroute due to c/s of 80. Patient given 500cc of ns.   history of afib, Hypertension, gastroesophageal reflux disease  no gross deformity of extremities, no asymmetry  axox2 with C Ye RN speaking mandarin and cantonese per ems patient is cantonese   will get iv, cbc, cmp, ekg, d5 ns 500cc, ct head, ua, urine culture, ce   Will follow up on labs, analgesia, imaging, reassess and disposition as clinically indicated.  *The above represents an initial assessment/impression. Please refer to my progress notes below for potential changes in patient clinical course* Justin Trevino MD, FACEP: In this physician's medical judgement based on clinical history and physical exam: patient with grunting during a nap and unable to arouse by family with incontinence of urine. Patient was reported to have given himself insulin prior to taking the nap at 12:30-1pm.   patient was reported to be 41bgl on scene with ems arrival, given d50 and up to 256bgl. BP in field was 75/42. patient then given another 50 of d50 enroute due to c/s of 80. Patient given 500cc of ns.   history of afib, Hypertension, gastroesophageal reflux disease  no gross deformity of extremities, no asymmetry  axox2 with C Ye RN speaking mandarin and cantonese per ems patient is cantonese   will get iv, cbc, cmp, ekg, d5 ns 500cc, ct head, ua, urine culture, ce   Will follow up on labs, analgesia, imaging, reassess and disposition as clinically indicated.  *The above represents an initial assessment/impression. Please refer to my progress notes below for potential changes in patient clinical course*  bgls improved  noted uti with esbl history and treated with prior culture sensitive antibiotics  Patient endorsed to Dr. Lynn at the time of admission. Based on patient's history and physical exam, as well as the results of today's workup, I feel that patient warrants admission to the hospital for further workup/evaluation and continued management. I discussed the findings of today's workup with the patient and addressed the patient's questions and concerns. The patient was agreeable with admission. Our team spoke with the inpatient receiving team who accepted the patient for admission and subsequently took over the patient's care at the time of admission. The receiving team will follow up on pending labs, analgesia, any clinical imaging results, ancillary findings, reassess, and disposition as clinically indicated. Details of patient and plan conveyed to receiving physician team and conveyed back for understanding. There were no questions at this time about the patient's status, disposition, and plan. Patient's care to be taken over by receiving physician team at this time, all decisions regarding the progression of care will be made at their discretion.

## 2022-06-06 NOTE — ED PROVIDER NOTE - CLINICAL SUMMARY MEDICAL DECISION MAKING FREE TEXT BOX
pt not on sulfonylureas presents with persistent hypoglycemia and hypotension that self-resolved by presentation to ED. Otherwise with normal vitals after being found obtunded by family in bed. adrenal insufficiency v hypothyroid v insulin overdose v sepsis. TBA.

## 2022-06-06 NOTE — ED ADULT NURSE REASSESSMENT NOTE - NS ED NURSE REASSESS COMMENT FT1
Axillary temperature of 96.4 obtained. Rectal temp obtained, see flow sheet, 96.5F. Pt turned and positioned and found to have wet diaper. Pt found to be soiled. Clean diaper and aide applied underneath perineum. Clean linen provided to patient. Pt boosted in stretcher. Pt placed in position of comfort. Pt educated on call bell system and provided call bell. Bed in lowest position, wheels locked, appropriate side rails raised. Pt denies needs at this time.

## 2022-06-06 NOTE — ED PROVIDER NOTE - OBJECTIVE STATEMENT
Pt with h/o afib on eliquis DM on janumet and insulin. Pt had taken his insulin dose prior to taking a nap; afterwards pt was found obtunded/unarousable by family. BIBEMS for BGL 30s, increased to 220s s/p D50, and pt became more alert. 20 minutes later BGL 50 on arrival to ED, given additional amp of D50 with response to 200s.

## 2022-06-06 NOTE — ED ADULT NURSE REASSESSMENT NOTE - NS ED NURSE REASSESS COMMENT FT1
Pt provided apple juice, okay to have as per ED Attending Maribel. Pt provided multiple blankets for underneath head as comfort.

## 2022-06-06 NOTE — ED ADULT NURSE NOTE - OBJECTIVE STATEMENT
82 YO male with PMH a fib on Eliquis, DM on insulin, HTN, HLD, and GERD, via EMS from home, presenting with complaints of low blood sugar. AS per EMS, pt went for nap at 12d0, and pt's family states pt took his insulin prior, and pt's family states that they heard pt grunting in his sleep and called 911. EMS reports initial FS of 41, and provided pt "an amp of dextrose," and FS increased to 256. EMS states that pt repeat FS dropped to 80, and pt received another amp of dextrose. EMS states they provided pt 500 cc of NS en route. Pt is Mandarin speaking, and responding to nurse that speaks Mandarin at bedside, as well as  utilized. Pt has pain to the right hip, and as per family, pt had a fall a few weeks ago. Pt denies chest pain, palpitations, shortness of breath, headache, visual disturbances, numbness/tingling, fever, chills, diaphoresis,  nausea, vomiting, constipation, diarrhea, or urinary symptoms.   Pt Axox4, gross neuro intact, PERRL 3 mm. Lungs clear throughout bilateral, respirations even, & non-labored. S1S2 heard, pulses strong and equal bilaterally. Abdomen soft, non-tender, non-distended. Pt has tenderness to the right hip. Skin warm, dry, and intact. Pt placed in position of comfort. Pt educated on call bell system and provided call bell. Bed in lowest position, wheels locked, appropriate side rails raised. Pt denies needs at this time.

## 2022-06-06 NOTE — ED ADULT NURSE NOTE - NS ED NURSE LEVEL OF CONSCIOUSNESS MENTAL STATUS
Possible cause for pt's symptoms, though unclear etiology.  Will refer to GI for work up; warning signs discussed   Awake/Alert/Cooperative

## 2022-06-06 NOTE — ED PROVIDER NOTE - PHYSICAL EXAMINATION
General: non-toxic, NAD  HEENT: NCAT, PERRL, no conjunctival pallor  Cardiac: tachycardic no murmurs, 2+ peripheral pulses  Resp: CTAB  Abdomen: soft, non-distended, bowel sounds present, no ttp, no rebound or guarding. no organomegaly  Extremities: ttp R hip. grimacing with passive internal rotation of the hip. per family fell a few weeks ago. no deformity, peripheral edema, calf tenderness, or leg length discrepancies  Skin: no rashes  Neuro: moving all extremities.   Psych: mood and affect appropriate General: non-toxic, lethargic   HEENT: NCAT, PERRL, no conjunctival pallor  Cardiac: tachycardic no murmurs, 2+ peripheral pulses  Resp: CTAB  Abdomen: soft, non-distended, bowel sounds present, no ttp, no rebound or guarding. no organomegaly  Extremities: ttp R hip. grimacing with passive internal rotation of the hip. per family fell a few weeks ago. no deformity, peripheral edema, calf tenderness, or leg length discrepancies  Skin: no rashes  Neuro: moving all extremities.   Psych: mood and affect appropriate

## 2022-06-06 NOTE — ED PROVIDER NOTE - CARE PLAN
Principal Discharge DX:	Hypoglycemia   1 Principal Discharge DX:	Hypoglycemia  Secondary Diagnosis:	Acute UTI

## 2022-06-06 NOTE — ED PROVIDER NOTE - PROGRESS NOTE DETAILS
more awake and alert. bordering on agitated. HR 130s afib. not fully engaging with , only saying that he is cold and asking for juice. will CTH. continue sepsis workup.

## 2022-06-07 DIAGNOSIS — I48.91 UNSPECIFIED ATRIAL FIBRILLATION: ICD-10-CM

## 2022-06-07 DIAGNOSIS — Z29.9 ENCOUNTER FOR PROPHYLACTIC MEASURES, UNSPECIFIED: ICD-10-CM

## 2022-06-07 DIAGNOSIS — I10 ESSENTIAL (PRIMARY) HYPERTENSION: ICD-10-CM

## 2022-06-07 DIAGNOSIS — A41.9 SEPSIS, UNSPECIFIED ORGANISM: ICD-10-CM

## 2022-06-07 DIAGNOSIS — E11.649 TYPE 2 DIABETES MELLITUS WITH HYPOGLYCEMIA WITHOUT COMA: ICD-10-CM

## 2022-06-07 DIAGNOSIS — E16.2 HYPOGLYCEMIA, UNSPECIFIED: ICD-10-CM

## 2022-06-07 DIAGNOSIS — N39.0 URINARY TRACT INFECTION, SITE NOT SPECIFIED: ICD-10-CM

## 2022-06-07 LAB
A1C WITH ESTIMATED AVERAGE GLUCOSE RESULT: 7.9 % — HIGH (ref 4–5.6)
ALBUMIN SERPL ELPH-MCNC: 3 G/DL — LOW (ref 3.3–5)
ALP SERPL-CCNC: 121 U/L — HIGH (ref 40–120)
ALT FLD-CCNC: 12 U/L — SIGNIFICANT CHANGE UP (ref 10–45)
ANION GAP SERPL CALC-SCNC: 9 MMOL/L — SIGNIFICANT CHANGE UP (ref 5–17)
AST SERPL-CCNC: 14 U/L — SIGNIFICANT CHANGE UP (ref 10–40)
BASOPHILS # BLD AUTO: 0.02 K/UL — SIGNIFICANT CHANGE UP (ref 0–0.2)
BASOPHILS NFR BLD AUTO: 0.3 % — SIGNIFICANT CHANGE UP (ref 0–2)
BILIRUB SERPL-MCNC: 0.5 MG/DL — SIGNIFICANT CHANGE UP (ref 0.2–1.2)
BUN SERPL-MCNC: 13 MG/DL — SIGNIFICANT CHANGE UP (ref 7–23)
CALCIUM SERPL-MCNC: 9.2 MG/DL — SIGNIFICANT CHANGE UP (ref 8.4–10.5)
CHLORIDE SERPL-SCNC: 100 MMOL/L — SIGNIFICANT CHANGE UP (ref 96–108)
CO2 SERPL-SCNC: 26 MMOL/L — SIGNIFICANT CHANGE UP (ref 22–31)
CREAT SERPL-MCNC: 1.05 MG/DL — SIGNIFICANT CHANGE UP (ref 0.5–1.3)
EGFR: 70 ML/MIN/1.73M2 — SIGNIFICANT CHANGE UP
EOSINOPHIL # BLD AUTO: 0.11 K/UL — SIGNIFICANT CHANGE UP (ref 0–0.5)
EOSINOPHIL NFR BLD AUTO: 1.4 % — SIGNIFICANT CHANGE UP (ref 0–6)
ESTIMATED AVERAGE GLUCOSE: 180 MG/DL — HIGH (ref 68–114)
GLUCOSE BLDC GLUCOMTR-MCNC: 108 MG/DL — HIGH (ref 70–99)
GLUCOSE BLDC GLUCOMTR-MCNC: 143 MG/DL — HIGH (ref 70–99)
GLUCOSE BLDC GLUCOMTR-MCNC: 178 MG/DL — HIGH (ref 70–99)
GLUCOSE BLDC GLUCOMTR-MCNC: 186 MG/DL — HIGH (ref 70–99)
GLUCOSE BLDC GLUCOMTR-MCNC: 303 MG/DL — HIGH (ref 70–99)
GLUCOSE BLDC GLUCOMTR-MCNC: 43 MG/DL — CRITICAL LOW (ref 70–99)
GLUCOSE BLDC GLUCOMTR-MCNC: 44 MG/DL — CRITICAL LOW (ref 70–99)
GLUCOSE BLDC GLUCOMTR-MCNC: 53 MG/DL — CRITICAL LOW (ref 70–99)
GLUCOSE BLDC GLUCOMTR-MCNC: 55 MG/DL — LOW (ref 70–99)
GLUCOSE BLDC GLUCOMTR-MCNC: 62 MG/DL — LOW (ref 70–99)
GLUCOSE BLDC GLUCOMTR-MCNC: 63 MG/DL — LOW (ref 70–99)
GLUCOSE BLDC GLUCOMTR-MCNC: 68 MG/DL — LOW (ref 70–99)
GLUCOSE BLDC GLUCOMTR-MCNC: 70 MG/DL — SIGNIFICANT CHANGE UP (ref 70–99)
GLUCOSE BLDC GLUCOMTR-MCNC: 92 MG/DL — SIGNIFICANT CHANGE UP (ref 70–99)
GLUCOSE SERPL-MCNC: 64 MG/DL — LOW (ref 70–99)
HCT VFR BLD CALC: 34.4 % — LOW (ref 39–50)
HGB BLD-MCNC: 11.2 G/DL — LOW (ref 13–17)
IMM GRANULOCYTES NFR BLD AUTO: 0.3 % — SIGNIFICANT CHANGE UP (ref 0–1.5)
LYMPHOCYTES # BLD AUTO: 0.54 K/UL — LOW (ref 1–3.3)
LYMPHOCYTES # BLD AUTO: 7 % — LOW (ref 13–44)
MCHC RBC-ENTMCNC: 28.1 PG — SIGNIFICANT CHANGE UP (ref 27–34)
MCHC RBC-ENTMCNC: 32.6 GM/DL — SIGNIFICANT CHANGE UP (ref 32–36)
MCV RBC AUTO: 86.2 FL — SIGNIFICANT CHANGE UP (ref 80–100)
MONOCYTES # BLD AUTO: 0.7 K/UL — SIGNIFICANT CHANGE UP (ref 0–0.9)
MONOCYTES NFR BLD AUTO: 9.1 % — SIGNIFICANT CHANGE UP (ref 2–14)
NEUTROPHILS # BLD AUTO: 6.29 K/UL — SIGNIFICANT CHANGE UP (ref 1.8–7.4)
NEUTROPHILS NFR BLD AUTO: 81.9 % — HIGH (ref 43–77)
NRBC # BLD: 0 /100 WBCS — SIGNIFICANT CHANGE UP (ref 0–0)
PLATELET # BLD AUTO: 194 K/UL — SIGNIFICANT CHANGE UP (ref 150–400)
POTASSIUM SERPL-MCNC: 4.1 MMOL/L — SIGNIFICANT CHANGE UP (ref 3.5–5.3)
POTASSIUM SERPL-SCNC: 4.1 MMOL/L — SIGNIFICANT CHANGE UP (ref 3.5–5.3)
PROT SERPL-MCNC: 6.5 G/DL — SIGNIFICANT CHANGE UP (ref 6–8.3)
RBC # BLD: 3.99 M/UL — LOW (ref 4.2–5.8)
RBC # FLD: 13.8 % — SIGNIFICANT CHANGE UP (ref 10.3–14.5)
SODIUM SERPL-SCNC: 135 MMOL/L — SIGNIFICANT CHANGE UP (ref 135–145)
TSH SERPL-MCNC: 1.3 UIU/ML — SIGNIFICANT CHANGE UP (ref 0.27–4.2)
WBC # BLD: 7.68 K/UL — SIGNIFICANT CHANGE UP (ref 3.8–10.5)
WBC # FLD AUTO: 7.68 K/UL — SIGNIFICANT CHANGE UP (ref 3.8–10.5)

## 2022-06-07 PROCEDURE — 12345: CPT | Mod: NC

## 2022-06-07 PROCEDURE — 99223 1ST HOSP IP/OBS HIGH 75: CPT

## 2022-06-07 RX ORDER — DEXTROSE 50 % IN WATER 50 %
25 SYRINGE (ML) INTRAVENOUS ONCE
Refills: 0 | Status: COMPLETED | OUTPATIENT
Start: 2022-06-07 | End: 2022-06-07

## 2022-06-07 RX ORDER — IPRATROPIUM/ALBUTEROL SULFATE 18-103MCG
3 AEROSOL WITH ADAPTER (GRAM) INHALATION EVERY 6 HOURS
Refills: 0 | Status: DISCONTINUED | OUTPATIENT
Start: 2022-06-07 | End: 2022-06-10

## 2022-06-07 RX ORDER — ASPIRIN/CALCIUM CARB/MAGNESIUM 324 MG
81 TABLET ORAL DAILY
Refills: 0 | Status: DISCONTINUED | OUTPATIENT
Start: 2022-06-07 | End: 2022-06-10

## 2022-06-07 RX ORDER — BUDESONIDE AND FORMOTEROL FUMARATE DIHYDRATE 160; 4.5 UG/1; UG/1
2 AEROSOL RESPIRATORY (INHALATION)
Refills: 0 | Status: DISCONTINUED | OUTPATIENT
Start: 2022-06-07 | End: 2022-06-10

## 2022-06-07 RX ORDER — MEROPENEM 1 G/30ML
1000 INJECTION INTRAVENOUS EVERY 8 HOURS
Refills: 0 | Status: DISCONTINUED | OUTPATIENT
Start: 2022-06-07 | End: 2022-06-08

## 2022-06-07 RX ORDER — LANOLIN ALCOHOL/MO/W.PET/CERES
3 CREAM (GRAM) TOPICAL AT BEDTIME
Refills: 0 | Status: DISCONTINUED | OUTPATIENT
Start: 2022-06-07 | End: 2022-06-10

## 2022-06-07 RX ORDER — SENNA PLUS 8.6 MG/1
2 TABLET ORAL AT BEDTIME
Refills: 0 | Status: DISCONTINUED | OUTPATIENT
Start: 2022-06-07 | End: 2022-06-10

## 2022-06-07 RX ORDER — PANTOPRAZOLE SODIUM 20 MG/1
40 TABLET, DELAYED RELEASE ORAL
Refills: 0 | Status: DISCONTINUED | OUTPATIENT
Start: 2022-06-07 | End: 2022-06-10

## 2022-06-07 RX ORDER — ONDANSETRON 8 MG/1
4 TABLET, FILM COATED ORAL EVERY 8 HOURS
Refills: 0 | Status: DISCONTINUED | OUTPATIENT
Start: 2022-06-07 | End: 2022-06-10

## 2022-06-07 RX ORDER — METOPROLOL TARTRATE 50 MG
25 TABLET ORAL DAILY
Refills: 0 | Status: DISCONTINUED | OUTPATIENT
Start: 2022-06-07 | End: 2022-06-10

## 2022-06-07 RX ORDER — DEXTROSE 50 % IN WATER 50 %
15 SYRINGE (ML) INTRAVENOUS ONCE
Refills: 0 | Status: DISCONTINUED | OUTPATIENT
Start: 2022-06-07 | End: 2022-06-10

## 2022-06-07 RX ORDER — SODIUM CHLORIDE 9 MG/ML
1000 INJECTION, SOLUTION INTRAVENOUS
Refills: 0 | Status: DISCONTINUED | OUTPATIENT
Start: 2022-06-07 | End: 2022-06-10

## 2022-06-07 RX ORDER — DEXTROSE 50 % IN WATER 50 %
12.5 SYRINGE (ML) INTRAVENOUS ONCE
Refills: 0 | Status: DISCONTINUED | OUTPATIENT
Start: 2022-06-07 | End: 2022-06-10

## 2022-06-07 RX ORDER — GLUCAGON INJECTION, SOLUTION 0.5 MG/.1ML
1 INJECTION, SOLUTION SUBCUTANEOUS ONCE
Refills: 0 | Status: DISCONTINUED | OUTPATIENT
Start: 2022-06-07 | End: 2022-06-10

## 2022-06-07 RX ORDER — SODIUM CHLORIDE 9 MG/ML
1000 INJECTION, SOLUTION INTRAVENOUS
Refills: 0 | Status: DISCONTINUED | OUTPATIENT
Start: 2022-06-07 | End: 2022-06-07

## 2022-06-07 RX ORDER — FINASTERIDE 5 MG/1
5 TABLET, FILM COATED ORAL DAILY
Refills: 0 | Status: DISCONTINUED | OUTPATIENT
Start: 2022-06-07 | End: 2022-06-10

## 2022-06-07 RX ORDER — TAMSULOSIN HYDROCHLORIDE 0.4 MG/1
0.8 CAPSULE ORAL AT BEDTIME
Refills: 0 | Status: DISCONTINUED | OUTPATIENT
Start: 2022-06-07 | End: 2022-06-10

## 2022-06-07 RX ORDER — ACETAMINOPHEN 500 MG
650 TABLET ORAL EVERY 6 HOURS
Refills: 0 | Status: DISCONTINUED | OUTPATIENT
Start: 2022-06-07 | End: 2022-06-10

## 2022-06-07 RX ORDER — HEPARIN SODIUM 5000 [USP'U]/ML
5000 INJECTION INTRAVENOUS; SUBCUTANEOUS EVERY 8 HOURS
Refills: 0 | Status: DISCONTINUED | OUTPATIENT
Start: 2022-06-07 | End: 2022-06-10

## 2022-06-07 RX ORDER — DEXTROSE 50 % IN WATER 50 %
25 SYRINGE (ML) INTRAVENOUS ONCE
Refills: 0 | Status: DISCONTINUED | OUTPATIENT
Start: 2022-06-07 | End: 2022-06-10

## 2022-06-07 RX ADMIN — Medication 25 GRAM(S): at 03:09

## 2022-06-07 RX ADMIN — MEROPENEM 100 MILLIGRAM(S): 1 INJECTION INTRAVENOUS at 16:02

## 2022-06-07 RX ADMIN — Medication 25 MILLIGRAM(S): at 23:41

## 2022-06-07 RX ADMIN — HEPARIN SODIUM 5000 UNIT(S): 5000 INJECTION INTRAVENOUS; SUBCUTANEOUS at 22:01

## 2022-06-07 RX ADMIN — HEPARIN SODIUM 5000 UNIT(S): 5000 INJECTION INTRAVENOUS; SUBCUTANEOUS at 16:03

## 2022-06-07 RX ADMIN — TAMSULOSIN HYDROCHLORIDE 0.8 MILLIGRAM(S): 0.4 CAPSULE ORAL at 22:02

## 2022-06-07 RX ADMIN — Medication 25 MILLILITER(S): at 14:29

## 2022-06-07 RX ADMIN — Medication 81 MILLIGRAM(S): at 14:32

## 2022-06-07 RX ADMIN — BUDESONIDE AND FORMOTEROL FUMARATE DIHYDRATE 2 PUFF(S): 160; 4.5 AEROSOL RESPIRATORY (INHALATION) at 16:59

## 2022-06-07 RX ADMIN — SENNA PLUS 2 TABLET(S): 8.6 TABLET ORAL at 22:02

## 2022-06-07 RX ADMIN — MEROPENEM 100 MILLIGRAM(S): 1 INJECTION INTRAVENOUS at 22:00

## 2022-06-07 RX ADMIN — Medication 25 MILLILITER(S): at 08:14

## 2022-06-07 RX ADMIN — PANTOPRAZOLE SODIUM 40 MILLIGRAM(S): 20 TABLET, DELAYED RELEASE ORAL at 08:42

## 2022-06-07 RX ADMIN — FINASTERIDE 5 MILLIGRAM(S): 5 TABLET, FILM COATED ORAL at 14:28

## 2022-06-07 RX ADMIN — SODIUM CHLORIDE 60 MILLILITER(S): 9 INJECTION, SOLUTION INTRAVENOUS at 09:27

## 2022-06-07 NOTE — CONSULT NOTE ADULT - SUBJECTIVE AND OBJECTIVE BOX
HPI:  Patient is an 82 yo cantonese speaking M with PMHx of HTN/HLD, DM2, BPH, Afib who was brought to the ED for unresponsiveness. Communicated via pacific  ID #[id number]. Patient is an extremely limited historian, therefore, history is primarily obtained by chart review. Patient had taken his insulin prior to laying down for a nap. He was subsequently found by his family unresponsive. EMS was called and patient was found to be hypoglycemic to the 40s and hypotensive to 75/42. He was given an amp of D50 with improvement in his glucose to the 200s and 500 ccs of NS. On arrival to the ED, patient was found to be hypoglycemic again to the 50s. As per his DIL, Yulissa, she believes he overdosed on his insulin, however, she does not know how much he typically takes or how much he administered today. When asked, the patient says he takes "151 insulin." He endorses dysuria which started "recently," however he is unable to further elaborate.     In the ED, patient tachypneic to 22, tachycardic to 104, with soft BP of 94/70. Labs significant for Mag of 1.5 and Phos of 2.3. CXR, Pelvic xray and R hip xray all unremarkable. UA grossly positive. Patient was given a dose of Mag, Phos, Ceftriaxone and Jim. Admitted to medicine for further management.     Unable to obtain family history as patient is a limited historian.  (07 Jun 2022 05:01)    Endocrine history obtained with help from daughter-in-law Yulissa.  Patient has history of diabetes, A1C 7.9%, was on insulin at home, she is unsure of doses though reports that patient would self-inject before meals and would NOT check his FS, had recent hypoglycemic episodes. Patient follows up with PCP for diabetes management.  Endo was consulted for glycemic control.    PAST MEDICAL & SURGICAL HISTORY:  DM (diabetes mellitus)      HTN (hypertension)      Hypercholesterolemia      CAD (coronary artery disease)      HTN (hypertension)      DM (diabetes mellitus)      S/P TURP      S/P gastrectomy          FAMILY HISTORY:  No pertinent family history in first degree relatives        Social History:    Outpatient Medications:    MEDICATIONS  (STANDING):  aspirin enteric coated 81 milliGRAM(s) Oral daily  budesonide 160 MICROgram(s)/formoterol 4.5 MICROgram(s) Inhaler 2 Puff(s) Inhalation two times a day  dextrose 5% + sodium chloride 0.9%. 1000 milliLiter(s) (500 mL/Hr) IV Continuous <Continuous>  dextrose 5% + sodium chloride 0.9%. 1000 milliLiter(s) (60 mL/Hr) IV Continuous <Continuous>  dextrose 5%. 1000 milliLiter(s) (50 mL/Hr) IV Continuous <Continuous>  dextrose 5%. 1000 milliLiter(s) (100 mL/Hr) IV Continuous <Continuous>  dextrose 50% Injectable 25 Gram(s) IV Push once  dextrose 50% Injectable 12.5 Gram(s) IV Push once  dextrose 50% Injectable 25 Gram(s) IV Push once  finasteride 5 milliGRAM(s) Oral daily  glucagon  Injectable 1 milliGRAM(s) IntraMuscular once  heparin   Injectable 5000 Unit(s) SubCutaneous every 8 hours  meropenem  IVPB 1000 milliGRAM(s) IV Intermittent every 8 hours  metoprolol succinate ER 25 milliGRAM(s) Oral daily  pantoprazole    Tablet 40 milliGRAM(s) Oral before breakfast  senna 2 Tablet(s) Oral at bedtime  tamsulosin 0.8 milliGRAM(s) Oral at bedtime    MEDICATIONS  (PRN):  acetaminophen     Tablet .. 650 milliGRAM(s) Oral every 6 hours PRN Temp greater or equal to 38C (100.4F), Mild Pain (1 - 3)  albuterol/ipratropium for Nebulization 3 milliLiter(s) Nebulizer every 6 hours PRN Shortness of Breath and/or Wheezing  aluminum hydroxide/magnesium hydroxide/simethicone Suspension 30 milliLiter(s) Oral every 4 hours PRN Dyspepsia  dextrose Oral Gel 15 Gram(s) Oral once PRN Blood Glucose LESS THAN 70 milliGRAM(s)/deciliter  melatonin 3 milliGRAM(s) Oral at bedtime PRN Insomnia  ondansetron Injectable 4 milliGRAM(s) IV Push every 8 hours PRN Nausea and/or Vomiting      Allergies    No Known Allergies    Intolerances      Review of Systems:  Constitutional: No fever, no chills  Eyes: No blurry vision  Neuro: No tremors  HEENT: No pain, no neck swelling  Cardiovascular: No chest pain, no palpitations  Respiratory: Has SOB, no cough  GI: No nausea, vomiting, abdominal pain  : No dysuria  Skin: no rash  MSK: Has leg swelling.  Psych: no depression  Endocrine: no polyuria, polydipsia    ALL OTHER SYSTEMS REVIEWED AND NEGATIVE    UNABLE TO OBTAIN    PHYSICAL EXAM:  VITALS: T(C): 36.4 (06-07-22 @ 08:16)  T(F): 97.5 (06-07-22 @ 08:16), Max: 98.2 (06-06-22 @ 20:40)  HR: 77 (06-07-22 @ 12:01) (71 - 110)  BP: 121/78 (06-07-22 @ 12:01) (94/70 - 121/78)  RR:  (18 - 22)  SpO2:  (94% - 100%)  Wt(kg): --  GENERAL: NAD, well-groomed, well-developed  EYES: No proptosis, no lid lag  HEENT:  Atraumatic, Normocephalic  THYROID: Normal size, no palpable nodules  RESPIRATORY: Clear to auscultation bilaterally; No rales, rhonchi, wheezing  CARDIOVASCULAR: Si S2, No murmurs;  GI: Soft, non distended, normal bowel sounds  SKIN: Dry, intact, No rashes or lesions  MUSCULOSKELETAL: Has BL lower extremity edema.  NEURO:  no tremor, sensation decreased in feet BL,    POCT Blood Glucose.: 143 mg/dL (06-07-22 @ 14:56)  POCT Blood Glucose.: 43 mg/dL (06-07-22 @ 14:03)  POCT Blood Glucose.: 44 mg/dL (06-07-22 @ 13:59)  POCT Blood Glucose.: 70 mg/dL (06-07-22 @ 09:45)  POCT Blood Glucose.: 68 mg/dL (06-07-22 @ 09:43)  POCT Blood Glucose.: 53 mg/dL (06-07-22 @ 08:01)  POCT Blood Glucose.: 55 mg/dL (06-07-22 @ 08:01)  POCT Blood Glucose.: 108 mg/dL (06-07-22 @ 04:59)  POCT Blood Glucose.: 178 mg/dL (06-07-22 @ 03:35)  POCT Blood Glucose.: 62 mg/dL (06-07-22 @ 02:02)  POCT Blood Glucose.: 63 mg/dL (06-07-22 @ 02:00)  POCT Blood Glucose.: 92 mg/dL (06-07-22 @ 00:02)  POCT Blood Glucose.: 84 mg/dL (06-06-22 @ 22:09)  POCT Blood Glucose.: 98 mg/dL (06-06-22 @ 20:53)  POCT Blood Glucose.: 128 mg/dL (06-06-22 @ 19:53)  POCT Blood Glucose.: 149 mg/dL (06-06-22 @ 19:11)  POCT Blood Glucose.: 159 mg/dL (06-06-22 @ 18:27)  POCT Blood Glucose.: 159 mg/dL (06-06-22 @ 18:07)  POCT Blood Glucose.: 134 mg/dL (06-06-22 @ 17:45)  POCT Blood Glucose.: 119 mg/dL (06-06-22 @ 17:31)  POCT Blood Glucose.: 140 mg/dL (06-06-22 @ 17:09)  POCT Blood Glucose.: 176 mg/dL (06-06-22 @ 16:42)  POCT Blood Glucose.: 216 mg/dL (06-06-22 @ 16:32)                            11.2   7.68  )-----------( 194      ( 07 Jun 2022 07:39 )             34.4       06-07    135  |  100  |  13  ----------------------------<  64<L>  4.1   |  26  |  1.05    eGFR: 70    Ca    9.2      06-07  Mg     1.5     06-06  Phos  2.3     06-06    TPro  6.5  /  Alb  3.0<L>  /  TBili  0.5  /  DBili  x   /  AST  14  /  ALT  12  /  AlkPhos  121<H>  06-07      Thyroid Function Tests:  06-06 @ 17:10 TSH 1.30 FreeT4 -- T3 -- Anti TPO -- Anti Thyroglobulin Ab -- TSI --              Radiology:                HPI:  Patient is an 80 yo cantonese speaking M with PMHx of HTN/HLD, DM2, BPH, Afib who was brought to the ED for unresponsiveness. Communicated via pacific  ID #[id number]. Patient is an extremely limited historian, therefore, history is primarily obtained by chart review. Patient had taken his insulin prior to laying down for a nap. He was subsequently found by his family unresponsive. EMS was called and patient was found to be hypoglycemic to the 40s and hypotensive to 75/42. He was given an amp of D50 with improvement in his glucose to the 200s and 500 ccs of NS. On arrival to the ED, patient was found to be hypoglycemic again to the 50s. As per his DIL, Yulissa, she believes he overdosed on his insulin, however, she does not know how much he typically takes or how much he administered today. When asked, the patient says he takes "151 insulin." He endorses dysuria which started "recently," however he is unable to further elaborate.     In the ED, patient tachypneic to 22, tachycardic to 104, with soft BP of 94/70. Labs significant for Mag of 1.5 and Phos of 2.3. CXR, Pelvic xray and R hip xray all unremarkable. UA grossly positive. Patient was given a dose of Mag, Phos, Ceftriaxone and Jim. Admitted to medicine for further management.     Unable to obtain family history as patient is a limited historian.  (07 Jun 2022 05:01)    Endocrine history obtained with help from daughter-in-law Yulissa.  Patient has history of diabetes, A1C 7.9%, was on insulin at home, she is unsure of doses though reports that patient would self-inject before meals and would NOT check his FS, had recent hypoglycemic episodes. Patient follows up with PCP for diabetes management.  Endo was consulted for glycemic control.    PAST MEDICAL & SURGICAL HISTORY:  DM (diabetes mellitus)      HTN (hypertension)      Hypercholesterolemia      CAD (coronary artery disease)      HTN (hypertension)      DM (diabetes mellitus)      S/P TURP      S/P gastrectomy          FAMILY HISTORY:  No pertinent family history in first degree relatives        Social History:    Outpatient Medications:    MEDICATIONS  (STANDING):  aspirin enteric coated 81 milliGRAM(s) Oral daily  budesonide 160 MICROgram(s)/formoterol 4.5 MICROgram(s) Inhaler 2 Puff(s) Inhalation two times a day  dextrose 5% + sodium chloride 0.9%. 1000 milliLiter(s) (500 mL/Hr) IV Continuous <Continuous>  dextrose 5% + sodium chloride 0.9%. 1000 milliLiter(s) (60 mL/Hr) IV Continuous <Continuous>  dextrose 5%. 1000 milliLiter(s) (50 mL/Hr) IV Continuous <Continuous>  dextrose 5%. 1000 milliLiter(s) (100 mL/Hr) IV Continuous <Continuous>  dextrose 50% Injectable 25 Gram(s) IV Push once  dextrose 50% Injectable 12.5 Gram(s) IV Push once  dextrose 50% Injectable 25 Gram(s) IV Push once  finasteride 5 milliGRAM(s) Oral daily  glucagon  Injectable 1 milliGRAM(s) IntraMuscular once  heparin   Injectable 5000 Unit(s) SubCutaneous every 8 hours  meropenem  IVPB 1000 milliGRAM(s) IV Intermittent every 8 hours  metoprolol succinate ER 25 milliGRAM(s) Oral daily  pantoprazole    Tablet 40 milliGRAM(s) Oral before breakfast  senna 2 Tablet(s) Oral at bedtime  tamsulosin 0.8 milliGRAM(s) Oral at bedtime    MEDICATIONS  (PRN):  acetaminophen     Tablet .. 650 milliGRAM(s) Oral every 6 hours PRN Temp greater or equal to 38C (100.4F), Mild Pain (1 - 3)  albuterol/ipratropium for Nebulization 3 milliLiter(s) Nebulizer every 6 hours PRN Shortness of Breath and/or Wheezing  aluminum hydroxide/magnesium hydroxide/simethicone Suspension 30 milliLiter(s) Oral every 4 hours PRN Dyspepsia  dextrose Oral Gel 15 Gram(s) Oral once PRN Blood Glucose LESS THAN 70 milliGRAM(s)/deciliter  melatonin 3 milliGRAM(s) Oral at bedtime PRN Insomnia  ondansetron Injectable 4 milliGRAM(s) IV Push every 8 hours PRN Nausea and/or Vomiting      Allergies    No Known Allergies    Intolerances      Review of Systems:  Constitutional: No fever, no chills  Eyes: No blurry vision  Neuro: No tremors  HEENT: No pain, no neck swelling  Cardiovascular: No chest pain, no palpitations  Respiratory: Has SOB, no cough  GI: No nausea, vomiting, abdominal pain  : No dysuria  Skin: no rash  MSK: Has leg swelling.  Psych: no depression  Endocrine: no polyuria, polydipsia    ALL OTHER SYSTEMS REVIEWED AND NEGATIVE    UNABLE TO OBTAIN    PHYSICAL EXAM:  VITALS: T(C): 36.4 (06-07-22 @ 08:16)  T(F): 97.5 (06-07-22 @ 08:16), Max: 98.2 (06-06-22 @ 20:40)  HR: 77 (06-07-22 @ 12:01) (71 - 110)  BP: 121/78 (06-07-22 @ 12:01) (94/70 - 121/78)  RR:  (18 - 22)  SpO2:  (94% - 100%)  Wt(kg): --  GENERAL: NAD, well-groomed, well-developed  EYES: No proptosis, no lid lag  HEENT:  Atraumatic, Normocephalic  THYROID: Normal size, no palpable nodules  RESPIRATORY: Clear to auscultation bilaterally; No rales, rhonchi, wheezing  CARDIOVASCULAR: Si S2, No murmurs;  GI: Soft, non distended, normal bowel sounds  SKIN: Dry, intact, No rashes or lesions  MUSCULOSKELETAL: Has BL lower extremity edema.  NEURO:  no tremor, sensation decreased in feet BL,    POCT Blood Glucose.: 143 mg/dL (06-07-22 @ 14:56)  POCT Blood Glucose.: 43 mg/dL (06-07-22 @ 14:03)  POCT Blood Glucose.: 44 mg/dL (06-07-22 @ 13:59)  POCT Blood Glucose.: 70 mg/dL (06-07-22 @ 09:45)  POCT Blood Glucose.: 68 mg/dL (06-07-22 @ 09:43)  POCT Blood Glucose.: 53 mg/dL (06-07-22 @ 08:01)  POCT Blood Glucose.: 55 mg/dL (06-07-22 @ 08:01)  POCT Blood Glucose.: 108 mg/dL (06-07-22 @ 04:59)  POCT Blood Glucose.: 178 mg/dL (06-07-22 @ 03:35)  POCT Blood Glucose.: 62 mg/dL (06-07-22 @ 02:02)  POCT Blood Glucose.: 63 mg/dL (06-07-22 @ 02:00)  POCT Blood Glucose.: 92 mg/dL (06-07-22 @ 00:02)  POCT Blood Glucose.: 84 mg/dL (06-06-22 @ 22:09)  POCT Blood Glucose.: 98 mg/dL (06-06-22 @ 20:53)  POCT Blood Glucose.: 128 mg/dL (06-06-22 @ 19:53)  POCT Blood Glucose.: 149 mg/dL (06-06-22 @ 19:11)  POCT Blood Glucose.: 159 mg/dL (06-06-22 @ 18:27)  POCT Blood Glucose.: 159 mg/dL (06-06-22 @ 18:07)  POCT Blood Glucose.: 134 mg/dL (06-06-22 @ 17:45)  POCT Blood Glucose.: 119 mg/dL (06-06-22 @ 17:31)  POCT Blood Glucose.: 140 mg/dL (06-06-22 @ 17:09)  POCT Blood Glucose.: 176 mg/dL (06-06-22 @ 16:42)  POCT Blood Glucose.: 216 mg/dL (06-06-22 @ 16:32)                            11.2   7.68  )-----------( 194      ( 07 Jun 2022 07:39 )             34.4       06-07    135  |  100  |  13  ----------------------------<  64<L>  4.1   |  26  |  1.05    eGFR: 70    Ca    9.2      06-07  Mg     1.5     06-06  Phos  2.3     06-06    TPro  6.5  /  Alb  3.0<L>  /  TBili  0.5  /  DBili  x   /  AST  14  /  ALT  12  /  AlkPhos  121<H>  06-07      Thyroid Function Tests:  06-06 @ 17:10 TSH 1.30 FreeT4 -- T3 -- Anti TPO -- Anti Thyroglobulin Ab -- TSI --              Radiology:

## 2022-06-07 NOTE — ED ADULT NURSE REASSESSMENT NOTE - NS ED NURSE REASSESS COMMENT FT1
repeat finger stick done - inpatient team contacted by resident Dr. Flores & patient medicated per orders with half an amp of dextrose as indicated.   Patient endorsing pain to site of IV in RUE - not flushing and no blood return. PIV removed.   PIV in L AC remains, flushing well without complaints of pain & +blood return.

## 2022-06-07 NOTE — H&P ADULT - HISTORY OF PRESENT ILLNESS
Patient is an 82 yo M with PMHx of HTN/HLD, DM2, BPH, Afib who was brought to the ED for unresponsiveness. Patient had taken his insulin prior to laying down for a nap. He was subsequently found by his family unresponsive. EMS was called and patient was found to be hypoglycemic to the 40s and hypotensive to 75/42. He was given an amp of D50 with improvement in his glucose to the 200s and 500 ccs of NS. On arrival to the ED, patient was found to be hypoglycemic again to the 50s.     In the ED, patient tachypneic to 22, tachycardic to 104, with soft BP of 94/70. Labs significant for Mag of 1.5 and Phos of 2.3. CXR, Pelvic xray and R hip xray all unremarkable. UA grossly positive. Patient was given a dose of Mag, Phos, Ceftriaxone and Jim. Admitted to medicine for further management  Patient is an 80 yo cantonese speaking M with PMHx of HTN/HLD, DM2, BPH, Afib who was brought to the ED for unresponsiveness. Communicated via pacific  ID #[id number]. Patient is an extremely limited historian, therefore, history is primarily obtained by chart review. Patient had taken his insulin prior to laying down for a nap. He was subsequently found by his family unresponsive. EMS was called and patient was found to be hypoglycemic to the 40s and hypotensive to 75/42. He was given an amp of D50 with improvement in his glucose to the 200s and 500 ccs of NS. On arrival to the ED, patient was found to be hypoglycemic again to the 50s. As per his DIL, Yulissa, she believes he overdosed on his insulin, however, she does not know how much he typically takes or how much he administered today. When asked, the patient says he takes "151 insulin." He endorses dysuria which started "recently," however he is unable to further elaborate.     In the ED, patient tachypneic to 22, tachycardic to 104, with soft BP of 94/70. Labs significant for Mag of 1.5 and Phos of 2.3. CXR, Pelvic xray and R hip xray all unremarkable. UA grossly positive. Patient was given a dose of Mag, Phos, Ceftriaxone and Jim. Admitted to medicine for further management.     Unable to obtain family history as patient is a limited historian.

## 2022-06-07 NOTE — PHYSICAL THERAPY INITIAL EVALUATION ADULT - STRENGTHENING, PT EVAL
Goal: Pt will increase strength >half a grade  t/o extremities to improve safety of transfers and ambulation in 2 weeks. r

## 2022-06-07 NOTE — ED ADULT NURSE REASSESSMENT NOTE - NS ED NURSE REASSESS COMMENT FT1
Pt received from [facility], pt pending bed assignment, pt given amp D50 fingerstick reassessment 178. A&Ox3, NARVAEZ, lungs clear, distal pulses intact, abdomen soft, skin intact. Side rails up for safety, call bell and personal items within reach, instructed to call for assistance, verbalizes understanding. Will continue to monitor.

## 2022-06-07 NOTE — H&P ADULT - PROBLEM SELECTOR PLAN 4
- patient with documented history of Afib  - does not appear to be on AV palak blockers or AC at home  - [id number] 4  - currently in sinus rhythm - patient with documented history of Afib  - does not appear to be on AC at home  - [id number] 4  - currently in sinus rhythm  - hold home Metoprolol in the setting of sepsis and recent hypotension  - will need further collateral information re his lack of AC in the AM

## 2022-06-07 NOTE — H&P ADULT - PROBLEM SELECTOR PLAN 1
- meets sepsis criteria with tachycardia and tachypnea  - lucia 2/2 UTI  - f/u blood and urine cultures  - suspect hypotensive episode in the field was 2/2 hypoglycemia  - no further evidence of hypotension or hypoperfusion  - monitor vitals q4  - remainder of plan as below

## 2022-06-07 NOTE — PHYSICAL THERAPY INITIAL EVALUATION ADULT - ADDITIONAL COMMENTS
Pt lives with his son in a private house with 5 steps to enter and resides on main floor. Pt amb with RW.

## 2022-06-07 NOTE — CONSULT NOTE ADULT - NS ATTEND AMEND GEN_ALL_CORE FT
Patient seen and examined today at bedside. Chart, labs, vitals, radiology reviewed. Above H&P reviewed and edited where appropriate. Agree with history and physical exam. Agree with assessment and plan. I reviewed the overnight course of events and discussed the care with the patient and their family  35 minutes spent on total encounter of which more than fifty percent of the encounter was spent counseling and/or coordinating care by the attending physician.

## 2022-06-07 NOTE — PHYSICAL THERAPY INITIAL EVALUATION ADULT - PERTINENT HX OF CURRENT PROBLEM, REHAB EVAL
Pt is an 80 yo cantonese speaking M with PMHx of HTN/HLD, DM2, BPH, Afib who was brought to the ED for unresponsiveness 2/2 hypoglycemia which is likely 2/2 UTI vs insulin overdose. Pt admitted for sepsis 2/2 meets criteria with tachycardia and tachypnea; pt also having persistent hypoglycemia. Pt is on ASA, not a/c for AFIB 2/2 h/o falls. Pt is an 80 yo Cantonese speaking M with PMHx of HTN/HLD, DM2, BPH, Afib who was brought to the ED for unresponsiveness 2/2 hypoglycemia which is likely 2/2 UTI vs insulin overdose. Pt admitted for sepsis 2/2 meets criteria with tachycardia and tachypnea; pt also having persistent hypoglycemia. Pt is on ASA, not a/c for AFIB 2/2 h/o falls.

## 2022-06-07 NOTE — PHYSICAL THERAPY INITIAL EVALUATION ADULT - REFERRING PHYSICIAN, REHAB EVAL
PT Orders: PT Evaluate and treat PT Orders: PT Evaluate and treat        *Cantonese  used via IPad:  Di [id number]

## 2022-06-07 NOTE — H&P ADULT - PROBLEM SELECTOR PLAN 3
- patient with recurrent episodes of hypoglycemia   - unclear etiology but ddx includes sepsis vs insulin overdose  - will hold all insulin at this time  - check A1C   - monitor FS q4 for now - patient with recurrent episodes of hypoglycemia   - unclear etiology but ddx includes sepsis vs insulin overdose  - will hold all insulin at this time  - check A1C   - monitor FS q4 for now  - PO intake encouraged   - if patient continues to be hypoglycemic, will start D10 gtt

## 2022-06-07 NOTE — CONSULT NOTE ADULT - ASSESSMENT
Spoke with daughter-in-law Yulissa.  Assessment  DMT2: 81y Male with DM T2 with hypoglycemia, A1C 7.9%, was on insulin at home, admitted s/p severe hypoglycemia and unresponsiveness, no insulin orders currently, blood sugars fluctuating in low-normal range, had new hypoglycemic episodes, on D5%, appears comfortable in NAD.   UTI: on IV ABx, stable, monitored.  HTN: Controlled,  on antihypertensive medications.          Melba Reyes MD  Cell: [phone]  Office: [phone]     Spoke with daughter-in-law Yulissa.  Assessment  DMT2: 81y Male with DM T2 with hypoglycemia, A1C 7.9%, was on insulin at home, admitted s/p severe hypoglycemia and unresponsiveness, no insulin orders currently, blood sugars fluctuating in low-normal range, had new hypoglycemic  episodes, on D5%, appears comfortable in NAD.   UTI: on IV ABx, stable, monitored.  HTN: Controlled,  on antihypertensive medications.          Melba Reyes MD  Cell: [phone]  Office: [phone]

## 2022-06-07 NOTE — H&P ADULT - PROBLEM SELECTOR PLAN 2
- patient with reported dysuria and positive UA on admission   - s/p Jim in the ED  - given history of ESBL ecoli UTI and bacteremia, will continue with Jim   - f/u blood and urine cultures

## 2022-06-07 NOTE — H&P ADULT - ASSESSMENT
Patient is an 80 yo cantonese speaking M with PMHx of HTN/HLD, DM2, BPH, Afib who was brought to the ED for unresponsiveness 2/2 hypoglycemia which is likely 2/2 UTI vs insulin overdose

## 2022-06-07 NOTE — H&P ADULT - NSHPLABSRESULTS_GEN_ALL_CORE
Vital Signs Last 24 Hrs  T(C): 36.8 (06 Jun 2022 20:40), Max: 36.8 (06 Jun 2022 20:40)  T(F): 98.2 (06 Jun 2022 20:40), Max: 98.2 (06 Jun 2022 20:40)  HR: 85 (07 Jun 2022 03:13) (84 - 110)  BP: 110/73 (07 Jun 2022 03:13) (94/70 - 121/73)  BP(mean): 96 (06 Jun 2022 20:40) (79 - 96)  RR: 18 (07 Jun 2022 03:13) (18 - 22)  SpO2: 98% (07 Jun 2022 03:13) (95% - 100%)      Personally reviewed available labs, imaging and ekg  [1]  CBC Full  -  ( 06 Jun 2022 17:10 )  WBC Count : 9.42 K/uL  RBC Count : 3.95 M/uL  Hemoglobin : 11.0 g/dL  Hematocrit : 34.6 %  Platelet Count - Automated : 177 K/uL  Mean Cell Volume : 87.6 fl  Mean Cell Hemoglobin : 27.8 pg  Mean Cell Hemoglobin Concentration : 31.8 gm/dL  Auto Neutrophil # : 8.53 K/uL  Auto Lymphocyte # : 0.24 K/uL  Auto Monocyte # : 0.65 K/uL  Auto Eosinophil # : 0.00 K/uL  Auto Basophil # : 0.00 K/uL  Auto Neutrophil % : 89.6 %  Auto Lymphocyte % : 2.6 %  Auto Monocyte % : 6.9 %  Auto Eosinophil % : 0.0 %  Auto Basophil % : 0.0 %    06-06    131<L>  |  95<L>  |  16  ----------------------------<  181<H>  3.6   |  23  |  1.12    Ca    9.0      06 Jun 2022 17:10  Phos  2.3     06-06  Mg     1.5     06-06    TPro  6.3  /  Alb  2.7<L>  /  TBili  0.5  /  DBili  x   /  AST  22  /  ALT  14  /  AlkPhos  127<H>  06-06    PT/INR - ( 06 Jun 2022 17:10 )   PT: 12.7 sec;   INR: 1.10 ratio         PTT - ( 06 Jun 2022 17:10 )  PTT:33.6 sec  Imaging:  [ 2] I independently reviewed CXR and no lobar opacification is appreciated  [ 2] I independently reviewed CT head and no territorial hemorrhage is appreciated    EKG:  [2] I independently reviewed EKG/cardiac tracing and NSR appreciated    Review of old records:  [2] I personally reviewed previous records Vital Signs Last 24 Hrs  T(C): 36.8 (06 Jun 2022 20:40), Max: 36.8 (06 Jun 2022 20:40)  T(F): 98.2 (06 Jun 2022 20:40), Max: 98.2 (06 Jun 2022 20:40)  HR: 85 (07 Jun 2022 03:13) (84 - 110)  BP: 110/73 (07 Jun 2022 03:13) (94/70 - 121/73)  BP(mean): 96 (06 Jun 2022 20:40) (79 - 96)  RR: 18 (07 Jun 2022 03:13) (18 - 22)  SpO2: 98% (07 Jun 2022 03:13) (95% - 100%)      Personally reviewed available labs, imaging and ekg  [1]  CBC Full  -  ( 06 Jun 2022 17:10 )  WBC Count : 9.42 K/uL  RBC Count : 3.95 M/uL  Hemoglobin : 11.0 g/dL  Hematocrit : 34.6 %  Platelet Count - Automated : 177 K/uL  Mean Cell Volume : 87.6 fl  Mean Cell Hemoglobin : 27.8 pg  Mean Cell Hemoglobin Concentration : 31.8 gm/dL  Auto Neutrophil # : 8.53 K/uL  Auto Lymphocyte # : 0.24 K/uL  Auto Monocyte # : 0.65 K/uL  Auto Eosinophil # : 0.00 K/uL  Auto Basophil # : 0.00 K/uL  Auto Neutrophil % : 89.6 %  Auto Lymphocyte % : 2.6 %  Auto Monocyte % : 6.9 %  Auto Eosinophil % : 0.0 %  Auto Basophil % : 0.0 %    06-06    131<L>  |  95<L>  |  16  ----------------------------<  181<H>  3.6   |  23  |  1.12    Ca    9.0      06 Jun 2022 17:10  Phos  2.3     06-06  Mg     1.5     06-06    TPro  6.3  /  Alb  2.7<L>  /  TBili  0.5  /  DBili  x   /  AST  22  /  ALT  14  /  AlkPhos  127<H>  06-06    PT/INR - ( 06 Jun 2022 17:10 )   PT: 12.7 sec;   INR: 1.10 ratio         PTT - ( 06 Jun 2022 17:10 )  PTT:33.6 sec  Imaging:  [ 2] I independently reviewed CXR and no lobar opacification is appreciated  [ 2] I independently reviewed CT head and no territorial hemorrhage is appreciated    EKG:  [2] I independently reviewed EKG/cardiac tracing and NSR with PVCs is appreciated    Review of old records:  [2] I personally reviewed previous records

## 2022-06-07 NOTE — PROGRESS NOTE ADULT - SUBJECTIVE AND OBJECTIVE BOX
KAITLIN CALDERON  81y Male  MRN:[id number]    Patient is a 81y old  Male who presents with a chief complaint of AMS    HPI:  Patient is an 82 yo cantonese speaking M with PMHx of HTN/HLD, DM2, BPH,  who was brought to the ED for unresponsiveness. Communicated via pacific  ID #[id number]. Patient is an extremely limited historian, therefore, history is primarily obtained by chart review. Patient had taken his insulin prior to laying down for a nap. He was subsequently found by his family unresponsive. EMS was called and patient was found to be hypoglycemic to the 40s and hypotensive to 75/42. He was given an amp of D50 with improvement in his glucose to the 200s and 500 ccs of NS. On arrival to the ED, patient was found to be hypoglycemic again to the 50s. As per his DIL, Yulissa, she believes he overdosed on his insulin, however, she does not know how much he typically takes or how much he administered today. When asked, the patient says he takes "151 insulin." He endorses dysuria which started "recently," however he is unable to further elaborate.     In the ED, patient tachypneic to 22, tachycardic to 104, with soft BP of 94/70. Labs significant for Mag of 1.5 and Phos of 2.3. CXR, Pelvic xray and R hip xray all unremarkable. UA grossly positive. Patient was given a dose of Mag, Phos, Ceftriaxone and Jim. Admitted to medicine for further management.     Unable to obtain family history as patient is a limited historian.  (2022 05:01)      Patient seen and evaluated at bedside. No acute events overnight except as noted.    Interval HPI: no acute events o/n    PAST MEDICAL & SURGICAL HISTORY:  DM (diabetes mellitus)      HTN (hypertension)      Hypercholesterolemia      CAD (coronary artery disease)      HTN (hypertension)      DM (diabetes mellitus)      S/P TURP      S/P gastrectomy          REVIEW OF SYSTEMS:  as per hpi     VITALS:  Vital Signs Last 24 Hrs  T(C): 36.4 (2022 08:16), Max: 36.8 (2022 20:40)  T(F): 97.5 (2022 08:16), Max: 98.2 (2022 20:40)  HR: 77 (2022 12:01) (71 - 110)  BP: 121/78 (2022 12:01) (94/70 - 121/78)  BP(mean): 96 (2022 20:40) (79 - 96)  RR: 18 (2022 12:01) (18 - 22)  SpO2: 98% (2022 12:01) (94% - 100%)  CAPILLARY BLOOD GLUCOSE      POCT Blood Glucose.: 70 mg/dL (2022 09:45)  POCT Blood Glucose.: 68 mg/dL (2022 09:43)  POCT Blood Glucose.: 53 mg/dL (2022 08:01)  POCT Blood Glucose.: 55 mg/dL (2022 08:01)  POCT Blood Glucose.: 108 mg/dL (2022 04:59)  POCT Blood Glucose.: 178 mg/dL (2022 03:35)  POCT Blood Glucose.: 62 mg/dL (2022 02:02)  POCT Blood Glucose.: 63 mg/dL (2022 02:00)  POCT Blood Glucose.: 92 mg/dL (2022 00:02)  POCT Blood Glucose.: 84 mg/dL (2022 22:09)  POCT Blood Glucose.: 98 mg/dL (2022 20:53)  POCT Blood Glucose.: 128 mg/dL (2022 19:53)  POCT Blood Glucose.: 149 mg/dL (2022 19:11)  POCT Blood Glucose.: 159 mg/dL (2022 18:27)  POCT Blood Glucose.: 159 mg/dL (2022 18:07)  POCT Blood Glucose.: 134 mg/dL (2022 17:45)  POCT Blood Glucose.: 119 mg/dL (2022 17:31)  POCT Blood Glucose.: 140 mg/dL (2022 17:09)  POCT Blood Glucose.: 176 mg/dL (2022 16:42)  POCT Blood Glucose.: 216 mg/dL (2022 16:32)    I&O's Summary      PHYSICAL EXAM:  GENERAL: NAD, well-developed  HEAD:  Atraumatic, Normocephalic  EYES: EOMI, PERRLA, conjunctiva and sclera clear  NECK: Supple, No JVD  CHEST/LUNG: Clear to auscultation bilaterally; No wheeze  HEART: S1, S2; No murmurs, rubs, or gallops  ABDOMEN: Soft, Nontender, Nondistended; Bowel sounds present  EXTREMITIES:  2+ Peripheral Pulses, No clubbing, cyanosis, or edema  PSYCH: Normal affect  NEUROLOGY: AAOX3; non-focal  SKIN: No rashes or lesions    Consultant(s) Notes Reviewed:  [x ] YES  [ ] NO  Care Discussed with Consultants/Other Providers [ x] YES  [ ] NO    MEDS:  MEDICATIONS  (STANDING):  aspirin enteric coated 81 milliGRAM(s) Oral daily  budesonide 160 MICROgram(s)/formoterol 4.5 MICROgram(s) Inhaler 2 Puff(s) Inhalation two times a day  dextrose 5% + sodium chloride 0.9%. 1000 milliLiter(s) (500 mL/Hr) IV Continuous <Continuous>  dextrose 5% + sodium chloride 0.9%. 1000 milliLiter(s) (60 mL/Hr) IV Continuous <Continuous>  dextrose 5%. 1000 milliLiter(s) (50 mL/Hr) IV Continuous <Continuous>  dextrose 5%. 1000 milliLiter(s) (100 mL/Hr) IV Continuous <Continuous>  dextrose 50% Injectable 25 Gram(s) IV Push once  dextrose 50% Injectable 12.5 Gram(s) IV Push once  dextrose 50% Injectable 25 Gram(s) IV Push once  finasteride 5 milliGRAM(s) Oral daily  glucagon  Injectable 1 milliGRAM(s) IntraMuscular once  heparin   Injectable 5000 Unit(s) SubCutaneous every 8 hours  meropenem  IVPB 1000 milliGRAM(s) IV Intermittent every 8 hours  metoprolol succinate ER 25 milliGRAM(s) Oral daily  pantoprazole    Tablet 40 milliGRAM(s) Oral before breakfast  senna 2 Tablet(s) Oral at bedtime  tamsulosin 0.8 milliGRAM(s) Oral at bedtime    MEDICATIONS  (PRN):  acetaminophen     Tablet .. 650 milliGRAM(s) Oral every 6 hours PRN Temp greater or equal to 38C (100.4F), Mild Pain (1 - 3)  albuterol/ipratropium for Nebulization 3 milliLiter(s) Nebulizer every 6 hours PRN Shortness of Breath and/or Wheezing  aluminum hydroxide/magnesium hydroxide/simethicone Suspension 30 milliLiter(s) Oral every 4 hours PRN Dyspepsia  dextrose Oral Gel 15 Gram(s) Oral once PRN Blood Glucose LESS THAN 70 milliGRAM(s)/deciliter  melatonin 3 milliGRAM(s) Oral at bedtime PRN Insomnia  ondansetron Injectable 4 milliGRAM(s) IV Push every 8 hours PRN Nausea and/or Vomiting    ALLERGIES:  No Known Allergies      LABS:                        11.2   7.68  )-----------( 194      ( 2022 07:39 )             34.4     06-    135  |  100  |  13  ----------------------------<  64<L>  4.1   |  26  |  1.05    Ca    9.2      2022 07:39  Phos  2.3     -  Mg     1.5     -    TPro  6.5  /  Alb  3.0<L>  /  TBili  0.5  /  DBili  x   /  AST  14  /  ALT  12  /  AlkPhos  121<H>      PT/INR - ( 2022 17:10 )   PT: 12.7 sec;   INR: 1.10 ratio         PTT - ( 2022 17:10 )  PTT:33.6 sec      LIVER FUNCTIONS - ( 2022 07:39 )  Alb: 3.0 g/dL / Pro: 6.5 g/dL / ALK PHOS: 121 U/L / ALT: 12 U/L / AST: 14 U/L / GGT: x           Urinalysis Basic - ( 2022 18:30 )    Color: Yellow / Appearance: Slightly Turbid / S.017 / pH: x  Gluc: x / Ketone: Negative  / Bili: Negative / Urobili: Negative   Blood: x / Protein: 100 / Nitrite: Negative   Leuk Esterase: Large / RBC: 3 /hpf /  /HPF   Sq Epi: x / Non Sq Epi: 0 /hpf / Bacteria: Many      TSH: Thyroid Stimulating Hormone, Serum: 1.30 uIU/mL ( @ 17:10)

## 2022-06-07 NOTE — PROGRESS NOTE ADULT - ASSESSMENT
Patient is an 80 yo cantonese speaking M with PMHx of HTN/HLD, DM2, BPH, Afib? who was brought to the ED for unresponsiveness 2/2 hypoglycemia which is likely 2/2 UTI vs insulin overdose

## 2022-06-07 NOTE — PHYSICAL THERAPY INITIAL EVALUATION ADULT - THERAPY FREQUENCY, PT EVAL
INR at goal. Medications and chart reviewed. No changes noted to necessitate adjustment of warfarin or follow-up plan. See calendar.      
Patient advised to maintain dosage. Patient verbalized understanding.  
2-3x/week

## 2022-06-08 LAB
-  AMIKACIN: SIGNIFICANT CHANGE UP
-  AMOXICILLIN/CLAVULANIC ACID: SIGNIFICANT CHANGE UP
-  AMPICILLIN/SULBACTAM: SIGNIFICANT CHANGE UP
-  AMPICILLIN: SIGNIFICANT CHANGE UP
-  AZTREONAM: SIGNIFICANT CHANGE UP
-  CEFAZOLIN: SIGNIFICANT CHANGE UP
-  CEFEPIME: SIGNIFICANT CHANGE UP
-  CEFTRIAXONE: SIGNIFICANT CHANGE UP
-  CIPROFLOXACIN: SIGNIFICANT CHANGE UP
-  ERTAPENEM: SIGNIFICANT CHANGE UP
-  GENTAMICIN: SIGNIFICANT CHANGE UP
-  IMIPENEM: SIGNIFICANT CHANGE UP
-  LEVOFLOXACIN: SIGNIFICANT CHANGE UP
-  MEROPENEM: SIGNIFICANT CHANGE UP
-  NITROFURANTOIN: SIGNIFICANT CHANGE UP
-  PIPERACILLIN/TAZOBACTAM: SIGNIFICANT CHANGE UP
-  TIGECYCLINE: SIGNIFICANT CHANGE UP
-  TOBRAMYCIN: SIGNIFICANT CHANGE UP
-  TRIMETHOPRIM/SULFAMETHOXAZOLE: SIGNIFICANT CHANGE UP
GLUCOSE BLDC GLUCOMTR-MCNC: 216 MG/DL — HIGH (ref 70–99)
GLUCOSE BLDC GLUCOMTR-MCNC: 228 MG/DL — HIGH (ref 70–99)
GLUCOSE BLDC GLUCOMTR-MCNC: 242 MG/DL — HIGH (ref 70–99)
GLUCOSE BLDC GLUCOMTR-MCNC: 341 MG/DL — HIGH (ref 70–99)
GLUCOSE BLDC GLUCOMTR-MCNC: 344 MG/DL — HIGH (ref 70–99)
GLUCOSE BLDC GLUCOMTR-MCNC: 353 MG/DL — HIGH (ref 70–99)
GLUCOSE BLDC GLUCOMTR-MCNC: 370 MG/DL — HIGH (ref 70–99)
GLUCOSE BLDC GLUCOMTR-MCNC: 380 MG/DL — HIGH (ref 70–99)
GLUCOSE BLDC GLUCOMTR-MCNC: 405 MG/DL — HIGH (ref 70–99)
METHOD TYPE: SIGNIFICANT CHANGE UP

## 2022-06-08 PROCEDURE — 99222 1ST HOSP IP/OBS MODERATE 55: CPT

## 2022-06-08 RX ORDER — INFLUENZA VIRUS VACCINE 15; 15; 15; 15 UG/.5ML; UG/.5ML; UG/.5ML; UG/.5ML
0.7 SUSPENSION INTRAMUSCULAR ONCE
Refills: 0 | Status: DISCONTINUED | OUTPATIENT
Start: 2022-06-08 | End: 2022-06-10

## 2022-06-08 RX ORDER — INSULIN LISPRO 100/ML
VIAL (ML) SUBCUTANEOUS
Refills: 0 | Status: DISCONTINUED | OUTPATIENT
Start: 2022-06-08 | End: 2022-06-10

## 2022-06-08 RX ORDER — INSULIN LISPRO 100/ML
4 VIAL (ML) SUBCUTANEOUS ONCE
Refills: 0 | Status: DISCONTINUED | OUTPATIENT
Start: 2022-06-08 | End: 2022-06-08

## 2022-06-08 RX ORDER — INSULIN GLARGINE 100 [IU]/ML
10 INJECTION, SOLUTION SUBCUTANEOUS AT BEDTIME
Refills: 0 | Status: DISCONTINUED | OUTPATIENT
Start: 2022-06-08 | End: 2022-06-09

## 2022-06-08 RX ORDER — INSULIN LISPRO 100/ML
VIAL (ML) SUBCUTANEOUS AT BEDTIME
Refills: 0 | Status: DISCONTINUED | OUTPATIENT
Start: 2022-06-08 | End: 2022-06-10

## 2022-06-08 RX ORDER — ERTAPENEM SODIUM 1 G/1
1000 INJECTION, POWDER, LYOPHILIZED, FOR SOLUTION INTRAMUSCULAR; INTRAVENOUS EVERY 24 HOURS
Refills: 0 | Status: DISCONTINUED | OUTPATIENT
Start: 2022-06-08 | End: 2022-06-10

## 2022-06-08 RX ORDER — INSULIN LISPRO 100/ML
3 VIAL (ML) SUBCUTANEOUS ONCE
Refills: 0 | Status: COMPLETED | OUTPATIENT
Start: 2022-06-08 | End: 2022-06-08

## 2022-06-08 RX ORDER — INSULIN LISPRO 100/ML
5 VIAL (ML) SUBCUTANEOUS
Refills: 0 | Status: DISCONTINUED | OUTPATIENT
Start: 2022-06-08 | End: 2022-06-09

## 2022-06-08 RX ADMIN — Medication 2: at 12:08

## 2022-06-08 RX ADMIN — MEROPENEM 100 MILLIGRAM(S): 1 INJECTION INTRAVENOUS at 05:17

## 2022-06-08 RX ADMIN — HEPARIN SODIUM 5000 UNIT(S): 5000 INJECTION INTRAVENOUS; SUBCUTANEOUS at 14:58

## 2022-06-08 RX ADMIN — Medication 5 UNIT(S): at 09:03

## 2022-06-08 RX ADMIN — HEPARIN SODIUM 5000 UNIT(S): 5000 INJECTION INTRAVENOUS; SUBCUTANEOUS at 05:16

## 2022-06-08 RX ADMIN — TAMSULOSIN HYDROCHLORIDE 0.8 MILLIGRAM(S): 0.4 CAPSULE ORAL at 22:24

## 2022-06-08 RX ADMIN — HEPARIN SODIUM 5000 UNIT(S): 5000 INJECTION INTRAVENOUS; SUBCUTANEOUS at 22:21

## 2022-06-08 RX ADMIN — Medication 2: at 08:54

## 2022-06-08 RX ADMIN — INSULIN GLARGINE 10 UNIT(S): 100 INJECTION, SOLUTION SUBCUTANEOUS at 22:21

## 2022-06-08 RX ADMIN — BUDESONIDE AND FORMOTEROL FUMARATE DIHYDRATE 2 PUFF(S): 160; 4.5 AEROSOL RESPIRATORY (INHALATION) at 17:59

## 2022-06-08 RX ADMIN — SENNA PLUS 2 TABLET(S): 8.6 TABLET ORAL at 22:23

## 2022-06-08 RX ADMIN — Medication 3: at 22:22

## 2022-06-08 RX ADMIN — Medication 5 UNIT(S): at 12:07

## 2022-06-08 RX ADMIN — Medication 4: at 17:59

## 2022-06-08 RX ADMIN — PANTOPRAZOLE SODIUM 40 MILLIGRAM(S): 20 TABLET, DELAYED RELEASE ORAL at 05:17

## 2022-06-08 RX ADMIN — Medication 5 UNIT(S): at 17:59

## 2022-06-08 RX ADMIN — Medication 25 MILLIGRAM(S): at 05:17

## 2022-06-08 RX ADMIN — ERTAPENEM SODIUM 120 MILLIGRAM(S): 1 INJECTION, POWDER, LYOPHILIZED, FOR SOLUTION INTRAMUSCULAR; INTRAVENOUS at 16:02

## 2022-06-08 RX ADMIN — Medication 3 UNIT(S): at 02:38

## 2022-06-08 RX ADMIN — FINASTERIDE 5 MILLIGRAM(S): 5 TABLET, FILM COATED ORAL at 12:17

## 2022-06-08 RX ADMIN — Medication 81 MILLIGRAM(S): at 12:17

## 2022-06-08 RX ADMIN — Medication 3 MILLIGRAM(S): at 22:23

## 2022-06-08 RX ADMIN — BUDESONIDE AND FORMOTEROL FUMARATE DIHYDRATE 2 PUFF(S): 160; 4.5 AEROSOL RESPIRATORY (INHALATION) at 05:16

## 2022-06-08 NOTE — CONSULT NOTE ADULT - SUBJECTIVE AND OBJECTIVE BOX
KAITLIN CALDERON 81y Male  MRN-[id number]    Patient is a 81y old  Male who presents with a chief complaint of     HPI:  Patient is an 82 yo cantonese speaking M with PMHx of HTN/HLD, DM2, BPH, Afib who was brought to the ED for unresponsiveness. Communicated via pacific  ID #[id number]. Patient is an extremely limited historian, therefore, history is primarily obtained by chart review. Patient had taken his insulin prior to laying down for a nap. He was subsequently found by his family unresponsive. EMS was called and patient was found to be hypoglycemic to the 40s and hypotensive to 75/42. He was given an amp of D50 with improvement in his glucose to the 200s and 500 ccs of NS. On arrival to the ED, patient was found to be hypoglycemic again to the 50s. As per his DILYulissa, she believes he overdosed on his insulin, however, she does not know how much he typically takes or how much he administered today. When asked, the patient says he takes "151 insulin." He endorses dysuria which started "recently," however he is unable to further elaborate.     In the ED, patient tachypneic to 22, tachycardic to 104, with soft BP of 94/70. Labs significant for Mag of 1.5 and Phos of 2.3. CXR, Pelvic xray and R hip xray all unremarkable. UA grossly positive. Patient was given a dose of Mag, Phos, Ceftriaxone and Jim. Admitted to medicine for further management.     Unable to obtain family history as patient is a limited historian.  (2022 05:01)      PAST MEDICAL & SURGICAL HISTORY:  DM (diabetes mellitus)      HTN (hypertension)      Hypercholesterolemia      CAD (coronary artery disease)      HTN (hypertension)      DM (diabetes mellitus)      S/P TURP      S/P gastrectomy          Allergies    No Known Allergies    Intolerances        ANTIMICROBIALS:  meropenem  IVPB 1000 every 8 hours      MEDICATIONS  (STANDING):  aspirin enteric coated 81 milliGRAM(s) Oral daily  budesonide 160 MICROgram(s)/formoterol 4.5 MICROgram(s) Inhaler 2 Puff(s) Inhalation two times a day  dextrose 5% + sodium chloride 0.9%. 1000 milliLiter(s) (500 mL/Hr) IV Continuous <Continuous>  dextrose 5%. 1000 milliLiter(s) (50 mL/Hr) IV Continuous <Continuous>  dextrose 5%. 1000 milliLiter(s) (100 mL/Hr) IV Continuous <Continuous>  dextrose 50% Injectable 25 Gram(s) IV Push once  dextrose 50% Injectable 12.5 Gram(s) IV Push once  dextrose 50% Injectable 25 Gram(s) IV Push once  finasteride 5 milliGRAM(s) Oral daily  glucagon  Injectable 1 milliGRAM(s) IntraMuscular once  heparin   Injectable 5000 Unit(s) SubCutaneous every 8 hours  insulin glargine Injectable (LANTUS) 10 Unit(s) SubCutaneous at bedtime  insulin lispro (ADMELOG) corrective regimen sliding scale   SubCutaneous three times a day before meals  insulin lispro (ADMELOG) corrective regimen sliding scale   SubCutaneous at bedtime  insulin lispro Injectable (ADMELOG) 5 Unit(s) SubCutaneous three times a day before meals  meropenem  IVPB 1000 milliGRAM(s) IV Intermittent every 8 hours  metoprolol succinate ER 25 milliGRAM(s) Oral daily  pantoprazole    Tablet 40 milliGRAM(s) Oral before breakfast  senna 2 Tablet(s) Oral at bedtime  tamsulosin 0.8 milliGRAM(s) Oral at bedtime      Social History  Smoking:  Etoh:  Drug use:      FAMILY HISTORY:  No pertinent family history in first degree relatives        Vital Signs Last 24 Hrs  T(C): 36.7 (2022 08:53), Max: 36.7 (2022 08:53)  T(F): 98.1 (2022 08:53), Max: 98.1 (2022 08:53)  HR: 60 (2022 08:53) (60 - 107)  BP: 156/69 (2022 08:53) (103/58 - 156/69)  BP(mean): --  RR: 18 (2022 08:53) (18 - 18)  SpO2: 98% (2022 08:53) (94% - 99%)    CBC Full  -  ( 2022 07:39 )  WBC Count : 7.68 K/uL  RBC Count : 3.99 M/uL  Hemoglobin : 11.2 g/dL  Hematocrit : 34.4 %  Platelet Count - Automated : 194 K/uL  Mean Cell Volume : 86.2 fl  Mean Cell Hemoglobin : 28.1 pg  Mean Cell Hemoglobin Concentration : 32.6 gm/dL  Auto Neutrophil # : 6.29 K/uL  Auto Lymphocyte # : 0.54 K/uL  Auto Monocyte # : 0.70 K/uL  Auto Eosinophil # : 0.11 K/uL  Auto Basophil # : 0.02 K/uL  Auto Neutrophil % : 81.9 %  Auto Lymphocyte % : 7.0 %  Auto Monocyte % : 9.1 %  Auto Eosinophil % : 1.4 %  Auto Basophil % : 0.3 %        135  |  100  |  13  ----------------------------<  64<L>  4.1   |  26  |  1.05    Ca    9.2      2022 07:39  Phos  2.3       Mg     1.5         TPro  6.5  /  Alb  3.0<L>  /  TBili  0.5  /  DBili  x   /  AST  14  /  ALT  12  /  AlkPhos  121<H>      LIVER FUNCTIONS - ( 2022 07:39 )  Alb: 3.0 g/dL / Pro: 6.5 g/dL / ALK PHOS: 121 U/L / ALT: 12 U/L / AST: 14 U/L / GGT: x           Urinalysis Basic - ( 2022 18:30 )    Color: Yellow / Appearance: Slightly Turbid / S.017 / pH: x  Gluc: x / Ketone: Negative  / Bili: Negative / Urobili: Negative   Blood: x / Protein: 100 / Nitrite: Negative   Leuk Esterase: Large / RBC: 3 /hpf /  /HPF   Sq Epi: x / Non Sq Epi: 0 /hpf / Bacteria: Many        MICROBIOLOGY:  Clean Catch Clean Catch (Midstream)  22   >100,000 CFU/ml Escherichia coli  --  --      .Blood Blood-Peripheral  22   No growth to date.  --  --      .Blood Blood-Peripheral  22   No growth to date.  --  --              v              RADIOLOGY   KAITLIN CALDERON 81y Male  MRN-[id number]    Patient is a 81y old  Male who presents with a chief complaint of     HPI:  Patient is an 80 yo cantonese speaking M with PMHx of HTN/HLD, DM2, BPH, Afib who was brought to the ED for unresponsiveness. Communicated via pacific  ID #[id number]. Patient is an extremely limited historian, therefore, history is primarily obtained by chart review. Patient had taken his insulin prior to laying down for a nap. He was subsequently found by his family unresponsive. EMS was called and patient was found to be hypoglycemic to the 40s and hypotensive to 75/42. He was given an amp of D50 with improvement in his glucose to the 200s and 500 ccs of NS. On arrival to the ED, patient was found to be hypoglycemic again to the 50s. As per his DILYulissa, she believes he overdosed on his insulin, however, she does not know how much he typically takes or how much he administered today. When asked, the patient says he takes "151 insulin." He endorses dysuria which started "recently," however he is unable to further elaborate.     In the ED, patient tachypneic to 22, tachycardic to 104, with soft BP of 94/70. Labs significant for Mag of 1.5 and Phos of 2.3. CXR, Pelvic xray and R hip xray all unremarkable. UA grossly positive. Patient was given a dose of Mag, Phos, Ceftriaxone and Jim. Admitted to medicine for further management.     Unable to obtain family history as patient is a limited historian.  (2022 05:01)      PAST MEDICAL & SURGICAL HISTORY:  DM (diabetes mellitus)      HTN (hypertension)      Hypercholesterolemia      CAD (coronary artery disease)      HTN (hypertension)      DM (diabetes mellitus)      S/P TURP      S/P gastrectomy          Allergies    No Known Allergies    Intolerances        ANTIMICROBIALS:  meropenem  IVPB 1000 every 8 hours      MEDICATIONS  (STANDING):  aspirin enteric coated 81 milliGRAM(s) Oral daily  budesonide 160 MICROgram(s)/formoterol 4.5 MICROgram(s) Inhaler 2 Puff(s) Inhalation two times a day  dextrose 5% + sodium chloride 0.9%. 1000 milliLiter(s) (500 mL/Hr) IV Continuous <Continuous>  dextrose 5%. 1000 milliLiter(s) (50 mL/Hr) IV Continuous <Continuous>  dextrose 5%. 1000 milliLiter(s) (100 mL/Hr) IV Continuous <Continuous>  dextrose 50% Injectable 25 Gram(s) IV Push once  dextrose 50% Injectable 12.5 Gram(s) IV Push once  dextrose 50% Injectable 25 Gram(s) IV Push once  finasteride 5 milliGRAM(s) Oral daily  glucagon  Injectable 1 milliGRAM(s) IntraMuscular once  heparin   Injectable 5000 Unit(s) SubCutaneous every 8 hours  insulin glargine Injectable (LANTUS) 10 Unit(s) SubCutaneous at bedtime  insulin lispro (ADMELOG) corrective regimen sliding scale   SubCutaneous three times a day before meals  insulin lispro (ADMELOG) corrective regimen sliding scale   SubCutaneous at bedtime  insulin lispro Injectable (ADMELOG) 5 Unit(s) SubCutaneous three times a day before meals  meropenem  IVPB 1000 milliGRAM(s) IV Intermittent every 8 hours  metoprolol succinate ER 25 milliGRAM(s) Oral daily  pantoprazole    Tablet 40 milliGRAM(s) Oral before breakfast  senna 2 Tablet(s) Oral at bedtime  tamsulosin 0.8 milliGRAM(s) Oral at bedtime      Social History  Smoking: no  Etoh: no      FAMILY HISTORY:  No pertinent family history in first degree relatives        Vital Signs Last 24 Hrs  T(C): 36.7 (2022 08:53), Max: 36.7 (2022 08:53)  T(F): 98.1 (2022 08:53), Max: 98.1 (2022 08:53)  HR: 60 (2022 08:53) (60 - 107)  BP: 156/69 (2022 08:53) (103/58 - 156/69)  BP(mean): --  RR: 18 (2022 08:53) (18 - 18)  SpO2: 98% (2022 08:53) (94% - 99%)    CBC Full  -  ( 2022 07:39 )  WBC Count : 7.68 K/uL  RBC Count : 3.99 M/uL  Hemoglobin : 11.2 g/dL  Hematocrit : 34.4 %  Platelet Count - Automated : 194 K/uL  Mean Cell Volume : 86.2 fl  Mean Cell Hemoglobin : 28.1 pg  Mean Cell Hemoglobin Concentration : 32.6 gm/dL  Auto Neutrophil # : 6.29 K/uL  Auto Lymphocyte # : 0.54 K/uL  Auto Monocyte # : 0.70 K/uL  Auto Eosinophil # : 0.11 K/uL  Auto Basophil # : 0.02 K/uL  Auto Neutrophil % : 81.9 %  Auto Lymphocyte % : 7.0 %  Auto Monocyte % : 9.1 %  Auto Eosinophil % : 1.4 %  Auto Basophil % : 0.3 %        135  |  100  |  13  ----------------------------<  64<L>  4.1   |  26  |  1.05    Ca    9.2      2022 07:39  Phos  2.3       Mg     1.5         TPro  6.5  /  Alb  3.0<L>  /  TBili  0.5  /  DBili  x   /  AST  14  /  ALT  12  /  AlkPhos  121<H>      LIVER FUNCTIONS - ( 2022 07:39 )  Alb: 3.0 g/dL / Pro: 6.5 g/dL / ALK PHOS: 121 U/L / ALT: 12 U/L / AST: 14 U/L / GGT: x           Urinalysis Basic - ( 2022 18:30 )    Color: Yellow / Appearance: Slightly Turbid / S.017 / pH: x  Gluc: x / Ketone: Negative  / Bili: Negative / Urobili: Negative   Blood: x / Protein: 100 / Nitrite: Negative   Leuk Esterase: Large / RBC: 3 /hpf /  /HPF   Sq Epi: x / Non Sq Epi: 0 /hpf / Bacteria: Many        MICROBIOLOGY:  Clean Catch Clean Catch (Midstream)  22   >100,000 CFU/ml Escherichia coli  --  --      .Blood Blood-Peripheral  22   No growth to date.  --  --      .Blood Blood-Peripheral  22   No growth to date.  --  --      RADIOLOGY  < from: CT Head No Cont (22 @ 20:12) >  No acute intracranial hemorrhage, vasogenic edema, extra-axial collection   or hydrocephalus. Stable exam with chronic findings as above.      < end of copied text >  < from: Xray Hip 2-3 Views, Right (22 @ 19:29) >  No acute fracture or dislocation.    < end of copied text >  < from: Xray Chest 1 View- PORTABLE-Urgent (Xray Chest 1 View- PORTABLE-Urgent .) (22 @ 19:29) >  Linear atelectasis versus scar at the left base. The visualized lungs are   otherwise clear.    < end of copied text >

## 2022-06-08 NOTE — PROGRESS NOTE ADULT - SUBJECTIVE AND OBJECTIVE BOX
Chief complaint  Patient is a 81y old  Male who presents with a chief complaint of  Review of systems  Patient in bed, looks comfortable, no hypoglycemic episodes.    Labs and Fingersticks  CAPILLARY BLOOD GLUCOSE      POCT Blood Glucose.: 242 mg/dL (08 Jun 2022 11:54)  POCT Blood Glucose.: 216 mg/dL (08 Jun 2022 08:47)  POCT Blood Glucose.: 228 mg/dL (08 Jun 2022 06:57)  POCT Blood Glucose.: 344 mg/dL (08 Jun 2022 03:28)  POCT Blood Glucose.: 370 mg/dL (08 Jun 2022 02:32)  POCT Blood Glucose.: 405 mg/dL (08 Jun 2022 01:30)  POCT Blood Glucose.: 303 mg/dL (07 Jun 2022 22:02)  POCT Blood Glucose.: 186 mg/dL (07 Jun 2022 18:11)  POCT Blood Glucose.: 143 mg/dL (07 Jun 2022 14:56)      Anion Gap, Serum: 9 (06-07 @ 07:39)  Anion Gap, Serum: 13 (06-06 @ 17:10)      Calcium, Total Serum: 9.2 (06-07 @ 07:39)  Calcium, Total Serum: 9.0 (06-06 @ 17:10)  Albumin, Serum: 3.0 *L* (06-07 @ 07:39)  Albumin, Serum: 2.7 *L* (06-06 @ 17:10)    Alanine Aminotransferase (ALT/SGPT): 12 (06-07 @ 07:39)  Alanine Aminotransferase (ALT/SGPT): 14 (06-06 @ 17:10)  Alkaline Phosphatase, Serum: 121 *H* (06-07 @ 07:39)  Alkaline Phosphatase, Serum: 127 *H* (06-06 @ 17:10)  Aspartate Aminotransferase (AST/SGOT): 14 (06-07 @ 07:39)  Aspartate Aminotransferase (AST/SGOT): 22 (06-06 @ 17:10)        06-07    135  |  100  |  13  ----------------------------<  64<L>  4.1   |  26  |  1.05    Ca    9.2      07 Jun 2022 07:39  Phos  2.3     06-06  Mg     1.5     06-06    TPro  6.5  /  Alb  3.0<L>  /  TBili  0.5  /  DBili  x   /  AST  14  /  ALT  12  /  AlkPhos  121<H>  06-07                        11.2   7.68  )-----------( 194      ( 07 Jun 2022 07:39 )             34.4     Medications  MEDICATIONS  (STANDING):  aspirin enteric coated 81 milliGRAM(s) Oral daily  budesonide 160 MICROgram(s)/formoterol 4.5 MICROgram(s) Inhaler 2 Puff(s) Inhalation two times a day  dextrose 5% + sodium chloride 0.9%. 1000 milliLiter(s) (500 mL/Hr) IV Continuous <Continuous>  dextrose 5%. 1000 milliLiter(s) (50 mL/Hr) IV Continuous <Continuous>  dextrose 5%. 1000 milliLiter(s) (100 mL/Hr) IV Continuous <Continuous>  dextrose 50% Injectable 25 Gram(s) IV Push once  dextrose 50% Injectable 12.5 Gram(s) IV Push once  dextrose 50% Injectable 25 Gram(s) IV Push once  ertapenem  IVPB 1000 milliGRAM(s) IV Intermittent every 24 hours  finasteride 5 milliGRAM(s) Oral daily  glucagon  Injectable 1 milliGRAM(s) IntraMuscular once  heparin   Injectable 5000 Unit(s) SubCutaneous every 8 hours  insulin glargine Injectable (LANTUS) 10 Unit(s) SubCutaneous at bedtime  insulin lispro (ADMELOG) corrective regimen sliding scale   SubCutaneous three times a day before meals  insulin lispro (ADMELOG) corrective regimen sliding scale   SubCutaneous at bedtime  insulin lispro Injectable (ADMELOG) 5 Unit(s) SubCutaneous three times a day before meals  metoprolol succinate ER 25 milliGRAM(s) Oral daily  pantoprazole    Tablet 40 milliGRAM(s) Oral before breakfast  senna 2 Tablet(s) Oral at bedtime  tamsulosin 0.8 milliGRAM(s) Oral at bedtime      Physical Exam  General: Patient comfortable in bed  Vital Signs Last 12 Hrs  T(F): 98.5 (06-08-22 @ 13:55), Max: 98.5 (06-08-22 @ 13:55)  HR: 66 (06-08-22 @ 13:55) (60 - 86)  BP: 154/76 (06-08-22 @ 13:55) (106/65 - 156/69)  BP(mean): --  RR: 18 (06-08-22 @ 13:55) (18 - 18)  SpO2: 95% (06-08-22 @ 13:55) (94% - 98%)  Neck: No palpable thyroid nodules.  CVS: S1S2, No murmurs  Respiratory: No wheezing, no crepitations  GI: Abdomen soft, bowel sounds positive  Musculoskeletal:  edema lower extremities.   Skin: No skin rashes, no ecchymosis    Diagnostics    C-Peptide, Serum: AM Sched. Collection: 08-Jun-2022 06:00 (06-07 @ 16:06)  Cortisol AM, Serum: AM Sched. Collection: 08-Jun-2022 06:00 (06-07 @ 13:29)           Chief complaint  Patient is a 81y old  Male who presents with a chief complaint of  Review of systems  Patient in bed, looks comfortable, no hypoglycemic episodes.    Labs and Fingersticks  CAPILLARY BLOOD GLUCOSE      POCT Blood Glucose.: 242 mg/dL (08 Jun 2022 11:54)  POCT Blood Glucose.: 216 mg/dL (08 Jun 2022 08:47)  POCT Blood Glucose.: 228 mg/dL (08 Jun 2022 06:57)  POCT Blood Glucose.: 344 mg/dL (08 Jun 2022 03:28)  POCT Blood Glucose.: 370 mg/dL (08 Jun 2022 02:32)  POCT Blood Glucose.: 405 mg/dL (08 Jun 2022 01:30)  POCT Blood Glucose.: 303 mg/dL (07 Jun 2022 22:02)  POCT Blood Glucose.: 186 mg/dL (07 Jun 2022 18:11)  POCT Blood Glucose.: 143 mg/dL (07 Jun 2022 14:56)      Anion Gap, Serum: 9 (06-07 @ 07:39)  Anion Gap, Serum: 13 (06-06 @ 17:10)      Calcium, Total Serum: 9.2 (06-07 @ 07:39)  Calcium, Total Serum: 9.0 (06-06 @ 17:10)  Albumin, Serum: 3.0 *L* (06-07 @ 07:39)  Albumin, Serum: 2.7 *L* (06-06 @ 17:10)    Alanine Aminotransferase (ALT/SGPT): 12 (06-07 @ 07:39)  Alanine Aminotransferase (ALT/SGPT): 14 (06-06 @ 17:10)  Alkaline Phosphatase, Serum: 121 *H* (06-07 @ 07:39)  Alkaline Phosphatase, Serum: 127 *H* (06-06 @ 17:10)  Aspartate Aminotransferase (AST/SGOT): 14 (06-07 @ 07:39)  Aspartate Aminotransferase (AST/SGOT): 22 (06-06 @ 17:10)        06-07    135  |  100  |  13  ----------------------------<  64<L>  4.1   |  26  |  1.05    Ca    9.2      07 Jun 2022 07:39  Phos  2.3     06-06  Mg     1.5     06-06    TPro  6.5  /  Alb  3.0<L>  /  TBili  0.5  /  DBili  x   /  AST  14  /  ALT  12  /  AlkPhos  121<H>  06-07                        11.2   7.68  )-----------( 194      ( 07 Jun 2022 07:39 )             34.4     Medications  MEDICATIONS  (STANDING):  aspirin enteric coated 81 milliGRAM(s) Oral daily  budesonide 160 MICROgram(s)/formoterol 4.5 MICROgram(s) Inhaler 2 Puff(s) Inhalation two times a day  dextrose 5% + sodium chloride 0.9%. 1000 milliLiter(s) (500 mL/Hr) IV Continuous <Continuous>  dextrose 5%. 1000 milliLiter(s) (50 mL/Hr) IV Continuous <Continuous>  dextrose 5%. 1000 milliLiter(s) (100 mL/Hr) IV Continuous <Continuous>  dextrose 50% Injectable 25 Gram(s) IV Push once  dextrose 50% Injectable 12.5 Gram(s) IV Push once  dextrose 50% Injectable 25 Gram(s) IV Push once  ertapenem  IVPB 1000 milliGRAM(s) IV Intermittent every 24 hours  finasteride 5 milliGRAM(s) Oral daily  glucagon  Injectable 1 milliGRAM(s) IntraMuscular once  heparin   Injectable 5000 Unit(s) SubCutaneous every 8 hours  insulin glargine Injectable (LANTUS) 10 Unit(s) SubCutaneous at bedtime  insulin lispro (ADMELOG) corrective regimen sliding scale   SubCutaneous three times a day before meals  insulin lispro (ADMELOG) corrective regimen sliding scale   SubCutaneous at bedtime  insulin lispro Injectable (ADMELOG) 5 Unit(s) SubCutaneous three times a day before meals  metoprolol succinate ER 25 milliGRAM(s) Oral daily  pantoprazole    Tablet 40 milliGRAM(s) Oral before breakfast  senna 2 Tablet(s) Oral at bedtime  tamsulosin 0.8 milliGRAM(s) Oral at bedtime      Physical Exam  General: Patient comfortable in bed  Vital Signs Last 12 Hrs  T(F): 98.5 (06-08-22 @ 13:55), Max: 98.5 (06-08-22 @ 13:55)  HR: 66 (06-08-22 @ 13:55) (60 - 86)  BP: 154/76 (06-08-22 @ 13:55) (106/65 - 156/69)  BP(mean): --  RR: 18 (06-08-22 @ 13:55) (18 - 18)  SpO2: 95% (06-08-22 @ 13:55) (94% - 98%)  Neck: No palpable thyroid nodules.  CVS: S1S2, No murmurs  Respiratory: No wheezing, no crepitations  GI: Abdomen soft, bowel sounds positive  Musculoskeletal:  edema lower extremities.   Skin: No skin rashes, no ecchymosis    Diagnostics    C-Peptide, Serum: AM Sched. Collection: 08-Jun-2022 06:00 (06-07 @ 16:06)  Cortisol AM, Serum: AM Sched. Collection: 08-Jun-2022 06:00 (06-07 @ 13:29)

## 2022-06-08 NOTE — PROGRESS NOTE ADULT - SUBJECTIVE AND OBJECTIVE BOX
KAITLIN CALDERON  81y Male  MRN:[id number]    Patient is a 81y old  Male who presents with a chief complaint of AMS    HPI:  Patient is an 82 yo cantonese speaking M with PMHx of HTN/HLD, DM2, BPH,  who was brought to the ED for unresponsiveness. Communicated via pacific  ID #[id number]. Patient is an extremely limited historian, therefore, history is primarily obtained by chart review. Patient had taken his insulin prior to laying down for a nap. He was subsequently found by his family unresponsive. EMS was called and patient was found to be hypoglycemic to the 40s and hypotensive to 75/42. He was given an amp of D50 with improvement in his glucose to the 200s and 500 ccs of NS. On arrival to the ED, patient was found to be hypoglycemic again to the 50s. As per his DIL, Yulissa, she believes he overdosed on his insulin, however, she does not know how much he typically takes or how much he administered today. When asked, the patient says he takes "151 insulin." He endorses dysuria which started "recently," however he is unable to further elaborate.     In the ED, patient tachypneic to 22, tachycardic to 104, with soft BP of 94/70. Labs significant for Mag of 1.5 and Phos of 2.3. CXR, Pelvic xray and R hip xray all unremarkable. UA grossly positive. Patient was given a dose of Mag, Phos, Ceftriaxone and Jim. Admitted to medicine for further management.     Unable to obtain family history as patient is a limited historian.  (07 Jun 2022 05:01)      Patient seen and evaluated at bedside. No acute events overnight except as noted.    Interval HPI: pt with hyperglycemia    PAST MEDICAL & SURGICAL HISTORY:  DM (diabetes mellitus)      HTN (hypertension)      Hypercholesterolemia      CAD (coronary artery disease)      HTN (hypertension)      DM (diabetes mellitus)      S/P TURP      S/P gastrectomy          REVIEW OF SYSTEMS:  as per hpi     VITALS:   Vital Signs Last 24 Hrs  T(C): 36.7 (08 Jun 2022 08:53), Max: 36.7 (08 Jun 2022 08:53)  T(F): 98.1 (08 Jun 2022 08:53), Max: 98.1 (08 Jun 2022 08:53)  HR: 60 (08 Jun 2022 08:53) (60 - 107)  BP: 156/69 (08 Jun 2022 08:53) (103/58 - 156/69)  BP(mean): --  RR: 18 (08 Jun 2022 08:53) (18 - 18)  SpO2: 98% (08 Jun 2022 08:53) (94% - 99%)    CAPILLARY BLOOD GLUCOSE      POCT Blood Glucose.: 216 mg/dL (08 Jun 2022 08:47)  POCT Blood Glucose.: 228 mg/dL (08 Jun 2022 06:57)  POCT Blood Glucose.: 344 mg/dL (08 Jun 2022 03:28)  POCT Blood Glucose.: 370 mg/dL (08 Jun 2022 02:32)  POCT Blood Glucose.: 405 mg/dL (08 Jun 2022 01:30)  POCT Blood Glucose.: 303 mg/dL (07 Jun 2022 22:02)  POCT Blood Glucose.: 186 mg/dL (07 Jun 2022 18:11)  POCT Blood Glucose.: 143 mg/dL (07 Jun 2022 14:56)  POCT Blood Glucose.: 43 mg/dL (07 Jun 2022 14:03)  POCT Blood Glucose.: 44 mg/dL (07 Jun 2022 13:59)        PHYSICAL EXAM:  GENERAL: NAD, well-developed  HEAD:  Atraumatic, Normocephalic  EYES: EOMI, PERRLA, conjunctiva and sclera clear  NECK: Supple, No JVD  CHEST/LUNG: Clear to auscultation bilaterally; No wheeze  HEART: S1, S2; No murmurs, rubs, or gallops  ABDOMEN: Soft, Nontender, Nondistended; Bowel sounds present  EXTREMITIES:  2+ Peripheral Pulses, No clubbing, cyanosis, or edema  PSYCH: Normal affect  NEUROLOGY: AAOX3; non-focal  SKIN: No rashes or lesions    Consultant(s) Notes Reviewed:  [x ] YES  [ ] NO  Care Discussed with Consultants/Other Providers [ x] YES  [ ] NO    MEDS:   MEDICATIONS  (STANDING):  aspirin enteric coated 81 milliGRAM(s) Oral daily  budesonide 160 MICROgram(s)/formoterol 4.5 MICROgram(s) Inhaler 2 Puff(s) Inhalation two times a day  dextrose 5% + sodium chloride 0.9%. 1000 milliLiter(s) (500 mL/Hr) IV Continuous <Continuous>  dextrose 5%. 1000 milliLiter(s) (50 mL/Hr) IV Continuous <Continuous>  dextrose 5%. 1000 milliLiter(s) (100 mL/Hr) IV Continuous <Continuous>  dextrose 50% Injectable 25 Gram(s) IV Push once  dextrose 50% Injectable 12.5 Gram(s) IV Push once  dextrose 50% Injectable 25 Gram(s) IV Push once  finasteride 5 milliGRAM(s) Oral daily  glucagon  Injectable 1 milliGRAM(s) IntraMuscular once  heparin   Injectable 5000 Unit(s) SubCutaneous every 8 hours  insulin glargine Injectable (LANTUS) 10 Unit(s) SubCutaneous at bedtime  insulin lispro (ADMELOG) corrective regimen sliding scale   SubCutaneous three times a day before meals  insulin lispro (ADMELOG) corrective regimen sliding scale   SubCutaneous at bedtime  insulin lispro Injectable (ADMELOG) 5 Unit(s) SubCutaneous three times a day before meals  meropenem  IVPB 1000 milliGRAM(s) IV Intermittent every 8 hours  metoprolol succinate ER 25 milliGRAM(s) Oral daily  pantoprazole    Tablet 40 milliGRAM(s) Oral before breakfast  senna 2 Tablet(s) Oral at bedtime  tamsulosin 0.8 milliGRAM(s) Oral at bedtime    MEDICATIONS  (PRN):  acetaminophen     Tablet .. 650 milliGRAM(s) Oral every 6 hours PRN Temp greater or equal to 38C (100.4F), Mild Pain (1 - 3)  albuterol/ipratropium for Nebulization 3 milliLiter(s) Nebulizer every 6 hours PRN Shortness of Breath and/or Wheezing  aluminum hydroxide/magnesium hydroxide/simethicone Suspension 30 milliLiter(s) Oral every 4 hours PRN Dyspepsia  dextrose Oral Gel 15 Gram(s) Oral once PRN Blood Glucose LESS THAN 70 milliGRAM(s)/deciliter  melatonin 3 milliGRAM(s) Oral at bedtime PRN Insomnia  ondansetron Injectable 4 milliGRAM(s) IV Push every 8 hours PRN Nausea and/or Vomiting      ALLERGIES:  No Known Allergies      LABS:                                  11.2   7.68  )-----------( 194      ( 07 Jun 2022 07:39 )             34.4   06-07    135  |  100  |  13  ----------------------------<  64<L>  4.1   |  26  |  1.05    Ca    9.2      07 Jun 2022 07:39  Phos  2.3     06-06  Mg     1.5     06-06    TPro  6.5  /  Alb  3.0<L>  /  TBili  0.5  /  DBili  x   /  AST  14  /  ALT  12  /  AlkPhos  121<H>  06-07

## 2022-06-08 NOTE — PROGRESS NOTE ADULT - ASSESSMENT
Patient is an 82 yo cantonese speaking M with PMHx of HTN/HLD, DM2, BPH, Afib? who was brought to the ED for unresponsiveness 2/2 hypoglycemia which is likely 2/2 UTI vs insulin overdose

## 2022-06-08 NOTE — PATIENT PROFILE ADULT - FALL HARM RISK - HARM RISK INTERVENTIONS
Assistance with ambulation/Assistance OOB with selected safe patient handling equipment/Communicate Risk of Fall with Harm to all staff/Discuss with provider need for PT consult/Monitor gait and stability/Provide patient with walking aids - walker, cane, crutches/Reinforce activity limits and safety measures with patient and family/Tailored Fall Risk Interventions/Use of alarms - bed, chair and/or voice tab/Visual Cue: Yellow wristband and red socks/Bed in lowest position, wheels locked, appropriate side rails in place/Call bell, personal items and telephone in reach/Instruct patient to call for assistance before getting out of bed or chair/Non-slip footwear when patient is out of bed/[place] to call system/Physically safe environment - no spills, clutter or unnecessary equipment/Purposeful Proactive Rounding/Room/bathroom lighting operational, light cord in reach

## 2022-06-08 NOTE — CONSULT NOTE ADULT - PROBLEM SELECTOR RECOMMENDATION 2
Suggest to continue medications, monitoring, FU primary team recommendations.
-endo following  -?from infection  -cont abx

## 2022-06-08 NOTE — CONSULT NOTE ADULT - PROBLEM SELECTOR RECOMMENDATION 9
Will check AM cortisol and c-peptide.  Suggest to continue D5% until blood sugars are stable. Encourage patient to increase PO intake as tolerated, FU RD recommendations.  Will continue off insulin for now, will continue monitoring FS and FU.  Discussed plan with patient's family Yulissa, will continue monitoring and FU DC recommendations.
-f/u GNR in urine cx  -prior h/o ESBL  -dc meropenem  -invanz 1 gm iv q24

## 2022-06-08 NOTE — CONSULT NOTE ADULT - ASSESSMENT
82 yo cantonese speaking M with PMHx of HTN/HLD, DM2, BPH, Afib who was brought to the ED for unresponsiveness with UTI, hypoglycemia    Naren Cabezas  Attending Physician   Division of Infectious Disease  Office #[phone]  Available on Microsoft Teams also  After 5pm/weekend or no response, call #[phone]

## 2022-06-08 NOTE — PROGRESS NOTE ADULT - ASSESSMENT
Assessment  DMT2: 81y Male with DM T2 with hypoglycemia, A1C 7.9%, was on insulin at home, admitted s/p severe hypoglycemia and unresponsiveness, no insulin orders currently, blood sugars trending up/running high and not at target now, no new hypoglycemias, eating meals, NAD.  UTI: on IV ABx, stable, monitored.  HTN: Controlled,  on antihypertensive medications.          Melba Reyes MD  Cell: [phone]  Office: [phone]       Assessment  DMT2: 81y Male with DM T2 with hypoglycemia, A1C 7.9%, was on insulin at home, admitted s/p severe hypoglycemia and unresponsiveness, no insulin orders currently, blood sugars trending  up/running high and not at target now, no new hypoglycemias, eating meals, NAD.  UTI: on IV ABx, stable, monitored.  HTN: Controlled,  on antihypertensive medications.          Melba Reyes MD  Cell: [phone]  Office: [phone]

## 2022-06-09 ENCOUNTER — TRANSCRIPTION ENCOUNTER (OUTPATIENT)
Age: 82
End: 2022-06-09

## 2022-06-09 LAB
-  AMPICILLIN: SIGNIFICANT CHANGE UP
-  CIPROFLOXACIN: SIGNIFICANT CHANGE UP
-  LEVOFLOXACIN: SIGNIFICANT CHANGE UP
-  NITROFURANTOIN: SIGNIFICANT CHANGE UP
-  TETRACYCLINE: SIGNIFICANT CHANGE UP
-  VANCOMYCIN: SIGNIFICANT CHANGE UP
CULTURE RESULTS: SIGNIFICANT CHANGE UP
GLUCOSE BLDC GLUCOMTR-MCNC: 111 MG/DL — HIGH (ref 70–99)
GLUCOSE BLDC GLUCOMTR-MCNC: 120 MG/DL — HIGH (ref 70–99)
GLUCOSE BLDC GLUCOMTR-MCNC: 166 MG/DL — HIGH (ref 70–99)
GLUCOSE BLDC GLUCOMTR-MCNC: 192 MG/DL — HIGH (ref 70–99)
GLUCOSE BLDC GLUCOMTR-MCNC: 95 MG/DL — SIGNIFICANT CHANGE UP (ref 70–99)
METHOD TYPE: SIGNIFICANT CHANGE UP
ORGANISM # SPEC MICROSCOPIC CNT: SIGNIFICANT CHANGE UP
SPECIMEN SOURCE: SIGNIFICANT CHANGE UP

## 2022-06-09 PROCEDURE — 99232 SBSQ HOSP IP/OBS MODERATE 35: CPT

## 2022-06-09 RX ORDER — INSULIN GLARGINE 100 [IU]/ML
13 INJECTION, SOLUTION SUBCUTANEOUS AT BEDTIME
Refills: 0 | Status: DISCONTINUED | OUTPATIENT
Start: 2022-06-09 | End: 2022-06-10

## 2022-06-09 RX ORDER — INSULIN LISPRO 100/ML
7 VIAL (ML) SUBCUTANEOUS
Refills: 0 | Status: DISCONTINUED | OUTPATIENT
Start: 2022-06-09 | End: 2022-06-09

## 2022-06-09 RX ORDER — INSULIN GLARGINE 100 [IU]/ML
15 INJECTION, SOLUTION SUBCUTANEOUS AT BEDTIME
Refills: 0 | Status: DISCONTINUED | OUTPATIENT
Start: 2022-06-09 | End: 2022-06-09

## 2022-06-09 RX ORDER — INSULIN LISPRO 100/ML
6 VIAL (ML) SUBCUTANEOUS
Refills: 0 | Status: DISCONTINUED | OUTPATIENT
Start: 2022-06-09 | End: 2022-06-10

## 2022-06-09 RX ADMIN — Medication 6 UNIT(S): at 17:29

## 2022-06-09 RX ADMIN — TAMSULOSIN HYDROCHLORIDE 0.8 MILLIGRAM(S): 0.4 CAPSULE ORAL at 22:36

## 2022-06-09 RX ADMIN — Medication 7 UNIT(S): at 08:57

## 2022-06-09 RX ADMIN — Medication 650 MILLIGRAM(S): at 11:38

## 2022-06-09 RX ADMIN — Medication 25 MILLIGRAM(S): at 05:59

## 2022-06-09 RX ADMIN — HEPARIN SODIUM 5000 UNIT(S): 5000 INJECTION INTRAVENOUS; SUBCUTANEOUS at 05:58

## 2022-06-09 RX ADMIN — SENNA PLUS 2 TABLET(S): 8.6 TABLET ORAL at 22:35

## 2022-06-09 RX ADMIN — Medication 81 MILLIGRAM(S): at 11:38

## 2022-06-09 RX ADMIN — BUDESONIDE AND FORMOTEROL FUMARATE DIHYDRATE 2 PUFF(S): 160; 4.5 AEROSOL RESPIRATORY (INHALATION) at 17:30

## 2022-06-09 RX ADMIN — INSULIN GLARGINE 13 UNIT(S): 100 INJECTION, SOLUTION SUBCUTANEOUS at 22:35

## 2022-06-09 RX ADMIN — FINASTERIDE 5 MILLIGRAM(S): 5 TABLET, FILM COATED ORAL at 11:38

## 2022-06-09 RX ADMIN — BUDESONIDE AND FORMOTEROL FUMARATE DIHYDRATE 2 PUFF(S): 160; 4.5 AEROSOL RESPIRATORY (INHALATION) at 05:59

## 2022-06-09 RX ADMIN — PANTOPRAZOLE SODIUM 40 MILLIGRAM(S): 20 TABLET, DELAYED RELEASE ORAL at 05:59

## 2022-06-09 RX ADMIN — HEPARIN SODIUM 5000 UNIT(S): 5000 INJECTION INTRAVENOUS; SUBCUTANEOUS at 15:00

## 2022-06-09 RX ADMIN — HEPARIN SODIUM 5000 UNIT(S): 5000 INJECTION INTRAVENOUS; SUBCUTANEOUS at 22:36

## 2022-06-09 RX ADMIN — Medication 1: at 08:56

## 2022-06-09 RX ADMIN — Medication 6 UNIT(S): at 11:42

## 2022-06-09 RX ADMIN — ERTAPENEM SODIUM 120 MILLIGRAM(S): 1 INJECTION, POWDER, LYOPHILIZED, FOR SOLUTION INTRAMUSCULAR; INTRAVENOUS at 15:12

## 2022-06-09 NOTE — PROGRESS NOTE ADULT - ASSESSMENT
Assessment  DMT2: 81y Male with DM T2 with hypoglycemia, A1C 7.9%, was on insulin at home, admitted s/p severe hypoglycemia and unresponsiveness, started on basal bolus insulin, blood sugars fluctuating/running high and not at target, no new hypoglycemias, eating meals, NAD.  UTI: on IV ABx, stable, monitored.  HTN: Controlled,  on antihypertensive medications.          Melba Reyes MD  Cell: [phone]  Office: [phone]       Assessment  DMT2: 81y Male with DM T2 with hypoglycemia, A1C 7.9%, was on insulin at home, admitted s/p severe hypoglycemia and  unresponsiveness, started on basal bolus insulin, blood sugars fluctuating/running high and not at target, no new hypoglycemias, eating meals, NAD.  UTI: on IV ABx, stable, monitored.  HTN: Controlled,  on antihypertensive medications.          Melba Reyes MD  Cell: [phone]  Office: [phone]

## 2022-06-09 NOTE — DISCHARGE NOTE PROVIDER - NSDCFUADDAPPT_GEN_ALL_CORE_FT
Podiatry Discharge Instructions:  Follow up: Please follow up with Dr. Ashby within 1 week of discharge from the hospital, please call [phone] for appointment and discuss that you recently were seen in the hospital.  Weight bearing: Please weight bear as tolerated in a surgical shoe.  Antibiotics: Please continue as instructed.

## 2022-06-09 NOTE — PROGRESS NOTE ADULT - ASSESSMENT
82 yo cantonese speaking M with PMHx of HTN/HLD, DM2, BPH, Afib who was brought to the ED for unresponsiveness with UTI, hypoglycemia    Naren Cabezas  Attending Physician   Division of Infectious Disease  Office #[phone]  Available on Microsoft Teams also  After 5pm/weekend or no response, call #[phone]

## 2022-06-09 NOTE — PROGRESS NOTE ADULT - SUBJECTIVE AND OBJECTIVE BOX
Chief complaint  Patient is a 81y old  Male who presents with a chief complaint of  Review of systems  Patient in bed, looks comfortable, no hypoglycemic episodes.    Labs and Fingersticks  CAPILLARY BLOOD GLUCOSE      POCT Blood Glucose.: 95 mg/dL (09 Jun 2022 11:29)  POCT Blood Glucose.: 166 mg/dL (09 Jun 2022 08:53)  POCT Blood Glucose.: 192 mg/dL (09 Jun 2022 07:45)  POCT Blood Glucose.: 380 mg/dL (08 Jun 2022 22:07)  POCT Blood Glucose.: 353 mg/dL (08 Jun 2022 19:14)  POCT Blood Glucose.: 341 mg/dL (08 Jun 2022 17:57)                        Medications  MEDICATIONS  (STANDING):  aspirin enteric coated 81 milliGRAM(s) Oral daily  budesonide 160 MICROgram(s)/formoterol 4.5 MICROgram(s) Inhaler 2 Puff(s) Inhalation two times a day  dextrose 5% + sodium chloride 0.9%. 1000 milliLiter(s) (500 mL/Hr) IV Continuous <Continuous>  dextrose 5%. 1000 milliLiter(s) (50 mL/Hr) IV Continuous <Continuous>  dextrose 5%. 1000 milliLiter(s) (100 mL/Hr) IV Continuous <Continuous>  dextrose 50% Injectable 25 Gram(s) IV Push once  dextrose 50% Injectable 12.5 Gram(s) IV Push once  dextrose 50% Injectable 25 Gram(s) IV Push once  ertapenem  IVPB 1000 milliGRAM(s) IV Intermittent every 24 hours  finasteride 5 milliGRAM(s) Oral daily  glucagon  Injectable 1 milliGRAM(s) IntraMuscular once  heparin   Injectable 5000 Unit(s) SubCutaneous every 8 hours  influenza  Vaccine (HIGH DOSE) 0.7 milliLiter(s) IntraMuscular once  insulin glargine Injectable (LANTUS) 13 Unit(s) SubCutaneous at bedtime  insulin lispro (ADMELOG) corrective regimen sliding scale   SubCutaneous three times a day before meals  insulin lispro (ADMELOG) corrective regimen sliding scale   SubCutaneous at bedtime  insulin lispro Injectable (ADMELOG) 6 Unit(s) SubCutaneous three times a day before meals  metoprolol succinate ER 25 milliGRAM(s) Oral daily  pantoprazole    Tablet 40 milliGRAM(s) Oral before breakfast  senna 2 Tablet(s) Oral at bedtime  tamsulosin 0.8 milliGRAM(s) Oral at bedtime      Physical Exam  General: Patient comfortable in bed  Vital Signs Last 12 Hrs  T(F): 97.4 (06-09-22 @ 11:58), Max: 98.1 (06-09-22 @ 08:42)  HR: 75 (06-09-22 @ 11:58) (75 - 93)  BP: 147/64 (06-09-22 @ 11:58) (117/64 - 147/64)  BP(mean): --  RR: 18 (06-09-22 @ 11:58) (18 - 18)  SpO2: 98% (06-09-22 @ 11:58) (94% - 99%)  Neck: No palpable thyroid nodules.  CVS: S1S2, No murmurs  Respiratory: No wheezing, no crepitations  GI: Abdomen soft, bowel sounds positive  Musculoskeletal:  edema lower extremities.   Skin: No skin rashes, no ecchymosis    Diagnostics    C-Peptide, Serum: AM Sched. Collection: 08-Jun-2022 06:00 (06-07 @ 16:06)  Cortisol AM, Serum: AM Sched. Collection: 08-Jun-2022 06:00 (06-07 @ 13:29)           Chief complaint  Patient is a 81y old  Male who presents with a chief complaint of  Review of systems  Patient in bed, looks comfortable, no hypoglycemic episodes.    Labs and Fingersticks  CAPILLARY BLOOD GLUCOSE      POCT Blood Glucose.: 95 mg/dL (09 Jun 2022 11:29)  POCT Blood Glucose.: 166 mg/dL (09 Jun 2022 08:53)  POCT Blood Glucose.: 192 mg/dL (09 Jun 2022 07:45)  POCT Blood Glucose.: 380 mg/dL (08 Jun 2022 22:07)  POCT Blood Glucose.: 353 mg/dL (08 Jun 2022 19:14)  POCT Blood Glucose.: 341 mg/dL (08 Jun 2022 17:57)                        Medications  MEDICATIONS  (STANDING):  aspirin enteric coated 81 milliGRAM(s) Oral daily  budesonide 160 MICROgram(s)/formoterol 4.5 MICROgram(s) Inhaler 2 Puff(s) Inhalation two times a day  dextrose 5% + sodium chloride 0.9%. 1000 milliLiter(s) (500 mL/Hr) IV Continuous <Continuous>  dextrose 5%. 1000 milliLiter(s) (50 mL/Hr) IV Continuous <Continuous>  dextrose 5%. 1000 milliLiter(s) (100 mL/Hr) IV Continuous <Continuous>  dextrose 50% Injectable 25 Gram(s) IV Push once  dextrose 50% Injectable 12.5 Gram(s) IV Push once  dextrose 50% Injectable 25 Gram(s) IV Push once  ertapenem  IVPB 1000 milliGRAM(s) IV Intermittent every 24 hours  finasteride 5 milliGRAM(s) Oral daily  glucagon  Injectable 1 milliGRAM(s) IntraMuscular once  heparin   Injectable 5000 Unit(s) SubCutaneous every 8 hours  influenza  Vaccine (HIGH DOSE) 0.7 milliLiter(s) IntraMuscular once  insulin glargine Injectable (LANTUS) 13 Unit(s) SubCutaneous at bedtime  insulin lispro (ADMELOG) corrective regimen sliding scale   SubCutaneous three times a day before meals  insulin lispro (ADMELOG) corrective regimen sliding scale   SubCutaneous at bedtime  insulin lispro Injectable (ADMELOG) 6 Unit(s) SubCutaneous three times a day before meals  metoprolol succinate ER 25 milliGRAM(s) Oral daily  pantoprazole    Tablet 40 milliGRAM(s) Oral before breakfast  senna 2 Tablet(s) Oral at bedtime  tamsulosin 0.8 milliGRAM(s) Oral at bedtime      Physical Exam  General: Patient comfortable in bed  Vital Signs Last 12 Hrs  T(F): 97.4 (06-09-22 @ 11:58), Max: 98.1 (06-09-22 @ 08:42)  HR: 75 (06-09-22 @ 11:58) (75 - 93)  BP: 147/64 (06-09-22 @ 11:58) (117/64 - 147/64)  BP(mean): --  RR: 18 (06-09-22 @ 11:58) (18 - 18)  SpO2: 98% (06-09-22 @ 11:58) (94% - 99%)  Neck: No palpable thyroid nodules.  CVS: S1S2, No murmurs  Respiratory: No wheezing, no crepitations  GI: Abdomen soft, bowel sounds positive  Musculoskeletal:  edema lower extremities.   Skin: No skin rashes, no ecchymosis    Diagnostics    C-Peptide, Serum: AM Sched. Collection: 08-Jun-2022 06:00 (06-07 @ 16:06)  Cortisol AM, Serum: AM Sched. Collection: 08-Jun-2022 06:00 (06-07 @ 13:29)

## 2022-06-09 NOTE — DISCHARGE NOTE PROVIDER - NSDCMRMEDTOKEN_GEN_ALL_CORE_FT
aspirin 81 mg oral delayed release tablet: 1 tab(s) orally once a day  finasteride 5 mg oral tablet: 1 tab(s) orally once a day  ipratropium-albuterol 0.5 mg-2.5 mg/3 mL inhalation solution: 3 milliliter(s) inhaled 4 times a day  Lantus 100 units/mL subcutaneous solution: 28 unit(s) subcutaneous once a day (at bedtime)  metoprolol succinate 25 mg oral tablet, extended release: 1 tab(s) orally once a day  omeprazole 20 mg oral delayed release capsule: 1 cap(s) orally once a day  polyethylene glycol 3350 oral powder for reconstitution: 17 gram(s) orally 2 times a day  senna oral tablet: 2 tab(s) orally once a day (at bedtime)  Symbicort 160 mcg-4.5 mcg/inh inhalation aerosol: 2 puff(s) inhaled 2 times a day  tamsulosin 0.4 mg oral capsule: 2 cap(s) orally once a day (at bedtime)   aluminum hydroxide-magnesium hydroxide 200 mg-200 mg/5 mL oral suspension: 30 milliliter(s) orally every 4 hours, As needed, Dyspepsia  aspirin 81 mg oral delayed release tablet: 1 tab(s) orally once a day  finasteride 5 mg oral tablet: 1 tab(s) orally once a day  insulin glargine 100 units/mL subcutaneous solution: 6 unit(s) subcutaneous once a day (at bedtime)   ipratropium-albuterol 0.5 mg-2.5 mg/3 mL inhalation solution: 3 milliliter(s) inhaled 4 times a day  Lantus 100 units/mL subcutaneous solution: 13 unit(s) subcutaneous once a day (at bedtime)   melatonin 3 mg oral tablet: 1 tab(s) orally once a day (at bedtime), As needed, Insomnia  metoprolol succinate 25 mg oral tablet, extended release: 1 tab(s) orally once a day  omeprazole 20 mg oral delayed release capsule: 1 cap(s) orally once a day  polyethylene glycol 3350 oral powder for reconstitution: 17 gram(s) orally 2 times a day  senna oral tablet: 2 tab(s) orally once a day (at bedtime)  sulfamethoxazole-trimethoprim 800 mg-160 mg oral tablet: 1 tab(s) orally 2 times a day  Symbicort 160 mcg-4.5 mcg/inh inhalation aerosol: 2 puff(s) inhaled 2 times a day  tamsulosin 0.4 mg oral capsule: 2 cap(s) orally once a day (at bedtime)   aluminum hydroxide-magnesium hydroxide 200 mg-200 mg/5 mL oral suspension: 30 milliliter(s) orally every 4 hours, As needed, Dyspepsia  aspirin 81 mg oral delayed release tablet: 1 tab(s) orally once a day  finasteride 5 mg oral tablet: 1 tab(s) orally once a day  insulin glargine 100 units/mL subcutaneous solution: 6 unit(s) subcutaneous once a day (at bedtime)   ipratropium-albuterol 0.5 mg-2.5 mg/3 mL inhalation solution: 3 milliliter(s) inhaled 4 times a day  Lantus 100 units/mL subcutaneous solution: 11 unit(s) subcutaneous once a day (at bedtime)   melatonin 3 mg oral tablet: 1 tab(s) orally once a day (at bedtime), As needed, Insomnia  metoprolol succinate 25 mg oral tablet, extended release: 1 tab(s) orally once a day  omeprazole 20 mg oral delayed release capsule: 1 cap(s) orally once a day  Physical Therapy:   polyethylene glycol 3350 oral powder for reconstitution: 17 gram(s) orally 2 times a day  senna oral tablet: 2 tab(s) orally once a day (at bedtime)  sulfamethoxazole-trimethoprim 800 mg-160 mg oral tablet: 1 tab(s) orally 2 times a day  Symbicort 160 mcg-4.5 mcg/inh inhalation aerosol: 2 puff(s) inhaled 2 times a day  tamsulosin 0.4 mg oral capsule: 2 cap(s) orally once a day (at bedtime)   Admelog 100 units/mL injectable solution: 6 unit(s) injectable 3 times a day (before meals)   MAY USE SUBSTITUTE PER INSURANCE  aluminum hydroxide-magnesium hydroxide 200 mg-200 mg/5 mL oral suspension: 30 milliliter(s) orally every 4 hours, As needed, Dyspepsia  aspirin 81 mg oral delayed release tablet: 1 tab(s) orally once a day  finasteride 5 mg oral tablet: 1 tab(s) orally once a day  ipratropium-albuterol 0.5 mg-2.5 mg/3 mL inhalation solution: 3 milliliter(s) inhaled 4 times a day  Lantus 100 units/mL subcutaneous solution: 11 unit(s) subcutaneous once a day (at bedtime)   melatonin 3 mg oral tablet: 1 tab(s) orally once a day (at bedtime), As needed, Insomnia  metoprolol succinate 25 mg oral tablet, extended release: 1 tab(s) orally once a day  omeprazole 20 mg oral delayed release capsule: 1 cap(s) orally once a day  Physical Therapy:   polyethylene glycol 3350 oral powder for reconstitution: 17 gram(s) orally 2 times a day  senna oral tablet: 2 tab(s) orally once a day (at bedtime)  sulfamethoxazole-trimethoprim 800 mg-160 mg oral tablet: 1 tab(s) orally 2 times a day  Symbicort 160 mcg-4.5 mcg/inh inhalation aerosol: 2 puff(s) inhaled 2 times a day  tamsulosin 0.4 mg oral capsule: 2 cap(s) orally once a day (at bedtime)   aluminum hydroxide-magnesium hydroxide 200 mg-200 mg/5 mL oral suspension: 30 milliliter(s) orally every 4 hours, As needed, Dyspepsia  aspirin 81 mg oral delayed release tablet: 1 tab(s) orally once a day  finasteride 5 mg oral tablet: 1 tab(s) orally once a day  insulin lispro 100 units/mL injectable solution: Follow low correctional sliding scale below three times a day before meals:     1 Unit(s) if Glucose 151 - 200  2 Unit(s) if Glucose 201 - 250  3 Unit(s) if Glucose 251 - 300  4 Unit(s) if Glucose 301 - 350  5 Unit(s) if Glucose 351 - 400  6 Unit(s) if Glucose Greater Than 400  ipratropium-albuterol 0.5 mg-2.5 mg/3 mL inhalation solution: 3 milliliter(s) inhaled 4 times a day  Lantus 100 units/mL subcutaneous solution: 11 unit(s) subcutaneous once a day (at bedtime)   melatonin 3 mg oral tablet: 1 tab(s) orally once a day (at bedtime), As needed, Insomnia  metoprolol succinate 25 mg oral tablet, extended release: 1 tab(s) orally once a day  omeprazole 20 mg oral delayed release capsule: 1 cap(s) orally once a day  Physical Therapy:   polyethylene glycol 3350 oral powder for reconstitution: 17 gram(s) orally 2 times a day  senna oral tablet: 2 tab(s) orally once a day (at bedtime)  sulfamethoxazole-trimethoprim 800 mg-160 mg oral tablet: 1 tab(s) orally 2 times a day  Symbicort 160 mcg-4.5 mcg/inh inhalation aerosol: 2 puff(s) inhaled 2 times a day  tamsulosin 0.4 mg oral capsule: 2 cap(s) orally once a day (at bedtime)   aluminum hydroxide-magnesium hydroxide 200 mg-200 mg/5 mL oral suspension: 30 milliliter(s) orally every 4 hours, As needed, Dyspepsia  aspirin 81 mg oral delayed release tablet: 1 tab(s) orally once a day  finasteride 5 mg oral tablet: 1 tab(s) orally once a day  insulin lispro 100 units/mL injectable solution: Follow low correctional sliding scale below three times a day before meals:     1 Unit(s) if Glucose 151 - 200  2 Unit(s) if Glucose 201 - 250  3 Unit(s) if Glucose 251 - 300  4 Unit(s) if Glucose 301 - 350  5 Unit(s) if Glucose 351 - 400  6 Unit(s) if Glucose Greater Than 400   insulin lispro 100 units/mL injectable solution: Follow low correctional sliding scale below three times a day before meals:     1 Unit(s) if Glucose 151 - 200  2 Unit(s) if Glucose 201 - 250  3 Unit(s) if Glucose 251 - 300  4 Unit(s) if Glucose 301 - 350  5 Unit(s) if Glucose 351 - 400  6 Unit(s) if Glucose Greater Than 400  ipratropium-albuterol 0.5 mg-2.5 mg/3 mL inhalation solution: 3 milliliter(s) inhaled 4 times a day  Lantus 100 units/mL subcutaneous solution: 11 unit(s) subcutaneous once a day (at bedtime)   melatonin 3 mg oral tablet: 1 tab(s) orally once a day (at bedtime), As needed, Insomnia  metoprolol succinate 25 mg oral tablet, extended release: 1 tab(s) orally once a day  omeprazole 20 mg oral delayed release capsule: 1 cap(s) orally once a day  Physical Therapy:   polyethylene glycol 3350 oral powder for reconstitution: 17 gram(s) orally 2 times a day  senna oral tablet: 2 tab(s) orally once a day (at bedtime)  sulfamethoxazole-trimethoprim 800 mg-160 mg oral tablet: 1 tab(s) orally 2 times a day  Symbicort 160 mcg-4.5 mcg/inh inhalation aerosol: 2 puff(s) inhaled 2 times a day  tamsulosin 0.4 mg oral capsule: 2 cap(s) orally once a day (at bedtime)

## 2022-06-09 NOTE — PROGRESS NOTE ADULT - SUBJECTIVE AND OBJECTIVE BOX
KAITLIN CALDERON 81y [id number]    Patient is a 81y old  Male who presents with a chief complaint of     Follow Up/CC:  ID following for UTI    Interval History/ROS: no fever    Allergies    No Known Allergies    Intolerances        ANTIMICROBIALS:  ertapenem  IVPB 1000 every 24 hours      MEDICATIONS  (STANDING):  aspirin enteric coated 81 milliGRAM(s) Oral daily  budesonide 160 MICROgram(s)/formoterol 4.5 MICROgram(s) Inhaler 2 Puff(s) Inhalation two times a day  dextrose 5% + sodium chloride 0.9%. 1000 milliLiter(s) (500 mL/Hr) IV Continuous <Continuous>  dextrose 5%. 1000 milliLiter(s) (50 mL/Hr) IV Continuous <Continuous>  dextrose 5%. 1000 milliLiter(s) (100 mL/Hr) IV Continuous <Continuous>  dextrose 50% Injectable 25 Gram(s) IV Push once  dextrose 50% Injectable 12.5 Gram(s) IV Push once  dextrose 50% Injectable 25 Gram(s) IV Push once  ertapenem  IVPB 1000 milliGRAM(s) IV Intermittent every 24 hours  finasteride 5 milliGRAM(s) Oral daily  glucagon  Injectable 1 milliGRAM(s) IntraMuscular once  heparin   Injectable 5000 Unit(s) SubCutaneous every 8 hours  influenza  Vaccine (HIGH DOSE) 0.7 milliLiter(s) IntraMuscular once  insulin glargine Injectable (LANTUS) 13 Unit(s) SubCutaneous at bedtime  insulin lispro (ADMELOG) corrective regimen sliding scale   SubCutaneous three times a day before meals  insulin lispro (ADMELOG) corrective regimen sliding scale   SubCutaneous at bedtime  insulin lispro Injectable (ADMELOG) 6 Unit(s) SubCutaneous three times a day before meals  metoprolol succinate ER 25 milliGRAM(s) Oral daily  pantoprazole    Tablet 40 milliGRAM(s) Oral before breakfast  senna 2 Tablet(s) Oral at bedtime  tamsulosin 0.8 milliGRAM(s) Oral at bedtime    MEDICATIONS  (PRN):  acetaminophen     Tablet .. 650 milliGRAM(s) Oral every 6 hours PRN Temp greater or equal to 38C (100.4F), Mild Pain (1 - 3)  albuterol/ipratropium for Nebulization 3 milliLiter(s) Nebulizer every 6 hours PRN Shortness of Breath and/or Wheezing  aluminum hydroxide/magnesium hydroxide/simethicone Suspension 30 milliLiter(s) Oral every 4 hours PRN Dyspepsia  dextrose Oral Gel 15 Gram(s) Oral once PRN Blood Glucose LESS THAN 70 milliGRAM(s)/deciliter  melatonin 3 milliGRAM(s) Oral at bedtime PRN Insomnia  ondansetron Injectable 4 milliGRAM(s) IV Push every 8 hours PRN Nausea and/or Vomiting        Vital Signs Last 24 Hrs  T(C): 36.3 (09 Jun 2022 11:58), Max: 36.7 (09 Jun 2022 08:42)  T(F): 97.4 (09 Jun 2022 11:58), Max: 98.1 (09 Jun 2022 08:42)  HR: 75 (09 Jun 2022 11:58) (75 - 95)  BP: 147/64 (09 Jun 2022 11:58) (117/64 - 147/64)  BP(mean): --  RR: 18 (09 Jun 2022 11:58) (18 - 18)  SpO2: 98% (09 Jun 2022 11:58) (94% - 99%)        MICROBIOLOGY:  Clean Catch Clean Catch (Midstream)  06-06-22   >100,000 CFU/ml Escherichia coli  --  Escherichia coli      .Blood Blood-Peripheral  06-06-22   No growth to date.  --  --      .Blood Blood-Peripheral  06-06-22   No growth to date.  --  --      RADIOLOGY    < from: CT Head No Cont (06.06.22 @ 20:12) >  No acute intracranial hemorrhage, vasogenic edema, extra-axial collection   or hydrocephalus. Stable exam with chronic findings as above.      < end of copied text >

## 2022-06-09 NOTE — DISCHARGE NOTE PROVIDER - CARE PROVIDER_API CALL
Miguel A Ashby (DPM)  Surgery  [address] [facility] [address], [address] [facility] [place] [address]  Phone: [phone]  Fax: [phone]  Follow Up Time: Routine

## 2022-06-09 NOTE — DISCHARGE NOTE PROVIDER - NSDCCPCAREPLAN_GEN_ALL_CORE_FT
PRINCIPAL DISCHARGE DIAGNOSIS  Diagnosis: Hypoglycemia  Assessment and Plan of Treatment: Make sure you get your HgA1c checked every three months.  If you take oral diabetes medications, check your blood glucose two times a day.  If you take insulin, check your blood glucose before meals and at bedtime.  You should call their doctor when glucose <70 or above >400 and/or consistently above 200s as changes in the regimen will need to be made.  It is important not to skip any meals.  Keep a log of your blood glucose results and always take it with you to your doctor appointments.  Keep a list of your current medications including injectables and over the counter medications and bring this medication list with you to all your doctor appointments.  If you have not seen your ophthalmologist this year, call for appointment.  Check your feet daily for redness, sores, or openings. Do not self treat. If no improvement in two days call your primary care physician for an appointment.  Low blood sugar (hypoglycemia) is a blood sugar below 70mg/dl. Check your blood sugar if you feel signs/symptoms of hypoglycemia. If your blood sugar is below 70, take 15 grams of carbohydrates (ex 4 oz of apple juice, 3-4 glucose tablets, or 4-6 oz of regular soda), wait 15 minutes and repeat blood sugar to make sure it comes up above 70.  If your blood sugar is above 70 and you are due for a meal, have a meal.  If you are not due for a meal, have a snack.  This snack helps keeps your blood sugar at a safe range.  Continue Lantus to 13u at bedtime.  Continue Admelog to 6u before each meal.  Follow up with endocrinology in 4 weeks or sooner if needed.         SECONDARY DISCHARGE DIAGNOSES  Diagnosis: Acute UTI  Assessment and Plan of Treatment:   -completed course of IV antibiotics  -start bactrim DS 1 tab twice a day for 4 more days from 6/10    Diagnosis: Atrial fibrillation  Assessment and Plan of Treatment: - currently in sinus rhythm  - as per family not on anticoagulation at home due to falls risk  - continue Aspirin     PRINCIPAL DISCHARGE DIAGNOSIS  Diagnosis: Hypoglycemia  Assessment and Plan of Treatment: Make sure you get your HgA1c checked every three months.  If you take oral diabetes medications, check your blood glucose two times a day.  If you take insulin, check your blood glucose before meals and at bedtime.  You should call their doctor when glucose <70 or above >400 and/or consistently above 200s as changes in the regimen will need to be made.  It is important not to skip any meals.  Keep a log of your blood glucose results and always take it with you to your doctor appointments.  Keep a list of your current medications including injectables and over the counter medications and bring this medication list with you to all your doctor appointments.  If you have not seen your ophthalmologist this year, call for appointment.  Check your feet daily for redness, sores, or openings. Do not self treat. If no improvement in two days call your primary care physician for an appointment.  Low blood sugar (hypoglycemia) is a blood sugar below 70mg/dl. Check your blood sugar if you feel signs/symptoms of hypoglycemia. If your blood sugar is below 70, take 15 grams of carbohydrates (ex 4 oz of apple juice, 3-4 glucose tablets, or 4-6 oz of regular soda), wait 15 minutes and repeat blood sugar to make sure it comes up above 70.  If your blood sugar is above 70 and you are due for a meal, have a meal.  If you are not due for a meal, have a snack.  This snack helps keeps your blood sugar at a safe range.  Continue Lantus to 11u at bedtime.  Continue Admelog to 6u before each meal.  Follow up with endocrinology in 4 weeks or sooner if needed.         SECONDARY DISCHARGE DIAGNOSES  Diagnosis: Acute UTI  Assessment and Plan of Treatment:   -completed course of IV antibiotics  -start bactrim DS 1 tab twice a day for 4 more days from 6/10    Diagnosis: Atrial fibrillation  Assessment and Plan of Treatment: - currently in sinus rhythm  - as per family not on anticoagulation at home due to falls risk  - continue Aspirin     PRINCIPAL DISCHARGE DIAGNOSIS  Diagnosis: Hypoglycemia  Assessment and Plan of Treatment: Make sure you get your HgA1c checked every three months.  If you take oral diabetes medications, check your blood glucose two times a day.  If you take insulin, check your blood glucose before meals and at bedtime.  You should call their doctor when glucose <70 or above >400 and/or consistently above 200s as changes in the regimen will need to be made.  It is important not to skip any meals.  Keep a log of your blood glucose results and always take it with you to your doctor appointments.  Keep a list of your current medications including injectables and over the counter medications and bring this medication list with you to all your doctor appointments.  If you have not seen your ophthalmologist this year, call for appointment.  Check your feet daily for redness, sores, or openings. Do not self treat. If no improvement in two days call your primary care physician for an appointment.  Low blood sugar (hypoglycemia) is a blood sugar below 70mg/dl. Check your blood sugar if you feel signs/symptoms of hypoglycemia. If your blood sugar is below 70, take 15 grams of carbohydrates (ex 4 oz of apple juice, 3-4 glucose tablets, or 4-6 oz of regular soda), wait 15 minutes and repeat blood sugar to make sure it comes up above 70.  If your blood sugar is above 70 and you are due for a meal, have a meal.  If you are not due for a meal, have a snack.  This snack helps keeps your blood sugar at a safe range.  -Continue Lantus to 11u at bedtime.  -Follow low correctional sliding scale below three times a day before meals:   1 Unit(s) if Glucose 151 - 200  2 Unit(s) if Glucose 201 - 250  3 Unit(s) if Glucose 251 - 300  4 Unit(s) if Glucose 301 - 350  5 Unit(s) if Glucose 351 - 400  6 Unit(s) if Glucose Greater Than 400  -Follow up with endocrinology in 4 weeks or sooner if needed.         SECONDARY DISCHARGE DIAGNOSES  Diagnosis: Acute UTI  Assessment and Plan of Treatment:   -completed course of IV antibiotics  -start bactrim DS 1 tab twice a day for 4 more days from 6/10    Diagnosis: Atrial fibrillation  Assessment and Plan of Treatment: - currently in sinus rhythm  - as per family not on anticoagulation at home due to falls risk  - continue Aspirin

## 2022-06-09 NOTE — DISCHARGE NOTE PROVIDER - HOSPITAL COURSE
Patient is an 82 yo cantonese speaking M with PMHx of HTN/HLD, DM2, BPH, Afib? who was brought to the ED for unresponsiveness 2/2 hypoglycemia which is likely 2/2 UTI vs insulin overdose.     Problem/Plan - 1:  ·  Problem: Sepsis.   ·  Plan: - meets sepsis criteria with tachycardia and tachypnea  - likley 2/2 UTI  - f/u blood and urine cultures  - suspect hypotensive episode in the field was 2/2 hypoglycemia  - no further evidence of hypotension or hypoperfusion  - monitor vitals q4  - remainder of plan as below.     Problem/Plan - 2:  ·  Problem: Acute UTI.   ·  Plan: - patient with reported dysuria and positive UA on admission   -  history of ESBL ecoli UTI and bacteremia,    f/u cult and sens  abx as per id.     Problem/Plan - 3:  ·  Problem: Hypoglycemia.   ·  Plan: - patient with recurrent episodes of hypoglycemia    monitor FS    - PO intake encouraged   endo consulted  insulin as per endo.     Problem/Plan - 4:  ·  Problem: Afib.   ·  Plan: - patient with documented history of Afib.  all EKG in hospital are NSR  - currently in sinus rhythm  - hold home Metoprolol in the setting of sepsis and recent hypotension  as per family not on AC at home due to falls risk  cont ASA.     Problem/Plan - 5:  ·  Problem: Need for prophylactic measure.   ·  Plan: - Diet: DASH CC  - DVT ppx: HSQ.

## 2022-06-09 NOTE — PROGRESS NOTE ADULT - SUBJECTIVE AND OBJECTIVE BOX
KAITLIN CALDERON  81y Male  MRN:[id number]    Patient is a 81y old  Male who presents with a chief complaint of AMS    HPI:  Patient is an 82 yo cantonese speaking M with PMHx of HTN/HLD, DM2, BPH,  who was brought to the ED for unresponsiveness. Communicated via pacific  ID #[id number]. Patient is an extremely limited historian, therefore, history is primarily obtained by chart review. Patient had taken his insulin prior to laying down for a nap. He was subsequently found by his family unresponsive. EMS was called and patient was found to be hypoglycemic to the 40s and hypotensive to 75/42. He was given an amp of D50 with improvement in his glucose to the 200s and 500 ccs of NS. On arrival to the ED, patient was found to be hypoglycemic again to the 50s. As per his DIL, Yulissa, she believes he overdosed on his insulin, however, she does not know how much he typically takes or how much he administered today. When asked, the patient says he takes "151 insulin." He endorses dysuria which started "recently," however he is unable to further elaborate.     In the ED, patient tachypneic to 22, tachycardic to 104, with soft BP of 94/70. Labs significant for Mag of 1.5 and Phos of 2.3. CXR, Pelvic xray and R hip xray all unremarkable. UA grossly positive. Patient was given a dose of Mag, Phos, Ceftriaxone and Jim. Admitted to medicine for further management.     Unable to obtain family history as patient is a limited historian.  (07 Jun 2022 05:01)      Patient seen and evaluated at bedside. No acute events overnight except as noted.    Interval HPI: no acute events o/n     PAST MEDICAL & SURGICAL HISTORY:  DM (diabetes mellitus)      HTN (hypertension)      Hypercholesterolemia      CAD (coronary artery disease)      HTN (hypertension)      DM (diabetes mellitus)      S/P TURP      S/P gastrectomy          REVIEW OF SYSTEMS:  as per hpi     VITALS:   Vital Signs Last 24 Hrs  T(C): 36.3 (09 Jun 2022 11:58), Max: 36.9 (08 Jun 2022 13:55)  T(F): 97.4 (09 Jun 2022 11:58), Max: 98.5 (08 Jun 2022 13:55)  HR: 75 (09 Jun 2022 11:58) (66 - 95)  BP: 147/64 (09 Jun 2022 11:58) (117/64 - 154/76)  BP(mean): --  RR: 18 (09 Jun 2022 11:58) (18 - 18)  SpO2: 98% (09 Jun 2022 11:58) (94% - 99%)      PHYSICAL EXAM:  GENERAL: NAD, well-developed  HEAD:  Atraumatic, Normocephalic  EYES: EOMI, PERRLA, conjunctiva and sclera clear  NECK: Supple, No JVD  CHEST/LUNG: Clear to auscultation bilaterally; No wheeze  HEART: S1, S2; No murmurs, rubs, or gallops  ABDOMEN: Soft, Nontender, Nondistended; Bowel sounds present  EXTREMITIES:  2+ Peripheral Pulses, No clubbing, cyanosis, or edema  PSYCH: Normal affect  NEUROLOGY: AAOX3; non-focal  SKIN: No rashes or lesions    Consultant(s) Notes Reviewed:  [x ] YES  [ ] NO  Care Discussed with Consultants/Other Providers [ x] YES  [ ] NO    MEDS:   MEDICATIONS  (STANDING):  aspirin enteric coated 81 milliGRAM(s) Oral daily  budesonide 160 MICROgram(s)/formoterol 4.5 MICROgram(s) Inhaler 2 Puff(s) Inhalation two times a day  dextrose 5% + sodium chloride 0.9%. 1000 milliLiter(s) (500 mL/Hr) IV Continuous <Continuous>  dextrose 5%. 1000 milliLiter(s) (50 mL/Hr) IV Continuous <Continuous>  dextrose 5%. 1000 milliLiter(s) (100 mL/Hr) IV Continuous <Continuous>  dextrose 50% Injectable 25 Gram(s) IV Push once  dextrose 50% Injectable 12.5 Gram(s) IV Push once  dextrose 50% Injectable 25 Gram(s) IV Push once  ertapenem  IVPB 1000 milliGRAM(s) IV Intermittent every 24 hours  finasteride 5 milliGRAM(s) Oral daily  glucagon  Injectable 1 milliGRAM(s) IntraMuscular once  heparin   Injectable 5000 Unit(s) SubCutaneous every 8 hours  influenza  Vaccine (HIGH DOSE) 0.7 milliLiter(s) IntraMuscular once  insulin glargine Injectable (LANTUS) 13 Unit(s) SubCutaneous at bedtime  insulin lispro (ADMELOG) corrective regimen sliding scale   SubCutaneous three times a day before meals  insulin lispro (ADMELOG) corrective regimen sliding scale   SubCutaneous at bedtime  insulin lispro Injectable (ADMELOG) 6 Unit(s) SubCutaneous three times a day before meals  metoprolol succinate ER 25 milliGRAM(s) Oral daily  pantoprazole    Tablet 40 milliGRAM(s) Oral before breakfast  senna 2 Tablet(s) Oral at bedtime  tamsulosin 0.8 milliGRAM(s) Oral at bedtime    MEDICATIONS  (PRN):  acetaminophen     Tablet .. 650 milliGRAM(s) Oral every 6 hours PRN Temp greater or equal to 38C (100.4F), Mild Pain (1 - 3)  albuterol/ipratropium for Nebulization 3 milliLiter(s) Nebulizer every 6 hours PRN Shortness of Breath and/or Wheezing  aluminum hydroxide/magnesium hydroxide/simethicone Suspension 30 milliLiter(s) Oral every 4 hours PRN Dyspepsia  dextrose Oral Gel 15 Gram(s) Oral once PRN Blood Glucose LESS THAN 70 milliGRAM(s)/deciliter  melatonin 3 milliGRAM(s) Oral at bedtime PRN Insomnia  ondansetron Injectable 4 milliGRAM(s) IV Push every 8 hours PRN Nausea and/or Vomiting      ALLERGIES:  No Known Allergies      LABS:         pending

## 2022-06-10 ENCOUNTER — TRANSCRIPTION ENCOUNTER (OUTPATIENT)
Age: 82
End: 2022-06-10

## 2022-06-10 VITALS
OXYGEN SATURATION: 100 % | RESPIRATION RATE: 18 BRPM | HEART RATE: 77 BPM | TEMPERATURE: 98 F | SYSTOLIC BLOOD PRESSURE: 125 MMHG | DIASTOLIC BLOOD PRESSURE: 66 MMHG

## 2022-06-10 LAB
ANION GAP SERPL CALC-SCNC: 11 MMOL/L — SIGNIFICANT CHANGE UP (ref 5–17)
BUN SERPL-MCNC: 20 MG/DL — SIGNIFICANT CHANGE UP (ref 7–23)
CALCIUM SERPL-MCNC: 9.2 MG/DL — SIGNIFICANT CHANGE UP (ref 8.4–10.5)
CHLORIDE SERPL-SCNC: 104 MMOL/L — SIGNIFICANT CHANGE UP (ref 96–108)
CO2 SERPL-SCNC: 27 MMOL/L — SIGNIFICANT CHANGE UP (ref 22–31)
CREAT SERPL-MCNC: 1.33 MG/DL — HIGH (ref 0.5–1.3)
EGFR: 53 ML/MIN/1.73M2 — LOW
GLUCOSE BLDC GLUCOMTR-MCNC: 116 MG/DL — HIGH (ref 70–99)
GLUCOSE BLDC GLUCOMTR-MCNC: 121 MG/DL — HIGH (ref 70–99)
GLUCOSE BLDC GLUCOMTR-MCNC: 125 MG/DL — HIGH (ref 70–99)
GLUCOSE BLDC GLUCOMTR-MCNC: 143 MG/DL — HIGH (ref 70–99)
GLUCOSE BLDC GLUCOMTR-MCNC: 64 MG/DL — LOW (ref 70–99)
GLUCOSE BLDC GLUCOMTR-MCNC: 69 MG/DL — LOW (ref 70–99)
GLUCOSE BLDC GLUCOMTR-MCNC: 84 MG/DL — SIGNIFICANT CHANGE UP (ref 70–99)
GLUCOSE BLDC GLUCOMTR-MCNC: 87 MG/DL — SIGNIFICANT CHANGE UP (ref 70–99)
GLUCOSE SERPL-MCNC: 81 MG/DL — SIGNIFICANT CHANGE UP (ref 70–99)
HCT VFR BLD CALC: 35.9 % — LOW (ref 39–50)
HGB BLD-MCNC: 11.5 G/DL — LOW (ref 13–17)
MCHC RBC-ENTMCNC: 27.9 PG — SIGNIFICANT CHANGE UP (ref 27–34)
MCHC RBC-ENTMCNC: 32 GM/DL — SIGNIFICANT CHANGE UP (ref 32–36)
MCV RBC AUTO: 87.1 FL — SIGNIFICANT CHANGE UP (ref 80–100)
NRBC # BLD: 0 /100 WBCS — SIGNIFICANT CHANGE UP (ref 0–0)
PLATELET # BLD AUTO: 204 K/UL — SIGNIFICANT CHANGE UP (ref 150–400)
POTASSIUM SERPL-MCNC: 4.1 MMOL/L — SIGNIFICANT CHANGE UP (ref 3.5–5.3)
POTASSIUM SERPL-SCNC: 4.1 MMOL/L — SIGNIFICANT CHANGE UP (ref 3.5–5.3)
RBC # BLD: 4.12 M/UL — LOW (ref 4.2–5.8)
RBC # FLD: 14.1 % — SIGNIFICANT CHANGE UP (ref 10.3–14.5)
SODIUM SERPL-SCNC: 142 MMOL/L — SIGNIFICANT CHANGE UP (ref 135–145)
WBC # BLD: 6.02 K/UL — SIGNIFICANT CHANGE UP (ref 3.8–10.5)
WBC # FLD AUTO: 6.02 K/UL — SIGNIFICANT CHANGE UP (ref 3.8–10.5)

## 2022-06-10 PROCEDURE — 87040 BLOOD CULTURE FOR BACTERIA: CPT

## 2022-06-10 PROCEDURE — 83735 ASSAY OF MAGNESIUM: CPT

## 2022-06-10 PROCEDURE — U0005: CPT

## 2022-06-10 PROCEDURE — 70450 CT HEAD/BRAIN W/O DYE: CPT | Mod: MA

## 2022-06-10 PROCEDURE — 85730 THROMBOPLASTIN TIME PARTIAL: CPT

## 2022-06-10 PROCEDURE — 82435 ASSAY OF BLOOD CHLORIDE: CPT

## 2022-06-10 PROCEDURE — 84443 ASSAY THYROID STIM HORMONE: CPT

## 2022-06-10 PROCEDURE — 85025 COMPLETE CBC W/AUTO DIFF WBC: CPT

## 2022-06-10 PROCEDURE — 82533 TOTAL CORTISOL: CPT

## 2022-06-10 PROCEDURE — 99232 SBSQ HOSP IP/OBS MODERATE 35: CPT

## 2022-06-10 PROCEDURE — 97116 GAIT TRAINING THERAPY: CPT

## 2022-06-10 PROCEDURE — 71045 X-RAY EXAM CHEST 1 VIEW: CPT

## 2022-06-10 PROCEDURE — 85610 PROTHROMBIN TIME: CPT

## 2022-06-10 PROCEDURE — 87086 URINE CULTURE/COLONY COUNT: CPT

## 2022-06-10 PROCEDURE — 84132 ASSAY OF SERUM POTASSIUM: CPT

## 2022-06-10 PROCEDURE — U0003: CPT

## 2022-06-10 PROCEDURE — 80053 COMPREHEN METABOLIC PANEL: CPT

## 2022-06-10 PROCEDURE — 85027 COMPLETE CBC AUTOMATED: CPT

## 2022-06-10 PROCEDURE — 36415 COLL VENOUS BLD VENIPUNCTURE: CPT

## 2022-06-10 PROCEDURE — 97110 THERAPEUTIC EXERCISES: CPT

## 2022-06-10 PROCEDURE — 99285 EMERGENCY DEPT VISIT HI MDM: CPT

## 2022-06-10 PROCEDURE — 96374 THER/PROPH/DIAG INJ IV PUSH: CPT

## 2022-06-10 PROCEDURE — 97530 THERAPEUTIC ACTIVITIES: CPT

## 2022-06-10 PROCEDURE — 96361 HYDRATE IV INFUSION ADD-ON: CPT

## 2022-06-10 PROCEDURE — 80048 BASIC METABOLIC PNL TOTAL CA: CPT

## 2022-06-10 PROCEDURE — 94640 AIRWAY INHALATION TREATMENT: CPT

## 2022-06-10 PROCEDURE — 97162 PT EVAL MOD COMPLEX 30 MIN: CPT

## 2022-06-10 PROCEDURE — 85018 HEMOGLOBIN: CPT

## 2022-06-10 PROCEDURE — 93005 ELECTROCARDIOGRAM TRACING: CPT

## 2022-06-10 PROCEDURE — 83690 ASSAY OF LIPASE: CPT

## 2022-06-10 PROCEDURE — 82947 ASSAY GLUCOSE BLOOD QUANT: CPT

## 2022-06-10 PROCEDURE — 82803 BLOOD GASES ANY COMBINATION: CPT

## 2022-06-10 PROCEDURE — 72170 X-RAY EXAM OF PELVIS: CPT

## 2022-06-10 PROCEDURE — 85014 HEMATOCRIT: CPT

## 2022-06-10 PROCEDURE — 84295 ASSAY OF SERUM SODIUM: CPT

## 2022-06-10 PROCEDURE — 83036 HEMOGLOBIN GLYCOSYLATED A1C: CPT

## 2022-06-10 PROCEDURE — 82962 GLUCOSE BLOOD TEST: CPT

## 2022-06-10 PROCEDURE — 87186 SC STD MICRODIL/AGAR DIL: CPT

## 2022-06-10 PROCEDURE — 84100 ASSAY OF PHOSPHORUS: CPT

## 2022-06-10 PROCEDURE — 82330 ASSAY OF CALCIUM: CPT

## 2022-06-10 PROCEDURE — 73502 X-RAY EXAM HIP UNI 2-3 VIEWS: CPT

## 2022-06-10 PROCEDURE — 83605 ASSAY OF LACTIC ACID: CPT

## 2022-06-10 PROCEDURE — 81001 URINALYSIS AUTO W/SCOPE: CPT

## 2022-06-10 RX ORDER — LANOLIN ALCOHOL/MO/W.PET/CERES
1 CREAM (GRAM) TOPICAL
Qty: 0 | Refills: 0 | DISCHARGE
Start: 2022-06-10

## 2022-06-10 RX ORDER — INSULIN GLARGINE 100 [IU]/ML
11 INJECTION, SOLUTION SUBCUTANEOUS
Qty: 1 | Refills: 0
Start: 2022-06-10

## 2022-06-10 RX ORDER — INSULIN LISPRO 100/ML
0 VIAL (ML) SUBCUTANEOUS
Qty: 0 | Refills: 0 | DISCHARGE
Start: 2022-06-10

## 2022-06-10 RX ORDER — INSULIN GLARGINE 100 [IU]/ML
6 INJECTION, SOLUTION SUBCUTANEOUS
Qty: 1 | Refills: 0
Start: 2022-06-10

## 2022-06-10 RX ORDER — INSULIN LISPRO 100/ML
6 VIAL (ML) SUBCUTANEOUS
Qty: 1 | Refills: 0
Start: 2022-06-10

## 2022-06-10 RX ORDER — INSULIN GLARGINE 100 [IU]/ML
11 INJECTION, SOLUTION SUBCUTANEOUS AT BEDTIME
Refills: 0 | Status: DISCONTINUED | OUTPATIENT
Start: 2022-06-10 | End: 2022-06-10

## 2022-06-10 RX ORDER — INSULIN LISPRO 100/ML
1 VIAL (ML) SUBCUTANEOUS
Qty: 1 | Refills: 0
Start: 2022-06-10

## 2022-06-10 RX ORDER — INSULIN GLARGINE 100 [IU]/ML
28 INJECTION, SOLUTION SUBCUTANEOUS
Qty: 0 | Refills: 0 | DISCHARGE

## 2022-06-10 RX ORDER — INSULIN GLARGINE 100 [IU]/ML
13 INJECTION, SOLUTION SUBCUTANEOUS
Qty: 1 | Refills: 0
Start: 2022-06-10

## 2022-06-10 RX ADMIN — HEPARIN SODIUM 5000 UNIT(S): 5000 INJECTION INTRAVENOUS; SUBCUTANEOUS at 05:32

## 2022-06-10 RX ADMIN — Medication 81 MILLIGRAM(S): at 12:52

## 2022-06-10 RX ADMIN — PANTOPRAZOLE SODIUM 40 MILLIGRAM(S): 20 TABLET, DELAYED RELEASE ORAL at 05:32

## 2022-06-10 RX ADMIN — Medication 1 TABLET(S): at 05:36

## 2022-06-10 RX ADMIN — BUDESONIDE AND FORMOTEROL FUMARATE DIHYDRATE 2 PUFF(S): 160; 4.5 AEROSOL RESPIRATORY (INHALATION) at 17:34

## 2022-06-10 RX ADMIN — FINASTERIDE 5 MILLIGRAM(S): 5 TABLET, FILM COATED ORAL at 12:52

## 2022-06-10 RX ADMIN — Medication 1 TABLET(S): at 17:35

## 2022-06-10 RX ADMIN — BUDESONIDE AND FORMOTEROL FUMARATE DIHYDRATE 2 PUFF(S): 160; 4.5 AEROSOL RESPIRATORY (INHALATION) at 05:31

## 2022-06-10 RX ADMIN — Medication 25 MILLIGRAM(S): at 05:32

## 2022-06-10 RX ADMIN — HEPARIN SODIUM 5000 UNIT(S): 5000 INJECTION INTRAVENOUS; SUBCUTANEOUS at 14:12

## 2022-06-10 RX ADMIN — Medication 6 UNIT(S): at 10:30

## 2022-06-10 RX ADMIN — Medication 6 UNIT(S): at 14:30

## 2022-06-10 NOTE — PROGRESS NOTE ADULT - PROVIDER SPECIALTY LIST ADULT
Infectious Disease
Internal Medicine
Endocrinology
Internal Medicine
Infectious Disease

## 2022-06-10 NOTE — PROGRESS NOTE ADULT - PROBLEM SELECTOR PLAN 5
- Diet: DASH CC  - DVT ppx: HSQ

## 2022-06-10 NOTE — PROGRESS NOTE ADULT - PROBLEM SELECTOR PLAN 1
- meets sepsis criteria with tachycardia and tachypnea  - lucia 2/2 UTI  - f/u blood and urine cultures  - suspect hypotensive episode in the field was 2/2 hypoglycemia  - no further evidence of hypotension or hypoperfusion  - monitor vitals q4  - remainder of plan as below
Will increase Lantus to 13u at bedtime.  Will increase Admelog to 6u before each meal and continue Admelog correction scale coverage. Will continue monitoring FS and FU.  If blood sugars are stable, suggest DC on current insulin regimen.  Must FU Endo 4 weeks.
Will start Lantus 10u at bedtime.  Will start Admelog 5u before each meal as well as Admelog correction scale coverage. Will continue monitoring FS and FU.  Will FU DC recommendations.   for compliance with consistent low-carb diet.
- meets sepsis criteria with tachycardia and tachypnea  - lucia 2/2 UTI  - f/u blood and urine cultures  - suspect hypotensive episode in the field was 2/2 hypoglycemia  - no further evidence of hypotension or hypoperfusion  - monitor vitals q4  - remainder of plan as below
-better  -start bactrim DS 1 tab po bid x 4 more days from 6/10  -on day 3 of abx - got ertapenem today
Will decrease Lantus to 11u at bedtime.  Will continue Admelog 6u before each meal as well as Admelog correction scale coverage. Will continue monitoring FS and FU.  If blood sugars are stable, suggest DC on current insulin regimen.  Must FU Endo 4 weeks.  Discussed plan with team.
- meets sepsis criteria with tachycardia and tachypnea  - lucia 2/2 UTI  - f/u blood and urine cultures  - suspect hypotensive episode in the field was 2/2 hypoglycemia  - no further evidence of hypotension or hypoperfusion  - monitor vitals q4  - remainder of plan as below
- meets sepsis criteria with tachycardia and tachypnea  - lucia 2/2 UTI  - f/u blood and urine cultures  - suspect hypotensive episode in the field was 2/2 hypoglycemia  - no further evidence of hypotension or hypoperfusion  - monitor vitals q4  - remainder of plan as below
-better  -start bactrim DS 1 tab po bid x 4 more days from 6/10

## 2022-06-10 NOTE — PROGRESS NOTE ADULT - ASSESSMENT
Assessment  DMT2: 81y Male with DM T2 with hypoglycemia, A1C 7.9%, was on insulin at home, admitted s/p severe hypoglycemia and unresponsiveness, restarted on basal bolus insulin, blood sugars in acceptable range/trending down, no new hypoglycemias, eating meals, appears comfortable in NAD, planning DC.  UTI: on IV ABx, stable, monitored.  HTN: Controlled,  on antihypertensive medications.          Melba Reyes MD  Cell: [phone]  Office: [phone]       Assessment  DMT2: 81y Male with DM T2 with hypoglycemia, A1C 7.9%, was on insulin at home, admitted s/p severe  hypoglycemia and unresponsiveness, restarted on basal bolus insulin, blood sugars in acceptable range/trending down, no new hypoglycemias, eating meals, appears comfortable in NAD, planning DC.  UTI: on IV ABx, stable, monitored.  HTN: Controlled,  on antihypertensive medications.          Melba Reyes MD  Cell: [phone]  Office: [phone]

## 2022-06-10 NOTE — CHART NOTE - NSCHARTNOTEFT_GEN_A_CORE
Attending Addendum-  06/07/22, 9:30am    Seen, examined the patient in ED, spoke to daughter-in-law- Yulissa  Resting in bed, no fever, no abdominal or chest pain, some burning and frequency of urination, VSS    1. Hypoglycemia, likely due to non-compliance vs sepsis due to UTI  2. Sepsis due to UTI  3. Recent E. Coli Bacteremia, s/p IV anbx  4. PAF, not on AC for fall risks  5. CKD 3    - afebrile, doing better, WBC 7.6, on IV meropenem for UTI/sepsis, will f/u blood and urine c/s  - Held all basal/Bolus insulin, FS 53-70, started IV dextrose d5NS at 60ml/hr    Called Endocrine consult from Dr Melba Reyes ( saw him on last admission), HbA1C was 11% in Feb  - spoke to daughter-in-law- Yulissa, he has not been on AC due to fall risks but takes ASA, metoprolol    EKG shows SR, controlled rate, will c/w Metoprolol and ASA at jhome doses  - PT eval, fall precaution
Pt admitted for hypoglycemia requiring multiple doses of D50 1/2 amp. F.S. 40s on admission, pt continued on D5 NS gtt for several episodes of hypoglycemia. As per Endo consult, will continue off insulin for now, F.S. monitored Q 4hrs.  At 22:00 F.S. 303, held D5 NS gtt  Recheck at 1:30 am with F.S. 405.    POCT Blood Glucose.: 344 mg/dL (06.08.22 @ 03:28)   POCT Blood Glucose.: 370 mg/dL (06.08.22 @ 02:32)   POCT Blood Glucose.: 405 mg/dL (06.08.22 @ 01:30)   POCT Blood Glucose.: 303 mg/dL (06.07.22 @ 22:02)   POCT Blood Glucose.: 186 mg/dL (06.07.22 @ 18:11)   POCT Blood Glucose.: 143 mg/dL (06.07.22 @ 14:56)   POCT Blood Glucose.: 43 mg/dL (06.07.22 @ 14:03)   POCT Blood Glucose.: 44 mg/dL (06.07.22 @ 13:59)     Pt is a  82 yo cantonese speaking M with PMHx of HTN/HLD, DM2, BPH, Afib who was brought to the ED for unresponsiveness 2/2 hypoglycemia and UTI. Pt now with hyperglycemia.     Endocrinology on call notified, spoke with Dr. Owusu, recommended to cover one time with bedtime scale and team with follow up in AM  Recheck with F.S. in 370 gave 3 units of Admelog, F.S. post insulin 344  Will continue with F.S. Q 4hrs.      Jose Ramos, MICHELLE   [phone]
MEDICINE PA NOTE    Discharge medications made by endocrine earlier today: Lantus 11u at bedtime and Admelog 6u before each meal.  However after following this regimen pt hypoglycemic before dinner, FSG 64. Reached out to endocrine again - recommended to discharge on Lantus and low correctional sliding scale before meals only, and to discontinue premeal as blood sugars very fluctuating[place]   [id number]

## 2022-06-10 NOTE — PROVIDER CONTACT NOTE (OTHER) - ACTION/TREATMENT ORDERED:
Pt was given apple juice and sugar was checked in 15 min after, FS was 87. FS re-checked in 15 min again with the result of . As per Charity Mason,  discharge home insulin was modified
Provider made aware, Endo consult made, cover with 3units of Insulin, instead of 4units.

## 2022-06-10 NOTE — PROGRESS NOTE ADULT - SUBJECTIVE AND OBJECTIVE BOX
KAITLIN CALDERON  81y Male  MRN:[id number]    Patient is a 81y old  Male who presents with a chief complaint of AMS    HPI:  Patient is an 82 yo cantonese speaking M with PMHx of HTN/HLD, DM2, BPH,  who was brought to the ED for unresponsiveness. Communicated via pacific  ID #[id number]. Patient is an extremely limited historian, therefore, history is primarily obtained by chart review. Patient had taken his insulin prior to laying down for a nap. He was subsequently found by his family unresponsive. EMS was called and patient was found to be hypoglycemic to the 40s and hypotensive to 75/42. He was given an amp of D50 with improvement in his glucose to the 200s and 500 ccs of NS. On arrival to the ED, patient was found to be hypoglycemic again to the 50s. As per his DIL, Yulissa, she believes he overdosed on his insulin, however, she does not know how much he typically takes or how much he administered today. When asked, the patient says he takes "151 insulin." He endorses dysuria which started "recently," however he is unable to further elaborate.     In the ED, patient tachypneic to 22, tachycardic to 104, with soft BP of 94/70. Labs significant for Mag of 1.5 and Phos of 2.3. CXR, Pelvic xray and R hip xray all unremarkable. UA grossly positive. Patient was given a dose of Mag, Phos, Ceftriaxone and Jim. Admitted to medicine for further management.     Unable to obtain family history as patient is a limited historian.  (07 Jun 2022 05:01)      Patient seen and evaluated at bedside. No acute events overnight except as noted.    Interval HPI: no acute events o/n     PAST MEDICAL & SURGICAL HISTORY:  DM (diabetes mellitus)      HTN (hypertension)      Hypercholesterolemia      CAD (coronary artery disease)      HTN (hypertension)      DM (diabetes mellitus)      S/P TURP      S/P gastrectomy          REVIEW OF SYSTEMS:  as per hpi     VITALS:   Vital Signs Last 24 Hrs  T(C): 36.6 (10 Jamil 2022 12:49), Max: 36.9 (09 Jun 2022 16:10)  T(F): 97.9 (10 Jamil 2022 12:49), Max: 98.4 (09 Jun 2022 16:10)  HR: 77 (10 Jamil 2022 12:49) (75 - 84)  BP: 125/66 (10 Jamil 2022 12:49) (122/78 - 149/60)  BP(mean): --  RR: 18 (10 Jamil 2022 12:49) (18 - 18)  SpO2: 100% (10 Jamil 2022 12:49) (95% - 100%)    PHYSICAL EXAM:  GENERAL: NAD, well-developed  HEAD:  Atraumatic, Normocephalic  EYES: EOMI, PERRLA, conjunctiva and sclera clear  NECK: Supple, No JVD  CHEST/LUNG: Clear to auscultation bilaterally; No wheeze  HEART: S1, S2; No murmurs, rubs, or gallops  ABDOMEN: Soft, Nontender, Nondistended; Bowel sounds present  EXTREMITIES:  2+ Peripheral Pulses, No clubbing, cyanosis, or edema  PSYCH: Normal affect  NEUROLOGY: AAOX3; non-focal  SKIN: No rashes or lesions    Consultant(s) Notes Reviewed:  [x ] YES  [ ] NO  Care Discussed with Consultants/Other Providers [ x] YES  [ ] NO    MEDS:   MEDICATIONS  (STANDING):  aspirin enteric coated 81 milliGRAM(s) Oral daily  budesonide 160 MICROgram(s)/formoterol 4.5 MICROgram(s) Inhaler 2 Puff(s) Inhalation two times a day  dextrose 5% + sodium chloride 0.9%. 1000 milliLiter(s) (500 mL/Hr) IV Continuous <Continuous>  dextrose 5%. 1000 milliLiter(s) (50 mL/Hr) IV Continuous <Continuous>  dextrose 5%. 1000 milliLiter(s) (100 mL/Hr) IV Continuous <Continuous>  dextrose 50% Injectable 25 Gram(s) IV Push once  dextrose 50% Injectable 12.5 Gram(s) IV Push once  dextrose 50% Injectable 25 Gram(s) IV Push once  finasteride 5 milliGRAM(s) Oral daily  glucagon  Injectable 1 milliGRAM(s) IntraMuscular once  heparin   Injectable 5000 Unit(s) SubCutaneous every 8 hours  influenza  Vaccine (HIGH DOSE) 0.7 milliLiter(s) IntraMuscular once  insulin glargine Injectable (LANTUS) 11 Unit(s) SubCutaneous at bedtime  insulin lispro (ADMELOG) corrective regimen sliding scale   SubCutaneous three times a day before meals  insulin lispro (ADMELOG) corrective regimen sliding scale   SubCutaneous at bedtime  insulin lispro Injectable (ADMELOG) 6 Unit(s) SubCutaneous three times a day before meals  metoprolol succinate ER 25 milliGRAM(s) Oral daily  pantoprazole    Tablet 40 milliGRAM(s) Oral before breakfast  senna 2 Tablet(s) Oral at bedtime  tamsulosin 0.8 milliGRAM(s) Oral at bedtime  trimethoprim  160 mG/sulfamethoxazole 800 mG 1 Tablet(s) Oral two times a day    MEDICATIONS  (PRN):  acetaminophen     Tablet .. 650 milliGRAM(s) Oral every 6 hours PRN Temp greater or equal to 38C (100.4F), Mild Pain (1 - 3)  albuterol/ipratropium for Nebulization 3 milliLiter(s) Nebulizer every 6 hours PRN Shortness of Breath and/or Wheezing  aluminum hydroxide/magnesium hydroxide/simethicone Suspension 30 milliLiter(s) Oral every 4 hours PRN Dyspepsia  dextrose Oral Gel 15 Gram(s) Oral once PRN Blood Glucose LESS THAN 70 milliGRAM(s)/deciliter  melatonin 3 milliGRAM(s) Oral at bedtime PRN Insomnia  ondansetron Injectable 4 milliGRAM(s) IV Push every 8 hours PRN Nausea and/or Vomiting      ALLERGIES:  No Known Allergies      LABS:                              11.5   6.02  )-----------( 204      ( 10 Jamil 2022 07:05 )             35.9   06-10    142  |  104  |  20  ----------------------------<  81  4.1   |  27  |  1.33<H>    Ca    9.2      10 Jamil 2022 07:05

## 2022-06-10 NOTE — PROGRESS NOTE ADULT - PROBLEM SELECTOR PLAN 4
- patient with documented history of Afib.  all EKG in hospital are NSR  - currently in sinus rhythm  - hold home Metoprolol in the setting of sepsis and recent hypotension  as per family not on AC at home due to falls risk  cont ASA

## 2022-06-10 NOTE — PROGRESS NOTE ADULT - ASSESSMENT
80 yo cantonese speaking M with PMHx of HTN/HLD, DM2, BPH, Afib who was brought to the ED for unresponsiveness with UTI, hypoglycemia    Naren Cabezas  Attending Physician   Division of Infectious Disease  Office #[phone]  Available on Microsoft Teams also  After 5pm/weekend or no response, call #[phone]

## 2022-06-10 NOTE — PROGRESS NOTE ADULT - PROBLEM SELECTOR PROBLEM 1
Sepsis
Sepsis
Diabetes mellitus with hypoglycemia
Sepsis
Sepsis
Acute UTI
Acute UTI

## 2022-06-10 NOTE — PROGRESS NOTE ADULT - PROBLEM SELECTOR PLAN 2
Suggest to continue medications, monitoring, FU primary team recommendations.
Suggest to continue medications, monitoring, FU primary team recommendations.
- patient with reported dysuria and positive UA on admission   - s/p Jim in the ED  - given history of ESBL ecoli UTI and bacteremia, will continue with Jim   - f/u blood and urine cultures  ID consulted
- patient with reported dysuria and positive UA on admission   -  history of ESBL ecoli UTI and bacteremia,    f/u cult and sens  abx as per id  change to oral bactrim for dc planning as per ID
Suggest to continue medications, monitoring, FU primary team recommendations.
- patient with reported dysuria and positive UA on admission   - s/p Jim in the ED  - given history of ESBL ecoli UTI and bacteremia, will continue with Jim   - f/u blood and urine cultures  ID consulted
- patient with reported dysuria and positive UA on admission   -  history of ESBL ecoli UTI and bacteremia,    f/u cult and sens  abx as per id
-per Endo
-per Endo

## 2022-06-10 NOTE — DISCHARGE NOTE NURSING/CASE MANAGEMENT/SOCIAL WORK - NSDCPEFALRISK_GEN_ALL_CORE
For information on Fall & Injury Prevention, visit: [email or web address] OR  [email or web address] OR  https://www.cdc.gov/steadi/patient.html

## 2022-06-10 NOTE — PROGRESS NOTE ADULT - SUBJECTIVE AND OBJECTIVE BOX
KAITLIN CALDERON 81y [id number]    Patient is a 81y old  Male who presents with a chief complaint of     Follow Up/CC:  ID following for    Interval History/ROS:    Allergies    No Known Allergies    Intolerances        ANTIMICROBIALS:  ertapenem  IVPB 1000 every 24 hours  trimethoprim  160 mG/sulfamethoxazole 800 mG 1 two times a day      MEDICATIONS  (STANDING):  aspirin enteric coated 81 milliGRAM(s) Oral daily  budesonide 160 MICROgram(s)/formoterol 4.5 MICROgram(s) Inhaler 2 Puff(s) Inhalation two times a day  dextrose 5% + sodium chloride 0.9%. 1000 milliLiter(s) (500 mL/Hr) IV Continuous <Continuous>  dextrose 5%. 1000 milliLiter(s) (50 mL/Hr) IV Continuous <Continuous>  dextrose 5%. 1000 milliLiter(s) (100 mL/Hr) IV Continuous <Continuous>  dextrose 50% Injectable 25 Gram(s) IV Push once  dextrose 50% Injectable 12.5 Gram(s) IV Push once  dextrose 50% Injectable 25 Gram(s) IV Push once  ertapenem  IVPB 1000 milliGRAM(s) IV Intermittent every 24 hours  finasteride 5 milliGRAM(s) Oral daily  glucagon  Injectable 1 milliGRAM(s) IntraMuscular once  heparin   Injectable 5000 Unit(s) SubCutaneous every 8 hours  influenza  Vaccine (HIGH DOSE) 0.7 milliLiter(s) IntraMuscular once  insulin glargine Injectable (LANTUS) 11 Unit(s) SubCutaneous at bedtime  insulin lispro (ADMELOG) corrective regimen sliding scale   SubCutaneous three times a day before meals  insulin lispro (ADMELOG) corrective regimen sliding scale   SubCutaneous at bedtime  insulin lispro Injectable (ADMELOG) 6 Unit(s) SubCutaneous three times a day before meals  metoprolol succinate ER 25 milliGRAM(s) Oral daily  pantoprazole    Tablet 40 milliGRAM(s) Oral before breakfast  senna 2 Tablet(s) Oral at bedtime  tamsulosin 0.8 milliGRAM(s) Oral at bedtime  trimethoprim  160 mG/sulfamethoxazole 800 mG 1 Tablet(s) Oral two times a day    MEDICATIONS  (PRN):  acetaminophen     Tablet .. 650 milliGRAM(s) Oral every 6 hours PRN Temp greater or equal to 38C (100.4F), Mild Pain (1 - 3)  albuterol/ipratropium for Nebulization 3 milliLiter(s) Nebulizer every 6 hours PRN Shortness of Breath and/or Wheezing  aluminum hydroxide/magnesium hydroxide/simethicone Suspension 30 milliLiter(s) Oral every 4 hours PRN Dyspepsia  dextrose Oral Gel 15 Gram(s) Oral once PRN Blood Glucose LESS THAN 70 milliGRAM(s)/deciliter  melatonin 3 milliGRAM(s) Oral at bedtime PRN Insomnia  ondansetron Injectable 4 milliGRAM(s) IV Push every 8 hours PRN Nausea and/or Vomiting        Vital Signs Last 24 Hrs  T(C): 36.6 (10 Jamil 2022 08:38), Max: 36.9 (09 Jun 2022 16:10)  T(F): 97.9 (10 Jamil 2022 08:38), Max: 98.4 (09 Jun 2022 16:10)  HR: 80 (10 Jamil 2022 08:38) (75 - 84)  BP: 149/60 (10 Jamil 2022 08:38) (122/78 - 149/60)  BP(mean): --  RR: 18 (10 Jamil 2022 08:38) (18 - 18)  SpO2: 95% (10 Jamil 2022 08:38) (95% - 98%)    CBC Full  -  ( 10 Jamil 2022 07:05 )  WBC Count : 6.02 K/uL  RBC Count : 4.12 M/uL  Hemoglobin : 11.5 g/dL  Hematocrit : 35.9 %  Platelet Count - Automated : 204 K/uL  Mean Cell Volume : 87.1 fl  Mean Cell Hemoglobin : 27.9 pg  Mean Cell Hemoglobin Concentration : 32.0 gm/dL  Auto Neutrophil # : x  Auto Lymphocyte # : x  Auto Monocyte # : x  Auto Eosinophil # : x  Auto Basophil # : x  Auto Neutrophil % : x  Auto Lymphocyte % : x  Auto Monocyte % : x  Auto Eosinophil % : x  Auto Basophil % : x    06-10    142  |  104  |  20  ----------------------------<  81  4.1   |  27  |  1.33<H>    Ca    9.2      10 Jamil 2022 07:05            MICROBIOLOGY:  Clean Catch Clean Catch (Midstream)  06-06-22   >100,000 CFU/ml Escherichia coli  50,000 - 99,000 CFU/mL Enterococcus faecalis  --  Escherichia coli  Enterococcus faecalis      .Blood Blood-Peripheral  06-06-22   No growth to date.  --  --      .Blood Blood-Peripheral  06-06-22   No growth to date.  --  --              v            RADIOLOGY     KAITLIN CALDERON 81y [id number]    Patient is a 81y old  Male who presents with a chief complaint of     Follow Up/CC:  ID following for UTI    Interval History/ROS: no fever    Allergies    No Known Allergies    Intolerances        ANTIMICROBIALS:  ertapenem  IVPB 1000 every 24 hours  trimethoprim  160 mG/sulfamethoxazole 800 mG 1 two times a day      MEDICATIONS  (STANDING):  aspirin enteric coated 81 milliGRAM(s) Oral daily  budesonide 160 MICROgram(s)/formoterol 4.5 MICROgram(s) Inhaler 2 Puff(s) Inhalation two times a day  dextrose 5% + sodium chloride 0.9%. 1000 milliLiter(s) (500 mL/Hr) IV Continuous <Continuous>  dextrose 5%. 1000 milliLiter(s) (50 mL/Hr) IV Continuous <Continuous>  dextrose 5%. 1000 milliLiter(s) (100 mL/Hr) IV Continuous <Continuous>  dextrose 50% Injectable 25 Gram(s) IV Push once  dextrose 50% Injectable 12.5 Gram(s) IV Push once  dextrose 50% Injectable 25 Gram(s) IV Push once  ertapenem  IVPB 1000 milliGRAM(s) IV Intermittent every 24 hours  finasteride 5 milliGRAM(s) Oral daily  glucagon  Injectable 1 milliGRAM(s) IntraMuscular once  heparin   Injectable 5000 Unit(s) SubCutaneous every 8 hours  influenza  Vaccine (HIGH DOSE) 0.7 milliLiter(s) IntraMuscular once  insulin glargine Injectable (LANTUS) 11 Unit(s) SubCutaneous at bedtime  insulin lispro (ADMELOG) corrective regimen sliding scale   SubCutaneous three times a day before meals  insulin lispro (ADMELOG) corrective regimen sliding scale   SubCutaneous at bedtime  insulin lispro Injectable (ADMELOG) 6 Unit(s) SubCutaneous three times a day before meals  metoprolol succinate ER 25 milliGRAM(s) Oral daily  pantoprazole    Tablet 40 milliGRAM(s) Oral before breakfast  senna 2 Tablet(s) Oral at bedtime  tamsulosin 0.8 milliGRAM(s) Oral at bedtime  trimethoprim  160 mG/sulfamethoxazole 800 mG 1 Tablet(s) Oral two times a day    MEDICATIONS  (PRN):  acetaminophen     Tablet .. 650 milliGRAM(s) Oral every 6 hours PRN Temp greater or equal to 38C (100.4F), Mild Pain (1 - 3)  albuterol/ipratropium for Nebulization 3 milliLiter(s) Nebulizer every 6 hours PRN Shortness of Breath and/or Wheezing  aluminum hydroxide/magnesium hydroxide/simethicone Suspension 30 milliLiter(s) Oral every 4 hours PRN Dyspepsia  dextrose Oral Gel 15 Gram(s) Oral once PRN Blood Glucose LESS THAN 70 milliGRAM(s)/deciliter  melatonin 3 milliGRAM(s) Oral at bedtime PRN Insomnia  ondansetron Injectable 4 milliGRAM(s) IV Push every 8 hours PRN Nausea and/or Vomiting        Vital Signs Last 24 Hrs  T(C): 36.6 (10 Jamil 2022 08:38), Max: 36.9 (09 Jun 2022 16:10)  T(F): 97.9 (10 Jamil 2022 08:38), Max: 98.4 (09 Jun 2022 16:10)  HR: 80 (10 Jamil 2022 08:38) (75 - 84)  BP: 149/60 (10 Jamil 2022 08:38) (122/78 - 149/60)  BP(mean): --  RR: 18 (10 Jamil 2022 08:38) (18 - 18)  SpO2: 95% (10 Jamil 2022 08:38) (95% - 98%)    CBC Full  -  ( 10 Jamil 2022 07:05 )  WBC Count : 6.02 K/uL  RBC Count : 4.12 M/uL  Hemoglobin : 11.5 g/dL  Hematocrit : 35.9 %  Platelet Count - Automated : 204 K/uL  Mean Cell Volume : 87.1 fl  Mean Cell Hemoglobin : 27.9 pg  Mean Cell Hemoglobin Concentration : 32.0 gm/dL  Auto Neutrophil # : x  Auto Lymphocyte # : x  Auto Monocyte # : x  Auto Eosinophil # : x  Auto Basophil # : x  Auto Neutrophil % : x  Auto Lymphocyte % : x  Auto Monocyte % : x  Auto Eosinophil % : x  Auto Basophil % : x    06-10    142  |  104  |  20  ----------------------------<  81  4.1   |  27  |  1.33<H>    Ca    9.2      10 Jamil 2022 07:05            MICROBIOLOGY:  Clean Catch Clean Catch (Midstream)  06-06-22   >100,000 CFU/ml Escherichia coli  50,000 - 99,000 CFU/mL Enterococcus faecalis  --  Escherichia coli  Enterococcus faecalis      .Blood Blood-Peripheral  06-06-22   No growth to date.  --  --      .Blood Blood-Peripheral  06-06-22   No growth to date.  --  --              v            RADIOLOGY

## 2022-06-10 NOTE — PROGRESS NOTE ADULT - SUBJECTIVE AND OBJECTIVE BOX
Chief complaint  Patient is a 81y old  Male who presents with a chief complaint of  Review of systems  Patient in bed, looks comfortable, no hypoglycemic episodes.    Labs and Fingersticks  CAPILLARY BLOOD GLUCOSE      POCT Blood Glucose.: 125 mg/dL (10 Jamil 2022 12:22)  POCT Blood Glucose.: 143 mg/dL (10 Jamil 2022 10:28)  POCT Blood Glucose.: 84 mg/dL (10 Jamil 2022 09:00)  POCT Blood Glucose.: 120 mg/dL (09 Jun 2022 21:43)  POCT Blood Glucose.: 111 mg/dL (09 Jun 2022 17:27)      Anion Gap, Serum: 11 (06-10 @ 07:05)      Calcium, Total Serum: 9.2 (06-10 @ 07:05)          06-10    142  |  104  |  20  ----------------------------<  81  4.1   |  27  |  1.33<H>    Ca    9.2      10 Jamil 2022 07:05                          11.5   6.02  )-----------( 204      ( 10 Jamil 2022 07:05 )             35.9     Medications  MEDICATIONS  (STANDING):  aspirin enteric coated 81 milliGRAM(s) Oral daily  budesonide 160 MICROgram(s)/formoterol 4.5 MICROgram(s) Inhaler 2 Puff(s) Inhalation two times a day  dextrose 5% + sodium chloride 0.9%. 1000 milliLiter(s) (500 mL/Hr) IV Continuous <Continuous>  dextrose 5%. 1000 milliLiter(s) (50 mL/Hr) IV Continuous <Continuous>  dextrose 5%. 1000 milliLiter(s) (100 mL/Hr) IV Continuous <Continuous>  dextrose 50% Injectable 25 Gram(s) IV Push once  dextrose 50% Injectable 12.5 Gram(s) IV Push once  dextrose 50% Injectable 25 Gram(s) IV Push once  finasteride 5 milliGRAM(s) Oral daily  glucagon  Injectable 1 milliGRAM(s) IntraMuscular once  heparin   Injectable 5000 Unit(s) SubCutaneous every 8 hours  influenza  Vaccine (HIGH DOSE) 0.7 milliLiter(s) IntraMuscular once  insulin glargine Injectable (LANTUS) 11 Unit(s) SubCutaneous at bedtime  insulin lispro (ADMELOG) corrective regimen sliding scale   SubCutaneous three times a day before meals  insulin lispro (ADMELOG) corrective regimen sliding scale   SubCutaneous at bedtime  insulin lispro Injectable (ADMELOG) 6 Unit(s) SubCutaneous three times a day before meals  metoprolol succinate ER 25 milliGRAM(s) Oral daily  pantoprazole    Tablet 40 milliGRAM(s) Oral before breakfast  senna 2 Tablet(s) Oral at bedtime  tamsulosin 0.8 milliGRAM(s) Oral at bedtime  trimethoprim  160 mG/sulfamethoxazole 800 mG 1 Tablet(s) Oral two times a day      Physical Exam  General: Patient comfortable in bed  Vital Signs Last 12 Hrs  T(F): 97.9 (06-10-22 @ 12:49), Max: 98.3 (06-10-22 @ 12:45)  HR: 77 (06-10-22 @ 12:49) (77 - 84)  BP: 125/66 (06-10-22 @ 12:49) (125/66 - 149/60)  BP(mean): --  RR: 18 (06-10-22 @ 12:49) (18 - 18)  SpO2: 100% (06-10-22 @ 12:49) (95% - 100%)  Neck: No palpable thyroid nodules.  CVS: S1S2, No murmurs  Respiratory: No wheezing, no crepitations  GI: Abdomen soft, bowel sounds positive  Musculoskeletal:  edema lower extremities.   Skin: No skin rashes, no ecchymosis    Diagnostics    C-Peptide, Serum: AM Sched. Collection: 08-Jun-2022 06:00 (06-07 @ 16:06)  Cortisol AM, Serum: AM Sched. Collection: 08-Jun-2022 06:00 (06-07 @ 13:29)           Chief complaint  Patient is a 81y old  Male who presents with a chief complaint of  Review of systems  Patient in bed, looks comfortable, no hypoglycemic episodes.    Labs and Fingersticks  CAPILLARY BLOOD GLUCOSE      POCT Blood Glucose.: 125 mg/dL (10 Jamil 2022 12:22)  POCT Blood Glucose.: 143 mg/dL (10 Jamil 2022 10:28)  POCT Blood Glucose.: 84 mg/dL (10 Jamil 2022 09:00)  POCT Blood Glucose.: 120 mg/dL (09 Jun 2022 21:43)  POCT Blood Glucose.: 111 mg/dL (09 Jun 2022 17:27)      Anion Gap, Serum: 11 (06-10 @ 07:05)      Calcium, Total Serum: 9.2 (06-10 @ 07:05)          06-10    142  |  104  |  20  ----------------------------<  81  4.1   |  27  |  1.33<H>    Ca    9.2      10 Jamil 2022 07:05                          11.5   6.02  )-----------( 204      ( 10 Jamil 2022 07:05 )             35.9     Medications  MEDICATIONS  (STANDING):  aspirin enteric coated 81 milliGRAM(s) Oral daily  budesonide 160 MICROgram(s)/formoterol 4.5 MICROgram(s) Inhaler 2 Puff(s) Inhalation two times a day  dextrose 5% + sodium chloride 0.9%. 1000 milliLiter(s) (500 mL/Hr) IV Continuous <Continuous>  dextrose 5%. 1000 milliLiter(s) (50 mL/Hr) IV Continuous <Continuous>  dextrose 5%. 1000 milliLiter(s) (100 mL/Hr) IV Continuous <Continuous>  dextrose 50% Injectable 25 Gram(s) IV Push once  dextrose 50% Injectable 12.5 Gram(s) IV Push once  dextrose 50% Injectable 25 Gram(s) IV Push once  finasteride 5 milliGRAM(s) Oral daily  glucagon  Injectable 1 milliGRAM(s) IntraMuscular once  heparin   Injectable 5000 Unit(s) SubCutaneous every 8 hours  influenza  Vaccine (HIGH DOSE) 0.7 milliLiter(s) IntraMuscular once  insulin glargine Injectable (LANTUS) 11 Unit(s) SubCutaneous at bedtime  insulin lispro (ADMELOG) corrective regimen sliding scale   SubCutaneous three times a day before meals  insulin lispro (ADMELOG) corrective regimen sliding scale   SubCutaneous at bedtime  insulin lispro Injectable (ADMELOG) 6 Unit(s) SubCutaneous three times a day before meals  metoprolol succinate ER 25 milliGRAM(s) Oral daily  pantoprazole    Tablet 40 milliGRAM(s) Oral before breakfast  senna 2 Tablet(s) Oral at bedtime  tamsulosin 0.8 milliGRAM(s) Oral at bedtime  trimethoprim  160 mG/sulfamethoxazole 800 mG 1 Tablet(s) Oral two times a day      Physical Exam  General: Patient comfortable in bed  Vital Signs Last 12 Hrs  T(F): 97.9 (06-10-22 @ 12:49), Max: 98.3 (06-10-22 @ 12:45)  HR: 77 (06-10-22 @ 12:49) (77 - 84)  BP: 125/66 (06-10-22 @ 12:49) (125/66 - 149/60)  BP(mean): --  RR: 18 (06-10-22 @ 12:49) (18 - 18)  SpO2: 100% (06-10-22 @ 12:49) (95% - 100%)  Neck: No palpable thyroid nodules.  CVS: S1S2, No murmurs  Respiratory: No wheezing, no crepitations  GI: Abdomen soft, bowel sounds positive  Musculoskeletal:  edema lower extremities.   Skin: No skin rashes, no ecchymosis    Diagnostics    C-Peptide, Serum: AM Sched. Collection: 08-Jun-2022 06:00 (06-07 @ 16:06)  Cortisol AM, Serum: AM Sched. Collection: 08-Jun-2022 06:00 (06-07 @ 13:29)

## 2022-06-10 NOTE — DISCHARGE NOTE NURSING/CASE MANAGEMENT/SOCIAL WORK - PATIENT PORTAL LINK FT
You can access the FollowMyHealth Patient Portal offered by [facility] by registering at the following website: [email or web address]. By joining [employer]’s FollowMyHealth portal, you will also be able to view your health information using other applications (apps) compatible with our system.

## 2022-06-10 NOTE — PROGRESS NOTE ADULT - PROBLEM SELECTOR PLAN 3
- patient with recurrent episodes of hypoglycemia    monitor FS    - PO intake encouraged   endo consulted  insulin as per endo
- patient with recurrent episodes of hypoglycemia   - unclear etiology but ddx includes sepsis vs insulin overdose  - will hold all insulin at this time  - monitor FS    - PO intake encouraged   endo consulted
- patient with recurrent episodes of hypoglycemia    monitor FS    - PO intake encouraged   endo consulted  insulin as per endo
- patient with recurrent episodes of hypoglycemia   - unclear etiology but ddx includes sepsis vs insulin overdose  - will hold all insulin at this time  - check A1C   - monitor FS q4 for now  - PO intake encouraged   endo consulted
Suggest to continue medications, monitoring, FU primary team recommendations.
finger/left
Suggest to continue medications, monitoring, FU primary team recommendations.
Suggest to continue medications, monitoring, FU primary team recommendations.

## 2022-06-10 NOTE — DISCHARGE NOTE NURSING/CASE MANAGEMENT/SOCIAL WORK - NSDCFUADDAPPT_GEN_ALL_CORE_FT
Podiatry Discharge Instructions:  Follow up: Please follow up with Dr. Ashby within 1 week of discharge from the hospital, please call [phone] for appointment and discuss that you recently were seen in the hospital.  Weight bearing: Please weight bear as tolerated in a surgical shoe.  Antibiotics: Please continue as instructed.

## 2022-06-14 LAB
CORTICOSTEROID BINDING GLOBULIN RESULT: 1.8 MG/DL — SIGNIFICANT CHANGE UP
CORTIS F/TOTAL MFR SERPL: 41 % — SIGNIFICANT CHANGE UP
CORTIS SERPL-MCNC: 20 UG/DL — HIGH
CORTISOL, FREE RESULT: 8.1 UG/DL — HIGH

## 2022-06-21 NOTE — ED ADULT NURSE NOTE - CAS EDP DISCH DISPOSITION ADMI
Do You Have A Family History Of Psoriasis?: no
How Severe Is Your Psoriasis?: moderate
Is This A New Presentation, Or A Follow-Up?: Follow Up Psoriasis
[facility]

## 2022-07-29 NOTE — PATIENT PROFILE ADULT - NSPROMEDSBROUGHTTOHOSP_GEN_A_NUR
Quality 226: Preventive Care And Screening: Tobacco Use: Screening And Cessation Intervention: Tobacco Screening not Performed for Medical Reasons Quality 111:Pneumonia Vaccination Status For Older Adults: Pneumococcal Vaccination Previously Received Detail Level: Detailed Quality 110: Preventive Care And Screening: Influenza Immunization: Influenza Immunization Administered during Influenza season Quality 402: Tobacco Use And Help With Quitting Among Adolescents: Patient screened for tobacco and never smoked yes

## 2022-10-19 ENCOUNTER — INPATIENT (INPATIENT)
Facility: HOSPITAL | Age: 82
LOS: 28 days | Discharge: NURSING, REHAB OR LONG-TERM CARE | DRG: 551 | End: 2022-11-17
Attending: INTERNAL MEDICINE | Admitting: INTERNAL MEDICINE
Payer: COMMERCIAL

## 2022-10-19 VITALS
TEMPERATURE: 98 F | SYSTOLIC BLOOD PRESSURE: 110 MMHG | HEART RATE: 118 BPM | OXYGEN SATURATION: 98 % | DIASTOLIC BLOOD PRESSURE: 78 MMHG | RESPIRATION RATE: 18 BRPM | HEIGHT: 71 IN

## 2022-10-19 DIAGNOSIS — Z90.3 ACQUIRED ABSENCE OF STOMACH [PART OF]: Chronic | ICD-10-CM

## 2022-10-19 DIAGNOSIS — Z90.79 ACQUIRED ABSENCE OF OTHER GENITAL ORGAN(S): Chronic | ICD-10-CM

## 2022-10-19 DIAGNOSIS — M54.9 DORSALGIA, UNSPECIFIED: ICD-10-CM

## 2022-10-19 LAB
ALBUMIN SERPL ELPH-MCNC: 3 G/DL — LOW (ref 3.3–5)
ALP SERPL-CCNC: 190 U/L — HIGH (ref 40–120)
ALT FLD-CCNC: 15 U/L — SIGNIFICANT CHANGE UP (ref 10–45)
ANION GAP SERPL CALC-SCNC: 11 MMOL/L — SIGNIFICANT CHANGE UP (ref 5–17)
APPEARANCE UR: CLEAR — SIGNIFICANT CHANGE UP
APTT BLD: 31.6 SEC — SIGNIFICANT CHANGE UP (ref 27.5–35.5)
AST SERPL-CCNC: 12 U/L — SIGNIFICANT CHANGE UP (ref 10–40)
BACTERIA # UR AUTO: NEGATIVE — SIGNIFICANT CHANGE UP
BASE EXCESS BLDV CALC-SCNC: 4 MMOL/L — HIGH (ref -2–3)
BASOPHILS # BLD AUTO: 0.02 K/UL — SIGNIFICANT CHANGE UP (ref 0–0.2)
BASOPHILS # BLD AUTO: 0.02 K/UL — SIGNIFICANT CHANGE UP (ref 0–0.2)
BASOPHILS NFR BLD AUTO: 0.2 % — SIGNIFICANT CHANGE UP (ref 0–2)
BASOPHILS NFR BLD AUTO: 0.2 % — SIGNIFICANT CHANGE UP (ref 0–2)
BILIRUB SERPL-MCNC: 1 MG/DL — SIGNIFICANT CHANGE UP (ref 0.2–1.2)
BILIRUB UR-MCNC: NEGATIVE — SIGNIFICANT CHANGE UP
BLD GP AB SCN SERPL QL: NEGATIVE — SIGNIFICANT CHANGE UP
BUN SERPL-MCNC: 18 MG/DL — SIGNIFICANT CHANGE UP (ref 7–23)
CA-I SERPL-SCNC: 1.2 MMOL/L — SIGNIFICANT CHANGE UP (ref 1.15–1.33)
CALCIUM SERPL-MCNC: 9.5 MG/DL — SIGNIFICANT CHANGE UP (ref 8.4–10.5)
CHLORIDE BLDV-SCNC: 91 MMOL/L — LOW (ref 96–108)
CHLORIDE SERPL-SCNC: 91 MMOL/L — LOW (ref 96–108)
CK SERPL-CCNC: 38 U/L — SIGNIFICANT CHANGE UP (ref 30–200)
CO2 BLDV-SCNC: 31 MMOL/L — HIGH (ref 22–26)
CO2 SERPL-SCNC: 26 MMOL/L — SIGNIFICANT CHANGE UP (ref 22–31)
COLOR SPEC: SIGNIFICANT CHANGE UP
CREAT SERPL-MCNC: 1.11 MG/DL — SIGNIFICANT CHANGE UP (ref 0.5–1.3)
DIFF PNL FLD: NEGATIVE — SIGNIFICANT CHANGE UP
EGFR: 65 ML/MIN/1.73M2 — SIGNIFICANT CHANGE UP
EOSINOPHIL # BLD AUTO: 0.01 K/UL — SIGNIFICANT CHANGE UP (ref 0–0.5)
EOSINOPHIL # BLD AUTO: 0.03 K/UL — SIGNIFICANT CHANGE UP (ref 0–0.5)
EOSINOPHIL NFR BLD AUTO: 0.1 % — SIGNIFICANT CHANGE UP (ref 0–6)
EOSINOPHIL NFR BLD AUTO: 0.3 % — SIGNIFICANT CHANGE UP (ref 0–6)
EPI CELLS # UR: 1 /HPF — SIGNIFICANT CHANGE UP
GAS PNL BLDV: 125 MMOL/L — LOW (ref 136–145)
GAS PNL BLDV: SIGNIFICANT CHANGE UP
GLUCOSE BLDV-MCNC: 398 MG/DL — HIGH (ref 70–99)
GLUCOSE SERPL-MCNC: 409 MG/DL — HIGH (ref 70–99)
GLUCOSE UR QL: ABNORMAL
HCO3 BLDV-SCNC: 30 MMOL/L — HIGH (ref 22–29)
HCT VFR BLD CALC: 34.1 % — LOW (ref 39–50)
HCT VFR BLD CALC: 36 % — LOW (ref 39–50)
HCT VFR BLDA CALC: 38 % — LOW (ref 39–51)
HGB BLD CALC-MCNC: 12.5 G/DL — LOW (ref 12.6–17.4)
HGB BLD-MCNC: 11.3 G/DL — LOW (ref 13–17)
HGB BLD-MCNC: 11.9 G/DL — LOW (ref 13–17)
HYALINE CASTS # UR AUTO: 1 /LPF — SIGNIFICANT CHANGE UP (ref 0–2)
IMM GRANULOCYTES NFR BLD AUTO: 0.8 % — SIGNIFICANT CHANGE UP (ref 0–0.9)
IMM GRANULOCYTES NFR BLD AUTO: 0.9 % — SIGNIFICANT CHANGE UP (ref 0–0.9)
INR BLD: 1.12 RATIO — SIGNIFICANT CHANGE UP (ref 0.88–1.16)
KETONES UR-MCNC: NEGATIVE — SIGNIFICANT CHANGE UP
LACTATE BLDV-MCNC: 2.3 MMOL/L — HIGH (ref 0.5–2)
LEUKOCYTE ESTERASE UR-ACNC: NEGATIVE — SIGNIFICANT CHANGE UP
LYMPHOCYTES # BLD AUTO: 0.32 K/UL — LOW (ref 1–3.3)
LYMPHOCYTES # BLD AUTO: 0.39 K/UL — LOW (ref 1–3.3)
LYMPHOCYTES # BLD AUTO: 2.5 % — LOW (ref 13–44)
LYMPHOCYTES # BLD AUTO: 3.3 % — LOW (ref 13–44)
MAGNESIUM SERPL-MCNC: 1.4 MG/DL — LOW (ref 1.6–2.6)
MCHC RBC-ENTMCNC: 28.7 PG — SIGNIFICANT CHANGE UP (ref 27–34)
MCHC RBC-ENTMCNC: 28.9 PG — SIGNIFICANT CHANGE UP (ref 27–34)
MCHC RBC-ENTMCNC: 33.1 GM/DL — SIGNIFICANT CHANGE UP (ref 32–36)
MCHC RBC-ENTMCNC: 33.1 GM/DL — SIGNIFICANT CHANGE UP (ref 32–36)
MCV RBC AUTO: 86.7 FL — SIGNIFICANT CHANGE UP (ref 80–100)
MCV RBC AUTO: 87.2 FL — SIGNIFICANT CHANGE UP (ref 80–100)
MONOCYTES # BLD AUTO: 0.75 K/UL — SIGNIFICANT CHANGE UP (ref 0–0.9)
MONOCYTES # BLD AUTO: 0.85 K/UL — SIGNIFICANT CHANGE UP (ref 0–0.9)
MONOCYTES NFR BLD AUTO: 5.9 % — SIGNIFICANT CHANGE UP (ref 2–14)
MONOCYTES NFR BLD AUTO: 7.2 % — SIGNIFICANT CHANGE UP (ref 2–14)
NEUTROPHILS # BLD AUTO: 10.42 K/UL — HIGH (ref 1.8–7.4)
NEUTROPHILS # BLD AUTO: 11.55 K/UL — HIGH (ref 1.8–7.4)
NEUTROPHILS NFR BLD AUTO: 88.1 % — HIGH (ref 43–77)
NEUTROPHILS NFR BLD AUTO: 90.5 % — HIGH (ref 43–77)
NITRITE UR-MCNC: NEGATIVE — SIGNIFICANT CHANGE UP
NRBC # BLD: 0 /100 WBCS — SIGNIFICANT CHANGE UP (ref 0–0)
NRBC # BLD: 0 /100 WBCS — SIGNIFICANT CHANGE UP (ref 0–0)
PCO2 BLDV: 48 MMHG — SIGNIFICANT CHANGE UP (ref 42–55)
PH BLDV: 7.4 — SIGNIFICANT CHANGE UP (ref 7.32–7.43)
PH UR: 7 — SIGNIFICANT CHANGE UP (ref 5–8)
PLATELET # BLD AUTO: 165 K/UL — SIGNIFICANT CHANGE UP (ref 150–400)
PLATELET # BLD AUTO: 171 K/UL — SIGNIFICANT CHANGE UP (ref 150–400)
PO2 BLDV: 26 MMHG — SIGNIFICANT CHANGE UP (ref 25–45)
POTASSIUM BLDV-SCNC: 5.3 MMOL/L — HIGH (ref 3.5–5.1)
POTASSIUM SERPL-MCNC: 4.9 MMOL/L — SIGNIFICANT CHANGE UP (ref 3.5–5.3)
POTASSIUM SERPL-SCNC: 4.9 MMOL/L — SIGNIFICANT CHANGE UP (ref 3.5–5.3)
PROT SERPL-MCNC: 6.8 G/DL — SIGNIFICANT CHANGE UP (ref 6–8.3)
PROT UR-MCNC: ABNORMAL
PROTHROM AB SERPL-ACNC: 13 SEC — SIGNIFICANT CHANGE UP (ref 10.5–13.4)
RAPID RVP RESULT: SIGNIFICANT CHANGE UP
RBC # BLD: 3.91 M/UL — LOW (ref 4.2–5.8)
RBC # BLD: 4.15 M/UL — LOW (ref 4.2–5.8)
RBC # FLD: 13.3 % — SIGNIFICANT CHANGE UP (ref 10.3–14.5)
RBC # FLD: 13.4 % — SIGNIFICANT CHANGE UP (ref 10.3–14.5)
RBC CASTS # UR COMP ASSIST: 1 /HPF — SIGNIFICANT CHANGE UP (ref 0–4)
RH IG SCN BLD-IMP: POSITIVE — SIGNIFICANT CHANGE UP
RH IG SCN BLD-IMP: POSITIVE — SIGNIFICANT CHANGE UP
SAO2 % BLDV: 38.3 % — LOW (ref 67–88)
SARS-COV-2 RNA SPEC QL NAA+PROBE: SIGNIFICANT CHANGE UP
SODIUM SERPL-SCNC: 128 MMOL/L — LOW (ref 135–145)
SP GR SPEC: 1.02 — SIGNIFICANT CHANGE UP (ref 1.01–1.02)
UROBILINOGEN FLD QL: NEGATIVE — SIGNIFICANT CHANGE UP
WBC # BLD: 11.82 K/UL — HIGH (ref 3.8–10.5)
WBC # BLD: 12.75 K/UL — HIGH (ref 3.8–10.5)
WBC # FLD AUTO: 11.82 K/UL — HIGH (ref 3.8–10.5)
WBC # FLD AUTO: 12.75 K/UL — HIGH (ref 3.8–10.5)
WBC UR QL: 2 /HPF — SIGNIFICANT CHANGE UP (ref 0–5)

## 2022-10-19 PROCEDURE — 99285 EMERGENCY DEPT VISIT HI MDM: CPT

## 2022-10-19 PROCEDURE — 99284 EMERGENCY DEPT VISIT MOD MDM: CPT | Mod: GC

## 2022-10-19 PROCEDURE — 74176 CT ABD & PELVIS W/O CONTRAST: CPT | Mod: 26,MA

## 2022-10-19 PROCEDURE — 72131 CT LUMBAR SPINE W/O DYE: CPT | Mod: 26,MA

## 2022-10-19 PROCEDURE — 72125 CT NECK SPINE W/O DYE: CPT | Mod: 26,MA

## 2022-10-19 PROCEDURE — 22315 CLOSED TX VERT FX W/MANJ: CPT

## 2022-10-19 PROCEDURE — 99222 1ST HOSP IP/OBS MODERATE 55: CPT | Mod: 57

## 2022-10-19 PROCEDURE — 71250 CT THORAX DX C-: CPT | Mod: 26,MA

## 2022-10-19 PROCEDURE — 70450 CT HEAD/BRAIN W/O DYE: CPT | Mod: 26,MA

## 2022-10-19 RX ORDER — PANTOPRAZOLE SODIUM 20 MG/1
40 TABLET, DELAYED RELEASE ORAL
Refills: 0 | Status: DISCONTINUED | OUTPATIENT
Start: 2022-10-19 | End: 2022-11-17

## 2022-10-19 RX ORDER — SODIUM CHLORIDE 9 MG/ML
1000 INJECTION, SOLUTION INTRAVENOUS
Refills: 0 | Status: DISCONTINUED | OUTPATIENT
Start: 2022-10-19 | End: 2022-11-17

## 2022-10-19 RX ORDER — ACETAMINOPHEN 500 MG
975 TABLET ORAL ONCE
Refills: 0 | Status: COMPLETED | OUTPATIENT
Start: 2022-10-19 | End: 2022-10-19

## 2022-10-19 RX ORDER — DEXTROSE 50 % IN WATER 50 %
15 SYRINGE (ML) INTRAVENOUS ONCE
Refills: 0 | Status: DISCONTINUED | OUTPATIENT
Start: 2022-10-19 | End: 2022-11-17

## 2022-10-19 RX ORDER — MAGNESIUM SULFATE 500 MG/ML
1 VIAL (ML) INJECTION ONCE
Refills: 0 | Status: COMPLETED | OUTPATIENT
Start: 2022-10-19 | End: 2022-10-19

## 2022-10-19 RX ORDER — TAMSULOSIN HYDROCHLORIDE 0.4 MG/1
0.8 CAPSULE ORAL AT BEDTIME
Refills: 0 | Status: DISCONTINUED | OUTPATIENT
Start: 2022-10-19 | End: 2022-11-17

## 2022-10-19 RX ORDER — INSULIN GLARGINE 100 [IU]/ML
10 INJECTION, SOLUTION SUBCUTANEOUS AT BEDTIME
Refills: 0 | Status: DISCONTINUED | OUTPATIENT
Start: 2022-10-19 | End: 2022-10-20

## 2022-10-19 RX ORDER — DEXTROSE 50 % IN WATER 50 %
25 SYRINGE (ML) INTRAVENOUS ONCE
Refills: 0 | Status: DISCONTINUED | OUTPATIENT
Start: 2022-10-19 | End: 2022-11-17

## 2022-10-19 RX ORDER — FINASTERIDE 5 MG/1
5 TABLET, FILM COATED ORAL DAILY
Refills: 0 | Status: DISCONTINUED | OUTPATIENT
Start: 2022-10-19 | End: 2022-11-17

## 2022-10-19 RX ORDER — GLUCAGON INJECTION, SOLUTION 0.5 MG/.1ML
1 INJECTION, SOLUTION SUBCUTANEOUS ONCE
Refills: 0 | Status: DISCONTINUED | OUTPATIENT
Start: 2022-10-19 | End: 2022-11-17

## 2022-10-19 RX ORDER — ASPIRIN/CALCIUM CARB/MAGNESIUM 324 MG
81 TABLET ORAL DAILY
Refills: 0 | Status: ACTIVE | OUTPATIENT
Start: 2022-10-19 | End: 2023-09-17

## 2022-10-19 RX ORDER — DEXTROSE 50 % IN WATER 50 %
12.5 SYRINGE (ML) INTRAVENOUS ONCE
Refills: 0 | Status: DISCONTINUED | OUTPATIENT
Start: 2022-10-19 | End: 2022-11-17

## 2022-10-19 RX ORDER — INSULIN LISPRO 100/ML
VIAL (ML) SUBCUTANEOUS
Refills: 0 | Status: DISCONTINUED | OUTPATIENT
Start: 2022-10-19 | End: 2022-11-01

## 2022-10-19 RX ORDER — SODIUM CHLORIDE 9 MG/ML
1000 INJECTION INTRAMUSCULAR; INTRAVENOUS; SUBCUTANEOUS ONCE
Refills: 0 | Status: COMPLETED | OUTPATIENT
Start: 2022-10-19 | End: 2022-10-19

## 2022-10-19 RX ORDER — METOPROLOL TARTRATE 50 MG
25 TABLET ORAL DAILY
Refills: 0 | Status: DISCONTINUED | OUTPATIENT
Start: 2022-10-19 | End: 2022-11-17

## 2022-10-19 RX ORDER — SODIUM CHLORIDE 9 MG/ML
500 INJECTION INTRAMUSCULAR; INTRAVENOUS; SUBCUTANEOUS ONCE
Refills: 0 | Status: COMPLETED | OUTPATIENT
Start: 2022-10-19 | End: 2022-10-19

## 2022-10-19 RX ORDER — LIDOCAINE 4 G/100G
1 CREAM TOPICAL ONCE
Refills: 0 | Status: COMPLETED | OUTPATIENT
Start: 2022-10-19 | End: 2022-10-19

## 2022-10-19 RX ADMIN — Medication 25 MILLIGRAM(S): at 18:57

## 2022-10-19 RX ADMIN — SODIUM CHLORIDE 500 MILLILITER(S): 9 INJECTION INTRAMUSCULAR; INTRAVENOUS; SUBCUTANEOUS at 12:30

## 2022-10-19 RX ADMIN — FINASTERIDE 5 MILLIGRAM(S): 5 TABLET, FILM COATED ORAL at 18:57

## 2022-10-19 RX ADMIN — Medication 975 MILLIGRAM(S): at 10:03

## 2022-10-19 RX ADMIN — SODIUM CHLORIDE 1000 MILLILITER(S): 9 INJECTION INTRAMUSCULAR; INTRAVENOUS; SUBCUTANEOUS at 08:43

## 2022-10-19 RX ADMIN — Medication 100 GRAM(S): at 11:29

## 2022-10-19 RX ADMIN — Medication 975 MILLIGRAM(S): at 08:44

## 2022-10-19 RX ADMIN — Medication 81 MILLIGRAM(S): at 18:56

## 2022-10-19 RX ADMIN — SODIUM CHLORIDE 1000 MILLILITER(S): 9 INJECTION INTRAMUSCULAR; INTRAVENOUS; SUBCUTANEOUS at 10:03

## 2022-10-19 RX ADMIN — SODIUM CHLORIDE 500 MILLILITER(S): 9 INJECTION INTRAMUSCULAR; INTRAVENOUS; SUBCUTANEOUS at 11:30

## 2022-10-19 RX ADMIN — LIDOCAINE 1 PATCH: 4 CREAM TOPICAL at 09:05

## 2022-10-19 RX ADMIN — Medication 1 GRAM(S): at 12:07

## 2022-10-19 NOTE — ED PROVIDER NOTE - ATTENDING CONTRIBUTION TO CARE
This is an 82-year-old gentleman who has diabetes does not appear to be on anticoagulation or antiplatelet agents.  He is states that he had a fall.  He has back pain.  No other complaints.  Its unclear if he spends time on the ground or if he is able to walk.  The patient gives a poor history even with a  phone.  History was obtained from EMS for the above information.  We will likely require to call the family who he lives with.  Moving 4 out of 4 extremities.  Spinal tenderness to the lumbosacral area without any point location.  No abdominal tenderness no chest wall tenderness.  Patient appears weak and having difficulty to sit erect in the bed alone.  Will work-up for the cause of the fall, pan scan, basic blood work, reassess.

## 2022-10-19 NOTE — H&P ADULT - ASSESSMENT
81 male h/o htn, chol, dm, here s/p mechanical fall    mechanical fall  imaging noted  surg eval noted  f/u MRI l spine  no acute intervention  pain control  PT     81 male h/o htn, chol, dm, here s/p mechanical fall    mechanical fall  imaging noted  surg eval noted  f/u MRI l spine  no acute intervention  pain control  PT    cont other home meds    Advanced care planning was discussed with patient and family.  Advanced care planning forms were reviewed and discussed as appropriate.  Differential diagnosis and plan of care discussed with patient after the evaluation.   Pain assessed and judicious use of narcotics when appropriate was discussed.  Importance of Fall prevention discussed.  Counseling on Smoking and Alcohol cessation was offered when appropriate.  Counseling on Diet, exercise, and medication compliance was done.   Approx 30 minutes spent.

## 2022-10-19 NOTE — CONSULT NOTE ADULT - SUBJECTIVE AND OBJECTIVE BOX
TRAUMA SURGERY CONSULT   ========================    HPI:    Patient is a 82yMale home ambulator  with assistive devices who presents to [facility] ED w/ a c/o of lower back pain. Patient cantonese speaking and demented. Poor historian. Hx obtained from ED: Patient lives at home with his daughter who lives the floor above. He has an aide during the day but not at night. Last night at 9pm they heard him scream and found him on the floor of the bathroom in pain. Found on CT here to have L1 burst with possible pathologic etiology as well as 11th rib fracture. Patient only partly follows commands but appears in no pain lying on bed and moving all extremities.     In the ED patient is hemodynamically stable. GCS 15. Primary survey intact, secondary significant for right flank tenderness. Pan scan CT imaging obtained. CT H/C-Spine/Lumbar/A+P/Chest.     VITALS:  Vital Signs Last 24 Hrs  T(C): 37.2 (19 Oct 2022 08:30), Max: 37.2 (19 Oct 2022 08:30)  T(F): 98.9 (19 Oct 2022 08:30), Max: 98.9 (19 Oct 2022 08:30)  HR: 105 (19 Oct 2022 11:29) (105 - 118)  BP: 107/80 (19 Oct 2022 11:29) (107/80 - 138/88)  BP(mean): --  RR: 19 (19 Oct 2022 08:30) (18 - 19)  SpO2: 95% (19 Oct 2022 11:29) (95% - 98%)    Parameters below as of 19 Oct 2022 11:29  Patient On (Oxygen Delivery Method): room air        PRIMARY SURVEY:  A - Airway intact.  B - Bilateral breath sounds and equal chest rise. SpO2 ***  C - Initially BP: 107/80 (10-19-22 @ 11:29), palpable pulses in all extremities.  D - GCS 15.  E - Exposure obtained.    SECONDARY SURVEY:  Gen: No acute distress.  HEENT: Normocephalic, atraumatic. No midline cervical tenderness. Trachea midline.  Pulm: No respiratory distress.   Chest: No tenderness to palpation.   Abd: Soft, nontender, nondistended, no rebound, no guarding.  Hips: Stable.   Gu: no blood at the meatus  Ext: No gross deformities. Sensation and strength intact. Palpable radial pulses bilaterally, palpable dorsalis pedis pulses bilaterally.  Back: No tenderness to palpation of spine, paraspinal tenderness, no palpable runoff, stepoff, or deformity.    ________________________________  PAST MEDICAL HISTORY:  DM (diabetes mellitus)    HTN (hypertension)    Hypercholesterolemia    CAD (coronary artery disease)    HTN (hypertension)    DM (diabetes mellitus)      PAST SURGICAL HISTORY:  S/P TURP    S/P gastrectomy      HOME MEDICATIONS:    ALLERGIES:  Allergies    No Known Allergies    Intolerances       SOCIAL: ***    ________________________________  LABS:                        11.9   12.75 )-----------( 171      ( 19 Oct 2022 08:36 )             36.0     10-    128<L>  |  91<L>  |  18  ----------------------------<  409<H>  4.9   |  26  |  1.11    Ca    9.5      19 Oct 2022 08:36  Mg     1.4     10-    TPro  6.8  /  Alb  3.0<L>  /  TBili  1.0  /  DBili  x   /  AST  12  /  ALT  15  /  AlkPhos  190<H>  10-19    PT/INR - ( 19 Oct 2022 08:36 )   PT: 13.0 sec;   INR: 1.12 ratio         PTT - ( 19 Oct 2022 08:36 )  PTT:31.6 sec  Urinalysis Basic - ( 19 Oct 2022 08:59 )    Color: Light Yellow / Appearance: Clear / S.022 / pH: x  Gluc: x / Ketone: Negative  / Bili: Negative / Urobili: Negative   Blood: x / Protein: 30 mg/dL / Nitrite: Negative   Leuk Esterase: Negative / RBC: 1 /hpf / WBC 2 /HPF   Sq Epi: x / Non Sq Epi: 1 /hpf / Bacteria: Negative          ________________________________  RADIOLOGY & ADDITIONAL STUDIES:      Imaging:    ACC: [id number] EXAM: CT REFORM SPINE L    PROCEDURE DATE: 10/19/2022        INTERPRETATION: CLINICAL INDICATION: Trauma.    TECHNIQUE: CT of the lumbar spine was reformatted from concurrent CT abdomen.    COMPARISON: CT abdomen and pelvis 2022.    FINDINGS:  BONES AND DISCS:  There is an acute compression fracture of L1 vertebral body with mild loss of height. There is suspicion for an underlying lesion within the anterior half of the vertebral body. No bony retropulsion is identified.    Mild multilevel degenerative changes are noted.    SOFT TISSUES:  Paraspinal soft tissues are unremarkable.    OTHER:  Partially visualized necrotic mass possibly arising from the right inferior hepatic lobe. Please see concurrent dedicated CT abdomen and pelvis.    IMPRESSION:  -Acute mild L1 compression fracture, suspected to be secondary to underlying lesion/metastasis. No bony retropulsion, but MRI lumbar spine with IV contrast is recommended for further assessment.    -Partially visualized necrotic mass possibly arising from the right inferior hepatic lobe. Please see concurrent dedicated CT abdomen and pelvis report for further evaluation.    --- End of Report ---

## 2022-10-19 NOTE — ED PROVIDER NOTE - CLINICAL SUMMARY MEDICAL DECISION MAKING FREE TEXT BOX
Zambratto, Med Tox Fellow: unwitnessed fall with midline low back pain, not on AC's, vitals reassuring, no signif trauma on exam, no FND. Pt hyperglycemic here. Will eval for trauma with CT's, will eval metabolic/cardiac/infx etiology for why pt fell though likely mech fall.   Collateral to be obtained

## 2022-10-19 NOTE — ED PROVIDER NOTE - CARE PLAN
Principal Discharge DX:	Back pain  Secondary Diagnosis:	Unsteady gait  Secondary Diagnosis:	Lumbar compression fracture   1

## 2022-10-19 NOTE — CONSULT NOTE ADULT - SUBJECTIVE AND OBJECTIVE BOX
Patient is a 82yMale home ambulator  with assistive devices who presents to [facility] ED w/ a c/o of lower back pain. Patient cantonese speaking and demented. Poor historian. Hx obtained from ED: Patient lives at home with his daughter who lives the floor above. He has an aide during the day but not at night. Last night at 9pm they heard him scream and found him on the floor of the bathroom in pain. Found on CT here to have L1 burst with possible pathologic etiology as well as 11th rib fracture. Patient only partly follows commands but appears in no pain lying on bed and moving all extremities.     PAST MEDICAL & SURGICAL HISTORY:  DM (diabetes mellitus)      HTN (hypertension)      Hypercholesterolemia      CAD (coronary artery disease)      HTN (hypertension)      DM (diabetes mellitus)      S/P TURP      S/P gastrectomy          Vital Signs Last 24 Hrs  T(C): 37.2 (19 Oct 2022 08:30), Max: 37.2 (19 Oct 2022 08:30)  T(F): 98.9 (19 Oct 2022 08:30), Max: 98.9 (19 Oct 2022 08:30)  HR: 105 (19 Oct 2022 11:29) (105 - 118)  BP: 107/80 (19 Oct 2022 11:29) (107/80 - 138/88)  BP(mean): --  RR: 19 (19 Oct 2022 08:30) (18 - 19)  SpO2: 95% (19 Oct 2022 11:29) (95% - 98%)    Parameters below as of 19 Oct 2022 11:29  Patient On (Oxygen Delivery Method): room air      I&O's Detail      LABS:                        11.9   12.75 )-----------( 171      ( 19 Oct 2022 08:36 )             36.0     10-    128<L>  |  91<L>  |  18  ----------------------------<  409<H>  4.9   |  26  |  1.11    Ca    9.5      19 Oct 2022 08:36  Mg     1.4     10-    TPro  6.8  /  Alb  3.0<L>  /  TBili  1.0  /  DBili  x   /  AST  12  /  ALT  15  /  AlkPhos  190<H>  10-    PT/INR - ( 19 Oct 2022 08:36 )   PT: 13.0 sec;   INR: 1.12 ratio         PTT - ( 19 Oct 2022 08:36 )  PTT:31.6 sec  Urinalysis Basic - ( 19 Oct 2022 08:59 )    Color: Light Yellow / Appearance: Clear / S.022 / pH: x  Gluc: x / Ketone: Negative  / Bili: Negative / Urobili: Negative   Blood: x / Protein: 30 mg/dL / Nitrite: Negative   Leuk Esterase: Negative / RBC: 1 /hpf / WBC 2 /HPF   Sq Epi: x / Non Sq Epi: 1 /hpf / Bacteria: Negative        PHYSICAL EXAM:  General; Awake and alert, Oriented x 3  Spine: TTP upper L spine, No TTP over spinous processes or paraspinal muscles at C/T spine. No palpable step off.     Able to actively SLR BL. No pain to passive SLR             Sensory:  (0=absent, 1=impaired, 2=normal, NT=not testable)      C5    C6   C7   C8   T1         R   2     2      2     2      2  L    2     2      2     2      2        L2    L3   L4   L5   S1   R   2     2     2     2     2  L    2     2     2     2     2    *Motor exam is grossly normal   *Patient is grossly moving BL UE and LE but does not fully follow commands to assess strength. NO obvious weakness noted    Right Upper extremity:   Negative Whalen  +Rad Pulse  Compartments soft and compressible    Left Upper extremity:   Negative Whalen  +Rad Pulse  Compartments soft and compressible    Right Lower Extremity:   SILT L2-S1  Negative Clonus  Negative Babinski  +DP  Compartments soft and compressible           Left Lower Extremity:  SILT L2-S1  Negative Clonus   Negative Babinski  +DP  Compartments soft and compressible    Secondary  Skin intact. No erythema/ecchymosis. No TTP over bony prominences, SILT, palpable pulses, full/painless A/PROM, compartments soft. No other injuries or complaints. Negative logroll/heelstrike BL.     Imaging:  CT L spine demonstrating L1 burst with abnormal bony changes                                           A/P :   Patient is a 82yMale w/  L1 burst with abnormal bony changes     -Clinical presentation and physical exam are not consistent w/ acute cord compression/cauda equina. Moving all extremities with ease and pain appears localized to L spine .   Should patient develop neurological symptoms such as weakness, numbness/tingling/paresthesias, worsening pain, bowel/bladder incontinence, or fevers/chills this would be reason for reavulation   -Recommendcore strengthening for improved back health.  -No heavy lifting/twisting  -Multimodal analgesia - recommend, acetaminophen, muscle relaxant as tolerated  -Would hold on dvt ppx in setting of acute vertebral fx and risk of epidural hematoma  -WBAT  -will discuss possible need for MRR  - Will Imaging and clinical presentation discussed w/ Dr. Galicia and update with any changes to the plan     Patient is a 82yMale home ambulator  with assistive devices who presents to [facility] ED w/ a c/o of lower back pain. Patient cantonese speaking and demented. Poor historian. Hx obtained from ED: Patient lives at home with his daughter who lives the floor above. He has an aide during the day but not at night. Last night at 9pm they heard him scream and found him on the floor of the bathroom in pain. Found on CT here to have L1 burst with possible pathologic etiology as well as 11th rib fracture. Patient only partly follows commands but appears in no pain lying on bed and moving all extremities.     PAST MEDICAL & SURGICAL HISTORY:  DM (diabetes mellitus)      HTN (hypertension)      Hypercholesterolemia      CAD (coronary artery disease)      HTN (hypertension)      DM (diabetes mellitus)      S/P TURP      S/P gastrectomy          Vital Signs Last 24 Hrs  T(C): 37.2 (19 Oct 2022 08:30), Max: 37.2 (19 Oct 2022 08:30)  T(F): 98.9 (19 Oct 2022 08:30), Max: 98.9 (19 Oct 2022 08:30)  HR: 105 (19 Oct 2022 11:29) (105 - 118)  BP: 107/80 (19 Oct 2022 11:29) (107/80 - 138/88)  BP(mean): --  RR: 19 (19 Oct 2022 08:30) (18 - 19)  SpO2: 95% (19 Oct 2022 11:29) (95% - 98%)    Parameters below as of 19 Oct 2022 11:29  Patient On (Oxygen Delivery Method): room air      I&O's Detail      LABS:                        11.9   12.75 )-----------( 171      ( 19 Oct 2022 08:36 )             36.0     10-    128<L>  |  91<L>  |  18  ----------------------------<  409<H>  4.9   |  26  |  1.11    Ca    9.5      19 Oct 2022 08:36  Mg     1.4     10-    TPro  6.8  /  Alb  3.0<L>  /  TBili  1.0  /  DBili  x   /  AST  12  /  ALT  15  /  AlkPhos  190<H>  10-    PT/INR - ( 19 Oct 2022 08:36 )   PT: 13.0 sec;   INR: 1.12 ratio         PTT - ( 19 Oct 2022 08:36 )  PTT:31.6 sec  Urinalysis Basic - ( 19 Oct 2022 08:59 )    Color: Light Yellow / Appearance: Clear / S.022 / pH: x  Gluc: x / Ketone: Negative  / Bili: Negative / Urobili: Negative   Blood: x / Protein: 30 mg/dL / Nitrite: Negative   Leuk Esterase: Negative / RBC: 1 /hpf / WBC 2 /HPF   Sq Epi: x / Non Sq Epi: 1 /hpf / Bacteria: Negative        PHYSICAL EXAM:  General; Awake and alert, Oriented x 3  Spine: TTP upper L spine, No TTP over spinous processes or paraspinal muscles at C/T spine. No palpable step off.     Able to actively SLR BL. No pain to passive SLR             Sensory:  (0=absent, 1=impaired, 2=normal, NT=not testable)      C5    C6   C7   C8   T1         R   2     2      2     2      2  L    2     2      2     2      2        L2    L3   L4   L5   S1   R   2     2     2     2     2  L    2     2     2     2     2    *Motor exam is grossly normal   *Patient is grossly moving BL UE and LE but does not fully follow commands to assess strength. NO obvious weakness noted    Right Upper extremity:   Negative Whalen  +Rad Pulse  Compartments soft and compressible    Left Upper extremity:   Negative Whalen  +Rad Pulse  Compartments soft and compressible    Right Lower Extremity:   SILT L2-S1  Negative Clonus  Negative Babinski  +DP  Compartments soft and compressible           Left Lower Extremity:  SILT L2-S1  Negative Clonus   Negative Babinski  +DP  Compartments soft and compressible    Secondary  Skin intact. No erythema/ecchymosis. No TTP over bony prominences, SILT, palpable pulses, full/painless A/PROM, compartments soft. No other injuries or complaints. Negative logroll/heelstrike BL.     Imaging:  CT L spine demonstrating L1 burst with abnormal bony changes                                           A/P :   Patient is a 82yMale w/  L1 burst with abnormal bony changes     -Clinical presentation and physical exam are not consistent w/ acute cord compression/cauda equina. Moving all extremities with ease and pain appears localized to L spine .   Should patient develop neurological symptoms such as weakness, numbness/tingling/paresthesias, worsening pain, bowel/bladder incontinence, or fevers/chills this would be reason for reavulation   -Recommendcore strengthening for improved back health.  -No heavy lifting/twisting  -Multimodal analgesia - recommend, acetaminophen, muscle relaxant as tolerated  -Would hold on dvt ppx in setting of acute vertebral fx and risk of epidural hematoma  -WBAT  -MRI of L spine  - discussed Imaging and clinical presentation discussed w/ Dr. Galicia who agrees with above

## 2022-10-19 NOTE — ED ADULT NURSE NOTE - NSIMPLEMENTINTERV_GEN_ALL_ED
Implemented All Fall Risk Interventions:  [place] to call system. Call bell, personal items and telephone within reach. Instruct patient to call for assistance. Room bathroom lighting operational. Non-slip footwear when patient is off stretcher. Physically safe environment: no spills, clutter or unnecessary equipment. Stretcher in lowest position, wheels locked, appropriate side rails in place. Provide visual cue, wrist band, yellow gown, etc. Monitor gait and stability. Monitor for mental status changes and reorient to person, place, and time. Review medications for side effects contributing to fall risk. Reinforce activity limits and safety measures with patient and family.

## 2022-10-19 NOTE — H&P ADULT - HISTORY OF PRESENT ILLNESS
81M cantonese speaking PMHx of HTN/HLD, DM2, BPH, (?Afib per chart review, though not on AC's), presents to the ED with mid lower back pain that he sustained after falling in the bathroom while he was urinating at 9pm last night. Pt denies headstrike/LOC. Difficult to obtain full history even with  though pt answering questions appropriately and is AAOx2. Pt denies any pain anywhere else. Per EMS pt has had chronic cough. Unclear if pt was down long. pt last took insulin 2 days ago he states.   	Spoke with stepdaughter Yulissa who lives below pt: Pt couldn't get up from the fall yesterday, has difficulty ambulating with walker since the fall as he complaints of back pain, pt has 9hrs home help daily but not overnight. She states that pt is at baseline mental status and that he's had short term memory problems for some time. Denies any recent fevers/chills, abd pain, v/d, chest pain/sob. States that the cough pt has is chronic.

## 2022-10-19 NOTE — CONSULT NOTE ADULT - ASSESSMENT
83 y/o M p/w acute L1 fracture and necrotic mass on inferior lobe of liver.    -No acute surgical intervention  -Recommend Medicine admission  -Appreciate Orthopedics Spine consult  -Recommend Surgical Oncology consult for liver mass  -General Surgery is available    Gopal Coulter MD  PGY-2  Trauma Surgery  #[id number]

## 2022-10-19 NOTE — ED PROVIDER NOTE - OBJECTIVE STATEMENT
81M cantonese speaking PMHx of HTN/HLD, DM2, BPH, (?Afib per chart review, though not on AC's), presents to the ED with mid lower back pain that he sustained after falling in the bathroom while he was urinating at 9pm last night. Pt denies headstrike/LOC. Difficult to obtain full history even with  though pt answering questions appropriately and is AAOx2. Pt denies any pain anywhere else. Per EMS pt has had chronic cough. Unclear if pt was down long. pt last took insulin 2 days ago he states.     Pt walks with walker at baseline.    ID: [id number] 81M cantonese speaking PMHx of HTN/HLD, DM2, BPH, (?Afib per chart review, though not on AC's), presents to the ED with mid lower back pain that he sustained after falling in the bathroom while he was urinating at 9pm last night. Pt denies headstrike/LOC. Difficult to obtain full history even with  though pt answering questions appropriately and is AAOx2. Pt denies any pain anywhere else. Per EMS pt has had chronic cough. Unclear if pt was down long. pt last took insulin 2 days ago he states.   Spoke with stepdaughter Yulissa who lives below pt: Pt couldn't get up from the fall yesterday, has difficulty ambulating with walker since the fall as he complaints of back pain, pt has 9hrs home help daily but not overnight. She states that pt is at baseline mental status and that he's had short term memory problems for some time. Denies any recent fevers/chills, abd pain, v/d, chest pain/sob. States that the cough pt has is chronic.     Pt walks with walker at baseline.    ID: [id number]

## 2022-10-19 NOTE — ED ADULT NURSE NOTE - IS THE PATIENT ABLE TO BE SCREENED?
Progress Note - Geovanny Mckeon 25 y o  female MRN: [id number]    Unit/Bed#: -01 Encounter: [id number]      Assessment:  1   Cellulitis right foot  2   Non-pressure ulcer bilateral foot to fat  3   DM with DN  Plan:  1) Patient to OR tomorrow AM for right great toe amputation  2) Patient is agreeable  3) NPO at midnight  4) IV antibiotics as per Medicine and ID      Subjective:   Patient is seen bedside resting comfortably  NAD  Objective:     Vitals: Blood pressure 105/59, pulse 69, temperature 97 6 °F (36 4 °C), resp  rate 18, height 5' 5" (1 651 m), weight 115 kg (253 lb 8 5 oz), SpO2 93 %  ,Body mass index is 42 19 kg/m²  Intake/Output Summary (Last 24 hours) at 3/15/2021 0858  Last data filed at 3/14/2021 1745  Gross per 24 hour   Intake 460 ml   Output --   Net 460 ml       Physical Exam   GEN:  Awake and alert  Vasc:  DP and PT pulses palpable  Feet are warm  No leg edema bilateral      SKIN:  Ulcer plantar great toe bilateral to fat level  Gross infection right foot ulcer with severe redness and edema of the great toe  Ulcer of the left great toe to skin  No signs of infection        NEURO:  Decreased sensation of the toes bilateral        ORTHO:  Hallux limitus bilateral     Invasive Devices     Peripheral Intravenous Line            Peripheral IV 03/14/21 Dorsal (posterior); Left Wrist less than 1 day              MRI  IMPRESSION:  1   1st interphalangeal joint septic arthropathy with corresponding proximal/distal phalangeal osteomyelitis and distal phalanx pathologic fracture  Draining sinus tract extends to the plantar skin surface in a region of ulceration  There appears to be   a small dorsal collection at the level of the 1st interphalangeal joint measuring 5 mm concerning for small abscess  2   Complete tear of the flexor pollicis longus tendon with retraction to the proximal 1st metatarsal   This may be infection related (pathologic rupture)    The study was marked in EPIC for immediate notification  Yes

## 2022-10-19 NOTE — H&P ADULT - NSHPPHYSICALEXAM_GEN_ALL_CORE
Vital Signs Last 24 Hrs  T(C): 36.7 (19 Oct 2022 11:29), Max: 37.2 (19 Oct 2022 08:30)  T(F): 98 (19 Oct 2022 11:29), Max: 98.9 (19 Oct 2022 08:30)  HR: 105 (19 Oct 2022 11:29) (105 - 118)  BP: 107/80 (19 Oct 2022 11:29) (107/80 - 138/88)  BP(mean): --  RR: 19 (19 Oct 2022 08:30) (18 - 19)  SpO2: 95% (19 Oct 2022 11:29) (95% - 98%)    Parameters below as of 19 Oct 2022 11:29  Patient On (Oxygen Delivery Method): room air    PHYSICAL EXAM:  GENERAL: NAD, well-developed  HEAD:  Atraumatic, Normocephalic  EYES: EOMI, PERRLA, conjunctiva and sclera clear  NECK: Supple, No JVD  CHEST/LUNG: Clear to auscultation bilaterally; No wheeze  HEART: Regular rate and rhythm; No murmurs, rubs, or gallops  ABDOMEN: Soft, Nontender, Nondistended; Bowel sounds present  EXTREMITIES:  2+ Peripheral Pulses, No clubbing, cyanosis, or edema  PSYCH: AAOx3  NEUROLOGY: non-focal  SKIN: No rashes or lesions 06/19/18/negative

## 2022-10-19 NOTE — ED ADULT NURSE NOTE - OBJECTIVE STATEMENT
Patient  is  alert  and  oriented x3.  Color is  good  and skin warm to touch.  He fell  down at  home injuring his back.  He is  c/o  lower  back pain.   Moist  productive  cough  with whitish  mucus.  No other  s/s  of  visible injury noted.

## 2022-10-19 NOTE — ED PROVIDER NOTE - PHYSICAL EXAMINATION
GENERAL: no acute distress, non-toxic appearing  HEENT: normal conjunctiva, atraumatic, no midline c spine tenderness, dry oral mucosa with no tongue lacs  CARDIAC: tachy rate and regular rhythm, bp reassuring, 2+ equal pulses all extremities  PULM: bilat breath sounds, no incr wob  GI: abdomen nondistended, soft, nontender  : no suprapubic tenderness  NEURO: alert and oriented x 2, normal speech, good equal strength all extremities, star bilat, eomi without nystagmus  MSK: pt with midline lumbosacral tenderness without stepoffs, no other midline spine tenderness, no pelvic ttp, no deformity/swelling/tenderness of extremities  SKIN: no visible rashes/lacs  PSYCH: cooperative

## 2022-10-19 NOTE — CONSULT NOTE ADULT - ATTENDING COMMENTS
Agree with above. brace for comfort. Mobilize as able. Recommend MRI L spine to further characterize fx pattern.
82 year old man admitted to medicine s/p fall.   Injuries include:   -mild L1 compression fracture, suspected to be secondary to underlying lesion/metastasis  -8.3cm liver lesion Abscess vs mass vs hematoma. L    Patient stable from hemodynamic standpoint  GCS 15    A/P: S/p low energy fall with pathologic spine fx and liver lesion. Abdomen non tender and no intraperitoneal free fluid. Low suspicion for traumatic liver injury.   -Ortho-spine evaluation   -Oncologic workup for liver mass and pathologic fx

## 2022-10-19 NOTE — ED ADULT NURSE NOTE - NSFALLRSKASSISTTYPE_ED_ALL_ED
Occupational Discharge Summary Addendum:  Date: 2/12/2021  Total Number of Visits: 1  Referred by: No ref. provider found  Medical Diagnosis (from order):     Patient discharged due to not scheduling more appointments.  Status of goals: per status in last daily treatment note     Standing/Walking/Toileting

## 2022-10-19 NOTE — CONSULT NOTE ADULT - TIME BILLING
Please note that over 50 minutes of time was spent in care of this patient including  - previsit preparation  - in person visit  - post visit documentation  - review of imaging  - discussion with colleagues

## 2022-10-19 NOTE — ED PROVIDER NOTE - PROGRESS NOTE DETAILS
ct notable for rib fx w fluid collection associated . dw trauma res = will see pt. Zambratto, Med Tox Fellow: pt with R rib fx, fluid collection, poss L1 compression fx  spoke with trauma they saw pt, awaiting recs  paged ortho spine  Pt remains HD stable, type sent, no evolving fnd on exam.  Ordered pt CT with contrast for poss bleed Mark Med Tox Fellow: CT final read noted, pt JACKELINE Kim for PT/pain control, rest of findings on CT can be worked up as inpt or outpt basis

## 2022-10-20 DIAGNOSIS — E11.9 TYPE 2 DIABETES MELLITUS WITHOUT COMPLICATIONS: ICD-10-CM

## 2022-10-20 DIAGNOSIS — I10 ESSENTIAL (PRIMARY) HYPERTENSION: ICD-10-CM
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
## 2022-10-20 LAB
ANION GAP SERPL CALC-SCNC: 14 MMOL/L — SIGNIFICANT CHANGE UP (ref 5–17)
BUN SERPL-MCNC: 19 MG/DL — SIGNIFICANT CHANGE UP (ref 7–23)
CALCIUM SERPL-MCNC: 9.3 MG/DL — SIGNIFICANT CHANGE UP (ref 8.4–10.5)
CHLORIDE SERPL-SCNC: 96 MMOL/L — SIGNIFICANT CHANGE UP (ref 96–108)
CO2 SERPL-SCNC: 23 MMOL/L — SIGNIFICANT CHANGE UP (ref 22–31)
CREAT SERPL-MCNC: 0.9 MG/DL — SIGNIFICANT CHANGE UP (ref 0.5–1.3)
CULTURE RESULTS: NO GROWTH — SIGNIFICANT CHANGE UP
EGFR: 84 ML/MIN/1.73M2 — SIGNIFICANT CHANGE UP
GLUCOSE BLDC GLUCOMTR-MCNC: 269 MG/DL — HIGH (ref 70–99)
GLUCOSE BLDC GLUCOMTR-MCNC: 279 MG/DL — HIGH (ref 70–99)
GLUCOSE BLDC GLUCOMTR-MCNC: 281 MG/DL — HIGH (ref 70–99)
GLUCOSE BLDC GLUCOMTR-MCNC: 289 MG/DL — HIGH (ref 70–99)
GLUCOSE BLDC GLUCOMTR-MCNC: 302 MG/DL — HIGH (ref 70–99)
GLUCOSE BLDC GLUCOMTR-MCNC: 307 MG/DL — HIGH (ref 70–99)
GLUCOSE SERPL-MCNC: 304 MG/DL — HIGH (ref 70–99)
HCT VFR BLD CALC: 38.9 % — LOW (ref 39–50)
HGB BLD-MCNC: 12.9 G/DL — LOW (ref 13–17)
MCHC RBC-ENTMCNC: 28.5 PG — SIGNIFICANT CHANGE UP (ref 27–34)
MCHC RBC-ENTMCNC: 33.2 GM/DL — SIGNIFICANT CHANGE UP (ref 32–36)
MCV RBC AUTO: 85.9 FL — SIGNIFICANT CHANGE UP (ref 80–100)
NRBC # BLD: 0 /100 WBCS — SIGNIFICANT CHANGE UP (ref 0–0)
PLATELET # BLD AUTO: 213 K/UL — SIGNIFICANT CHANGE UP (ref 150–400)
POTASSIUM SERPL-MCNC: 4.8 MMOL/L — SIGNIFICANT CHANGE UP (ref 3.5–5.3)
POTASSIUM SERPL-SCNC: 4.8 MMOL/L — SIGNIFICANT CHANGE UP (ref 3.5–5.3)
RBC # BLD: 4.53 M/UL — SIGNIFICANT CHANGE UP (ref 4.2–5.8)
RBC # FLD: 13.4 % — SIGNIFICANT CHANGE UP (ref 10.3–14.5)
SODIUM SERPL-SCNC: 133 MMOL/L — LOW (ref 135–145)
SPECIMEN SOURCE: SIGNIFICANT CHANGE UP
WBC # BLD: 15.19 K/UL — HIGH (ref 3.8–10.5)
WBC # FLD AUTO: 15.19 K/UL — HIGH (ref 3.8–10.5)

## 2022-10-20 PROCEDURE — 72148 MRI LUMBAR SPINE W/O DYE: CPT | Mod: 26

## 2022-10-20 RX ORDER — INSULIN LISPRO 100/ML
5 VIAL (ML) SUBCUTANEOUS
Refills: 0 | Status: DISCONTINUED | OUTPATIENT
Start: 2022-10-20 | End: 2022-10-21

## 2022-10-20 RX ORDER — INSULIN HUMAN 100 [IU]/ML
5 INJECTION, SOLUTION SUBCUTANEOUS
Refills: 0 | Status: DISCONTINUED | OUTPATIENT
Start: 2022-10-20 | End: 2022-10-20

## 2022-10-20 RX ORDER — MORPHINE SULFATE 50 MG/1
1 CAPSULE, EXTENDED RELEASE ORAL ONCE
Refills: 0 | Status: DISCONTINUED | OUTPATIENT
Start: 2022-10-20 | End: 2022-10-20

## 2022-10-20 RX ORDER — INFLUENZA VIRUS VACCINE 15; 15; 15; 15 UG/.5ML; UG/.5ML; UG/.5ML; UG/.5ML
0.7 SUSPENSION INTRAMUSCULAR ONCE
Refills: 0 | Status: COMPLETED | OUTPATIENT
Start: 2022-10-20 | End: 2022-11-17

## 2022-10-20 RX ORDER — OXYCODONE AND ACETAMINOPHEN 5; 325 MG/1; MG/1
2 TABLET ORAL EVERY 4 HOURS
Refills: 0 | Status: DISCONTINUED | OUTPATIENT
Start: 2022-10-20 | End: 2022-10-21

## 2022-10-20 RX ORDER — INSULIN GLARGINE 100 [IU]/ML
14 INJECTION, SOLUTION SUBCUTANEOUS AT BEDTIME
Refills: 0 | Status: DISCONTINUED | OUTPATIENT
Start: 2022-10-20 | End: 2022-10-21

## 2022-10-20 RX ORDER — OXYCODONE AND ACETAMINOPHEN 5; 325 MG/1; MG/1
1 TABLET ORAL EVERY 6 HOURS
Refills: 0 | Status: DISCONTINUED | OUTPATIENT
Start: 2022-10-20 | End: 2022-10-21

## 2022-10-20 RX ADMIN — OXYCODONE AND ACETAMINOPHEN 1 TABLET(S): 5; 325 TABLET ORAL at 21:57

## 2022-10-20 RX ADMIN — INSULIN GLARGINE 14 UNIT(S): 100 INJECTION, SOLUTION SUBCUTANEOUS at 21:46

## 2022-10-20 RX ADMIN — Medication 81 MILLIGRAM(S): at 11:15

## 2022-10-20 RX ADMIN — OXYCODONE AND ACETAMINOPHEN 1 TABLET(S): 5; 325 TABLET ORAL at 21:25

## 2022-10-20 RX ADMIN — INSULIN HUMAN 5 UNIT(S): 100 INJECTION, SOLUTION SUBCUTANEOUS at 19:31

## 2022-10-20 RX ADMIN — MORPHINE SULFATE 1 MILLIGRAM(S): 50 CAPSULE, EXTENDED RELEASE ORAL at 00:00

## 2022-10-20 RX ADMIN — INSULIN GLARGINE 10 UNIT(S): 100 INJECTION, SOLUTION SUBCUTANEOUS at 01:35

## 2022-10-20 RX ADMIN — Medication 3: at 09:03

## 2022-10-20 RX ADMIN — MORPHINE SULFATE 1 MILLIGRAM(S): 50 CAPSULE, EXTENDED RELEASE ORAL at 17:19

## 2022-10-20 RX ADMIN — TAMSULOSIN HYDROCHLORIDE 0.8 MILLIGRAM(S): 0.4 CAPSULE ORAL at 21:25

## 2022-10-20 RX ADMIN — Medication 3: at 12:44

## 2022-10-20 RX ADMIN — PANTOPRAZOLE SODIUM 40 MILLIGRAM(S): 20 TABLET, DELAYED RELEASE ORAL at 06:03

## 2022-10-20 RX ADMIN — Medication 25 MILLIGRAM(S): at 17:28

## 2022-10-20 RX ADMIN — Medication 3: at 19:29

## 2022-10-20 RX ADMIN — TAMSULOSIN HYDROCHLORIDE 0.8 MILLIGRAM(S): 0.4 CAPSULE ORAL at 01:34

## 2022-10-20 RX ADMIN — FINASTERIDE 5 MILLIGRAM(S): 5 TABLET, FILM COATED ORAL at 11:15

## 2022-10-20 NOTE — CONSULT NOTE ADULT - SUBJECTIVE AND OBJECTIVE BOX
HPI:   81M cantonese speaking PMHx of HTN/HLD, DM2, BPH, (?Afib per chart review, though not on AC's), presents to the ED with mid lower back pain that he sustained after falling in the bathroom while he was urinating at 9pm last night. Pt denies headstrike/LOC. Difficult to obtain full history even with  though pt answering questions appropriately and is AAOx2. Pt denies any pain anywhere else. Per EMS pt has had chronic cough. Unclear if pt was down long. pt last took insulin 2 days ago he states.   	Spoke with stepdaughter Yulissa who lives below pt: Pt couldn't get up from the fall yesterday, has difficulty ambulating with walker since the fall as he complaints of back pain, pt has 9hrs home help daily but not overnight. She states that pt is at baseline mental status and that he's had short term memory problems for some time. Denies any recent fevers/chills, abd pain, v/d, chest pain/sob. States that the cough pt has is chronic.  (19 Oct 2022 15:53)      Patient known to Service from previous hospital admission, has history of diabetes, was on insulin at home (attempted to call family to confirm insulin doses however no response, will try again).  Endo was consulted for glycemic control.    PAST MEDICAL & SURGICAL HISTORY:  DM (diabetes mellitus)      HTN (hypertension)      Hypercholesterolemia      CAD (coronary artery disease)      HTN (hypertension)      DM (diabetes mellitus)      S/P TURP      S/P gastrectomy          FAMILY HISTORY:  No pertinent family history in first degree relatives        Social History:    Outpatient Medications:    MEDICATIONS  (STANDING):  aspirin enteric coated 81 milliGRAM(s) Oral daily  dextrose 5%. 1000 milliLiter(s) (50 mL/Hr) IV Continuous <Continuous>  dextrose 5%. 1000 milliLiter(s) (100 mL/Hr) IV Continuous <Continuous>  dextrose 50% Injectable 25 Gram(s) IV Push once  dextrose 50% Injectable 12.5 Gram(s) IV Push once  dextrose 50% Injectable 25 Gram(s) IV Push once  finasteride 5 milliGRAM(s) Oral daily  glucagon  Injectable 1 milliGRAM(s) IntraMuscular once  influenza  Vaccine (HIGH DOSE) 0.7 milliLiter(s) IntraMuscular once  insulin glargine Injectable (LANTUS) 14 Unit(s) SubCutaneous at bedtime  insulin lispro (ADMELOG) corrective regimen sliding scale   SubCutaneous three times a day before meals  insulin regular  human recombinant 5 Unit(s) SubCutaneous before dinner  metoprolol succinate ER 25 milliGRAM(s) Oral daily  pantoprazole    Tablet 40 milliGRAM(s) Oral before breakfast  tamsulosin 0.8 milliGRAM(s) Oral at bedtime    MEDICATIONS  (PRN):  dextrose Oral Gel 15 Gram(s) Oral once PRN Blood Glucose LESS THAN 70 milliGRAM(s)/deciliter      Allergies    No Known Allergies    Intolerances      Review of Systems:  Constitutional: No fever, no chills  Eyes: No blurry vision  Neuro: No tremors  HEENT: No pain, no neck swelling  Cardiovascular: No chest pain, no palpitations  Respiratory: Has SOB, no cough  GI: No nausea, vomiting, abdominal pain  : No dysuria  Skin: no rash  MSK: Has leg swelling.  Psych: no depression  Endocrine: no polyuria, polydipsia    ALL OTHER SYSTEMS REVIEWED AND NEGATIVE    UNABLE TO OBTAIN    PHYSICAL EXAM:  VITALS: T(C): 36.4 (10-20-22 @ 09:21)  T(F): 97.6 (10-20-22 @ 09:21), Max: 97.9 (10-20-22 @ 01:43)  HR: 62 (10-20-22 @ 09:21) (62 - 102)  BP: 130/90 (10-20-22 @ 09:21) (130/90 - 146/90)  RR:  (18 - 18)  SpO2:  (94% - 96%)  Wt(kg): --  GENERAL: NAD, well-groomed, well-developed  EYES: No proptosis, no lid lag  HEENT:  Atraumatic, Normocephalic  THYROID: Normal size, no palpable nodules  RESPIRATORY: Clear to auscultation bilaterally; No rales, rhonchi, wheezing  CARDIOVASCULAR: Si S2, No murmurs;  GI: Soft, non distended, normal bowel sounds  SKIN: Dry, intact, No rashes or lesions  MUSCULOSKELETAL: Has BL lower extremity edema.  NEURO:  no tremor, sensation decreased in feet BL,    POCT Blood Glucose.: 289 mg/dL (10-20-22 @ 11:56)  POCT Blood Glucose.: 281 mg/dL (10-20-22 @ 08:25)  POCT Blood Glucose.: 279 mg/dL (10-20-22 @ 01:32)  POCT Blood Glucose.: 353 mg/dL (10-19-22 @ 07:59)                            11.3   11.82 )-----------( 165      ( 19 Oct 2022 14:49 )             34.1       10-19    128<L>  |  91<L>  |  18  ----------------------------<  409<H>  4.9   |  26  |  1.11    eGFR: 65    Ca    9.5      10-19  Mg     1.4     10-19    TPro  6.8  /  Alb  3.0<L>  /  TBili  1.0  /  DBili  x   /  AST  12  /  ALT  15  /  AlkPhos  190<H>  10-19      Thyroid Function Tests:              Radiology:                HPI:   81M cantonese speaking PMHx of HTN/HLD, DM2, BPH, (?Afib per chart review, though not on AC's), presents to the ED with mid lower back pain that he sustained after falling in the bathroom while he was urinating at 9pm last night. Pt denies headstrike/LOC. Difficult to obtain full history even with  though pt answering questions appropriately and is AAOx2. Pt denies any pain anywhere else. Per EMS pt has had chronic cough. Unclear if pt was down long. pt last took insulin 2 days ago he states.   	Spoke with stepdaughter Yulissa who lives below pt: Pt couldn't get up from the fall yesterday, has difficulty ambulating with walker since the fall as he complaints of back pain, pt has 9hrs home help daily but not overnight. She states that pt is at baseline mental status and that he's had short term memory problems for some time. Denies any recent fevers/chills, abd pain, v/d, chest pain/sob. States that the cough pt has is chronic.  (19 Oct 2022 15:53)      Patient known to Service from previous hospital admission, has history of diabetes, was on insulin at home (attempted to call family to confirm insulin doses however no response, will try again).  Endo was consulted for glycemic control.    PAST MEDICAL & SURGICAL HISTORY:  DM (diabetes mellitus)      HTN (hypertension)      Hypercholesterolemia      CAD (coronary artery disease)      HTN (hypertension)      DM (diabetes mellitus)      S/P TURP      S/P gastrectomy          FAMILY HISTORY:  No pertinent family history in first degree relatives        Social History:    Outpatient Medications:    MEDICATIONS  (STANDING):  aspirin enteric coated 81 milliGRAM(s) Oral daily  dextrose 5%. 1000 milliLiter(s) (50 mL/Hr) IV Continuous <Continuous>  dextrose 5%. 1000 milliLiter(s) (100 mL/Hr) IV Continuous <Continuous>  dextrose 50% Injectable 25 Gram(s) IV Push once  dextrose 50% Injectable 12.5 Gram(s) IV Push once  dextrose 50% Injectable 25 Gram(s) IV Push once  finasteride 5 milliGRAM(s) Oral daily  glucagon  Injectable 1 milliGRAM(s) IntraMuscular once  influenza  Vaccine (HIGH DOSE) 0.7 milliLiter(s) IntraMuscular once  insulin glargine Injectable (LANTUS) 14 Unit(s) SubCutaneous at bedtime  insulin lispro (ADMELOG) corrective regimen sliding scale   SubCutaneous three times a day before meals  insulin regular  human recombinant 5 Unit(s) SubCutaneous before dinner  metoprolol succinate ER 25 milliGRAM(s) Oral daily  pantoprazole    Tablet 40 milliGRAM(s) Oral before breakfast  tamsulosin 0.8 milliGRAM(s) Oral at bedtime    MEDICATIONS  (PRN):  dextrose Oral Gel 15 Gram(s) Oral once PRN Blood Glucose LESS THAN 70 milliGRAM(s)/deciliter      Allergies    No Known Allergies    Intolerances      Review of Systems:  Constitutional: No fever, no chills  Eyes: No blurry vision  Neuro: No tremors  HEENT: No pain, no neck swelling  Cardiovascular: No chest pain, no palpitations  Respiratory: Has SOB, no cough  GI: No nausea, vomiting, abdominal pain  : No dysuria  Skin: no rash  MSK: Has leg swelling.  Psych: no depression  Endocrine: no polyuria, polydipsia    ALL OTHER SYSTEMS REVIEWED AND NEGATIVE    UNABLE TO OBTAIN    PHYSICAL EXAM:  VITALS: T(C): 36.4 (10-20-22 @ 09:21)  T(F): 97.6 (10-20-22 @ 09:21), Max: 97.9 (10-20-22 @ 01:43)  HR: 62 (10-20-22 @ 09:21) (62 - 102)  BP: 130/90 (10-20-22 @ 09:21) (130/90 - 146/90)  RR:  (18 - 18)  SpO2:  (94% - 96%)  Wt(kg): --  GENERAL: NAD, well-groomed, well-developed  EYES: No proptosis, no lid lag  HEENT:  Atraumatic, Normocephalic  THYROID: Normal size, no palpable nodules  RESPIRATORY: Clear to auscultation bilaterally; No rales, rhonchi, wheezing  CARDIOVASCULAR: Si S2, No murmurs;  GI: Soft, non distended, normal bowel sounds  SKIN: Dry, intact, No rashes or lesions  MUSCULOSKELETAL: Has BL lower extremity edema.  NEURO:  no tremor, sensation decreased in feet BL,    POCT Blood Glucose.: 289 mg/dL (10-20-22 @ 11:56)  POCT Blood Glucose.: 281 mg/dL (10-20-22 @ 08:25)  POCT Blood Glucose.: 279 mg/dL (10-20-22 @ 01:32)  POCT Blood Glucose.: 353 mg/dL (10-19-22 @ 07:59)                            11.3   11.82 )-----------( 165      ( 19 Oct 2022 14:49 )             34.1       10-19    128<L>  |  91<L>  |  18  ----------------------------<  409<H>  4.9   |  26  |  1.11    eGFR: 65    Ca    9.5      10-19  Mg     1.4     10-19    TPro  6.8  /  Alb  3.0<L>  /  TBili  1.0  /  DBili  x   /  AST  12  /  ALT  15  /  AlkPhos  190<H>  10-19      Thyroid Function Tests:              Radiology:

## 2022-10-20 NOTE — PHYSICAL THERAPY INITIAL EVALUATION ADULT - FOLLOWS COMMANDS/ANSWERS QUESTIONS, REHAB EVAL
follows demonstrations intermittently/50% of the time/25% of the time/unable to follow multi-step instructions

## 2022-10-20 NOTE — CONSULT NOTE ADULT - PROBLEM SELECTOR RECOMMENDATION 9
Will increase Lantus to 14u at bedtime.  Will start Admelog 5u before each meal and continue Admelog correction scale coverage. Will continue monitoring FS and FU.   for compliance with consistent low carb diet.

## 2022-10-20 NOTE — PROGRESS NOTE ADULT - SUBJECTIVE AND OBJECTIVE BOX
KAITLIN CALDERON  82y Male  MRN:[id number]    Patient is a 82y old  Male who presents with a chief complaint of fall    HPI:   81M cantonese speaking PMHx of HTN/HLD, DM2, BPH, (?Afib per chart review, though not on AC's), presents to the ED with mid lower back pain that he sustained after falling in the bathroom while he was urinating at 9pm last night. Pt denies headstrike/LOC. Difficult to obtain full history even with  though pt answering questions appropriately and is AAOx2. Pt denies any pain anywhere else. Per EMS pt has had chronic cough. Unclear if pt was down long. pt last took insulin 2 days ago he states.   	Spoke with stepdaughter Yulissa who lives below pt: Pt couldn't get up from the fall yesterday, has difficulty ambulating with walker since the fall as he complaints of back pain, pt has 9hrs home help daily but not overnight. She states that pt is at baseline mental status and that he's had short term memory problems for some time. Denies any recent fevers/chills, abd pain, v/d, chest pain/sob. States that the cough pt has is chronic.  (19 Oct 2022 15:53)      Patient seen and evaluated at bedside. No acute events overnight except as noted.    Interval HPI: no acute events o/n     PAST MEDICAL & SURGICAL HISTORY:  DM (diabetes mellitus)      HTN (hypertension)      Hypercholesterolemia      CAD (coronary artery disease)      HTN (hypertension)      DM (diabetes mellitus)      S/P TURP      S/P gastrectomy          REVIEW OF SYSTEMS:  as per hpi     VITALS:  Vital Signs Last 24 Hrs  T(C): 36.9 (20 Oct 2022 14:44), Max: 36.9 (20 Oct 2022 14:44)  T(F): 98.4 (20 Oct 2022 14:44), Max: 98.4 (20 Oct 2022 14:44)  HR: 77 (20 Oct 2022 14:44) (62 - 102)  BP: 127/87 (20 Oct 2022 14:44) (127/87 - 146/90)  BP(mean): --  RR: 18 (20 Oct 2022 14:44) (18 - 18)  SpO2: 95% (20 Oct 2022 14:44) (94% - 96%)    Parameters below as of 20 Oct 2022 14:44  Patient On (Oxygen Delivery Method): room air      CAPILLARY BLOOD GLUCOSE      POCT Blood Glucose.: 289 mg/dL (20 Oct 2022 11:56)  POCT Blood Glucose.: 281 mg/dL (20 Oct 2022 08:25)  POCT Blood Glucose.: 279 mg/dL (20 Oct 2022 01:32)    I&O's Summary      PHYSICAL EXAM:  GENERAL: NAD, well-developed  HEAD:  Atraumatic, Normocephalic  EYES: EOMI, PERRLA, conjunctiva and sclera clear  NECK: Supple, No JVD  CHEST/LUNG: Clear to auscultation bilaterally; No wheeze  HEART: S1, S2; No murmurs, rubs, or gallops  ABDOMEN: Soft, Nontender, Nondistended; Bowel sounds present  EXTREMITIES:  2+ Peripheral Pulses, No clubbing, cyanosis, or edema  PSYCH: Normal affect  NEUROLOGY: AAOX3; non-focal  SKIN: No rashes or lesions    Consultant(s) Notes Reviewed:  [x ] YES  [ ] NO  Care Discussed with Consultants/Other Providers [ x] YES  [ ] NO    MEDS:  MEDICATIONS  (STANDING):  aspirin enteric coated 81 milliGRAM(s) Oral daily  dextrose 5%. 1000 milliLiter(s) (50 mL/Hr) IV Continuous <Continuous>  dextrose 5%. 1000 milliLiter(s) (100 mL/Hr) IV Continuous <Continuous>  dextrose 50% Injectable 25 Gram(s) IV Push once  dextrose 50% Injectable 12.5 Gram(s) IV Push once  dextrose 50% Injectable 25 Gram(s) IV Push once  finasteride 5 milliGRAM(s) Oral daily  glucagon  Injectable 1 milliGRAM(s) IntraMuscular once  influenza  Vaccine (HIGH DOSE) 0.7 milliLiter(s) IntraMuscular once  insulin glargine Injectable (LANTUS) 14 Unit(s) SubCutaneous at bedtime  insulin lispro (ADMELOG) corrective regimen sliding scale   SubCutaneous three times a day before meals  insulin regular  human recombinant 5 Unit(s) SubCutaneous before dinner  metoprolol succinate ER 25 milliGRAM(s) Oral daily  pantoprazole    Tablet 40 milliGRAM(s) Oral before breakfast  tamsulosin 0.8 milliGRAM(s) Oral at bedtime    MEDICATIONS  (PRN):  dextrose Oral Gel 15 Gram(s) Oral once PRN Blood Glucose LESS THAN 70 milliGRAM(s)/deciliter    ALLERGIES:  No Known Allergies      LABS:                        12.9   15.19 )-----------( 213      ( 20 Oct 2022 15:03 )             38.9     10-19    128<L>  |  91<L>  |  18  ----------------------------<  409<H>  4.9   |  26  |  1.11    Ca    9.5      19 Oct 2022 08:36  Mg     1.4     10-19    TPro  6.8  /  Alb  3.0<L>  /  TBili  1.0  /  DBili  x   /  AST  12  /  ALT  15  /  AlkPhos  190<H>  10-19    PT/INR - ( 19 Oct 2022 08:36 )   PT: 13.0 sec;   INR: 1.12 ratio         PTT - ( 19 Oct 2022 08:36 )  PTT:31.6 sec  CARDIAC MARKERS ( 19 Oct 2022 08:36 )  x     / x     / 38 U/L / x     / x          LIVER FUNCTIONS - ( 19 Oct 2022 08:36 )  Alb: 3.0 g/dL / Pro: 6.8 g/dL / ALK PHOS: 190 U/L / ALT: 15 U/L / AST: 12 U/L / GGT: x           Urinalysis Basic - ( 19 Oct 2022 08:59 )    Color: Light Yellow / Appearance: Clear / S.022 / pH: x  Gluc: x / Ketone: Negative  / Bili: Negative / Urobili: Negative   Blood: x / Protein: 30 mg/dL / Nitrite: Negative   Leuk Esterase: Negative / RBC: 1 /hpf / WBC 2 /HPF   Sq Epi: x / Non Sq Epi: 1 /hpf / Bacteria: Negative            cultures: Culture Results:   No growth (10-19 @ 08:59)      RADIOLOGY & ADDITIONAL TESTS:    Imaging Personally Reviewed:  [ ] YES  [ ] NO

## 2022-10-20 NOTE — CONSULT NOTE ADULT - ASSESSMENT
Assessment  DMT2: 82y Male with DM T2 with hyperglycemia, A1C pending , was on insulin at home, now on basal insulin and coverage, blood sugars running high and not at target, no hypoglycemic episodes, eating meals with fair appetite, appears comfortable.  S/P Fall: workup in progress, stable, monitored.  HTN: Un Controlled,  on antihypertensive medications.          Melba Reyes MD  Cell: [phone]  Office: [phone]               Assessment  DMT2: 82y Male with DM T2 with hyperglycemia, A1C pending , was on insulin at home, now on basal insulin and coverage, blood sugars running high and not at target, no hypoglycemic episodes, eating meals  with fair appetite, appears comfortable.  S/P Fall: workup in progress, stable, monitored.  HTN: Un Controlled,  on antihypertensive medications.          Melba Reyes MD  Cell: [phone]  Office: [phone]

## 2022-10-20 NOTE — PHYSICAL THERAPY INITIAL EVALUATION ADULT - TRANSFER TRAINING, PT EVAL
GOAL: Pt will perform ALL transfers with CG Ax1, w/use of appropriate assistive device as needed, in 4 weeks.

## 2022-10-20 NOTE — CONSULT NOTE ADULT - NS ATTEND OPT1 GEN_ALL_CORE
Client appeared to have slept throughout the night.   I attest my time as attending is greater than 50% of the total combined time spent on qualifying patient care activities by the PA/NP and attending.

## 2022-10-20 NOTE — PHYSICAL THERAPY INITIAL EVALUATION ADULT - ADDITIONAL COMMENTS
Unable to assess 2/2 cognitive status, as per CM notes  Pt resides in a PH with his son and spouse +5 steps to go down to basement level. PTA pt was ambulating with R/W, has wheelchair for longer distances. Has [facility] services 9hrs/day x 7 days, Mon-Wed 7:30-4:30p, Thurs-Sun 9-6pm. Unable to assess 2/2 cognitive status, as per CM notes  Pt resides in a PH with his son and daughter in law +5 steps to go down to basement level. PTA pt was ambulating with R/W, has wheelchair for longer distances. Has [facility] services 9hrs/day x 7 days, Mon-Wed 7:30-4:30p, Thurs-Sun 9-6pm.

## 2022-10-20 NOTE — PHYSICAL THERAPY INITIAL EVALUATION ADULT - ACTIVE RANGE OF MOTION EXAMINATION, REHAB EVAL
observed moving B/L LEs off EOB in supine/Left UE Active ROM was WFL (within functional limits)/Right UE Active ROM was WFL (within functional limits)

## 2022-10-20 NOTE — PATIENT PROFILE ADULT - FALL HARM RISK - HARM RISK INTERVENTIONS
Assistance with ambulation/Assistance OOB with selected safe patient handling equipment/Communicate Risk of Fall with Harm to all staff/Discuss with provider need for PT consult/Monitor gait and stability/Provide patient with walking aids - walker, cane, crutches/Reinforce activity limits and safety measures with patient and family/Tailored Fall Risk Interventions/Use of alarms - bed, chair and/or voice tab/Visual Cue: Yellow wristband and red socks/Bed in lowest position, wheels locked, appropriate side rails in place/Call bell, personal items and telephone in reach/Instruct patient to call for assistance before getting out of bed or chair/Non-slip footwear when patient is out of bed/[place] to call system/Physically safe environment - no spills, clutter or unnecessary equipment/Purposeful Proactive Rounding/Room/bathroom lighting operational, light cord in reach

## 2022-10-20 NOTE — CONSULT NOTE ADULT - PROBLEM SELECTOR RECOMMENDATION 2
Will check TFTs and FU.  Suggest to continue management , monitoring, FU primary team recommendations.

## 2022-10-20 NOTE — PHYSICAL THERAPY INITIAL EVALUATION ADULT - BALANCE TRAINING, PT EVAL
GOAL: Pt will demonstrate improved static/dynamic standing balance by 1 grade, in order to improve stability, decrease fall risk and increase independence with ADLs within 4 weeks.

## 2022-10-20 NOTE — PROGRESS NOTE ADULT - ASSESSMENT
81 male h/o htn, chol, dm, here s/p mechanical fall    mechanical fall  imaging noted  surg eval noted  f/u MRI l spine  no acute intervention  pain control  PT    DM  insulin as ordered  endo f/u  monitor fs    cont other home meds    Advanced care planning was discussed with patient and family.  Advanced care planning forms were reviewed and discussed as appropriate.  Differential diagnosis and plan of care discussed with patient after the evaluation.   Pain assessed and judicious use of narcotics when appropriate was discussed.  Importance of Fall prevention discussed.  Counseling on Smoking and Alcohol cessation was offered when appropriate.  Counseling on Diet, exercise, and medication compliance was done.   Approx 30 minutes spent.

## 2022-10-20 NOTE — PHYSICAL THERAPY INITIAL EVALUATION ADULT - PERTINENT HX OF CURRENT PROBLEM, REHAB EVAL
81M cantonese speaking PMHx of HTN/HLD, DM2, BPH, (?Afib per chart review, though not on AC's), presents to the ED with mid lower back pain that he sustained after falling in the bathroom while he was urinating at 9pm last night. Pt denies headstrike/LOC. 81M cantonese speaking PMHx of HTN/HLD, DM2, BPH, (?Afib per chart review, though not on AC's), presents to the ED with mid lower back pain that he sustained after falling in the bathroom while he was urinating at 9pm last night. Pt denies headstrike/LOC. 10/19 CT Chest: Secretions/debris within the bilateral mainstem bronchi with multifocal sites of mucous plugging in distal airways. CT Abd and Pelvis:  Low density structure measuring 8.3 cm along the inferior margin of the right hepatic lobe with surrounding inflammatory changes. Mild compression fracture at L1, likely acute. CT Head: No acute transcortical infarct or intracranial hemorrhage, White matter small vessel disease. CT Cervical Spine: -No acute fracture or dislocation. CT Lumbar Spine: Acute mild L1 compression fracture, suspected to be secondary to underlying lesion/metastasis.

## 2022-10-20 NOTE — CHART NOTE - NSCHARTNOTEFT_GEN_A_CORE
Imaging reviewed with Orthopedic Surgery attending Dr. Galicia. Patient's fracture does not involve the posterior elements. He can be weightbearing as tolerated. Dr. Galicia would like him to follow-up in the outpatient office in 1-2 weeks.    Updated recs:  - No acute orthopedic intervention at this time  - Pain control  - PT/OT  - Weightbearing as tolerated with assistive devices as needed  - Follow-up outpatient with Dr. Galicia in 1-2 weeks. Please call [phone] to schedule an appointment    Munir Seay, PGY-3  Orthopedic Surgery  [facility]: [phone]  [facility]: [phone]  [facility] [place] [facility]: [id number] Imaging reviewed with Orthopedic Surgery attending Dr. Galicia. Patient's fracture does not involve the posterior elements. He can be weightbearing as tolerated. Dr. Galicia would like him to follow-up in the outpatient office in 1-2 weeks.    Updated recs:  - No acute orthopedic intervention at this time  - Pain control  - PT/OT  - Weightbearing as tolerated with assistive devices as needed  - May use TLSO for comfort, but not required for this fracture  - Follow-up outpatient with Dr. Galicia in 1-2 weeks. Please call [phone] to schedule an appointment    Munir Seay, PGY-3  Orthopedic Surgery  [facility]: [phone]  [facility]: [id number]  [facility] [place]: [phone]

## 2022-10-21 LAB
A1C WITH ESTIMATED AVERAGE GLUCOSE RESULT: 10.7 % — HIGH (ref 4–5.6)
ESTIMATED AVERAGE GLUCOSE: 260 MG/DL — HIGH (ref 68–114)
GLUCOSE BLDC GLUCOMTR-MCNC: 124 MG/DL — HIGH (ref 70–99)
GLUCOSE BLDC GLUCOMTR-MCNC: 186 MG/DL — HIGH (ref 70–99)
GLUCOSE BLDC GLUCOMTR-MCNC: 193 MG/DL — HIGH (ref 70–99)
GLUCOSE BLDC GLUCOMTR-MCNC: 246 MG/DL — HIGH (ref 70–99)
SARS-COV-2 RNA SPEC QL NAA+PROBE: SIGNIFICANT CHANGE UP
T4 FREE SERPL-MCNC: 1.7 NG/DL — SIGNIFICANT CHANGE UP (ref 0.9–1.8)
TSH SERPL-MCNC: 1.9 UIU/ML — SIGNIFICANT CHANGE UP (ref 0.27–4.2)

## 2022-10-21 RX ORDER — INSULIN LISPRO 100/ML
7 VIAL (ML) SUBCUTANEOUS
Refills: 0 | Status: DISCONTINUED | OUTPATIENT
Start: 2022-10-21 | End: 2022-10-22

## 2022-10-21 RX ORDER — OXYCODONE HYDROCHLORIDE 5 MG/1
5 TABLET ORAL EVERY 6 HOURS
Refills: 0 | Status: DISCONTINUED | OUTPATIENT
Start: 2022-10-21 | End: 2022-10-26

## 2022-10-21 RX ORDER — LIDOCAINE 4 G/100G
1 CREAM TOPICAL EVERY 24 HOURS
Refills: 0 | Status: DISCONTINUED | OUTPATIENT
Start: 2022-10-21 | End: 2022-11-17

## 2022-10-21 RX ORDER — ACETAMINOPHEN 500 MG
1000 TABLET ORAL ONCE
Refills: 0 | Status: COMPLETED | OUTPATIENT
Start: 2022-10-21 | End: 2022-10-21

## 2022-10-21 RX ORDER — INSULIN GLARGINE 100 [IU]/ML
20 INJECTION, SOLUTION SUBCUTANEOUS AT BEDTIME
Refills: 0 | Status: DISCONTINUED | OUTPATIENT
Start: 2022-10-21 | End: 2022-10-22

## 2022-10-21 RX ORDER — ACETAMINOPHEN 500 MG
650 TABLET ORAL ONCE
Refills: 0 | Status: DISCONTINUED | OUTPATIENT
Start: 2022-10-21 | End: 2022-10-21

## 2022-10-21 RX ADMIN — Medication 81 MILLIGRAM(S): at 12:56

## 2022-10-21 RX ADMIN — OXYCODONE AND ACETAMINOPHEN 1 TABLET(S): 5; 325 TABLET ORAL at 09:49

## 2022-10-21 RX ADMIN — Medication 1: at 14:20

## 2022-10-21 RX ADMIN — Medication 2: at 09:07

## 2022-10-21 RX ADMIN — Medication 5 UNIT(S): at 09:08

## 2022-10-21 RX ADMIN — TAMSULOSIN HYDROCHLORIDE 0.8 MILLIGRAM(S): 0.4 CAPSULE ORAL at 21:39

## 2022-10-21 RX ADMIN — INSULIN GLARGINE 20 UNIT(S): 100 INJECTION, SOLUTION SUBCUTANEOUS at 22:19

## 2022-10-21 RX ADMIN — Medication 25 MILLIGRAM(S): at 05:10

## 2022-10-21 RX ADMIN — LIDOCAINE 1 PATCH: 4 CREAM TOPICAL at 14:22

## 2022-10-21 RX ADMIN — Medication 400 MILLIGRAM(S): at 14:19

## 2022-10-21 RX ADMIN — Medication 7 UNIT(S): at 14:21

## 2022-10-21 RX ADMIN — OXYCODONE AND ACETAMINOPHEN 1 TABLET(S): 5; 325 TABLET ORAL at 10:49

## 2022-10-21 RX ADMIN — PANTOPRAZOLE SODIUM 40 MILLIGRAM(S): 20 TABLET, DELAYED RELEASE ORAL at 05:49

## 2022-10-21 RX ADMIN — Medication 1000 MILLIGRAM(S): at 14:45

## 2022-10-21 RX ADMIN — FINASTERIDE 5 MILLIGRAM(S): 5 TABLET, FILM COATED ORAL at 12:56

## 2022-10-21 NOTE — PROGRESS NOTE ADULT - SUBJECTIVE AND OBJECTIVE BOX
Chief complaint    Patient is a 82y old  Male who presents with a chief complaint of  Review of systems  Patient in bed, appears comfortable.    Labs and Fingersticks  CAPILLARY BLOOD GLUCOSE    POCT Blood Glucose.: 186 mg/dL (21 Oct 2022 13:56)  POCT Blood Glucose.: 246 mg/dL (21 Oct 2022 08:58)  POCT Blood Glucose.: 302 mg/dL (20 Oct 2022 21:40)  POCT Blood Glucose.: 269 mg/dL (20 Oct 2022 19:27)  POCT Blood Glucose.: 307 mg/dL (20 Oct 2022 18:20)    Anion Gap, Serum: 14 (10-20 @ 15:03)      Calcium, Total Serum: 9.3 (10-20 @ 15:03)          10-20    133<L>  |  96  |  19  ----------------------------<  304<H>  4.8   |  23  |  0.90    Ca    9.3      20 Oct 2022 15:03                          12.9   15.19 )-----------( 213      ( 20 Oct 2022 15:03 )             38.9     Medications  MEDICATIONS  (STANDING):  aspirin enteric coated 81 milliGRAM(s) Oral daily  dextrose 5%. 1000 milliLiter(s) (50 mL/Hr) IV Continuous <Continuous>  dextrose 5%. 1000 milliLiter(s) (100 mL/Hr) IV Continuous <Continuous>  dextrose 50% Injectable 25 Gram(s) IV Push once  dextrose 50% Injectable 12.5 Gram(s) IV Push once  dextrose 50% Injectable 25 Gram(s) IV Push once  finasteride 5 milliGRAM(s) Oral daily  glucagon  Injectable 1 milliGRAM(s) IntraMuscular once  influenza  Vaccine (HIGH DOSE) 0.7 milliLiter(s) IntraMuscular once  insulin glargine Injectable (LANTUS) 20 Unit(s) SubCutaneous at bedtime  insulin lispro (ADMELOG) corrective regimen sliding scale   SubCutaneous three times a day before meals  insulin lispro Injectable (ADMELOG) 7 Unit(s) SubCutaneous three times a day before meals  lidocaine   4% Patch 1 Patch Transdermal every 24 hours  metoprolol succinate ER 25 milliGRAM(s) Oral daily  pantoprazole    Tablet 40 milliGRAM(s) Oral before breakfast  tamsulosin 0.8 milliGRAM(s) Oral at bedtime      Physical Exam  General: Patient comfortable in bed  Vital Signs Last 12 Hrs  T(F): 97.4 (10-21-22 @ 04:16), Max: 97.4 (10-21-22 @ 04:16)  HR: 80 (10-21-22 @ 09:54) (63 - 80)  BP: 112/80 (10-21-22 @ 09:54) (103/68 - 112/80)  BP(mean): --  RR: 18 (10-21-22 @ 09:54) (18 - 18)  SpO2: 98% (10-21-22 @ 09:54) (95% - 98%)  Neck: No palpable thyroid nodules.  CVS: S1S2, No murmurs  Respiratory: No wheezing, no crepitations  GI: Abdomen soft, bowel sounds positive  Musculoskeletal:  edema lower extremities.     Diagnostics

## 2022-10-21 NOTE — PROGRESS NOTE ADULT - ASSESSMENT
81 male h/o htn, chol, dm, here s/p mechanical fall    mechanical fall  imaging noted  surg eval noted  MRI L spine noted with Lspine fx  ortho f/u noted  no acute intervention  pain control  TLSO brace  PT    DM  insulin as ordered  endo f/u  monitor fs    cont other home meds    Advanced care planning was discussed with patient and family.  Advanced care planning forms were reviewed and discussed as appropriate.  Differential diagnosis and plan of care discussed with patient after the evaluation.   Pain assessed and judicious use of narcotics when appropriate was discussed.  Importance of Fall prevention discussed.  Counseling on Smoking and Alcohol cessation was offered when appropriate.  Counseling on Diet, exercise, and medication compliance was done.   Approx 30 minutes spent.

## 2022-10-21 NOTE — PROGRESS NOTE ADULT - SUBJECTIVE AND OBJECTIVE BOX
Patient evaluated measured fitted for TLSO spinal orthosis as requested by  medicine  to treat post L1 fracture for OBB use to control LS spine in al  planes aid in reducing pain and assist in healing delivered by  [facility] [phone]

## 2022-10-21 NOTE — PROGRESS NOTE ADULT - SUBJECTIVE AND OBJECTIVE BOX
KAITLIN CALDERON  82y Male  MRN:[id number]    Patient is a 82y old  Male who presents with a chief complaint of fall    HPI:   81M cantonese speaking PMHx of HTN/HLD, DM2, BPH, (?Afib per chart review, though not on AC's), presents to the ED with mid lower back pain that he sustained after falling in the bathroom while he was urinating at 9pm last night. Pt denies headstrike/LOC. Difficult to obtain full history even with  though pt answering questions appropriately and is AAOx2. Pt denies any pain anywhere else. Per EMS pt has had chronic cough. Unclear if pt was down long. pt last took insulin 2 days ago he states.   	Spoke with stepdaughter Yulissa who lives below pt: Pt couldn't get up from the fall yesterday, has difficulty ambulating with walker since the fall as he complaints of back pain, pt has 9hrs home help daily but not overnight. She states that pt is at baseline mental status and that he's had short term memory problems for some time. Denies any recent fevers/chills, abd pain, v/d, chest pain/sob. States that the cough pt has is chronic.  (19 Oct 2022 15:53)      Patient seen and evaluated at bedside. No acute events overnight except as noted.    Interval HPI: no acute events o/n     PAST MEDICAL & SURGICAL HISTORY:  DM (diabetes mellitus)      HTN (hypertension)      Hypercholesterolemia      CAD (coronary artery disease)      HTN (hypertension)      DM (diabetes mellitus)      S/P TURP      S/P gastrectomy          REVIEW OF SYSTEMS:  as per hpi     VITALS:   Vital Signs Last 24 Hrs  T(C): 36.3 (21 Oct 2022 04:16), Max: 36.9 (20 Oct 2022 14:44)  T(F): 97.4 (21 Oct 2022 04:16), Max: 98.4 (20 Oct 2022 14:44)  HR: 80 (21 Oct 2022 09:54) (63 - 100)  BP: 112/80 (21 Oct 2022 09:54) (103/68 - 135/71)  BP(mean): --  RR: 18 (21 Oct 2022 09:54) (18 - 18)  SpO2: 98% (21 Oct 2022 09:54) (94% - 98%)    Parameters below as of 21 Oct 2022 09:54  Patient On (Oxygen Delivery Method): room air          PHYSICAL EXAM:  GENERAL: NAD, well-developed  HEAD:  Atraumatic, Normocephalic  EYES: EOMI, PERRLA, conjunctiva and sclera clear  NECK: Supple, No JVD  CHEST/LUNG: Clear to auscultation bilaterally; No wheeze  HEART: S1, S2; No murmurs, rubs, or gallops  ABDOMEN: Soft, Nontender, Nondistended; Bowel sounds present  EXTREMITIES:  2+ Peripheral Pulses, No clubbing, cyanosis, or edema  PSYCH: Normal affect  NEUROLOGY: AAOX3; non-focal  SKIN: No rashes or lesions    Consultant(s) Notes Reviewed:  [x ] YES  [ ] NO  Care Discussed with Consultants/Other Providers [ x] YES  [ ] NO    MEDS:   MEDICATIONS  (STANDING):  acetaminophen   IVPB .. 1000 milliGRAM(s) IV Intermittent once  aspirin enteric coated 81 milliGRAM(s) Oral daily  dextrose 5%. 1000 milliLiter(s) (50 mL/Hr) IV Continuous <Continuous>  dextrose 5%. 1000 milliLiter(s) (100 mL/Hr) IV Continuous <Continuous>  dextrose 50% Injectable 25 Gram(s) IV Push once  dextrose 50% Injectable 12.5 Gram(s) IV Push once  dextrose 50% Injectable 25 Gram(s) IV Push once  finasteride 5 milliGRAM(s) Oral daily  glucagon  Injectable 1 milliGRAM(s) IntraMuscular once  influenza  Vaccine (HIGH DOSE) 0.7 milliLiter(s) IntraMuscular once  insulin glargine Injectable (LANTUS) 20 Unit(s) SubCutaneous at bedtime  insulin lispro (ADMELOG) corrective regimen sliding scale   SubCutaneous three times a day before meals  insulin lispro Injectable (ADMELOG) 7 Unit(s) SubCutaneous three times a day before meals  lidocaine   4% Patch 1 Patch Transdermal every 24 hours  metoprolol succinate ER 25 milliGRAM(s) Oral daily  pantoprazole    Tablet 40 milliGRAM(s) Oral before breakfast  tamsulosin 0.8 milliGRAM(s) Oral at bedtime    MEDICATIONS  (PRN):  dextrose Oral Gel 15 Gram(s) Oral once PRN Blood Glucose LESS THAN 70 milliGRAM(s)/deciliter  oxyCODONE    IR 5 milliGRAM(s) Oral every 6 hours PRN Moderate Pain (4 - 6)      ALLERGIES:  No Known Allergies      LABS:                             12.9   15.19 )-----------( 213      ( 20 Oct 2022 15:03 )             38.9   10-20    133<L>  |  96  |  19  ----------------------------<  304<H>  4.8   |  23  |  0.90    Ca    9.3      20 Oct 2022 15:03              cultures: Culture Results:   No growth (10-19 @ 08:59)       < from: MR Lumbar Spine No Cont (10.20.22 @ 17:28) >    IMPRESSION:  Evidence of acute fracture L1 with fracture line traversing   the vertebral body from anterior to posterior and findings consistent   with a 2 column injury. Paravertebral cuff of soft tissue consistent with   recent injury. Some mild extension of edema into the pedicles though the   transverse and spinous processes as well as the lamina are spared. No   other lesions are identified. Recommend interval follow-up to determine   benign versus pathologic fracture.    --- End of Report ---      < end of copied text >

## 2022-10-21 NOTE — PROGRESS NOTE ADULT - ASSESSMENT
Assessment  DMT2: 82y Male with DM T2 with hyperglycemia, A1C pending , was on insulin at home, now on basal insulin and coverage, blood sugars are still running high and not at target, no hypoglycemic episodes, eating meals, appears comfortable.  S/P Fall: workup in progress, stable, monitored.  HTN: Un Controlled,  on antihypertensive medications.          Melba Reyes MD  Cell: [phone]  Office: [phone]

## 2022-10-21 NOTE — PROGRESS NOTE ADULT - PROBLEM SELECTOR PLAN 1
Will increase Lantus to 15 units at bed time.  Will increase Admelog to 7 units before each meal in addition to Admelog correction scale coverage.  Patient counseled for compliance with consistent low carb diet.

## 2022-10-22 LAB
A1C WITH ESTIMATED AVERAGE GLUCOSE RESULT: 10.4 % — HIGH (ref 4–5.6)
ANION GAP SERPL CALC-SCNC: 10 MMOL/L — SIGNIFICANT CHANGE UP (ref 5–17)
BUN SERPL-MCNC: 22 MG/DL — SIGNIFICANT CHANGE UP (ref 7–23)
CALCIUM SERPL-MCNC: 9.3 MG/DL — SIGNIFICANT CHANGE UP (ref 8.4–10.5)
CHLORIDE SERPL-SCNC: 98 MMOL/L — SIGNIFICANT CHANGE UP (ref 96–108)
CO2 SERPL-SCNC: 25 MMOL/L — SIGNIFICANT CHANGE UP (ref 22–31)
CREAT SERPL-MCNC: 1.08 MG/DL — SIGNIFICANT CHANGE UP (ref 0.5–1.3)
EGFR: 68 ML/MIN/1.73M2 — SIGNIFICANT CHANGE UP
ESTIMATED AVERAGE GLUCOSE: 252 MG/DL — HIGH (ref 68–114)
GLUCOSE BLDC GLUCOMTR-MCNC: 167 MG/DL — HIGH (ref 70–99)
GLUCOSE BLDC GLUCOMTR-MCNC: 200 MG/DL — HIGH (ref 70–99)
GLUCOSE BLDC GLUCOMTR-MCNC: 212 MG/DL — HIGH (ref 70–99)
GLUCOSE BLDC GLUCOMTR-MCNC: 234 MG/DL — HIGH (ref 70–99)
GLUCOSE SERPL-MCNC: 186 MG/DL — HIGH (ref 70–99)
HCT VFR BLD CALC: 35.9 % — LOW (ref 39–50)
HGB BLD-MCNC: 11.8 G/DL — LOW (ref 13–17)
MCHC RBC-ENTMCNC: 28.7 PG — SIGNIFICANT CHANGE UP (ref 27–34)
MCHC RBC-ENTMCNC: 32.9 GM/DL — SIGNIFICANT CHANGE UP (ref 32–36)
MCV RBC AUTO: 87.3 FL — SIGNIFICANT CHANGE UP (ref 80–100)
NRBC # BLD: 0 /100 WBCS — SIGNIFICANT CHANGE UP (ref 0–0)
PLATELET # BLD AUTO: 195 K/UL — SIGNIFICANT CHANGE UP (ref 150–400)
POTASSIUM SERPL-MCNC: 4.1 MMOL/L — SIGNIFICANT CHANGE UP (ref 3.5–5.3)
POTASSIUM SERPL-SCNC: 4.1 MMOL/L — SIGNIFICANT CHANGE UP (ref 3.5–5.3)
RBC # BLD: 4.11 M/UL — LOW (ref 4.2–5.8)
RBC # FLD: 13.7 % — SIGNIFICANT CHANGE UP (ref 10.3–14.5)
SODIUM SERPL-SCNC: 133 MMOL/L — LOW (ref 135–145)
WBC # BLD: 10.75 K/UL — HIGH (ref 3.8–10.5)
WBC # FLD AUTO: 10.75 K/UL — HIGH (ref 3.8–10.5)

## 2022-10-22 RX ORDER — INSULIN LISPRO 100/ML
8 VIAL (ML) SUBCUTANEOUS
Refills: 0 | Status: DISCONTINUED | OUTPATIENT
Start: 2022-10-22 | End: 2022-10-23

## 2022-10-22 RX ORDER — ACETAMINOPHEN 500 MG
650 TABLET ORAL EVERY 6 HOURS
Refills: 0 | Status: DISCONTINUED | OUTPATIENT
Start: 2022-10-22 | End: 2022-11-17

## 2022-10-22 RX ORDER — ACETAMINOPHEN 500 MG
1000 TABLET ORAL ONCE
Refills: 0 | Status: COMPLETED | OUTPATIENT
Start: 2022-10-22 | End: 2022-10-22

## 2022-10-22 RX ORDER — SENNA PLUS 8.6 MG/1
2 TABLET ORAL AT BEDTIME
Refills: 0 | Status: DISCONTINUED | OUTPATIENT
Start: 2022-10-22 | End: 2022-11-17

## 2022-10-22 RX ORDER — INSULIN GLARGINE 100 [IU]/ML
22 INJECTION, SOLUTION SUBCUTANEOUS AT BEDTIME
Refills: 0 | Status: DISCONTINUED | OUTPATIENT
Start: 2022-10-22 | End: 2022-10-23

## 2022-10-22 RX ADMIN — LIDOCAINE 1 PATCH: 4 CREAM TOPICAL at 01:48

## 2022-10-22 RX ADMIN — Medication 2: at 12:15

## 2022-10-22 RX ADMIN — OXYCODONE HYDROCHLORIDE 5 MILLIGRAM(S): 5 TABLET ORAL at 01:50

## 2022-10-22 RX ADMIN — Medication 1000 MILLIGRAM(S): at 03:29

## 2022-10-22 RX ADMIN — OXYCODONE HYDROCHLORIDE 5 MILLIGRAM(S): 5 TABLET ORAL at 18:07

## 2022-10-22 RX ADMIN — OXYCODONE HYDROCHLORIDE 5 MILLIGRAM(S): 5 TABLET ORAL at 07:58

## 2022-10-22 RX ADMIN — Medication 1: at 08:43

## 2022-10-22 RX ADMIN — SENNA PLUS 2 TABLET(S): 8.6 TABLET ORAL at 21:39

## 2022-10-22 RX ADMIN — INSULIN GLARGINE 22 UNIT(S): 100 INJECTION, SOLUTION SUBCUTANEOUS at 21:44

## 2022-10-22 RX ADMIN — Medication 650 MILLIGRAM(S): at 20:29

## 2022-10-22 RX ADMIN — OXYCODONE HYDROCHLORIDE 5 MILLIGRAM(S): 5 TABLET ORAL at 02:27

## 2022-10-22 RX ADMIN — OXYCODONE HYDROCHLORIDE 5 MILLIGRAM(S): 5 TABLET ORAL at 18:37

## 2022-10-22 RX ADMIN — PANTOPRAZOLE SODIUM 40 MILLIGRAM(S): 20 TABLET, DELAYED RELEASE ORAL at 05:24

## 2022-10-22 RX ADMIN — Medication 400 MILLIGRAM(S): at 02:59

## 2022-10-22 RX ADMIN — FINASTERIDE 5 MILLIGRAM(S): 5 TABLET, FILM COATED ORAL at 11:14

## 2022-10-22 RX ADMIN — Medication 650 MILLIGRAM(S): at 11:13

## 2022-10-22 RX ADMIN — Medication 7 UNIT(S): at 08:43

## 2022-10-22 RX ADMIN — Medication 7 UNIT(S): at 12:16

## 2022-10-22 RX ADMIN — Medication 650 MILLIGRAM(S): at 11:43

## 2022-10-22 RX ADMIN — TAMSULOSIN HYDROCHLORIDE 0.8 MILLIGRAM(S): 0.4 CAPSULE ORAL at 21:39

## 2022-10-22 RX ADMIN — OXYCODONE HYDROCHLORIDE 5 MILLIGRAM(S): 5 TABLET ORAL at 08:28

## 2022-10-22 RX ADMIN — Medication 25 MILLIGRAM(S): at 05:25

## 2022-10-22 RX ADMIN — Medication 81 MILLIGRAM(S): at 11:13

## 2022-10-22 NOTE — PROGRESS NOTE ADULT - SUBJECTIVE AND OBJECTIVE BOX
Chief complaint    Patient is a 82y old  Male who presents with a chief complaint of  Review of systems  Patient in bed, appears comfortable.    Labs and Fingersticks  CAPILLARY BLOOD GLUCOSE      POCT Blood Glucose.: 234 mg/dL (22 Oct 2022 12:10)  POCT Blood Glucose.: 167 mg/dL (22 Oct 2022 08:05)  POCT Blood Glucose.: 193 mg/dL (21 Oct 2022 22:18)  POCT Blood Glucose.: 124 mg/dL (21 Oct 2022 18:26)      Anion Gap, Serum: 10 (10-22 @ 07:57)      Calcium, Total Serum: 9.3 (10-22 @ 07:57)          10-22    133<L>  |  98  |  22  ----------------------------<  186<H>  4.1   |  25  |  1.08    Ca    9.3      22 Oct 2022 07:57                          11.8   10.75 )-----------( 195      ( 22 Oct 2022 07:49 )             35.9     Medications  MEDICATIONS  (STANDING):  aspirin enteric coated 81 milliGRAM(s) Oral daily  dextrose 5%. 1000 milliLiter(s) (50 mL/Hr) IV Continuous <Continuous>  dextrose 5%. 1000 milliLiter(s) (100 mL/Hr) IV Continuous <Continuous>  dextrose 50% Injectable 25 Gram(s) IV Push once  dextrose 50% Injectable 12.5 Gram(s) IV Push once  dextrose 50% Injectable 25 Gram(s) IV Push once  finasteride 5 milliGRAM(s) Oral daily  glucagon  Injectable 1 milliGRAM(s) IntraMuscular once  influenza  Vaccine (HIGH DOSE) 0.7 milliLiter(s) IntraMuscular once  insulin glargine Injectable (LANTUS) 22 Unit(s) SubCutaneous at bedtime  insulin lispro (ADMELOG) corrective regimen sliding scale   SubCutaneous three times a day before meals  insulin lispro Injectable (ADMELOG) 8 Unit(s) SubCutaneous three times a day before meals  lidocaine   4% Patch 1 Patch Transdermal every 24 hours  metoprolol succinate ER 25 milliGRAM(s) Oral daily  pantoprazole    Tablet 40 milliGRAM(s) Oral before breakfast  senna 2 Tablet(s) Oral at bedtime  tamsulosin 0.8 milliGRAM(s) Oral at bedtime      Physical Exam  General: Patient comfortable in bed  Vital Signs Last 12 Hrs  T(F): 98.4 (10-22-22 @ 12:23), Max: 98.5 (10-22-22 @ 09:45)  HR: 85 (10-22-22 @ 12:23) (85 - 86)  BP: 120/70 (10-22-22 @ 12:23) (110/70 - 120/70)  BP(mean): --  RR: 18 (10-22-22 @ 12:23) (18 - 18)  SpO2: 94% (10-22-22 @ 12:23) (94% - 95%)  Neck: No palpable thyroid nodules.  CVS: S1S2, No murmurs  Respiratory: No wheezing, no crepitations  GI: Abdomen soft, bowel sounds positive  Musculoskeletal:  edema lower extremities.     Diagnostics

## 2022-10-22 NOTE — PROGRESS NOTE ADULT - ASSESSMENT
Assessment  DMT2: 82y Male with DM T2 with hyperglycemia, A1C pending , was on insulin at home, now on basal insulin and coverage, blood sugars are still running high and not at target, no hypoglycemic episodes, eating meals, appears comfortable.  S/P Fall: workup in progress, stable, monitored.  HTN: Un Controlled,  on antihypertensive medications.          Melba Reyes MD  Cell: [phone]  Office: [phone]

## 2022-10-22 NOTE — PROGRESS NOTE ADULT - SUBJECTIVE AND OBJECTIVE BOX
KAITLIN CALDERON  82y Male  MRN:[id number]    Patient is a 82y old  Male who presents with a chief complaint of fall    HPI:   81M cantonese speaking PMHx of HTN/HLD, DM2, BPH, (?Afib per chart review, though not on AC's), presents to the ED with mid lower back pain that he sustained after falling in the bathroom while he was urinating at 9pm last night. Pt denies headstrike/LOC. Difficult to obtain full history even with  though pt answering questions appropriately and is AAOx2. Pt denies any pain anywhere else. Per EMS pt has had chronic cough. Unclear if pt was down long. pt last took insulin 2 days ago he states.   	Spoke with stepdaughter Yulissa who lives below pt: Pt couldn't get up from the fall yesterday, has difficulty ambulating with walker since the fall as he complaints of back pain, pt has 9hrs home help daily but not overnight. She states that pt is at baseline mental status and that he's had short term memory problems for some time. Denies any recent fevers/chills, abd pain, v/d, chest pain/sob. States that the cough pt has is chronic.  (19 Oct 2022 15:53)      Patient seen and evaluated at bedside. No acute events overnight except as noted.    Interval HPI: no acute events o/n     PAST MEDICAL & SURGICAL HISTORY:  DM (diabetes mellitus)      HTN (hypertension)      Hypercholesterolemia      CAD (coronary artery disease)      HTN (hypertension)      DM (diabetes mellitus)      S/P TURP      S/P gastrectomy          REVIEW OF SYSTEMS:  as per hpi     VITALS:  Vital Signs Last 24 Hrs  T(C): 36.6 (22 Oct 2022 17:12), Max: 36.9 (22 Oct 2022 09:45)  T(F): 97.9 (22 Oct 2022 17:12), Max: 98.5 (22 Oct 2022 09:45)  HR: 90 (22 Oct 2022 17:12) (80 - 90)  BP: 113/74 (22 Oct 2022 17:12) (103/71 - 120/70)  BP(mean): --  RR: 18 (22 Oct 2022 17:12) (18 - 18)  SpO2: 95% (22 Oct 2022 17:12) (93% - 95%)    Parameters below as of 22 Oct 2022 17:12  Patient On (Oxygen Delivery Method): room air              PHYSICAL EXAM:  GENERAL: NAD, well-developed  HEAD:  Atraumatic, Normocephalic  EYES: EOMI, PERRLA, conjunctiva and sclera clear  NECK: Supple, No JVD  CHEST/LUNG: Clear to auscultation bilaterally; No wheeze  HEART: S1, S2; No murmurs, rubs, or gallops  ABDOMEN: Soft, Nontender, Nondistended; Bowel sounds present  EXTREMITIES:  2+ Peripheral Pulses, No clubbing, cyanosis, or edema  PSYCH: Normal affect  NEUROLOGY: AAOX3; non-focal  SKIN: No rashes or lesions    Consultant(s) Notes Reviewed:  [x ] YES  [ ] NO  Care Discussed with Consultants/Other Providers [ x] YES  [ ] NO    MEDS:   MEDICATIONS  (STANDING):  aspirin enteric coated 81 milliGRAM(s) Oral daily  dextrose 5%. 1000 milliLiter(s) (50 mL/Hr) IV Continuous <Continuous>  dextrose 5%. 1000 milliLiter(s) (100 mL/Hr) IV Continuous <Continuous>  dextrose 50% Injectable 25 Gram(s) IV Push once  dextrose 50% Injectable 12.5 Gram(s) IV Push once  dextrose 50% Injectable 25 Gram(s) IV Push once  finasteride 5 milliGRAM(s) Oral daily  glucagon  Injectable 1 milliGRAM(s) IntraMuscular once  influenza  Vaccine (HIGH DOSE) 0.7 milliLiter(s) IntraMuscular once  insulin glargine Injectable (LANTUS) 22 Unit(s) SubCutaneous at bedtime  insulin lispro (ADMELOG) corrective regimen sliding scale   SubCutaneous three times a day before meals  insulin lispro Injectable (ADMELOG) 8 Unit(s) SubCutaneous three times a day before meals  lidocaine   4% Patch 1 Patch Transdermal every 24 hours  metoprolol succinate ER 25 milliGRAM(s) Oral daily  pantoprazole    Tablet 40 milliGRAM(s) Oral before breakfast  senna 2 Tablet(s) Oral at bedtime  tamsulosin 0.8 milliGRAM(s) Oral at bedtime    MEDICATIONS  (PRN):  acetaminophen     Tablet .. 650 milliGRAM(s) Oral every 6 hours PRN mild pain  dextrose Oral Gel 15 Gram(s) Oral once PRN Blood Glucose LESS THAN 70 milliGRAM(s)/deciliter  oxyCODONE    IR 5 milliGRAM(s) Oral every 6 hours PRN Moderate Pain (4 - 6)      ALLERGIES:  No Known Allergies      LABS:                                    11.8   10.75 )-----------( 195      ( 22 Oct 2022 07:49 )             35.9   10-22    133<L>  |  98  |  22  ----------------------------<  186<H>  4.1   |  25  |  1.08    Ca    9.3      22 Oct 2022 07:57                cultures: Culture Results:   No growth (10-19 @ 08:59)       < from: MR Lumbar Spine No Cont (10.20.22 @ 17:28) >    IMPRESSION:  Evidence of acute fracture L1 with fracture line traversing   the vertebral body from anterior to posterior and findings consistent   with a 2 column injury. Paravertebral cuff of soft tissue consistent with   recent injury. Some mild extension of edema into the pedicles though the   transverse and spinous processes as well as the lamina are spared. No   other lesions are identified. Recommend interval follow-up to determine   benign versus pathologic fracture.    --- End of Report ---      < end of copied text >

## 2022-10-22 NOTE — PROGRESS NOTE ADULT - ASSESSMENT
81 male h/o htn, chol, dm, here s/p mechanical fall    mechanical fall  imaging noted  surg eval noted  MRI L spine noted with Lspine fx  ortho f/u noted  no acute intervention  pain control  TLSO brace  PT    DM  insulin as ordered  endo f/u  monitor fs    cont other home meds    dc planning    Advanced care planning was discussed with patient and family.  Advanced care planning forms were reviewed and discussed as appropriate.  Differential diagnosis and plan of care discussed with patient after the evaluation.   Pain assessed and judicious use of narcotics when appropriate was discussed.  Importance of Fall prevention discussed.  Counseling on Smoking and Alcohol cessation was offered when appropriate.  Counseling on Diet, exercise, and medication compliance was done.   Approx 30 minutes spent.

## 2022-10-22 NOTE — PROGRESS NOTE ADULT - PROBLEM SELECTOR PLAN 1
Will increase Lantus to 22 units at bed time.  Will increase Admelog to 8 units before each meal in addition to Admelog correction scale coverage.  Patient counseled for compliance with consistent low carb diet.

## 2022-10-23 LAB
GLUCOSE BLDC GLUCOMTR-MCNC: 215 MG/DL — HIGH (ref 70–99)
GLUCOSE BLDC GLUCOMTR-MCNC: 246 MG/DL — HIGH (ref 70–99)
GLUCOSE BLDC GLUCOMTR-MCNC: 283 MG/DL — HIGH (ref 70–99)
HCT VFR BLD CALC: 39.7 % — SIGNIFICANT CHANGE UP (ref 39–50)
HGB BLD-MCNC: 13 G/DL — SIGNIFICANT CHANGE UP (ref 13–17)
MCHC RBC-ENTMCNC: 28.6 PG — SIGNIFICANT CHANGE UP (ref 27–34)
MCHC RBC-ENTMCNC: 32.7 GM/DL — SIGNIFICANT CHANGE UP (ref 32–36)
MCV RBC AUTO: 87.3 FL — SIGNIFICANT CHANGE UP (ref 80–100)
NRBC # BLD: 0 /100 WBCS — SIGNIFICANT CHANGE UP (ref 0–0)
PLATELET # BLD AUTO: 251 K/UL — SIGNIFICANT CHANGE UP (ref 150–400)
RBC # BLD: 4.55 M/UL — SIGNIFICANT CHANGE UP (ref 4.2–5.8)
RBC # FLD: 14 % — SIGNIFICANT CHANGE UP (ref 10.3–14.5)
WBC # BLD: 16.73 K/UL — HIGH (ref 3.8–10.5)
WBC # FLD AUTO: 16.73 K/UL — HIGH (ref 3.8–10.5)

## 2022-10-23 PROCEDURE — 71045 X-RAY EXAM CHEST 1 VIEW: CPT | Mod: 26

## 2022-10-23 RX ORDER — IPRATROPIUM/ALBUTEROL SULFATE 18-103MCG
3 AEROSOL WITH ADAPTER (GRAM) INHALATION EVERY 6 HOURS
Refills: 0 | Status: DISCONTINUED | OUTPATIENT
Start: 2022-10-23 | End: 2022-11-17

## 2022-10-23 RX ORDER — INSULIN GLARGINE 100 [IU]/ML
23 INJECTION, SOLUTION SUBCUTANEOUS AT BEDTIME
Refills: 0 | Status: DISCONTINUED | OUTPATIENT
Start: 2022-10-23 | End: 2022-10-24

## 2022-10-23 RX ORDER — INSULIN LISPRO 100/ML
9 VIAL (ML) SUBCUTANEOUS
Refills: 0 | Status: DISCONTINUED | OUTPATIENT
Start: 2022-10-23 | End: 2022-10-24

## 2022-10-23 RX ADMIN — Medication 81 MILLIGRAM(S): at 12:33

## 2022-10-23 RX ADMIN — LIDOCAINE 1 PATCH: 4 CREAM TOPICAL at 16:44

## 2022-10-23 RX ADMIN — FINASTERIDE 5 MILLIGRAM(S): 5 TABLET, FILM COATED ORAL at 12:33

## 2022-10-23 RX ADMIN — TAMSULOSIN HYDROCHLORIDE 0.8 MILLIGRAM(S): 0.4 CAPSULE ORAL at 21:54

## 2022-10-23 RX ADMIN — PANTOPRAZOLE SODIUM 40 MILLIGRAM(S): 20 TABLET, DELAYED RELEASE ORAL at 05:33

## 2022-10-23 RX ADMIN — LIDOCAINE 1 PATCH: 4 CREAM TOPICAL at 19:06

## 2022-10-23 RX ADMIN — SENNA PLUS 2 TABLET(S): 8.6 TABLET ORAL at 21:53

## 2022-10-23 RX ADMIN — Medication 650 MILLIGRAM(S): at 12:33

## 2022-10-23 RX ADMIN — INSULIN GLARGINE 23 UNIT(S): 100 INJECTION, SOLUTION SUBCUTANEOUS at 21:54

## 2022-10-23 RX ADMIN — Medication 9 UNIT(S): at 19:00

## 2022-10-23 RX ADMIN — Medication 25 MILLIGRAM(S): at 05:33

## 2022-10-23 RX ADMIN — Medication 3: at 19:00

## 2022-10-23 RX ADMIN — OXYCODONE HYDROCHLORIDE 5 MILLIGRAM(S): 5 TABLET ORAL at 05:33

## 2022-10-23 RX ADMIN — Medication 650 MILLIGRAM(S): at 13:03

## 2022-10-23 RX ADMIN — Medication 650 MILLIGRAM(S): at 18:59

## 2022-10-23 NOTE — PROGRESS NOTE ADULT - SUBJECTIVE AND OBJECTIVE BOX
Chief complaint    Patient is a 82y old  Male who presents with a chief complaint of  Review of systems  Patient in bed, appears comfortable.    Labs and Fingersticks  CAPILLARY BLOOD GLUCOSE      POCT Blood Glucose.: 246 mg/dL (23 Oct 2022 13:59)  POCT Blood Glucose.: 212 mg/dL (22 Oct 2022 21:42)  POCT Blood Glucose.: 200 mg/dL (22 Oct 2022 17:42)      Anion Gap, Serum: 10 (10-22 @ 07:57)      Calcium, Total Serum: 9.3 (10-22 @ 07:57)          10-22    133<L>  |  98  |  22  ----------------------------<  186<H>  4.1   |  25  |  1.08    Ca    9.3      22 Oct 2022 07:57                          13.0   16.73 )-----------( 251      ( 23 Oct 2022 07:36 )             39.7     Medications  MEDICATIONS  (STANDING):  aspirin enteric coated 81 milliGRAM(s) Oral daily  dextrose 5%. 1000 milliLiter(s) (50 mL/Hr) IV Continuous <Continuous>  dextrose 5%. 1000 milliLiter(s) (100 mL/Hr) IV Continuous <Continuous>  dextrose 50% Injectable 25 Gram(s) IV Push once  dextrose 50% Injectable 12.5 Gram(s) IV Push once  dextrose 50% Injectable 25 Gram(s) IV Push once  finasteride 5 milliGRAM(s) Oral daily  glucagon  Injectable 1 milliGRAM(s) IntraMuscular once  influenza  Vaccine (HIGH DOSE) 0.7 milliLiter(s) IntraMuscular once  insulin glargine Injectable (LANTUS) 23 Unit(s) SubCutaneous at bedtime  insulin lispro (ADMELOG) corrective regimen sliding scale   SubCutaneous three times a day before meals  insulin lispro Injectable (ADMELOG) 9 Unit(s) SubCutaneous three times a day before meals  lidocaine   4% Patch 1 Patch Transdermal every 24 hours  metoprolol succinate ER 25 milliGRAM(s) Oral daily  pantoprazole    Tablet 40 milliGRAM(s) Oral before breakfast  senna 2 Tablet(s) Oral at bedtime  tamsulosin 0.8 milliGRAM(s) Oral at bedtime      Physical Exam  General: Patient comfortable in bed  Vital Signs Last 12 Hrs  T(F): 98.6 (10-23-22 @ 05:29), Max: 98.6 (10-23-22 @ 05:29)  HR: 97 (10-23-22 @ 13:30) (82 - 97)  BP: 119/80 (10-23-22 @ 13:30) (111/77 - 119/80)  BP(mean): --  RR: 17 (10-23-22 @ 05:29) (17 - 17)  SpO2: 91% (10-23-22 @ 13:30) (91% - 92%)  Neck: No palpable thyroid nodules.  CVS: S1S2, No murmurs  Respiratory: No wheezing, no crepitations  GI: Abdomen soft, bowel sounds positive  Musculoskeletal:  edema lower extremities.     Diagnostics

## 2022-10-23 NOTE — PROGRESS NOTE ADULT - ASSESSMENT
Assessment  DMT2: 82y Male with DM T2 with hyperglycemia, was on insulin at home, now on basal insulin and coverage, blood sugars are trending down, no hypoglycemic episodes, eating meals, appears comfortable.  S/P Fall: workup in progress, stable, monitored.  HTN: Un Controlled,  on antihypertensive medications.          Melba Reyes MD  Cell: [phone]  Office: [phone]

## 2022-10-23 NOTE — PROGRESS NOTE ADULT - SUBJECTIVE AND OBJECTIVE BOX
KAITLIN CALDERON  82y Male  MRN:[id number]    Patient is a 82y old  Male who presents with a chief complaint of fall    HPI:   81M cantonese speaking PMHx of HTN/HLD, DM2, BPH, (?Afib per chart review, though not on AC's), presents to the ED with mid lower back pain that he sustained after falling in the bathroom while he was urinating at 9pm last night. Pt denies headstrike/LOC. Difficult to obtain full history even with  though pt answering questions appropriately and is AAOx2. Pt denies any pain anywhere else. Per EMS pt has had chronic cough. Unclear if pt was down long. pt last took insulin 2 days ago he states.   	Spoke with stepdaughter Yulissa who lives below pt: Pt couldn't get up from the fall yesterday, has difficulty ambulating with walker since the fall as he complaints of back pain, pt has 9hrs home help daily but not overnight. She states that pt is at baseline mental status and that he's had short term memory problems for some time. Denies any recent fevers/chills, abd pain, v/d, chest pain/sob. States that the cough pt has is chronic.  (19 Oct 2022 15:53)      Patient seen and evaluated at bedside. No acute events overnight except as noted.    Interval HPI: hypoxia this AM    PAST MEDICAL & SURGICAL HISTORY:  DM (diabetes mellitus)      HTN (hypertension)      Hypercholesterolemia      CAD (coronary artery disease)      HTN (hypertension)      DM (diabetes mellitus)      S/P TURP      S/P gastrectomy          REVIEW OF SYSTEMS:  as per hpi     VITALS:   Vital Signs Last 24 Hrs  T(C): 37 (23 Oct 2022 05:29), Max: 37 (23 Oct 2022 05:29)  T(F): 98.6 (23 Oct 2022 05:29), Max: 98.6 (23 Oct 2022 05:29)  HR: 97 (23 Oct 2022 13:30) (74 - 98)  BP: 119/80 (23 Oct 2022 13:30) (101/65 - 119/80)  BP(mean): --  RR: 17 (23 Oct 2022 05:29) (17 - 18)  SpO2: 91% (23 Oct 2022 13:30) (91% - 96%)    Parameters below as of 23 Oct 2022 13:30  Patient On (Oxygen Delivery Method): nasal cannula  O2 Flow (L/min): 2          PHYSICAL EXAM:  GENERAL: NAD, well-developed  HEAD:  Atraumatic, Normocephalic  EYES: EOMI, PERRLA, conjunctiva and sclera clear  NECK: Supple, No JVD  CHEST/LUNG: Clear to auscultation bilaterally; No wheeze  HEART: S1, S2; No murmurs, rubs, or gallops  ABDOMEN: Soft, Nontender, Nondistended; Bowel sounds present  EXTREMITIES:  2+ Peripheral Pulses, No clubbing, cyanosis, or edema  PSYCH: Normal affect  NEUROLOGY: AAOX3; non-focal  SKIN: No rashes or lesions    Consultant(s) Notes Reviewed:  [x ] YES  [ ] NO  Care Discussed with Consultants/Other Providers [ x] YES  [ ] NO    MEDS:   MEDICATIONS  (STANDING):  aspirin enteric coated 81 milliGRAM(s) Oral daily  dextrose 5%. 1000 milliLiter(s) (50 mL/Hr) IV Continuous <Continuous>  dextrose 5%. 1000 milliLiter(s) (100 mL/Hr) IV Continuous <Continuous>  dextrose 50% Injectable 25 Gram(s) IV Push once  dextrose 50% Injectable 12.5 Gram(s) IV Push once  dextrose 50% Injectable 25 Gram(s) IV Push once  finasteride 5 milliGRAM(s) Oral daily  glucagon  Injectable 1 milliGRAM(s) IntraMuscular once  influenza  Vaccine (HIGH DOSE) 0.7 milliLiter(s) IntraMuscular once  insulin glargine Injectable (LANTUS) 23 Unit(s) SubCutaneous at bedtime  insulin lispro (ADMELOG) corrective regimen sliding scale   SubCutaneous three times a day before meals  insulin lispro Injectable (ADMELOG) 9 Unit(s) SubCutaneous three times a day before meals  lidocaine   4% Patch 1 Patch Transdermal every 24 hours  metoprolol succinate ER 25 milliGRAM(s) Oral daily  pantoprazole    Tablet 40 milliGRAM(s) Oral before breakfast  senna 2 Tablet(s) Oral at bedtime  tamsulosin 0.8 milliGRAM(s) Oral at bedtime    MEDICATIONS  (PRN):  acetaminophen     Tablet .. 650 milliGRAM(s) Oral every 6 hours PRN mild pain  albuterol/ipratropium for Nebulization 3 milliLiter(s) Nebulizer every 6 hours PRN Shortness of Breath  dextrose Oral Gel 15 Gram(s) Oral once PRN Blood Glucose LESS THAN 70 milliGRAM(s)/deciliter  oxyCODONE    IR 5 milliGRAM(s) Oral every 6 hours PRN Moderate Pain (4 - 6)      ALLERGIES:  No Known Allergies      LABS:                                             13.0   16.73 )-----------( 251      ( 23 Oct 2022 07:36 )             39.7   10-22    133<L>  |  98  |  22  ----------------------------<  186<H>  4.1   |  25  |  1.08    Ca    9.3      22 Oct 2022 07:57              cultures: Culture Results:   No growth (10-19 @ 08:59)       < from: MR Lumbar Spine No Cont (10.20.22 @ 17:28) >    IMPRESSION:  Evidence of acute fracture L1 with fracture line traversing   the vertebral body from anterior to posterior and findings consistent   with a 2 column injury. Paravertebral cuff of soft tissue consistent with   recent injury. Some mild extension of edema into the pedicles though the   transverse and spinous processes as well as the lamina are spared. No   other lesions are identified. Recommend interval follow-up to determine   benign versus pathologic fracture.    --- End of Report ---      < end of copied text >

## 2022-10-23 NOTE — PROGRESS NOTE ADULT - PROBLEM SELECTOR PLAN 1
Will increase Lantus to 23 units at bed time.  Will increase Admelog to 9 units before each meal in addition to Admelog correction scale coverage.  Patient counseled for compliance with consistent low carb diet.

## 2022-10-23 NOTE — PROGRESS NOTE ADULT - ASSESSMENT
81 male h/o htn, chol, dm, here s/p mechanical fall    mechanical fall  imaging noted  surg eval noted  MRI L spine noted with Lspine fx  ortho f/u noted  no acute intervention  pain control  TLSO brace  PT    DM  insulin as ordered  endo f/u  monitor fs    hypoxia  check cxr  incentive spirometer  pulm consulted  supp o2 prn    cont other home meds         Advanced care planning was discussed with patient and family.  Advanced care planning forms were reviewed and discussed as appropriate.  Differential diagnosis and plan of care discussed with patient after the evaluation.   Pain assessed and judicious use of narcotics when appropriate was discussed.  Importance of Fall prevention discussed.  Counseling on Smoking and Alcohol cessation was offered when appropriate.  Counseling on Diet, exercise, and medication compliance was done.   Approx 30 minutes spent.   81 male h/o htn, chol, dm, here s/p mechanical fall    mechanical fall  imaging noted  surg eval noted  MRI L spine noted with Lspine fx  ortho f/u noted  no acute intervention  pain control  TLSO brace  PT    DM  insulin as ordered  endo f/u  monitor fs    hypoxia  check cxr  incentive spirometer  pulm consulted  supp o2 prn    leukocytosis  f/u cxr  check ua    cont other home meds         Advanced care planning was discussed with patient and family.  Advanced care planning forms were reviewed and discussed as appropriate.  Differential diagnosis and plan of care discussed with patient after the evaluation.   Pain assessed and judicious use of narcotics when appropriate was discussed.  Importance of Fall prevention discussed.  Counseling on Smoking and Alcohol cessation was offered when appropriate.  Counseling on Diet, exercise, and medication compliance was done.   Approx 30 minutes spent.

## 2022-10-23 NOTE — PROGRESS NOTE ADULT - SUBJECTIVE AND OBJECTIVE BOX
Chief complaint    Patient is a 82y old  Male who presents with a chief complaint of  Review of systems  Patient in bed, appears comfortable.    Labs and Fingersticks  CAPILLARY BLOOD GLUCOSE      POCT Blood Glucose.: 212 mg/dL (22 Oct 2022 21:42)  POCT Blood Glucose.: 200 mg/dL (22 Oct 2022 17:42)  POCT Blood Glucose.: 234 mg/dL (22 Oct 2022 12:10)      Anion Gap, Serum: 10 (10-22 @ 07:57)      Calcium, Total Serum: 9.3 (10-22 @ 07:57)          10-22    133<L>  |  98  |  22  ----------------------------<  186<H>  4.1   |  25  |  1.08    Ca    9.3      22 Oct 2022 07:57                          13.0   16.73 )-----------( 251      ( 23 Oct 2022 07:36 )             39.7     Medications  MEDICATIONS  (STANDING):  aspirin enteric coated 81 milliGRAM(s) Oral daily  dextrose 5%. 1000 milliLiter(s) (50 mL/Hr) IV Continuous <Continuous>  dextrose 5%. 1000 milliLiter(s) (100 mL/Hr) IV Continuous <Continuous>  dextrose 50% Injectable 25 Gram(s) IV Push once  dextrose 50% Injectable 12.5 Gram(s) IV Push once  dextrose 50% Injectable 25 Gram(s) IV Push once  finasteride 5 milliGRAM(s) Oral daily  glucagon  Injectable 1 milliGRAM(s) IntraMuscular once  influenza  Vaccine (HIGH DOSE) 0.7 milliLiter(s) IntraMuscular once  insulin glargine Injectable (LANTUS) 22 Unit(s) SubCutaneous at bedtime  insulin lispro (ADMELOG) corrective regimen sliding scale   SubCutaneous three times a day before meals  insulin lispro Injectable (ADMELOG) 8 Unit(s) SubCutaneous three times a day before meals  lidocaine   4% Patch 1 Patch Transdermal every 24 hours  metoprolol succinate ER 25 milliGRAM(s) Oral daily  pantoprazole    Tablet 40 milliGRAM(s) Oral before breakfast  senna 2 Tablet(s) Oral at bedtime  tamsulosin 0.8 milliGRAM(s) Oral at bedtime      Physical Exam  General: Patient comfortable in bed  Vital Signs Last 12 Hrs  T(F): 98.6 (10-23-22 @ 05:29), Max: 98.6 (10-23-22 @ 05:29)  HR: 82 (10-23-22 @ 05:29) (74 - 82)  BP: 111/77 (10-23-22 @ 05:29) (101/65 - 111/77)  BP(mean): --  RR: 17 (10-23-22 @ 05:29) (17 - 18)  SpO2: 92% (10-23-22 @ 05:29) (92% - 93%)  Neck: No palpable thyroid nodules.  CVS: S1S2, No murmurs  Respiratory: No wheezing, no crepitations  GI: Abdomen soft, bowel sounds positive  Musculoskeletal:  edema lower extremities.     Diagnostics

## 2022-10-23 NOTE — PROGRESS NOTE ADULT - ASSESSMENT
Assessment  DMT2: 82y Male with DM T2 with hyperglycemia, was on insulin at home, now on basal insulin and coverage, blood sugars still not at target, no hypoglycemic episodes, eating meals, appears comfortable.  S/P Fall: workup in progress, stable, monitored.  HTN: Un Controlled,  on antihypertensive medications.          Melba Reyes MD  Cell: [phone]  Office: [phone]

## 2022-10-24 LAB
ANION GAP SERPL CALC-SCNC: 13 MMOL/L — SIGNIFICANT CHANGE UP (ref 5–17)
APPEARANCE UR: CLEAR — SIGNIFICANT CHANGE UP
BACTERIA # UR AUTO: NEGATIVE — SIGNIFICANT CHANGE UP
BILIRUB UR-MCNC: NEGATIVE — SIGNIFICANT CHANGE UP
BUN SERPL-MCNC: 32 MG/DL — HIGH (ref 7–23)
CALCIUM SERPL-MCNC: 9.7 MG/DL — SIGNIFICANT CHANGE UP (ref 8.4–10.5)
CHLORIDE SERPL-SCNC: 94 MMOL/L — LOW (ref 96–108)
CHLORIDE UR-SCNC: 22 MMOL/L — SIGNIFICANT CHANGE UP
CO2 SERPL-SCNC: 26 MMOL/L — SIGNIFICANT CHANGE UP (ref 22–31)
COLOR SPEC: YELLOW — SIGNIFICANT CHANGE UP
CREAT ?TM UR-MCNC: 114 MG/DL — SIGNIFICANT CHANGE UP
CREAT SERPL-MCNC: 1.44 MG/DL — HIGH (ref 0.5–1.3)
DIFF PNL FLD: NEGATIVE — SIGNIFICANT CHANGE UP
EGFR: 48 ML/MIN/1.73M2 — LOW
EPI CELLS # UR: 2 /HPF — SIGNIFICANT CHANGE UP
GLUCOSE BLDC GLUCOMTR-MCNC: 112 MG/DL — HIGH (ref 70–99)
GLUCOSE BLDC GLUCOMTR-MCNC: 170 MG/DL — HIGH (ref 70–99)
GLUCOSE BLDC GLUCOMTR-MCNC: 215 MG/DL — HIGH (ref 70–99)
GLUCOSE BLDC GLUCOMTR-MCNC: 268 MG/DL — HIGH (ref 70–99)
GLUCOSE SERPL-MCNC: 204 MG/DL — HIGH (ref 70–99)
GLUCOSE UR QL: ABNORMAL
HCT VFR BLD CALC: 41.1 % — SIGNIFICANT CHANGE UP (ref 39–50)
HGB BLD-MCNC: 13.3 G/DL — SIGNIFICANT CHANGE UP (ref 13–17)
HYALINE CASTS # UR AUTO: 3 /LPF — HIGH (ref 0–2)
KETONES UR-MCNC: NEGATIVE — SIGNIFICANT CHANGE UP
LEUKOCYTE ESTERASE UR-ACNC: ABNORMAL
MCHC RBC-ENTMCNC: 28.7 PG — SIGNIFICANT CHANGE UP (ref 27–34)
MCHC RBC-ENTMCNC: 32.4 GM/DL — SIGNIFICANT CHANGE UP (ref 32–36)
MCV RBC AUTO: 88.8 FL — SIGNIFICANT CHANGE UP (ref 80–100)
NITRITE UR-MCNC: NEGATIVE — SIGNIFICANT CHANGE UP
NRBC # BLD: 0 /100 WBCS — SIGNIFICANT CHANGE UP (ref 0–0)
PH UR: 5.5 — SIGNIFICANT CHANGE UP (ref 5–8)
PLATELET # BLD AUTO: 232 K/UL — SIGNIFICANT CHANGE UP (ref 150–400)
POTASSIUM SERPL-MCNC: 4.5 MMOL/L — SIGNIFICANT CHANGE UP (ref 3.5–5.3)
POTASSIUM SERPL-SCNC: 4.5 MMOL/L — SIGNIFICANT CHANGE UP (ref 3.5–5.3)
POTASSIUM UR-SCNC: 58 MMOL/L — SIGNIFICANT CHANGE UP
PROT UR-MCNC: ABNORMAL
RBC # BLD: 4.63 M/UL — SIGNIFICANT CHANGE UP (ref 4.2–5.8)
RBC # FLD: 14.1 % — SIGNIFICANT CHANGE UP (ref 10.3–14.5)
RBC CASTS # UR COMP ASSIST: 5 /HPF — HIGH (ref 0–4)
SODIUM SERPL-SCNC: 133 MMOL/L — LOW (ref 135–145)
SODIUM UR-SCNC: 21 MMOL/L — SIGNIFICANT CHANGE UP
SP GR SPEC: 1.02 — SIGNIFICANT CHANGE UP (ref 1.01–1.02)
UROBILINOGEN FLD QL: NEGATIVE — SIGNIFICANT CHANGE UP
WBC # BLD: 12.58 K/UL — HIGH (ref 3.8–10.5)
WBC # FLD AUTO: 12.58 K/UL — HIGH (ref 3.8–10.5)
WBC UR QL: 16 /HPF — HIGH (ref 0–5)

## 2022-10-24 PROCEDURE — 76770 US EXAM ABDO BACK WALL COMP: CPT | Mod: 26

## 2022-10-24 RX ORDER — INSULIN GLARGINE 100 [IU]/ML
25 INJECTION, SOLUTION SUBCUTANEOUS AT BEDTIME
Refills: 0 | Status: DISCONTINUED | OUTPATIENT
Start: 2022-10-24 | End: 2022-11-09

## 2022-10-24 RX ORDER — PIPERACILLIN AND TAZOBACTAM 4; .5 G/20ML; G/20ML
3.38 INJECTION, POWDER, LYOPHILIZED, FOR SOLUTION INTRAVENOUS EVERY 8 HOURS
Refills: 0 | Status: DISCONTINUED | OUTPATIENT
Start: 2022-10-25 | End: 2022-11-04

## 2022-10-24 RX ORDER — PIPERACILLIN AND TAZOBACTAM 4; .5 G/20ML; G/20ML
3.38 INJECTION, POWDER, LYOPHILIZED, FOR SOLUTION INTRAVENOUS ONCE
Refills: 0 | Status: COMPLETED | OUTPATIENT
Start: 2022-10-24 | End: 2022-10-24

## 2022-10-24 RX ORDER — PIPERACILLIN AND TAZOBACTAM 4; .5 G/20ML; G/20ML
3.38 INJECTION, POWDER, LYOPHILIZED, FOR SOLUTION INTRAVENOUS ONCE
Refills: 0 | Status: COMPLETED | OUTPATIENT
Start: 2022-10-24 | End: 2022-10-25

## 2022-10-24 RX ORDER — INSULIN LISPRO 100/ML
10 VIAL (ML) SUBCUTANEOUS
Refills: 0 | Status: DISCONTINUED | OUTPATIENT
Start: 2022-10-24 | End: 2022-11-09

## 2022-10-24 RX ADMIN — Medication 3: at 09:26

## 2022-10-24 RX ADMIN — TAMSULOSIN HYDROCHLORIDE 0.8 MILLIGRAM(S): 0.4 CAPSULE ORAL at 21:57

## 2022-10-24 RX ADMIN — Medication 81 MILLIGRAM(S): at 12:07

## 2022-10-24 RX ADMIN — PIPERACILLIN AND TAZOBACTAM 200 GRAM(S): 4; .5 INJECTION, POWDER, LYOPHILIZED, FOR SOLUTION INTRAVENOUS at 19:44

## 2022-10-24 RX ADMIN — Medication 9 UNIT(S): at 12:56

## 2022-10-24 RX ADMIN — Medication 3 MILLILITER(S): at 10:59

## 2022-10-24 RX ADMIN — OXYCODONE HYDROCHLORIDE 5 MILLIGRAM(S): 5 TABLET ORAL at 06:19

## 2022-10-24 RX ADMIN — SENNA PLUS 2 TABLET(S): 8.6 TABLET ORAL at 21:57

## 2022-10-24 RX ADMIN — Medication 3 MILLILITER(S): at 19:59

## 2022-10-24 RX ADMIN — Medication 2: at 12:55

## 2022-10-24 RX ADMIN — Medication 9 UNIT(S): at 09:27

## 2022-10-24 RX ADMIN — INSULIN GLARGINE 25 UNIT(S): 100 INJECTION, SOLUTION SUBCUTANEOUS at 21:57

## 2022-10-24 RX ADMIN — PANTOPRAZOLE SODIUM 40 MILLIGRAM(S): 20 TABLET, DELAYED RELEASE ORAL at 06:19

## 2022-10-24 RX ADMIN — LIDOCAINE 1 PATCH: 4 CREAM TOPICAL at 14:16

## 2022-10-24 RX ADMIN — Medication 25 MILLIGRAM(S): at 06:19

## 2022-10-24 RX ADMIN — FINASTERIDE 5 MILLIGRAM(S): 5 TABLET, FILM COATED ORAL at 12:07

## 2022-10-24 NOTE — PROGRESS NOTE ADULT - PROBLEM SELECTOR PLAN 1
Will increase Lantus to 25 units at bed time.  Will increase Admelog to 10 units before each meal in addition to Admelog correction scale coverage. Will continue monitoring FS and FU.  If blood sugars stable, suggest DC on current insulin doses.  Patient counseled for compliance with consistent low carb diet.

## 2022-10-24 NOTE — CONSULT NOTE ADULT - SUBJECTIVE AND OBJECTIVE BOX
HPI: Mr. Evans is an 82 year-old Cantonese-speaking man with history of multiple medical issues including hypertension, type 2 diabetes melliuts, coronary artery disease, and benign prostatic hyperplasia s/p TURP. He presented 10/19/22 to the [facility] ER with mid-lower back pain s/p mechanical fall. He was diagnosed with an L1 vertebral fracture. Orthopedics has been on board since admission; patient is being set up for lumbar brace.    Today, Mr. Evans's creatinine was noted to rise from 1.08 to 1.44mg/dL; in light of the ANTON, a renal consultation was requested.    I see no evidence of nephrotoxic meds/agents given over the past few days. BP appears quite stable relative to admission. I see that he had a bout of hypoxia yesterday, and he required placement on NCO2.    PAST MEDICAL & SURGICAL HISTORY:  DM (diabetes mellitus)  HTN (hypertension)  Hypercholesterolemia  CAD (coronary artery disease)  BPH-TURP  S/P gastrectomy    Allergies  No Known Allergies    SOCIAL HISTORY:  Denies ETOh,Smoking,     FAMILY HISTORY:  No pertinent family history in first degree relatives    REVIEW OF SYSTEMS:  CONSTITUTIONAL: No weakness, fevers or chills  EYES/ENT: No visual changes;  No vertigo or throat pain   NECK: No pain or stiffness  RESPIRATORY: (+)cough  CARDIOVASCULAR: No chest pain or palpitations  GASTROINTESTINAL: No abdominal or epigastric pain. No nausea, vomiting, or hematemesis; No diarrhea or constipation. No melena or hematochezia.  GENITOURINARY: No dysuria, frequency or hematuria  NEUROLOGICAL: (+)back pain  SKIN: No itching, burning, rashes, or lesions   All other review of systems is negative unless indicated above.    VITAL:  T(C): , Max: 37.1 (10-24-22 @ 09:30)  T(F): , Max: 98.8 (10-24-22 @ 09:30)  HR: 110 (10-24-22 @ 10:05)  BP: 124/82 (10-24-22 @ 10:05)  RR: 18 (10-24-22 @ 10:05)  SpO2: 95% (10-24-22 @ 10:05)    PHYSICAL EXAM:  Constitutional: NAD, Alert  HEENT: NCAT, MMM  Neck: Supple, No JVD  Respiratory: CTA-b/l  Cardiovascular: tachy s1s2  Gastrointestinal: BS+, soft, NT/ND  Extremities: No peripheral edema b/l  Neurological: no focal deficits; strength grossly intact  Back: no CVAT b/l  Skin: No rashes, no nevi    LABS:                        13.3   12.58 )-----------( 232      ( 24 Oct 2022 07:45 )             41.1     Na(133)/K(4.5)/Cl(94)/HCO3(26)/BUN(32)/Cr(1.44)Glu(204)/Ca(9.7)/Mg(--)/PO4(--)    10-24 @ 07:48  Na(133)/K(4.1)/Cl(98)/HCO3(25)/BUN(22)/Cr(1.08)Glu(186)/Ca(9.3)/Mg(--)/PO4(--)    10-22 @ 07:57    (10/19/22) - UA - 30prot, 1rbc, 2wbc    IMAGING:  < from: CT Lumbar Spine Reform No Cont (10.19.22 @ 10:05) >  -Acute mild L1 compression fracture, suspected to be secondary to   underlying lesion/metastasis. No bony retropulsion, but MRI lumbar spine   with IV contrast is recommended for furtherassessment.  -Partially visualized necrotic mass possibly arising from the right   inferior hepatic lobe. Please see concurrent dedicated CT abdomen and   pelvis report for further evaluation.    < from: MR Lumbar Spine No Cont (10.20.22 @ 17:28) >  Evidence of acute fracture L1 with fracture line traversing   the vertebral body from anterior to posterior and findings consistent   with a 2 column injury. Paravertebral cuff of soft tissue consistent with   recent injury. Some mild extension of edema into the pedicles though the   transverse and spinous processes as well as the lamina are spared. No   other lesions are identified. Recommend interval follow-up to determine   benign versus pathologic fracture.        ASSESSMENT:  (1)Renal - ANTON - unclear etiology   (2)Hypoxia - unclear etiology    RECOMMEND:  (1)Renal US  (2)Urine: lytes, creatinine, UA  (3)F/U Pulm eval - favor avoidance of IV contrast for now (ie no CTPA for now)  (4)Meds for GFR 40-50ml/min (present dosing is acceptable)  (5)BMP daily    Thank you for involving [facility] in this patient's care.    With warm regards,    Maco Bhatia MD   [facility]  Office: [phone]  Cell: [phone]             HPI: Mr. Evans is an 82 year-old Cantonese-speaking man with history of multiple medical issues including hypertension, type 2 diabetes melliuts, coronary artery disease, and benign prostatic hyperplasia s/p TURP. He presented 10/19/22 to the [facility] ER with mid-lower back pain s/p mechanical fall. He was diagnosed with an L1 vertebral fracture. Orthopedics has been on board since admission; patient is being set up for lumbar brace.    Today, Mr. Evans's creatinine was noted to rise from 1.08 to 1.44mg/dL; in light of the ANTON, a renal consultation was requested.    I see no evidence of nephrotoxic meds/agents given over the past few days. BP appears quite stable relative to admission. I see that he had a bout of hypoxia yesterday, and he required placement on NCO2.      PAST MEDICAL & SURGICAL HISTORY:  DM (diabetes mellitus)  HTN (hypertension)  Hypercholesterolemia  CAD (coronary artery disease)  BPH-TURP  S/P gastrectomy    Allergies  No Known Allergies    SOCIAL HISTORY:  Denies ETOh,Smoking,     FAMILY HISTORY:  No pertinent family history in first degree relatives    REVIEW OF SYSTEMS:  CONSTITUTIONAL: No weakness, fevers or chills  EYES/ENT: No visual changes;  No vertigo or throat pain   NECK: No pain or stiffness  RESPIRATORY: (+)cough  CARDIOVASCULAR: No chest pain or palpitations  GASTROINTESTINAL: No abdominal or epigastric pain. No nausea, vomiting, or hematemesis; No diarrhea or constipation. No melena or hematochezia.  GENITOURINARY: No dysuria, frequency or hematuria  NEUROLOGICAL: (+)back pain  SKIN: No itching, burning, rashes, or lesions   All other review of systems is negative unless indicated above.    VITAL:  T(C): , Max: 37.1 (10-24-22 @ 09:30)  T(F): , Max: 98.8 (10-24-22 @ 09:30)  HR: 110 (10-24-22 @ 10:05)  BP: 124/82 (10-24-22 @ 10:05)  RR: 18 (10-24-22 @ 10:05)  SpO2: 95% (10-24-22 @ 10:05)    PHYSICAL EXAM:  Constitutional: lethargic; NAD  HEENT: NCAT, MMM  Neck: Supple, No JVD  Respiratory: CTA-b/l  Cardiovascular: tachy s1s2  Gastrointestinal: BS+, soft, NT/ND  Extremities: No peripheral edema b/l  Neurological: no focal deficits; strength grossly intact  Back: no CVAT b/l  Skin: No rashes, no nevi    LABS:                        13.3   12.58 )-----------( 232      ( 24 Oct 2022 07:45 )             41.1     Na(133)/K(4.5)/Cl(94)/HCO3(26)/BUN(32)/Cr(1.44)Glu(204)/Ca(9.7)/Mg(--)/PO4(--)    10-24 @ 07:48  Na(133)/K(4.1)/Cl(98)/HCO3(25)/BUN(22)/Cr(1.08)Glu(186)/Ca(9.3)/Mg(--)/PO4(--)    10-22 @ 07:57    (10/19/22) - UA - 30prot, 1rbc, 2wbc    IMAGING:  < from: CT Lumbar Spine Reform No Cont (10.19.22 @ 10:05) >  -Acute mild L1 compression fracture, suspected to be secondary to   underlying lesion/metastasis. No bony retropulsion, but MRI lumbar spine   with IV contrast is recommended for furtherassessment.  -Partially visualized necrotic mass possibly arising from the right   inferior hepatic lobe. Please see concurrent dedicated CT abdomen and   pelvis report for further evaluation.    < from: MR Lumbar Spine No Cont (10.20.22 @ 17:28) >  Evidence of acute fracture L1 with fracture line traversing   the vertebral body from anterior to posterior and findings consistent   with a 2 column injury. Paravertebral cuff of soft tissue consistent with   recent injury. Some mild extension of edema into the pedicles though the   transverse and spinous processes as well as the lamina are spared. No   other lesions are identified. Recommend interval follow-up to determine   benign versus pathologic fracture.        ASSESSMENT:  (1)Renal - ANTON - unclear etiology   (2)Hypoxia - unclear etiology    RECOMMEND:  (1)Renal US  (2)Urine: lytes, creatinine, UA  (3)F/U Pulm eval - favor avoidance of IV contrast for now (ie no CTPA for now)  (4)Meds for GFR 40-50ml/min (present dosing is acceptable)  (5)BMP daily    Thank you for involving [facility] in this patient's care.    With warm regards,    Maco Bhatia MD   [facility]  Office: [phone]  Cell: [phone]

## 2022-10-24 NOTE — PROGRESS NOTE ADULT - SUBJECTIVE AND OBJECTIVE BOX
KAITLIN CALDERON  82y Male  MRN:[id number]    Patient is a 82y old  Male who presents with a chief complaint of fall    HPI:   81M cantonese speaking PMHx of HTN/HLD, DM2, BPH, (?Afib per chart review, though not on AC's), presents to the ED with mid lower back pain that he sustained after falling in the bathroom while he was urinating at 9pm last night. Pt denies headstrike/LOC. Difficult to obtain full history even with  though pt answering questions appropriately and is AAOx2. Pt denies any pain anywhere else. Per EMS pt has had chronic cough. Unclear if pt was down long. pt last took insulin 2 days ago he states.   	Spoke with stepdaughter Yulissa who lives below pt: Pt couldn't get up from the fall yesterday, has difficulty ambulating with walker since the fall as he complaints of back pain, pt has 9hrs home help daily but not overnight. She states that pt is at baseline mental status and that he's had short term memory problems for some time. Denies any recent fevers/chills, abd pain, v/d, chest pain/sob. States that the cough pt has is chronic.  (19 Oct 2022 15:53)      Patient seen and evaluated at bedside. No acute events overnight except as noted.    Interval HPI: lethargic and hypoxia    PAST MEDICAL & SURGICAL HISTORY:  DM (diabetes mellitus)      HTN (hypertension)      Hypercholesterolemia      CAD (coronary artery disease)      HTN (hypertension)      DM (diabetes mellitus)      S/P TURP      S/P gastrectomy          REVIEW OF SYSTEMS:  as per hpi     VITALS:   Vital Signs Last 24 Hrs  T(C): 36.7 (24 Oct 2022 17:21), Max: 37.1 (24 Oct 2022 09:30)  T(F): 98.1 (24 Oct 2022 17:21), Max: 98.8 (24 Oct 2022 09:30)  HR: 79 (24 Oct 2022 17:21) (64 - 110)  BP: 115/74 (24 Oct 2022 17:21) (101/64 - 124/82)  BP(mean): --  RR: 19 (24 Oct 2022 17:21) (16 - 19)  SpO2: 92% (24 Oct 2022 17:21) (92% - 96%)    Parameters below as of 24 Oct 2022 17:21  Patient On (Oxygen Delivery Method): nasal cannula  O2 Flow (L/min): 3          PHYSICAL EXAM:  GENERAL: NAD, well-developed, lethargic  HEAD:  Atraumatic, Normocephalic  EYES: EOMI, PERRLA, conjunctiva and sclera clear  NECK: Supple, No JVD  CHEST/LUNG: b/l ronchi  HEART: S1, S2; No murmurs, rubs, or gallops  ABDOMEN: Soft, Nontender, Nondistended; Bowel sounds present  EXTREMITIES:  2+ Peripheral Pulses, No clubbing, cyanosis, or edema  PSYCH: Normal affect  NEUROLOGY: AAOX1-2; non-focal  SKIN: No rashes or lesions    Consultant(s) Notes Reviewed:  [x ] YES  [ ] NO  Care Discussed with Consultants/Other Providers [ x] YES  [ ] NO    MEDS:   MEDICATIONS  (STANDING):  aspirin enteric coated 81 milliGRAM(s) Oral daily  dextrose 5%. 1000 milliLiter(s) (50 mL/Hr) IV Continuous <Continuous>  dextrose 5%. 1000 milliLiter(s) (100 mL/Hr) IV Continuous <Continuous>  dextrose 50% Injectable 25 Gram(s) IV Push once  dextrose 50% Injectable 12.5 Gram(s) IV Push once  dextrose 50% Injectable 25 Gram(s) IV Push once  finasteride 5 milliGRAM(s) Oral daily  glucagon  Injectable 1 milliGRAM(s) IntraMuscular once  influenza  Vaccine (HIGH DOSE) 0.7 milliLiter(s) IntraMuscular once  insulin glargine Injectable (LANTUS) 25 Unit(s) SubCutaneous at bedtime  insulin lispro (ADMELOG) corrective regimen sliding scale   SubCutaneous three times a day before meals  insulin lispro Injectable (ADMELOG) 10 Unit(s) SubCutaneous three times a day before meals  lidocaine   4% Patch 1 Patch Transdermal every 24 hours  metoprolol succinate ER 25 milliGRAM(s) Oral daily  pantoprazole    Tablet 40 milliGRAM(s) Oral before breakfast  senna 2 Tablet(s) Oral at bedtime  tamsulosin 0.8 milliGRAM(s) Oral at bedtime    MEDICATIONS  (PRN):  acetaminophen     Tablet .. 650 milliGRAM(s) Oral every 6 hours PRN mild pain  albuterol/ipratropium for Nebulization 3 milliLiter(s) Nebulizer every 6 hours PRN Shortness of Breath  dextrose Oral Gel 15 Gram(s) Oral once PRN Blood Glucose LESS THAN 70 milliGRAM(s)/deciliter  oxyCODONE    IR 5 milliGRAM(s) Oral every 6 hours PRN Moderate Pain (4 - 6)      ALLERGIES:  No Known Allergies      LABS:                             13.3   12.58 )-----------( 232      ( 24 Oct 2022 07:45 )             41.1   10-24    133<L>  |  94<L>  |  32<H>  ----------------------------<  204<H>  4.5   |  26  |  1.44<H>    Ca    9.7      24 Oct 2022 07:48                cultures: Culture Results:   No growth (10-19 @ 08:59)       < from: MR Lumbar Spine No Cont (10.20.22 @ 17:28) >    IMPRESSION:  Evidence of acute fracture L1 with fracture line traversing   the vertebral body from anterior to posterior and findings consistent   with a 2 column injury. Paravertebral cuff of soft tissue consistent with   recent injury. Some mild extension of edema into the pedicles though the   transverse and spinous processes as well as the lamina are spared. No   other lesions are identified. Recommend interval follow-up to determine   benign versus pathologic fracture.    --- End of Report ---      < end of copied text >

## 2022-10-24 NOTE — PROGRESS NOTE ADULT - ASSESSMENT
Assessment  DMT2: 82y Male with DM T2 with hyperglycemia, was on insulin at home, now on basal bolus insulin, adjusted dose yesterday, blood sugars still running high and not at target, no hypoglycemic episodes, eating meals, appears comfortable.  S/P Fall: workup in progress, stable, monitored.  HTN: Un Controlled,  on antihypertensive medications.          Melba Reyes MD  Cell: [phone]  Office: [phone]               Assessment  DMT2: 82y Male with DM T2 with hyperglycemia, was on insulin at home, now on basal bolus insulin, adjusted dose yesterday, blood sugars  still running high and not at target, no hypoglycemic episodes, eating meals, appears comfortable.  S/P Fall: workup in progress, stable, monitored.  HTN: Un Controlled,  on antihypertensive medications.          Melba Reyes MD  Cell: [phone]  Office: [phone]

## 2022-10-24 NOTE — PROVIDER CONTACT NOTE (OTHER) - ACTION/TREATMENT ORDERED:
No further action at this time. Provider said ok to give metoprolol at 5AM. Pt getting transferred to [facility].

## 2022-10-24 NOTE — PROGRESS NOTE ADULT - SUBJECTIVE AND OBJECTIVE BOX
Chief complaint  Patient is a 82y old  Male who presents with a chief complaint of  Review of systems  Patient in bed, looks comfortable, no hypoglycemic episodes.    Labs and Fingersticks  CAPILLARY BLOOD GLUCOSE      POCT Blood Glucose.: 215 mg/dL (24 Oct 2022 12:29)  POCT Blood Glucose.: 268 mg/dL (24 Oct 2022 09:03)  POCT Blood Glucose.: 215 mg/dL (23 Oct 2022 21:33)  POCT Blood Glucose.: 283 mg/dL (23 Oct 2022 18:52)      Anion Gap, Serum: 13 (10-24 @ 07:48)      Calcium, Total Serum: 9.7 (10-24 @ 07:48)          10-24    133<L>  |  94<L>  |  32<H>  ----------------------------<  204<H>  4.5   |  26  |  1.44<H>    Ca    9.7      24 Oct 2022 07:48                          13.3   12.58 )-----------( 232      ( 24 Oct 2022 07:45 )             41.1     Medications  MEDICATIONS  (STANDING):  aspirin enteric coated 81 milliGRAM(s) Oral daily  dextrose 5%. 1000 milliLiter(s) (50 mL/Hr) IV Continuous <Continuous>  dextrose 5%. 1000 milliLiter(s) (100 mL/Hr) IV Continuous <Continuous>  dextrose 50% Injectable 25 Gram(s) IV Push once  dextrose 50% Injectable 12.5 Gram(s) IV Push once  dextrose 50% Injectable 25 Gram(s) IV Push once  finasteride 5 milliGRAM(s) Oral daily  glucagon  Injectable 1 milliGRAM(s) IntraMuscular once  influenza  Vaccine (HIGH DOSE) 0.7 milliLiter(s) IntraMuscular once  insulin glargine Injectable (LANTUS) 25 Unit(s) SubCutaneous at bedtime  insulin lispro (ADMELOG) corrective regimen sliding scale   SubCutaneous three times a day before meals  insulin lispro Injectable (ADMELOG) 10 Unit(s) SubCutaneous three times a day before meals  lidocaine   4% Patch 1 Patch Transdermal every 24 hours  metoprolol succinate ER 25 milliGRAM(s) Oral daily  pantoprazole    Tablet 40 milliGRAM(s) Oral before breakfast  senna 2 Tablet(s) Oral at bedtime  tamsulosin 0.8 milliGRAM(s) Oral at bedtime      Physical Exam  General: Patient comfortable in bed  Vital Signs Last 12 Hrs  T(F): 98.8 (10-24-22 @ 13:14), Max: 98.8 (10-24-22 @ 09:30)  HR: 100 (10-24-22 @ 13:14) (81 - 110)  BP: 123/75 (10-24-22 @ 13:14) (113/72 - 124/82)  BP(mean): --  RR: 16 (10-24-22 @ 13:14) (16 - 18)  SpO2: 92% (10-24-22 @ 13:14) (92% - 96%)  Neck: No palpable thyroid nodules.  CVS: S1S2, No murmurs  Respiratory: No wheezing, no crepitations  GI: Abdomen soft, bowel sounds positive  Musculoskeletal:  edema lower extremities.   Skin: No skin rashes, no ecchymosis    Diagnostics    Free Thyroxine, Serum: AM Sched. Collection: 21-Oct-2022 06:00 (10-20 @ 13:18)  Thyroid Stimulating Hormone, Serum: AM Sched. Collection: 21-Oct-2022 06:00 (10-20 @ 13:18)  A1C with Estimated Average Glucose: Routine (10-20 @ 13:17)         Chief complaint  Patient is a 82y old  Male who presents with a chief complaint of  Review of systems  Patient in bed, looks comfortable, no hypoglycemic episodes.    Labs and Fingersticks  CAPILLARY BLOOD GLUCOSE      POCT Blood Glucose.: 215 mg/dL (24 Oct 2022 12:29)  POCT Blood Glucose.: 268 mg/dL (24 Oct 2022 09:03)  POCT Blood Glucose.: 215 mg/dL (23 Oct 2022 21:33)  POCT Blood Glucose.: 283 mg/dL (23 Oct 2022 18:52)      Anion Gap, Serum: 13 (10-24 @ 07:48)      Calcium, Total Serum: 9.7 (10-24 @ 07:48)          10-24    133<L>  |  94<L>  |  32<H>  ----------------------------<  204<H>  4.5   |  26  |  1.44<H>    Ca    9.7      24 Oct 2022 07:48                          13.3   12.58 )-----------( 232      ( 24 Oct 2022 07:45 )             41.1     Medications  MEDICATIONS  (STANDING):  aspirin enteric coated 81 milliGRAM(s) Oral daily  dextrose 5%. 1000 milliLiter(s) (50 mL/Hr) IV Continuous <Continuous>  dextrose 5%. 1000 milliLiter(s) (100 mL/Hr) IV Continuous <Continuous>  dextrose 50% Injectable 25 Gram(s) IV Push once  dextrose 50% Injectable 12.5 Gram(s) IV Push once  dextrose 50% Injectable 25 Gram(s) IV Push once  finasteride 5 milliGRAM(s) Oral daily  glucagon  Injectable 1 milliGRAM(s) IntraMuscular once  influenza  Vaccine (HIGH DOSE) 0.7 milliLiter(s) IntraMuscular once  insulin glargine Injectable (LANTUS) 25 Unit(s) SubCutaneous at bedtime  insulin lispro (ADMELOG) corrective regimen sliding scale   SubCutaneous three times a day before meals  insulin lispro Injectable (ADMELOG) 10 Unit(s) SubCutaneous three times a day before meals  lidocaine   4% Patch 1 Patch Transdermal every 24 hours  metoprolol succinate ER 25 milliGRAM(s) Oral daily  pantoprazole    Tablet 40 milliGRAM(s) Oral before breakfast  senna 2 Tablet(s) Oral at bedtime  tamsulosin 0.8 milliGRAM(s) Oral at bedtime      Physical Exam  General: Patient comfortable in bed  Vital Signs Last 12 Hrs  T(F): 98.8 (10-24-22 @ 13:14), Max: 98.8 (10-24-22 @ 09:30)  HR: 100 (10-24-22 @ 13:14) (81 - 110)  BP: 123/75 (10-24-22 @ 13:14) (113/72 - 124/82)  BP(mean): --  RR: 16 (10-24-22 @ 13:14) (16 - 18)  SpO2: 92% (10-24-22 @ 13:14) (92% - 96%)  Neck: No palpable thyroid nodules.  CVS: S1S2, No murmurs  Respiratory: No wheezing, no crepitations  GI: Abdomen soft, bowel sounds positive  Musculoskeletal:  edema lower extremities.   Skin: No skin rashes, no ecchymosis    Diagnostics    Free Thyroxine, Serum: AM Sched. Collection: 21-Oct-2022 06:00 (10-20 @ 13:18)  Thyroid Stimulating Hormone, Serum: AM Sched. Collection: 21-Oct-2022 06:00 (10-20 @ 13:18)  A1C with Estimated Average Glucose: Routine (10-20 @ 13:17)

## 2022-10-24 NOTE — PROGRESS NOTE ADULT - ASSESSMENT
81 male h/o htn, chol, dm, here s/p mechanical fall    mechanical fall  imaging noted  surg eval noted  MRI L spine noted with Lspine fx  ortho f/u noted  no acute intervention  pain control  TLSO brace  PT    DM  insulin as ordered  endo f/u  monitor fs    hypoxia  f/u cxr  incentive spirometer  pulm consulted  supp o2 prn    leukocytosis  f/u cxr    abnl UA  f/u cult    start empiric zosyn to cover possible pna and possible uti    cont other home meds         Advanced care planning was discussed with patient and family.  Advanced care planning forms were reviewed and discussed as appropriate.  Differential diagnosis and plan of care discussed with patient after the evaluation.   Pain assessed and judicious use of narcotics when appropriate was discussed.  Importance of Fall prevention discussed.  Counseling on Smoking and Alcohol cessation was offered when appropriate.  Counseling on Diet, exercise, and medication compliance was done.   Approx 30 minutes spent.   81 male h/o htn, chol, dm, here s/p mechanical fall    mechanical fall  imaging noted  surg eval noted  MRI L spine noted with Lspine fx  ortho f/u noted  no acute intervention  pain control  TLSO brace  PT    DM  insulin as ordered  endo f/u  monitor fs    hypoxia  f/u cxr  incentive spirometer  pulm consulted  supp o2 prn    chanel  unclear etiology  possible 2/2 poor oral fluid intake  renal consulted  f/u urine studies    leukocytosis  f/u cxr    abnl UA  f/u cult    start empiric zosyn to cover possible pna and possible uti    cont other home meds         Advanced care planning was discussed with patient and family.  Advanced care planning forms were reviewed and discussed as appropriate.  Differential diagnosis and plan of care discussed with patient after the evaluation.   Pain assessed and judicious use of narcotics when appropriate was discussed.  Importance of Fall prevention discussed.  Counseling on Smoking and Alcohol cessation was offered when appropriate.  Counseling on Diet, exercise, and medication compliance was done.   Approx 30 minutes spent.

## 2022-10-25 LAB
ANION GAP SERPL CALC-SCNC: 15 MMOL/L — SIGNIFICANT CHANGE UP (ref 5–17)
APPEARANCE UR: ABNORMAL
BACTERIA # UR AUTO: NEGATIVE — SIGNIFICANT CHANGE UP
BILIRUB UR-MCNC: NEGATIVE — SIGNIFICANT CHANGE UP
BUN SERPL-MCNC: 35 MG/DL — HIGH (ref 7–23)
CALCIUM SERPL-MCNC: 9.2 MG/DL — SIGNIFICANT CHANGE UP (ref 8.4–10.5)
CHLORIDE SERPL-SCNC: 96 MMOL/L — SIGNIFICANT CHANGE UP (ref 96–108)
CO2 SERPL-SCNC: 22 MMOL/L — SIGNIFICANT CHANGE UP (ref 22–31)
COLOR SPEC: YELLOW — SIGNIFICANT CHANGE UP
CREAT SERPL-MCNC: 1.34 MG/DL — HIGH (ref 0.5–1.3)
DIFF PNL FLD: NEGATIVE — SIGNIFICANT CHANGE UP
EGFR: 52 ML/MIN/1.73M2 — LOW
EPI CELLS # UR: 1 /HPF — SIGNIFICANT CHANGE UP
GLUCOSE BLDC GLUCOMTR-MCNC: 101 MG/DL — HIGH (ref 70–99)
GLUCOSE BLDC GLUCOMTR-MCNC: 160 MG/DL — HIGH (ref 70–99)
GLUCOSE BLDC GLUCOMTR-MCNC: 162 MG/DL — HIGH (ref 70–99)
GLUCOSE BLDC GLUCOMTR-MCNC: 211 MG/DL — HIGH (ref 70–99)
GLUCOSE SERPL-MCNC: 172 MG/DL — HIGH (ref 70–99)
GLUCOSE UR QL: NEGATIVE — SIGNIFICANT CHANGE UP
HCT VFR BLD CALC: 35.6 % — LOW (ref 39–50)
HGB BLD-MCNC: 11.5 G/DL — LOW (ref 13–17)
HYALINE CASTS # UR AUTO: 0 /LPF — SIGNIFICANT CHANGE UP (ref 0–2)
KETONES UR-MCNC: NEGATIVE — SIGNIFICANT CHANGE UP
LEUKOCYTE ESTERASE UR-ACNC: ABNORMAL
MCHC RBC-ENTMCNC: 28.6 PG — SIGNIFICANT CHANGE UP (ref 27–34)
MCHC RBC-ENTMCNC: 32.3 GM/DL — SIGNIFICANT CHANGE UP (ref 32–36)
MCV RBC AUTO: 88.6 FL — SIGNIFICANT CHANGE UP (ref 80–100)
NITRITE UR-MCNC: NEGATIVE — SIGNIFICANT CHANGE UP
NRBC # BLD: 0 /100 WBCS — SIGNIFICANT CHANGE UP (ref 0–0)
PH UR: 5.5 — SIGNIFICANT CHANGE UP (ref 5–8)
PLATELET # BLD AUTO: 202 K/UL — SIGNIFICANT CHANGE UP (ref 150–400)
POTASSIUM SERPL-MCNC: 4.2 MMOL/L — SIGNIFICANT CHANGE UP (ref 3.5–5.3)
POTASSIUM SERPL-SCNC: 4.2 MMOL/L — SIGNIFICANT CHANGE UP (ref 3.5–5.3)
PROT UR-MCNC: ABNORMAL
RAPID RVP RESULT: SIGNIFICANT CHANGE UP
RBC # BLD: 4.02 M/UL — LOW (ref 4.2–5.8)
RBC # FLD: 14.1 % — SIGNIFICANT CHANGE UP (ref 10.3–14.5)
RBC CASTS # UR COMP ASSIST: 21 /HPF — HIGH (ref 0–4)
SARS-COV-2 RNA SPEC QL NAA+PROBE: SIGNIFICANT CHANGE UP
SODIUM SERPL-SCNC: 133 MMOL/L — LOW (ref 135–145)
SP GR SPEC: 1.02 — SIGNIFICANT CHANGE UP (ref 1.01–1.02)
UROBILINOGEN FLD QL: NEGATIVE — SIGNIFICANT CHANGE UP
WBC # BLD: 13.77 K/UL — HIGH (ref 3.8–10.5)
WBC # FLD AUTO: 13.77 K/UL — HIGH (ref 3.8–10.5)
WBC UR QL: 5 /HPF — SIGNIFICANT CHANGE UP (ref 0–5)

## 2022-10-25 PROCEDURE — 99223 1ST HOSP IP/OBS HIGH 75: CPT

## 2022-10-25 RX ORDER — POLYETHYLENE GLYCOL 3350 17 G/17G
17 POWDER, FOR SOLUTION ORAL DAILY
Refills: 0 | Status: DISCONTINUED | OUTPATIENT
Start: 2022-10-25 | End: 2022-11-17

## 2022-10-25 RX ADMIN — Medication 1: at 18:09

## 2022-10-25 RX ADMIN — PANTOPRAZOLE SODIUM 40 MILLIGRAM(S): 20 TABLET, DELAYED RELEASE ORAL at 05:15

## 2022-10-25 RX ADMIN — PIPERACILLIN AND TAZOBACTAM 25 GRAM(S): 4; .5 INJECTION, POWDER, LYOPHILIZED, FOR SOLUTION INTRAVENOUS at 21:17

## 2022-10-25 RX ADMIN — INSULIN GLARGINE 25 UNIT(S): 100 INJECTION, SOLUTION SUBCUTANEOUS at 22:04

## 2022-10-25 RX ADMIN — LIDOCAINE 1 PATCH: 4 CREAM TOPICAL at 13:51

## 2022-10-25 RX ADMIN — Medication 10 UNIT(S): at 12:41

## 2022-10-25 RX ADMIN — Medication 10 UNIT(S): at 18:09

## 2022-10-25 RX ADMIN — Medication 2: at 09:01

## 2022-10-25 RX ADMIN — PIPERACILLIN AND TAZOBACTAM 25 GRAM(S): 4; .5 INJECTION, POWDER, LYOPHILIZED, FOR SOLUTION INTRAVENOUS at 00:14

## 2022-10-25 RX ADMIN — TAMSULOSIN HYDROCHLORIDE 0.8 MILLIGRAM(S): 0.4 CAPSULE ORAL at 21:14

## 2022-10-25 RX ADMIN — LIDOCAINE 1 PATCH: 4 CREAM TOPICAL at 20:42

## 2022-10-25 RX ADMIN — Medication 25 MILLIGRAM(S): at 05:15

## 2022-10-25 RX ADMIN — Medication 650 MILLIGRAM(S): at 06:00

## 2022-10-25 RX ADMIN — LIDOCAINE 1 PATCH: 4 CREAM TOPICAL at 02:42

## 2022-10-25 RX ADMIN — SENNA PLUS 2 TABLET(S): 8.6 TABLET ORAL at 21:14

## 2022-10-25 RX ADMIN — Medication 10 UNIT(S): at 09:01

## 2022-10-25 RX ADMIN — Medication 1: at 12:40

## 2022-10-25 RX ADMIN — Medication 650 MILLIGRAM(S): at 05:16

## 2022-10-25 RX ADMIN — Medication 81 MILLIGRAM(S): at 11:26

## 2022-10-25 RX ADMIN — POLYETHYLENE GLYCOL 3350 17 GRAM(S): 17 POWDER, FOR SOLUTION ORAL at 14:14

## 2022-10-25 RX ADMIN — PIPERACILLIN AND TAZOBACTAM 25 GRAM(S): 4; .5 INJECTION, POWDER, LYOPHILIZED, FOR SOLUTION INTRAVENOUS at 06:11

## 2022-10-25 RX ADMIN — FINASTERIDE 5 MILLIGRAM(S): 5 TABLET, FILM COATED ORAL at 11:26

## 2022-10-25 RX ADMIN — PIPERACILLIN AND TAZOBACTAM 25 GRAM(S): 4; .5 INJECTION, POWDER, LYOPHILIZED, FOR SOLUTION INTRAVENOUS at 13:51

## 2022-10-25 NOTE — PROGRESS NOTE ADULT - ASSESSMENT
81 male h/o htn, chol, dm, here s/p mechanical fall    mechanical fall  imaging noted  surg eval noted  MRI L spine noted with Lspine fx  ortho f/u noted  no acute intervention  pain control  TLSO brace  PT    DM  insulin as ordered  endo f/u  monitor fs    hypoxia  imaging c/w pna  pulm consult noted  supp o2  iv zosyn  f/u swallow eval  incentive spirometer  nebs    chanel  unclear etiology  possible 2/2 poor oral fluid intake  renal consult f/u    abnl UA  f/u cult  on zosyn  id f/u         cont other home meds         Advanced care planning was discussed with patient and family.  Advanced care planning forms were reviewed and discussed as appropriate.  Differential diagnosis and plan of care discussed with patient after the evaluation.   Pain assessed and judicious use of narcotics when appropriate was discussed.  Importance of Fall prevention discussed.  Counseling on Smoking and Alcohol cessation was offered when appropriate.  Counseling on Diet, exercise, and medication compliance was done.   Approx 30 minutes spent.

## 2022-10-25 NOTE — PROGRESS NOTE ADULT - SUBJECTIVE AND OBJECTIVE BOX
KAITLIN CALDERON  82y Male  MRN:[id number]    Patient is a 82y old  Male who presents with a chief complaint of fall    HPI:   81M cantonese speaking PMHx of HTN/HLD, DM2, BPH, (?Afib per chart review, though not on AC's), presents to the ED with mid lower back pain that he sustained after falling in the bathroom while he was urinating at 9pm last night. Pt denies headstrike/LOC. Difficult to obtain full history even with  though pt answering questions appropriately and is AAOx2. Pt denies any pain anywhere else. Per EMS pt has had chronic cough. Unclear if pt was down long. pt last took insulin 2 days ago he states.   	Spoke with stepdaughter Yulissa who lives below pt: Pt couldn't get up from the fall yesterday, has difficulty ambulating with walker since the fall as he complaints of back pain, pt has 9hrs home help daily but not overnight. She states that pt is at baseline mental status and that he's had short term memory problems for some time. Denies any recent fevers/chills, abd pain, v/d, chest pain/sob. States that the cough pt has is chronic.  (19 Oct 2022 15:53)      Patient seen and evaluated at bedside. No acute events overnight except as noted.    Interval HPI: clinically improved today     PAST MEDICAL & SURGICAL HISTORY:  DM (diabetes mellitus)      HTN (hypertension)      Hypercholesterolemia      CAD (coronary artery disease)      HTN (hypertension)      DM (diabetes mellitus)      S/P TURP      S/P gastrectomy          REVIEW OF SYSTEMS:  as per hpi     VITALS:   Vital Signs Last 24 Hrs  T(C): 36.5 (25 Oct 2022 05:01), Max: 37.1 (24 Oct 2022 13:14)  T(F): 97.7 (25 Oct 2022 05:01), Max: 98.8 (24 Oct 2022 13:14)  HR: 85 (25 Oct 2022 05:01) (79 - 103)  BP: 102/63 (25 Oct 2022 05:01) (100/66 - 123/75)  BP(mean): --  RR: 18 (25 Oct 2022 05:01) (16 - 20)  SpO2: 93% (25 Oct 2022 05:01) (92% - 93%)    Parameters below as of 25 Oct 2022 05:01  Patient On (Oxygen Delivery Method): nasal cannula  O2 Flow (L/min): 4            PHYSICAL EXAM:  GENERAL: NAD, well-developed, lethargic  HEAD:  Atraumatic, Normocephalic  EYES: EOMI, PERRLA, conjunctiva and sclera clear  NECK: Supple, No JVD  CHEST/LUNG: b/l ronchi  HEART: S1, S2; No murmurs, rubs, or gallops  ABDOMEN: Soft, Nontender, Nondistended; Bowel sounds present  EXTREMITIES:  2+ Peripheral Pulses, No clubbing, cyanosis, or edema  PSYCH: Normal affect  NEUROLOGY: AAOX1-2; non-focal  SKIN: No rashes or lesions    Consultant(s) Notes Reviewed:  [x ] YES  [ ] NO  Care Discussed with Consultants/Other Providers [ x] YES  [ ] NO    MEDS:   MEDICATIONS  (STANDING):  aspirin enteric coated 81 milliGRAM(s) Oral daily  dextrose 5%. 1000 milliLiter(s) (50 mL/Hr) IV Continuous <Continuous>  dextrose 5%. 1000 milliLiter(s) (100 mL/Hr) IV Continuous <Continuous>  dextrose 50% Injectable 25 Gram(s) IV Push once  dextrose 50% Injectable 12.5 Gram(s) IV Push once  dextrose 50% Injectable 25 Gram(s) IV Push once  finasteride 5 milliGRAM(s) Oral daily  glucagon  Injectable 1 milliGRAM(s) IntraMuscular once  influenza  Vaccine (HIGH DOSE) 0.7 milliLiter(s) IntraMuscular once  insulin glargine Injectable (LANTUS) 25 Unit(s) SubCutaneous at bedtime  insulin lispro (ADMELOG) corrective regimen sliding scale   SubCutaneous three times a day before meals  insulin lispro Injectable (ADMELOG) 10 Unit(s) SubCutaneous three times a day before meals  lidocaine   4% Patch 1 Patch Transdermal every 24 hours  metoprolol succinate ER 25 milliGRAM(s) Oral daily  pantoprazole    Tablet 40 milliGRAM(s) Oral before breakfast  piperacillin/tazobactam IVPB.. 3.375 Gram(s) IV Intermittent every 8 hours  senna 2 Tablet(s) Oral at bedtime  tamsulosin 0.8 milliGRAM(s) Oral at bedtime    MEDICATIONS  (PRN):  acetaminophen     Tablet .. 650 milliGRAM(s) Oral every 6 hours PRN mild pain  albuterol/ipratropium for Nebulization 3 milliLiter(s) Nebulizer every 6 hours PRN Shortness of Breath  dextrose Oral Gel 15 Gram(s) Oral once PRN Blood Glucose LESS THAN 70 milliGRAM(s)/deciliter  oxyCODONE    IR 5 milliGRAM(s) Oral every 6 hours PRN Moderate Pain (4 - 6)        ALLERGIES:  No Known Allergies      LABS:                                                  11.5   13.77 )-----------( 202      ( 25 Oct 2022 07:19 )             35.6   10-25    133<L>  |  96  |  35<H>  ----------------------------<  172<H>  4.2   |  22  |  1.34<H>    Ca    9.2      25 Oct 2022 07:20         cultures: Culture Results:   No growth (10-19 @ 08:59)       < from: MR Lumbar Spine No Cont (10.20.22 @ 17:28) >    IMPRESSION:  Evidence of acute fracture L1 with fracture line traversing   the vertebral body from anterior to posterior and findings consistent   with a 2 column injury. Paravertebral cuff of soft tissue consistent with   recent injury. Some mild extension of edema into the pedicles though the   transverse and spinous processes as well as the lamina are spared. No   other lesions are identified. Recommend interval follow-up to determine   benign versus pathologic fracture.    --- End of Report ---      < end of copied text >      < from: Xray Chest 1 View- PORTABLE-Urgent (Xray Chest 1 View- PORTABLE-Urgent .) (10.23.22 @ 15:36) >  IMPRESSION:  Bibasilar patchy opacities, compatible with subsegmental atelectasis.   Infectious etiology cannot be ruled out.    --- End of Report ---      < end of copied text >

## 2022-10-25 NOTE — PROGRESS NOTE ADULT - PROBLEM SELECTOR PLAN 1
Will continue current insulin regimen. Will continue monitoring FS and FU.  Patient was on insulin at home (Tresiba & Novolog). If blood sugars stable, suggest DC on current insulin doses. Discontinue prior Janumet.  Patient counseled for compliance with consistent low carb diet.

## 2022-10-25 NOTE — PROGRESS NOTE ADULT - ASSESSMENT
Assessment  DMT2: 82y Male with DM T2 with hyperglycemia, was on insulin at home, now on basal bolus insulin, increased dose yesterday, blood sugars fluctuating/overall improving, no hypoglycemic episodes, eating meals, appears comfortable.  S/P Fall: workup in progress, stable, monitored.  HTN: Un Controlled,  on antihypertensive medications.          Melba Reyes MD  Cell: [phone]  Office: [phone]               Assessment  DMT2: 82y Male with DM T2 with hyperglycemia, was on insulin at home, now on basal bolus  insulin, increased dose yesterday, blood sugars fluctuating/overall improving, no hypoglycemic episodes, eating meals, appears comfortable.  S/P Fall: workup in progress, stable, monitored.  HTN: Un Controlled,  on antihypertensive medications.            Melba Reyes MD  Cell: [phone]  Office: [phone]

## 2022-10-25 NOTE — PROGRESS NOTE ADULT - SUBJECTIVE AND OBJECTIVE BOX
NEPHROLOGY    Patient seen and examined resting comfortably, denies complaints, no sob, comfortable on 4L NC, in no acute distress.     MEDICATIONS  (STANDING):  aspirin enteric coated 81 milliGRAM(s) Oral daily  dextrose 5%. 1000 milliLiter(s) (50 mL/Hr) IV Continuous <Continuous>  dextrose 5%. 1000 milliLiter(s) (100 mL/Hr) IV Continuous <Continuous>  dextrose 50% Injectable 25 Gram(s) IV Push once  dextrose 50% Injectable 12.5 Gram(s) IV Push once  dextrose 50% Injectable 25 Gram(s) IV Push once  finasteride 5 milliGRAM(s) Oral daily  glucagon  Injectable 1 milliGRAM(s) IntraMuscular once  influenza  Vaccine (HIGH DOSE) 0.7 milliLiter(s) IntraMuscular once  insulin glargine Injectable (LANTUS) 25 Unit(s) SubCutaneous at bedtime  insulin lispro (ADMELOG) corrective regimen sliding scale   SubCutaneous three times a day before meals  insulin lispro Injectable (ADMELOG) 10 Unit(s) SubCutaneous three times a day before meals  lidocaine   4% Patch 1 Patch Transdermal every 24 hours  metoprolol succinate ER 25 milliGRAM(s) Oral daily  pantoprazole    Tablet 40 milliGRAM(s) Oral before breakfast  piperacillin/tazobactam IVPB.. 3.375 Gram(s) IV Intermittent every 8 hours  senna 2 Tablet(s) Oral at bedtime  tamsulosin 0.8 milliGRAM(s) Oral at bedtime    VITALS:  T(C): , Max: 37.1 (10-24-22 @ 13:14)  T(F): , Max: 98.8 (10-24-22 @ 13:14)  HR: 99 (10-25-22 @ 12:00)  BP: 104/76 (10-25-22 @ 12:00)  RR: 18 (10-25-22 @ 12:00)  SpO2: 98% (10-25-22 @ 12:00)    I and O's:    10-24 @ 07:01  -  10-25 @ 07:00  --------------------------------------------------------  IN: 720 mL / OUT: 175 mL / NET: 545 mL    10-25 @ 07:01  -  10-25 @ 12:59  --------------------------------------------------------  IN: 360 mL / OUT: 0 mL / NET: 360 mL    PHYSICAL EXAM:  Constitutional: lethargic; NAD  HEENT: NCAT, MMM  Neck: Supple, No JVD  Respiratory: CTA-b/l  Cardiovascular: tachy s1s2  Gastrointestinal: BS+, soft, NT/ND  Extremities: No peripheral edema b/l  Neurological: no focal deficits; strength grossly intact  Back: no CVAT b/l  Skin: No rashes, no nevi    LABS:                        11.5   13.77 )-----------( 202      ( 25 Oct 2022 07:19 )             35.6     10-25    133<L>  |  96  |  35<H>  ----------------------------<  172<H>  4.2   |  22  |  1.34<H>    Ca    9.2      25 Oct 2022 07:20    Urine Studies:  Urinalysis Basic - ( 24 Oct 2022 12:30 )    Color: Yellow / Appearance: Clear / S.023 / pH: x  Gluc: x / Ketone: Negative  / Bili: Negative / Urobili: Negative   Blood: x / Protein: 30 mg/dL / Nitrite: Negative   Leuk Esterase: Large / RBC: 5 /hpf / WBC 16 /HPF   Sq Epi: x / Non Sq Epi: 2 /hpf / Bacteria: Negative      Sodium, Random Urine: 21 mmol/L (10-24 @ 12:30)  Potassium, Random Urine: 58 mmol/L (10-24 @ 12:30)  Chloride, Random Urine: 22 mmol/L (10-24 @ 12:30)  Creatinine, Random Urine: 114 mg/dL (10-24 @ 12:30)      RADIOLOGY & ADDITIONAL STUDIES:      ACC: [id number] EXAM:  US KIDNEYS AND BLADDER                          PROCEDURE DATE:  10/24/2022          INTERPRETATION:  CLINICAL INFORMATION: Acute renal insufficiency    COMPARISON: None available.    TECHNIQUE: Sonography of the kidneys and bladder.    FINDINGS:  Right kidney: 9.5 cm. No renal mass, hydronephrosis or calculi. Multiple   cysts are noted. A 4.4 cm cyst in the interpolar region contains thin   septation. Lower pole simple cyst measures 2.5 cm. Adjacent to the upper   pole theright kidney, separate from it is a complex cyst containing   debris measuring 7.0 x 5.6 x 6.5 cm. This corresponds to heterogeneous   collection seen in the inferior right hepatic lobe.    Left kidney: 11.3 cm. No renal mass, hydronephrosis or calculi. Upper   pole cyst measures 8.8 cm and contains a septation. Interpolar cyst   measures 5.4 cm.    Urinary bladder: Within normal limits.    IMPRESSION:  Complex cystic collection superior to the right kidney, correlating to   heterogeneous collection exophytic from the right hepatic lobe.   Differential includes abscess versus hematoma.    Bilateral complex renal cysts.        --- End of Report ---        NAKIA MCGRATH MD; Attending Radiologist  This document has been electronically signed. Oct 24 2022  9:32PM

## 2022-10-25 NOTE — CONSULT NOTE ADULT - SUBJECTIVE AND OBJECTIVE BOX
"HPI:   81M cantonese speaking PMHx of HTN/HLD, DM2, BPH, (?Afib per chart review, though not on AC's), presents to the ED with mid lower back pain that he sustained after falling in the bathroom while he was urinating at 9pm last night. Pt denies headstrike/LOC. Difficult to obtain full history even with  though pt answering questions appropriately and is AAOx2. Pt denies any pain anywhere else. Per EMS pt has had chronic cough. Unclear if pt was down long. pt last took insulin 2 days ago he states.   	Spoke with stepdaughter Yulissa who lives below pt: Pt couldn't get up from the fall yesterday, has difficulty ambulating with walker since the fall as he complaints of back pain, pt has 9hrs home help daily but not overnight. She states that pt is at baseline mental status and that he's had short term memory problems for some time. Denies any recent fevers/chills, abd pain, v/d, chest pain/sob. States that the cough pt has is chronic.  (19 Oct 2022 15:53)"    Above reviewed. 82 yo M with HTN/HLD, DM2, BPH, initially with low back pain  Patient had fall prior to arrival  Today, patient complains of constipation and not being able to move bowels  Also noted by staff to not be able to urinate with concern for urinary retention  At bedside, patient with suprapubic pain  No other complaints  ID called for further eval    PAST MEDICAL & SURGICAL HISTORY:  DM (diabetes mellitus)      HTN (hypertension)      Hypercholesterolemia      CAD (coronary artery disease)      HTN (hypertension)      DM (diabetes mellitus)      S/P TURP      S/P gastrectomy      Allergies    No Known Allergies    Intolerances      ANTIMICROBIALS:  piperacillin/tazobactam IVPB.. 3.375 every 8 hours    OTHER MEDS:  acetaminophen     Tablet .. 650 milliGRAM(s) Oral every 6 hours PRN  albuterol/ipratropium for Nebulization 3 milliLiter(s) Nebulizer every 6 hours PRN  aspirin enteric coated 81 milliGRAM(s) Oral daily  dextrose 5%. 1000 milliLiter(s) IV Continuous <Continuous>  dextrose 5%. 1000 milliLiter(s) IV Continuous <Continuous>  dextrose 50% Injectable 25 Gram(s) IV Push once  dextrose 50% Injectable 12.5 Gram(s) IV Push once  dextrose 50% Injectable 25 Gram(s) IV Push once  dextrose Oral Gel 15 Gram(s) Oral once PRN  finasteride 5 milliGRAM(s) Oral daily  glucagon  Injectable 1 milliGRAM(s) IntraMuscular once  influenza  Vaccine (HIGH DOSE) 0.7 milliLiter(s) IntraMuscular once  insulin glargine Injectable (LANTUS) 25 Unit(s) SubCutaneous at bedtime  insulin lispro (ADMELOG) corrective regimen sliding scale   SubCutaneous three times a day before meals  insulin lispro Injectable (ADMELOG) 10 Unit(s) SubCutaneous three times a day before meals  lidocaine   4% Patch 1 Patch Transdermal every 24 hours  metoprolol succinate ER 25 milliGRAM(s) Oral daily  oxyCODONE    IR 5 milliGRAM(s) Oral every 6 hours PRN  pantoprazole    Tablet 40 milliGRAM(s) Oral before breakfast  senna 2 Tablet(s) Oral at bedtime  tamsulosin 0.8 milliGRAM(s) Oral at bedtime    SOCIAL HISTORY: No tobacco, no alcohol, no illicit drugs    FAMILY HISTORY:  No pertinent family history in first degree relatives relating to chief complaint    Drug Dosing Weight  Height (cm): 180.3 (19 Oct 2022 07:35)  Weight (kg): 90 (2022 22:59)  BMI (kg/m2): 27.7 (19 Oct 2022 07:35)  BSA (m2): 2.1 (19 Oct 2022 07:35)    PE:    Vital Signs Last 24 Hrs  T(C): 36.2 (25 Oct 2022 12:00), Max: 37.1 (24 Oct 2022 13:14)  T(F): 97.1 (25 Oct 2022 12:00), Max: 98.8 (24 Oct 2022 13:14)  HR: 99 (25 Oct 2022 12:00) (79 - 103)  BP: 104/76 (25 Oct 2022 12:00) (100/66 - 123/75)  RR: 18 (25 Oct 2022 12:00) (16 - 20)  SpO2: 98% (25 Oct 2022 12:00) (92% - 98%)    Gen: AOx1-2, NAD, non-toxic, pleasant  CV: S1+S2 normal, nontachycardic  Resp: Clear bilat, no resp distress, no crackles/wheezes  Abd: Soft, nontender, +BS  Ext: No LE edema, no wounds  : Patient has pain to palpation bladder  IV/Skin: No thrombophlebitis  Msk: No low back pain, no arthralgias, no joint swelling  Neuro: No sensory deficits, no motor deficits    LABS:                        11.5   13.77 )-----------( 202      ( 25 Oct 2022 07:19 )             35.6     10-    133<L>  |  96  |  35<H>  ----------------------------<  172<H>  4.2   |  22  |  1.34<H>    Ca    9.2      25 Oct 2022 07:20    Urinalysis Basic - ( 24 Oct 2022 12:30 )    Color: Yellow / Appearance: Clear / S.023 / pH: x  Gluc: x / Ketone: Negative  / Bili: Negative / Urobili: Negative   Blood: x / Protein: 30 mg/dL / Nitrite: Negative   Leuk Esterase: Large / RBC: 5 /hpf / WBC 16 /HPF   Sq Epi: x / Non Sq Epi: 2 /hpf / Bacteria: Negative    MICROBIOLOGY:    Clean Catch Clean Catch (Midstream)  10-19-22   No growth  --  --    Rapid RVP Result: NotDetec (10-19 @ 08:37)    (otherwise reviewed)    RADIOLOGY:    10/19 CT:    IMPRESSION:  Limited noncontrast examination.    CHEST:  *  Secretions/debris within the bilateral mainstem bronchi with   multifocal sites of mucous plugging in distal airways.  *  Bilateral tree-in-bud nodularity is likely infectious/inflammatory.   Groundglass opacity within the left upper lobe, which may also be   infectious/inflammatory. Pulmonary nodule measuring 6 m in the right   upper lobe. Suggest 3 month follow-up chest CT to assess for stability or   resolution.  *  A few prominent nonspecific mediastinal lymph nodes.  *  Ascending thoracic aortic aneurysm measuring up to 5 cm.    ABDOMEN AND PELVIS:  *  Low density structure measuring 8.3 cm along the inferior margin of   the right hepatic lobe with surrounding inflammatory changes.   Differential includes an intrahepatic/perihepatic abscess, or possibly   hematoma in the setting of trauma. Neoplasm is thought to be less likely   but is not excluded. Contrast enhanced abdominal MRI may be helpful for   further characterization.  *  Mild compression fracture at L1, likely acute. Please refer to the   concurrent dedicated CT lumbar spine report.    10/24 USG:    IMPRESSION:  Complex cystic collection superior to the right kidney, correlating to   heterogeneous collection exophytic from the right hepatic lobe.   Differential includes abscess versus hematoma.    Bilateral complex renal cysts.

## 2022-10-25 NOTE — CONSULT NOTE ADULT - SUBJECTIVE AND OBJECTIVE BOX
PULMONARY CONSULT  Edmund Quiroga MD  [phone]    Initial HPI on admission:  HPI:   81M cantonese speaking PMHx of HTN/HLD, DM2, BPH, (?Afib per chart review, though not on AC's), presents to the ED with mid lower back pain that he sustained after falling in the bathroom while he was urinating at 9pm last night. Pt denies headstrike/LOC. Difficult to obtain full history even with  though pt answering questions appropriately and is AAOx2. Pt denies any pain anywhere else. Per EMS pt has had chronic cough. Unclear if pt was down long. pt last took insulin 2 days ago he states. Spoke with stepdaughter Yulissa who lives below pt: Pt couldn't get up from the fall yesterday, has difficulty ambulating with walker since the fall as he complaints of back pain, pt has 9hrs home help daily but not overnight. She states that pt is at baseline mental status and that he's had short term memory problems for some time. Denies any recent fevers/chills, abd pain, v/d, chest pain/sob. States that the cough pt has is chronic.          PAST MEDICAL & SURGICAL HISTORY:  DM (diabetes mellitus)      HTN (hypertension)      Hypercholesterolemia      CAD (coronary artery disease)      HTN (hypertension)      DM (diabetes mellitus)      S/P TURP      S/P gastrectomy        Allergies    No Known Allergies    Intolerances      FAMILY HISTORY:  No pertinent family history in first degree relatives     Social History:  · Substance use	No     Tobacco Screening:  · Core Measure Site	No     Review of Systems:  Other Review of Systems: All other review of systems negative, except as noted in HPI      Allergies and Intolerances:        Allergies:  	No Known Allergies:     Medications:  MEDICATIONS  (STANDING):  aspirin enteric coated 81 milliGRAM(s) Oral daily  dextrose 5%. 1000 milliLiter(s) (50 mL/Hr) IV Continuous <Continuous>  dextrose 5%. 1000 milliLiter(s) (100 mL/Hr) IV Continuous <Continuous>  dextrose 50% Injectable 25 Gram(s) IV Push once  dextrose 50% Injectable 12.5 Gram(s) IV Push once  dextrose 50% Injectable 25 Gram(s) IV Push once  finasteride 5 milliGRAM(s) Oral daily  glucagon  Injectable 1 milliGRAM(s) IntraMuscular once  influenza  Vaccine (HIGH DOSE) 0.7 milliLiter(s) IntraMuscular once  insulin glargine Injectable (LANTUS) 25 Unit(s) SubCutaneous at bedtime  insulin lispro (ADMELOG) corrective regimen sliding scale   SubCutaneous three times a day before meals  insulin lispro Injectable (ADMELOG) 10 Unit(s) SubCutaneous three times a day before meals  lidocaine   4% Patch 1 Patch Transdermal every 24 hours  metoprolol succinate ER 25 milliGRAM(s) Oral daily  pantoprazole    Tablet 40 milliGRAM(s) Oral before breakfast  piperacillin/tazobactam IVPB.. 3.375 Gram(s) IV Intermittent every 8 hours  senna 2 Tablet(s) Oral at bedtime  tamsulosin 0.8 milliGRAM(s) Oral at bedtime    MEDICATIONS  (PRN):  acetaminophen     Tablet .. 650 milliGRAM(s) Oral every 6 hours PRN mild pain  albuterol/ipratropium for Nebulization 3 milliLiter(s) Nebulizer every 6 hours PRN Shortness of Breath  dextrose Oral Gel 15 Gram(s) Oral once PRN Blood Glucose LESS THAN 70 milliGRAM(s)/deciliter  oxyCODONE    IR 5 milliGRAM(s) Oral every 6 hours PRN Moderate Pain (4 - 6)    Vital Signs Last 24 Hrs  T(C): 36.5 (25 Oct 2022 05:01), Max: 37.1 (24 Oct 2022 13:14)  T(F): 97.7 (25 Oct 2022 05:01), Max: 98.8 (24 Oct 2022 13:14)  HR: 85 (25 Oct 2022 05:01) (79 - 103)  BP: 102/63 (25 Oct 2022 05:01) (100/66 - 123/75)  BP(mean): --  RR: 18 (25 Oct 2022 05:01) (16 - 20)  SpO2: 93% (25 Oct 2022 05:01) (92% - 93%)    Parameters below as of 25 Oct 2022 05:01  Patient On (Oxygen Delivery Method): nasal cannula  O2 Flow (L/min): 4      10- @ 07:01  -  10-25 @ 07:00  --------------------------------------------------------  IN: 720 mL / OUT: 175 mL / NET: 545 mL      LABS:                        11.5   13.77 )-----------(       ( 25 Oct 2022 07:19 )             35.6     10-25    133<L>  |  96  |  35<H>  ----------------------------<  172<H>  4.2   |  22  |  1.34<H>    Ca    9.2      25 Oct 2022 07:20    Urinalysis Basic - ( 24 Oct 2022 12:30 )    Color: Yellow / Appearance: Clear / S.023 / pH: x  Gluc: x / Ketone: Negative  / Bili: Negative / Urobili: Negative   Blood: x / Protein: 30 mg/dL / Nitrite: Negative   Leuk Esterase: Large / RBC: 5 /hpf / WBC 16 /HPF   Sq Epi: x / Non Sq Epi: 2 /hpf / Bacteria: Negative      Physical Examination:    General: No acute distress.      HEENT: Pupils equal, reactive to light.  Symmetric.    PULM: Clear to auscultation bilaterally, no significant sputum production    CVS: Regular rate and rhythm, no murmurs, rubs, or gallops    ABD: Soft, nondistended, nontender, normoactive bowel sounds, no masses    EXT: No edema, nontender    SKIN: Warm and well perfused, no rashes noted.    NEURO: Alert, oriented, interactive, nonfocal    RADIOLOGY REVIEWED PERSONALLY  CXR:    CT chest:    PROCEDURE DATE:  10/19/2022      INTERPRETATION:  CLINICAL INFORMATION: Unwitnessed fall    COMPARISON: CT abdomen and pelvis 2022 and 2022.    CONTRAST/COMPLICATIONS:  IV Contrast: NONE  OralContrast: NONE  Complications: None reported at time of study completion    PROCEDURE:  CT of the Chest, Abdomen and Pelvis was performed.  Sagittal and coronal reformats were performed.    FINDINGS:  Limited evaluation of the vasculature and viscerain the absence of   intravenous contrast.    CHEST:  LUNGS AND LARGE AIRWAYS: Secretions/debris within the bilateral mainstem   bronchi and sites of mucous plugging within the right lower lobe, left   upper lobe, and left lower lobe scattered clustered sites of tree-in-bud   nodularity in both lungs. Groundglass opacities within the left upper   lobe. Right upper lobe nodule measuring 6 mm (series 3 image 190).   Bilateral subsegmental atelectatic changes. Scattered calcified   granulomas.  PLEURA: No pleural effusion. No pneumothorax.  VESSELS: Atherosclerotic changes. Aneurysmal dilation of the ascending   thoracic aorta to 5.0 cm in diameter, previously 5.0 cm on 2022. The   aortic arch is also mildly dilated to 4.0 cm, which was not previously   imaged. Coronary artery calcifications.  HEART: Heart size is normal. Trace pericardial effusion.  MEDIASTINUM AND ERNIE: A few prominent mediastinal lymph nodes with   several demonstrating calcification. For example, precarinal lymph node   with calcification measures 1.5 x 1.1 cm (series 2 image 36).  CHEST WALL AND LOWER NECK: Within normal limits.    ABDOMEN AND PELVIS:  LIVER: Along the inferior margin of the right hepatic lobe, there is a   low density structure measuring slightly greater than simple fluid   density that measures 6.1 x 8.3 x 7.0 cm (series 2 image 96, series 601   image 54), new since 2022. There is surrounding fat stranding, and   margins with the liver parenchyma are somewhat ill-defined. There is mass   effect upon the adjacent right kidney.  BILE DUCTS: Normal caliber.  GALLBLADDER: Within normal limits.  SPLEEN: Within normal limits.  PANCREAS: Scattered pancreatic calcifications.  ADRENALS: Within normal limits.  KIDNEYS/URETERS: Bilateral renal cysts. No hydronephrosis. Mass effect   upon the upper pole of the right kidney from the above-described   structure.    BLADDER: Within normal limits.  REPRODUCTIVE ORGANS: Prostate within normal limits.    BOWEL: No bowel obstruction. Appendix isnormal.  PERITONEUM: No ascites.  VESSELS: Atherosclerotic changes.  RETROPERITONEUM/LYMPH NODES: No lymphadenopathy.  ABDOMINAL WALL: Small bilateral fat-containing inguinal hernias.  BONES: Osseous demineralization. Degenerative changes. Mild compression   fracture deformity at L1, new since 2022 and suspected to be acute.    IMPRESSION:  Limited noncontrast examination.    CHEST:  *  Secretions/debris within the bilateral mainstem bronchi with   multifocal sites of mucous plugging in distal airways.  *  Bilateral tree-in-bud nodularity is likely infectious/inflammatory.   Groundglass opacity within the left upper lobe, which may also be   infectious/inflammatory. Pulmonary nodule measuring 6 m in the right   upper lobe. Suggest 3 month follow-up chest CT to assess for stability or   resolution.  *  A few prominent nonspecific mediastinal lymph nodes.  *  Ascending thoracic aortic aneurysm measuring up to 5 cm.    ABDOMEN AND PELVIS:  *  Low density structure measuring 8.3 cm alongthe inferior margin of   the right hepatic lobe with surrounding inflammatory changes.   Differential includes an intrahepatic/perihepatic abscess, or possibly   hematoma in the setting of trauma. Neoplasm is thought to be less likely   but is not excluded. Contrast enhanced abdominal MRI may be helpful for   further characterization.  *  Mild compression fracture at L1, likely acute. Please refer to the   concurrent dedicated CT lumbar spine report.    Preliminary findings were discussed by Dr. Parks with Dr. Flores   on 10/19/2022 11:10 AM with read back confirmation. Final findings were   discussed with Dr. Pierce by Dr. Mcqueen on 10/19/2022 at 12:57 PM.  TTE:      PULMONARY CONSULT  Edmund Quiroga MD  [phone]    Initial HPI on admission:  HPI:   81M cantonese speaking PMHx of HTN/HLD, DM2, BPH, (?Afib per chart review, though not on AC's), presents to the ED with mid lower back pain that he sustained after falling in the bathroom while he was urinating at 9pm last night. Pt denies headstrike/LOC. Difficult to obtain full history even with  though pt answering questions appropriately and is AAOx2. Pt denies any pain anywhere else. Per EMS pt has had chronic cough. Unclear if pt was down long. pt last took insulin 2 days ago he states. Spoke with stepdaughter Yulissa who lives below pt: Pt couldn't get up from the fall yesterday, has difficulty ambulating with walker since the fall as he complaints of back pain, pt has 9hrs home help daily but not overnight. She states that pt is at baseline mental status and that he's had short term memory problems for some time. Denies any recent fevers/chills, abd pain, v/d, chest pain/sob. States that the cough pt has is chronic.      Asked to evaluate new oxygen requirement. Admission CT with SOTO opacities, scattered tree in bud, and mucous in airways noted - c/w infectious etiiology. CXR 10/24 with new R>L basilar airspace opacities c/w aspiration pneumonia'     PAST MEDICAL & SURGICAL HISTORY:  DM (diabetes mellitus)      HTN (hypertension)      Hypercholesterolemia      CAD (coronary artery disease)      HTN (hypertension)      DM (diabetes mellitus)      S/P TURP      S/P gastrectomy        Allergies    No Known Allergies    Intolerances      FAMILY HISTORY:  No pertinent family history in first degree relatives     Social History:  · Substance use	No     Tobacco Screening:  · Core Measure Site	No     Review of Systems:  Other Review of Systems: All other review of systems negative, except as noted in HPI      Allergies and Intolerances:        Allergies:  	No Known Allergies:     Medications:  MEDICATIONS  (STANDING):  aspirin enteric coated 81 milliGRAM(s) Oral daily  dextrose 5%. 1000 milliLiter(s) (50 mL/Hr) IV Continuous <Continuous>  dextrose 5%. 1000 milliLiter(s) (100 mL/Hr) IV Continuous <Continuous>  dextrose 50% Injectable 25 Gram(s) IV Push once  dextrose 50% Injectable 12.5 Gram(s) IV Push once  dextrose 50% Injectable 25 Gram(s) IV Push once  finasteride 5 milliGRAM(s) Oral daily  glucagon  Injectable 1 milliGRAM(s) IntraMuscular once  influenza  Vaccine (HIGH DOSE) 0.7 milliLiter(s) IntraMuscular once  insulin glargine Injectable (LANTUS) 25 Unit(s) SubCutaneous at bedtime  insulin lispro (ADMELOG) corrective regimen sliding scale   SubCutaneous three times a day before meals  insulin lispro Injectable (ADMELOG) 10 Unit(s) SubCutaneous three times a day before meals  lidocaine   4% Patch 1 Patch Transdermal every 24 hours  metoprolol succinate ER 25 milliGRAM(s) Oral daily  pantoprazole    Tablet 40 milliGRAM(s) Oral before breakfast  piperacillin/tazobactam IVPB.. 3.375 Gram(s) IV Intermittent every 8 hours  senna 2 Tablet(s) Oral at bedtime  tamsulosin 0.8 milliGRAM(s) Oral at bedtime    MEDICATIONS  (PRN):  acetaminophen     Tablet .. 650 milliGRAM(s) Oral every 6 hours PRN mild pain  albuterol/ipratropium for Nebulization 3 milliLiter(s) Nebulizer every 6 hours PRN Shortness of Breath  dextrose Oral Gel 15 Gram(s) Oral once PRN Blood Glucose LESS THAN 70 milliGRAM(s)/deciliter  oxyCODONE    IR 5 milliGRAM(s) Oral every 6 hours PRN Moderate Pain (4 - 6)    Vital Signs Last 24 Hrs  T(C): 36.5 (25 Oct 2022 05:01), Max: 37.1 (24 Oct 2022 13:14)  T(F): 97.7 (25 Oct 2022 05:01), Max: 98.8 (24 Oct 2022 13:14)  HR: 85 (25 Oct 2022 05:01) (79 - 103)  BP: 102/63 (25 Oct 2022 05:01) (100/66 - 123/75)  BP(mean): --  RR: 18 (25 Oct 2022 05:01) (16 - 20)  SpO2: 93% (25 Oct 2022 05:01) (92% - 93%)    Parameters below as of 25 Oct 2022 05:01  Patient On (Oxygen Delivery Method): nasal cannula  O2 Flow (L/min): 4      10- @ 07:01  -  10-25 @ 07:00  --------------------------------------------------------  IN: 720 mL / OUT: 175 mL / NET: 545 mL      LABS:                        11.5   13.77 )-----------( 202      ( 25 Oct 2022 07:19 )             35.6     10-25    133<L>  |  96  |  35<H>  ----------------------------<  172<H>  4.2   |  22  |  1.34<H>    Ca    9.2      25 Oct 2022 07:20    Urinalysis Basic - ( 24 Oct 2022 12:30 )    Color: Yellow / Appearance: Clear / S.023 / pH: x  Gluc: x / Ketone: Negative  / Bili: Negative / Urobili: Negative   Blood: x / Protein: 30 mg/dL / Nitrite: Negative   Leuk Esterase: Large / RBC: 5 /hpf / WBC 16 /HPF   Sq Epi: x / Non Sq Epi: 2 /hpf / Bacteria: Negative      Physical Examination:    General: Non toxic, No acute distress.      HEENT: Pupils equal, reactive to light.  Symmetric.    PULM: Dense basilar R>L crackles; no wheeze    CVS: Regular rate and rhythm, no murmurs, rubs, or gallops    ABD: Soft, nondistended, nontender, normoactive bowel sounds, no masses    EXT: No edema, nontender    SKIN: Warm and well perfused, no rashes noted.    NEURO: Alert, oriented, interactive, nonfocal    RADIOLOGY REVIEWED PERSONALLY  CXR:    CT chest:    PROCEDURE DATE:  10/19/2022      INTERPRETATION:  CLINICAL INFORMATION: Unwitnessed fall    COMPARISON: CT abdomen and pelvis 2022 and 2022.    CONTRAST/COMPLICATIONS:  IV Contrast: NONE  OralContrast: NONE  Complications: None reported at time of study completion    PROCEDURE:  CT of the Chest, Abdomen and Pelvis was performed.  Sagittal and coronal reformats were performed.    FINDINGS:  Limited evaluation of the vasculature and viscerain the absence of   intravenous contrast.    CHEST:  LUNGS AND LARGE AIRWAYS: Secretions/debris within the bilateral mainstem   bronchi and sites of mucous plugging within the right lower lobe, left   upper lobe, and left lower lobe scattered clustered sites of tree-in-bud   nodularity in both lungs. Groundglass opacities within the left upper   lobe. Right upper lobe nodule measuring 6 mm (series 3 image 190).   Bilateral subsegmental atelectatic changes. Scattered calcified   granulomas.  PLEURA: No pleural effusion. No pneumothorax.  VESSELS: Atherosclerotic changes. Aneurysmal dilation of the ascending   thoracic aorta to 5.0 cm in diameter, previously 5.0 cm on 2022. The   aortic arch is also mildly dilated to 4.0 cm, which was not previously   imaged. Coronary artery calcifications.  HEART: Heart size is normal. Trace pericardial effusion.  MEDIASTINUM AND ERNIE: A few prominent mediastinal lymph nodes with   several demonstrating calcification. For example, precarinal lymph node   with calcification measures 1.5 x 1.1 cm (series 2 image 36).  CHEST WALL AND LOWER NECK: Within normal limits.    ABDOMEN AND PELVIS:  LIVER: Along the inferior margin of the right hepatic lobe, there is a   low density structure measuring slightly greater than simple fluid   density that measures 6.1 x 8.3 x 7.0 cm (series 2 image 96, series 601   image 54), new since 2022. There is surrounding fat stranding, and   margins with the liver parenchyma are somewhat ill-defined. There is mass   effect upon the adjacent right kidney.  BILE DUCTS: Normal caliber.  GALLBLADDER: Within normal limits.  SPLEEN: Within normal limits.  PANCREAS: Scattered pancreatic calcifications.  ADRENALS: Within normal limits.  KIDNEYS/URETERS: Bilateral renal cysts. No hydronephrosis. Mass effect   upon the upper pole of the right kidney from the above-described   structure.    BLADDER: Within normal limits.  REPRODUCTIVE ORGANS: Prostate within normal limits.    BOWEL: No bowel obstruction. Appendix isnormal.  PERITONEUM: No ascites.  VESSELS: Atherosclerotic changes.  RETROPERITONEUM/LYMPH NODES: No lymphadenopathy.  ABDOMINAL WALL: Small bilateral fat-containing inguinal hernias.  BONES: Osseous demineralization. Degenerative changes. Mild compression   fracture deformity at L1, new since 2022 and suspected to be acute.    IMPRESSION:  Limited noncontrast examination.    CHEST:  *  Secretions/debris within the bilateral mainstem bronchi with   multifocal sites of mucous plugging in distal airways.  *  Bilateral tree-in-bud nodularity is likely infectious/inflammatory.   Groundglass opacity within the left upper lobe, which may also be   infectious/inflammatory. Pulmonary nodule measuring 6 m in the right   upper lobe. Suggest 3 month follow-up chest CT to assess for stability or   resolution.  *  A few prominent nonspecific mediastinal lymph nodes.  *  Ascending thoracic aortic aneurysm measuring up to 5 cm.    ABDOMEN AND PELVIS:  *  Low density structure measuring 8.3 cm alongthe inferior margin of   the right hepatic lobe with surrounding inflammatory changes.   Differential includes an intrahepatic/perihepatic abscess, or possibly   hematoma in the setting of trauma. Neoplasm is thought to be less likely   but is not excluded. Contrast enhanced abdominal MRI may be helpful for   further characterization.  *  Mild compression fracture at L1, likely acute. Please refer to the   concurrent dedicated CT lumbar spine report.    Preliminary findings were discussed by Dr. Parks with Dr. Flores   on 10/19/2022 11:10 AM with read back confirmation. Final findings were   discussed with Dr. Pierce by Dr. Mcqueen on 10/19/2022 at 12:57 PM.  TTE:

## 2022-10-25 NOTE — PROGRESS NOTE ADULT - SUBJECTIVE AND OBJECTIVE BOX
Chief complaint  Patient is a 82y old  Male who presents with a chief complaint of Mechanical Fall/Back Pain (25 Oct 2022 11:00)   Review of systems  Patient in bed, looks comfortable, no hypoglycemic episodes.    Labs and Fingersticks  CAPILLARY BLOOD GLUCOSE      POCT Blood Glucose.: 162 mg/dL (25 Oct 2022 12:16)  POCT Blood Glucose.: 211 mg/dL (25 Oct 2022 08:50)  POCT Blood Glucose.: 170 mg/dL (24 Oct 2022 21:31)  POCT Blood Glucose.: 112 mg/dL (24 Oct 2022 17:44)      Anion Gap, Serum: 15 (10-25 @ 07:20)  Anion Gap, Serum: 13 (10-24 @ 07:48)      Calcium, Total Serum: 9.2 (10-25 @ 07:20)  Calcium, Total Serum: 9.7 (10-24 @ 07:48)          10-25    133<L>  |  96  |  35<H>  ----------------------------<  172<H>  4.2   |  22  |  1.34<H>    Ca    9.2      25 Oct 2022 07:20                          11.5   13.77 )-----------( 202      ( 25 Oct 2022 07:19 )             35.6     Medications  MEDICATIONS  (STANDING):  aspirin enteric coated 81 milliGRAM(s) Oral daily  dextrose 5%. 1000 milliLiter(s) (50 mL/Hr) IV Continuous <Continuous>  dextrose 5%. 1000 milliLiter(s) (100 mL/Hr) IV Continuous <Continuous>  dextrose 50% Injectable 25 Gram(s) IV Push once  dextrose 50% Injectable 12.5 Gram(s) IV Push once  dextrose 50% Injectable 25 Gram(s) IV Push once  finasteride 5 milliGRAM(s) Oral daily  glucagon  Injectable 1 milliGRAM(s) IntraMuscular once  influenza  Vaccine (HIGH DOSE) 0.7 milliLiter(s) IntraMuscular once  insulin glargine Injectable (LANTUS) 25 Unit(s) SubCutaneous at bedtime  insulin lispro (ADMELOG) corrective regimen sliding scale   SubCutaneous three times a day before meals  insulin lispro Injectable (ADMELOG) 10 Unit(s) SubCutaneous three times a day before meals  lidocaine   4% Patch 1 Patch Transdermal every 24 hours  metoprolol succinate ER 25 milliGRAM(s) Oral daily  pantoprazole    Tablet 40 milliGRAM(s) Oral before breakfast  piperacillin/tazobactam IVPB.. 3.375 Gram(s) IV Intermittent every 8 hours  polyethylene glycol 3350 17 Gram(s) Oral daily  senna 2 Tablet(s) Oral at bedtime  tamsulosin 0.8 milliGRAM(s) Oral at bedtime      Physical Exam  General: Patient comfortable in bed  Vital Signs Last 12 Hrs  T(F): 97.1 (10-25-22 @ 12:00), Max: 97.7 (10-25-22 @ 05:01)  HR: 99 (10-25-22 @ 12:00) (85 - 99)  BP: 104/76 (10-25-22 @ 12:00) (102/63 - 104/76)  BP(mean): --  RR: 18 (10-25-22 @ 12:00) (18 - 18)  SpO2: 98% (10-25-22 @ 12:00) (93% - 98%)  Neck: No palpable thyroid nodules.  CVS: S1S2, No murmurs  Respiratory: No wheezing, no crepitations  GI: Abdomen soft, bowel sounds positive  Musculoskeletal:  edema lower extremities.   Skin: No skin rashes, no ecchymosis    Diagnostics    Free Thyroxine, Serum: AM Sched. Collection: 21-Oct-2022 06:00 (10-20 @ 13:18)  Thyroid Stimulating Hormone, Serum: AM Sched. Collection: 21-Oct-2022 06:00 (10-20 @ 13:18)  A1C with Estimated Average Glucose: Routine (10-20 @ 13:17)           Chief complaint  Patient is a 82y old  Male who presents with a chief complaint of Mechanical Fall/Back Pain (25 Oct 2022 11:00)   Review of systems  Patient in bed, looks comfortable, no hypoglycemic episodes.    Labs and Fingersticks  CAPILLARY BLOOD GLUCOSE      POCT Blood Glucose.: 162 mg/dL (25 Oct 2022 12:16)  POCT Blood Glucose.: 211 mg/dL (25 Oct 2022 08:50)  POCT Blood Glucose.: 170 mg/dL (24 Oct 2022 21:31)  POCT Blood Glucose.: 112 mg/dL (24 Oct 2022 17:44)      Anion Gap, Serum: 15 (10-25 @ 07:20)  Anion Gap, Serum: 13 (10-24 @ 07:48)      Calcium, Total Serum: 9.2 (10-25 @ 07:20)  Calcium, Total Serum: 9.7 (10-24 @ 07:48)          10-25    133<L>  |  96  |  35<H>  ----------------------------<  172<H>  4.2   |  22  |  1.34<H>    Ca    9.2      25 Oct 2022 07:20                          11.5   13.77 )-----------( 202      ( 25 Oct 2022 07:19 )             35.6     Medications  MEDICATIONS  (STANDING):  aspirin enteric coated 81 milliGRAM(s) Oral daily  dextrose 5%. 1000 milliLiter(s) (50 mL/Hr) IV Continuous <Continuous>  dextrose 5%. 1000 milliLiter(s) (100 mL/Hr) IV Continuous <Continuous>  dextrose 50% Injectable 25 Gram(s) IV Push once  dextrose 50% Injectable 12.5 Gram(s) IV Push once  dextrose 50% Injectable 25 Gram(s) IV Push once  finasteride 5 milliGRAM(s) Oral daily  glucagon  Injectable 1 milliGRAM(s) IntraMuscular once  influenza  Vaccine (HIGH DOSE) 0.7 milliLiter(s) IntraMuscular once  insulin glargine Injectable (LANTUS) 25 Unit(s) SubCutaneous at bedtime  insulin lispro (ADMELOG) corrective regimen sliding scale   SubCutaneous three times a day before meals  insulin lispro Injectable (ADMELOG) 10 Unit(s) SubCutaneous three times a day before meals  lidocaine   4% Patch 1 Patch Transdermal every 24 hours  metoprolol succinate ER 25 milliGRAM(s) Oral daily  pantoprazole    Tablet 40 milliGRAM(s) Oral before breakfast  piperacillin/tazobactam IVPB.. 3.375 Gram(s) IV Intermittent every 8 hours  polyethylene glycol 3350 17 Gram(s) Oral daily  senna 2 Tablet(s) Oral at bedtime  tamsulosin 0.8 milliGRAM(s) Oral at bedtime      Physical Exam  General: Patient comfortable in bed  Vital Signs Last 12 Hrs  T(F): 97.1 (10-25-22 @ 12:00), Max: 97.7 (10-25-22 @ 05:01)  HR: 99 (10-25-22 @ 12:00) (85 - 99)  BP: 104/76 (10-25-22 @ 12:00) (102/63 - 104/76)  BP(mean): --  RR: 18 (10-25-22 @ 12:00) (18 - 18)  SpO2: 98% (10-25-22 @ 12:00) (93% - 98%)  Neck: No palpable thyroid nodules.  CVS: S1S2, No murmurs  Respiratory: No wheezing, no crepitations  GI: Abdomen soft, bowel sounds positive  Musculoskeletal:  edema lower extremities.   Skin: No skin rashes, no ecchymosis    Diagnostics    Free Thyroxine, Serum: AM Sched. Collection: 21-Oct-2022 06:00 (10-20 @ 13:18)  Thyroid Stimulating Hormone, Serum: AM Sched. Collection: 21-Oct-2022 06:00 (10-20 @ 13:18)  A1C with Estimated Average Glucose: Routine (10-20 @ 13:17)

## 2022-10-25 NOTE — CONSULT NOTE ADULT - ASSESSMENT
82M admitted with L1 fracture after mechanical fall. New opacities on 10/24 CXR and admission SOTO opacities with mucoid impaction, non resolving leukocytosis. Picture c/w multilobar pneumonia - suspect aspiration. No wheezing on exam  MIld dementia    REC    Resp viral panel ordered  Continue Zosyn for presumed aspiration pneumonia  Swallow evaluation per primary team

## 2022-10-25 NOTE — PROGRESS NOTE ADULT - ASSESSMENT
ASSESSMENT:  (1)Renal - ANTON - unclear etiology  (2)Hypoxia - unclear etiology         ASSESSMENT:  (1)Renal - ANTON - unclear etiology, creatinine slightly lower  (2)Hypoxia - unclear etiology, pulm eval noted   (3)ID - on IV abx     RECOMMEND:  (1)BMP in the am, trend renal fxn, monitor output   (2)F/u Pulm eval - favor avoidance of IV contrast for now (ie no CTPA for now)  (3)Meds for GFR 40-50ml/min (present dosing is acceptable)    D/w Dr. Bhatia and ACP    Ynes Esparza, NP-C  [facility]  ASSESSMENT:  (1)Renal - ANTON - unclear etiology, creatinine slightly lower  (2)Hypoxia - unclear etiology, pulm eval noted   (3)ID - on IV abx     RECOMMEND:  (1)BMP in the am, trend renal fxn, monitor output   (2)F/u Pulm eval - favor avoidance of IV contrast for now (ie no CTPA for now)  (3)Meds for GFR 40-50ml/min (present dosing is acceptable)    D/w Dr. Bhatia and ACP    Ynes Esparza, NP-C  [facility]  [phone] ASSESSMENT:  (1)Renal - ANTON - unclear etiology, creatinine slightly lower, renal US results noted  (2)Hypoxia - unclear etiology, pulm eval noted   (3)ID - on IV abx     RECOMMEND:  (1)BMP in the am, trend renal fxn, monitor output   (2)F/u Pulm eval - favor avoidance of IV contrast for now (ie no CTPA for now)  (3)Consider  eval   (4))Meds for GFR 40-50ml/min (present dosing is acceptable)    D/w Dr. Bhatia and ACP    Ynes Esparza, NP-C  [facility]  [phone]

## 2022-10-25 NOTE — CONSULT NOTE ADULT - ASSESSMENT
80 yo M with HTN/HLD, DM2, BPH, initially with low back pain  Leukocytosis, no fever  Initially with fall  Current complaints of constipation, possible urinary retention  CT chest with evidence aspiration; CT A/P with concern for abscess vs hematoma along liver margin  Uncertain if abscess vs hematoma  Overall,  1) Abnormal finding on imaging  - Collection adjacent to liver--hematoma vs abscess  - Would be cross treated by Zosyn  - Would check MRI abd (to evaluate the collection, abscess vs hematoma)  - Depending on MRI result, anticipate would consult IR for sampling/drainage  2) Leukocytosis  - Slightly elevated, trending upwards; note concern for possible urinary retention/bladder pain; UA+  - Zosyn for now  - Check BCXs x 2 (ordered)  - If plans for straight cath (if determined patient is retaining urine), please check UCX  - Trend WBC to normal  3) PNA  - Possible PNA based on CT, aspiration, small airway disease  - Would be cross treated by Tariq Arroyo MD  Contact on TEAMS messaging from 9am - 5pm  From 5pm-9am, on weekends, or if no response call [phone]

## 2022-10-26 LAB
BUN SERPL-MCNC: 40 MG/DL — HIGH (ref 7–23)
CALCIUM SERPL-MCNC: 9.2 MG/DL — SIGNIFICANT CHANGE UP (ref 8.4–10.5)
CHLORIDE SERPL-SCNC: 98 MMOL/L — SIGNIFICANT CHANGE UP (ref 96–108)
CO2 SERPL-SCNC: 20 MMOL/L — LOW (ref 22–31)
CREAT SERPL-MCNC: 1.49 MG/DL — HIGH (ref 0.5–1.3)
EGFR: 46 ML/MIN/1.73M2 — LOW
GLUCOSE BLDC GLUCOMTR-MCNC: 130 MG/DL — HIGH (ref 70–99)
GLUCOSE BLDC GLUCOMTR-MCNC: 147 MG/DL — HIGH (ref 70–99)
GLUCOSE SERPL-MCNC: 131 MG/DL — HIGH (ref 70–99)
HCT VFR BLD CALC: 33.8 % — LOW (ref 39–50)
HGB BLD-MCNC: 11.2 G/DL — LOW (ref 13–17)
MCHC RBC-ENTMCNC: 29.1 PG — SIGNIFICANT CHANGE UP (ref 27–34)
MCHC RBC-ENTMCNC: 33.1 GM/DL — SIGNIFICANT CHANGE UP (ref 32–36)
MCV RBC AUTO: 87.8 FL — SIGNIFICANT CHANGE UP (ref 80–100)
NRBC # BLD: 0 /100 WBCS — SIGNIFICANT CHANGE UP (ref 0–0)
PLATELET # BLD AUTO: 179 K/UL — SIGNIFICANT CHANGE UP (ref 150–400)
POTASSIUM SERPL-MCNC: 3.6 MMOL/L — SIGNIFICANT CHANGE UP (ref 3.5–5.3)
POTASSIUM SERPL-SCNC: 3.6 MMOL/L — SIGNIFICANT CHANGE UP (ref 3.5–5.3)
RBC # BLD: 3.85 M/UL — LOW (ref 4.2–5.8)
RBC # FLD: 14.1 % — SIGNIFICANT CHANGE UP (ref 10.3–14.5)
SARS-COV-2 RNA SPEC QL NAA+PROBE: SIGNIFICANT CHANGE UP
SODIUM SERPL-SCNC: 133 MMOL/L — LOW (ref 135–145)
WBC # BLD: 10.76 K/UL — HIGH (ref 3.8–10.5)
WBC # FLD AUTO: 10.76 K/UL — HIGH (ref 3.8–10.5)

## 2022-10-26 PROCEDURE — 99232 SBSQ HOSP IP/OBS MODERATE 35: CPT

## 2022-10-26 PROCEDURE — 74183 MRI ABD W/O CNTR FLWD CNTR: CPT | Mod: 26

## 2022-10-26 RX ADMIN — LIDOCAINE 1 PATCH: 4 CREAM TOPICAL at 03:18

## 2022-10-26 RX ADMIN — Medication 81 MILLIGRAM(S): at 11:50

## 2022-10-26 RX ADMIN — OXYCODONE HYDROCHLORIDE 5 MILLIGRAM(S): 5 TABLET ORAL at 22:37

## 2022-10-26 RX ADMIN — Medication 10 UNIT(S): at 09:32

## 2022-10-26 RX ADMIN — LIDOCAINE 1 PATCH: 4 CREAM TOPICAL at 14:12

## 2022-10-26 RX ADMIN — PANTOPRAZOLE SODIUM 40 MILLIGRAM(S): 20 TABLET, DELAYED RELEASE ORAL at 05:18

## 2022-10-26 RX ADMIN — INSULIN GLARGINE 25 UNIT(S): 100 INJECTION, SOLUTION SUBCUTANEOUS at 22:28

## 2022-10-26 RX ADMIN — Medication 10 MILLIGRAM(S): at 22:03

## 2022-10-26 RX ADMIN — Medication 25 MILLIGRAM(S): at 05:18

## 2022-10-26 RX ADMIN — PIPERACILLIN AND TAZOBACTAM 25 GRAM(S): 4; .5 INJECTION, POWDER, LYOPHILIZED, FOR SOLUTION INTRAVENOUS at 14:19

## 2022-10-26 RX ADMIN — OXYCODONE HYDROCHLORIDE 5 MILLIGRAM(S): 5 TABLET ORAL at 22:03

## 2022-10-26 RX ADMIN — LIDOCAINE 1 PATCH: 4 CREAM TOPICAL at 19:46

## 2022-10-26 RX ADMIN — TAMSULOSIN HYDROCHLORIDE 0.8 MILLIGRAM(S): 0.4 CAPSULE ORAL at 22:03

## 2022-10-26 RX ADMIN — SENNA PLUS 2 TABLET(S): 8.6 TABLET ORAL at 22:04

## 2022-10-26 RX ADMIN — PIPERACILLIN AND TAZOBACTAM 25 GRAM(S): 4; .5 INJECTION, POWDER, LYOPHILIZED, FOR SOLUTION INTRAVENOUS at 05:15

## 2022-10-26 RX ADMIN — FINASTERIDE 5 MILLIGRAM(S): 5 TABLET, FILM COATED ORAL at 11:50

## 2022-10-26 RX ADMIN — PIPERACILLIN AND TAZOBACTAM 25 GRAM(S): 4; .5 INJECTION, POWDER, LYOPHILIZED, FOR SOLUTION INTRAVENOUS at 22:04

## 2022-10-26 RX ADMIN — POLYETHYLENE GLYCOL 3350 17 GRAM(S): 17 POWDER, FOR SOLUTION ORAL at 11:51

## 2022-10-26 NOTE — CONSULT NOTE ADULT - SUBJECTIVE AND OBJECTIVE BOX
Urology Consult Note    Chief Complaint:  M with multiple medical problems.  He has a     PMHx of HTN/HLD, DM2, BPH, (?Afib per chart review, though not on AC's), presents to the ED with mid lower back pain that he sustained after falling in the bathroom while he was urinating at 9pm last night. Pt denies headstrike/LOC. imaging thus far demonstrates a right upper quadrant lesion, possibly hepatic in origin. AAso possible concern for incomplete bladder emptying. resting in bed.    History of Present Illness:      PAST MEDICAL & SURGICAL HISTORY:  DM (diabetes mellitus)      HTN (hypertension)      Hypercholesterolemia      CAD (coronary artery disease)      HTN (hypertension)      DM (diabetes mellitus)      S/P TURP      S/P gastrectomy          FAMILY HISTORY:  No pertinent family history in first degree relatives        Allergies    No Known Allergies    Intolerances        Social History:  Denies tobacco or alcohol use          Review of Systems:   Constitutional: Negative  HEENT: No sneezing  Skin: No new rash  CV:  No new chest pressure  Pulm: No new cough  GI: No new symptoms except as noted on presentation  : Per HPI  Neuro: No new symptoms except unless noted on presentation   MSK: No muscle pain, no joint pain except unless noted on presentation  Heme: No anemia, bruising, or bleeding except unless noted on presentation  Lymphatics: No enlarged nodes except unless noted on presentation  Psych: No depression of anxiety except unless noted on presentation  Endo: No cold or heat intolerance except unless noted on presentation  Allergies: No asthma, hives except unless noted on presentation    Physical Exam:  Vital signs  T(C): 36.3 (10-26-22 @ 05:24), Max: 36.4 (10-25-22 @ 20:46)  HR: 86 (10-26-22 @ 05:24)  BP: 106/74 (10-26-22 @ 05:24)  SpO2: 96% (10-26-22 @ 05:24)  Wt(kg): --    Gen: Alive  HEENT: Normocephalic  CV: Vitals noted  Pulm: Respirations noted  Abd: abd not obviously distended  Extremities: Digits and extremities noted  Neuro: Neural capacity noted  Skin: Skin apperance and any abnormalities noted  Psych: Noted  : Bladder not obviously distended. Patient declining remainder of exam consisting of phallus, meatus, Scrotum, testes, and epididymis,   Rectal: Unable to perform due to patient declining and/or patient is declining this portion of the exam.      Labs:      10-25 @ 07:20    WBC --    / Hct --    / SCr 1.34     10-25 @ 07:19    WBC 13.77 / Hct 35.6  / SCr --       10-25    133<L>  |  96  |  35<H>  ----------------------------<  172<H>  4.2   |  22  |  1.34<H>    Ca    9.2      25 Oct 2022 07:20        Urinalysis Basic - ( 25 Oct 2022 17:02 )    Color: Yellow / Appearance: Slightly Turbid / S.023 / pH: x  Gluc: x / Ketone: Negative  / Bili: Negative / Urobili: Negative   Blood: x / Protein: 30 mg/dL / Nitrite: Negative   Leuk Esterase: Small / RBC: 21 /hpf / WBC 5 /HPF   Sq Epi: x / Non Sq Epi: 1 /hpf / Bacteria: Negative      Renal US:    IMPRESSION:  Complex cystic collection superior to the right kidney, correlating to   heterogeneous collection exophytic from the right hepatic lobe.   Differential includes abscess versus hematoma.    Bilateral complex renal cysts.

## 2022-10-26 NOTE — PROVIDER CONTACT NOTE (OTHER) - ACTION/TREATMENT ORDERED:
As per PA - pt needs to be evaluated for dysphagia before diet can be reinstated. She will order Bisacodyl for constipation.

## 2022-10-26 NOTE — CONSULT NOTE ADULT - ASSESSMENT
M with renal cysts, incomplete bladder emptying    Renal cyst:  -Differential includes benign renal cyst, complex renal cyst, malignant renal cyst  -Please order MRI abdomen with and without IV contrast renal mass protocol to better characterize renal cystic lesion    -Incomplete bladder emptying  -Post void residual bladder scan--if >400cc, would consider clean intermittent catheterization or traore catheter placement  -Urine culture to assess for UTI  -bowel regimen  -hydration  -physical activities as medically permitted    -Outpatient  followup to further monitor the above mentioned conditions

## 2022-10-26 NOTE — PROGRESS NOTE ADULT - ASSESSMENT
81 male h/o htn, chol, dm, here s/p mechanical fall    mechanical fall  imaging noted  surg eval noted  MRI L spine noted with Lspine fx  ortho f/u noted  no acute intervention  pain control  TLSO brace  PT    DM  insulin as ordered  endo f/u  monitor fs    hypoxia  imaging c/w pna  pulm consult noted  supp o2  iv zosyn  check swallow eval  incentive spirometer  nebs    chanel  unclear etiology  possible 2/2 poor oral fluid intake  renal f/u    abnl UA  f/u cult  on zosyn  id f/u    possible liver abscess  f/u MRI abd  ID f/u apprec    suspected renal lesion  gu eval noted   f/u MRI       cont other home meds         Advanced care planning was discussed with patient and family.  Advanced care planning forms were reviewed and discussed as appropriate.  Differential diagnosis and plan of care discussed with patient after the evaluation.   Pain assessed and judicious use of narcotics when appropriate was discussed.  Importance of Fall prevention discussed.  Counseling on Smoking and Alcohol cessation was offered when appropriate.  Counseling on Diet, exercise, and medication compliance was done.   Approx 30 minutes spent.

## 2022-10-26 NOTE — PROGRESS NOTE ADULT - SUBJECTIVE AND OBJECTIVE BOX
KAITLIN CALDERON  82y Male  MRN:[id number]    Patient is a 82y old  Male who presents with a chief complaint of fall    HPI:   81M cantonese speaking PMHx of HTN/HLD, DM2, BPH, (?Afib per chart review, though not on AC's), presents to the ED with mid lower back pain that he sustained after falling in the bathroom while he was urinating at 9pm last night. Pt denies headstrike/LOC. Difficult to obtain full history even with  though pt answering questions appropriately and is AAOx2. Pt denies any pain anywhere else. Per EMS pt has had chronic cough. Unclear if pt was down long. pt last took insulin 2 days ago he states.   	Spoke with stepdaughter Yulissa who lives below pt: Pt couldn't get up from the fall yesterday, has difficulty ambulating with walker since the fall as he complaints of back pain, pt has 9hrs home help daily but not overnight. She states that pt is at baseline mental status and that he's had short term memory problems for some time. Denies any recent fevers/chills, abd pain, v/d, chest pain/sob. States that the cough pt has is chronic.  (19 Oct 2022 15:53)      Patient seen and evaluated at bedside. No acute events overnight except as noted.    Interval HPI: no acute events o/n     PAST MEDICAL & SURGICAL HISTORY:  DM (diabetes mellitus)      HTN (hypertension)      Hypercholesterolemia      CAD (coronary artery disease)      HTN (hypertension)      DM (diabetes mellitus)      S/P TURP      S/P gastrectomy          REVIEW OF SYSTEMS:  as per hpi     VITALS:   Vital Signs Last 24 Hrs  T(C): 36.3 (26 Oct 2022 05:24), Max: 36.4 (25 Oct 2022 20:46)  T(F): 97.3 (26 Oct 2022 05:24), Max: 97.5 (25 Oct 2022 20:46)  HR: 86 (26 Oct 2022 05:24) (86 - 104)  BP: 106/74 (26 Oct 2022 05:24) (104/76 - 130/89)  BP(mean): --  RR: 18 (26 Oct 2022 05:24) (18 - 18)  SpO2: 96% (26 Oct 2022 05:24) (96% - 98%)    Parameters below as of 26 Oct 2022 05:24  Patient On (Oxygen Delivery Method): nasal cannula  O2 Flow (L/min): 3        PHYSICAL EXAM:  GENERAL: NAD, well-developed,    HEAD:  Atraumatic, Normocephalic  EYES: EOMI, PERRLA, conjunctiva and sclera clear  NECK: Supple, No JVD  CHEST/LUNG: b/l ronchi  HEART: S1, S2; No murmurs, rubs, or gallops  ABDOMEN: Soft, Nontender, Nondistended; Bowel sounds present  EXTREMITIES:  2+ Peripheral Pulses, No clubbing, cyanosis, or edema  PSYCH: Normal affect  NEUROLOGY: AAOX1-2; non-focal  SKIN: No rashes or lesions    Consultant(s) Notes Reviewed:  [x ] YES  [ ] NO  Care Discussed with Consultants/Other Providers [ x] YES  [ ] NO    MEDS:   MEDICATIONS  (STANDING):  aspirin enteric coated 81 milliGRAM(s) Oral daily  dextrose 5%. 1000 milliLiter(s) (50 mL/Hr) IV Continuous <Continuous>  dextrose 5%. 1000 milliLiter(s) (100 mL/Hr) IV Continuous <Continuous>  dextrose 50% Injectable 25 Gram(s) IV Push once  dextrose 50% Injectable 12.5 Gram(s) IV Push once  dextrose 50% Injectable 25 Gram(s) IV Push once  finasteride 5 milliGRAM(s) Oral daily  glucagon  Injectable 1 milliGRAM(s) IntraMuscular once  influenza  Vaccine (HIGH DOSE) 0.7 milliLiter(s) IntraMuscular once  insulin glargine Injectable (LANTUS) 25 Unit(s) SubCutaneous at bedtime  insulin lispro (ADMELOG) corrective regimen sliding scale   SubCutaneous three times a day before meals  insulin lispro Injectable (ADMELOG) 10 Unit(s) SubCutaneous three times a day before meals  lidocaine   4% Patch 1 Patch Transdermal every 24 hours  metoprolol succinate ER 25 milliGRAM(s) Oral daily  pantoprazole    Tablet 40 milliGRAM(s) Oral before breakfast  piperacillin/tazobactam IVPB.. 3.375 Gram(s) IV Intermittent every 8 hours  polyethylene glycol 3350 17 Gram(s) Oral daily  senna 2 Tablet(s) Oral at bedtime  tamsulosin 0.8 milliGRAM(s) Oral at bedtime    MEDICATIONS  (PRN):  acetaminophen     Tablet .. 650 milliGRAM(s) Oral every 6 hours PRN mild pain  albuterol/ipratropium for Nebulization 3 milliLiter(s) Nebulizer every 6 hours PRN Shortness of Breath  dextrose Oral Gel 15 Gram(s) Oral once PRN Blood Glucose LESS THAN 70 milliGRAM(s)/deciliter  oxyCODONE    IR 5 milliGRAM(s) Oral every 6 hours PRN Moderate Pain (4 - 6)      ALLERGIES:  No Known Allergies      LABS:                                                11.2   10.76 )-----------( 179      ( 26 Oct 2022 07:31 )             33.8   10-25    133<L>  |  96  |  35<H>  ----------------------------<  172<H>  4.2   |  22  |  1.34<H>    Ca    9.2      25 Oct 2022 07:20             cultures: Culture Results:   No growth (10-19 @ 08:59)       < from: MR Lumbar Spine No Cont (10.20.22 @ 17:28) >    IMPRESSION:  Evidence of acute fracture L1 with fracture line traversing   the vertebral body from anterior to posterior and findings consistent   with a 2 column injury. Paravertebral cuff of soft tissue consistent with   recent injury. Some mild extension of edema into the pedicles though the   transverse and spinous processes as well as the lamina are spared. No   other lesions are identified. Recommend interval follow-up to determine   benign versus pathologic fracture.    --- End of Report ---      < end of copied text >      < from: Xray Chest 1 View- PORTABLE-Urgent (Xray Chest 1 View- PORTABLE-Urgent .) (10.23.22 @ 15:36) >  IMPRESSION:  Bibasilar patchy opacities, compatible with subsegmental atelectasis.   Infectious etiology cannot be ruled out.    --- End of Report ---      < end of copied text >      < from: US Kidney and Bladder (10.24.22 @ 19:31) >  IMPRESSION:  Complex cystic collection superior to the right kidney, correlating to   heterogeneous collection exophytic from the right hepatic lobe.   Differential includes abscess versus hematoma.    Bilateral complex renal cysts.    < end of copied text >  < from: CT Chest No Cont (10.19.22 @ 09:59) >  IMPRESSION:  Limited noncontrast examination.    CHEST:  *  Secretions/debris within the bilateral mainstem bronchi with   multifocal sites of mucous plugging in distal airways.  *  Bilateral tree-in-bud nodularity is likely infectious/inflammatory.   Groundglass opacity within the left upper lobe, which may also be   infectious/inflammatory. Pulmonary nodule measuring 6 m in the right   upper lobe. Suggest 3 month follow-up chest CT to assess for stability or   resolution.  *  A few prominent nonspecific mediastinal lymph nodes.  *  Ascending thoracic aortic aneurysm measuring up to 5 cm.    ABDOMEN AND PELVIS:  *  Low density structure measuring 8.3 cm alongthe inferior margin of   the right hepatic lobe with surrounding inflammatory changes.   Differential includes an intrahepatic/perihepatic abscess, or possibly   hematoma in the setting of trauma. Neoplasm is thought to be less likely   but is not excluded. Contrast enhanced abdominal MRI may be helpful for   further characterization.  *  Mild compression fracture at L1, likely acute. Please refer to the   concurrent dedicated CT lumbar spine report.    Preliminary findings were discussed by Dr. Parks with Dr. Flores   on 10/19/2022 11:10 AM with read back confirmation. Final findings were   discussed with Dr. Pierce by Dr. Mcqueen on 10/19/2022 at 12:57 PM.    --- End of Report ---    ***Please see the addendum at the top of this report. It may contain   additional important information or changes.****      < end of copied text >

## 2022-10-26 NOTE — PROGRESS NOTE ADULT - SUBJECTIVE AND OBJECTIVE BOX
Follow-up Pulm Progress Note  Edmund Quiroga MD  [phone]    Afebrile on Zosyn  Alert, no resp complaints  Respviral panel negative    Medications:  Vital Signs Last 24 Hrs  T(C): 36.3 (26 Oct 2022 05:24), Max: 36.4 (25 Oct 2022 20:46)  T(F): 97.3 (26 Oct 2022 05:24), Max: 97.5 (25 Oct 2022 20:46)  HR: 86 (26 Oct 2022 05:24) (86 - 104)  BP: 106/74 (26 Oct 2022 05:24) (104/76 - 130/89)  BP(mean): --  RR: 18 (26 Oct 2022 05:24) (18 - 18)  SpO2: 96% (26 Oct 2022 05:24) (96% - 98%)    Parameters below as of 26 Oct 2022 05:24  Patient On (Oxygen Delivery Method): nasal cannula  O2 Flow (L/min): 3        10-25 @ 07:01  -  10-26 @ 07:00  --------------------------------------------------------  IN: 360 mL / OUT: 550 mL / NET: -190 mL        LABS:                        11.2   10.76 )-----------( 179      ( 26 Oct 2022 07:31 )             33.8     10-25    133<L>  |  96  |  35<H>  ----------------------------<  172<H>  4.2   |  22  |  1.34<H>    Ca    9.2      25 Oct 2022 07:20      Physical Examination:  PULM: Few basilar rhonchi  CVS: Regular rate and rhythm, no murmurs, rubs, or gallops  ABD: Soft, non-tender  EXT:  No clubbing, cyanosis, or edema    RADIOLOGY REVIEWED  CXR:    CT chest:    TTE:

## 2022-10-26 NOTE — PROGRESS NOTE ADULT - SUBJECTIVE AND OBJECTIVE BOX
Chief complaint  Patient is a 82y old  Male who presents with a chief complaint of Fall (26 Oct 2022 11:39)   Review of systems  Patient in bed, looks comfortable, no hypoglycemic episodes.    Labs and Fingersticks  CAPILLARY BLOOD GLUCOSE      POCT Blood Glucose.: 147 mg/dL (26 Oct 2022 09:20)  POCT Blood Glucose.: 101 mg/dL (25 Oct 2022 21:56)  POCT Blood Glucose.: 160 mg/dL (25 Oct 2022 17:40)      Anion Gap, Serum: 15 (10-25 @ 07:20)      Calcium, Total Serum: 9.2 (10-26 @ 07:31)  Calcium, Total Serum: 9.2 (10-25 @ 07:20)          10-26    133<L>  |  98  |  40<H>  ----------------------------<  131<H>  3.6   |  20<L>  |  1.49<H>    Ca    9.2      26 Oct 2022 07:31                          11.2   10.76 )-----------( 179      ( 26 Oct 2022 07:31 )             33.8     Medications  MEDICATIONS  (STANDING):  aspirin enteric coated 81 milliGRAM(s) Oral daily  dextrose 5%. 1000 milliLiter(s) (50 mL/Hr) IV Continuous <Continuous>  dextrose 5%. 1000 milliLiter(s) (100 mL/Hr) IV Continuous <Continuous>  dextrose 50% Injectable 25 Gram(s) IV Push once  dextrose 50% Injectable 12.5 Gram(s) IV Push once  dextrose 50% Injectable 25 Gram(s) IV Push once  finasteride 5 milliGRAM(s) Oral daily  glucagon  Injectable 1 milliGRAM(s) IntraMuscular once  influenza  Vaccine (HIGH DOSE) 0.7 milliLiter(s) IntraMuscular once  insulin glargine Injectable (LANTUS) 25 Unit(s) SubCutaneous at bedtime  insulin lispro (ADMELOG) corrective regimen sliding scale   SubCutaneous three times a day before meals  insulin lispro Injectable (ADMELOG) 10 Unit(s) SubCutaneous three times a day before meals  lidocaine   4% Patch 1 Patch Transdermal every 24 hours  metoprolol succinate ER 25 milliGRAM(s) Oral daily  pantoprazole    Tablet 40 milliGRAM(s) Oral before breakfast  piperacillin/tazobactam IVPB.. 3.375 Gram(s) IV Intermittent every 8 hours  polyethylene glycol 3350 17 Gram(s) Oral daily  senna 2 Tablet(s) Oral at bedtime  tamsulosin 0.8 milliGRAM(s) Oral at bedtime      Physical Exam  General: Patient comfortable in bed  Vital Signs Last 12 Hrs  T(F): 97.3 (10-26-22 @ 05:24), Max: 97.3 (10-26-22 @ 05:24)  HR: 86 (10-26-22 @ 05:24) (86 - 86)  BP: 106/74 (10-26-22 @ 05:24) (106/74 - 106/74)  BP(mean): --  RR: 18 (10-26-22 @ 05:24) (18 - 18)  SpO2: 96% (10-26-22 @ 05:24) (96% - 96%)  Neck: No palpable thyroid nodules.  CVS: S1S2, No murmurs  Respiratory: No wheezing, no crepitations  GI: Abdomen soft, bowel sounds positive  Musculoskeletal:  edema lower extremities.   Skin: No skin rashes, no ecchymosis    Diagnostics    Free Thyroxine, Serum: AM Sched. Collection: 21-Oct-2022 06:00 (10-20 @ 13:18)  Thyroid Stimulating Hormone, Serum: AM Sched. Collection: 21-Oct-2022 06:00 (10-20 @ 13:18)  A1C with Estimated Average Glucose: Routine (10-20 @ 13:17)             Chief complaint  Patient is a 82y old  Male who presents with a chief complaint of Fall (26 Oct 2022 11:39)   Review of systems  Patient in bed, looks comfortable, no hypoglycemic episodes.    Labs and Fingersticks  CAPILLARY BLOOD GLUCOSE      POCT Blood Glucose.: 147 mg/dL (26 Oct 2022 09:20)  POCT Blood Glucose.: 101 mg/dL (25 Oct 2022 21:56)  POCT Blood Glucose.: 160 mg/dL (25 Oct 2022 17:40)      Anion Gap, Serum: 15 (10-25 @ 07:20)      Calcium, Total Serum:  9.2 (10-26 @ 07:31)  Calcium, Total Serum: 9.2 (10-25 @ 07:20)          10-26    133<L>  |  98  |  40<H>  ----------------------------<  131<H>  3.6   |  20<L>  |  1.49<H>    Ca    9.2      26 Oct 2022 07:31                          11.2   10.76 )-----------( 179      ( 26 Oct 2022 07:31 )             33.8     Medications  MEDICATIONS  (STANDING):  aspirin enteric coated 81 milliGRAM(s) Oral daily  dextrose 5%. 1000 milliLiter(s) (50 mL/Hr) IV Continuous <Continuous>  dextrose 5%. 1000 milliLiter(s) (100 mL/Hr) IV Continuous <Continuous>  dextrose 50% Injectable 25 Gram(s) IV Push once  dextrose 50% Injectable 12.5 Gram(s) IV Push once  dextrose 50% Injectable 25 Gram(s) IV Push once  finasteride 5 milliGRAM(s) Oral daily  glucagon  Injectable 1 milliGRAM(s) IntraMuscular once  influenza  Vaccine (HIGH DOSE) 0.7 milliLiter(s) IntraMuscular once  insulin glargine Injectable (LANTUS) 25 Unit(s) SubCutaneous at bedtime  insulin lispro (ADMELOG) corrective regimen sliding scale   SubCutaneous three times a day before meals  insulin lispro Injectable (ADMELOG) 10 Unit(s) SubCutaneous three times a day before meals  lidocaine   4% Patch 1 Patch Transdermal every 24 hours  metoprolol succinate ER 25 milliGRAM(s) Oral daily  pantoprazole    Tablet 40 milliGRAM(s) Oral before breakfast  piperacillin/tazobactam IVPB.. 3.375 Gram(s) IV Intermittent every 8 hours  polyethylene glycol 3350 17 Gram(s) Oral daily  senna 2 Tablet(s) Oral at bedtime  tamsulosin 0.8 milliGRAM(s) Oral at bedtime      Physical Exam  General: Patient comfortable in bed  Vital Signs Last 12 Hrs  T(F): 97.3 (10-26-22 @ 05:24), Max: 97.3 (10-26-22 @ 05:24)  HR: 86 (10-26-22 @ 05:24) (86 - 86)  BP: 106/74 (10-26-22 @ 05:24) (106/74 - 106/74)  BP(mean): --  RR: 18 (10-26-22 @ 05:24) (18 - 18)  SpO2: 96% (10-26-22 @ 05:24) (96% - 96%)  Neck: No palpable thyroid nodules.  CVS: S1S2, No murmurs  Respiratory: No wheezing, no crepitations  GI: Abdomen soft, bowel sounds positive  Musculoskeletal:  edema lower extremities.   Skin: No skin rashes, no ecchymosis    Diagnostics    Free Thyroxine, Serum: AM Sched. Collection: 21-Oct-2022 06:00 (10-20 @ 13:18)  Thyroid Stimulating Hormone, Serum: AM Sched. Collection: 21-Oct-2022 06:00 (10-20 @ 13:18)  A1C with Estimated Average Glucose: Routine (10-20 @ 13:17)

## 2022-10-26 NOTE — PROGRESS NOTE ADULT - ASSESSMENT
82 yo M with HTN/HLD, DM2, BPH, initially with low back pain  Leukocytosis, no fever  Initially with fall  Current complaints of constipation, possible urinary retention  CT chest with evidence aspiration; CT A/P with concern for abscess vs hematoma along liver margin  Uncertain if abscess vs hematoma  Overall,  1) Abnormal finding on imaging  - Collection adjacent to liver--hematoma vs abscess  - Would be cross treated by Zosyn  - Would check MRI abd (to evaluate the collection, abscess vs hematoma)  - Depending on MRI result, anticipate would consult IR for sampling/drainage  2) Leukocytosis  - Slightly elevated, trending upwards; note concern for possible urinary retention/bladder pain; UA+  - Zosyn for now  - F/U BCXs x 2 (ordered)  - If plans for straight cath (if determined patient is retaining urine), please check UCX  - Trend WBC to normal  3) PNA  - Possible PNA based on CT, aspiration, small airway disease  - Would be cross treated by Tariq Arroyo MD  Contact on TEAMS messaging from 9am - 5pm  From 5pm-9am, on weekends, or if no response call [phone]

## 2022-10-26 NOTE — PROGRESS NOTE ADULT - ASSESSMENT
Assessment  DMT2: 82y Male with DM T2 with hyperglycemia, was on insulin at home, now on basal bolus insulin, increased dose yesterday, blood sugars improving and in overall acceptable range now, no hypoglycemic episodes, eating meals, NAD, DC plan is for rehab.  S/P Fall: workup in progress, stable, monitored.  HTN: Un Controlled,  on antihypertensive medications.            Melba Reyes MD  Cell: [phone]  Office: [phone]               Assessment  DMT2: 82y Male with DM T2 with hyperglycemia, was on insulin at home, now on basal bolus insulin, increased dose yesterday, blood sugars improving and in overall acceptable  range now, no hypoglycemic episodes, eating meals, NAD, DC plan is for rehab.  S/P Fall: workup in progress, stable, monitored.  HTN: Un Controlled,  on antihypertensive medications.            Melba Reyes MD  Cell: [phone]  Office: [phone]

## 2022-10-26 NOTE — PROGRESS NOTE ADULT - ASSESSMENT
ASSESSMENT:  (1)Renal - ANTON - unclear etiology, creatinine slightly higher, renal US results noted  (2)Hypoxia - unclear etiology, pulm eval noted   (3)ID - on IV abx    RECOMMEND:  (1)BMP daily, trend renal fxn, monitor output   (2) follow up  (3)No objection to MRI with IV contrast   (4)Meds for GFR 40-50ml/min (present dosing is acceptable)    D/w Dr. Sriram Esparza, NP-C  [facility]  [phone]     ASSESSMENT:  (1)Renal - ANTON - unclear etiology, creatinine slightly higher, renal US results noted  (2)Hypoxia - unclear etiology, pulm eval noted   (3)ID - on IV abx    RECOMMEND:  (1)BMP daily, trend renal fxn, monitor output   (2) follow up  (3)No objection to MRI with IV contrast   (4)Meds for GFR 40-50ml/min (present dosing is acceptable)    D/w Dr. Sriram Esparza, NP-C  [facility]  [phone]    RENAL ATTENDING NOTE  Patient seen and examined with NP. Agree with assessment and plan as above.    Maco Bhatia MD  [facility]  [phone]

## 2022-10-26 NOTE — PROGRESS NOTE ADULT - SUBJECTIVE AND OBJECTIVE BOX
CC: F/U for Fluid collection    Saw/spoke to patient. No fevers, no chills. No new complaints.    Allergies  No Known Allergies    ANTIMICROBIALS:  piperacillin/tazobactam IVPB.. 3.375 every 8 hours    PE:    Vital Signs Last 24 Hrs  T(C): 36.3 (26 Oct 2022 13:20), Max: 36.4 (25 Oct 2022 20:46)  T(F): 97.3 (26 Oct 2022 13:20), Max: 97.5 (25 Oct 2022 20:46)  HR: 89 (26 Oct 2022 13:20) (86 - 104)  BP: 110/84 (26 Oct 2022 13:20) (106/74 - 130/89)  RR: 18 (26 Oct 2022 13:20) (18 - 18)  SpO2: 96% (26 Oct 2022 13:20) (96% - 98%)    Gen: AOx3, NAD, non-toxic  CV: Nontachycardic  Resp: Breathing comfortably, RA  Abd: Soft, nontender  IV/Skin: No thrombophlebitis    LABS:                        11.2   10.76 )-----------( 179      ( 26 Oct 2022 07:31 )             33.8     10    133<L>  |  98  |  40<H>  ----------------------------<  131<H>  3.6   |  20<L>  |  1.49<H>    Ca    9.2      26 Oct 2022 07:31    Urinalysis Basic - ( 25 Oct 2022 17:02 )    Color: Yellow / Appearance: Slightly Turbid / S.023 / pH: x  Gluc: x / Ketone: Negative  / Bili: Negative / Urobili: Negative   Blood: x / Protein: 30 mg/dL / Nitrite: Negative   Leuk Esterase: Small / RBC: 21 /hpf / WBC 5 /HPF   Sq Epi: x / Non Sq Epi: 1 /hpf / Bacteria: Negative    MICROBIOLOGY:    Clean Catch Clean Catch (Midstream)  10-19-22   No growth  --  --    Rapid RVP Result: NotDetec (10-25 @ 14:33)    (otherwise reviewed)    RADIOLOGY:    10/23 XR:    FINDINGS:  The heart size cannot be accurately assessed in this projection. Aortic   calcifications are noted.  Bibasilar patchy opacities, right greater than left.  There is no pneumothorax or pleural effusion.  No acute bony abnormality.    IMPRESSION:  Bibasilar patchy opacities, compatible with subsegmental atelectasis.   Infectious etiology cannot be ruled out.

## 2022-10-26 NOTE — PROGRESS NOTE ADULT - ASSESSMENT
82M admitted post mechanical fall with T1 compression fx  CT chest with multilobar opacities c/w aspiration pneumonia  Hypoxemia due to former    REC    Continue Zosyn: anticipate7 day course abx  Taper nasal cannula as tolerated  Pain managment per primary team  Consider swallow eval

## 2022-10-27 LAB
ALBUMIN SERPL ELPH-MCNC: 2.4 G/DL — LOW (ref 3.3–5)
ALP SERPL-CCNC: 150 U/L — HIGH (ref 40–120)
ALT FLD-CCNC: 8 U/L — LOW (ref 10–45)
ANION GAP SERPL CALC-SCNC: 11 MMOL/L — SIGNIFICANT CHANGE UP (ref 5–17)
AST SERPL-CCNC: 10 U/L — SIGNIFICANT CHANGE UP (ref 10–40)
BILIRUB DIRECT SERPL-MCNC: 0.2 MG/DL — SIGNIFICANT CHANGE UP (ref 0–0.3)
BILIRUB INDIRECT FLD-MCNC: 0.4 MG/DL — SIGNIFICANT CHANGE UP (ref 0.2–1)
BILIRUB SERPL-MCNC: 0.6 MG/DL — SIGNIFICANT CHANGE UP (ref 0.2–1.2)
BUN SERPL-MCNC: 26 MG/DL — HIGH (ref 7–23)
CALCIUM SERPL-MCNC: 9 MG/DL — SIGNIFICANT CHANGE UP (ref 8.4–10.5)
CHLORIDE SERPL-SCNC: 97 MMOL/L — SIGNIFICANT CHANGE UP (ref 96–108)
CO2 SERPL-SCNC: 23 MMOL/L — SIGNIFICANT CHANGE UP (ref 22–31)
CREAT SERPL-MCNC: 1.06 MG/DL — SIGNIFICANT CHANGE UP (ref 0.5–1.3)
EGFR: 69 ML/MIN/1.73M2 — SIGNIFICANT CHANGE UP
GLUCOSE BLDC GLUCOMTR-MCNC: 132 MG/DL — HIGH (ref 70–99)
GLUCOSE BLDC GLUCOMTR-MCNC: 143 MG/DL — HIGH (ref 70–99)
GLUCOSE BLDC GLUCOMTR-MCNC: 152 MG/DL — HIGH (ref 70–99)
GLUCOSE BLDC GLUCOMTR-MCNC: 207 MG/DL — HIGH (ref 70–99)
GLUCOSE SERPL-MCNC: 154 MG/DL — HIGH (ref 70–99)
HCT VFR BLD CALC: 35 % — LOW (ref 39–50)
HGB BLD-MCNC: 11.4 G/DL — LOW (ref 13–17)
MCHC RBC-ENTMCNC: 28.4 PG — SIGNIFICANT CHANGE UP (ref 27–34)
MCHC RBC-ENTMCNC: 32.6 GM/DL — SIGNIFICANT CHANGE UP (ref 32–36)
MCV RBC AUTO: 87.1 FL — SIGNIFICANT CHANGE UP (ref 80–100)
NRBC # BLD: 0 /100 WBCS — SIGNIFICANT CHANGE UP (ref 0–0)
PLATELET # BLD AUTO: 245 K/UL — SIGNIFICANT CHANGE UP (ref 150–400)
POTASSIUM SERPL-MCNC: 3.5 MMOL/L — SIGNIFICANT CHANGE UP (ref 3.5–5.3)
POTASSIUM SERPL-SCNC: 3.5 MMOL/L — SIGNIFICANT CHANGE UP (ref 3.5–5.3)
PROT SERPL-MCNC: 6.1 G/DL — SIGNIFICANT CHANGE UP (ref 6–8.3)
RBC # BLD: 4.02 M/UL — LOW (ref 4.2–5.8)
RBC # FLD: 13.9 % — SIGNIFICANT CHANGE UP (ref 10.3–14.5)
SODIUM SERPL-SCNC: 131 MMOL/L — LOW (ref 135–145)
WBC # BLD: 12.21 K/UL — HIGH (ref 3.8–10.5)
WBC # FLD AUTO: 12.21 K/UL — HIGH (ref 3.8–10.5)

## 2022-10-27 PROCEDURE — 99232 SBSQ HOSP IP/OBS MODERATE 35: CPT

## 2022-10-27 RX ORDER — OXYCODONE HYDROCHLORIDE 5 MG/1
5 TABLET ORAL EVERY 6 HOURS
Refills: 0 | Status: DISCONTINUED | OUTPATIENT
Start: 2022-10-29 | End: 2022-10-29

## 2022-10-27 RX ADMIN — PIPERACILLIN AND TAZOBACTAM 25 GRAM(S): 4; .5 INJECTION, POWDER, LYOPHILIZED, FOR SOLUTION INTRAVENOUS at 13:16

## 2022-10-27 RX ADMIN — SENNA PLUS 2 TABLET(S): 8.6 TABLET ORAL at 21:44

## 2022-10-27 RX ADMIN — Medication 2: at 17:53

## 2022-10-27 RX ADMIN — INSULIN GLARGINE 25 UNIT(S): 100 INJECTION, SOLUTION SUBCUTANEOUS at 22:44

## 2022-10-27 RX ADMIN — Medication 650 MILLIGRAM(S): at 22:21

## 2022-10-27 RX ADMIN — Medication 650 MILLIGRAM(S): at 21:43

## 2022-10-27 RX ADMIN — LIDOCAINE 1 PATCH: 4 CREAM TOPICAL at 19:09

## 2022-10-27 RX ADMIN — Medication 81 MILLIGRAM(S): at 11:20

## 2022-10-27 RX ADMIN — Medication 10 UNIT(S): at 09:02

## 2022-10-27 RX ADMIN — LIDOCAINE 1 PATCH: 4 CREAM TOPICAL at 17:56

## 2022-10-27 RX ADMIN — PIPERACILLIN AND TAZOBACTAM 25 GRAM(S): 4; .5 INJECTION, POWDER, LYOPHILIZED, FOR SOLUTION INTRAVENOUS at 21:44

## 2022-10-27 RX ADMIN — Medication 25 MILLIGRAM(S): at 05:46

## 2022-10-27 RX ADMIN — POLYETHYLENE GLYCOL 3350 17 GRAM(S): 17 POWDER, FOR SOLUTION ORAL at 11:20

## 2022-10-27 RX ADMIN — FINASTERIDE 5 MILLIGRAM(S): 5 TABLET, FILM COATED ORAL at 11:20

## 2022-10-27 RX ADMIN — PANTOPRAZOLE SODIUM 40 MILLIGRAM(S): 20 TABLET, DELAYED RELEASE ORAL at 06:40

## 2022-10-27 RX ADMIN — TAMSULOSIN HYDROCHLORIDE 0.8 MILLIGRAM(S): 0.4 CAPSULE ORAL at 21:43

## 2022-10-27 RX ADMIN — LIDOCAINE 1 PATCH: 4 CREAM TOPICAL at 02:12

## 2022-10-27 RX ADMIN — PIPERACILLIN AND TAZOBACTAM 25 GRAM(S): 4; .5 INJECTION, POWDER, LYOPHILIZED, FOR SOLUTION INTRAVENOUS at 05:46

## 2022-10-27 RX ADMIN — Medication 10 UNIT(S): at 13:17

## 2022-10-27 RX ADMIN — Medication 10 UNIT(S): at 17:53

## 2022-10-27 NOTE — CONSULT NOTE ADULT - CONSULT REASON
Hypoxemia/Pneumonia
8 cm abscess interposed between the liver and upper pole right   kidney.  ? IR FOR DRAINAGE
L1 burst fracture
renal lesion, incomplete bladder emptying
Trauma
ANTON
High Blood Sugars/DMT2
UTI

## 2022-10-27 NOTE — SWALLOW BEDSIDE ASSESSMENT ADULT - PHARYNGEAL PHASE
Delayed cough suggestive of laryngeal penetration vs aspiration/Delayed pharyngeal swallow/Decreased laryngeal elevation No overt s/s aspiration./Delayed pharyngeal swallow/Decreased laryngeal elevation

## 2022-10-27 NOTE — SWALLOW BEDSIDE ASSESSMENT ADULT - SLP PERTINENT HISTORY OF CURRENT PROBLEM
Trauma consult-->acute L1 fracture and necrotic mass on inferior lobe of liver. No acute surgical intervention. Recommend Medicine admission. Ortho consult-->Clinical presentation and physical exam are not consistent w/ acute cord compression/cauda equina. Moving all extremities with ease and pain appears localized to L spine. brace for comfort. Mobilize as able. Recommend MRI L spine to further characterize fx pattern. Endocrinology consult for High Blood Sugars/DMT2. Nephrology consult for ANTON.

## 2022-10-27 NOTE — SWALLOW BEDSIDE ASSESSMENT ADULT - SLP GENERAL OBSERVATIONS
Pt encountered upright in bed, on 2L NC, awake/alert, oriented to self and place only. [facility]  # [id number] utilized. Patient denies dysphagia. RN Elisa endorsed pt intermittently coughs with PO intake.

## 2022-10-27 NOTE — CONSULT NOTE ADULT - CONSULT REQUESTED DATE/TIME
19-Oct-2022 13:41
24-Oct-2022 10:56
19-Oct-2022 10:00
27-Oct-2022 11:46
25-Oct-2022 11:00
26-Oct-2022 07:39
20-Oct-2022 14:24
25-Oct-2022 13:06
Dahiana Ag(Resident)

## 2022-10-27 NOTE — SWALLOW BEDSIDE ASSESSMENT ADULT - ASR SWALLOW ASPIRATION MONITOR
Monitor for s/s aspiration/laryngeal penetration. If noted:  D/C p.o. intake, provide non-oral nutrition/hydration/meds, and contact this service @ [phone]/change of breathing pattern/cough/gurgly voice/fever/pneumonia/throat clearing/upper respiratory infection

## 2022-10-27 NOTE — PROGRESS NOTE ADULT - ASSESSMENT
81 male h/o htn, chol, dm, here s/p mechanical fall    mechanical fall  imaging noted  surg eval noted  MRI L spine noted with Lspine fx  ortho f/u noted  no acute intervention  pain control  TLSO brace  PT    DM  insulin as ordered  endo f/u  monitor fs    hypoxia  imaging c/w pna  pulm consult noted  supp o2  iv zosyn  check swallow eval  incentive spirometer  nebs    chanel  unclear etiology  possible 2/2 poor oral fluid intake  renal f/u  creat now nl    abnl UA  f/u cult  on zosyn  id f/u    MRI noted with collection btw liver and kidney  possible abscess  IR consulted for drainage  abx as per id    suspected renal lesion  gu eval noted   MRI noted with cysts. not suspicious for malignancy        cont other home meds         Advanced care planning was discussed with patient and family.  Advanced care planning forms were reviewed and discussed as appropriate.  Differential diagnosis and plan of care discussed with patient after the evaluation.   Pain assessed and judicious use of narcotics when appropriate was discussed.  Importance of Fall prevention discussed.  Counseling on Smoking and Alcohol cessation was offered when appropriate.  Counseling on Diet, exercise, and medication compliance was done.   Approx 30 minutes spent.

## 2022-10-27 NOTE — SWALLOW BEDSIDE ASSESSMENT ADULT - SWALLOW EVAL: PATIENT/FAMILY GOALS STATEMENT
D/W daughter (Yulissa) on the phone POC. She stated patient has one partial denture at home but does not wear them for eating. Patient has had progressively poor PO intake for >6 months with suspected weight loss as per daughter.

## 2022-10-27 NOTE — PROGRESS NOTE ADULT - ASSESSMENT
82M admitted post mechanical fall with T1 compression fx  CT chest with multilobar opacities c/w aspiration pneumonia  Hypoxemia due to former    REC    Continue Zosyn per ID  Abcess drainage pending  Taper nasal cannula as tolerated  Pain managment per primary team

## 2022-10-27 NOTE — PROGRESS NOTE ADULT - SUBJECTIVE AND OBJECTIVE BOX
KAITLIN CALDERON  82y Male  MRN:[id number]    Patient is a 82y old  Male who presents with a chief complaint of fall    HPI:   81M cantonese speaking PMHx of HTN/HLD, DM2, BPH, (?Afib per chart review, though not on AC's), presents to the ED with mid lower back pain that he sustained after falling in the bathroom while he was urinating at 9pm last night. Pt denies headstrike/LOC. Difficult to obtain full history even with  though pt answering questions appropriately and is AAOx2. Pt denies any pain anywhere else. Per EMS pt has had chronic cough. Unclear if pt was down long. pt last took insulin 2 days ago he states.   	Spoke with stepdaughter Yulissa who lives below pt: Pt couldn't get up from the fall yesterday, has difficulty ambulating with walker since the fall as he complaints of back pain, pt has 9hrs home help daily but not overnight. She states that pt is at baseline mental status and that he's had short term memory problems for some time. Denies any recent fevers/chills, abd pain, v/d, chest pain/sob. States that the cough pt has is chronic.  (19 Oct 2022 15:53)      Patient seen and evaluated at bedside. No acute events overnight except as noted.    Interval HPI: no acute events o/n     PAST MEDICAL & SURGICAL HISTORY:  DM (diabetes mellitus)      HTN (hypertension)      Hypercholesterolemia      CAD (coronary artery disease)      HTN (hypertension)      DM (diabetes mellitus)      S/P TURP      S/P gastrectomy          REVIEW OF SYSTEMS:  as per hpi     VITALS:   Vital Signs Last 24 Hrs  T(C): 36.3 (27 Oct 2022 12:17), Max: 36.9 (26 Oct 2022 16:30)  T(F): 97.4 (27 Oct 2022 12:17), Max: 98.4 (26 Oct 2022 16:30)  HR: 69 (27 Oct 2022 12:17) (69 - 100)  BP: 112/77 (27 Oct 2022 12:17) (101/73 - 120/78)  BP(mean): --  RR: 17 (27 Oct 2022 12:17) (17 - 18)  SpO2: 93% (27 Oct 2022 12:17) (93% - 96%)    Parameters below as of 27 Oct 2022 12:17  Patient On (Oxygen Delivery Method): room air          PHYSICAL EXAM:  GENERAL: NAD, well-developed,    HEAD:  Atraumatic, Normocephalic  EYES: EOMI, PERRLA, conjunctiva and sclera clear  NECK: Supple, No JVD  CHEST/LUNG: b/l ronchi  HEART: S1, S2; No murmurs, rubs, or gallops  ABDOMEN: Soft, Nontender, Nondistended; Bowel sounds present  EXTREMITIES:  2+ Peripheral Pulses, No clubbing, cyanosis, or edema  PSYCH: Normal affect  NEUROLOGY: AAOX1-2; non-focal  SKIN: No rashes or lesions    Consultant(s) Notes Reviewed:  [x ] YES  [ ] NO  Care Discussed with Consultants/Other Providers [ x] YES  [ ] NO    MEDS:   MEDICATIONS  (STANDING):  aspirin enteric coated 81 milliGRAM(s) Oral daily  dextrose 5%. 1000 milliLiter(s) (100 mL/Hr) IV Continuous <Continuous>  dextrose 5%. 1000 milliLiter(s) (50 mL/Hr) IV Continuous <Continuous>  dextrose 50% Injectable 25 Gram(s) IV Push once  dextrose 50% Injectable 12.5 Gram(s) IV Push once  dextrose 50% Injectable 25 Gram(s) IV Push once  finasteride 5 milliGRAM(s) Oral daily  glucagon  Injectable 1 milliGRAM(s) IntraMuscular once  influenza  Vaccine (HIGH DOSE) 0.7 milliLiter(s) IntraMuscular once  insulin glargine Injectable (LANTUS) 25 Unit(s) SubCutaneous at bedtime  insulin lispro (ADMELOG) corrective regimen sliding scale   SubCutaneous three times a day before meals  insulin lispro Injectable (ADMELOG) 10 Unit(s) SubCutaneous three times a day before meals  lidocaine   4% Patch 1 Patch Transdermal every 24 hours  metoprolol succinate ER 25 milliGRAM(s) Oral daily  pantoprazole    Tablet 40 milliGRAM(s) Oral before breakfast  piperacillin/tazobactam IVPB.. 3.375 Gram(s) IV Intermittent every 8 hours  polyethylene glycol 3350 17 Gram(s) Oral daily  senna 2 Tablet(s) Oral at bedtime  tamsulosin 0.8 milliGRAM(s) Oral at bedtime    MEDICATIONS  (PRN):  acetaminophen     Tablet .. 650 milliGRAM(s) Oral every 6 hours PRN mild pain  albuterol/ipratropium for Nebulization 3 milliLiter(s) Nebulizer every 6 hours PRN Shortness of Breath  dextrose Oral Gel 15 Gram(s) Oral once PRN Blood Glucose LESS THAN 70 milliGRAM(s)/deciliter  oxyCODONE    IR 5 milliGRAM(s) Oral every 6 hours PRN Moderate Pain (4 - 6)      ALLERGIES:  No Known Allergies      LABS:                                                     11.4   12.21 )-----------( 245      ( 27 Oct 2022 10:12 )             35.0   10-27    131<L>  |  97  |  26<H>  ----------------------------<  154<H>  3.5   |  23  |  1.06    Ca    9.0      27 Oct 2022 10:11    TPro  6.1  /  Alb  2.4<L>  /  TBili  0.6  /  DBili  0.2  /  AST  10  /  ALT  8<L>  /  AlkPhos  150<H>  10-27      < from: MR Abdomen w/wo IV Cont (10.26.22 @ 17:39) >    IMPRESSION:  *  An 8 cm abscess interposed between the liver and upper pole right   kidney.  *  Bilateral renal cyst. No lesion suspicious for neoplasm.      --- End of Report ---      < end of copied text >         cultures: Culture Results:   No growth (10-19 @ 08:59)       < from: MR Lumbar Spine No Cont (10.20.22 @ 17:28) >    IMPRESSION:  Evidence of acute fracture L1 with fracture line traversing   the vertebral body from anterior to posterior and findings consistent   with a 2 column injury. Paravertebral cuff of soft tissue consistent with   recent injury. Some mild extension of edema into the pedicles though the   transverse and spinous processes as well as the lamina are spared. No   other lesions are identified. Recommend interval follow-up to determine   benign versus pathologic fracture.    --- End of Report ---      < end of copied text >      < from: Xray Chest 1 View- PORTABLE-Urgent (Xray Chest 1 View- PORTABLE-Urgent .) (10.23.22 @ 15:36) >  IMPRESSION:  Bibasilar patchy opacities, compatible with subsegmental atelectasis.   Infectious etiology cannot be ruled out.    --- End of Report ---      < end of copied text >      < from: US Kidney and Bladder (10.24.22 @ 19:31) >  IMPRESSION:  Complex cystic collection superior to the right kidney, correlating to   heterogeneous collection exophytic from the right hepatic lobe.   Differential includes abscess versus hematoma.    Bilateral complex renal cysts.    < end of copied text >  < from: CT Chest No Cont (10.19.22 @ 09:59) >  IMPRESSION:  Limited noncontrast examination.    CHEST:  *  Secretions/debris within the bilateral mainstem bronchi with   multifocal sites of mucous plugging in distal airways.  *  Bilateral tree-in-bud nodularity is likely infectious/inflammatory.   Groundglass opacity within the left upper lobe, which may also be   infectious/inflammatory. Pulmonary nodule measuring 6 m in the right   upper lobe. Suggest 3 month follow-up chest CT to assess for stability or   resolution.  *  A few prominent nonspecific mediastinal lymph nodes.  *  Ascending thoracic aortic aneurysm measuring up to 5 cm.    ABDOMEN AND PELVIS:  *  Low density structure measuring 8.3 cm alongthe inferior margin of   the right hepatic lobe with surrounding inflammatory changes.   Differential includes an intrahepatic/perihepatic abscess, or possibly   hematoma in the setting of trauma. Neoplasm is thought to be less likely   but is not excluded. Contrast enhanced abdominal MRI may be helpful for   further characterization.  *  Mild compression fracture at L1, likely acute. Please refer to the   concurrent dedicated CT lumbar spine report.    Preliminary findings were discussed by Dr. Parks with Dr. Flores   on 10/19/2022 11:10 AM with read back confirmation. Final findings were   discussed with Dr. Pierce by Dr. Mcqueen on 10/19/2022 at 12:57 PM.    --- End of Report ---    ***Please see the addendum at the top of this report. It may contain   additional important information or changes.****      < end of copied text >

## 2022-10-27 NOTE — PROGRESS NOTE ADULT - ASSESSMENT
82 yo M with HTN/HLD, DM2, BPH, initially with low back pain  Leukocytosis, no fever  Initially with fall  Current complaints of constipation, possible urinary retention  CT chest with evidence aspiration; CT A/P with concern for abscess vs hematoma along liver margin  MR suspicious for abscess  Overall,  1) Abnormal finding on imaging/Abscess  - Collection adjacent to liver--hematoma vs abscess  - Zosyn 3.375g q 8  - IR eval to drain liver abscess--therapeutic and diagnostic  2) Leukocytosis  - Trending down  - Zosyn for now  - F/U BCXs  - Trend WBC to normal  3) PNA  - Possible PNA based on CT, aspiration, small airway disease  - Would be cross treated by Tariq Arroyo MD  Contact on TEAMS messaging from 9am - 5pm  From 5pm-9am, on weekends, or if no response call [phone]

## 2022-10-27 NOTE — SWALLOW BEDSIDE ASSESSMENT ADULT - COMMENTS
Infectious disease consult for UTI-->Current complaints of constipation, possible urinary retention. CT chest with evidence aspiration; CT A/P with concern for abscess vs hematoma along liver margin  Uncertain if abscess vs hematoma. Leukocytosis-Slightly elevated, trending upwards; note concern for possible urinary retention/bladder pain; UA+. Possible PNA based on CT, aspiration, small airway disease. Would be cross treated by Zosyn. Pulmonology following Hypoxemia/Pneumonia-->SOTO opacities with mucoid impaction, non resolving leukocytosis. Picture c/w multilobar pneumonia - suspect aspiration. No wheezing on exam. MIld dementia. Recommend resp viral panel, continue Zosyn for presumed aspiration PNA, swallow evaluation.  Urology consult for renal lesion, incomplete bladder emptying-->Differential includes benign renal cyst, complex renal cyst, malignant renal cyst  -Please order MRI abdomen with and without IV contrast renal mass protocol to better characterize renal cystic lesion.  IR consult for 8 cm abscess interposed between the liver and upper pole right kidney. ? IR FOR DRAINAGE--> Concern for hematoma vs abscess. NPO on 10/31 at 11pm    10/19 CT head: No acute transcortical infarct or intracranial hemorrhage. White matter small vessel disease.  CT cervical spine: No acute fracture or dislocation.  10/19 CT CHEST Secretions/debris within the bilateral mainstem bronchi with multifocal sites of mucous plugging in distal airways. Bilateral tree-in-bud nodularity is likely infectious/inflammatory. Groundglass opacity within the left upper lobe, which may also be infectious/inflammatory. Pulmonary nodule measuring 6 m in the right upper lobe.  10/23 CXR IMPRESSION: Bibasilar patchy opacities, compatible with subsegmental atelectasis. Infectious etiology cannot be ruled out.    SWALLOW HISTORY: No reports in SCM or in PACS prior to this admission.   Patient seen at [facility] 3/2020 for bedside swallow evaluation with recommendations for mechanical soft/thin liquids.

## 2022-10-27 NOTE — CONSULT NOTE ADULT - SUBJECTIVE AND OBJECTIVE BOX
Interventional Radiology    Evaluate for Procedure:     HPI: 82y Male with     Allergies:   Medications (Abx/Cardiac/Anticoagulation/Blood Products)    aspirin enteric coated: 81 milliGRAM(s) Oral (10-27 @ 11:20)  metoprolol succinate ER: 25 milliGRAM(s) Oral (10-27 @ 05:46)  piperacillin/tazobactam IVPB..: 25 mL/Hr IV Intermittent (10-27 @ 05:46)  tamsulosin: 0.8 milliGRAM(s) Oral (10-26 @ 22:03)    Data:    T(C): 36.4  HR: 98  BP: 113/76  RR: 18  SpO2: 94%    -WBC 12.21 / HgB 11.4 / Hct 35.0 / Plt 245  -Na 131 / Cl 97 / BUN 26 / Glucose 154  -K 3.5 / CO2 23 / Cr 1.06  -ALT 8 / Alk Phos 150 / T.Bili 0.6  -INR 1.12 / PTT 31.6    Radiology:     < from: MR Abdomen w/wo IV Cont (10.26.22 @ 17:39) >  IDNEYS/URETERS: No hydronephrosis. Bilateral renal cysts, including   hemorrhagic cysts.    There is an 8 x 6 cm thick-walled fluid collection interposed between the   upper pole right kidney and liver with surrounding infiltration. The   collection demonstrates diffusion restriction and rim enhancement,   compatible with abscess. There is diminished T2 signal, although no T1   hyperintense signal to suggest hemorrhage. This is likely arising from   the upper pole of the right kidney, since a 3 cm exophytic cyst of the   right kidney was previously seen in this location on older exams.      BONES: L1 vertebral body fracture is redemonstrated.  IMPRESSION:  *  An 8 cm abscess interposed between the liver and upper pole right   kidney.  *  Bilateral renal cyst. No lesion suspicious for neoplasm.              Assessment/Plan:   82M w/ HTN, DM2, CAD, and BPH-TURP, 10/19/22 p/w L1 vertebral fx s/p mechanical fall; c/b ANTON.   IR consulted on 10/27 for drainage of 8 cm abscess interposed between the liver and upper pole R kidney. MR showing diffusion restriction. Concern for hematoma vs abscess .  Pt remains with Leukocytosis,  afebrile.        CONSULT IN PROGRESS******        - case reviewed and approved for ____  - please place IR procedure order under ______  - STAT labs in AM (cbc,coags, bmp, T&S)  - hold AC x___hrs  - NPO on ____ at 11pm  - COVID PCR results within 5 days of planned procedure  - d/w primary team Interventional Radiology    Evaluate for Procedure:   82M w/ HTN, DM2, CAD, and BPH-TURP, 10/19/22 p/w L1 vertebral fx s/p mechanical fall; c/b ANTON.   IR consulted on 10/27 for drainage of 8 cm abscess interposed between the liver and upper pole R kidney. MR showing diffusion restriction. Concern for hematoma vs abscess .  Pt remains with Leukocytosis,  afebrile.   HPI: 82y Male with     Allergies:   Medications (Abx/Cardiac/Anticoagulation/Blood Products)    aspirin enteric coated: 81 milliGRAM(s) Oral (10-27 @ 11:20)  metoprolol succinate ER: 25 milliGRAM(s) Oral (10-27 @ 05:46)  piperacillin/tazobactam IVPB..: 25 mL/Hr IV Intermittent (10-27 @ 05:46)  tamsulosin: 0.8 milliGRAM(s) Oral (10-26 @ 22:03)    Data:    T(C): 36.4  HR: 98  BP: 113/76  RR: 18  SpO2: 94%    -WBC 12.21 / HgB 11.4 / Hct 35.0 / Plt 245  -Na 131 / Cl 97 / BUN 26 / Glucose 154  -K 3.5 / CO2 23 / Cr 1.06  -ALT 8 / Alk Phos 150 / T.Bili 0.6  -INR 1.12 / PTT 31.6    Radiology:     < from: MR Abdomen w/wo IV Cont (10.26.22 @ 17:39) >  IDNEYS/URETERS: No hydronephrosis. Bilateral renal cysts, including   hemorrhagic cysts.    There is an 8 x 6 cm thick-walled fluid collection interposed between the   upper pole right kidney and liver with surrounding infiltration. The   collection demonstrates diffusion restriction and rim enhancement,   compatible with abscess. There is diminished T2 signal, although no T1   hyperintense signal to suggest hemorrhage. This is likely arising from   the upper pole of the right kidney, since a 3 cm exophytic cyst of the   right kidney was previously seen in this location on older exams.      BONES: L1 vertebral body fracture is redemonstrated.  IMPRESSION:  *  An 8 cm abscess interposed between the liver and upper pole right   kidney.  *  Bilateral renal cyst. No lesion suspicious for neoplasm.              Assessment/Plan:   82M w/ HTN, DM2, CAD, and BPH-TURP, 10/19/22 p/w L1 vertebral fx s/p mechanical fall; c/b ANTON.   IR consulted on 10/27 for drainage of 8 cm abscess interposed between the liver and upper pole R kidney. MR showing diffusion restriction. Concern for hematoma vs abscess .  Pt remains with Leukocytosis,  afebrile.     - 10/26 MR shows 8 cm abdominal  abscess interposed between the liver and upper pole R kidney.  - case reviewed and approved for Tuesday Nov 1st for CT GUIDED RUQ   abscess drainage with Anesthesia  - please place IR procedure order under Dr. Del Castillo  - STAT labs in AM (cbc,coags, bmp, T&S) on 11/1  - pt on ASA 81mg.  please hold for 5 days pre-procedure.   - NPO on 10/31 at 11pm  - COVID PCR results within 5 days of planned procedure  - d/w primary team, ACP C. Burkey Sadaf Jalili, IR PARHETT, available on TEAMS or IR callback [phone]

## 2022-10-27 NOTE — PROGRESS NOTE ADULT - ASSESSMENT
Assessment  DMT2: 82y Male with DM T2 with hyperglycemia, was on insulin at home, now on basal bolus insulin, blood sugars improved and trending within overall acceptable range now, no hypoglycemic episodes, eating meals, NAD, DC plan is for rehab, no acute events.  S/P Fall: workup in progress, stable, monitored.  HTN: Un Controlled,  on antihypertensive medications.            Melba Reyes MD  Cell: [phone]  Office: [phone]               Assessment  DMT2: 82y Male with DM T2 with hyperglycemia, was on insulin at home, now on basal bolus insulin, blood sugars improved and trending within overall acceptable  range now, no hypoglycemic episodes, eating meals, NAD, DC plan is for rehab, no acute events.  S/P Fall: workup in progress, stable, monitored.  HTN: Un Controlled,  on antihypertensive medications.            Melba Reyes MD  Cell: [phone]  Office: [phone]

## 2022-10-27 NOTE — PROGRESS NOTE ADULT - SUBJECTIVE AND OBJECTIVE BOX
Follow-up Pulm Progress Note  Edmund Quiroga MD  [phone]    Afebrile on Zosyn  MRI abdomend noted: 8cm abcess      Vital Signs Last 24 Hrs  T(C): 36.4 (27 Oct 2022 05:54), Max: 36.9 (26 Oct 2022 16:30)  T(F): 97.5 (27 Oct 2022 05:54), Max: 98.4 (26 Oct 2022 16:30)  HR: 98 (27 Oct 2022 05:54) (87 - 100)  BP: 113/76 (27 Oct 2022 05:54) (101/73 - 120/78)  BP(mean): --  RR: 18 (27 Oct 2022 05:54) (18 - 18)  SpO2: 94% (27 Oct 2022 05:54) (93% - 96%)    Parameters below as of 27 Oct 2022 05:54  Patient On (Oxygen Delivery Method): nasal cannula  O2 Flow (L/min): 3                          11.4   12.21 )-----------( 245      ( 27 Oct 2022 10:12 )             35.0     10-27    131<L>  |  97  |  26<H>  ----------------------------<  154<H>  3.5   |  23  |  1.06    Ca    9.0      27 Oct 2022 10:11    TPro  6.1  /  Alb  2.4<L>  /  TBili  0.6  /  DBili  0.2  /  AST  10  /  ALT  8<L>  /  AlkPhos  150<H>  10-27    Physical Examination:  PULM: Few basilar rhonchi  CVS: Regular rate and rhythm, no murmurs, rubs, or gallops  ABD: Soft, non-tender  EXT:  No clubbing, cyanosis, or edema    RADIOLOGY REVIEWED  CXR:    CT chest:    TTE:

## 2022-10-27 NOTE — PROGRESS NOTE ADULT - SUBJECTIVE AND OBJECTIVE BOX
Chief complaint  Patient is a 82y old  Male who presents with a chief complaint of Fall (27 Oct 2022 11:51)   Review of systems  Patient in bed, looks comfortable, no hypoglycemic episodes.    Labs and Fingersticks  CAPILLARY BLOOD GLUCOSE      POCT Blood Glucose.: 132 mg/dL (27 Oct 2022 08:42)  POCT Blood Glucose.: 130 mg/dL (26 Oct 2022 21:51)      Anion Gap, Serum: 11 (10-27 @ 10:11)      Calcium, Total Serum: 9.0 (10-27 @ 10:11)  Calcium, Total Serum: 9.2 (10-26 @ 07:31)  Albumin, Serum: 2.4 *L* (10-27 @ 10:11)    Alanine Aminotransferase (ALT/SGPT): 8 *L* (10-27 @ 10:11)  Alkaline Phosphatase, Serum: 150 *H* (10-27 @ 10:11)  Aspartate Aminotransferase (AST/SGOT): 10 (10-27 @ 10:11)        10-27    131<L>  |  97  |  26<H>  ----------------------------<  154<H>  3.5   |  23  |  1.06    Ca    9.0      27 Oct 2022 10:11    TPro  6.1  /  Alb  2.4<L>  /  TBili  0.6  /  DBili  0.2  /  AST  10  /  ALT  8<L>  /  AlkPhos  150<H>  10-27                        11.4   12.21 )-----------( 245      ( 27 Oct 2022 10:12 )             35.0     Medications  MEDICATIONS  (STANDING):  dextrose 5%. 1000 milliLiter(s) (100 mL/Hr) IV Continuous <Continuous>  dextrose 5%. 1000 milliLiter(s) (50 mL/Hr) IV Continuous <Continuous>  dextrose 50% Injectable 25 Gram(s) IV Push once  dextrose 50% Injectable 12.5 Gram(s) IV Push once  dextrose 50% Injectable 25 Gram(s) IV Push once  finasteride 5 milliGRAM(s) Oral daily  glucagon  Injectable 1 milliGRAM(s) IntraMuscular once  influenza  Vaccine (HIGH DOSE) 0.7 milliLiter(s) IntraMuscular once  insulin glargine Injectable (LANTUS) 25 Unit(s) SubCutaneous at bedtime  insulin lispro (ADMELOG) corrective regimen sliding scale   SubCutaneous three times a day before meals  insulin lispro Injectable (ADMELOG) 10 Unit(s) SubCutaneous three times a day before meals  lidocaine   4% Patch 1 Patch Transdermal every 24 hours  metoprolol succinate ER 25 milliGRAM(s) Oral daily  pantoprazole    Tablet 40 milliGRAM(s) Oral before breakfast  piperacillin/tazobactam IVPB.. 3.375 Gram(s) IV Intermittent every 8 hours  polyethylene glycol 3350 17 Gram(s) Oral daily  senna 2 Tablet(s) Oral at bedtime  tamsulosin 0.8 milliGRAM(s) Oral at bedtime      Physical Exam  General: Patient comfortable in bed  Vital Signs Last 12 Hrs  T(F): 97.4 (10-27-22 @ 12:17), Max: 97.5 (10-27-22 @ 05:54)  HR: 69 (10-27-22 @ 12:17) (69 - 98)  BP: 112/77 (10-27-22 @ 12:17) (112/77 - 113/76)  BP(mean): --  RR: 17 (10-27-22 @ 12:17) (17 - 18)  SpO2: 93% (10-27-22 @ 12:17) (93% - 94%)  Neck: No palpable thyroid nodules.  CVS: S1S2, No murmurs  Respiratory: No wheezing, no crepitations  GI: Abdomen soft, bowel sounds positive  Musculoskeletal:  edema lower extremities.   Skin: No skin rashes, no ecchymosis    Diagnostics    Free Thyroxine, Serum: AM Sched. Collection: 21-Oct-2022 06:00 (10-20 @ 13:18)  Thyroid Stimulating Hormone, Serum: AM Sched. Collection: 21-Oct-2022 06:00 (10-20 @ 13:18)  A1C with Estimated Average Glucose: Routine (10-20 @ 13:17)           Chief complaint  Patient is a 82y old  Male who presents with a chief complaint of Fall (27 Oct 2022 11:51)   Review of systems  Patient in bed, looks comfortable, no hypoglycemic episodes.    Labs and Fingersticks  CAPILLARY BLOOD GLUCOSE      POCT Blood Glucose.: 132 mg/dL (27 Oct 2022 08:42)  POCT Blood Glucose.: 130 mg/dL (26 Oct 2022 21:51)      Anion Gap, Serum: 11 (10-27 @ 10:11)      Calcium, Total Serum: 9.0 (10-27 @ 10:11)  Calcium, Total Serum: 9.2 (10-26 @ 07:31)  Albumin, Serum: 2.4 *L* (10-27 @ 10:11)    Alanine Aminotransferase (ALT/SGPT): 8 *L* (10-27 @ 10:11)  Alkaline Phosphatase, Serum: 150 *H* (10-27 @ 10:11)  Aspartate Aminotransferase (AST/SGOT): 10 (10-27 @ 10:11)        10-27    131<L>  |  97  |  26<H>  ----------------------------<  154<H>  3.5   |  23  |  1.06    Ca    9.0      27 Oct 2022 10:11    TPro  6.1  /  Alb  2.4<L>  /  TBili  0.6  /  DBili  0.2  /  AST  10  /  ALT  8<L>  /  AlkPhos  150<H>  10-27                        11.4   12.21 )-----------( 245      ( 27 Oct 2022 10:12 )             35.0     Medications  MEDICATIONS  (STANDING):  dextrose 5%. 1000 milliLiter(s) (100 mL/Hr) IV Continuous <Continuous>  dextrose 5%. 1000 milliLiter(s) (50 mL/Hr) IV Continuous <Continuous>  dextrose 50% Injectable 25 Gram(s) IV Push once  dextrose 50% Injectable 12.5 Gram(s) IV Push once  dextrose 50% Injectable 25 Gram(s) IV Push once  finasteride 5 milliGRAM(s) Oral daily  glucagon  Injectable 1 milliGRAM(s) IntraMuscular once  influenza  Vaccine (HIGH DOSE) 0.7 milliLiter(s) IntraMuscular once  insulin glargine Injectable (LANTUS) 25 Unit(s) SubCutaneous at bedtime  insulin lispro (ADMELOG) corrective regimen sliding scale   SubCutaneous three times a day before meals  insulin lispro Injectable (ADMELOG) 10 Unit(s) SubCutaneous three times a day before meals  lidocaine   4% Patch 1 Patch Transdermal every 24 hours  metoprolol succinate ER 25 milliGRAM(s) Oral daily  pantoprazole    Tablet 40 milliGRAM(s) Oral before breakfast  piperacillin/tazobactam IVPB.. 3.375 Gram(s) IV Intermittent every 8 hours  polyethylene glycol 3350 17 Gram(s) Oral daily  senna 2 Tablet(s) Oral at bedtime  tamsulosin 0.8 milliGRAM(s) Oral at bedtime      Physical Exam  General: Patient comfortable in bed  Vital Signs Last 12 Hrs  T(F): 97.4 (10-27-22 @ 12:17), Max: 97.5 (10-27-22 @ 05:54)  HR: 69 (10-27-22 @ 12:17) (69 - 98)  BP: 112/77 (10-27-22 @ 12:17) (112/77 - 113/76)  BP(mean): --  RR: 17 (10-27-22 @ 12:17) (17 - 18)  SpO2: 93% (10-27-22 @ 12:17) (93% - 94%)  Neck: No palpable thyroid nodules.  CVS: S1S2, No murmurs  Respiratory: No wheezing, no crepitations  GI: Abdomen soft, bowel sounds positive  Musculoskeletal:  edema lower extremities.   Skin: No skin rashes, no ecchymosis    Diagnostics    Free Thyroxine, Serum: AM Sched. Collection: 21-Oct-2022 06:00 (10-20 @ 13:18)  Thyroid Stimulating Hormone, Serum: AM Sched. Collection: 21-Oct-2022 06:00 (10-20 @ 13:18)  A1C with Estimated Average Glucose: Routine (10-20 @ 13:17)

## 2022-10-27 NOTE — SWALLOW BEDSIDE ASSESSMENT ADULT - SWALLOW EVAL: DIAGNOSIS
Pt is an 80 y/o male admitted for fall found to have acute L1 fracture, UTI, PNA, and  with concern for abscess vs hematoma along liver margins. Pt p/w oropharyngeal dysphagia remarkable for prolonged and inefficient mastication of solids in anterior portion of oral cavity, delayed Ap transport, suspected delayed pharyngeal trigger, and reduced hyolaryngeal elevation upon palpation. Single episode of delayed cough observed s/p solids, no further overt s/s aspiration observed. Given RN reports of intermittent coughing with PO intake, suspicion for PNA due to aspiration, and swallow presentation, instrumental assessment in warranted to objectively assess swallow function.

## 2022-10-27 NOTE — CONSULT NOTE ADULT - PROVIDER SPECIALTY LIST ADULT
Pulmonology
Nephrology
Urology
Orthopedics
Intervent Radiology
Endocrinology
Trauma Surgery
Infectious Disease

## 2022-10-27 NOTE — PROGRESS NOTE ADULT - SUBJECTIVE AND OBJECTIVE BOX
Overnight events noted      VITAL:  T(C): , Max: 36.9 (10-26-22 @ 16:30)  T(F): , Max: 98.4 (10-26-22 @ 16:30)  HR: 98 (10-27-22 @ 05:54)  BP: 113/76 (10-27-22 @ 05:54)  BP(mean): --  RR: 18 (10-27-22 @ 05:54)  SpO2: 94% (10-27-22 @ 05:54)      PHYSICAL EXAM:  Constitutional: awake,; NAD  HEENT: NCAT, MMM  Neck: Supple, No JVD  Respiratory: CTA-b/l  Cardiovascular: tachy s1s2  Gastrointestinal: BS+, soft, NT/ND  Extremities: No peripheral edema b/l  Neurological: no focal deficits; strength grossly intact  Back: no CVAT b/l  Skin: No rashes, no nevi      LABS:                        11.2   10.76 )-----------( 179      ( 26 Oct 2022 07:31 )             33.8     Na(133)/K(3.6)/Cl(98)/HCO3(20)/BUN(40)/Cr(1.49)Glu(131)/Ca(9.2)/Mg(--)/PO4(--)    10-26 @ 07:31  Na(133)/K(4.2)/Cl(96)/HCO3(22)/BUN(35)/Cr(1.34)Glu(172)/Ca(9.2)/Mg(--)/PO4(--)    10-25 @ 07:20    Urinalysis Basic - ( 25 Oct 2022 17:02 )  Color: Yellow / Appearance: Slightly Turbid / S.023 / pH: x  Gluc: x / Ketone: Negative  / Bili: Negative / Urobili: Negative   Blood: x / Protein: 30 mg/dL / Nitrite: Negative   Leuk Esterase: Small / RBC: 21 /hpf / WBC 5 /HPF   Sq Epi: x / Non Sq Epi: 1 /hpf / Bacteria: Negative      IMAGING:  < from: US Kidney and Bladder (10.24.22 @ 19:31) >  Complex cystic collection superior to the right kidney, correlating to   heterogeneous collection exophytic from the right hepatic lobe.   Differential includes abscess versus hematoma.  Bilateral complex renal cysts.    PRESSION: 82M w/ HTN, DM2, CAD, and BPH-TURP, 10/19/22 p/w L1 vertebral fx s/p mechanical fall; c/b ANTON    (1)Renal - ANTON - unclear etiology, creatinine slightly higher, renal US results noted  (2)Hypoxia - unclear etiology, pulm eval noted   (3)ID - perihepatic collection/?PNA - ID on board - on IV Zosyn; s/p MRI yesterday, results pending  (4) - complex renal cysts - s/p MRI yesterday, results pending    RECOMMEND:  (1)Continue abx for GFR 40-50ml/min  (2)F/U MRI results  (3)Trend BMP daily q1-2 days for now (no labs today; we can wait until tomorrow for next lab set)            Maco Bhatia MD  [facility]  Office: [phone]  Cell: [phone]       no complaints      VITAL:  T(C): , Max: 36.9 (10-26-22 @ 16:30)  T(F): , Max: 98.4 (10-26-22 @ 16:30)  HR: 98 (10-27-22 @ 05:54)  BP: 113/76 (10-27-22 @ 05:54)  BP(mean): --  RR: 18 (10-27-22 @ 05:54)  SpO2: 94% (10-27-22 @ 05:54)      PHYSICAL EXAM:  Constitutional: awake,; NAD  HEENT: NCAT, MMM  Neck: Supple, No JVD  Respiratory: CTA-b/l  Cardiovascular: tachy s1s2  Gastrointestinal: BS+, soft, NT/ND  Extremities: No peripheral edema b/l  Neurological: no focal deficits; strength grossly intact  Back: no CVAT b/l  Skin: No rashes, no nevi      LABS:                        11.2   10.76 )-----------( 179      ( 26 Oct 2022 07:31 )             33.8     Na(133)/K(3.6)/Cl(98)/HCO3(20)/BUN(40)/Cr(1.49)Glu(131)/Ca(9.2)/Mg(--)/PO4(--)    10-26 @ 07:31  Na(133)/K(4.2)/Cl(96)/HCO3(22)/BUN(35)/Cr(1.34)Glu(172)/Ca(9.2)/Mg(--)/PO4(--)    10-25 @ 07:20    Urinalysis Basic - ( 25 Oct 2022 17:02 )  Color: Yellow / Appearance: Slightly Turbid / S.023 / pH: x  Gluc: x / Ketone: Negative  / Bili: Negative / Urobili: Negative   Blood: x / Protein: 30 mg/dL / Nitrite: Negative   Leuk Esterase: Small / RBC: 21 /hpf / WBC 5 /HPF   Sq Epi: x / Non Sq Epi: 1 /hpf / Bacteria: Negative      IMAGING:  < from: US Kidney and Bladder (10.24.22 @ 19:31) >  Complex cystic collection superior to the right kidney, correlating to   heterogeneous collection exophytic from the right hepatic lobe.   Differential includes abscess versus hematoma.  Bilateral complex renal cysts.    PRESSION: 82M w/ HTN, DM2, CAD, and BPH-TURP, 10/19/22 p/w L1 vertebral fx s/p mechanical fall; c/b ANTON    (1)Renal - ANTON - unclear etiology, creatinine slightly higher, renal US results noted  (2)Hypoxia - unclear etiology, pulm eval noted   (3)ID - perihepatic collection/?PNA - ID on board - on IV Zosyn; s/p MRI yesterday, results pending  (4) - complex renal cysts - s/p MRI yesterday, results pending    RECOMMEND:  (1)Continue abx for GFR 40-50ml/min  (2)F/U MRI results  (3)Trend BMP daily q1-2 days for now (no labs today; we can wait until tomorrow for next lab set)            Maco Bhatia MD  [facility]  Office: [phone]  Cell: [phone]

## 2022-10-27 NOTE — SWALLOW BEDSIDE ASSESSMENT ADULT - SPECIFY REASON(S)
To assess swallow function and rule out dysphagia. As per consult "poor PO intake ?aspiration" Passed dysphagia screen 10/26.

## 2022-10-27 NOTE — PROGRESS NOTE ADULT - SUBJECTIVE AND OBJECTIVE BOX
CC: F/U for Abscess    Saw/spoke to patient. No fevers, no chills. No new complaints.    Allergies  No Known Allergies    ANTIMICROBIALS:  piperacillin/tazobactam IVPB.. 3.375 every 8 hours    PE:    Vital Signs Last 24 Hrs  T(C): 36.3 (27 Oct 2022 12:17), Max: 36.4 (26 Oct 2022 21:36)  T(F): 97.4 (27 Oct 2022 12:17), Max: 97.5 (26 Oct 2022 21:36)  HR: 69 (27 Oct 2022 12:17) (69 - 100)  BP: 112/77 (27 Oct 2022 12:17) (112/77 - 120/78)  RR: 17 (27 Oct 2022 12:17) (17 - 18)  SpO2: 93% (27 Oct 2022 12:17) (93% - 94%)    Gen: AOx3, NAD, non-toxic  CV: Nontachycardic  Resp: Breathing comfortably, RA  Abd: Soft, nontender  IV/Skin: No thrombophlebitis    LABS:                        11.4   12.21 )-----------( 245      ( 27 Oct 2022 10:12 )             35.0     10-27    131<L>  |  97  |  26<H>  ----------------------------<  154<H>  3.5   |  23  |  1.06    Ca    9.0      27 Oct 2022 10:11    TPro  6.1  /  Alb  2.4<L>  /  TBili  0.6  /  DBili  0.2  /  AST  10  /  ALT  8<L>  /  AlkPhos  150<H>  10-27    MICROBIOLOGY:    .Blood Blood-Venous  10-25-22   No growth to date.  --  --    .Blood Blood  10-25-22   No growth to date.  --  --    Clean Catch Clean Catch (Midstream)  10-19-22   No growth  --  --    Rapid RVP Result: NotDetec (10-25 @ 14:33)    (otherwise reviewed)    RADIOLOGY:    10/26    IMPRESSION:  *  An 8 cm abscess interposed between the liver and upper pole right   kidney.  *  Bilateral renal cyst. No lesion suspicious for neoplasm.

## 2022-10-28 LAB
ANION GAP SERPL CALC-SCNC: 14 MMOL/L — SIGNIFICANT CHANGE UP (ref 5–17)
BUN SERPL-MCNC: 20 MG/DL — SIGNIFICANT CHANGE UP (ref 7–23)
CALCIUM SERPL-MCNC: 9.3 MG/DL — SIGNIFICANT CHANGE UP (ref 8.4–10.5)
CHLORIDE SERPL-SCNC: 99 MMOL/L — SIGNIFICANT CHANGE UP (ref 96–108)
CO2 SERPL-SCNC: 23 MMOL/L — SIGNIFICANT CHANGE UP (ref 22–31)
CREAT SERPL-MCNC: 1.18 MG/DL — SIGNIFICANT CHANGE UP (ref 0.5–1.3)
EGFR: 61 ML/MIN/1.73M2 — SIGNIFICANT CHANGE UP
GLUCOSE BLDC GLUCOMTR-MCNC: 158 MG/DL — HIGH (ref 70–99)
GLUCOSE BLDC GLUCOMTR-MCNC: 177 MG/DL — HIGH (ref 70–99)
GLUCOSE BLDC GLUCOMTR-MCNC: 189 MG/DL — HIGH (ref 70–99)
GLUCOSE BLDC GLUCOMTR-MCNC: 248 MG/DL — HIGH (ref 70–99)
GLUCOSE BLDC GLUCOMTR-MCNC: 260 MG/DL — HIGH (ref 70–99)
GLUCOSE SERPL-MCNC: 121 MG/DL — HIGH (ref 70–99)
POTASSIUM SERPL-MCNC: 5.1 MMOL/L — SIGNIFICANT CHANGE UP (ref 3.5–5.3)
POTASSIUM SERPL-SCNC: 5.1 MMOL/L — SIGNIFICANT CHANGE UP (ref 3.5–5.3)
SODIUM SERPL-SCNC: 136 MMOL/L — SIGNIFICANT CHANGE UP (ref 135–145)

## 2022-10-28 PROCEDURE — 99232 SBSQ HOSP IP/OBS MODERATE 35: CPT

## 2022-10-28 PROCEDURE — 74230 X-RAY XM SWLNG FUNCJ C+: CPT | Mod: 26

## 2022-10-28 RX ADMIN — Medication 1: at 09:06

## 2022-10-28 RX ADMIN — Medication 1: at 13:10

## 2022-10-28 RX ADMIN — LIDOCAINE 1 PATCH: 4 CREAM TOPICAL at 05:00

## 2022-10-28 RX ADMIN — LIDOCAINE 1 PATCH: 4 CREAM TOPICAL at 13:03

## 2022-10-28 RX ADMIN — INSULIN GLARGINE 25 UNIT(S): 100 INJECTION, SOLUTION SUBCUTANEOUS at 21:04

## 2022-10-28 RX ADMIN — FINASTERIDE 5 MILLIGRAM(S): 5 TABLET, FILM COATED ORAL at 11:08

## 2022-10-28 RX ADMIN — Medication 10 UNIT(S): at 18:07

## 2022-10-28 RX ADMIN — PIPERACILLIN AND TAZOBACTAM 25 GRAM(S): 4; .5 INJECTION, POWDER, LYOPHILIZED, FOR SOLUTION INTRAVENOUS at 13:00

## 2022-10-28 RX ADMIN — Medication 3: at 18:05

## 2022-10-28 RX ADMIN — Medication 10 UNIT(S): at 09:06

## 2022-10-28 RX ADMIN — TAMSULOSIN HYDROCHLORIDE 0.8 MILLIGRAM(S): 0.4 CAPSULE ORAL at 21:04

## 2022-10-28 RX ADMIN — PANTOPRAZOLE SODIUM 40 MILLIGRAM(S): 20 TABLET, DELAYED RELEASE ORAL at 06:01

## 2022-10-28 RX ADMIN — PIPERACILLIN AND TAZOBACTAM 25 GRAM(S): 4; .5 INJECTION, POWDER, LYOPHILIZED, FOR SOLUTION INTRAVENOUS at 05:59

## 2022-10-28 RX ADMIN — POLYETHYLENE GLYCOL 3350 17 GRAM(S): 17 POWDER, FOR SOLUTION ORAL at 11:08

## 2022-10-28 RX ADMIN — Medication 25 MILLIGRAM(S): at 05:59

## 2022-10-28 RX ADMIN — PIPERACILLIN AND TAZOBACTAM 25 GRAM(S): 4; .5 INJECTION, POWDER, LYOPHILIZED, FOR SOLUTION INTRAVENOUS at 21:03

## 2022-10-28 NOTE — SWALLOW VFSS/MBS ASSESSMENT ADULT - THE ABOVE FINDINGS WERE DISCUSSED WITH
MICHELLE Atkinson, RN Elisa, patient MICHELLE Atkinson, RN Elisa, patient's daughter (Yulissa), patient

## 2022-10-28 NOTE — PROGRESS NOTE ADULT - ASSESSMENT
81 male h/o htn, chol, dm, here s/p mechanical fall    mechanical fall  imaging noted  surg eval noted  MRI L spine noted with Lspine fx  ortho f/u noted  no acute intervention  pain control  TLSO brace  PT    DM  insulin as ordered  endo f/u  monitor fs    hypoxia  imaging c/w pna  pulm consult noted  supp o2  iv zosyn  check swallow eval  incentive spirometer  nebs    chanel  unclear etiology  possible 2/2 poor oral fluid intake  renal f/u  creat now nl    abnl UA  f/u cult  on zosyn  id f/u    MRI noted with collection btw liver and kidney  possible abscess  IR consulted for drainage - to be done next week. hold ASA  abx as per id    suspected renal lesion  gu eval noted   MRI noted with cysts. not suspicious for malignancy        cont other home meds         Advanced care planning was discussed with patient and family.  Advanced care planning forms were reviewed and discussed as appropriate.  Differential diagnosis and plan of care discussed with patient after the evaluation.   Pain assessed and judicious use of narcotics when appropriate was discussed.  Importance of Fall prevention discussed.  Counseling on Smoking and Alcohol cessation was offered when appropriate.  Counseling on Diet, exercise, and medication compliance was done.   Approx 30 minutes spent.

## 2022-10-28 NOTE — SWALLOW VFSS/MBS ASSESSMENT ADULT - ROSENBEK'S PENETRATION ASPIRATION SCALE
(3) contrast remains above the vocal cords, visible residue remains (penetration) (2) TSP/CUP (5) Straw sips Single episode (6) further trials (1) (1) tsp/(3) contrast remains above the vocal cords, visible residue remains (penetration)

## 2022-10-28 NOTE — PROGRESS NOTE ADULT - ASSESSMENT
Assessment  DMT2: 82y Male with DM T2 with hyperglycemia, was on insulin at home, now on basal bolus insulin, blood sugars improved and trending within overall acceptable range now, no hypoglycemic episodes. Patient eating meals, NAD, has renal cyst/fluid collection, planning IR drainage 11/1.  S/P Fall: workup in progress, stable, monitored.  HTN: Un Controlled,  on antihypertensive medications.            Melba Reyes MD  Cell: [phone]  Office: [phone]               Assessment  DMT2: 82y Male with DM T2 with hyperglycemia, was on insulin at home, now on basal bolus insulin, blood sugars improved and trending within overall acceptable range now, no hypoglycemic  episodes. Patient eating meals, NAD, has renal cyst/fluid collection, planning IR drainage 11/1.  S/P Fall: workup in progress, stable, monitored.  HTN: Un Controlled,  on antihypertensive medications.            Melba Reyes MD  Cell: [phone]  Office: [phone]

## 2022-10-28 NOTE — PROGRESS NOTE ADULT - SUBJECTIVE AND OBJECTIVE BOX
Overnight events noted      VITAL:  T(C): , Max: 36.7 (10-28-22 @ 05:01)  T(F): , Max: 98 (10-28-22 @ 05:01)  HR: 96 (10-28-22 @ 13:37)  BP: 128/80 (10-28-22 @ 13:37)  BP(mean): --  RR: 18 (10-28-22 @ 13:37)  SpO2: 96% (10-28-22 @ 13:37)      PHYSICAL EXAM:  Constitutional: awake,; NAD  HEENT: NCAT, MMM  Neck: Supple, No JVD  Respiratory: CTA-b/l  Cardiovascular: tachy s1s2  Gastrointestinal: BS+, soft, NT/ND  Extremities: No peripheral edema b/l  Neurological: no focal deficits; strength grossly intact  Back: no CVAT b/l  Skin: No rashes, no nevi    LABS:                        11.4   12.21 )-----------( 245      ( 27 Oct 2022 10:12 )             35.0     Na(136)/K(5.1)/Cl(99)/HCO3(23)/BUN(20)/Cr(1.18)Glu(121)/Ca(9.3)/Mg(--)/PO4(--)    10-28 @ 07:21  Na(131)/K(3.5)/Cl(97)/HCO3(23)/BUN(26)/Cr(1.06)Glu(154)/Ca(9.0)/Mg(--)/PO4(--)    10-27 @ 10:11  Na(133)/K(3.6)/Cl(98)/HCO3(20)/BUN(40)/Cr(1.49)Glu(131)/Ca(9.2)/Mg(--)/PO4(--)    10-26 @ 07:31      IMAGING:  < from: MR Abdomen w/wo IV Cont (10.26.22 @ 17:39) >  *  An 8 cm abscess interposed between the liver and upper pole right   kidney.  *  Bilateral renal cyst. No lesion suspicious for neoplasm.    < end of copied text >      IMPRESSION: 82M w/ HTN, DM2, CAD, and BPH-TURP, 10/19/22 p/w L1 vertebral fx s/p mechanical fall; c/b ANTON    (1)Renal - ANTON - prerenal - now improved  (2)Hypoxia - PNA - on IV Zosyn - Pulm on baoard  (3)Perihepatic collection - potentially for IR-guided drainage next week  (4) - no lesions suspicious for neoplasm based on MRI    RECOMMEND:  (1)Continue abx for GFR 40-50ml/min  (2)Abscess drainage per IR          Maco Bhatia MD  [facility]  Office: [phone]  Cell: [phone]       No complaints      VITAL:  T(C): , Max: 36.7 (10-28-22 @ 05:01)  T(F): , Max: 98 (10-28-22 @ 05:01)  HR: 96 (10-28-22 @ 13:37)  BP: 128/80 (10-28-22 @ 13:37)  BP(mean): --  RR: 18 (10-28-22 @ 13:37)  SpO2: 96% (10-28-22 @ 13:37)      PHYSICAL EXAM:  Constitutional: NAD  HEENT: NCAT, MMM  Neck: Supple, No JVD  Respiratory: CTA-b/l  Cardiovascular: tachy s1s2  Gastrointestinal: BS+, soft, NT/ND  Extremities: No peripheral edema b/l  Neurological: no focal deficits; strength grossly intact  Back: no CVAT b/l  Skin: No rashes, no nevi    LABS:                        11.4   12.21 )-----------( 245      ( 27 Oct 2022 10:12 )             35.0     Na(136)/K(5.1)/Cl(99)/HCO3(23)/BUN(20)/Cr(1.18)Glu(121)/Ca(9.3)/Mg(--)/PO4(--)    10-28 @ 07:21  Na(131)/K(3.5)/Cl(97)/HCO3(23)/BUN(26)/Cr(1.06)Glu(154)/Ca(9.0)/Mg(--)/PO4(--)    10-27 @ 10:11  Na(133)/K(3.6)/Cl(98)/HCO3(20)/BUN(40)/Cr(1.49)Glu(131)/Ca(9.2)/Mg(--)/PO4(--)    10-26 @ 07:31      IMAGING:  < from: MR Abdomen w/wo IV Cont (10.26.22 @ 17:39) >  *  An 8 cm abscess interposed between the liver and upper pole right   kidney.  *  Bilateral renal cyst. No lesion suspicious for neoplasm.    < end of copied text >      IMPRESSION: 82M w/ HTN, DM2, CAD, and BPH-TURP, 10/19/22 p/w L1 vertebral fx s/p mechanical fall; c/b ANTON    (1)Renal - ANTON - prerenal - now improved  (2)Hypoxia - PNA - on IV Zosyn - Pulm on baoard  (3)Perihepatic collection - potentially for IR-guided drainage next week  (4) - no lesions suspicious for neoplasm based on MRI    RECOMMEND:  (1)Continue abx for GFR 40-50ml/min  (2)Abscess drainage per IR          Maco Bhatia MD  [facility]  Office: [phone]  Cell: [phone]

## 2022-10-28 NOTE — PROGRESS NOTE ADULT - SUBJECTIVE AND OBJECTIVE BOX
Chief complaint  Patient is a 82y old  Male who presents with a chief complaint of Fall (27 Oct 2022 11:51)   Review of systems  Patient in bed, looks comfortable, no hypoglycemic episodes.    Labs and Fingersticks  CAPILLARY BLOOD GLUCOSE      POCT Blood Glucose.: 158 mg/dL (28 Oct 2022 08:33)  POCT Blood Glucose.: 152 mg/dL (27 Oct 2022 22:13)  POCT Blood Glucose.: 207 mg/dL (27 Oct 2022 17:39)  POCT Blood Glucose.: 143 mg/dL (27 Oct 2022 12:59)      Anion Gap, Serum: 14 (10-28 @ 07:21)  Anion Gap, Serum: 11 (10-27 @ 10:11)      Calcium, Total Serum: 9.3 (10-28 @ 07:21)  Calcium, Total Serum: 9.0 (10-27 @ 10:11)  Albumin, Serum: 2.4 *L* (10-27 @ 10:11)    Alanine Aminotransferase (ALT/SGPT): 8 *L* (10-27 @ 10:11)  Alkaline Phosphatase, Serum: 150 *H* (10-27 @ 10:11)  Aspartate Aminotransferase (AST/SGOT): 10 (10-27 @ 10:11)        10-28    136  |  99  |  20  ----------------------------<  121<H>  5.1   |  23  |  1.18    Ca    9.3      28 Oct 2022 07:21    TPro  6.1  /  Alb  2.4<L>  /  TBili  0.6  /  DBili  0.2  /  AST  10  /  ALT  8<L>  /  AlkPhos  150<H>  10-27                        11.4   12.21 )-----------( 245      ( 27 Oct 2022 10:12 )             35.0     Medications  MEDICATIONS  (STANDING):  dextrose 5%. 1000 milliLiter(s) (50 mL/Hr) IV Continuous <Continuous>  dextrose 5%. 1000 milliLiter(s) (100 mL/Hr) IV Continuous <Continuous>  dextrose 50% Injectable 25 Gram(s) IV Push once  dextrose 50% Injectable 12.5 Gram(s) IV Push once  dextrose 50% Injectable 25 Gram(s) IV Push once  finasteride 5 milliGRAM(s) Oral daily  glucagon  Injectable 1 milliGRAM(s) IntraMuscular once  influenza  Vaccine (HIGH DOSE) 0.7 milliLiter(s) IntraMuscular once  insulin glargine Injectable (LANTUS) 25 Unit(s) SubCutaneous at bedtime  insulin lispro (ADMELOG) corrective regimen sliding scale   SubCutaneous three times a day before meals  insulin lispro Injectable (ADMELOG) 10 Unit(s) SubCutaneous three times a day before meals  lidocaine   4% Patch 1 Patch Transdermal every 24 hours  metoprolol succinate ER 25 milliGRAM(s) Oral daily  pantoprazole    Tablet 40 milliGRAM(s) Oral before breakfast  piperacillin/tazobactam IVPB.. 3.375 Gram(s) IV Intermittent every 8 hours  polyethylene glycol 3350 17 Gram(s) Oral daily  senna 2 Tablet(s) Oral at bedtime  tamsulosin 0.8 milliGRAM(s) Oral at bedtime      Physical Exam  General: Patient comfortable in bed  Vital Signs Last 12 Hrs  T(F): 98 (10-28-22 @ 05:01), Max: 98 (10-28-22 @ 05:01)  HR: 64 (10-28-22 @ 05:01) (64 - 64)  BP: 106/70 (10-28-22 @ 05:01) (106/70 - 106/70)  BP(mean): --  RR: 18 (10-28-22 @ 05:01) (18 - 18)  SpO2: 98% (10-28-22 @ 05:01) (98% - 98%)  Neck: No palpable thyroid nodules.  CVS: S1S2, No murmurs  Respiratory: No wheezing, no crepitations  GI: Abdomen soft, bowel sounds positive  Musculoskeletal:  edema lower extremities.   Skin: No skin rashes, no ecchymosis    Diagnostics    Free Thyroxine, Serum: AM Sched. Collection: 21-Oct-2022 06:00 (10-20 @ 13:18)  Thyroid Stimulating Hormone, Serum: AM Sched. Collection: 21-Oct-2022 06:00 (10-20 @ 13:18)  A1C with Estimated Average Glucose: Routine (10-20 @ 13:17)           Chief complaint  Patient is a 82y old  Male who presents with a chief complaint of Fall (27 Oct 2022 11:51)   Review of systems  Patient in bed, looks comfortable, no hypoglycemic episodes.    Labs and Fingersticks  CAPILLARY BLOOD GLUCOSE      POCT Blood Glucose.: 158 mg/dL (28 Oct 2022 08:33)  POCT Blood Glucose.: 152 mg/dL (27 Oct 2022 22:13)  POCT Blood Glucose.: 207 mg/dL (27 Oct 2022 17:39)  POCT Blood Glucose.: 143 mg/dL (27 Oct 2022 12:59)      Anion Gap, Serum: 14 (10-28 @ 07:21)  Anion Gap, Serum: 11 (10-27 @ 10:11)      Calcium, Total Serum: 9.3 (10-28 @ 07:21)  Calcium, Total Serum: 9.0 (10-27 @ 10:11)  Albumin, Serum: 2.4 *L* (10-27 @ 10:11)    Alanine Aminotransferase (ALT/SGPT): 8 *L* (10-27 @ 10:11)  Alkaline Phosphatase, Serum: 150 *H* (10-27 @ 10:11)  Aspartate Aminotransferase (AST/SGOT): 10 (10-27 @ 10:11)        10-28    136  |  99  |  20  ----------------------------<  121<H>  5.1   |  23  |  1.18    Ca    9.3      28 Oct 2022 07:21    TPro  6.1  /  Alb  2.4<L>  /  TBili  0.6  /  DBili  0.2  /  AST  10  /  ALT  8<L>  /  AlkPhos  150<H>  10-27                        11.4   12.21 )-----------( 245      ( 27 Oct 2022 10:12 )             35.0     Medications  MEDICATIONS  (STANDING):  dextrose 5%. 1000 milliLiter(s) (50 mL/Hr) IV Continuous <Continuous>  dextrose 5%. 1000 milliLiter(s) (100 mL/Hr) IV Continuous <Continuous>  dextrose 50% Injectable 25 Gram(s) IV Push once  dextrose 50% Injectable 12.5 Gram(s) IV Push once  dextrose 50% Injectable 25 Gram(s) IV Push once  finasteride 5 milliGRAM(s) Oral daily  glucagon  Injectable 1 milliGRAM(s) IntraMuscular once  influenza  Vaccine (HIGH DOSE) 0.7 milliLiter(s) IntraMuscular once  insulin glargine Injectable (LANTUS) 25 Unit(s) SubCutaneous at bedtime  insulin lispro (ADMELOG) corrective regimen sliding scale   SubCutaneous three times a day before meals  insulin lispro Injectable (ADMELOG) 10 Unit(s) SubCutaneous three times a day before meals  lidocaine   4% Patch 1 Patch Transdermal every 24 hours  metoprolol succinate ER 25 milliGRAM(s) Oral daily  pantoprazole    Tablet 40 milliGRAM(s) Oral before breakfast  piperacillin/tazobactam IVPB.. 3.375 Gram(s) IV Intermittent every 8 hours  polyethylene glycol 3350 17 Gram(s) Oral daily  senna 2 Tablet(s) Oral at bedtime  tamsulosin 0.8 milliGRAM(s) Oral at bedtime      Physical Exam  General: Patient comfortable in bed  Vital Signs Last 12 Hrs  T(F): 98 (10-28-22 @ 05:01), Max: 98 (10-28-22 @ 05:01)  HR: 64 (10-28-22 @ 05:01) (64 - 64)  BP: 106/70 (10-28-22 @ 05:01) (106/70 - 106/70)  BP(mean): --  RR: 18 (10-28-22 @ 05:01) (18 - 18)  SpO2: 98% (10-28-22 @ 05:01) (98% - 98%)  Neck: No palpable thyroid nodules.  CVS: S1S2, No murmurs  Respiratory: No wheezing, no crepitations  GI: Abdomen soft, bowel sounds positive  Musculoskeletal:  edema lower extremities.   Skin: No skin rashes, no ecchymosis    Diagnostics    Free Thyroxine, Serum: AM Sched. Collection: 21-Oct-2022 06:00 (10-20 @ 13:18)  Thyroid Stimulating Hormone, Serum: AM Sched. Collection: 21-Oct-2022 06:00 (10-20 @ 13:18)  A1C with Estimated Average Glucose: Routine (10-20 @ 13:17)

## 2022-10-28 NOTE — SWALLOW VFSS/MBS ASSESSMENT ADULT - LARYNGEAL PENETRATION DURING THE SWALLOW - SILENT
over the laryngeal surface of the epiglottis and AE folds without retrieval. Trace-mild remains in laryngeal vestibule and descends to vocal cords, not ejected despite cued cough and reswallow./Trace/Mild over AE folds with complete retrieval across tsp/cup sips. Mild silent laryngeal penetration over the AE folds with straw sips to the level of the vocal cords, not ejected and trace remains above the vocal cords./Trace/Mild

## 2022-10-28 NOTE — PROGRESS NOTE ADULT - SUBJECTIVE AND OBJECTIVE BOX
CC: F/U for Abscess    Saw/spoke to patient. No fevers, no chills. No new complaints.    Allergies  No Known Allergies    ANTIMICROBIALS:  piperacillin/tazobactam IVPB.. 3.375 every 8 hours    PE:    Vital Signs Last 24 Hrs  T(C): 36.6 (28 Oct 2022 13:37), Max: 36.7 (28 Oct 2022 05:01)  T(F): 97.8 (28 Oct 2022 13:37), Max: 98 (28 Oct 2022 05:01)  HR: 96 (28 Oct 2022 13:37) (64 - 96)  BP: 128/80 (28 Oct 2022 13:37) (106/70 - 128/80)  RR: 18 (28 Oct 2022 13:37) (16 - 18)  SpO2: 96% (28 Oct 2022 13:37) (96% - 98%)    Gen: AOx3, NAD, non-toxic  CV: Nontachycardic  Resp: Breathing comfortably, RA  Abd: Soft, nontender  IV/Skin: No thrombophlebitis    LABS:                        11.4   12.21 )-----------( 245      ( 27 Oct 2022 10:12 )             35.0     10-28    136  |  99  |  20  ----------------------------<  121<H>  5.1   |  23  |  1.18    Ca    9.3      28 Oct 2022 07:21    TPro  6.1  /  Alb  2.4<L>  /  TBili  0.6  /  DBili  0.2  /  AST  10  /  ALT  8<L>  /  AlkPhos  150<H>  10-27    MICROBIOLOGY:    .Blood Blood-Venous  10-25-22   No growth to date.  --  --    .Blood Blood  10-25-22   No growth to date.  --  --    Clean Catch Clean Catch (Midstream)  10-19-22   No growth  --  --    Rapid RVP Result: NotDetec (10-25 @ 14:33)    (otherwise reviewed)    RADIOLOGY:    10/26 MR:    IMPRESSION:  *  An 8 cm abscess interposed between the liver and upper pole right   kidney.  *  Bilateral renal cyst. No lesion suspicious for neoplasm.

## 2022-10-28 NOTE — SWALLOW VFSS/MBS ASSESSMENT ADULT - SLP GENERAL OBSERVATIONS
Pt encountered upright in DARLENE chair, on 2L NC, awake/alert, oriented to self and place only. [employer]  #[id number] utilized. Patient denies dysphagia. RN Elisa endorsed pt intermittently coughs with PO intake. Pt unable to follow single step directives consistently.

## 2022-10-28 NOTE — SWALLOW VFSS/MBS ASSESSMENT ADULT - SPECIFY REASON(S)
pt seen in er yesterday for complaint returns from [facility] for same complaint
To objectively assess swallow function and rule out dysphagia. As per consult "poor PO intake ?aspiration" Passed dysphagia screen 10/26.

## 2022-10-28 NOTE — PROGRESS NOTE ADULT - SUBJECTIVE AND OBJECTIVE BOX
KAITLIN CALDERON  82y Male  MRN:[id number]    Patient is a 82y old  Male who presents with a chief complaint of fall    HPI:   81M cantonese speaking PMHx of HTN/HLD, DM2, BPH, (?Afib per chart review, though not on AC's), presents to the ED with mid lower back pain that he sustained after falling in the bathroom while he was urinating at 9pm last night. Pt denies headstrike/LOC. Difficult to obtain full history even with  though pt answering questions appropriately and is AAOx2. Pt denies any pain anywhere else. Per EMS pt has had chronic cough. Unclear if pt was down long. pt last took insulin 2 days ago he states.   	Spoke with stepdaughter Yulissa who lives below pt: Pt couldn't get up from the fall yesterday, has difficulty ambulating with walker since the fall as he complaints of back pain, pt has 9hrs home help daily but not overnight. She states that pt is at baseline mental status and that he's had short term memory problems for some time. Denies any recent fevers/chills, abd pain, v/d, chest pain/sob. States that the cough pt has is chronic.  (19 Oct 2022 15:53)      Patient seen and evaluated at bedside. No acute events overnight except as noted.    Interval HPI: no acute events o/n     PAST MEDICAL & SURGICAL HISTORY:  DM (diabetes mellitus)      HTN (hypertension)      Hypercholesterolemia      CAD (coronary artery disease)      HTN (hypertension)      DM (diabetes mellitus)      S/P TURP      S/P gastrectomy          REVIEW OF SYSTEMS:  as per hpi     VITALS:   Vital Signs Last 24 Hrs  T(C): 36.7 (28 Oct 2022 05:01), Max: 36.7 (28 Oct 2022 05:01)  T(F): 98 (28 Oct 2022 05:01), Max: 98 (28 Oct 2022 05:01)  HR: 64 (28 Oct 2022 05:01) (64 - 93)  BP: 106/70 (28 Oct 2022 05:01) (106/70 - 117/69)  BP(mean): --  RR: 18 (28 Oct 2022 05:01) (16 - 18)  SpO2: 98% (28 Oct 2022 05:01) (97% - 98%)            PHYSICAL EXAM:  GENERAL: NAD, well-developed,    HEAD:  Atraumatic, Normocephalic  EYES: EOMI, PERRLA, conjunctiva and sclera clear  NECK: Supple, No JVD  CHEST/LUNG: clear b/l  HEART: S1, S2; No murmurs, rubs, or gallops  ABDOMEN: Soft, Nontender, Nondistended; Bowel sounds present  EXTREMITIES:  2+ Peripheral Pulses, No clubbing, cyanosis, or edema  PSYCH: Normal affect  NEUROLOGY: AAOX1-2; non-focal  SKIN: No rashes or lesions    Consultant(s) Notes Reviewed:  [x ] YES  [ ] NO  Care Discussed with Consultants/Other Providers [ x] YES  [ ] NO    MEDS:   MEDICATIONS  (STANDING):  dextrose 5%. 1000 milliLiter(s) (100 mL/Hr) IV Continuous <Continuous>  dextrose 5%. 1000 milliLiter(s) (50 mL/Hr) IV Continuous <Continuous>  dextrose 50% Injectable 25 Gram(s) IV Push once  dextrose 50% Injectable 12.5 Gram(s) IV Push once  dextrose 50% Injectable 25 Gram(s) IV Push once  finasteride 5 milliGRAM(s) Oral daily  glucagon  Injectable 1 milliGRAM(s) IntraMuscular once  influenza  Vaccine (HIGH DOSE) 0.7 milliLiter(s) IntraMuscular once  insulin glargine Injectable (LANTUS) 25 Unit(s) SubCutaneous at bedtime  insulin lispro (ADMELOG) corrective regimen sliding scale   SubCutaneous three times a day before meals  insulin lispro Injectable (ADMELOG) 10 Unit(s) SubCutaneous three times a day before meals  lidocaine   4% Patch 1 Patch Transdermal every 24 hours  metoprolol succinate ER 25 milliGRAM(s) Oral daily  pantoprazole    Tablet 40 milliGRAM(s) Oral before breakfast  piperacillin/tazobactam IVPB.. 3.375 Gram(s) IV Intermittent every 8 hours  polyethylene glycol 3350 17 Gram(s) Oral daily  senna 2 Tablet(s) Oral at bedtime  tamsulosin 0.8 milliGRAM(s) Oral at bedtime    MEDICATIONS  (PRN):  acetaminophen     Tablet .. 650 milliGRAM(s) Oral every 6 hours PRN mild pain  albuterol/ipratropium for Nebulization 3 milliLiter(s) Nebulizer every 6 hours PRN Shortness of Breath  dextrose Oral Gel 15 Gram(s) Oral once PRN Blood Glucose LESS THAN 70 milliGRAM(s)/deciliter  oxyCODONE    IR 5 milliGRAM(s) Oral every 6 hours PRN Moderate Pain (4 - 6)      ALLERGIES:  No Known Allergies      LABS:                                                               11.4   12.21 )-----------( 245      ( 27 Oct 2022 10:12 )             35.0   10-28    136  |  99  |  20  ----------------------------<  121<H>  5.1   |  23  |  1.18    Ca    9.3      28 Oct 2022 07:21    TPro  6.1  /  Alb  2.4<L>  /  TBili  0.6  /  DBili  0.2  /  AST  10  /  ALT  8<L>  /  AlkPhos  150<H>  10-27      < from: MR Abdomen w/wo IV Cont (10.26.22 @ 17:39) >    IMPRESSION:  *  An 8 cm abscess interposed between the liver and upper pole right   kidney.  *  Bilateral renal cyst. No lesion suspicious for neoplasm.      --- End of Report ---      < end of copied text >         cultures: Culture Results:   No growth (10-19 @ 08:59)       < from: MR Lumbar Spine No Cont (10.20.22 @ 17:28) >    IMPRESSION:  Evidence of acute fracture L1 with fracture line traversing   the vertebral body from anterior to posterior and findings consistent   with a 2 column injury. Paravertebral cuff of soft tissue consistent with   recent injury. Some mild extension of edema into the pedicles though the   transverse and spinous processes as well as the lamina are spared. No   other lesions are identified. Recommend interval follow-up to determine   benign versus pathologic fracture.    --- End of Report ---      < end of copied text >      < from: Xray Chest 1 View- PORTABLE-Urgent (Xray Chest 1 View- PORTABLE-Urgent .) (10.23.22 @ 15:36) >  IMPRESSION:  Bibasilar patchy opacities, compatible with subsegmental atelectasis.   Infectious etiology cannot be ruled out.    --- End of Report ---      < end of copied text >      < from: US Kidney and Bladder (10.24.22 @ 19:31) >  IMPRESSION:  Complex cystic collection superior to the right kidney, correlating to   heterogeneous collection exophytic from the right hepatic lobe.   Differential includes abscess versus hematoma.    Bilateral complex renal cysts.    < end of copied text >  < from: CT Chest No Cont (10.19.22 @ 09:59) >  IMPRESSION:  Limited noncontrast examination.    CHEST:  *  Secretions/debris within the bilateral mainstem bronchi with   multifocal sites of mucous plugging in distal airways.  *  Bilateral tree-in-bud nodularity is likely infectious/inflammatory.   Groundglass opacity within the left upper lobe, which may also be   infectious/inflammatory. Pulmonary nodule measuring 6 m in the right   upper lobe. Suggest 3 month follow-up chest CT to assess for stability or   resolution.  *  A few prominent nonspecific mediastinal lymph nodes.  *  Ascending thoracic aortic aneurysm measuring up to 5 cm.    ABDOMEN AND PELVIS:  *  Low density structure measuring 8.3 cm alongthe inferior margin of   the right hepatic lobe with surrounding inflammatory changes.   Differential includes an intrahepatic/perihepatic abscess, or possibly   hematoma in the setting of trauma. Neoplasm is thought to be less likely   but is not excluded. Contrast enhanced abdominal MRI may be helpful for   further characterization.  *  Mild compression fracture at L1, likely acute. Please refer to the   concurrent dedicated CT lumbar spine report.    Preliminary findings were discussed by Dr. Parks with Dr. Flores   on 10/19/2022 11:10 AM with read back confirmation. Final findings were   discussed with Dr. Pierce by Dr. Mcqueen on 10/19/2022 at 12:57 PM.    --- End of Report ---    ***Please see the addendum at the top of this report. It may contain   additional important information or changes.****      < end of copied text >

## 2022-10-28 NOTE — SWALLOW VFSS/MBS ASSESSMENT ADULT - ORAL PHASE
lingual pumping, mild oral residue/Delayed oral transit time/Reduced anterior - posterior transport Lingual pumping, mild-moderate oral residue/Delayed oral transit time/Reduced anterior - posterior transport Mild oral residue/Delayed oral transit time/Reduced anterior - posterior transport Lingual pumping, mild-moderate oral residue reduced with liquid wash to mild/Delayed oral transit time/Reduced anterior - posterior transport

## 2022-10-28 NOTE — PROGRESS NOTE ADULT - SUBJECTIVE AND OBJECTIVE BOX
Urology Progress Note    Overnight Events:  No acute urologic events overnight. MRi showing simply cysts in kidneys only but with abscess adjacent    Review of Systems:   Constitutional: No weight loss, no weakness  CV: No chest pain, no chest pressure  Pulm: No shortness of breath, cough, or sputum    Physical Exam:  Vital signs  T(C): 36.6 (10-28-22 @ 13:37), Max: 36.7 (10-28-22 @ 05:01)  HR: 96 (10-28-22 @ 13:37)  BP: 128/80 (10-28-22 @ 13:37)  SpO2: 96% (10-28-22 @ 13:37)  Wt(kg): --    Gen: No acute distress. Normal mood  Abd: soft, non-tender, non-distended  : Non-palpable bladder    Labs:      10-28 @ 07:21    WBC --    / Hct --    / SCr 1.18     10-27 @ 10:12    WBC 12.21 / Hct 35.0  / SCr --       10-28    136  |  99  |  20  ----------------------------<  121<H>  5.1   |  23  |  1.18    Ca    9.3      28 Oct 2022 07:21    TPro  6.1  /  Alb  2.4<L>  /  TBili  0.6  /  DBili  0.2  /  AST  10  /  ALT  8<L>  /  AlkPhos  150<H>  10-27

## 2022-10-28 NOTE — SWALLOW VFSS/MBS ASSESSMENT ADULT - MODE OF PRESENTATION
cup/spoon/straw/self fed/fed by clinician cup/spoon/self fed/fed by clinician spoon/fed by clinician

## 2022-10-28 NOTE — SWALLOW VFSS/MBS ASSESSMENT ADULT - DIAGNOSTIC IMPRESSIONS
Pt is an 82 y/o male admitted for fall found to have acute L1 fracture, +UA, PNA, being cross treated with Zosyn, and with concern for abscess vs hematoma along liver margins. Pt p/w oropharyngeal dysphagia, likely acute related to deconditioning s/p fall, PNA, and UTI (currently on Zosyn). Oral phase is remarkable for prolonged AP transport and lingual pumping across puree textures/thickened liquids with mild-moderate oral residue which is effectively reduced given liquid wash. The pharyngeal phase is remarkable for a delayed pharyngeal trigger, reduced hyolaryngeal elevation/excursion, and poor laryngeal vestibule. This results in silent laryngeal penetration during the swallow with thin liquids via cup sips and mildly thick liquids via straw sips without retrieval. Compensatory strategies ineffective in ejecting material from laryngeal vestibule. Single episode of aspiration visualized with thick puree, however pt sensate and material effectively ejected from supra-subglottic region. No further aspiration events noted. Pharyngeal residue effectively reduced with liquid wash. Can consider repeat instrumental assessment in house vs. at next level of care given improvement in overall status.    Disorders: reduced lingual strength/control, delayed pharyngeal trigger, reduced hyolaryngeal elevation, reduced laryngeal vestibule closure, reduced supraglottic sensation. Pt is an 80 y/o male admitted for fall found to have acute L1 fracture, +UA, PNA, being cross treated with Zosyn, and with concern for abscess vs hematoma along liver margins. Pt p/w oropharyngeal dysphagia, likely acute related to deconditioning s/p fall, PNA, and UTI (currently on Zosyn). Oral phase is remarkable for prolonged AP transport and lingual pumping across puree textures/thickened liquids with mild-moderate oral residue which is effectively reduced given liquid wash. The pharyngeal phase is remarkable for a delayed pharyngeal trigger, reduced hyolaryngeal elevation/excursion, and poor laryngeal vestibule closure. This results in silent laryngeal penetration during the swallow with thin liquids via cup sips and mildly thick liquids via straw sips without retrieval. Compensatory strategies ineffective in ejecting material from laryngeal vestibule (i.e. cued cough/reswallow). Single episode of aspiration visualized with thick puree, however pt sensate and material effectively ejected from supra-subglottic region. No further aspiration events noted. Mild-moderate pharyngeal residue effectively reduced with liquid wash. Can consider repeat instrumental assessment in house vs. at next level of care given improvement in overall status.    Disorders: reduced lingual strength/control, delayed pharyngeal trigger, reduced hyolaryngeal elevation, reduced laryngeal vestibule closure, reduced supraglottic sensation.

## 2022-10-28 NOTE — PROGRESS NOTE ADULT - ASSESSMENT
80 yo M with HTN/HLD, DM2, BPH, initially with low back pain  Leukocytosis, no fever  Initially with fall  Current complaints of constipation, possible urinary retention  CT chest with evidence aspiration; CT A/P with concern for abscess vs hematoma along liver margin  MR suspicious for abscess  Overall,  1) Abnormal finding on imaging/Abscess  - Collection adjacent to liver--hematoma vs abscess  - Zosyn 3.375g q 8  - IR eval to drain liver abscess--therapeutic and diagnostic  2) Leukocytosis  - Trending down  - Zosyn for now  - F/U BCXs  - Trend WBC to normal  3) PNA  - Possible PNA based on CT, aspiration, small airway disease  - Would be cross treated by Tariq Arroyo MD  Contact on TEAMS messaging from 9am - 5pm  From 5pm-9am, on weekends, or if no response call [phone]

## 2022-10-28 NOTE — SWALLOW VFSS/MBS ASSESSMENT ADULT - RESIDUE IN PYRIFORM SINUSES
Pt is a 86 y/o male w/ PMH of HTN, HLD, remote CVA, COPD, CAD with Stents, CHF, chronic afib on eliquis, chronic systolic heart failure s/p TAVR 11/27/18.- with Dr Zuluaga presenting to [facility] ED today d/t weakness. pt states he has been feeling lightheaded and having difficulty breathing for last few days. Patient had cardiac cath during previous admission which showed Distal circumflex: There was a 40 % stenosis. Ramus intermedius: Angiography showed severe atherosclerosis., Mid RCA: There was a 60 % stenosis. Patient states that he has never had to be intubated d/t shortness of breath; also states that he has O2 at home as needed, but does not use his home O2 or his home inhalers. patient reports some chills and productive cough with yellow sputum. patient was seen by his cardiologist today which he was prescribed doxy for possible PNA however he was told to come to ER for worsening symptoms       # SOB and productive cough, R/P PNA VS viral URI   # COPD exacerbation   # HX of HFrEF, R/O acute on chronic exacerbation   # CAD S/P PCI   # Afib on AC  # S/P TAVR  # HTN  # CVA  #BPH  # HLD     CXR clear, no PNA seen  procalcitonin normal   CT Chest noted  solumedrol 40 IV daily   ProBNP mildly elevated however he is not in any fluid overlaod   Echo pending  I andOs, FLuid restriction   Nebs and inhalers   less likely PE, in view of chronic use of eliquis  COnt eliquis and spironolactone   Hold lasix for now in view of + RVP and chills, restart prior to discharge   Card and pulm eval   serial CE and EKG, mild Trop elevated, F/U Trend   MOnitor vitals, restart home BP medications and statin and AP with plavix   rate control, cont amiodorone   DVT and GI PPX     case discussed with house staff and cardiology Trace

## 2022-10-28 NOTE — SWALLOW VFSS/MBS ASSESSMENT ADULT - DELAYED INITIATION OF THE PHARYNGEAL SWALLOW (IN SECONDS)
level of valleculae level of pyriform sinuses level of valleculae for tsp/cup sips. Level of pyriform sinuses for straw sips.

## 2022-10-28 NOTE — SWALLOW VFSS/MBS ASSESSMENT ADULT - RECOMMENDED FEEDING/EATING TECHNIQUES
alternate food with liquid/maintain upright posture during/after eating for 30 mins/no straws/small sips/bites

## 2022-10-28 NOTE — PROGRESS NOTE ADULT - ASSESSMENT
M with renal cysts, incomplete bladder emptying    - awaiting IR drainage of abscess  - no intervention at this time for renal cysts, may be followed as outpatient  - serial PVR to ensure not in retention  - outpatient  fu

## 2022-10-28 NOTE — SWALLOW VFSS/MBS ASSESSMENT ADULT - ADDITIONAL RECOMMENDATIONS
Goals:   1. Pt/family/caregiver will demonstrate understanding and carryover of dysphagia management (safe swallow guidelines, compensatory strategies, dysphagia diet).  2. Pt will complete dysphagia exercises to improve swallow function.  3. Pt will tolerate recommended diet with no overt, clinical s/s of aspiration.  4. Pt will tolerate thin liquids via tsp as therapeutic trials with clinician only.

## 2022-10-28 NOTE — SWALLOW VFSS/MBS ASSESSMENT ADULT - NS SWALLOW VFSS REC ASPIR MON
Monitor for s/s aspiration/laryngeal penetration. If noted:  D/C p.o. intake, provide non-oral nutrition/hydration/meds, and contact this service @ [phone]/change of breathing pattern/cough/gurgly voice/fever/pneumonia/throat clearing/upper respiratory infection

## 2022-10-28 NOTE — SWALLOW VFSS/MBS ASSESSMENT ADULT - COMMENTS
Infectious disease consult for UTI-->Current complaints of constipation, possible urinary retention. CT chest with evidence aspiration; CT A/P with concern for abscess vs hematoma along liver margin  Uncertain if abscess vs hematoma. Leukocytosis-Slightly elevated, trending upwards; note concern for possible urinary retention/bladder pain; UA+. Possible PNA based on CT, aspiration, small airway disease. Would be cross treated by Zosyn. Pulmonology following Hypoxemia/Pneumonia-->SOTO opacities with mucoid impaction, non resolving leukocytosis. Picture c/w multilobar pneumonia - suspect aspiration. No wheezing on exam. MIld dementia. Recommend resp viral panel, continue Zosyn for presumed aspiration PNA, swallow evaluation.  Urology consult for renal lesion, incomplete bladder emptying-->Differential includes benign renal cyst, complex renal cyst, malignant renal cyst  -Please order MRI abdomen with and without IV contrast renal mass protocol to better characterize renal cystic lesion.  IR consult for 8 cm abscess interposed between the liver and upper pole right kidney. ? IR FOR DRAINAGE--> Concern for hematoma vs abscess. NPO on 10/31 at 11pm    10/19 CT head: No acute transcortical infarct or intracranial hemorrhage. White matter small vessel disease.  CT cervical spine: No acute fracture or dislocation.  10/19 CT CHEST Secretions/debris within the bilateral mainstem bronchi with multifocal sites of mucous plugging in distal airways. Bilateral tree-in-bud nodularity is likely infectious/inflammatory. Groundglass opacity within the left upper lobe, which may also be infectious/inflammatory. Pulmonary nodule measuring 6 m in the right upper lobe.  10/23 CXR IMPRESSION: Bibasilar patchy opacities, compatible with subsegmental atelectasis. Infectious etiology cannot be ruled out.    SWALLOW HISTORY: No reports in SCM or in PACS prior to this admission.   Pt seen by this service 10/26 with recommendations for puree/thin liquids and MBS to objectively assess swallow.

## 2022-10-29 LAB
GLUCOSE BLDC GLUCOMTR-MCNC: 118 MG/DL — HIGH (ref 70–99)
GLUCOSE BLDC GLUCOMTR-MCNC: 172 MG/DL — HIGH (ref 70–99)
GLUCOSE BLDC GLUCOMTR-MCNC: 175 MG/DL — HIGH (ref 70–99)
GLUCOSE BLDC GLUCOMTR-MCNC: 304 MG/DL — HIGH (ref 70–99)

## 2022-10-29 RX ADMIN — Medication 10 UNIT(S): at 18:36

## 2022-10-29 RX ADMIN — FINASTERIDE 5 MILLIGRAM(S): 5 TABLET, FILM COATED ORAL at 12:46

## 2022-10-29 RX ADMIN — Medication 10 UNIT(S): at 08:41

## 2022-10-29 RX ADMIN — Medication 10 UNIT(S): at 12:46

## 2022-10-29 RX ADMIN — PANTOPRAZOLE SODIUM 40 MILLIGRAM(S): 20 TABLET, DELAYED RELEASE ORAL at 05:51

## 2022-10-29 RX ADMIN — PIPERACILLIN AND TAZOBACTAM 25 GRAM(S): 4; .5 INJECTION, POWDER, LYOPHILIZED, FOR SOLUTION INTRAVENOUS at 05:50

## 2022-10-29 RX ADMIN — PIPERACILLIN AND TAZOBACTAM 25 GRAM(S): 4; .5 INJECTION, POWDER, LYOPHILIZED, FOR SOLUTION INTRAVENOUS at 21:16

## 2022-10-29 RX ADMIN — TAMSULOSIN HYDROCHLORIDE 0.8 MILLIGRAM(S): 0.4 CAPSULE ORAL at 21:18

## 2022-10-29 RX ADMIN — Medication 25 MILLIGRAM(S): at 05:50

## 2022-10-29 RX ADMIN — POLYETHYLENE GLYCOL 3350 17 GRAM(S): 17 POWDER, FOR SOLUTION ORAL at 12:45

## 2022-10-29 RX ADMIN — PIPERACILLIN AND TAZOBACTAM 25 GRAM(S): 4; .5 INJECTION, POWDER, LYOPHILIZED, FOR SOLUTION INTRAVENOUS at 14:18

## 2022-10-29 RX ADMIN — Medication 1: at 08:40

## 2022-10-29 RX ADMIN — INSULIN GLARGINE 25 UNIT(S): 100 INJECTION, SOLUTION SUBCUTANEOUS at 22:54

## 2022-10-29 RX ADMIN — Medication 1: at 12:46

## 2022-10-29 NOTE — DIETITIAN INITIAL EVALUATION ADULT - PERTINENT LABORATORY DATA
10-28    136  |  99  |  20  ----------------------------<  121<H>  5.1   |  23  |  1.18    Ca    9.3      28 Oct 2022 07:21    POCT Blood Glucose.: 172 mg/dL (10-29-22 @ 08:33)  A1C with Estimated Average Glucose Result: 10.4 % (10-21-22 @ 06:31)  A1C with Estimated Average Glucose Result: 10.7 % (10-20-22 @ 15:03)  A1C with Estimated Average Glucose Result: 7.9 % (06-07-22 @ 07:39)

## 2022-10-29 NOTE — DIETITIAN INITIAL EVALUATION ADULT - PERTINENT MEDS FT
MEDICATIONS  (STANDING):  dextrose 5%. 1000 milliLiter(s) (50 mL/Hr) IV Continuous <Continuous>  dextrose 5%. 1000 milliLiter(s) (100 mL/Hr) IV Continuous <Continuous>  dextrose 50% Injectable 25 Gram(s) IV Push once  dextrose 50% Injectable 12.5 Gram(s) IV Push once  dextrose 50% Injectable 25 Gram(s) IV Push once  finasteride 5 milliGRAM(s) Oral daily  glucagon  Injectable 1 milliGRAM(s) IntraMuscular once  influenza  Vaccine (HIGH DOSE) 0.7 milliLiter(s) IntraMuscular once  insulin glargine Injectable (LANTUS) 25 Unit(s) SubCutaneous at bedtime  insulin lispro (ADMELOG) corrective regimen sliding scale   SubCutaneous three times a day before meals  insulin lispro Injectable (ADMELOG) 10 Unit(s) SubCutaneous three times a day before meals  lidocaine   4% Patch 1 Patch Transdermal every 24 hours  metoprolol succinate ER 25 milliGRAM(s) Oral daily  pantoprazole    Tablet 40 milliGRAM(s) Oral before breakfast  piperacillin/tazobactam IVPB.. 3.375 Gram(s) IV Intermittent every 8 hours  polyethylene glycol 3350 17 Gram(s) Oral daily  senna 2 Tablet(s) Oral at bedtime  tamsulosin 0.8 milliGRAM(s) Oral at bedtime    MEDICATIONS  (PRN):  acetaminophen     Tablet .. 650 milliGRAM(s) Oral every 6 hours PRN mild pain  albuterol/ipratropium for Nebulization 3 milliLiter(s) Nebulizer every 6 hours PRN Shortness of Breath  dextrose Oral Gel 15 Gram(s) Oral once PRN Blood Glucose LESS THAN 70 milliGRAM(s)/deciliter  oxyCODONE    IR 5 milliGRAM(s) Oral every 6 hours PRN Moderate Pain (4 - 6)

## 2022-10-29 NOTE — DIETITIAN INITIAL EVALUATION ADULT - REASON INDICATOR FOR ASSESSMENT
Consult: A1c: 10.4%   Sources: EMR, PCA, Patient - asleep; noted to be confused   -Attempted to call Family (Yulissa Natarajan [phone]) - unsuccessful

## 2022-10-29 NOTE — DIETITIAN INITIAL EVALUATION ADULT - OTHER INFO
GI/Intake:   -Per flowsheet, 26-75% intake of meals   -Last BM documented 10/27; bowel regimen ordered (Miralax, Senna)   -SLP recommended, pureed diet with mildly thickened liquids (10/28)     Endo:  -Hx of DM; latest A1c: 10.4% - uncontrolled   -Noted on insulin PTA - unable to assess compliance   -25 units long acting, 10 units short acting TID, SSI ordered   -Will leave Counting Carbohydrates handout at bedside    Weight Hx:  -No current dosing weight   -Bed scale: 162 pounds (10/29) - ? accuracy   -Per [facility] HIE: 250 pounds (1/15/22); 198 pounds (2/16/22)

## 2022-10-29 NOTE — PROGRESS NOTE ADULT - ASSESSMENT
81 male h/o htn, chol, dm, here s/p mechanical fall    mechanical fall  imaging noted  surg eval noted  MRI L spine noted with Lspine fx  ortho f/u noted  no acute intervention  pain control  TLSO brace  PT    DM  insulin as ordered  endo f/u  monitor fs    hypoxia  imaging c/w pna  pulm consult noted  supp o2  iv zosyn  incentive spirometer  nebs    chanel  unclear etiology  possible 2/2 poor oral fluid intake  renal f/u  creat now nl    abnl UA  f/u cult  on zosyn  id f/u    MRI noted with collection btw liver and kidney  possible abscess  IR consulted for drainage - to be done next week. hold ASA  abx as per id    suspected renal lesion  gu eval noted   MRI noted with cysts. not suspicious for malignancy        cont other home meds         Advanced care planning was discussed with patient and family.  Advanced care planning forms were reviewed and discussed as appropriate.  Differential diagnosis and plan of care discussed with patient after the evaluation.   Pain assessed and judicious use of narcotics when appropriate was discussed.  Importance of Fall prevention discussed.  Counseling on Smoking and Alcohol cessation was offered when appropriate.  Counseling on Diet, exercise, and medication compliance was done.   Approx 30 minutes spent.

## 2022-10-29 NOTE — DIETITIAN INITIAL EVALUATION ADULT - REASON FOR ADMISSION
83yo Cantonese speaking Male with PMH of DM, HTN, CAD, hypercholesterolemia. Pt admitted on 10/19 with Dorsalgia.

## 2022-10-29 NOTE — PROGRESS NOTE ADULT - ASSESSMENT
M with renal cyst and incomplete bladder emptying    -Continue spontaneous voids using urinal  -Bladder scan post void residual to assess for urinary retention  -Fu IR plans for abscess  -Monitor renal cyst  -Outpatient  followup with LUTS and cyst followup    Thank you for the consult. Please call with questions. Urology signing off.

## 2022-10-29 NOTE — PROGRESS NOTE ADULT - SUBJECTIVE AND OBJECTIVE BOX
KAITLIN CALDERON  82y Male  MRN:[id number]    Patient is a 82y old  Male who presents with a chief complaint of fall    HPI:   81M cantonese speaking PMHx of HTN/HLD, DM2, BPH, (?Afib per chart review, though not on AC's), presents to the ED with mid lower back pain that he sustained after falling in the bathroom while he was urinating at 9pm last night. Pt denies headstrike/LOC. Difficult to obtain full history even with  though pt answering questions appropriately and is AAOx2. Pt denies any pain anywhere else. Per EMS pt has had chronic cough. Unclear if pt was down long. pt last took insulin 2 days ago he states.   	Spoke with stepdaughter Yulissa who lives below pt: Pt couldn't get up from the fall yesterday, has difficulty ambulating with walker since the fall as he complaints of back pain, pt has 9hrs home help daily but not overnight. She states that pt is at baseline mental status and that he's had short term memory problems for some time. Denies any recent fevers/chills, abd pain, v/d, chest pain/sob. States that the cough pt has is chronic.  (19 Oct 2022 15:53)      Patient seen and evaluated at bedside. No acute events overnight except as noted.    Interval HPI: no acute events o/n     PAST MEDICAL & SURGICAL HISTORY:  DM (diabetes mellitus)      HTN (hypertension)      Hypercholesterolemia      CAD (coronary artery disease)      HTN (hypertension)      DM (diabetes mellitus)      S/P TURP      S/P gastrectomy          REVIEW OF SYSTEMS:  as per hpi     VITALS:   Vital Signs Last 24 Hrs  T(C): 36.4 (29 Oct 2022 11:48), Max: 36.7 (29 Oct 2022 05:39)  T(F): 97.6 (29 Oct 2022 11:48), Max: 98.1 (29 Oct 2022 05:39)  HR: 85 (29 Oct 2022 15:12) (80 - 93)  BP: 115/75 (29 Oct 2022 15:12) (106/77 - 133/89)  BP(mean): --  RR: 18 (29 Oct 2022 11:48) (18 - 18)  SpO2: 98% (29 Oct 2022 15:12) (96% - 98%)    Parameters below as of 29 Oct 2022 15:12  Patient On (Oxygen Delivery Method): nasal cannula  O2 Flow (L/min): 3        PHYSICAL EXAM:  GENERAL: NAD, well-developed,    HEAD:  Atraumatic, Normocephalic  EYES: EOMI, PERRLA, conjunctiva and sclera clear  NECK: Supple, No JVD  CHEST/LUNG: clear b/l  HEART: S1, S2; No murmurs, rubs, or gallops  ABDOMEN: Soft, Nontender, Nondistended; Bowel sounds present  EXTREMITIES:  2+ Peripheral Pulses, No clubbing, cyanosis, or edema  PSYCH: Normal affect  NEUROLOGY: AAOX1-2; non-focal  SKIN: No rashes or lesions    Consultant(s) Notes Reviewed:  [x ] YES  [ ] NO  Care Discussed with Consultants/Other Providers [ x] YES  [ ] NO    MEDS:   MEDICATIONS  (STANDING):  dextrose 5%. 1000 milliLiter(s) (50 mL/Hr) IV Continuous <Continuous>  dextrose 5%. 1000 milliLiter(s) (100 mL/Hr) IV Continuous <Continuous>  dextrose 50% Injectable 25 Gram(s) IV Push once  dextrose 50% Injectable 12.5 Gram(s) IV Push once  dextrose 50% Injectable 25 Gram(s) IV Push once  finasteride 5 milliGRAM(s) Oral daily  glucagon  Injectable 1 milliGRAM(s) IntraMuscular once  influenza  Vaccine (HIGH DOSE) 0.7 milliLiter(s) IntraMuscular once  insulin glargine Injectable (LANTUS) 25 Unit(s) SubCutaneous at bedtime  insulin lispro (ADMELOG) corrective regimen sliding scale   SubCutaneous three times a day before meals  insulin lispro Injectable (ADMELOG) 10 Unit(s) SubCutaneous three times a day before meals  lidocaine   4% Patch 1 Patch Transdermal every 24 hours  metoprolol succinate ER 25 milliGRAM(s) Oral daily  pantoprazole    Tablet 40 milliGRAM(s) Oral before breakfast  piperacillin/tazobactam IVPB.. 3.375 Gram(s) IV Intermittent every 8 hours  polyethylene glycol 3350 17 Gram(s) Oral daily  senna 2 Tablet(s) Oral at bedtime  tamsulosin 0.8 milliGRAM(s) Oral at bedtime    MEDICATIONS  (PRN):  acetaminophen     Tablet .. 650 milliGRAM(s) Oral every 6 hours PRN mild pain  albuterol/ipratropium for Nebulization 3 milliLiter(s) Nebulizer every 6 hours PRN Shortness of Breath  dextrose Oral Gel 15 Gram(s) Oral once PRN Blood Glucose LESS THAN 70 milliGRAM(s)/deciliter  oxyCODONE    IR 5 milliGRAM(s) Oral every 6 hours PRN Moderate Pain (4 - 6)      ALLERGIES:  No Known Allergies      LABS:                 10-28    136  |  99  |  20  ----------------------------<  121<H>  5.1   |  23  |  1.18    Ca    9.3      28 Oct 2022 07:21        < from: MR Abdomen w/wo IV Cont (10.26.22 @ 17:39) >    IMPRESSION:  *  An 8 cm abscess interposed between the liver and upper pole right   kidney.  *  Bilateral renal cyst. No lesion suspicious for neoplasm.      --- End of Report ---      < end of copied text >         cultures: Culture Results:   No growth (10-19 @ 08:59)       < from: MR Lumbar Spine No Cont (10.20.22 @ 17:28) >    IMPRESSION:  Evidence of acute fracture L1 with fracture line traversing   the vertebral body from anterior to posterior and findings consistent   with a 2 column injury. Paravertebral cuff of soft tissue consistent with   recent injury. Some mild extension of edema into the pedicles though the   transverse and spinous processes as well as the lamina are spared. No   other lesions are identified. Recommend interval follow-up to determine   benign versus pathologic fracture.    --- End of Report ---      < end of copied text >      < from: Xray Chest 1 View- PORTABLE-Urgent (Xray Chest 1 View- PORTABLE-Urgent .) (10.23.22 @ 15:36) >  IMPRESSION:  Bibasilar patchy opacities, compatible with subsegmental atelectasis.   Infectious etiology cannot be ruled out.    --- End of Report ---      < end of copied text >      < from: US Kidney and Bladder (10.24.22 @ 19:31) >  IMPRESSION:  Complex cystic collection superior to the right kidney, correlating to   heterogeneous collection exophytic from the right hepatic lobe.   Differential includes abscess versus hematoma.    Bilateral complex renal cysts.    < end of copied text >  < from: CT Chest No Cont (10.19.22 @ 09:59) >  IMPRESSION:  Limited noncontrast examination.    CHEST:  *  Secretions/debris within the bilateral mainstem bronchi with   multifocal sites of mucous plugging in distal airways.  *  Bilateral tree-in-bud nodularity is likely infectious/inflammatory.   Groundglass opacity within the left upper lobe, which may also be   infectious/inflammatory. Pulmonary nodule measuring 6 m in the right   upper lobe. Suggest 3 month follow-up chest CT to assess for stability or   resolution.  *  A few prominent nonspecific mediastinal lymph nodes.  *  Ascending thoracic aortic aneurysm measuring up to 5 cm.    ABDOMEN AND PELVIS:  *  Low density structure measuring 8.3 cm alongthe inferior margin of   the right hepatic lobe with surrounding inflammatory changes.   Differential includes an intrahepatic/perihepatic abscess, or possibly   hematoma in the setting of trauma. Neoplasm is thought to be less likely   but is not excluded. Contrast enhanced abdominal MRI may be helpful for   further characterization.  *  Mild compression fracture at L1, likely acute. Please refer to the   concurrent dedicated CT lumbar spine report.    Preliminary findings were discussed by Dr. Parks with Dr. Flores   on 10/19/2022 11:10 AM with read back confirmation. Final findings were   discussed with Dr. Pierce by Dr. Mcqueen on 10/19/2022 at 12:57 PM.    --- End of Report ---    ***Please see the addendum at the top of this report. It may contain   additional important information or changes.****      < end of copied text >

## 2022-10-29 NOTE — PROGRESS NOTE ADULT - ASSESSMENT
Assessment  DMT2: 82y Male with DM T2 with hyperglycemia, was on insulin at home, now on basal bolus insulin, blood sugars trending down,, no hypoglycemic  episodes. Patient eating meals, NAD, has renal cyst/fluid collection, planning IR drainage 11/1.  S/P Fall: workup in progress, stable, monitored.  HTN: Un Controlled,  on antihypertensive medications.            Melba Reyes MD  Cell: [phone]  Office: [phone]

## 2022-10-29 NOTE — DIETITIAN INITIAL EVALUATION ADULT - NS FNS DIET ORDER
Diet, Consistent Carbohydrate w/Evening Snack:   Pureed (PUREED)  Mildly Thick Liquids (MILDTHICKLIQS) (10-28-22 @ 16:15)

## 2022-10-29 NOTE — DIETITIAN INITIAL EVALUATION ADULT - ADD RECOMMEND
1) Continue Consistent carbohydrate diet; defer consistency to SLP and Team  2) Review DM education at follow up   3) Monitor PO intake, diet tolerance, weight trends, labs, GI function, and skin integrity

## 2022-10-29 NOTE — PROGRESS NOTE ADULT - SUBJECTIVE AND OBJECTIVE BOX
Urology Progress Note    Overnight Events:  Voiding on own using urinal          Physical Exam:  Abd soft nt nondistended  urinal in bed

## 2022-10-29 NOTE — PROGRESS NOTE ADULT - SUBJECTIVE AND OBJECTIVE BOX
Chief complaint    Patient is a 82y old  Male who presents with a chief complaint of 81yo Cantonese speaking Male with PMH of DM, HTN, CAD, hypercholesterolemia. Pt admitted on 10/19 with Dorsalgia.      (29 Oct 2022 10:37)   Review of systems  Patient in bed, appears comfortable.    Labs and Fingersticks  CAPILLARY BLOOD GLUCOSE      POCT Blood Glucose.: 118 mg/dL (29 Oct 2022 17:43)  POCT Blood Glucose.: 175 mg/dL (29 Oct 2022 12:36)  POCT Blood Glucose.: 172 mg/dL (29 Oct 2022 08:33)  POCT Blood Glucose.: 189 mg/dL (28 Oct 2022 22:20)      Anion Gap, Serum: 14 (10-28 @ 07:21)      Calcium, Total Serum: 9.3 (10-28 @ 07:21)          10-28    136  |  99  |  20  ----------------------------<  121<H>  5.1   |  23  |  1.18    Ca    9.3      28 Oct 2022 07:21      Medications  MEDICATIONS  (STANDING):  dextrose 5%. 1000 milliLiter(s) (50 mL/Hr) IV Continuous <Continuous>  dextrose 5%. 1000 milliLiter(s) (100 mL/Hr) IV Continuous <Continuous>  dextrose 50% Injectable 25 Gram(s) IV Push once  dextrose 50% Injectable 12.5 Gram(s) IV Push once  dextrose 50% Injectable 25 Gram(s) IV Push once  finasteride 5 milliGRAM(s) Oral daily  glucagon  Injectable 1 milliGRAM(s) IntraMuscular once  influenza  Vaccine (HIGH DOSE) 0.7 milliLiter(s) IntraMuscular once  insulin glargine Injectable (LANTUS) 25 Unit(s) SubCutaneous at bedtime  insulin lispro (ADMELOG) corrective regimen sliding scale   SubCutaneous three times a day before meals  insulin lispro Injectable (ADMELOG) 10 Unit(s) SubCutaneous three times a day before meals  lidocaine   4% Patch 1 Patch Transdermal every 24 hours  metoprolol succinate ER 25 milliGRAM(s) Oral daily  pantoprazole    Tablet 40 milliGRAM(s) Oral before breakfast  piperacillin/tazobactam IVPB.. 3.375 Gram(s) IV Intermittent every 8 hours  polyethylene glycol 3350 17 Gram(s) Oral daily  senna 2 Tablet(s) Oral at bedtime  tamsulosin 0.8 milliGRAM(s) Oral at bedtime      Physical Exam  General: Patient appears comfortable.  Vital Signs Last 12 Hrs  T(F): 97.2 (10-29-22 @ 20:47), Max: 97.6 (10-29-22 @ 11:48)  HR: 86 (10-29-22 @ 20:47) (85 - 90)  BP: 113/73 (10-29-22 @ 20:47) (113/73 - 118/78)  BP(mean): --  RR: 18 (10-29-22 @ 20:47) (18 - 18)  SpO2: 96% (10-29-22 @ 20:47) (96% - 98%)  Neck: No palpable thyroid nodules.  CVS: S1S2, No murmurs  Respiratory: No wheezing, no crepitations  GI: Abdomen soft, non tender.  Musculoskeletal:  edema lower extremities.     Diagnostics

## 2022-10-30 LAB
ANION GAP SERPL CALC-SCNC: 10 MMOL/L — SIGNIFICANT CHANGE UP (ref 5–17)
BUN SERPL-MCNC: 14 MG/DL — SIGNIFICANT CHANGE UP (ref 7–23)
CALCIUM SERPL-MCNC: 9.1 MG/DL — SIGNIFICANT CHANGE UP (ref 8.4–10.5)
CHLORIDE SERPL-SCNC: 101 MMOL/L — SIGNIFICANT CHANGE UP (ref 96–108)
CO2 SERPL-SCNC: 26 MMOL/L — SIGNIFICANT CHANGE UP (ref 22–31)
CREAT SERPL-MCNC: 1.08 MG/DL — SIGNIFICANT CHANGE UP (ref 0.5–1.3)
CULTURE RESULTS: SIGNIFICANT CHANGE UP
CULTURE RESULTS: SIGNIFICANT CHANGE UP
EGFR: 68 ML/MIN/1.73M2 — SIGNIFICANT CHANGE UP
GLUCOSE BLDC GLUCOMTR-MCNC: 140 MG/DL — HIGH (ref 70–99)
GLUCOSE BLDC GLUCOMTR-MCNC: 154 MG/DL — HIGH (ref 70–99)
GLUCOSE BLDC GLUCOMTR-MCNC: 167 MG/DL — HIGH (ref 70–99)
GLUCOSE BLDC GLUCOMTR-MCNC: 182 MG/DL — HIGH (ref 70–99)
GLUCOSE SERPL-MCNC: 180 MG/DL — HIGH (ref 70–99)
HCT VFR BLD CALC: 35.8 % — LOW (ref 39–50)
HGB BLD-MCNC: 11.4 G/DL — LOW (ref 13–17)
MAGNESIUM SERPL-MCNC: 1.6 MG/DL — SIGNIFICANT CHANGE UP (ref 1.6–2.6)
MCHC RBC-ENTMCNC: 28.5 PG — SIGNIFICANT CHANGE UP (ref 27–34)
MCHC RBC-ENTMCNC: 31.8 GM/DL — LOW (ref 32–36)
MCV RBC AUTO: 89.5 FL — SIGNIFICANT CHANGE UP (ref 80–100)
NRBC # BLD: 0 /100 WBCS — SIGNIFICANT CHANGE UP (ref 0–0)
PHOSPHATE SERPL-MCNC: 2.5 MG/DL — SIGNIFICANT CHANGE UP (ref 2.5–4.5)
PLATELET # BLD AUTO: 224 K/UL — SIGNIFICANT CHANGE UP (ref 150–400)
POTASSIUM SERPL-MCNC: 3.7 MMOL/L — SIGNIFICANT CHANGE UP (ref 3.5–5.3)
POTASSIUM SERPL-SCNC: 3.7 MMOL/L — SIGNIFICANT CHANGE UP (ref 3.5–5.3)
RBC # BLD: 4 M/UL — LOW (ref 4.2–5.8)
RBC # FLD: 14.1 % — SIGNIFICANT CHANGE UP (ref 10.3–14.5)
SODIUM SERPL-SCNC: 137 MMOL/L — SIGNIFICANT CHANGE UP (ref 135–145)
SPECIMEN SOURCE: SIGNIFICANT CHANGE UP
SPECIMEN SOURCE: SIGNIFICANT CHANGE UP
WBC # BLD: 9.39 K/UL — SIGNIFICANT CHANGE UP (ref 3.8–10.5)
WBC # FLD AUTO: 9.39 K/UL — SIGNIFICANT CHANGE UP (ref 3.8–10.5)

## 2022-10-30 RX ADMIN — Medication 10 UNIT(S): at 08:49

## 2022-10-30 RX ADMIN — LIDOCAINE 1 PATCH: 4 CREAM TOPICAL at 19:15

## 2022-10-30 RX ADMIN — INSULIN GLARGINE 25 UNIT(S): 100 INJECTION, SOLUTION SUBCUTANEOUS at 21:45

## 2022-10-30 RX ADMIN — Medication 25 MILLIGRAM(S): at 06:13

## 2022-10-30 RX ADMIN — LIDOCAINE 1 PATCH: 4 CREAM TOPICAL at 14:18

## 2022-10-30 RX ADMIN — TAMSULOSIN HYDROCHLORIDE 0.8 MILLIGRAM(S): 0.4 CAPSULE ORAL at 21:45

## 2022-10-30 RX ADMIN — PIPERACILLIN AND TAZOBACTAM 25 GRAM(S): 4; .5 INJECTION, POWDER, LYOPHILIZED, FOR SOLUTION INTRAVENOUS at 06:13

## 2022-10-30 RX ADMIN — PANTOPRAZOLE SODIUM 40 MILLIGRAM(S): 20 TABLET, DELAYED RELEASE ORAL at 06:13

## 2022-10-30 RX ADMIN — PIPERACILLIN AND TAZOBACTAM 25 GRAM(S): 4; .5 INJECTION, POWDER, LYOPHILIZED, FOR SOLUTION INTRAVENOUS at 14:25

## 2022-10-30 RX ADMIN — PIPERACILLIN AND TAZOBACTAM 25 GRAM(S): 4; .5 INJECTION, POWDER, LYOPHILIZED, FOR SOLUTION INTRAVENOUS at 21:45

## 2022-10-30 RX ADMIN — Medication 1: at 12:45

## 2022-10-30 RX ADMIN — FINASTERIDE 5 MILLIGRAM(S): 5 TABLET, FILM COATED ORAL at 12:46

## 2022-10-30 RX ADMIN — Medication 1: at 08:48

## 2022-10-30 RX ADMIN — SENNA PLUS 2 TABLET(S): 8.6 TABLET ORAL at 21:45

## 2022-10-30 RX ADMIN — Medication 10 UNIT(S): at 18:13

## 2022-10-30 RX ADMIN — Medication 10 UNIT(S): at 12:45

## 2022-10-30 RX ADMIN — Medication 1: at 18:12

## 2022-10-30 NOTE — PROGRESS NOTE ADULT - ASSESSMENT
Assessment  DMT2: 82y Male with DM T2 with hyperglycemia, was on insulin at home, now on basal bolus insulin, had elevated FS last night though blood sugars trending within otherwise acceptable range, no hypoglycemic episodes. Patient eating meals, NAD, has renal cyst/fluid collection, planning IR drainage 11/1.  S/P Fall: workup in progress, stable, monitored.  HTN: Un Controlled,  on antihypertensive medications.            Melba Reyes MD  Cell: [phone]  Office: [phone]               Assessment  DMT2: 82y Male with DM T2 with hyperglycemia, was on insulin at home, now on basal bolus insulin, had elevated FS last night though blood sugars trending within otherwise acceptable range, no hypoglycemic  episodes. Patient eating meals, NAD, has renal cyst/fluid collection, planning IR drainage 11/1.  S/P Fall: workup in progress, stable, monitored.  HTN: Un Controlled,  on antihypertensive medications.            Melba Reyes MD  Cell: [phone]  Office: [phone]

## 2022-10-30 NOTE — PROGRESS NOTE ADULT - ASSESSMENT
81 male h/o htn, chol, dm, here s/p mechanical fall    mechanical fall  imaging noted  surg eval noted  MRI L spine noted with Lspine fx  ortho f/u noted  no acute intervention  pain control  TLSO brace  PT    DM  insulin as ordered  endo f/u  monitor fs    hypoxia  imaging c/w pna  pulm consult noted  supp o2  iv zosyn  incentive spirometer  nebs    chanel  unclear etiology  possible 2/2 poor oral fluid intake  renal f/u  creat now nl    abnl UA  f/u cult  on zosyn  id f/u    MRI noted with collection btw liver and kidney  possible abscess  IR consulted for drainage - to be done this week. hold ASA  abx as per id    suspected renal lesion  gu eval noted   MRI noted with cysts. not suspicious for malignancy        cont other home meds         Advanced care planning was discussed with patient and family.  Advanced care planning forms were reviewed and discussed as appropriate.  Differential diagnosis and plan of care discussed with patient after the evaluation.   Pain assessed and judicious use of narcotics when appropriate was discussed.  Importance of Fall prevention discussed.  Counseling on Smoking and Alcohol cessation was offered when appropriate.  Counseling on Diet, exercise, and medication compliance was done.   Approx 30 minutes spent.

## 2022-10-30 NOTE — PROGRESS NOTE ADULT - SUBJECTIVE AND OBJECTIVE BOX
KAITLIN CALDERON  82y Male  MRN:[id number]    Patient is a 82y old  Male who presents with a chief complaint of fall    HPI:   81M cantonese speaking PMHx of HTN/HLD, DM2, BPH, (?Afib per chart review, though not on AC's), presents to the ED with mid lower back pain that he sustained after falling in the bathroom while he was urinating at 9pm last night. Pt denies headstrike/LOC. Difficult to obtain full history even with  though pt answering questions appropriately and is AAOx2. Pt denies any pain anywhere else. Per EMS pt has had chronic cough. Unclear if pt was down long. pt last took insulin 2 days ago he states.   	Spoke with stepdaughter Yulissa who lives below pt: Pt couldn't get up from the fall yesterday, has difficulty ambulating with walker since the fall as he complaints of back pain, pt has 9hrs home help daily but not overnight. She states that pt is at baseline mental status and that he's had short term memory problems for some time. Denies any recent fevers/chills, abd pain, v/d, chest pain/sob. States that the cough pt has is chronic.  (19 Oct 2022 15:53)      Patient seen and evaluated at bedside. No acute events overnight except as noted.    Interval HPI: no acute events o/n     PAST MEDICAL & SURGICAL HISTORY:  DM (diabetes mellitus)      HTN (hypertension)      Hypercholesterolemia      CAD (coronary artery disease)      HTN (hypertension)      DM (diabetes mellitus)      S/P TURP      S/P gastrectomy          REVIEW OF SYSTEMS:  as per hpi     VITALS:   Vital Signs Last 24 Hrs  T(C): 36.6 (30 Oct 2022 14:10), Max: 36.6 (30 Oct 2022 14:10)  T(F): 97.8 (30 Oct 2022 14:10), Max: 97.8 (30 Oct 2022 14:10)  HR: 84 (30 Oct 2022 14:10) (83 - 86)  BP: 124/75 (30 Oct 2022 14:10) (113/73 - 141/92)  BP(mean): --  RR: 18 (30 Oct 2022 14:10) (18 - 18)  SpO2: 97% (30 Oct 2022 14:10) (96% - 98%)    Parameters below as of 30 Oct 2022 14:10  Patient On (Oxygen Delivery Method): nasal cannula  O2 Flow (L/min): 3        PHYSICAL EXAM:  GENERAL: NAD, well-developed,    HEAD:  Atraumatic, Normocephalic  EYES: EOMI, PERRLA, conjunctiva and sclera clear  NECK: Supple, No JVD  CHEST/LUNG: clear b/l  HEART: S1, S2; No murmurs, rubs, or gallops  ABDOMEN: Soft, Nontender, Nondistended; Bowel sounds present  EXTREMITIES:  2+ Peripheral Pulses, No clubbing, cyanosis, or edema  PSYCH: Normal affect  NEUROLOGY: AAOX1-2; non-focal  SKIN: No rashes or lesions    Consultant(s) Notes Reviewed:  [x ] YES  [ ] NO  Care Discussed with Consultants/Other Providers [ x] YES  [ ] NO    MEDS:   MEDICATIONS  (STANDING):  dextrose 5%. 1000 milliLiter(s) (100 mL/Hr) IV Continuous <Continuous>  dextrose 5%. 1000 milliLiter(s) (50 mL/Hr) IV Continuous <Continuous>  dextrose 50% Injectable 25 Gram(s) IV Push once  dextrose 50% Injectable 12.5 Gram(s) IV Push once  dextrose 50% Injectable 25 Gram(s) IV Push once  finasteride 5 milliGRAM(s) Oral daily  glucagon  Injectable 1 milliGRAM(s) IntraMuscular once  influenza  Vaccine (HIGH DOSE) 0.7 milliLiter(s) IntraMuscular once  insulin glargine Injectable (LANTUS) 25 Unit(s) SubCutaneous at bedtime  insulin lispro (ADMELOG) corrective regimen sliding scale   SubCutaneous three times a day before meals  insulin lispro Injectable (ADMELOG) 10 Unit(s) SubCutaneous three times a day before meals  lidocaine   4% Patch 1 Patch Transdermal every 24 hours  metoprolol succinate ER 25 milliGRAM(s) Oral daily  pantoprazole    Tablet 40 milliGRAM(s) Oral before breakfast  piperacillin/tazobactam IVPB.. 3.375 Gram(s) IV Intermittent every 8 hours  polyethylene glycol 3350 17 Gram(s) Oral daily  senna 2 Tablet(s) Oral at bedtime  tamsulosin 0.8 milliGRAM(s) Oral at bedtime    MEDICATIONS  (PRN):  acetaminophen     Tablet .. 650 milliGRAM(s) Oral every 6 hours PRN mild pain  albuterol/ipratropium for Nebulization 3 milliLiter(s) Nebulizer every 6 hours PRN Shortness of Breath  dextrose Oral Gel 15 Gram(s) Oral once PRN Blood Glucose LESS THAN 70 milliGRAM(s)/deciliter  oxyCODONE    IR 5 milliGRAM(s) Oral every 6 hours PRN Moderate Pain (4 - 6)      ALLERGIES:  No Known Allergies      LABS:                             11.4   9.39  )-----------( 224      ( 30 Oct 2022 07:11 )             35.8   10-30    137  |  101  |  14  ----------------------------<  180<H>  3.7   |  26  |  1.08    Ca    9.1      30 Oct 2022 07:11  Phos  2.5     10-30  Mg     1.6     10-30        < from: MR Abdomen w/wo IV Cont (10.26.22 @ 17:39) >    IMPRESSION:  *  An 8 cm abscess interposed between the liver and upper pole right   kidney.  *  Bilateral renal cyst. No lesion suspicious for neoplasm.      --- End of Report ---      < end of copied text >         cultures: Culture Results:   No growth (10-19 @ 08:59)       < from: MR Lumbar Spine No Cont (10.20.22 @ 17:28) >    IMPRESSION:  Evidence of acute fracture L1 with fracture line traversing   the vertebral body from anterior to posterior and findings consistent   with a 2 column injury. Paravertebral cuff of soft tissue consistent with   recent injury. Some mild extension of edema into the pedicles though the   transverse and spinous processes as well as the lamina are spared. No   other lesions are identified. Recommend interval follow-up to determine   benign versus pathologic fracture.    --- End of Report ---      < end of copied text >      < from: Xray Chest 1 View- PORTABLE-Urgent (Xray Chest 1 View- PORTABLE-Urgent .) (10.23.22 @ 15:36) >  IMPRESSION:  Bibasilar patchy opacities, compatible with subsegmental atelectasis.   Infectious etiology cannot be ruled out.    --- End of Report ---      < end of copied text >      < from: US Kidney and Bladder (10.24.22 @ 19:31) >  IMPRESSION:  Complex cystic collection superior to the right kidney, correlating to   heterogeneous collection exophytic from the right hepatic lobe.   Differential includes abscess versus hematoma.    Bilateral complex renal cysts.    < end of copied text >  < from: CT Chest No Cont (10.19.22 @ 09:59) >  IMPRESSION:  Limited noncontrast examination.    CHEST:  *  Secretions/debris within the bilateral mainstem bronchi with   multifocal sites of mucous plugging in distal airways.  *  Bilateral tree-in-bud nodularity is likely infectious/inflammatory.   Groundglass opacity within the left upper lobe, which may also be   infectious/inflammatory. Pulmonary nodule measuring 6 m in the right   upper lobe. Suggest 3 month follow-up chest CT to assess for stability or   resolution.  *  A few prominent nonspecific mediastinal lymph nodes.  *  Ascending thoracic aortic aneurysm measuring up to 5 cm.    ABDOMEN AND PELVIS:  *  Low density structure measuring 8.3 cm alongthe inferior margin of   the right hepatic lobe with surrounding inflammatory changes.   Differential includes an intrahepatic/perihepatic abscess, or possibly   hematoma in the setting of trauma. Neoplasm is thought to be less likely   but is not excluded. Contrast enhanced abdominal MRI may be helpful for   further characterization.  *  Mild compression fracture at L1, likely acute. Please refer to the   concurrent dedicated CT lumbar spine report.    Preliminary findings were discussed by Dr. Parks with Dr. Flores   on 10/19/2022 11:10 AM with read back confirmation. Final findings were   discussed with Dr. Pierce by Dr. Mcqueen on 10/19/2022 at 12:57 PM.    --- End of Report ---    ***Please see the addendum at the top of this report. It may contain   additional important information or changes.****      < end of copied text >

## 2022-10-30 NOTE — PROGRESS NOTE ADULT - SUBJECTIVE AND OBJECTIVE BOX
Chief complaint  Patient is a 82y old  Male who presents with a chief complaint of 83yo Cantonese speaking Male with PMH of DM, HTN, CAD, hypercholesterolemia. Pt admitted on 10/19 with Dorsalgia.      (29 Oct 2022 10:37)   Review of systems  Patient in bed, looks comfortable, no hypoglycemic episodes.    Labs and Fingersticks  CAPILLARY BLOOD GLUCOSE      POCT Blood Glucose.: 167 mg/dL (30 Oct 2022 12:35)  POCT Blood Glucose.: 182 mg/dL (30 Oct 2022 08:38)  POCT Blood Glucose.: 304 mg/dL (29 Oct 2022 22:01)  POCT Blood Glucose.: 118 mg/dL (29 Oct 2022 17:43)      Anion Gap, Serum: 10 (10-30 @ 07:11)      Calcium, Total Serum: 9.1 (10-30 @ 07:11)          10-30    137  |  101  |  14  ----------------------------<  180<H>  3.7   |  26  |  1.08    Ca    9.1      30 Oct 2022 07:11  Phos  2.5     10-30  Mg     1.6     10-30                          11.4   9.39  )-----------( 224      ( 30 Oct 2022 07:11 )             35.8     Medications  MEDICATIONS  (STANDING):  dextrose 5%. 1000 milliLiter(s) (50 mL/Hr) IV Continuous <Continuous>  dextrose 5%. 1000 milliLiter(s) (100 mL/Hr) IV Continuous <Continuous>  dextrose 50% Injectable 25 Gram(s) IV Push once  dextrose 50% Injectable 12.5 Gram(s) IV Push once  dextrose 50% Injectable 25 Gram(s) IV Push once  finasteride 5 milliGRAM(s) Oral daily  glucagon  Injectable 1 milliGRAM(s) IntraMuscular once  influenza  Vaccine (HIGH DOSE) 0.7 milliLiter(s) IntraMuscular once  insulin glargine Injectable (LANTUS) 25 Unit(s) SubCutaneous at bedtime  insulin lispro (ADMELOG) corrective regimen sliding scale   SubCutaneous three times a day before meals  insulin lispro Injectable (ADMELOG) 10 Unit(s) SubCutaneous three times a day before meals  lidocaine   4% Patch 1 Patch Transdermal every 24 hours  metoprolol succinate ER 25 milliGRAM(s) Oral daily  pantoprazole    Tablet 40 milliGRAM(s) Oral before breakfast  piperacillin/tazobactam IVPB.. 3.375 Gram(s) IV Intermittent every 8 hours  polyethylene glycol 3350 17 Gram(s) Oral daily  senna 2 Tablet(s) Oral at bedtime  tamsulosin 0.8 milliGRAM(s) Oral at bedtime      Physical Exam  General: Patient comfortable in bed  Vital Signs Last 12 Hrs  T(F): 97.3 (10-30-22 @ 05:48), Max: 97.3 (10-30-22 @ 05:48)  HR: 83 (10-30-22 @ 05:48) (83 - 83)  BP: 141/92 (10-30-22 @ 05:48) (141/92 - 141/92)  BP(mean): --  RR: 18 (10-30-22 @ 05:48) (18 - 18)  SpO2: 96% (10-30-22 @ 05:48) (96% - 96%)  Neck: No palpable thyroid nodules.  CVS: S1S2, No murmurs  Respiratory: No wheezing, no crepitations  GI: Abdomen soft, bowel sounds positive  Musculoskeletal:  edema lower extremities.   Skin: No skin rashes, no ecchymosis    Diagnostics    Free Thyroxine, Serum: AM Sched. Collection: 21-Oct-2022 06:00 (10-20 @ 13:18)  Thyroid Stimulating Hormone, Serum: AM Sched. Collection: 21-Oct-2022 06:00 (10-20 @ 13:18)  A1C with Estimated Average Glucose: Routine (10-20 @ 13:17)             Chief complaint  Patient is a 82y old  Male who presents with a chief complaint of 81yo Cantonese speaking Male with PMH of DM, HTN, CAD, hypercholesterolemia. Pt admitted on 10/19 with Dorsalgia.      (29 Oct 2022 10:37)   Review of systems  Patient in bed, looks comfortable, no hypoglycemic episodes.      Labs and Fingersticks  CAPILLARY BLOOD GLUCOSE      POCT Blood Glucose.: 167 mg/dL (30 Oct 2022 12:35)  POCT Blood Glucose.: 182 mg/dL (30 Oct 2022 08:38)  POCT Blood Glucose.: 304 mg/dL (29 Oct 2022 22:01)  POCT Blood Glucose.: 118 mg/dL (29 Oct 2022 17:43)      Anion Gap, Serum: 10 (10-30 @ 07:11)      Calcium, Total Serum: 9.1 (10-30 @ 07:11)          10-30    137  |  101  |  14  ----------------------------<  180<H>  3.7   |  26  |  1.08    Ca    9.1      30 Oct 2022 07:11  Phos  2.5     10-30  Mg     1.6     10-30                          11.4   9.39  )-----------( 224      ( 30 Oct 2022 07:11 )             35.8     Medications  MEDICATIONS  (STANDING):  dextrose 5%. 1000 milliLiter(s) (50 mL/Hr) IV Continuous <Continuous>  dextrose 5%. 1000 milliLiter(s) (100 mL/Hr) IV Continuous <Continuous>  dextrose 50% Injectable 25 Gram(s) IV Push once  dextrose 50% Injectable 12.5 Gram(s) IV Push once  dextrose 50% Injectable 25 Gram(s) IV Push once  finasteride 5 milliGRAM(s) Oral daily  glucagon  Injectable 1 milliGRAM(s) IntraMuscular once  influenza  Vaccine (HIGH DOSE) 0.7 milliLiter(s) IntraMuscular once  insulin glargine Injectable (LANTUS) 25 Unit(s) SubCutaneous at bedtime  insulin lispro (ADMELOG) corrective regimen sliding scale   SubCutaneous three times a day before meals  insulin lispro Injectable (ADMELOG) 10 Unit(s) SubCutaneous three times a day before meals  lidocaine   4% Patch 1 Patch Transdermal every 24 hours  metoprolol succinate ER 25 milliGRAM(s) Oral daily  pantoprazole    Tablet 40 milliGRAM(s) Oral before breakfast  piperacillin/tazobactam IVPB.. 3.375 Gram(s) IV Intermittent every 8 hours  polyethylene glycol 3350 17 Gram(s) Oral daily  senna 2 Tablet(s) Oral at bedtime  tamsulosin 0.8 milliGRAM(s) Oral at bedtime      Physical Exam  General: Patient comfortable in bed  Vital Signs Last 12 Hrs  T(F): 97.3 (10-30-22 @ 05:48), Max: 97.3 (10-30-22 @ 05:48)  HR: 83 (10-30-22 @ 05:48) (83 - 83)  BP: 141/92 (10-30-22 @ 05:48) (141/92 - 141/92)  BP(mean): --  RR: 18 (10-30-22 @ 05:48) (18 - 18)  SpO2: 96% (10-30-22 @ 05:48) (96% - 96%)  Neck: No palpable thyroid nodules.  CVS: S1S2, No murmurs  Respiratory: No wheezing, no crepitations  GI: Abdomen soft, bowel sounds positive  Musculoskeletal:  edema lower extremities.   Skin: No skin rashes, no ecchymosis    Diagnostics    Free Thyroxine, Serum: AM Sched. Collection: 21-Oct-2022 06:00 (10-20 @ 13:18)  Thyroid Stimulating Hormone, Serum: AM Sched. Collection: 21-Oct-2022 06:00 (10-20 @ 13:18)  A1C with Estimated Average Glucose: Routine (10-20 @ 13:17)

## 2022-10-31 LAB
GLUCOSE BLDC GLUCOMTR-MCNC: 112 MG/DL — HIGH (ref 70–99)
GLUCOSE BLDC GLUCOMTR-MCNC: 119 MG/DL — HIGH (ref 70–99)
GLUCOSE BLDC GLUCOMTR-MCNC: 138 MG/DL — HIGH (ref 70–99)
GLUCOSE BLDC GLUCOMTR-MCNC: 140 MG/DL — HIGH (ref 70–99)
HCT VFR BLD CALC: 35.6 % — LOW (ref 39–50)
HGB BLD-MCNC: 11.5 G/DL — LOW (ref 13–17)
MCHC RBC-ENTMCNC: 28.6 PG — SIGNIFICANT CHANGE UP (ref 27–34)
MCHC RBC-ENTMCNC: 32.3 GM/DL — SIGNIFICANT CHANGE UP (ref 32–36)
MCV RBC AUTO: 88.6 FL — SIGNIFICANT CHANGE UP (ref 80–100)
NRBC # BLD: 0 /100 WBCS — SIGNIFICANT CHANGE UP (ref 0–0)
PLATELET # BLD AUTO: 220 K/UL — SIGNIFICANT CHANGE UP (ref 150–400)
RBC # BLD: 4.02 M/UL — LOW (ref 4.2–5.8)
RBC # FLD: 14 % — SIGNIFICANT CHANGE UP (ref 10.3–14.5)
SARS-COV-2 RNA SPEC QL NAA+PROBE: SIGNIFICANT CHANGE UP
WBC # BLD: 9.03 K/UL — SIGNIFICANT CHANGE UP (ref 3.8–10.5)
WBC # FLD AUTO: 9.03 K/UL — SIGNIFICANT CHANGE UP (ref 3.8–10.5)

## 2022-10-31 PROCEDURE — 99232 SBSQ HOSP IP/OBS MODERATE 35: CPT

## 2022-10-31 RX ORDER — INSULIN GLARGINE 100 [IU]/ML
13 INJECTION, SOLUTION SUBCUTANEOUS ONCE
Refills: 0 | Status: COMPLETED | OUTPATIENT
Start: 2022-10-31 | End: 2022-10-31

## 2022-10-31 RX ADMIN — Medication 10 UNIT(S): at 12:55

## 2022-10-31 RX ADMIN — LIDOCAINE 1 PATCH: 4 CREAM TOPICAL at 13:44

## 2022-10-31 RX ADMIN — PIPERACILLIN AND TAZOBACTAM 25 GRAM(S): 4; .5 INJECTION, POWDER, LYOPHILIZED, FOR SOLUTION INTRAVENOUS at 13:44

## 2022-10-31 RX ADMIN — FINASTERIDE 5 MILLIGRAM(S): 5 TABLET, FILM COATED ORAL at 11:25

## 2022-10-31 RX ADMIN — Medication 25 MILLIGRAM(S): at 05:16

## 2022-10-31 RX ADMIN — PIPERACILLIN AND TAZOBACTAM 25 GRAM(S): 4; .5 INJECTION, POWDER, LYOPHILIZED, FOR SOLUTION INTRAVENOUS at 22:40

## 2022-10-31 RX ADMIN — INSULIN GLARGINE 13 UNIT(S): 100 INJECTION, SOLUTION SUBCUTANEOUS at 22:40

## 2022-10-31 RX ADMIN — LIDOCAINE 1 PATCH: 4 CREAM TOPICAL at 01:35

## 2022-10-31 RX ADMIN — SENNA PLUS 2 TABLET(S): 8.6 TABLET ORAL at 22:47

## 2022-10-31 RX ADMIN — TAMSULOSIN HYDROCHLORIDE 0.8 MILLIGRAM(S): 0.4 CAPSULE ORAL at 22:47

## 2022-10-31 RX ADMIN — POLYETHYLENE GLYCOL 3350 17 GRAM(S): 17 POWDER, FOR SOLUTION ORAL at 11:26

## 2022-10-31 RX ADMIN — LIDOCAINE 1 PATCH: 4 CREAM TOPICAL at 18:23

## 2022-10-31 RX ADMIN — Medication 10 UNIT(S): at 09:13

## 2022-10-31 RX ADMIN — PIPERACILLIN AND TAZOBACTAM 25 GRAM(S): 4; .5 INJECTION, POWDER, LYOPHILIZED, FOR SOLUTION INTRAVENOUS at 05:16

## 2022-10-31 RX ADMIN — Medication 10 UNIT(S): at 17:17

## 2022-10-31 RX ADMIN — PANTOPRAZOLE SODIUM 40 MILLIGRAM(S): 20 TABLET, DELAYED RELEASE ORAL at 05:16

## 2022-10-31 NOTE — PROGRESS NOTE ADULT - SUBJECTIVE AND OBJECTIVE BOX
CC: F/U for Abscess    Saw/spoke to patient. No fevers, no chills. No new complaints.    Allergies  No Known Allergies    ANTIMICROBIALS:  piperacillin/tazobactam IVPB.. 3.375 every 8 hours    PE:    Vital Signs Last 24 Hrs  T(C): 36.6 (31 Oct 2022 12:45), Max: 36.6 (30 Oct 2022 14:10)  T(F): 97.9 (31 Oct 2022 12:45), Max: 97.9 (30 Oct 2022 20:37)  HR: 98 (31 Oct 2022 12:45) (84 - 100)  BP: 119/83 (31 Oct 2022 12:45) (109/80 - 133/78)  RR: 18 (31 Oct 2022 12:45) (18 - 18)  SpO2: 98% (31 Oct 2022 12:45) (97% - 98%)    Gen: AOx3, NAD, non-toxic  CV: Nontachycardic  Resp: Breathing comfortably, RA  Abd: Soft, nontender  IV/Skin: No thrombophlebitis    LABS:                        11.5   9.03  )-----------( 220      ( 31 Oct 2022 07:21 )             35.6     10-30    137  |  101  |  14  ----------------------------<  180<H>  3.7   |  26  |  1.08    Ca    9.1      30 Oct 2022 07:11  Phos  2.5     10-30  Mg     1.6     10-30    MICROBIOLOGY:    .Blood Blood-Venous  10-25-22   No Growth Final  --  --    .Blood Blood  10-25-22   No Growth Final  --  --    Clean Catch Clean Catch (Midstream)  10-19-22   No growth  --  --    Rapid RVP Result: NotDetec (10-25 @ 14:33)    (otherwise reviewed)    RADIOLOGY:    10/26 CT:    IMPRESSION:  *  An 8 cm abscess interposed between the liver and upper pole right   kidney.  *  Bilateral renal cyst. No lesion suspicious for neoplasm.

## 2022-10-31 NOTE — PROGRESS NOTE ADULT - SUBJECTIVE AND OBJECTIVE BOX
Vital Signs Last 24 Hrs  T(C): 36.6 (31 Oct 2022 12:45), Max: 36.6 (30 Oct 2022 14:10)  T(F): 97.9 (31 Oct 2022 12:45), Max: 97.9 (30 Oct 2022 20:37)  HR: 98 (31 Oct 2022 12:45) (84 - 100)  BP: 119/83 (31 Oct 2022 12:45) (109/80 - 133/78)  BP(mean): --  RR: 18 (31 Oct 2022 12:45) (18 - 18)  SpO2: 98% (31 Oct 2022 12:45) (97% - 98%)    Parameters below as of 31 Oct 2022 12:45  Patient On (Oxygen Delivery Method): nasal cannula  O2 Flow (L/min): 3                            11.5   9.03  )-----------( 220      ( 31 Oct 2022 07:21 )             35.6     10-30    137  |  101  |  14  ----------------------------<  180<H>  3.7   |  26  |  1.08    Ca    9.1      30 Oct 2022 07:11  Phos  2.5     10-30  Mg     1.6     10-30    Follow-up Pulm Progress Note  Edmund Quiroga MD  [phone]    Remains afebrile on Zosyn  CTguided drainage RUQ abcess pending  Sleeping comfortably supine on nasal cannula        Physical Examination:  PULM: Few basilar rhonchi  CVS: Regular rate and rhythm, no murmurs, rubs, or gallops  ABD: Soft, non-tender  EXT:  No clubbing, cyanosis, or edema    RADIOLOGY REVIEWED  CXR:    CT chest:    TTE:

## 2022-10-31 NOTE — PROGRESS NOTE ADULT - SUBJECTIVE AND OBJECTIVE BOX
Overnight events noted      VITAL:  T(C): , Max: 36.6 (10-30-22 @ 14:10)  T(F): , Max: 97.9 (10-30-22 @ 20:37)  HR: 96 (10-31-22 @ 04:42)  BP: 109/80 (10-31-22 @ 04:42)  BP(mean): --  RR: 18 (10-31-22 @ 04:42)  SpO2: 97% (10-31-22 @ 04:42)      PHYSICAL EXAM:  Constitutional: NAD  HEENT: NCAT, MMM  Neck: Supple, No JVD  Respiratory: CTA-b/l  Cardiovascular: tachy s1s2  Gastrointestinal: BS+, soft, NT/ND  Extremities: No peripheral edema b/l  Neurological: no focal deficits; strength grossly intact  Back: no CVAT b/l  Skin: No rashes, no nevi    LABS:                        11.5   9.03  )-----------( 220      ( 31 Oct 2022 07:21 )             35.6     Na(137)/K(3.7)/Cl(101)/HCO3(26)/BUN(14)/Cr(1.08)Glu(180)/Ca(9.1)/Mg(1.6)/PO4(2.5)    10-30 @ 07:11      IMPRESSION: 82M w/ HTN, DM2, CAD, and BPH-TURP, 10/19/22 p/w L1 vertebral fx s/p mechanical fall; c/b ANTON    (1)Renal - ANTON - prerenal - now improved  (2)Hypoxia - PNA - on IV Zosyn - Pulm on baoard  (3)Perihepatic collection - potentially for IR-guided drainage this week      RECOMMEND:  (1)Continue abx for GFR ~50ml/min  (2)Abscess drainage per IR              Maco Bhatia MD  [facility]  Office: [phone]  Cell: [phone]       Overnight events noted      VITAL:  T(C): , Max: 36.6 (10-30-22 @ 14:10)  T(F): , Max: 97.9 (10-30-22 @ 20:37)  HR: 96 (10-31-22 @ 04:42)  BP: 109/80 (10-31-22 @ 04:42)  BP(mean): --  RR: 18 (10-31-22 @ 04:42)  SpO2: 97% (10-31-22 @ 04:42)      PHYSICAL EXAM:  Constitutional: NAD  HEENT: NCAT, MMM  Neck: Supple, No JVD  Respiratory: CTA-b/l  Cardiovascular: tachy s1s2  Gastrointestinal: BS+, soft, NT/ND  Extremities: No peripheral edema b/l  Neurological: no focal deficits; strength grossly intact  Back: no CVAT b/l  Skin: No rashes, no nevi    LABS:                        11.5   9.03  )-----------( 220      ( 31 Oct 2022 07:21 )             35.6     Na(137)/K(3.7)/Cl(101)/HCO3(26)/BUN(14)/Cr(1.08)Glu(180)/Ca(9.1)/Mg(1.6)/PO4(2.5)    10-30 @ 07:11      IMPRESSION: 82M w/ HTN, DM2, CAD, and BPH-TURP, 10/19/22 p/w L1 vertebral fx s/p mechanical fall; c/b ANTON    (1)Renal - ATNON - prerenal - now improved  (2)Hypoxia - PNA - on IV Zosyn - Pulm on board  (3)Perihepatic collection - potentially for IR-guided drainage this week      RECOMMEND:  (1)Continue abx for GFR ~50ml/min  (2)Abscess drainage per IR              Maco Bhatia MD  [facility]  Office: [phone]  Cell: [phone]       No complaints      VITAL:  T(C): , Max: 36.6 (10-30-22 @ 14:10)  T(F): , Max: 97.9 (10-30-22 @ 20:37)  HR: 96 (10-31-22 @ 04:42)  BP: 109/80 (10-31-22 @ 04:42)  BP(mean): --  RR: 18 (10-31-22 @ 04:42)  SpO2: 97% (10-31-22 @ 04:42)      PHYSICAL EXAM:  Constitutional: NAD  HEENT: NCAT, MMM  Neck: Supple, No JVD  Respiratory: CTA-b/l  Cardiovascular: tachy s1s2  Gastrointestinal: BS+, soft, NT/ND  Extremities: No peripheral edema b/l  Neurological: no focal deficits; strength grossly intact  Back: no CVAT b/l  Skin: No rashes, no nevi    LABS:                        11.5   9.03  )-----------( 220      ( 31 Oct 2022 07:21 )             35.6     Na(137)/K(3.7)/Cl(101)/HCO3(26)/BUN(14)/Cr(1.08)Glu(180)/Ca(9.1)/Mg(1.6)/PO4(2.5)    10-30 @ 07:11      IMPRESSION: 82M w/ HTN, DM2, CAD, and BPH-TURP, 10/19/22 p/w L1 vertebral fx s/p mechanical fall; c/b ANTON    (1)Renal - ANTON - prerenal - now improved  (2)Hypoxia - PNA - on IV Zosyn - Pulm on board  (3)Perihepatic collection - potentially for IR-guided drainage this week      RECOMMEND:  (1)Continue abx for GFR ~50ml/min  (2)Abscess drainage per IR              Maco Bhatia MD  [facility]  Office: [phone]  Cell: [phone]

## 2022-10-31 NOTE — PROGRESS NOTE ADULT - ASSESSMENT
Assessment  DMT2: 82y Male with DM T2 with hyperglycemia, was on insulin at home, now on basal bolus insulin, blood sugars trending within overall acceptable range, no hypoglycemic  episodes. Patient eating meals, NAD, has renal cyst/fluid collection, planning IR drainage tomorrow.  S/P Fall: workup in progress, stable, monitored.  HTN: Un Controlled,  on antihypertensive medications.            Melba Reyes MD  Cell: [phone]  Office: [phone]               Assessment  DMT2: 82y Male with DM T2 with hyperglycemia, was on insulin at home, now on basal bolus insulin, blood sugars trending within overall acceptable range, no hypoglycemic  episodes. Patient eating meals, NAD, has renal cyst/fluid  collection, planning IR drainage tomorrow.  S/P Fall: workup in progress, stable, monitored.  HTN: Un Controlled,  on antihypertensive medications.            Melba Reyes MD  Cell: [phone]  Office: [phone]

## 2022-10-31 NOTE — PROGRESS NOTE ADULT - SUBJECTIVE AND OBJECTIVE BOX
KAITLIN CALDERON  82y Male  MRN:[id number]    Patient is a 82y old  Male who presents with a chief complaint of fall    HPI:   81M cantonese speaking PMHx of HTN/HLD, DM2, BPH, (?Afib per chart review, though not on AC's), presents to the ED with mid lower back pain that he sustained after falling in the bathroom while he was urinating at 9pm last night. Pt denies headstrike/LOC. Difficult to obtain full history even with  though pt answering questions appropriately and is AAOx2. Pt denies any pain anywhere else. Per EMS pt has had chronic cough. Unclear if pt was down long. pt last took insulin 2 days ago he states.   	Spoke with stepdaughter Yulissa who lives below pt: Pt couldn't get up from the fall yesterday, has difficulty ambulating with walker since the fall as he complaints of back pain, pt has 9hrs home help daily but not overnight. She states that pt is at baseline mental status and that he's had short term memory problems for some time. Denies any recent fevers/chills, abd pain, v/d, chest pain/sob. States that the cough pt has is chronic.  (19 Oct 2022 15:53)      Patient seen and evaluated at bedside. No acute events overnight except as noted.    Interval HPI: no acute events o/n     PAST MEDICAL & SURGICAL HISTORY:  DM (diabetes mellitus)      HTN (hypertension)      Hypercholesterolemia      CAD (coronary artery disease)      HTN (hypertension)      DM (diabetes mellitus)      S/P TURP      S/P gastrectomy          REVIEW OF SYSTEMS:  as per hpi     VITALS:   Vital Signs Last 24 Hrs  T(C): 36.6 (31 Oct 2022 12:45), Max: 36.6 (30 Oct 2022 14:10)  T(F): 97.9 (31 Oct 2022 12:45), Max: 97.9 (30 Oct 2022 20:37)  HR: 98 (31 Oct 2022 12:45) (84 - 100)  BP: 119/83 (31 Oct 2022 12:45) (109/80 - 133/78)  BP(mean): --  RR: 18 (31 Oct 2022 12:45) (18 - 18)  SpO2: 98% (31 Oct 2022 12:45) (97% - 98%)    Parameters below as of 31 Oct 2022 12:45  Patient On (Oxygen Delivery Method): nasal cannula  O2 Flow (L/min): 3          PHYSICAL EXAM:  GENERAL: NAD, well-developed,    HEAD:  Atraumatic, Normocephalic  EYES: EOMI, PERRLA, conjunctiva and sclera clear  NECK: Supple, No JVD  CHEST/LUNG: clear b/l  HEART: S1, S2; No murmurs, rubs, or gallops  ABDOMEN: Soft, Nontender, Nondistended; Bowel sounds present  EXTREMITIES:  2+ Peripheral Pulses, No clubbing, cyanosis, or edema  PSYCH: Normal affect  NEUROLOGY: AAOX1-2; non-focal  SKIN: No rashes or lesions    Consultant(s) Notes Reviewed:  [x ] YES  [ ] NO  Care Discussed with Consultants/Other Providers [ x] YES  [ ] NO    MEDS:  MEDICATIONS  (STANDING):  dextrose 5%. 1000 milliLiter(s) (50 mL/Hr) IV Continuous <Continuous>  dextrose 5%. 1000 milliLiter(s) (100 mL/Hr) IV Continuous <Continuous>  dextrose 50% Injectable 25 Gram(s) IV Push once  dextrose 50% Injectable 12.5 Gram(s) IV Push once  dextrose 50% Injectable 25 Gram(s) IV Push once  finasteride 5 milliGRAM(s) Oral daily  glucagon  Injectable 1 milliGRAM(s) IntraMuscular once  influenza  Vaccine (HIGH DOSE) 0.7 milliLiter(s) IntraMuscular once  insulin glargine Injectable (LANTUS) 25 Unit(s) SubCutaneous at bedtime  insulin lispro (ADMELOG) corrective regimen sliding scale   SubCutaneous three times a day before meals  insulin lispro Injectable (ADMELOG) 10 Unit(s) SubCutaneous three times a day before meals  lidocaine   4% Patch 1 Patch Transdermal every 24 hours  metoprolol succinate ER 25 milliGRAM(s) Oral daily  pantoprazole    Tablet 40 milliGRAM(s) Oral before breakfast  piperacillin/tazobactam IVPB.. 3.375 Gram(s) IV Intermittent every 8 hours  polyethylene glycol 3350 17 Gram(s) Oral daily  senna 2 Tablet(s) Oral at bedtime  tamsulosin 0.8 milliGRAM(s) Oral at bedtime    MEDICATIONS  (PRN):  acetaminophen     Tablet .. 650 milliGRAM(s) Oral every 6 hours PRN mild pain  albuterol/ipratropium for Nebulization 3 milliLiter(s) Nebulizer every 6 hours PRN Shortness of Breath  dextrose Oral Gel 15 Gram(s) Oral once PRN Blood Glucose LESS THAN 70 milliGRAM(s)/deciliter  oxyCODONE    IR 5 milliGRAM(s) Oral every 6 hours PRN Moderate Pain (4 - 6)      ALLERGIES:  No Known Allergies      LABS:                                         11.5   9.03  )-----------( 220      ( 31 Oct 2022 07:21 )             35.6     10-30    137  |  101  |  14  ----------------------------<  180<H>  3.7   |  26  |  1.08    Ca    9.1      30 Oct 2022 07:11  Phos  2.5     10-30  Mg     1.6     10-30        < from: MR Abdomen w/wo IV Cont (10.26.22 @ 17:39) >    IMPRESSION:  *  An 8 cm abscess interposed between the liver and upper pole right   kidney.  *  Bilateral renal cyst. No lesion suspicious for neoplasm.      --- End of Report ---      < end of copied text >         cultures: Culture Results:   No growth (10-19 @ 08:59)       < from: MR Lumbar Spine No Cont (10.20.22 @ 17:28) >    IMPRESSION:  Evidence of acute fracture L1 with fracture line traversing   the vertebral body from anterior to posterior and findings consistent   with a 2 column injury. Paravertebral cuff of soft tissue consistent with   recent injury. Some mild extension of edema into the pedicles though the   transverse and spinous processes as well as the lamina are spared. No   other lesions are identified. Recommend interval follow-up to determine   benign versus pathologic fracture.    --- End of Report ---      < end of copied text >      < from: Xray Chest 1 View- PORTABLE-Urgent (Xray Chest 1 View- PORTABLE-Urgent .) (10.23.22 @ 15:36) >  IMPRESSION:  Bibasilar patchy opacities, compatible with subsegmental atelectasis.   Infectious etiology cannot be ruled out.    --- End of Report ---      < end of copied text >      < from: US Kidney and Bladder (10.24.22 @ 19:31) >  IMPRESSION:  Complex cystic collection superior to the right kidney, correlating to   heterogeneous collection exophytic from the right hepatic lobe.   Differential includes abscess versus hematoma.    Bilateral complex renal cysts.    < end of copied text >  < from: CT Chest No Cont (10.19.22 @ 09:59) >  IMPRESSION:  Limited noncontrast examination.    CHEST:  *  Secretions/debris within the bilateral mainstem bronchi with   multifocal sites of mucous plugging in distal airways.  *  Bilateral tree-in-bud nodularity is likely infectious/inflammatory.   Groundglass opacity within the left upper lobe, which may also be   infectious/inflammatory. Pulmonary nodule measuring 6 m in the right   upper lobe. Suggest 3 month follow-up chest CT to assess for stability or   resolution.  *  A few prominent nonspecific mediastinal lymph nodes.  *  Ascending thoracic aortic aneurysm measuring up to 5 cm.    ABDOMEN AND PELVIS:  *  Low density structure measuring 8.3 cm alongthe inferior margin of   the right hepatic lobe with surrounding inflammatory changes.   Differential includes an intrahepatic/perihepatic abscess, or possibly   hematoma in the setting of trauma. Neoplasm is thought to be less likely   but is not excluded. Contrast enhanced abdominal MRI may be helpful for   further characterization.  *  Mild compression fracture at L1, likely acute. Please refer to the   concurrent dedicated CT lumbar spine report.    Preliminary findings were discussed by Dr. Parks with Dr. Flores   on 10/19/2022 11:10 AM with read back confirmation. Final findings were   discussed with Dr. Pierce by Dr. Mcqueen on 10/19/2022 at 12:57 PM.    --- End of Report ---    ***Please see the addendum at the top of this report. It may contain   additional important information or changes.****      < end of copied text >

## 2022-10-31 NOTE — PROGRESS NOTE ADULT - SUBJECTIVE AND OBJECTIVE BOX
Chief complaint  Patient is a 82y old  Male who presents with a chief complaint of Fall (31 Oct 2022 13:20)   Review of systems  Patient in bed, looks comfortable, no hypoglycemic episodes.    Labs and Fingersticks  CAPILLARY BLOOD GLUCOSE      POCT Blood Glucose.: 140 mg/dL (31 Oct 2022 12:25)  POCT Blood Glucose.: 138 mg/dL (31 Oct 2022 08:47)  POCT Blood Glucose.: 140 mg/dL (30 Oct 2022 21:29)  POCT Blood Glucose.: 154 mg/dL (30 Oct 2022 17:52)      Anion Gap, Serum: 10 (10-30 @ 07:11)      Calcium, Total Serum: 9.1 (10-30 @ 07:11)          10-30    137  |  101  |  14  ----------------------------<  180<H>  3.7   |  26  |  1.08    Ca    9.1      30 Oct 2022 07:11  Phos  2.5     10-30  Mg     1.6     10-30                          11.5   9.03  )-----------( 220      ( 31 Oct 2022 07:21 )             35.6     Medications  MEDICATIONS  (STANDING):  dextrose 5%. 1000 milliLiter(s) (50 mL/Hr) IV Continuous <Continuous>  dextrose 5%. 1000 milliLiter(s) (100 mL/Hr) IV Continuous <Continuous>  dextrose 50% Injectable 25 Gram(s) IV Push once  dextrose 50% Injectable 12.5 Gram(s) IV Push once  dextrose 50% Injectable 25 Gram(s) IV Push once  finasteride 5 milliGRAM(s) Oral daily  glucagon  Injectable 1 milliGRAM(s) IntraMuscular once  influenza  Vaccine (HIGH DOSE) 0.7 milliLiter(s) IntraMuscular once  insulin glargine Injectable (LANTUS) 25 Unit(s) SubCutaneous at bedtime  insulin lispro (ADMELOG) corrective regimen sliding scale   SubCutaneous three times a day before meals  insulin lispro Injectable (ADMELOG) 10 Unit(s) SubCutaneous three times a day before meals  lidocaine   4% Patch 1 Patch Transdermal every 24 hours  metoprolol succinate ER 25 milliGRAM(s) Oral daily  pantoprazole    Tablet 40 milliGRAM(s) Oral before breakfast  piperacillin/tazobactam IVPB.. 3.375 Gram(s) IV Intermittent every 8 hours  polyethylene glycol 3350 17 Gram(s) Oral daily  senna 2 Tablet(s) Oral at bedtime  tamsulosin 0.8 milliGRAM(s) Oral at bedtime      Physical Exam  General: Patient comfortable in bed  Vital Signs Last 12 Hrs  T(F): 97.9 (10-31-22 @ 12:45), Max: 97.9 (10-31-22 @ 12:45)  HR: 98 (10-31-22 @ 12:45) (96 - 98)  BP: 119/83 (10-31-22 @ 12:45) (109/80 - 119/83)  BP(mean): --  RR: 18 (10-31-22 @ 12:45) (18 - 18)  SpO2: 98% (10-31-22 @ 12:45) (97% - 98%)  Neck: No palpable thyroid nodules.  CVS: S1S2, No murmurs  Respiratory: No wheezing, no crepitations  GI: Abdomen soft, bowel sounds positive  Musculoskeletal:  edema lower extremities.   Skin: No skin rashes, no ecchymosis    Diagnostics    Free Thyroxine, Serum: AM Sched. Collection: 21-Oct-2022 06:00 (10-20 @ 13:18)  Thyroid Stimulating Hormone, Serum: AM Sched. Collection: 21-Oct-2022 06:00 (10-20 @ 13:18)  A1C with Estimated Average Glucose: Routine (10-20 @ 13:17)           Chief complaint  Patient is a 82y old  Male who presents with a chief complaint of Fall (31 Oct 2022 13:20)   Review of systems  Patient in bed, looks comfortable, no hypoglycemic episodes.    Labs and Fingersticks  CAPILLARY BLOOD GLUCOSE      POCT Blood Glucose.: 140 mg/dL (31 Oct 2022 12:25)  POCT Blood Glucose.: 138 mg/dL (31 Oct 2022 08:47)  POCT Blood Glucose.: 140 mg/dL (30 Oct 2022 21:29)  POCT Blood Glucose.: 154 mg/dL (30 Oct 2022 17:52)      Anion Gap, Serum: 10 (10-30 @ 07:11)      Calcium, Total Serum: 9.1 (10-30 @ 07:11)          10-30    137  |  101  |  14  ----------------------------<  180<H>  3.7   |  26  |  1.08    Ca    9.1      30 Oct 2022 07:11  Phos  2.5     10-30  Mg     1.6     10-30                          11.5   9.03  )-----------( 220      ( 31 Oct 2022 07:21 )             35.6     Medications  MEDICATIONS  (STANDING):  dextrose 5%. 1000 milliLiter(s) (50 mL/Hr) IV Continuous <Continuous>  dextrose 5%. 1000 milliLiter(s) (100 mL/Hr) IV Continuous <Continuous>  dextrose 50% Injectable 25 Gram(s) IV Push once  dextrose 50% Injectable 12.5 Gram(s) IV Push once  dextrose 50% Injectable 25 Gram(s) IV Push once  finasteride 5 milliGRAM(s) Oral daily  glucagon  Injectable 1 milliGRAM(s) IntraMuscular once  influenza  Vaccine (HIGH DOSE) 0.7 milliLiter(s) IntraMuscular once  insulin glargine Injectable (LANTUS) 25 Unit(s) SubCutaneous at bedtime  insulin lispro (ADMELOG) corrective regimen sliding scale   SubCutaneous three times a day before meals  insulin lispro Injectable (ADMELOG) 10 Unit(s) SubCutaneous three times a day before meals  lidocaine   4% Patch 1 Patch Transdermal every 24 hours  metoprolol succinate ER 25 milliGRAM(s) Oral daily  pantoprazole    Tablet 40 milliGRAM(s) Oral before breakfast  piperacillin/tazobactam IVPB.. 3.375 Gram(s) IV Intermittent every 8 hours  polyethylene glycol 3350 17 Gram(s) Oral daily  senna 2 Tablet(s) Oral at bedtime  tamsulosin 0.8 milliGRAM(s) Oral at bedtime      Physical Exam  General: Patient comfortable in bed  Vital Signs Last 12 Hrs  T(F): 97.9 (10-31-22 @ 12:45), Max: 97.9 (10-31-22 @ 12:45)  HR: 98 (10-31-22 @ 12:45) (96 - 98)  BP: 119/83 (10-31-22 @ 12:45) (109/80 - 119/83)  BP(mean): --  RR: 18 (10-31-22 @ 12:45) (18 - 18)  SpO2: 98% (10-31-22 @ 12:45) (97% - 98%)  Neck: No palpable thyroid nodules.  CVS: S1S2, No murmurs  Respiratory: No wheezing, no crepitations  GI: Abdomen soft, bowel sounds positive  Musculoskeletal:  edema lower extremities.   Skin: No skin rashes, no ecchymosis    Diagnostics    Free Thyroxine, Serum: AM Sched. Collection: 21-Oct-2022 06:00 (10-20 @ 13:18)  Thyroid Stimulating Hormone, Serum: AM Sched. Collection: 21-Oct-2022 06:00 (10-20 @ 13:18)  A1C with Estimated Average Glucose: Routine (10-20 @ 13:17)

## 2022-10-31 NOTE — PROGRESS NOTE ADULT - ASSESSMENT
82M admitted post mechanical fall with T1 compression fx  CT chest with multilobar opacities c/w aspiration pneumonia  Hypoxemia due to former    REC    Complete Zosyn per ID  Abcess drainage pending  Taper nasal cannula as tolerated  Pain managment per primary team

## 2022-10-31 NOTE — PROGRESS NOTE ADULT - ASSESSMENT
80 yo M with HTN/HLD, DM2, BPH, initially with low back pain  Leukocytosis, no fever  Initially with fall  Current complaints of constipation, possible urinary retention  CT chest with evidence aspiration; CT A/P with concern for abscess vs hematoma along liver margin  MR suspicious for abscess  Overall,  1) Abnormal finding on imaging/Abscess  - Collection adjacent to liver--hematoma vs abscess  - Zosyn 3.375g q 8  - IR eval to drain liver abscess--therapeutic and diagnostic  2) Leukocytosis  - Trending down  - Zosyn for now  - F/U BCXs  - Trend WBC to normal  3) PNA  - Possible PNA based on CT, aspiration, small airway disease  - Would be cross treated by Tariq Arroyo MD  Contact on TEAMS messaging from 9am - 5pm  From 5pm-9am, on weekends, or if no response call [phone]

## 2022-10-31 NOTE — PROGRESS NOTE ADULT - ASSESSMENT
81 male h/o htn, chol, dm, here s/p mechanical fall    mechanical fall  imaging noted  surg eval noted  MRI L spine noted with Lspine fx  ortho f/u noted  no acute intervention  pain control  TLSO brace  PT    DM  insulin as ordered  endo f/u  monitor fs    hypoxia  imaging c/w pna  pulm consult noted  supp o2  iv zosyn  incentive spirometer  nebs    chanel  unclear etiology  possible 2/2 poor oral fluid intake  renal f/u  creat now nl    abnl UA  f/u cult  on zosyn  id f/u    MRI noted with collection btw liver and kidney  possible abscess  IR consulted for drainage - to be done tomorrow. hold ASA  abx as per id    suspected renal lesion  gu eval noted   MRI noted with cysts. not suspicious for malignancy        cont other home meds         Advanced care planning was discussed with patient and family.  Advanced care planning forms were reviewed and discussed as appropriate.  Differential diagnosis and plan of care discussed with patient after the evaluation.   Pain assessed and judicious use of narcotics when appropriate was discussed.  Importance of Fall prevention discussed.  Counseling on Smoking and Alcohol cessation was offered when appropriate.  Counseling on Diet, exercise, and medication compliance was done.   Approx 30 minutes spent.

## 2022-11-01 LAB
ANION GAP SERPL CALC-SCNC: 13 MMOL/L — SIGNIFICANT CHANGE UP (ref 5–17)
APTT BLD: 32.9 SEC — SIGNIFICANT CHANGE UP (ref 27.5–35.5)
BLD GP AB SCN SERPL QL: NEGATIVE — SIGNIFICANT CHANGE UP
BUN SERPL-MCNC: 17 MG/DL — SIGNIFICANT CHANGE UP (ref 7–23)
CALCIUM SERPL-MCNC: 8.9 MG/DL — SIGNIFICANT CHANGE UP (ref 8.4–10.5)
CHLORIDE SERPL-SCNC: 102 MMOL/L — SIGNIFICANT CHANGE UP (ref 96–108)
CO2 SERPL-SCNC: 25 MMOL/L — SIGNIFICANT CHANGE UP (ref 22–31)
CREAT SERPL-MCNC: 1.03 MG/DL — SIGNIFICANT CHANGE UP (ref 0.5–1.3)
EGFR: 72 ML/MIN/1.73M2 — SIGNIFICANT CHANGE UP
GLUCOSE BLDC GLUCOMTR-MCNC: 145 MG/DL — HIGH (ref 70–99)
GLUCOSE BLDC GLUCOMTR-MCNC: 163 MG/DL — HIGH (ref 70–99)
GLUCOSE BLDC GLUCOMTR-MCNC: 173 MG/DL — HIGH (ref 70–99)
GLUCOSE BLDC GLUCOMTR-MCNC: 175 MG/DL — HIGH (ref 70–99)
GLUCOSE SERPL-MCNC: 162 MG/DL — HIGH (ref 70–99)
HCT VFR BLD CALC: 35.8 % — LOW (ref 39–50)
HGB BLD-MCNC: 11.5 G/DL — LOW (ref 13–17)
INR BLD: 1.02 RATIO — SIGNIFICANT CHANGE UP (ref 0.88–1.16)
MCHC RBC-ENTMCNC: 29 PG — SIGNIFICANT CHANGE UP (ref 27–34)
MCHC RBC-ENTMCNC: 32.1 GM/DL — SIGNIFICANT CHANGE UP (ref 32–36)
MCV RBC AUTO: 90.4 FL — SIGNIFICANT CHANGE UP (ref 80–100)
NRBC # BLD: 0 /100 WBCS — SIGNIFICANT CHANGE UP (ref 0–0)
PLATELET # BLD AUTO: 240 K/UL — SIGNIFICANT CHANGE UP (ref 150–400)
POTASSIUM SERPL-MCNC: 4.9 MMOL/L — SIGNIFICANT CHANGE UP (ref 3.5–5.3)
POTASSIUM SERPL-SCNC: 4.9 MMOL/L — SIGNIFICANT CHANGE UP (ref 3.5–5.3)
PROTHROM AB SERPL-ACNC: 11.8 SEC — SIGNIFICANT CHANGE UP (ref 10.5–13.4)
RBC # BLD: 3.96 M/UL — LOW (ref 4.2–5.8)
RBC # FLD: 14.5 % — SIGNIFICANT CHANGE UP (ref 10.3–14.5)
RH IG SCN BLD-IMP: POSITIVE — SIGNIFICANT CHANGE UP
SODIUM SERPL-SCNC: 140 MMOL/L — SIGNIFICANT CHANGE UP (ref 135–145)
WBC # BLD: 6.52 K/UL — SIGNIFICANT CHANGE UP (ref 3.8–10.5)
WBC # FLD AUTO: 6.52 K/UL — SIGNIFICANT CHANGE UP (ref 3.8–10.5)

## 2022-11-01 PROCEDURE — 99232 SBSQ HOSP IP/OBS MODERATE 35: CPT

## 2022-11-01 PROCEDURE — 49406 IMAGE CATH FLUID PERI/RETRO: CPT

## 2022-11-01 RX ORDER — LANOLIN ALCOHOL/MO/W.PET/CERES
3 CREAM (GRAM) TOPICAL ONCE
Refills: 0 | Status: COMPLETED | OUTPATIENT
Start: 2022-11-01 | End: 2022-11-01

## 2022-11-01 RX ORDER — INSULIN LISPRO 100/ML
VIAL (ML) SUBCUTANEOUS
Refills: 0 | Status: DISCONTINUED | OUTPATIENT
Start: 2022-11-01 | End: 2022-11-17

## 2022-11-01 RX ORDER — INSULIN LISPRO 100/ML
VIAL (ML) SUBCUTANEOUS EVERY 6 HOURS
Refills: 0 | Status: DISCONTINUED | OUTPATIENT
Start: 2022-11-01 | End: 2022-11-01

## 2022-11-01 RX ADMIN — LIDOCAINE 1 PATCH: 4 CREAM TOPICAL at 14:53

## 2022-11-01 RX ADMIN — PIPERACILLIN AND TAZOBACTAM 25 GRAM(S): 4; .5 INJECTION, POWDER, LYOPHILIZED, FOR SOLUTION INTRAVENOUS at 14:35

## 2022-11-01 RX ADMIN — Medication 3 MILLIGRAM(S): at 01:23

## 2022-11-01 RX ADMIN — Medication 25 MILLIGRAM(S): at 12:19

## 2022-11-01 RX ADMIN — INSULIN GLARGINE 25 UNIT(S): 100 INJECTION, SOLUTION SUBCUTANEOUS at 22:13

## 2022-11-01 RX ADMIN — Medication 1: at 12:18

## 2022-11-01 RX ADMIN — LIDOCAINE 1 PATCH: 4 CREAM TOPICAL at 18:43

## 2022-11-01 RX ADMIN — Medication 1: at 06:39

## 2022-11-01 RX ADMIN — FINASTERIDE 5 MILLIGRAM(S): 5 TABLET, FILM COATED ORAL at 12:19

## 2022-11-01 RX ADMIN — SENNA PLUS 2 TABLET(S): 8.6 TABLET ORAL at 22:14

## 2022-11-01 RX ADMIN — LIDOCAINE 1 PATCH: 4 CREAM TOPICAL at 01:56

## 2022-11-01 RX ADMIN — Medication 10 UNIT(S): at 18:50

## 2022-11-01 RX ADMIN — PIPERACILLIN AND TAZOBACTAM 25 GRAM(S): 4; .5 INJECTION, POWDER, LYOPHILIZED, FOR SOLUTION INTRAVENOUS at 06:42

## 2022-11-01 RX ADMIN — TAMSULOSIN HYDROCHLORIDE 0.8 MILLIGRAM(S): 0.4 CAPSULE ORAL at 22:14

## 2022-11-01 RX ADMIN — PIPERACILLIN AND TAZOBACTAM 25 GRAM(S): 4; .5 INJECTION, POWDER, LYOPHILIZED, FOR SOLUTION INTRAVENOUS at 22:13

## 2022-11-01 NOTE — PROGRESS NOTE ADULT - SUBJECTIVE AND OBJECTIVE BOX
Overnight events noted      VITAL:  T(C): , Max: 36.6 (10-31-22 @ 12:45)  T(F): , Max: 97.9 (10-31-22 @ 12:45)  HR: 83 (11-01-22 @ 05:26)  BP: 111/78 (11-01-22 @ 05:26)  BP(mean): --  RR: 17 (11-01-22 @ 05:26)  SpO2: 98% (11-01-22 @ 05:26)      PHYSICAL EXAM:  Constitutional: NAD  HEENT: NCAT, MMM  Neck: Supple, No JVD  Respiratory: CTA-b/l  Cardiovascular: reg s1s2  Gastrointestinal: BS+, soft, NT/ND  Extremities: No peripheral edema b/l  Neurological: no focal deficits; strength grossly intact  Back: no CVAT b/l  Skin: No rashes, no nevi    LABS:                        11.5   6.52  )-----------( 240      ( 01 Nov 2022 06:23 )             35.8     Na(140)/K(4.9)/Cl(102)/HCO3(25)/BUN(17)/Cr(1.03)Glu(162)/Ca(8.9)/Mg(--)/PO4(--)    11-01 @ 06:23  Na(137)/K(3.7)/Cl(101)/HCO3(26)/BUN(14)/Cr(1.08)Glu(180)/Ca(9.1)/Mg(1.6)/PO4(2.5)    10-30 @ 07:11        IMPRESSION: 82M w/ HTN, DM2, CAD, and BPH-TURP, 10/19/22 p/w L1 vertebral fx s/p mechanical fall; c/b ANTON    (1)Renal - ANTON - prerenal - now improved  (2)Hypoxia - PNA - on IV Zosyn - supplemental O2 requirements are decreasing  (3)Perihepatic collection - potentially for IR-guided drainage       RECOMMEND:  (1)Continue abx for GFR ~50ml/min  (2)Abscess drainage per IR            Maco Bhatia MD  [facility]  Office: [phone]  Cell: [phone]       no complaints      VITAL:  T(C): , Max: 36.6 (10-31-22 @ 12:45)  T(F): , Max: 97.9 (10-31-22 @ 12:45)  HR: 83 (11-01-22 @ 05:26)  BP: 111/78 (11-01-22 @ 05:26)  BP(mean): --  RR: 17 (11-01-22 @ 05:26)  SpO2: 98% (11-01-22 @ 05:26)      PHYSICAL EXAM:  Constitutional: NAD  HEENT: NCAT, MMM  Neck: Supple, No JVD  Respiratory: CTA-b/l  Cardiovascular: reg s1s2  Gastrointestinal: BS+, soft, NT/ND  Extremities: No peripheral edema b/l  Neurological: no focal deficits; strength grossly intact  Back: no CVAT b/l  Skin: No rashes, no nevi    LABS:                        11.5   6.52  )-----------( 240      ( 01 Nov 2022 06:23 )             35.8     Na(140)/K(4.9)/Cl(102)/HCO3(25)/BUN(17)/Cr(1.03)Glu(162)/Ca(8.9)/Mg(--)/PO4(--)    11-01 @ 06:23  Na(137)/K(3.7)/Cl(101)/HCO3(26)/BUN(14)/Cr(1.08)Glu(180)/Ca(9.1)/Mg(1.6)/PO4(2.5)    10-30 @ 07:11        IMPRESSION: 82M w/ HTN, DM2, CAD, and BPH-TURP, 10/19/22 p/w L1 vertebral fx s/p mechanical fall; c/b ANTON    (1)Renal - ANTON - prerenal - now improved  (2)Hypoxia - PNA - on IV Zosyn - supplemental O2 requirements are decreasing  (3)Perihepatic collection - potentially for IR-guided drainage       RECOMMEND:  (1)Continue abx for GFR ~50ml/min  (2)Abscess drainage per IR            Maco Bhatia MD  [facility]  Office: [phone]  Cell: [phone]

## 2022-11-01 NOTE — PROGRESS NOTE ADULT - ASSESSMENT
81 male h/o htn, chol, dm, here s/p mechanical fall    mechanical fall  imaging noted  surg eval noted  MRI L spine noted with Lspine fx  ortho f/u noted  no acute intervention  pain control  TLSO brace  PT    DM  insulin as ordered  endo f/u  monitor fs    hypoxia  imaging c/w pna  pulm consult noted  supp o2  iv zosyn  incentive spirometer  nebs    chanel  unclear etiology  possible 2/2 poor oral fluid intake  renal f/u  creat now nl    abnl UA  f/u cult  on zosyn  id f/u    MRI noted with collection btw liver and kidney  possible abscess  IR consulted for drainage - to be done today. hold ASA  f/u cult  abx as per id    suspected renal lesion  gu eval noted   MRI noted with cysts. not suspicious for malignancy        cont other home meds         Advanced care planning was discussed with patient and family.  Advanced care planning forms were reviewed and discussed as appropriate.  Differential diagnosis and plan of care discussed with patient after the evaluation.   Pain assessed and judicious use of narcotics when appropriate was discussed.  Importance of Fall prevention discussed.  Counseling on Smoking and Alcohol cessation was offered when appropriate.  Counseling on Diet, exercise, and medication compliance was done.   Approx 30 minutes spent.

## 2022-11-01 NOTE — PROGRESS NOTE ADULT - ASSESSMENT
Assessment  DMT2: 82y Male with DM T2 with hyperglycemia, was on insulin at home, now on basal bolus insulin, bolus dose held 2/2 NPO status, blood sugars trending within overall acceptable range, no hypoglycemic  episodes, NAD, NPO for IR drainage today.  S/P Fall: workup in progress, stable, monitored.  HTN: Un Controlled,  on antihypertensive medications.            Melba Reyes MD  Cell: [phone]  Office: [phone]               Assessment  DMT2: 82y Male with DM T2 with hyperglycemia, was on insulin at home, now on basal bolus insulin, bolus dose held 2/2 NPO status, blood sugars trending within overall acceptable range, no hypoglycemic  episodes,  NAD, NPO for IR drainage today.  S/P Fall: workup in progress, stable, monitored.  HTN: Un Controlled,  on antihypertensive medications.            Melba Reyes MD  Cell: [phone]  Office: [phone]

## 2022-11-01 NOTE — PROGRESS NOTE ADULT - SUBJECTIVE AND OBJECTIVE BOX
PUlmonary Progress Note  Edmund Quiroga MD  [phone]    Sleeping comfortably on nasal cannula  Afebrile on Zosyn  No new respiratory issues    Vital Signs Last 24 Hrs  T(C): 36.6 (31 Oct 2022 12:45), Max: 36.6 (30 Oct 2022 14:10)  T(F): 97.9 (31 Oct 2022 12:45), Max: 97.9 (30 Oct 2022 20:37)  HR: 98 (31 Oct 2022 12:45) (84 - 100)  BP: 119/83 (31 Oct 2022 12:45) (109/80 - 133/78)  BP(mean): --  RR: 18 (31 Oct 2022 12:45) (18 - 18)  SpO2: 98% (31 Oct 2022 12:45) (97% - 98%)    Parameters below as of 31 Oct 2022 12:45  Patient On (Oxygen Delivery Method): nasal cannula  O2 Flow (L/min): 3                            11.5   9.03  )-----------( 220      ( 31 Oct 2022 07:21 )             35.6     10-30    137  |  101  |  14  ----------------------------<  180<H>  3.7   |  26  |  1.08    Ca    9.1      30 Oct 2022 07:11  Phos  2.5     10-30  Mg     1.6     10-30    Follow-up Pulm Progress Note  Edmund Quiroga MD  [phone]    Remains afebrile on Zosyn  CTguided drainage RUQ abcess pending  Sleeping comfortably supine on nasal cannula        Physical Examination:  PULM: Few basilar rhonchi  CVS: Regular rate and rhythm, no murmurs, rubs, or gallops  ABD: Soft, non-tender  EXT:  No clubbing, cyanosis, or edema    RADIOLOGY REVIEWED  CXR:    CT chest:    TTE:

## 2022-11-01 NOTE — PROGRESS NOTE ADULT - SUBJECTIVE AND OBJECTIVE BOX
KAITLIN CALDERON  82y Male  MRN:[id number]    Patient is a 82y old  Male who presents with a chief complaint of fall    HPI:   81M cantonese speaking PMHx of HTN/HLD, DM2, BPH, (?Afib per chart review, though not on AC's), presents to the ED with mid lower back pain that he sustained after falling in the bathroom while he was urinating at 9pm last night. Pt denies headstrike/LOC. Difficult to obtain full history even with  though pt answering questions appropriately and is AAOx2. Pt denies any pain anywhere else. Per EMS pt has had chronic cough. Unclear if pt was down long. pt last took insulin 2 days ago he states.   	Spoke with stepdaughter Yulissa who lives below pt: Pt couldn't get up from the fall yesterday, has difficulty ambulating with walker since the fall as he complaints of back pain, pt has 9hrs home help daily but not overnight. She states that pt is at baseline mental status and that he's had short term memory problems for some time. Denies any recent fevers/chills, abd pain, v/d, chest pain/sob. States that the cough pt has is chronic.  (19 Oct 2022 15:53)      Patient seen and evaluated at bedside. No acute events overnight except as noted.    Interval HPI: no acute events o/n     PAST MEDICAL & SURGICAL HISTORY:  DM (diabetes mellitus)      HTN (hypertension)      Hypercholesterolemia      CAD (coronary artery disease)      HTN (hypertension)      DM (diabetes mellitus)      S/P TURP      S/P gastrectomy          REVIEW OF SYSTEMS:  as per hpi     VITALS:   Vital Signs Last 24 Hrs  T(C): 36.3 (01 Nov 2022 05:26), Max: 36.4 (31 Oct 2022 21:20)  T(F): 97.4 (01 Nov 2022 05:26), Max: 97.6 (31 Oct 2022 21:20)  HR: 83 (01 Nov 2022 05:26) (80 - 83)  BP: 111/78 (01 Nov 2022 05:26) (111/78 - 114/72)  BP(mean): --  RR: 17 (01 Nov 2022 05:26) (17 - 18)  SpO2: 98% (01 Nov 2022 05:26) (98% - 98%)    Parameters below as of 01 Nov 2022 05:26  Patient On (Oxygen Delivery Method): nasal cannula  O2 Flow (L/min): 2            PHYSICAL EXAM:  GENERAL: NAD, well-developed,    HEAD:  Atraumatic, Normocephalic  EYES: EOMI, PERRLA, conjunctiva and sclera clear  NECK: Supple, No JVD  CHEST/LUNG: clear b/l  HEART: S1, S2; No murmurs, rubs, or gallops  ABDOMEN: Soft, Nontender, Nondistended; Bowel sounds present  EXTREMITIES:  2+ Peripheral Pulses, No clubbing, cyanosis, or edema  PSYCH: Normal affect  NEUROLOGY: AAOX1-2; non-focal  SKIN: No rashes or lesions    Consultant(s) Notes Reviewed:  [x ] YES  [ ] NO  Care Discussed with Consultants/Other Providers [ x] YES  [ ] NO    MEDS:   MEDICATIONS  (STANDING):  dextrose 5%. 1000 milliLiter(s) (50 mL/Hr) IV Continuous <Continuous>  dextrose 5%. 1000 milliLiter(s) (100 mL/Hr) IV Continuous <Continuous>  dextrose 50% Injectable 25 Gram(s) IV Push once  dextrose 50% Injectable 12.5 Gram(s) IV Push once  dextrose 50% Injectable 25 Gram(s) IV Push once  finasteride 5 milliGRAM(s) Oral daily  glucagon  Injectable 1 milliGRAM(s) IntraMuscular once  influenza  Vaccine (HIGH DOSE) 0.7 milliLiter(s) IntraMuscular once  insulin glargine Injectable (LANTUS) 25 Unit(s) SubCutaneous at bedtime  insulin lispro (ADMELOG) corrective regimen sliding scale   SubCutaneous every 6 hours  insulin lispro Injectable (ADMELOG) 10 Unit(s) SubCutaneous three times a day before meals  lidocaine   4% Patch 1 Patch Transdermal every 24 hours  metoprolol succinate ER 25 milliGRAM(s) Oral daily  pantoprazole    Tablet 40 milliGRAM(s) Oral before breakfast  piperacillin/tazobactam IVPB.. 3.375 Gram(s) IV Intermittent every 8 hours  polyethylene glycol 3350 17 Gram(s) Oral daily  senna 2 Tablet(s) Oral at bedtime  tamsulosin 0.8 milliGRAM(s) Oral at bedtime    MEDICATIONS  (PRN):  acetaminophen     Tablet .. 650 milliGRAM(s) Oral every 6 hours PRN mild pain  albuterol/ipratropium for Nebulization 3 milliLiter(s) Nebulizer every 6 hours PRN Shortness of Breath  dextrose Oral Gel 15 Gram(s) Oral once PRN Blood Glucose LESS THAN 70 milliGRAM(s)/deciliter  oxyCODONE    IR 5 milliGRAM(s) Oral every 6 hours PRN Moderate Pain (4 - 6)      ALLERGIES:  No Known Allergies      LABS:                                                   11.5   6.52  )-----------( 240      ( 01 Nov 2022 06:23 )             35.8   11-01    140  |  102  |  17  ----------------------------<  162<H>  4.9   |  25  |  1.03    Ca    8.9      01 Nov 2022 06:23          < from: MR Abdomen w/wo IV Cont (10.26.22 @ 17:39) >    IMPRESSION:  *  An 8 cm abscess interposed between the liver and upper pole right   kidney.  *  Bilateral renal cyst. No lesion suspicious for neoplasm.      --- End of Report ---      < end of copied text >         cultures: Culture Results:   No growth (10-19 @ 08:59)       < from: MR Lumbar Spine No Cont (10.20.22 @ 17:28) >    IMPRESSION:  Evidence of acute fracture L1 with fracture line traversing   the vertebral body from anterior to posterior and findings consistent   with a 2 column injury. Paravertebral cuff of soft tissue consistent with   recent injury. Some mild extension of edema into the pedicles though the   transverse and spinous processes as well as the lamina are spared. No   other lesions are identified. Recommend interval follow-up to determine   benign versus pathologic fracture.    --- End of Report ---      < end of copied text >      < from: Xray Chest 1 View- PORTABLE-Urgent (Xray Chest 1 View- PORTABLE-Urgent .) (10.23.22 @ 15:36) >  IMPRESSION:  Bibasilar patchy opacities, compatible with subsegmental atelectasis.   Infectious etiology cannot be ruled out.    --- End of Report ---      < end of copied text >      < from: US Kidney and Bladder (10.24.22 @ 19:31) >  IMPRESSION:  Complex cystic collection superior to the right kidney, correlating to   heterogeneous collection exophytic from the right hepatic lobe.   Differential includes abscess versus hematoma.    Bilateral complex renal cysts.    < end of copied text >  < from: CT Chest No Cont (10.19.22 @ 09:59) >  IMPRESSION:  Limited noncontrast examination.    CHEST:  *  Secretions/debris within the bilateral mainstem bronchi with   multifocal sites of mucous plugging in distal airways.  *  Bilateral tree-in-bud nodularity is likely infectious/inflammatory.   Groundglass opacity within the left upper lobe, which may also be   infectious/inflammatory. Pulmonary nodule measuring 6 m in the right   upper lobe. Suggest 3 month follow-up chest CT to assess for stability or   resolution.  *  A few prominent nonspecific mediastinal lymph nodes.  *  Ascending thoracic aortic aneurysm measuring up to 5 cm.    ABDOMEN AND PELVIS:  *  Low density structure measuring 8.3 cm alongthe inferior margin of   the right hepatic lobe with surrounding inflammatory changes.   Differential includes an intrahepatic/perihepatic abscess, or possibly   hematoma in the setting of trauma. Neoplasm is thought to be less likely   but is not excluded. Contrast enhanced abdominal MRI may be helpful for   further characterization.  *  Mild compression fracture at L1, likely acute. Please refer to the   concurrent dedicated CT lumbar spine report.    Preliminary findings were discussed by Dr. Parks with Dr. Flores   on 10/19/2022 11:10 AM with read back confirmation. Final findings were   discussed with Dr. Pierce by Dr. Mcqueen on 10/19/2022 at 12:57 PM.    --- End of Report ---    ***Please see the addendum at the top of this report. It may contain   additional important information or changes.****      < end of copied text >

## 2022-11-01 NOTE — PROCEDURE NOTE - PROCEDURE FINDINGS AND DETAILS
10 fr drain placed into right abdominal collection between liver and kidney. ~100cc purulent fluid aspirate. drain connected to bulb.

## 2022-11-01 NOTE — PROGRESS NOTE ADULT - SUBJECTIVE AND OBJECTIVE BOX
Chief complaint  Patient is a 82y old  Male who presents with a chief complaint of Fall (31 Oct 2022 13:20)   Review of systems  Patient NPO, no hypoglycemic episodes.    Labs and Fingersticks  CAPILLARY BLOOD GLUCOSE      POCT Blood Glucose.: 175 mg/dL (01 Nov 2022 06:01)  POCT Blood Glucose.: 119 mg/dL (31 Oct 2022 21:53)  POCT Blood Glucose.: 112 mg/dL (31 Oct 2022 17:15)  POCT Blood Glucose.: 140 mg/dL (31 Oct 2022 12:25)      Anion Gap, Serum: 13 (11-01 @ 06:23)      Calcium, Total Serum: 8.9 (11-01 @ 06:23)          11-01    140  |  102  |  17  ----------------------------<  162<H>  4.9   |  25  |  1.03    Ca    8.9      01 Nov 2022 06:23                          11.5   6.52  )-----------( 240      ( 01 Nov 2022 06:23 )             35.8     Medications  MEDICATIONS  (STANDING):  dextrose 5%. 1000 milliLiter(s) (50 mL/Hr) IV Continuous <Continuous>  dextrose 5%. 1000 milliLiter(s) (100 mL/Hr) IV Continuous <Continuous>  dextrose 50% Injectable 25 Gram(s) IV Push once  dextrose 50% Injectable 12.5 Gram(s) IV Push once  dextrose 50% Injectable 25 Gram(s) IV Push once  finasteride 5 milliGRAM(s) Oral daily  glucagon  Injectable 1 milliGRAM(s) IntraMuscular once  influenza  Vaccine (HIGH DOSE) 0.7 milliLiter(s) IntraMuscular once  insulin glargine Injectable (LANTUS) 25 Unit(s) SubCutaneous at bedtime  insulin lispro (ADMELOG) corrective regimen sliding scale   SubCutaneous every 6 hours  insulin lispro Injectable (ADMELOG) 10 Unit(s) SubCutaneous three times a day before meals  lidocaine   4% Patch 1 Patch Transdermal every 24 hours  metoprolol succinate ER 25 milliGRAM(s) Oral daily  pantoprazole    Tablet 40 milliGRAM(s) Oral before breakfast  piperacillin/tazobactam IVPB.. 3.375 Gram(s) IV Intermittent every 8 hours  polyethylene glycol 3350 17 Gram(s) Oral daily  senna 2 Tablet(s) Oral at bedtime  tamsulosin 0.8 milliGRAM(s) Oral at bedtime      Physical Exam  General: Patient comfortable in bed  Vital Signs Last 12 Hrs  T(F): 97.4 (11-01-22 @ 05:26), Max: 97.4 (11-01-22 @ 05:26)  HR: 83 (11-01-22 @ 05:26) (83 - 83)  BP: 111/78 (11-01-22 @ 05:26) (111/78 - 111/78)  BP(mean): --  RR: 17 (11-01-22 @ 05:26) (17 - 17)  SpO2: 98% (11-01-22 @ 05:26) (98% - 98%)  Neck: No palpable thyroid nodules.      Diagnostics    Free Thyroxine, Serum: AM Sched. Collection: 21-Oct-2022 06:00 (10-20 @ 13:18)  Thyroid Stimulating Hormone, Serum: AM Sched. Collection: 21-Oct-2022 06:00 (10-20 @ 13:18)  A1C with Estimated Average Glucose: Routine (10-20 @ 13:17)           Chief complaint  Patient is a 82y old  Male who presents with a chief complaint of Fall (31 Oct 2022 13:20)   Review of systems  Patient NPO, no hypoglycemic episodes.    Labs and Fingersticks  CAPILLARY BLOOD GLUCOSE      POCT Blood Glucose.: 175 mg/dL (01 Nov 2022 06:01)  POCT Blood Glucose.: 119 mg/dL (31 Oct 2022 21:53)  POCT Blood Glucose.: 112 mg/dL (31 Oct 2022 17:15)  POCT Blood Glucose.: 140 mg/dL (31 Oct 2022 12:25)      Anion Gap, Serum: 13 (11-01 @ 06:23)      Calcium, Total Serum: 8.9 (11-01 @ 06:23)          11-01    140  |  102  |  17  ----------------------------<  162<H>  4.9   |  25  |  1.03    Ca    8.9      01 Nov 2022 06:23                          11.5   6.52  )-----------( 240      ( 01 Nov 2022 06:23 )             35.8     Medications  MEDICATIONS  (STANDING):  dextrose 5%. 1000 milliLiter(s) (50 mL/Hr) IV Continuous <Continuous>  dextrose 5%. 1000 milliLiter(s) (100 mL/Hr) IV Continuous <Continuous>  dextrose 50% Injectable 25 Gram(s) IV Push once  dextrose 50% Injectable 12.5 Gram(s) IV Push once  dextrose 50% Injectable 25 Gram(s) IV Push once  finasteride 5 milliGRAM(s) Oral daily  glucagon  Injectable 1 milliGRAM(s) IntraMuscular once  influenza  Vaccine (HIGH DOSE) 0.7 milliLiter(s) IntraMuscular once  insulin glargine Injectable (LANTUS) 25 Unit(s) SubCutaneous at bedtime  insulin lispro (ADMELOG) corrective regimen sliding scale   SubCutaneous every 6 hours  insulin lispro Injectable (ADMELOG) 10 Unit(s) SubCutaneous three times a day before meals  lidocaine   4% Patch 1 Patch Transdermal every 24 hours  metoprolol succinate ER 25 milliGRAM(s) Oral daily  pantoprazole    Tablet 40 milliGRAM(s) Oral before breakfast  piperacillin/tazobactam IVPB.. 3.375 Gram(s) IV Intermittent every 8 hours  polyethylene glycol 3350 17 Gram(s) Oral daily  senna 2 Tablet(s) Oral at bedtime  tamsulosin 0.8 milliGRAM(s) Oral at bedtime      Physical Exam  General: Patient comfortable in bed  Vital Signs Last 12 Hrs  T(F): 97.4 (11-01-22 @ 05:26), Max: 97.4 (11-01-22 @ 05:26)  HR: 83 (11-01-22 @ 05:26) (83 - 83)  BP: 111/78 (11-01-22 @ 05:26) (111/78 - 111/78)  BP(mean): --  RR: 17 (11-01-22 @ 05:26) (17 - 17)  SpO2: 98% (11-01-22 @ 05:26) (98% - 98%)  Neck: No palpable thyroid nodules.      Diagnostics    Free Thyroxine, Serum: AM Sched. Collection: 21-Oct-2022 06:00 (10-20 @ 13:18)  Thyroid Stimulating Hormone, Serum: AM Sched. Collection: 21-Oct-2022 06:00 (10-20 @ 13:18)  A1C with Estimated Average Glucose: Routine (10-20 @ 13:17)

## 2022-11-01 NOTE — PROGRESS NOTE ADULT - ASSESSMENT
80 yo M with HTN/HLD, DM2, BPH, initially with low back pain  Leukocytosis, no fever  Initially with fall  Current complaints of constipation, possible urinary retention  CT chest with evidence aspiration; CT A/P with concern for abscess vs hematoma along liver margin  MR suspicious for abscess  Overall,  1) Abnormal finding on imaging/Abscess  - Collection adjacent to liver--hematoma vs abscess  - Zosyn 3.375g q 8  - IR eval to drain liver abscess--therapeutic and diagnostic  2) Leukocytosis  - Trending down  - Zosyn for now  - F/U BCXs  - Trend WBC to normal  3) PNA  - Possible PNA based on CT, aspiration, small airway disease  - Would be cross treated by Tariq Arroyo MD  Contact on TEAMS messaging from 9am - 5pm  From 5pm-9am, on weekends, or if no response call [phone]

## 2022-11-01 NOTE — PROGRESS NOTE ADULT - SUBJECTIVE AND OBJECTIVE BOX
CC: F/U for Abscess    Saw/spoke to patient. No fevers, no chills. No new complaints.    Allergies  No Known Allergies    ANTIMICROBIALS:  piperacillin/tazobactam IVPB.. 3.375 every 8 hours    PE:    Vital Signs Last 24 Hrs  T(C): 36.5 (01 Nov 2022 13:52), Max: 36.5 (01 Nov 2022 13:52)  T(F): 97.7 (01 Nov 2022 13:52), Max: 97.7 (01 Nov 2022 13:52)  HR: 82 (01 Nov 2022 13:52) (80 - 83)  BP: 120/82 (01 Nov 2022 13:52) (111/78 - 120/82)  RR: 18 (01 Nov 2022 13:52) (17 - 18)  SpO2: 97% (01 Nov 2022 13:52) (97% - 98%)    Gen: AOx3, NAD, non-toxic  CV: Nontachycardic  Resp: Breathing comfortably, RA  Abd: Soft, nontender  IV/Skin: No thrombophlebitis    LABS:                        11.5   6.52  )-----------( 240      ( 01 Nov 2022 06:23 )             35.8     11-01    140  |  102  |  17  ----------------------------<  162<H>  4.9   |  25  |  1.03    Ca    8.9      01 Nov 2022 06:23    MICROBIOLOGY:    .Blood Blood-Venous  10-25-22   No Growth Final  --  --    .Blood Blood  10-25-22   No Growth Final  --  --    Clean Catch Clean Catch (Midstream)  10-19-22   No growth  --  --    (otherwise reviewed)    RADIOLOGY:    10/26 MR:      IMPRESSION:  *  An 8 cm abscess interposed between the liver and upper pole right   kidney.  *  Bilateral renal cyst. No lesion suspicious for neoplasm.

## 2022-11-01 NOTE — PRE PROCEDURE NOTE - PRE PROCEDURE EVALUATION
Interventional Radiology    HPI:82M w/ HTN, DM2, CAD, and BPH-TURP, 10/19/22 p/w L1 vertebral fx s/p mechanical fall; c/b [facility]TON.   IR consulted on 10/27 for drainage of 8 cm abscess interposed between the liver and upper pole R kidney. MR showing diffusion restriction. Concern for hematoma vs abscess .  Pt remains with Leukocytosis,  afebrile.  Presents to IR for drainage.     Allergies: NKDA  Medications (Abx/Cardiac/Anticoagulation/Blood Products)  metoprolol succinate ER: 25 milliGRAM(s) Oral (11-01 @ 12:19)  piperacillin/tazobactam IVPB..: 25 mL/Hr IV Intermittent (11-01 @ 14:35)    Data:    T(C): 36.5  HR: 82  BP: 120/82  RR: 18  SpO2: 97%    Exam  General: No acute distress  Chest: Non labored breathing  Abdomen: Non-distended  Extremities: No swelling, warm    -WBC 6.52 / HgB 11.5 / Hct 35.8 / Plt 240  -Na 140 / Cl 102 / BUN 17 / Glucose 162  -K 4.9 / CO2 25 / Cr 1.03  -ALT -- / Alk Phos -- / T.Bili --  -INR1.02    Imaging: reviewed    Plan: 82y Male presents for hepatic abscess drainage  -Risks/Benefits/alternatives explained with the patient and/or healthcare proxy and witnessed informed consent obtained.

## 2022-11-02 LAB
ANION GAP SERPL CALC-SCNC: 9 MMOL/L — SIGNIFICANT CHANGE UP (ref 5–17)
BUN SERPL-MCNC: 14 MG/DL — SIGNIFICANT CHANGE UP (ref 7–23)
CALCIUM SERPL-MCNC: 9 MG/DL — SIGNIFICANT CHANGE UP (ref 8.4–10.5)
CHLORIDE SERPL-SCNC: 104 MMOL/L — SIGNIFICANT CHANGE UP (ref 96–108)
CO2 SERPL-SCNC: 26 MMOL/L — SIGNIFICANT CHANGE UP (ref 22–31)
CREAT SERPL-MCNC: 1.03 MG/DL — SIGNIFICANT CHANGE UP (ref 0.5–1.3)
EGFR: 72 ML/MIN/1.73M2 — SIGNIFICANT CHANGE UP
GLUCOSE BLDC GLUCOMTR-MCNC: 120 MG/DL — HIGH (ref 70–99)
GLUCOSE BLDC GLUCOMTR-MCNC: 165 MG/DL — HIGH (ref 70–99)
GLUCOSE BLDC GLUCOMTR-MCNC: 179 MG/DL — HIGH (ref 70–99)
GLUCOSE BLDC GLUCOMTR-MCNC: 200 MG/DL — HIGH (ref 70–99)
GLUCOSE SERPL-MCNC: 111 MG/DL — HIGH (ref 70–99)
HCT VFR BLD CALC: 33.7 % — LOW (ref 39–50)
HGB BLD-MCNC: 10.8 G/DL — LOW (ref 13–17)
MCHC RBC-ENTMCNC: 28.6 PG — SIGNIFICANT CHANGE UP (ref 27–34)
MCHC RBC-ENTMCNC: 32 GM/DL — SIGNIFICANT CHANGE UP (ref 32–36)
MCV RBC AUTO: 89.2 FL — SIGNIFICANT CHANGE UP (ref 80–100)
NRBC # BLD: 0 /100 WBCS — SIGNIFICANT CHANGE UP (ref 0–0)
PLATELET # BLD AUTO: 213 K/UL — SIGNIFICANT CHANGE UP (ref 150–400)
POTASSIUM SERPL-MCNC: 3.9 MMOL/L — SIGNIFICANT CHANGE UP (ref 3.5–5.3)
POTASSIUM SERPL-SCNC: 3.9 MMOL/L — SIGNIFICANT CHANGE UP (ref 3.5–5.3)
RBC # BLD: 3.78 M/UL — LOW (ref 4.2–5.8)
RBC # FLD: 14.3 % — SIGNIFICANT CHANGE UP (ref 10.3–14.5)
SODIUM SERPL-SCNC: 139 MMOL/L — SIGNIFICANT CHANGE UP (ref 135–145)
WBC # BLD: 7.18 K/UL — SIGNIFICANT CHANGE UP (ref 3.8–10.5)
WBC # FLD AUTO: 7.18 K/UL — SIGNIFICANT CHANGE UP (ref 3.8–10.5)

## 2022-11-02 PROCEDURE — 99232 SBSQ HOSP IP/OBS MODERATE 35: CPT

## 2022-11-02 PROCEDURE — 99231 SBSQ HOSP IP/OBS SF/LOW 25: CPT

## 2022-11-02 RX ADMIN — Medication 10 UNIT(S): at 08:56

## 2022-11-02 RX ADMIN — Medication 10 UNIT(S): at 12:12

## 2022-11-02 RX ADMIN — POLYETHYLENE GLYCOL 3350 17 GRAM(S): 17 POWDER, FOR SOLUTION ORAL at 11:07

## 2022-11-02 RX ADMIN — LIDOCAINE 1 PATCH: 4 CREAM TOPICAL at 14:45

## 2022-11-02 RX ADMIN — PANTOPRAZOLE SODIUM 40 MILLIGRAM(S): 20 TABLET, DELAYED RELEASE ORAL at 05:31

## 2022-11-02 RX ADMIN — TAMSULOSIN HYDROCHLORIDE 0.8 MILLIGRAM(S): 0.4 CAPSULE ORAL at 21:01

## 2022-11-02 RX ADMIN — PIPERACILLIN AND TAZOBACTAM 25 GRAM(S): 4; .5 INJECTION, POWDER, LYOPHILIZED, FOR SOLUTION INTRAVENOUS at 14:44

## 2022-11-02 RX ADMIN — PIPERACILLIN AND TAZOBACTAM 25 GRAM(S): 4; .5 INJECTION, POWDER, LYOPHILIZED, FOR SOLUTION INTRAVENOUS at 05:32

## 2022-11-02 RX ADMIN — Medication 10 UNIT(S): at 17:49

## 2022-11-02 RX ADMIN — LIDOCAINE 1 PATCH: 4 CREAM TOPICAL at 18:48

## 2022-11-02 RX ADMIN — INSULIN GLARGINE 25 UNIT(S): 100 INJECTION, SOLUTION SUBCUTANEOUS at 22:25

## 2022-11-02 RX ADMIN — Medication 25 MILLIGRAM(S): at 05:31

## 2022-11-02 RX ADMIN — FINASTERIDE 5 MILLIGRAM(S): 5 TABLET, FILM COATED ORAL at 11:07

## 2022-11-02 RX ADMIN — Medication 1: at 12:13

## 2022-11-02 RX ADMIN — SENNA PLUS 2 TABLET(S): 8.6 TABLET ORAL at 21:01

## 2022-11-02 RX ADMIN — Medication 1: at 17:49

## 2022-11-02 RX ADMIN — PIPERACILLIN AND TAZOBACTAM 25 GRAM(S): 4; .5 INJECTION, POWDER, LYOPHILIZED, FOR SOLUTION INTRAVENOUS at 21:00

## 2022-11-02 RX ADMIN — Medication 1: at 22:25

## 2022-11-02 RX ADMIN — LIDOCAINE 1 PATCH: 4 CREAM TOPICAL at 02:57

## 2022-11-02 NOTE — PROGRESS NOTE ADULT - ASSESSMENT
82M admitted post mechanical fall with T1 compression fx  CT chest with multilobar opacities c/w aspiration pneumonia  Hypoxemia due to former    REC    Complete Zosyn per ID  ABcess cultures pending  Monitor RA sats  Pain managment per primary team

## 2022-11-02 NOTE — PROGRESS NOTE ADULT - SUBJECTIVE AND OBJECTIVE BOX
KAITLIN CALDERON  82y Male  MRN:[id number]    Patient is a 82y old  Male who presents with a chief complaint of fall    HPI:   81M cantonese speaking PMHx of HTN/HLD, DM2, BPH, (?Afib per chart review, though not on AC's), presents to the ED with mid lower back pain that he sustained after falling in the bathroom while he was urinating at 9pm last night. Pt denies headstrike/LOC. Difficult to obtain full history even with  though pt answering questions appropriately and is AAOx2. Pt denies any pain anywhere else. Per EMS pt has had chronic cough. Unclear if pt was down long. pt last took insulin 2 days ago he states.   	Spoke with stepdaughter Yulissa who lives below pt: Pt couldn't get up from the fall yesterday, has difficulty ambulating with walker since the fall as he complaints of back pain, pt has 9hrs home help daily but not overnight. She states that pt is at baseline mental status and that he's had short term memory problems for some time. Denies any recent fevers/chills, abd pain, v/d, chest pain/sob. States that the cough pt has is chronic.  (19 Oct 2022 15:53)      Patient seen and evaluated at bedside. No acute events overnight except as noted.    Interval HPI: s/p IR drainage of abscess    PAST MEDICAL & SURGICAL HISTORY:  DM (diabetes mellitus)      HTN (hypertension)      Hypercholesterolemia      CAD (coronary artery disease)      HTN (hypertension)      DM (diabetes mellitus)      S/P TURP      S/P gastrectomy          REVIEW OF SYSTEMS:  as per hpi     VITALS:   Vital Signs Last 24 Hrs  T(C): 36.9 (02 Nov 2022 04:43), Max: 36.9 (02 Nov 2022 04:43)  T(F): 98.4 (02 Nov 2022 04:43), Max: 98.4 (02 Nov 2022 04:43)  HR: 93 (02 Nov 2022 04:43) (69 - 93)  BP: 126/86 (02 Nov 2022 04:43) (110/81 - 131/65)  BP(mean): 98 (01 Nov 2022 17:45) (77 - 98)  RR: 18 (02 Nov 2022 04:43) (14 - 18)  SpO2: 96% (02 Nov 2022 04:43) (96% - 100%)    Parameters below as of 02 Nov 2022 04:43  Patient On (Oxygen Delivery Method): room air           PHYSICAL EXAM:  GENERAL: NAD, well-developed,    HEAD:  Atraumatic, Normocephalic  EYES: EOMI, PERRLA, conjunctiva and sclera clear  NECK: Supple, No JVD  CHEST/LUNG: clear b/l  HEART: S1, S2; No murmurs, rubs, or gallops  ABDOMEN: Soft, Nontender, Nondistended; Bowel sounds present.   +drain  EXTREMITIES:  2+ Peripheral Pulses, No clubbing, cyanosis, or edema  PSYCH: Normal affect  NEUROLOGY: AAOX1-2; non-focal  SKIN: No rashes or lesions    Consultant(s) Notes Reviewed:  [x ] YES  [ ] NO  Care Discussed with Consultants/Other Providers [ x] YES  [ ] NO    MEDS:   MEDICATIONS  (STANDING):  dextrose 5%. 1000 milliLiter(s) (100 mL/Hr) IV Continuous <Continuous>  dextrose 5%. 1000 milliLiter(s) (50 mL/Hr) IV Continuous <Continuous>  dextrose 50% Injectable 25 Gram(s) IV Push once  dextrose 50% Injectable 12.5 Gram(s) IV Push once  dextrose 50% Injectable 25 Gram(s) IV Push once  finasteride 5 milliGRAM(s) Oral daily  glucagon  Injectable 1 milliGRAM(s) IntraMuscular once  influenza  Vaccine (HIGH DOSE) 0.7 milliLiter(s) IntraMuscular once  insulin glargine Injectable (LANTUS) 25 Unit(s) SubCutaneous at bedtime  insulin lispro (ADMELOG) corrective regimen sliding scale   SubCutaneous Before meals and at bedtime  insulin lispro Injectable (ADMELOG) 10 Unit(s) SubCutaneous three times a day before meals  lidocaine   4% Patch 1 Patch Transdermal every 24 hours  metoprolol succinate ER 25 milliGRAM(s) Oral daily  pantoprazole    Tablet 40 milliGRAM(s) Oral before breakfast  piperacillin/tazobactam IVPB.. 3.375 Gram(s) IV Intermittent every 8 hours  polyethylene glycol 3350 17 Gram(s) Oral daily  senna 2 Tablet(s) Oral at bedtime  tamsulosin 0.8 milliGRAM(s) Oral at bedtime    MEDICATIONS  (PRN):  acetaminophen     Tablet .. 650 milliGRAM(s) Oral every 6 hours PRN mild pain  albuterol/ipratropium for Nebulization 3 milliLiter(s) Nebulizer every 6 hours PRN Shortness of Breath  dextrose Oral Gel 15 Gram(s) Oral once PRN Blood Glucose LESS THAN 70 milliGRAM(s)/deciliter  oxyCODONE    IR 5 milliGRAM(s) Oral every 6 hours PRN Moderate Pain (4 - 6)      ALLERGIES:  No Known Allergies      LABS:                          10.8   7.18  )-----------( 213      ( 02 Nov 2022 07:26 )             33.7   11-02    139  |  104  |  14  ----------------------------<  111<H>  3.9   |  26  |  1.03    Ca    9.0      02 Nov 2022 07:20                                    < from: MR Abdomen w/wo IV Cont (10.26.22 @ 17:39) >    IMPRESSION:  *  An 8 cm abscess interposed between the liver and upper pole right   kidney.  *  Bilateral renal cyst. No lesion suspicious for neoplasm.      --- End of Report ---      < end of copied text >         cultures: Culture Results:   No growth (10-19 @ 08:59)       < from: MR Lumbar Spine No Cont (10.20.22 @ 17:28) >    IMPRESSION:  Evidence of acute fracture L1 with fracture line traversing   the vertebral body from anterior to posterior and findings consistent   with a 2 column injury. Paravertebral cuff of soft tissue consistent with   recent injury. Some mild extension of edema into the pedicles though the   transverse and spinous processes as well as the lamina are spared. No   other lesions are identified. Recommend interval follow-up to determine   benign versus pathologic fracture.    --- End of Report ---      < end of copied text >      < from: Xray Chest 1 View- PORTABLE-Urgent (Xray Chest 1 View- PORTABLE-Urgent .) (10.23.22 @ 15:36) >  IMPRESSION:  Bibasilar patchy opacities, compatible with subsegmental atelectasis.   Infectious etiology cannot be ruled out.    --- End of Report ---      < end of copied text >      < from: US Kidney and Bladder (10.24.22 @ 19:31) >  IMPRESSION:  Complex cystic collection superior to the right kidney, correlating to   heterogeneous collection exophytic from the right hepatic lobe.   Differential includes abscess versus hematoma.    Bilateral complex renal cysts.    < end of copied text >  < from: CT Chest No Cont (10.19.22 @ 09:59) >  IMPRESSION:  Limited noncontrast examination.    CHEST:  *  Secretions/debris within the bilateral mainstem bronchi with   multifocal sites of mucous plugging in distal airways.  *  Bilateral tree-in-bud nodularity is likely infectious/inflammatory.   Groundglass opacity within the left upper lobe, which may also be   infectious/inflammatory. Pulmonary nodule measuring 6 m in the right   upper lobe. Suggest 3 month follow-up chest CT to assess for stability or   resolution.  *  A few prominent nonspecific mediastinal lymph nodes.  *  Ascending thoracic aortic aneurysm measuring up to 5 cm.    ABDOMEN AND PELVIS:  *  Low density structure measuring 8.3 cm alongthe inferior margin of   the right hepatic lobe with surrounding inflammatory changes.   Differential includes an intrahepatic/perihepatic abscess, or possibly   hematoma in the setting of trauma. Neoplasm is thought to be less likely   but is not excluded. Contrast enhanced abdominal MRI may be helpful for   further characterization.  *  Mild compression fracture at L1, likely acute. Please refer to the   concurrent dedicated CT lumbar spine report.    Preliminary findings were discussed by Dr. Parks with Dr. Flores   on 10/19/2022 11:10 AM with read back confirmation. Final findings were   discussed with Dr. Pierce by Dr. Mcqueen on 10/19/2022 at 12:57 PM.    --- End of Report ---    ***Please see the addendum at the top of this report. It may contain   additional important information or changes.****      < end of copied text >

## 2022-11-02 NOTE — PROGRESS NOTE ADULT - SUBJECTIVE AND OBJECTIVE BOX
Chief complaint  Patient is a 82y old  Male who presents with a chief complaint of Fall (01 Nov 2022 13:00)   Review of systems  Patient in bed, looks comfortable, no hypoglycemic episodes.    Labs and Fingersticks  CAPILLARY BLOOD GLUCOSE      POCT Blood Glucose.: 120 mg/dL (02 Nov 2022 08:24)  POCT Blood Glucose.: 173 mg/dL (01 Nov 2022 21:49)  POCT Blood Glucose.: 145 mg/dL (01 Nov 2022 18:31)  POCT Blood Glucose.: 163 mg/dL (01 Nov 2022 12:02)      Anion Gap, Serum: 9 (11-02 @ 07:20)  Anion Gap, Serum: 13 (11-01 @ 06:23)      Calcium, Total Serum: 9.0 (11-02 @ 07:20)  Calcium, Total Serum: 8.9 (11-01 @ 06:23)          11-02    139  |  104  |  14  ----------------------------<  111<H>  3.9   |  26  |  1.03    Ca    9.0      02 Nov 2022 07:20                          10.8   7.18  )-----------( 213      ( 02 Nov 2022 07:26 )             33.7     Medications  MEDICATIONS  (STANDING):  dextrose 5%. 1000 milliLiter(s) (100 mL/Hr) IV Continuous <Continuous>  dextrose 5%. 1000 milliLiter(s) (50 mL/Hr) IV Continuous <Continuous>  dextrose 50% Injectable 25 Gram(s) IV Push once  dextrose 50% Injectable 12.5 Gram(s) IV Push once  dextrose 50% Injectable 25 Gram(s) IV Push once  finasteride 5 milliGRAM(s) Oral daily  glucagon  Injectable 1 milliGRAM(s) IntraMuscular once  influenza  Vaccine (HIGH DOSE) 0.7 milliLiter(s) IntraMuscular once  insulin glargine Injectable (LANTUS) 25 Unit(s) SubCutaneous at bedtime  insulin lispro (ADMELOG) corrective regimen sliding scale   SubCutaneous Before meals and at bedtime  insulin lispro Injectable (ADMELOG) 10 Unit(s) SubCutaneous three times a day before meals  lidocaine   4% Patch 1 Patch Transdermal every 24 hours  metoprolol succinate ER 25 milliGRAM(s) Oral daily  pantoprazole    Tablet 40 milliGRAM(s) Oral before breakfast  piperacillin/tazobactam IVPB.. 3.375 Gram(s) IV Intermittent every 8 hours  polyethylene glycol 3350 17 Gram(s) Oral daily  senna 2 Tablet(s) Oral at bedtime  tamsulosin 0.8 milliGRAM(s) Oral at bedtime      Physical Exam  General: Patient comfortable in bed  Vital Signs Last 12 Hrs  T(F): 98.4 (11-02-22 @ 04:43), Max: 98.4 (11-02-22 @ 04:43)  HR: 93 (11-02-22 @ 04:43) (93 - 93)  BP: 126/86 (11-02-22 @ 04:43) (126/86 - 126/86)  BP(mean): --  RR: 18 (11-02-22 @ 04:43) (18 - 18)  SpO2: 96% (11-02-22 @ 04:43) (96% - 96%)  Neck: No palpable thyroid nodules.  CVS: S1S2, No murmurs  Respiratory: No wheezing, no crepitations  GI: Abdomen soft, bowel sounds positive  Musculoskeletal:  edema lower extremities.   Skin: No skin rashes, no ecchymosis    Diagnostics    Free Thyroxine, Serum: AM Sched. Collection: 21-Oct-2022 06:00 (10-20 @ 13:18)  Thyroid Stimulating Hormone, Serum: AM Sched. Collection: 21-Oct-2022 06:00 (10-20 @ 13:18)  A1C with Estimated Average Glucose: Routine (10-20 @ 13:17)           Chief complaint  Patient is a 82y old  Male who presents with a chief complaint of Fall (01 Nov 2022 13:00)   Review of systems  Patient in bed, looks comfortable, no hypoglycemic episodes.    Labs and Fingersticks  CAPILLARY BLOOD GLUCOSE      POCT Blood Glucose.: 120 mg/dL (02 Nov 2022 08:24)  POCT Blood Glucose.: 173 mg/dL (01 Nov 2022 21:49)  POCT Blood Glucose.: 145 mg/dL (01 Nov 2022 18:31)  POCT Blood Glucose.: 163 mg/dL (01 Nov 2022 12:02)      Anion Gap, Serum: 9 (11-02 @ 07:20)  Anion Gap, Serum: 13 (11-01 @ 06:23)      Calcium, Total Serum: 9.0 (11-02 @ 07:20)  Calcium, Total Serum: 8.9 (11-01 @ 06:23)          11-02    139  |  104  |  14  ----------------------------<  111<H>  3.9   |  26  |  1.03    Ca    9.0      02 Nov 2022 07:20                          10.8   7.18  )-----------( 213      ( 02 Nov 2022 07:26 )             33.7     Medications  MEDICATIONS  (STANDING):  dextrose 5%. 1000 milliLiter(s) (100 mL/Hr) IV Continuous <Continuous>  dextrose 5%. 1000 milliLiter(s) (50 mL/Hr) IV Continuous <Continuous>  dextrose 50% Injectable 25 Gram(s) IV Push once  dextrose 50% Injectable 12.5 Gram(s) IV Push once  dextrose 50% Injectable 25 Gram(s) IV Push once  finasteride 5 milliGRAM(s) Oral daily  glucagon  Injectable 1 milliGRAM(s) IntraMuscular once  influenza  Vaccine (HIGH DOSE) 0.7 milliLiter(s) IntraMuscular once  insulin glargine Injectable (LANTUS) 25 Unit(s) SubCutaneous at bedtime  insulin lispro (ADMELOG) corrective regimen sliding scale   SubCutaneous Before meals and at bedtime  insulin lispro Injectable (ADMELOG) 10 Unit(s) SubCutaneous three times a day before meals  lidocaine   4% Patch 1 Patch Transdermal every 24 hours  metoprolol succinate ER 25 milliGRAM(s) Oral daily  pantoprazole    Tablet 40 milliGRAM(s) Oral before breakfast  piperacillin/tazobactam IVPB.. 3.375 Gram(s) IV Intermittent every 8 hours  polyethylene glycol 3350 17 Gram(s) Oral daily  senna 2 Tablet(s) Oral at bedtime  tamsulosin 0.8 milliGRAM(s) Oral at bedtime      Physical Exam  General: Patient comfortable in bed  Vital Signs Last 12 Hrs  T(F): 98.4 (11-02-22 @ 04:43), Max: 98.4 (11-02-22 @ 04:43)  HR: 93 (11-02-22 @ 04:43) (93 - 93)  BP: 126/86 (11-02-22 @ 04:43) (126/86 - 126/86)  BP(mean): --  RR: 18 (11-02-22 @ 04:43) (18 - 18)  SpO2: 96% (11-02-22 @ 04:43) (96% - 96%)  Neck: No palpable thyroid nodules.  CVS: S1S2, No murmurs  Respiratory: No wheezing, no crepitations  GI: Abdomen soft, bowel sounds positive  Musculoskeletal:  edema lower extremities.   Skin: No skin rashes, no ecchymosis    Diagnostics    Free Thyroxine, Serum: AM Sched. Collection: 21-Oct-2022 06:00 (10-20 @ 13:18)  Thyroid Stimulating Hormone, Serum: AM Sched. Collection: 21-Oct-2022 06:00 (10-20 @ 13:18)  A1C with Estimated Average Glucose: Routine (10-20 @ 13:17)

## 2022-11-02 NOTE — PROGRESS NOTE ADULT - ASSESSMENT
81 male h/o htn, chol, dm, here s/p mechanical fall    mechanical fall  imaging noted  surg eval noted  MRI L spine noted with Lspine fx  ortho f/u noted  no acute intervention  pain control  TLSO brace  PT    DM  insulin as ordered  endo f/u  monitor fs    hypoxia  imaging c/w pna  pulm consult noted  supp o2  iv zosyn  incentive spirometer  nebs    chanel  unclear etiology  possible 2/2 poor oral fluid intake  renal f/u  creat now nl    abnl UA  f/u cult  on zosyn  id f/u    MRI noted with collection btw liver and kidney  possible abscess  s/p drainage by IR 11/1.  f/u cult.  monitor drain outpt. IR f/u  abx as per id    suspected renal lesion  gu eval noted   MRI noted with cysts. not suspicious for malignancy        cont other home meds         Advanced care planning was discussed with patient and family.  Advanced care planning forms were reviewed and discussed as appropriate.  Differential diagnosis and plan of care discussed with patient after the evaluation.   Pain assessed and judicious use of narcotics when appropriate was discussed.  Importance of Fall prevention discussed.  Counseling on Smoking and Alcohol cessation was offered when appropriate.  Counseling on Diet, exercise, and medication compliance was done.   Approx 30 minutes spent.

## 2022-11-02 NOTE — PROGRESS NOTE ADULT - SUBJECTIVE AND OBJECTIVE BOX
CC: F/U for Abscess    Saw/spoke to patient. No fevers, no chills. No new complaints.    Allergies  No Known Allergies    ANTIMICROBIALS:  piperacillin/tazobactam IVPB.. 3.375 every 8 hours    PE:    Vital Signs Last 24 Hrs  T(C): 36.9 (02 Nov 2022 04:43), Max: 36.9 (02 Nov 2022 04:43)  T(F): 98.4 (02 Nov 2022 04:43), Max: 98.4 (02 Nov 2022 04:43)  HR: 93 (02 Nov 2022 04:43) (69 - 93)  BP: 126/86 (02 Nov 2022 04:43) (110/81 - 131/65)  BP(mean): 98 (01 Nov 2022 17:45) (77 - 98)  RR: 18 (02 Nov 2022 04:43) (14 - 18)  SpO2: 96% (02 Nov 2022 04:43) (96% - 100%)    Gen: AOx3, NAD, non-toxic  CV: Nontachycardic  Resp: Breathing comfortably, RA  Abd: Soft, nontender  IV/Skin: No thrombophlebitis    LABS:                        10.8   7.18  )-----------( 213      ( 02 Nov 2022 07:26 )             33.7     11-02    139  |  104  |  14  ----------------------------<  111<H>  3.9   |  26  |  1.03    Ca    9.0      02 Nov 2022 07:20    MICROBIOLOGY:    .Blood Blood-Venous  10-25-22   No Growth Final  --  --    .Blood Blood  10-25-22   No Growth Final  --  --  Clean Catch Clean Catch (Midstream)  10-19-22   No growth  --  --    (otherwise reviewed)    RADIOLOGY:    10/26 MR:    IMPRESSION:  *  An 8 cm abscess interposed between the liver and upper pole right   kidney.  *  Bilateral renal cyst. No lesion suspicious for neoplasm.

## 2022-11-02 NOTE — PROGRESS NOTE ADULT - SUBJECTIVE AND OBJECTIVE BOX
Interventional Radiology Follow-Up Note    Patient seen and examined @ bedside     This is a 82 year old Male s/p perforated appendicitis abdominal drain placement on 11/1/22 in Interventional Radiology with Dr. Putnam    No complaint offered.    Medication:  metoprolol succinate ER: (11-02)  piperacillin/tazobactam IVPB..: (11-02)    Vitals:  T(F): 98.4, Max: 98.4 (04:43)  HR: 93  BP: 126/86  RR: 18  SpO2: 96%    Physical Exam:  General: Nontoxic, in NAD.  Abdomen: soft, NTND.   Drain Device: Drain intact attached to gravity bag, Flushed with 5cc NS w/o difficulty. Dressing clean, dry, intact.   24hr Drain output: 80cc    perforated LABS:  Na: 139 (11-02 @ 07:20), 140 (11-01 @ 06:23)  K: 3.9 (11-02 @ 07:20), 4.9 (11-01 @ 06:23)  Cl: 104 (11-02 @ 07:20), 102 (11-01 @ 06:23)  CO2: 26 (11-02 @ 07:20), 25 (11-01 @ 06:23)  BUN: 14 (11-02 @ 07:20), 17 (11-01 @ 06:23)  Cr: 1.03 (11-02 @ 07:20), 1.03 (11-01 @ 06:23)  Glu: 111(11-02 @ 07:20), 162(11-01 @ 06:23)  Hgb: 10.8 (11-02 @ 07:26), 11.5 (11-01 @ 06:23), 11.5 (10-31 @ 07:21)  Hct: 33.7 (11-02 @ 07:26), 35.8 (11-01 @ 06:23), 35.6 (10-31 @ 07:21)  WBC: 7.18 (11-02 @ 07:26), 6.52 (11-01 @ 06:23), 9.03 (10-31 @ 07:21)  Plt: 213 (11-02 @ 07:26), 240 (11-01 @ 06:23), 220 (10-31 @ 07:21)  INR: 1.02 11-01-22 @ 06:23  PTT: 32.9 11-01-22 @ 06:23      Assessment/Plan: This is a 82 year old Male s/p abdominal drain placement on 11/1/22 in Interventional Radiology with Dr. Putnam     - 10 fr drain placed into right abdominal collection between liver and kidney. ~100cc purulent fluid aspirated during procedure  - Follow up culture  - Flush drain with 5cc NS daily forward only; DO NOT aspirate  - Change dressing q3 days or when dressing is saturated.  - Trend vs/labs  - Continue global management per primary team, IR will follow  - If the patient is d/c home with drainage catheter, pt can make an appointment with IR by calling the IR booking office at [phone]; recommend IR follow in 10 days after discharge for tube evaluation.  - Pt will benefit from VNS service to help with drainage catheter care; Pt should continue same drainage catheter care as an outpatient.     Please call IR at [phone] with any questions, concerns, or issues regarding above.      Also available on TEAMS   Interventional Radiology Follow-Up Note    Patient seen and examined @ bedside     This is a 82 year old Male s/p  abdominal abscess drain placement on 11/1/22 in Interventional Radiology with Dr. Putnam    No complaint offered.    Medication:  metoprolol succinate ER: (11-02)  piperacillin/tazobactam IVPB..: (11-02)    Vitals:  T(F): 98.4, Max: 98.4 (04:43)  HR: 93  BP: 126/86  RR: 18  SpO2: 96%    Physical Exam:  General: Nontoxic, in NAD.  Abdomen: soft, NTND.   Drain Device: Drain intact attached to gravity bag, Flushed with 5cc NS w/o difficulty. Dressing clean, dry, intact.   24hr Drain output: 80cc    perforated LABS:  Na: 139 (11-02 @ 07:20), 140 (11-01 @ 06:23)  K: 3.9 (11-02 @ 07:20), 4.9 (11-01 @ 06:23)  Cl: 104 (11-02 @ 07:20), 102 (11-01 @ 06:23)  CO2: 26 (11-02 @ 07:20), 25 (11-01 @ 06:23)  BUN: 14 (11-02 @ 07:20), 17 (11-01 @ 06:23)  Cr: 1.03 (11-02 @ 07:20), 1.03 (11-01 @ 06:23)  Glu: 111(11-02 @ 07:20), 162(11-01 @ 06:23)  Hgb: 10.8 (11-02 @ 07:26), 11.5 (11-01 @ 06:23), 11.5 (10-31 @ 07:21)  Hct: 33.7 (11-02 @ 07:26), 35.8 (11-01 @ 06:23), 35.6 (10-31 @ 07:21)  WBC: 7.18 (11-02 @ 07:26), 6.52 (11-01 @ 06:23), 9.03 (10-31 @ 07:21)  Plt: 213 (11-02 @ 07:26), 240 (11-01 @ 06:23), 220 (10-31 @ 07:21)  INR: 1.02 11-01-22 @ 06:23  PTT: 32.9 11-01-22 @ 06:23      Assessment/Plan: This is a 82 year old Male s/p abdominal abscess drain placement on 11/1/22 in Interventional Radiology with Dr. Putnam     - 10 fr drain placed into right abdominal collection between liver and kidney. ~100cc purulent fluid aspirated during procedure  - Follow up culture  - Flush drain with 5cc NS daily forward only; DO NOT aspirate  - Change dressing q3 days or when dressing is saturated.  - Trend vs/labs  - Continue global management per primary team, IR will follow  - If the patient is d/c home with drainage catheter, pt can make an appointment with IR by calling the IR booking office at [phone]; recommend IR follow in 10 days after discharge for tube evaluation.  - Pt will benefit from VNS service to help with drainage catheter care; Pt should continue same drainage catheter care as an outpatient.     Please call IR at [phone] with any questions, concerns, or issues regarding above.      Also available on TEAMS

## 2022-11-02 NOTE — PROGRESS NOTE ADULT - ASSESSMENT
82 yo M with HTN/HLD, DM2, BPH, initially with low back pain  Leukocytosis, no fever  Initially with fall  Current complaints of constipation, possible urinary retention  CT chest with evidence aspiration; CT A/P with concern for abscess vs hematoma along liver margin  MR suspicious for abscess  Overall,  1) Abnormal finding on imaging/Abscess  - Collection adjacent to liver--hematoma vs abscess  - Zosyn 3.375g q 8  - S/p IR drain--appreciate procedure; follow up culture  2) Leukocytosis  - Trending down  - Zosyn for now  - F/U BCXs  - Trend WBC to normal  3) PNA  - Possible PNA based on CT, aspiration, small airway disease  - Would be cross treated by Zosyn    Final antibiotic plan pending culture result    Gal Arroyo MD  Contact on TEAMS messaging from 9am - 5pm  From 5pm-9am, on weekends, or if no response call [phone]

## 2022-11-02 NOTE — PROGRESS NOTE ADULT - SUBJECTIVE AND OBJECTIVE BOX
Overnight events noted      VITAL:  T(C): , Max: 36.9 (11-02-22 @ 04:43)  T(F): , Max: 98.4 (11-02-22 @ 04:43)  HR: 93 (11-02-22 @ 04:43)  BP: 126/86 (11-02-22 @ 04:43)  BP(mean): 98 (11-01-22 @ 17:45)  RR: 18 (11-02-22 @ 04:43)  SpO2: 96% (11-02-22 @ 04:43)      PHYSICAL EXAM:  Constitutional: NAD  HEENT: NCAT, MMM  Neck: Supple, No JVD  Respiratory: CTA-b/l  Cardiovascular: reg s1s2  Gastrointestinal: BS+, soft, NT/ND; (+)abdominal drain  Extremities: No peripheral edema b/l  Neurological: no focal deficits; strength grossly intact  Back: no CVAT b/l  Skin: No rashes, no nevi      LABS:                        10.8   7.18  )-----------( 213      ( 02 Nov 2022 07:26 )             33.7     Na(139)/K(3.9)/Cl(104)/HCO3(26)/BUN(14)/Cr(1.03)Glu(111)/Ca(9.0)/Mg(--)/PO4(--)    11-02 @ 07:20  Na(140)/K(4.9)/Cl(102)/HCO3(25)/BUN(17)/Cr(1.03)Glu(162)/Ca(8.9)/Mg(--)/PO4(--)    11-01 @ 06:23      IMPRESSION: 82M w/ HTN, DM2, CAD, and BPH-TURP, 10/19/22 p/w L1 vertebral fx s/p mechanical fall; c/b ANTON    (1)Renal - ANTON - prerenal - now improved  (2)Hypoxia - PNA - on IV Zosyn   (3)Perihepatic collection - s/p R abdominal drain placement 11/1/22 by IR      RECOMMEND:  (1)Continue abx for GFR ~50ml/min  (2)Drain management per primary team/ID/IR            Maco Bhatia MD  [facility]  Office: [phone]  Cell: [phone]       No complaints      VITAL:  T(C): , Max: 36.9 (11-02-22 @ 04:43)  T(F): , Max: 98.4 (11-02-22 @ 04:43)  HR: 93 (11-02-22 @ 04:43)  BP: 126/86 (11-02-22 @ 04:43)  BP(mean): 98 (11-01-22 @ 17:45)  RR: 18 (11-02-22 @ 04:43)  SpO2: 96% (11-02-22 @ 04:43)      PHYSICAL EXAM:  Constitutional: NAD  HEENT: NCAT, MMM  Neck: Supple, No JVD  Respiratory: CTA-b/l  Cardiovascular: reg s1s2  Gastrointestinal: BS+, soft, NT/ND; (+)abdominal drain  Extremities: No peripheral edema b/l  Neurological: no focal deficits; strength grossly intact  Back: no CVAT b/l  Skin: No rashes, no nevi      LABS:                        10.8   7.18  )-----------( 213      ( 02 Nov 2022 07:26 )             33.7     Na(139)/K(3.9)/Cl(104)/HCO3(26)/BUN(14)/Cr(1.03)Glu(111)/Ca(9.0)/Mg(--)/PO4(--)    11-02 @ 07:20  Na(140)/K(4.9)/Cl(102)/HCO3(25)/BUN(17)/Cr(1.03)Glu(162)/Ca(8.9)/Mg(--)/PO4(--)    11-01 @ 06:23      IMPRESSION: 82M w/ HTN, DM2, CAD, and BPH-TURP, 10/19/22 p/w L1 vertebral fx s/p mechanical fall; c/b ANTON    (1)Renal - ANTON - prerenal - now improved  (2)Hypoxia - PNA - on IV Zosyn   (3)Perihepatic collection - s/p R abdominal drain placement 11/1/22 by IR      RECOMMEND:  (1)Continue abx for GFR ~50ml/min  (2)Drain management per primary team/ID/IR            Maco Bhatia MD  [facility]  Office: [phone]  Cell: [phone]

## 2022-11-02 NOTE — PROGRESS NOTE ADULT - ASSESSMENT
Assessment  DMT2: 82y Male with DM T2 with hyperglycemia, was on insulin at home, now on basal bolus insulin, blood sugars are stable and trending within overall acceptable range, no hypoglycemic episodes. Patient is s/p abdominal abscess drain placement, eating meals, no acute events.  S/P Fall: workup in progress, stable, monitored.  HTN: Un Controlled,  on antihypertensive medications.            Melba Reyes MD  Cell: [phone]  Office: [phone]               Assessment  DMT2: 82y Male with DM T2 with hyperglycemia, was on insulin at home, now on basal bolus insulin, blood sugars are stable and trending within overall acceptable range, no hypoglycemic episodes. Patient is s/p abdominal  abscess drain placement, eating meals, no acute events.  S/P Fall: workup in progress, stable, monitored.  HTN: Un Controlled,  on antihypertensive medications.            Melba Reyes MD  Cell: [phone]  Office: [phone]

## 2022-11-02 NOTE — PROGRESS NOTE ADULT - SUBJECTIVE AND OBJECTIVE BOX
PUlmonary Progress Note  Edmund Quiroga MD  [phone]    Sleeping comfortably on room air  Afebrile on Zosyn  S/P abcess drainage    Vital Signs Last 24 Hrs  T(C): 36.9 (02 Nov 2022 04:43), Max: 36.9 (02 Nov 2022 04:43)  T(F): 98.4 (02 Nov 2022 04:43), Max: 98.4 (02 Nov 2022 04:43)  HR: 93 (02 Nov 2022 04:43) (69 - 93)  BP: 126/86 (02 Nov 2022 04:43) (110/81 - 131/65)  BP(mean): 98 (01 Nov 2022 17:45) (77 - 98)  RR: 18 (02 Nov 2022 04:43) (14 - 18)  SpO2: 96% (02 Nov 2022 04:43) (96% - 100%)    Parameters below as of 02 Nov 2022 04:43  Patient On (Oxygen Delivery Method): room air                          10.8   7.18  )-----------( 213      ( 02 Nov 2022 07:26 )             33.7       11-02    139  |  104  |  14  ----------------------------<  111<H>  3.9   |  26  |  1.03    Ca    9.0      02 Nov 2022 07:20      Physical Examination:  PULM: Few basilar rhonchi  CVS: Regular rate and rhythm, no murmurs, rubs, or gallops  ABD: Soft, non-tender  EXT:  No clubbing, cyanosis, or edema    RADIOLOGY REVIEWED  CXR:    CT chest:    TTE:

## 2022-11-03 LAB
-  AMIKACIN: SIGNIFICANT CHANGE UP
-  AMOXICILLIN/CLAVULANIC ACID: SIGNIFICANT CHANGE UP
-  AMPICILLIN/SULBACTAM: SIGNIFICANT CHANGE UP
-  AMPICILLIN: SIGNIFICANT CHANGE UP
-  AZTREONAM: SIGNIFICANT CHANGE UP
-  CEFAZOLIN: SIGNIFICANT CHANGE UP
-  CEFEPIME: SIGNIFICANT CHANGE UP
-  CEFTRIAXONE: SIGNIFICANT CHANGE UP
-  CIPROFLOXACIN: SIGNIFICANT CHANGE UP
-  ERTAPENEM: SIGNIFICANT CHANGE UP
-  GENTAMICIN: SIGNIFICANT CHANGE UP
-  IMIPENEM: SIGNIFICANT CHANGE UP
-  LEVOFLOXACIN: SIGNIFICANT CHANGE UP
-  MEROPENEM: SIGNIFICANT CHANGE UP
-  PIPERACILLIN/TAZOBACTAM: SIGNIFICANT CHANGE UP
-  TOBRAMYCIN: SIGNIFICANT CHANGE UP
-  TRIMETHOPRIM/SULFAMETHOXAZOLE: SIGNIFICANT CHANGE UP
GLUCOSE BLDC GLUCOMTR-MCNC: 166 MG/DL — HIGH (ref 70–99)
GLUCOSE BLDC GLUCOMTR-MCNC: 194 MG/DL — HIGH (ref 70–99)
GLUCOSE BLDC GLUCOMTR-MCNC: 209 MG/DL — HIGH (ref 70–99)
GLUCOSE BLDC GLUCOMTR-MCNC: 212 MG/DL — HIGH (ref 70–99)
HCT VFR BLD CALC: 35.5 % — LOW (ref 39–50)
HGB BLD-MCNC: 11.4 G/DL — LOW (ref 13–17)
MCHC RBC-ENTMCNC: 29 PG — SIGNIFICANT CHANGE UP (ref 27–34)
MCHC RBC-ENTMCNC: 32.1 GM/DL — SIGNIFICANT CHANGE UP (ref 32–36)
MCV RBC AUTO: 90.3 FL — SIGNIFICANT CHANGE UP (ref 80–100)
METHOD TYPE: SIGNIFICANT CHANGE UP
NRBC # BLD: 0 /100 WBCS — SIGNIFICANT CHANGE UP (ref 0–0)
PLATELET # BLD AUTO: 203 K/UL — SIGNIFICANT CHANGE UP (ref 150–400)
RBC # BLD: 3.93 M/UL — LOW (ref 4.2–5.8)
RBC # FLD: 14.3 % — SIGNIFICANT CHANGE UP (ref 10.3–14.5)
WBC # BLD: 6.74 K/UL — SIGNIFICANT CHANGE UP (ref 3.8–10.5)
WBC # FLD AUTO: 6.74 K/UL — SIGNIFICANT CHANGE UP (ref 3.8–10.5)

## 2022-11-03 PROCEDURE — 99232 SBSQ HOSP IP/OBS MODERATE 35: CPT

## 2022-11-03 PROCEDURE — 99231 SBSQ HOSP IP/OBS SF/LOW 25: CPT

## 2022-11-03 RX ADMIN — Medication 10 UNIT(S): at 13:16

## 2022-11-03 RX ADMIN — Medication 2: at 13:17

## 2022-11-03 RX ADMIN — LIDOCAINE 1 PATCH: 4 CREAM TOPICAL at 18:55

## 2022-11-03 RX ADMIN — TAMSULOSIN HYDROCHLORIDE 0.8 MILLIGRAM(S): 0.4 CAPSULE ORAL at 21:07

## 2022-11-03 RX ADMIN — SENNA PLUS 2 TABLET(S): 8.6 TABLET ORAL at 21:07

## 2022-11-03 RX ADMIN — PIPERACILLIN AND TAZOBACTAM 25 GRAM(S): 4; .5 INJECTION, POWDER, LYOPHILIZED, FOR SOLUTION INTRAVENOUS at 17:05

## 2022-11-03 RX ADMIN — Medication 25 MILLIGRAM(S): at 05:39

## 2022-11-03 RX ADMIN — Medication 2: at 18:25

## 2022-11-03 RX ADMIN — Medication 1: at 09:01

## 2022-11-03 RX ADMIN — Medication 10 UNIT(S): at 18:24

## 2022-11-03 RX ADMIN — Medication 10 UNIT(S): at 09:01

## 2022-11-03 RX ADMIN — INSULIN GLARGINE 25 UNIT(S): 100 INJECTION, SOLUTION SUBCUTANEOUS at 21:54

## 2022-11-03 RX ADMIN — PIPERACILLIN AND TAZOBACTAM 25 GRAM(S): 4; .5 INJECTION, POWDER, LYOPHILIZED, FOR SOLUTION INTRAVENOUS at 05:39

## 2022-11-03 RX ADMIN — LIDOCAINE 1 PATCH: 4 CREAM TOPICAL at 04:01

## 2022-11-03 RX ADMIN — PANTOPRAZOLE SODIUM 40 MILLIGRAM(S): 20 TABLET, DELAYED RELEASE ORAL at 06:24

## 2022-11-03 RX ADMIN — LIDOCAINE 1 PATCH: 4 CREAM TOPICAL at 18:33

## 2022-11-03 RX ADMIN — Medication 1: at 21:54

## 2022-11-03 NOTE — PROGRESS NOTE ADULT - ASSESSMENT
Interventional Radiology Follow-Up Note    This is a 82 year old Male s/p  abdominal abscess drain placement on 11/1/22 in Interventional Radiology with Dr. Putnam    S: Patient seen and examined @ bedside. No complaints offered.     Medication:  metoprolol succinate ER: (11-03)  piperacillin/tazobactam IVPB..: (11-03)    Vitals:   T(F): 97.5, Max: 98.8 (14:19)  HR: 96  BP: 151/89  RR: 18  SpO2: 95%    Physical Exam:  General: Nontoxic, in NAD.  Abdomen: soft, NTND.   Drain Device: Drain intact attached to gravity bag, Flushed with 5cc NS w/o difficulty. Dressing clean, dry, intact.   24hr Drain output: 95cc    LABS:      Preliminary Report:    Numerous Escherichia coli    Assessment/Plan: This is a 82 year old Male s/p abdominal abscess drain placement on 11/1/22 in Interventional Radiology with Dr. Putnam     - 10 fr drain placed into right abdominal collection between liver and kidney. ~100cc purulent fluid aspirated during procedure  - Follow up culture  - Flush drain with 5cc NS daily forward only; DO NOT aspirate  - Change dressing q3 days or when dressing is saturated.  - Trend vs/labs  - Continue global management per primary team, IR will follow  - If the patient is d/c home with drainage catheter, pt can make an appointment with IR by calling the IR booking office at [phone]; recommend IR follow in 10 days after discharge for tube evaluation.  - Pt will benefit from VNS service to help with drainage catheter care; Pt should continue same drainage catheter care as an outpatient.     Please call IR at extension [phone] with any questions, concerns, or issues regarding above.      Toña Gonzalez NP  Available on Teams

## 2022-11-03 NOTE — PROGRESS NOTE ADULT - SUBJECTIVE AND OBJECTIVE BOX
Chief complaint  Patient is a 82y old  Male who presents with a chief complaint of Fall (02 Nov 2022 13:01)   Review of systems  Patient in bed, looks comfortable, no hypoglycemic episodes.    Labs and Fingersticks  CAPILLARY BLOOD GLUCOSE      POCT Blood Glucose.: 166 mg/dL (03 Nov 2022 08:22)  POCT Blood Glucose.: 200 mg/dL (02 Nov 2022 22:18)  POCT Blood Glucose.: 179 mg/dL (02 Nov 2022 17:46)      Anion Gap, Serum: 9 (11-02 @ 07:20)      Calcium, Total Serum: 9.0 (11-02 @ 07:20)          11-02    139  |  104  |  14  ----------------------------<  111<H>  3.9   |  26  |  1.03    Ca    9.0      02 Nov 2022 07:20                          11.4   6.74  )-----------( 203      ( 03 Nov 2022 07:09 )             35.5     Medications  MEDICATIONS  (STANDING):  dextrose 5%. 1000 milliLiter(s) (100 mL/Hr) IV Continuous <Continuous>  dextrose 5%. 1000 milliLiter(s) (50 mL/Hr) IV Continuous <Continuous>  dextrose 50% Injectable 25 Gram(s) IV Push once  dextrose 50% Injectable 12.5 Gram(s) IV Push once  dextrose 50% Injectable 25 Gram(s) IV Push once  finasteride 5 milliGRAM(s) Oral daily  glucagon  Injectable 1 milliGRAM(s) IntraMuscular once  influenza  Vaccine (HIGH DOSE) 0.7 milliLiter(s) IntraMuscular once  insulin glargine Injectable (LANTUS) 25 Unit(s) SubCutaneous at bedtime  insulin lispro (ADMELOG) corrective regimen sliding scale   SubCutaneous Before meals and at bedtime  insulin lispro Injectable (ADMELOG) 10 Unit(s) SubCutaneous three times a day before meals  lidocaine   4% Patch 1 Patch Transdermal every 24 hours  metoprolol succinate ER 25 milliGRAM(s) Oral daily  pantoprazole    Tablet 40 milliGRAM(s) Oral before breakfast  piperacillin/tazobactam IVPB.. 3.375 Gram(s) IV Intermittent every 8 hours  polyethylene glycol 3350 17 Gram(s) Oral daily  senna 2 Tablet(s) Oral at bedtime  tamsulosin 0.8 milliGRAM(s) Oral at bedtime      Physical Exam  General: Patient comfortable in bed  Vital Signs Last 12 Hrs  T(F): 97.7 (11-03-22 @ 11:03), Max: 97.7 (11-03-22 @ 11:03)  HR: 75 (11-03-22 @ 11:03) (75 - 96)  BP: 118/83 (11-03-22 @ 11:03) (118/83 - 151/89)  BP(mean): --  RR: 18 (11-03-22 @ 11:03) (18 - 18)  SpO2: 97% (11-03-22 @ 11:03) (95% - 97%)  Neck: No palpable thyroid nodules.  CVS: S1S2, No murmurs  Respiratory: No wheezing, no crepitations  GI: Abdomen soft, bowel sounds positive  Musculoskeletal:  edema lower extremities.   Skin: No skin rashes, no ecchymosis    Diagnostics    Free Thyroxine, Serum: AM Sched. Collection: 21-Oct-2022 06:00 (10-20 @ 13:18)  Thyroid Stimulating Hormone, Serum: AM Sched. Collection: 21-Oct-2022 06:00 (10-20 @ 13:18)  A1C with Estimated Average Glucose: Routine (10-20 @ 13:17)           Chief complaint  Patient is a 82y old  Male who presents with a chief complaint of Fall (02 Nov 2022 13:01)   Review of systems  Patient in bed, looks comfortable, no hypoglycemic episodes.    Labs and Fingersticks  CAPILLARY BLOOD GLUCOSE      POCT Blood Glucose.: 166 mg/dL (03 Nov 2022 08:22)  POCT Blood Glucose.: 200 mg/dL (02 Nov 2022 22:18)  POCT Blood Glucose.: 179 mg/dL (02 Nov 2022 17:46)      Anion Gap, Serum: 9 (11-02 @ 07:20)      Calcium, Total Serum: 9.0 (11-02 @ 07:20)          11-02    139  |  104  |  14  ----------------------------<  111<H>  3.9   |  26  |  1.03    Ca    9.0      02 Nov 2022 07:20                          11.4   6.74  )-----------( 203      ( 03 Nov 2022 07:09 )             35.5     Medications  MEDICATIONS  (STANDING):  dextrose 5%. 1000 milliLiter(s) (100 mL/Hr) IV Continuous <Continuous>  dextrose 5%. 1000 milliLiter(s) (50 mL/Hr) IV Continuous <Continuous>  dextrose 50% Injectable 25 Gram(s) IV Push once  dextrose 50% Injectable 12.5 Gram(s) IV Push once  dextrose 50% Injectable 25 Gram(s) IV Push once  finasteride 5 milliGRAM(s) Oral daily  glucagon  Injectable 1 milliGRAM(s) IntraMuscular once  influenza  Vaccine (HIGH DOSE) 0.7 milliLiter(s) IntraMuscular once  insulin glargine Injectable (LANTUS) 25 Unit(s) SubCutaneous at bedtime  insulin lispro (ADMELOG) corrective regimen sliding scale   SubCutaneous Before meals and at bedtime  insulin lispro Injectable (ADMELOG) 10 Unit(s) SubCutaneous three times a day before meals  lidocaine   4% Patch 1 Patch Transdermal every 24 hours  metoprolol succinate ER 25 milliGRAM(s) Oral daily  pantoprazole    Tablet 40 milliGRAM(s) Oral before breakfast  piperacillin/tazobactam IVPB.. 3.375 Gram(s) IV Intermittent every 8 hours  polyethylene glycol 3350 17 Gram(s) Oral daily  senna 2 Tablet(s) Oral at bedtime  tamsulosin 0.8 milliGRAM(s) Oral at bedtime      Physical Exam  General: Patient comfortable in bed  Vital Signs Last 12 Hrs  T(F): 97.7 (11-03-22 @ 11:03), Max: 97.7 (11-03-22 @ 11:03)  HR: 75 (11-03-22 @ 11:03) (75 - 96)  BP: 118/83 (11-03-22 @ 11:03) (118/83 - 151/89)  BP(mean): --  RR: 18 (11-03-22 @ 11:03) (18 - 18)  SpO2: 97% (11-03-22 @ 11:03) (95% - 97%)  Neck: No palpable thyroid nodules.  CVS: S1S2, No murmurs  Respiratory: No wheezing, no crepitations  GI: Abdomen soft, bowel sounds positive  Musculoskeletal:  edema lower extremities.   Skin: No skin rashes, no ecchymosis    Diagnostics    Free Thyroxine, Serum: AM Sched. Collection: 21-Oct-2022 06:00 (10-20 @ 13:18)  Thyroid Stimulating Hormone, Serum: AM Sched. Collection: 21-Oct-2022 06:00 (10-20 @ 13:18)  A1C with Estimated Average Glucose: Routine (10-20 @ 13:17)

## 2022-11-03 NOTE — PROGRESS NOTE ADULT - SUBJECTIVE AND OBJECTIVE BOX
Overnight events noted      VITAL:  T(C): , Max: 37.1 (11-02-22 @ 14:19)  T(F): , Max: 98.8 (11-02-22 @ 14:19)  HR: 96 (11-03-22 @ 04:31)  BP: 151/89 (11-03-22 @ 04:31)  BP(mean): --  RR: 18 (11-03-22 @ 04:31)  SpO2: 95% (11-03-22 @ 04:31)      PHYSICAL EXAM:  Constitutional: NAD  HEENT: NCAT, MMM  Neck: Supple, No JVD  Respiratory: CTA-b/l  Cardiovascular: reg s1s2  Gastrointestinal: BS+, soft, NT/ND; (+)abdominal drain  Extremities: No peripheral edema b/l  Neurological: no focal deficits; strength grossly intact  Back: no CVAT b/l  Skin: No rashes, no nevi      LABS:                        11.4   6.74  )-----------( 203      ( 03 Nov 2022 07:09 )             35.5     Na(139)/K(3.9)/Cl(104)/HCO3(26)/BUN(14)/Cr(1.03)Glu(111)/Ca(9.0)/Mg(--)/PO4(--)    11-02 @ 07:20  Na(140)/K(4.9)/Cl(102)/HCO3(25)/BUN(17)/Cr(1.03)Glu(162)/Ca(8.9)/Mg(--)/PO4(--)    11-01 @ 06:23      IMPRESSION: 82M w/ HTN, DM2, CAD, and BPH-TURP, 10/19/22 p/w L1 vertebral fx s/p mechanical fall; c/b ANTON    (1)Renal - ANTON - prerenal - now improved  (2)Hypoxia - PNA - on IV Zosyn   (3)Perihepatic collection - s/p R abdominal drain placement 11/1/22 by IR      RECOMMEND:  (1)Continue abx for GFR ~50ml/min  (2)Reconsult as needed              Maco Bhatia MD  [facility]  Office: [phone]  Cell: [phone]       No complaints      VITAL:  T(C): , Max: 37.1 (11-02-22 @ 14:19)  T(F): , Max: 98.8 (11-02-22 @ 14:19)  HR: 96 (11-03-22 @ 04:31)  BP: 151/89 (11-03-22 @ 04:31)  BP(mean): --  RR: 18 (11-03-22 @ 04:31)  SpO2: 95% (11-03-22 @ 04:31)      PHYSICAL EXAM:  Constitutional: NAD  HEENT: NCAT, MMM  Neck: Supple, No JVD  Respiratory: CTA-b/l  Cardiovascular: reg s1s2  Gastrointestinal: BS+, soft, NT/ND; (+)abdominal drain  Extremities: No peripheral edema b/l  Neurological: no focal deficits; strength grossly intact  Back: no CVAT b/l  Skin: No rashes, no nevi      LABS:                        11.4   6.74  )-----------( 203      ( 03 Nov 2022 07:09 )             35.5     Na(139)/K(3.9)/Cl(104)/HCO3(26)/BUN(14)/Cr(1.03)Glu(111)/Ca(9.0)/Mg(--)/PO4(--)    11-02 @ 07:20  Na(140)/K(4.9)/Cl(102)/HCO3(25)/BUN(17)/Cr(1.03)Glu(162)/Ca(8.9)/Mg(--)/PO4(--)    11-01 @ 06:23      IMPRESSION: 82M w/ HTN, DM2, CAD, and BPH-TURP, 10/19/22 p/w L1 vertebral fx s/p mechanical fall; c/b ANTON    (1)Renal - ANTON - prerenal - now improved  (2)Hypoxia - PNA - on IV Zosyn   (3)Perihepatic collection - s/p R abdominal drain placement 11/1/22 by IR      RECOMMEND:  (1)Continue abx for GFR ~50ml/min  (2)Reconsult as needed              Maco Bhatia MD  [facility]  Office: [phone]  Cell: [phone]

## 2022-11-03 NOTE — PROGRESS NOTE ADULT - SUBJECTIVE AND OBJECTIVE BOX
KAITLIN CALDERON  82y Male  MRN:[id number]    Patient is a 82y old  Male who presents with a chief complaint of fall    HPI:   81M cantonese speaking PMHx of HTN/HLD, DM2, BPH, (?Afib per chart review, though not on AC's), presents to the ED with mid lower back pain that he sustained after falling in the bathroom while he was urinating at 9pm last night. Pt denies headstrike/LOC. Difficult to obtain full history even with  though pt answering questions appropriately and is AAOx2. Pt denies any pain anywhere else. Per EMS pt has had chronic cough. Unclear if pt was down long. pt last took insulin 2 days ago he states.   	Spoke with stepdaughter Yulissa who lives below pt: Pt couldn't get up from the fall yesterday, has difficulty ambulating with walker since the fall as he complaints of back pain, pt has 9hrs home help daily but not overnight. She states that pt is at baseline mental status and that he's had short term memory problems for some time. Denies any recent fevers/chills, abd pain, v/d, chest pain/sob. States that the cough pt has is chronic.  (19 Oct 2022 15:53)      Patient seen and evaluated at bedside. No acute events overnight except as noted.    Interval HPI: no acute events o/n    PAST MEDICAL & SURGICAL HISTORY:  DM (diabetes mellitus)      HTN (hypertension)      Hypercholesterolemia      CAD (coronary artery disease)      HTN (hypertension)      DM (diabetes mellitus)      S/P TURP      S/P gastrectomy          REVIEW OF SYSTEMS:  as per hpi     VITALS:   Vital Signs Last 24 Hrs  T(C): 36.5 (03 Nov 2022 11:03), Max: 37.1 (02 Nov 2022 14:19)  T(F): 97.7 (03 Nov 2022 11:03), Max: 98.8 (02 Nov 2022 14:19)  HR: 75 (03 Nov 2022 11:03) (75 - 105)  BP: 118/83 (03 Nov 2022 11:03) (118/83 - 151/89)  BP(mean): --  RR: 18 (03 Nov 2022 11:03) (18 - 18)  SpO2: 97% (03 Nov 2022 11:03) (94% - 97%)    Parameters below as of 03 Nov 2022 11:03  Patient On (Oxygen Delivery Method): room air             PHYSICAL EXAM:  GENERAL: NAD, well-developed,    HEAD:  Atraumatic, Normocephalic  EYES: EOMI, PERRLA, conjunctiva and sclera clear  NECK: Supple, No JVD  CHEST/LUNG: clear b/l  HEART: S1, S2; No murmurs, rubs, or gallops  ABDOMEN: Soft, Nontender, Nondistended; Bowel sounds present.   +drain  EXTREMITIES:  2+ Peripheral Pulses, No clubbing, cyanosis, or edema  PSYCH: Normal affect  NEUROLOGY: AAOX1-2; non-focal  SKIN: No rashes or lesions    Consultant(s) Notes Reviewed:  [x ] YES  [ ] NO  Care Discussed with Consultants/Other Providers [ x] YES  [ ] NO    MEDS:   MEDICATIONS  (STANDING):  dextrose 5%. 1000 milliLiter(s) (100 mL/Hr) IV Continuous <Continuous>  dextrose 5%. 1000 milliLiter(s) (50 mL/Hr) IV Continuous <Continuous>  dextrose 50% Injectable 25 Gram(s) IV Push once  dextrose 50% Injectable 12.5 Gram(s) IV Push once  dextrose 50% Injectable 25 Gram(s) IV Push once  finasteride 5 milliGRAM(s) Oral daily  glucagon  Injectable 1 milliGRAM(s) IntraMuscular once  influenza  Vaccine (HIGH DOSE) 0.7 milliLiter(s) IntraMuscular once  insulin glargine Injectable (LANTUS) 25 Unit(s) SubCutaneous at bedtime  insulin lispro (ADMELOG) corrective regimen sliding scale   SubCutaneous Before meals and at bedtime  insulin lispro Injectable (ADMELOG) 10 Unit(s) SubCutaneous three times a day before meals  lidocaine   4% Patch 1 Patch Transdermal every 24 hours  metoprolol succinate ER 25 milliGRAM(s) Oral daily  pantoprazole    Tablet 40 milliGRAM(s) Oral before breakfast  piperacillin/tazobactam IVPB.. 3.375 Gram(s) IV Intermittent every 8 hours  polyethylene glycol 3350 17 Gram(s) Oral daily  senna 2 Tablet(s) Oral at bedtime  tamsulosin 0.8 milliGRAM(s) Oral at bedtime    MEDICATIONS  (PRN):  acetaminophen     Tablet .. 650 milliGRAM(s) Oral every 6 hours PRN mild pain  albuterol/ipratropium for Nebulization 3 milliLiter(s) Nebulizer every 6 hours PRN Shortness of Breath  dextrose Oral Gel 15 Gram(s) Oral once PRN Blood Glucose LESS THAN 70 milliGRAM(s)/deciliter  oxyCODONE    IR 5 milliGRAM(s) Oral every 6 hours PRN Moderate Pain (4 - 6)      ALLERGIES:  No Known Allergies      LABS:                                  11.4   6.74  )-----------( 203      ( 03 Nov 2022 07:09 )             35.5   11-02    139  |  104  |  14  ----------------------------<  111<H>  3.9   |  26  |  1.03    Ca    9.0      02 Nov 2022 07:20                                    < from: MR Abdomen w/wo IV Cont (10.26.22 @ 17:39) >    IMPRESSION:  *  An 8 cm abscess interposed between the liver and upper pole right   kidney.  *  Bilateral renal cyst. No lesion suspicious for neoplasm.      --- End of Report ---      < end of copied text >         cultures: Culture Results:   No growth (10-19 @ 08:59)       < from: MR Lumbar Spine No Cont (10.20.22 @ 17:28) >    IMPRESSION:  Evidence of acute fracture L1 with fracture line traversing   the vertebral body from anterior to posterior and findings consistent   with a 2 column injury. Paravertebral cuff of soft tissue consistent with   recent injury. Some mild extension of edema into the pedicles though the   transverse and spinous processes as well as the lamina are spared. No   other lesions are identified. Recommend interval follow-up to determine   benign versus pathologic fracture.    --- End of Report ---      < end of copied text >      < from: Xray Chest 1 View- PORTABLE-Urgent (Xray Chest 1 View- PORTABLE-Urgent .) (10.23.22 @ 15:36) >  IMPRESSION:  Bibasilar patchy opacities, compatible with subsegmental atelectasis.   Infectious etiology cannot be ruled out.    --- End of Report ---      < end of copied text >      < from: US Kidney and Bladder (10.24.22 @ 19:31) >  IMPRESSION:  Complex cystic collection superior to the right kidney, correlating to   heterogeneous collection exophytic from the right hepatic lobe.   Differential includes abscess versus hematoma.    Bilateral complex renal cysts.    < end of copied text >  < from: CT Chest No Cont (10.19.22 @ 09:59) >  IMPRESSION:  Limited noncontrast examination.    CHEST:  *  Secretions/debris within the bilateral mainstem bronchi with   multifocal sites of mucous plugging in distal airways.  *  Bilateral tree-in-bud nodularity is likely infectious/inflammatory.   Groundglass opacity within the left upper lobe, which may also be   infectious/inflammatory. Pulmonary nodule measuring 6 m in the right   upper lobe. Suggest 3 month follow-up chest CT to assess for stability or   resolution.  *  A few prominent nonspecific mediastinal lymph nodes.  *  Ascending thoracic aortic aneurysm measuring up to 5 cm.    ABDOMEN AND PELVIS:  *  Low density structure measuring 8.3 cm alongthe inferior margin of   the right hepatic lobe with surrounding inflammatory changes.   Differential includes an intrahepatic/perihepatic abscess, or possibly   hematoma in the setting of trauma. Neoplasm is thought to be less likely   but is not excluded. Contrast enhanced abdominal MRI may be helpful for   further characterization.  *  Mild compression fracture at L1, likely acute. Please refer to the   concurrent dedicated CT lumbar spine report.    Preliminary findings were discussed by Dr. Parks with Dr. Flores   on 10/19/2022 11:10 AM with read back confirmation. Final findings were   discussed with Dr. Pierce by Dr. Mcqueen on 10/19/2022 at 12:57 PM.    --- End of Report ---    ***Please see the addendum at the top of this report. It may contain   additional important information or changes.****      < end of copied text >

## 2022-11-03 NOTE — PROGRESS NOTE ADULT - SUBJECTIVE AND OBJECTIVE BOX
PUlmonary Progress Note  Edmund Quiroga MD  [phone]    Denies dyspnea  No new resp issues  Abcess culture positive for E Coli    Vital Signs Last 24 Hrs  T(C): 36.5 (03 Nov 2022 11:03), Max: 37.1 (02 Nov 2022 14:19)  T(F): 97.7 (03 Nov 2022 11:03), Max: 98.8 (02 Nov 2022 14:19)  HR: 75 (03 Nov 2022 11:03) (75 - 105)  BP: 118/83 (03 Nov 2022 11:03) (118/83 - 151/89)  BP(mean): --  RR: 18 (03 Nov 2022 11:03) (18 - 18)  SpO2: 97% (03 Nov 2022 11:03) (94% - 97%)    Parameters below as of 03 Nov 2022 11:03  Patient On (Oxygen Delivery Method): room air                        11.4   6.74  )-----------( 203      ( 03 Nov 2022 07:09 )             35.5     11-02    139  |  104  |  14  ----------------------------<  111<H>  3.9   |  26  |  1.03    Ca    9.0      02 Nov 2022 07:20      Physical Examination:  PULM: Few basilar rhonchi  CVS: Regular rate and rhythm, no murmurs, rubs, or gallops  ABD: Soft, non-tender  EXT:  No clubbing, cyanosis, or edema    RADIOLOGY REVIEWED  CXR:    CT chest:    TTE:

## 2022-11-03 NOTE — PROGRESS NOTE ADULT - SUBJECTIVE AND OBJECTIVE BOX
CC: F/U for Abscess    Saw/spoke to patient. No fevers, no chills. No new complaints.    Allergies  No Known Allergies    ANTIMICROBIALS:  piperacillin/tazobactam IVPB.. 3.375 every 8 hours    PE:    Vital Signs Last 24 Hrs  T(C): 36.5 (03 Nov 2022 11:03), Max: 37.1 (02 Nov 2022 14:19)  T(F): 97.7 (03 Nov 2022 11:03), Max: 98.8 (02 Nov 2022 14:19)  HR: 75 (03 Nov 2022 11:03) (75 - 105)  BP: 118/83 (03 Nov 2022 11:03) (118/83 - 151/89)  RR: 18 (03 Nov 2022 11:03) (18 - 18)  SpO2: 97% (03 Nov 2022 11:03) (94% - 97%)    Gen: AOx3, NAD, non-toxic  CV: Nontachycardic  Resp: Breathing comfortably, RA  Abd: Soft, nontender  IV/Skin: No thrombophlebitis    LABS:                        11.4   6.74  )-----------( 203      ( 03 Nov 2022 07:09 )             35.5     11-02    139  |  104  |  14  ----------------------------<  111<H>  3.9   |  26  |  1.03    Ca    9.0      02 Nov 2022 07:20    MICROBIOLOGY:    .Abscess right abdominal collection  11-01-22   Numerous Escherichia coli  --  --    .Blood Blood-Venous  10-25-22   No Growth Final  --  --    .Blood Blood  10-25-22   No Growth Final  --  --    Clean Catch Clean Catch (Midstream)  10-19-22   No growth  --  --    RADIOLOGY:    10/26 MR:    IMPRESSION:  *  An 8 cm abscess interposed between the liver and upper pole right   kidney.  *  Bilateral renal cyst. No lesion suspicious for neoplasm.

## 2022-11-03 NOTE — PROGRESS NOTE ADULT - ASSESSMENT
80 yo M with HTN/HLD, DM2, BPH, initially with low back pain  Leukocytosis, no fever  Initially with fall  Current complaints of constipation, possible urinary retention  CT chest with evidence aspiration; CT A/P with concern for abscess vs hematoma along liver margin  MR suspicious for abscess  Culture with E coli  Overall,  1) Abnormal finding on imaging/Abscess  - Collection adjacent to liver--hematoma vs abscess  - Zosyn 3.375g q 8  - S/p IR drain--appreciate procedure; follow up culture (E coli S pending)  2) Leukocytosis  - Trending down  - Zosyn for now  - F/U BCXs  - Trend WBC to normal  3) PNA  - Possible PNA based on CT, aspiration, small airway disease  - Would be cross treated by Zosyn    Final antibiotic plan pending culture result    Gal Arroyo MD  Contact on TEAMS messaging from 9am - 5pm  From 5pm-9am, on weekends, or if no response call [phone]

## 2022-11-03 NOTE — PROGRESS NOTE ADULT - ASSESSMENT
82M admitted post mechanical fall with T1 compression fx  CT chest with multilobar opacities c/w aspiration pneumonia  Hypoxemia due to former    REC    Antibiotics per ID  Monitor RA sats  Pain managment per primary team

## 2022-11-03 NOTE — PROGRESS NOTE ADULT - ASSESSMENT
Assessment  DMT2: 82y Male with DM T2 with hyperglycemia, was on insulin at home, now on basal bolus insulin, blood sugars are trending within overall acceptable range with intermittent elevations, no hypoglycemic episodes. Patient is s/p abdominal  abscess drain placement, eating meals, no acute events.  S/P Fall: workup in progress, stable, monitored.  HTN: Un Controlled,  on antihypertensive medications.            Melba Reyes MD  Cell: [phone]  Office: [phone]               Assessment  DMT2: 82y Male with DM T2 with hyperglycemia, was on insulin at home, now on basal bolus insulin, blood sugars are trending within overall acceptable range with intermittent  elevations, no hypoglycemic episodes. Patient is s/p abdominal  abscess drain placement, eating meals, no acute events.  S/P Fall: workup in progress, stable, monitored.  HTN: Un Controlled,  on antihypertensive medications.            Melba Reyes MD  Cell: [phone]  Office: [phone]

## 2022-11-04 LAB
ANION GAP SERPL CALC-SCNC: 11 MMOL/L — SIGNIFICANT CHANGE UP (ref 5–17)
BUN SERPL-MCNC: 13 MG/DL — SIGNIFICANT CHANGE UP (ref 7–23)
CALCIUM SERPL-MCNC: 9.1 MG/DL — SIGNIFICANT CHANGE UP (ref 8.4–10.5)
CHLORIDE SERPL-SCNC: 102 MMOL/L — SIGNIFICANT CHANGE UP (ref 96–108)
CO2 SERPL-SCNC: 25 MMOL/L — SIGNIFICANT CHANGE UP (ref 22–31)
CREAT SERPL-MCNC: 0.98 MG/DL — SIGNIFICANT CHANGE UP (ref 0.5–1.3)
EGFR: 76 ML/MIN/1.73M2 — SIGNIFICANT CHANGE UP
GLUCOSE BLDC GLUCOMTR-MCNC: 132 MG/DL — HIGH (ref 70–99)
GLUCOSE BLDC GLUCOMTR-MCNC: 138 MG/DL — HIGH (ref 70–99)
GLUCOSE BLDC GLUCOMTR-MCNC: 152 MG/DL — HIGH (ref 70–99)
GLUCOSE BLDC GLUCOMTR-MCNC: 169 MG/DL — HIGH (ref 70–99)
GLUCOSE SERPL-MCNC: 162 MG/DL — HIGH (ref 70–99)
HCT VFR BLD CALC: 35.7 % — LOW (ref 39–50)
HGB BLD-MCNC: 11.2 G/DL — LOW (ref 13–17)
MCHC RBC-ENTMCNC: 28.4 PG — SIGNIFICANT CHANGE UP (ref 27–34)
MCHC RBC-ENTMCNC: 31.4 GM/DL — LOW (ref 32–36)
MCV RBC AUTO: 90.4 FL — SIGNIFICANT CHANGE UP (ref 80–100)
NRBC # BLD: 0 /100 WBCS — SIGNIFICANT CHANGE UP (ref 0–0)
PLATELET # BLD AUTO: 191 K/UL — SIGNIFICANT CHANGE UP (ref 150–400)
POTASSIUM SERPL-MCNC: 3.8 MMOL/L — SIGNIFICANT CHANGE UP (ref 3.5–5.3)
POTASSIUM SERPL-SCNC: 3.8 MMOL/L — SIGNIFICANT CHANGE UP (ref 3.5–5.3)
RBC # BLD: 3.95 M/UL — LOW (ref 4.2–5.8)
RBC # FLD: 14.2 % — SIGNIFICANT CHANGE UP (ref 10.3–14.5)
SARS-COV-2 RNA SPEC QL NAA+PROBE: SIGNIFICANT CHANGE UP
SODIUM SERPL-SCNC: 138 MMOL/L — SIGNIFICANT CHANGE UP (ref 135–145)
WBC # BLD: 5.89 K/UL — SIGNIFICANT CHANGE UP (ref 3.8–10.5)
WBC # FLD AUTO: 5.89 K/UL — SIGNIFICANT CHANGE UP (ref 3.8–10.5)

## 2022-11-04 PROCEDURE — 99232 SBSQ HOSP IP/OBS MODERATE 35: CPT

## 2022-11-04 RX ORDER — ERTAPENEM SODIUM 1 G/1
1000 INJECTION, POWDER, LYOPHILIZED, FOR SOLUTION INTRAMUSCULAR; INTRAVENOUS EVERY 24 HOURS
Refills: 0 | Status: COMPLETED | OUTPATIENT
Start: 2022-11-05 | End: 2022-11-11

## 2022-11-04 RX ORDER — ERTAPENEM SODIUM 1 G/1
1000 INJECTION, POWDER, LYOPHILIZED, FOR SOLUTION INTRAMUSCULAR; INTRAVENOUS ONCE
Refills: 0 | Status: COMPLETED | OUTPATIENT
Start: 2022-11-04 | End: 2022-11-04

## 2022-11-04 RX ORDER — ERTAPENEM SODIUM 1 G/1
INJECTION, POWDER, LYOPHILIZED, FOR SOLUTION INTRAMUSCULAR; INTRAVENOUS
Refills: 0 | Status: COMPLETED | OUTPATIENT
Start: 2022-11-04 | End: 2022-11-12

## 2022-11-04 RX ADMIN — SENNA PLUS 2 TABLET(S): 8.6 TABLET ORAL at 21:54

## 2022-11-04 RX ADMIN — LIDOCAINE 1 PATCH: 4 CREAM TOPICAL at 07:25

## 2022-11-04 RX ADMIN — PIPERACILLIN AND TAZOBACTAM 25 GRAM(S): 4; .5 INJECTION, POWDER, LYOPHILIZED, FOR SOLUTION INTRAVENOUS at 00:39

## 2022-11-04 RX ADMIN — LIDOCAINE 1 PATCH: 4 CREAM TOPICAL at 17:53

## 2022-11-04 RX ADMIN — Medication 25 MILLIGRAM(S): at 05:20

## 2022-11-04 RX ADMIN — Medication 10 UNIT(S): at 09:08

## 2022-11-04 RX ADMIN — Medication 10 UNIT(S): at 18:00

## 2022-11-04 RX ADMIN — Medication 10 UNIT(S): at 13:23

## 2022-11-04 RX ADMIN — POLYETHYLENE GLYCOL 3350 17 GRAM(S): 17 POWDER, FOR SOLUTION ORAL at 11:41

## 2022-11-04 RX ADMIN — Medication 1: at 13:23

## 2022-11-04 RX ADMIN — FINASTERIDE 5 MILLIGRAM(S): 5 TABLET, FILM COATED ORAL at 11:40

## 2022-11-04 RX ADMIN — ERTAPENEM SODIUM 120 MILLIGRAM(S): 1 INJECTION, POWDER, LYOPHILIZED, FOR SOLUTION INTRAMUSCULAR; INTRAVENOUS at 10:59

## 2022-11-04 RX ADMIN — LIDOCAINE 1 PATCH: 4 CREAM TOPICAL at 19:30

## 2022-11-04 RX ADMIN — TAMSULOSIN HYDROCHLORIDE 0.8 MILLIGRAM(S): 0.4 CAPSULE ORAL at 21:54

## 2022-11-04 RX ADMIN — INSULIN GLARGINE 25 UNIT(S): 100 INJECTION, SOLUTION SUBCUTANEOUS at 22:41

## 2022-11-04 RX ADMIN — PANTOPRAZOLE SODIUM 40 MILLIGRAM(S): 20 TABLET, DELAYED RELEASE ORAL at 05:20

## 2022-11-04 RX ADMIN — Medication 1: at 09:08

## 2022-11-04 NOTE — PROGRESS NOTE ADULT - SUBJECTIVE AND OBJECTIVE BOX
KAITLIN CALDERON  82y Male  MRN:[id number]    Patient is a 82y old  Male who presents with a chief complaint of fall    HPI:   81M cantonese speaking PMHx of HTN/HLD, DM2, BPH, (?Afib per chart review, though not on AC's), presents to the ED with mid lower back pain that he sustained after falling in the bathroom while he was urinating at 9pm last night. Pt denies headstrike/LOC. Difficult to obtain full history even with  though pt answering questions appropriately and is AAOx2. Pt denies any pain anywhere else. Per EMS pt has had chronic cough. Unclear if pt was down long. pt last took insulin 2 days ago he states.   	Spoke with stepdaughter Yulissa who lives below pt: Pt couldn't get up from the fall yesterday, has difficulty ambulating with walker since the fall as he complaints of back pain, pt has 9hrs home help daily but not overnight. She states that pt is at baseline mental status and that he's had short term memory problems for some time. Denies any recent fevers/chills, abd pain, v/d, chest pain/sob. States that the cough pt has is chronic.  (19 Oct 2022 15:53)      Patient seen and evaluated at bedside. No acute events overnight except as noted.    Interval HPI: no acute events o/n    PAST MEDICAL & SURGICAL HISTORY:  DM (diabetes mellitus)      HTN (hypertension)      Hypercholesterolemia      CAD (coronary artery disease)      HTN (hypertension)      DM (diabetes mellitus)      S/P TURP      S/P gastrectomy          REVIEW OF SYSTEMS:  as per hpi     VITALS:   Vital Signs Last 24 Hrs  T(C): 36.5 (04 Nov 2022 12:07), Max: 36.6 (04 Nov 2022 04:32)  T(F): 97.7 (04 Nov 2022 12:07), Max: 97.8 (04 Nov 2022 04:32)  HR: 70 (04 Nov 2022 12:07) (70 - 95)  BP: 134/78 (04 Nov 2022 12:07) (134/78 - 157/82)  BP(mean): --  RR: 16 (04 Nov 2022 12:07) (16 - 18)  SpO2: 98% (04 Nov 2022 12:07) (97% - 98%)    Parameters below as of 04 Nov 2022 12:07  Patient On (Oxygen Delivery Method): room air               PHYSICAL EXAM:  GENERAL: NAD, well-developed,    HEAD:  Atraumatic, Normocephalic  EYES: EOMI, PERRLA, conjunctiva and sclera clear  NECK: Supple, No JVD  CHEST/LUNG: clear b/l  HEART: S1, S2; No murmurs, rubs, or gallops  ABDOMEN: Soft, Nontender, Nondistended; Bowel sounds present.   +drain  EXTREMITIES:  2+ Peripheral Pulses, No clubbing, cyanosis, or edema  PSYCH: Normal affect  NEUROLOGY: AAOX1-2; non-focal  SKIN: No rashes or lesions    Consultant(s) Notes Reviewed:  [x ] YES  [ ] NO  Care Discussed with Consultants/Other Providers [ x] YES  [ ] NO    MEDS:   MEDICATIONS  (STANDING):  dextrose 5%. 1000 milliLiter(s) (100 mL/Hr) IV Continuous <Continuous>  dextrose 5%. 1000 milliLiter(s) (50 mL/Hr) IV Continuous <Continuous>  dextrose 50% Injectable 25 Gram(s) IV Push once  dextrose 50% Injectable 12.5 Gram(s) IV Push once  dextrose 50% Injectable 25 Gram(s) IV Push once  ertapenem  IVPB      finasteride 5 milliGRAM(s) Oral daily  glucagon  Injectable 1 milliGRAM(s) IntraMuscular once  influenza  Vaccine (HIGH DOSE) 0.7 milliLiter(s) IntraMuscular once  insulin glargine Injectable (LANTUS) 25 Unit(s) SubCutaneous at bedtime  insulin lispro (ADMELOG) corrective regimen sliding scale   SubCutaneous Before meals and at bedtime  insulin lispro Injectable (ADMELOG) 10 Unit(s) SubCutaneous three times a day before meals  lidocaine   4% Patch 1 Patch Transdermal every 24 hours  metoprolol succinate ER 25 milliGRAM(s) Oral daily  pantoprazole    Tablet 40 milliGRAM(s) Oral before breakfast  polyethylene glycol 3350 17 Gram(s) Oral daily  senna 2 Tablet(s) Oral at bedtime  tamsulosin 0.8 milliGRAM(s) Oral at bedtime    MEDICATIONS  (PRN):  acetaminophen     Tablet .. 650 milliGRAM(s) Oral every 6 hours PRN mild pain  albuterol/ipratropium for Nebulization 3 milliLiter(s) Nebulizer every 6 hours PRN Shortness of Breath  dextrose Oral Gel 15 Gram(s) Oral once PRN Blood Glucose LESS THAN 70 milliGRAM(s)/deciliter  oxyCODONE    IR 5 milliGRAM(s) Oral every 6 hours PRN Moderate Pain (4 - 6)        ALLERGIES:  No Known Allergies      LABS:                                              11.2   5.89  )-----------( 191      ( 04 Nov 2022 07:22 )             35.7   11-04    138  |  102  |  13  ----------------------------<  162<H>  3.8   |  25  |  0.98    Ca    9.1      04 Nov 2022 07:22            < from: MR Abdomen w/wo IV Cont (10.26.22 @ 17:39) >    IMPRESSION:  *  An 8 cm abscess interposed between the liver and upper pole right   kidney.  *  Bilateral renal cyst. No lesion suspicious for neoplasm.      --- End of Report ---      < end of copied text >         cultures: Culture Results:   No growth (10-19 @ 08:59)       < from: MR Lumbar Spine No Cont (10.20.22 @ 17:28) >    IMPRESSION:  Evidence of acute fracture L1 with fracture line traversing   the vertebral body from anterior to posterior and findings consistent   with a 2 column injury. Paravertebral cuff of soft tissue consistent with   recent injury. Some mild extension of edema into the pedicles though the   transverse and spinous processes as well as the lamina are spared. No   other lesions are identified. Recommend interval follow-up to determine   benign versus pathologic fracture.    --- End of Report ---      < end of copied text >      < from: Xray Chest 1 View- PORTABLE-Urgent (Xray Chest 1 View- PORTABLE-Urgent .) (10.23.22 @ 15:36) >  IMPRESSION:  Bibasilar patchy opacities, compatible with subsegmental atelectasis.   Infectious etiology cannot be ruled out.    --- End of Report ---      < end of copied text >      < from: US Kidney and Bladder (10.24.22 @ 19:31) >  IMPRESSION:  Complex cystic collection superior to the right kidney, correlating to   heterogeneous collection exophytic from the right hepatic lobe.   Differential includes abscess versus hematoma.    Bilateral complex renal cysts.    < end of copied text >  < from: CT Chest No Cont (10.19.22 @ 09:59) >  IMPRESSION:  Limited noncontrast examination.    CHEST:  *  Secretions/debris within the bilateral mainstem bronchi with   multifocal sites of mucous plugging in distal airways.  *  Bilateral tree-in-bud nodularity is likely infectious/inflammatory.   Groundglass opacity within the left upper lobe, which may also be   infectious/inflammatory. Pulmonary nodule measuring 6 m in the right   upper lobe. Suggest 3 month follow-up chest CT to assess for stability or   resolution.  *  A few prominent nonspecific mediastinal lymph nodes.  *  Ascending thoracic aortic aneurysm measuring up to 5 cm.    ABDOMEN AND PELVIS:  *  Low density structure measuring 8.3 cm alongthe inferior margin of   the right hepatic lobe with surrounding inflammatory changes.   Differential includes an intrahepatic/perihepatic abscess, or possibly   hematoma in the setting of trauma. Neoplasm is thought to be less likely   but is not excluded. Contrast enhanced abdominal MRI may be helpful for   further characterization.  *  Mild compression fracture at L1, likely acute. Please refer to the   concurrent dedicated CT lumbar spine report.    Preliminary findings were discussed by Dr. Parks with Dr. Flores   on 10/19/2022 11:10 AM with read back confirmation. Final findings were   discussed with Dr. Pierce by Dr. Mcqueen on 10/19/2022 at 12:57 PM.    --- End of Report ---    ***Please see the addendum at the top of this report. It may contain   additional important information or changes.****      < end of copied text >

## 2022-11-04 NOTE — PROGRESS NOTE ADULT - SUBJECTIVE AND OBJECTIVE BOX
Chief complaint  Patient is a 82y old  Male who presents with a chief complaint of Fall (03 Nov 2022 13:55)   Review of systems  Patient in bed, looks comfortable, no hypoglycemic episodes.    Labs and Fingersticks  CAPILLARY BLOOD GLUCOSE      POCT Blood Glucose.: 169 mg/dL (04 Nov 2022 08:45)  POCT Blood Glucose.: 194 mg/dL (03 Nov 2022 21:38)  POCT Blood Glucose.: 212 mg/dL (03 Nov 2022 17:49)  POCT Blood Glucose.: 209 mg/dL (03 Nov 2022 13:06)      Anion Gap, Serum: 11 (11-04 @ 07:22)      Calcium, Total Serum: 9.1 (11-04 @ 07:22)          11-04    138  |  102  |  13  ----------------------------<  162<H>  3.8   |  25  |  0.98    Ca    9.1      04 Nov 2022 07:22                          11.2   5.89  )-----------( 191      ( 04 Nov 2022 07:22 )             35.7     Medications  MEDICATIONS  (STANDING):  dextrose 5%. 1000 milliLiter(s) (100 mL/Hr) IV Continuous <Continuous>  dextrose 5%. 1000 milliLiter(s) (50 mL/Hr) IV Continuous <Continuous>  dextrose 50% Injectable 25 Gram(s) IV Push once  dextrose 50% Injectable 12.5 Gram(s) IV Push once  dextrose 50% Injectable 25 Gram(s) IV Push once  ertapenem  IVPB      finasteride 5 milliGRAM(s) Oral daily  glucagon  Injectable 1 milliGRAM(s) IntraMuscular once  influenza  Vaccine (HIGH DOSE) 0.7 milliLiter(s) IntraMuscular once  insulin glargine Injectable (LANTUS) 25 Unit(s) SubCutaneous at bedtime  insulin lispro (ADMELOG) corrective regimen sliding scale   SubCutaneous Before meals and at bedtime  insulin lispro Injectable (ADMELOG) 10 Unit(s) SubCutaneous three times a day before meals  lidocaine   4% Patch 1 Patch Transdermal every 24 hours  metoprolol succinate ER 25 milliGRAM(s) Oral daily  pantoprazole    Tablet 40 milliGRAM(s) Oral before breakfast  polyethylene glycol 3350 17 Gram(s) Oral daily  senna 2 Tablet(s) Oral at bedtime  tamsulosin 0.8 milliGRAM(s) Oral at bedtime      Physical Exam  General: Patient comfortable in bed  Vital Signs Last 12 Hrs  T(F): 97.8 (11-04-22 @ 04:32), Max: 97.8 (11-04-22 @ 04:32)  HR: 95 (11-04-22 @ 04:32) (95 - 95)  BP: 157/82 (11-04-22 @ 04:32) (157/82 - 157/82)  BP(mean): --  RR: 18 (11-04-22 @ 04:32) (18 - 18)  SpO2: 97% (11-04-22 @ 04:32) (97% - 97%)  Neck: No palpable thyroid nodules.  CVS: S1S2, No murmurs  Respiratory: No wheezing, no crepitations  GI: Abdomen soft, bowel sounds positive  Musculoskeletal:  edema lower extremities.   Skin: No skin rashes, no ecchymosis    Diagnostics    Free Thyroxine, Serum: AM Sched. Collection: 21-Oct-2022 06:00 (10-20 @ 13:18)  Thyroid Stimulating Hormone, Serum: AM Sched. Collection: 21-Oct-2022 06:00 (10-20 @ 13:18)  A1C with Estimated Average Glucose: Routine (10-20 @ 13:17)           Chief complaint  Patient is a 82y old  Male who presents with a chief complaint of Fall (03 Nov 2022 13:55)   Review of systems  Patient in bed, looks comfortable, no hypoglycemic episodes.    Labs and Fingersticks  CAPILLARY BLOOD GLUCOSE      POCT Blood Glucose.: 169 mg/dL (04 Nov 2022 08:45)  POCT Blood Glucose.: 194 mg/dL (03 Nov 2022 21:38)  POCT Blood Glucose.: 212 mg/dL (03 Nov 2022 17:49)  POCT Blood Glucose.: 209 mg/dL (03 Nov 2022 13:06)      Anion Gap, Serum: 11 (11-04 @ 07:22)      Calcium, Total Serum: 9.1 (11-04 @ 07:22)          11-04    138  |  102  |  13  ----------------------------<  162<H>  3.8   |  25  |  0.98    Ca    9.1      04 Nov 2022 07:22                          11.2   5.89  )-----------( 191      ( 04 Nov 2022 07:22 )             35.7     Medications  MEDICATIONS  (STANDING):  dextrose 5%. 1000 milliLiter(s) (100 mL/Hr) IV Continuous <Continuous>  dextrose 5%. 1000 milliLiter(s) (50 mL/Hr) IV Continuous <Continuous>  dextrose 50% Injectable 25 Gram(s) IV Push once  dextrose 50% Injectable 12.5 Gram(s) IV Push once  dextrose 50% Injectable 25 Gram(s) IV Push once  ertapenem  IVPB      finasteride 5 milliGRAM(s) Oral daily  glucagon  Injectable 1 milliGRAM(s) IntraMuscular once  influenza  Vaccine (HIGH DOSE) 0.7 milliLiter(s) IntraMuscular once  insulin glargine Injectable (LANTUS) 25 Unit(s) SubCutaneous at bedtime  insulin lispro (ADMELOG) corrective regimen sliding scale   SubCutaneous Before meals and at bedtime  insulin lispro Injectable (ADMELOG) 10 Unit(s) SubCutaneous three times a day before meals  lidocaine   4% Patch 1 Patch Transdermal every 24 hours  metoprolol succinate ER 25 milliGRAM(s) Oral daily  pantoprazole    Tablet 40 milliGRAM(s) Oral before breakfast  polyethylene glycol 3350 17 Gram(s) Oral daily  senna 2 Tablet(s) Oral at bedtime  tamsulosin 0.8 milliGRAM(s) Oral at bedtime      Physical Exam  General: Patient comfortable in bed  Vital Signs Last 12 Hrs  T(F): 97.8 (11-04-22 @ 04:32), Max: 97.8 (11-04-22 @ 04:32)  HR: 95 (11-04-22 @ 04:32) (95 - 95)  BP: 157/82 (11-04-22 @ 04:32) (157/82 - 157/82)  BP(mean): --  RR: 18 (11-04-22 @ 04:32) (18 - 18)  SpO2: 97% (11-04-22 @ 04:32) (97% - 97%)  Neck: No palpable thyroid nodules.  CVS: S1S2, No murmurs  Respiratory: No wheezing, no crepitations  GI: Abdomen soft, bowel sounds positive  Musculoskeletal:  edema lower extremities.   Skin: No skin rashes, no ecchymosis    Diagnostics    Free Thyroxine, Serum: AM Sched. Collection: 21-Oct-2022 06:00 (10-20 @ 13:18)  Thyroid Stimulating Hormone, Serum: AM Sched. Collection: 21-Oct-2022 06:00 (10-20 @ 13:18)  A1C with Estimated Average Glucose: Routine (10-20 @ 13:17)

## 2022-11-04 NOTE — PROGRESS NOTE ADULT - SUBJECTIVE AND OBJECTIVE BOX
Interventional Radiology Follow-Up Note.    Patient seen and examined @ bedside.    This is a 82 year old Male s/p abdominal abscess drain placement on 11/1/22 in Interventional Radiology with Dr. Putnam.    No complaint offered.      Medication:    ertapenem  IVPB: (11-04)  metoprolol succinate ER: (11-04)  piperacillin/tazobactam IVPB..: (11-04)    Vitals:  T(F): 97.7, Max: 97.8 (04:32)  HR: 79  BP: 113/73  RR: 16  SpO2: 98%    Physical Exam:  General: Nontoxic, in NAD.  Abdomen: soft, NTND.   Drain Device: Drain intact attached to PEDRO LUIS, Flushed with 5cc NS w/o difficulty. Dressing clean, dry, intact.   24hr Drain output: 45cc          LABS:  Na: 138 (11-04 @ 07:22), 139 (11-02 @ 07:20)  K: 3.8 (11-04 @ 07:22), 3.9 (11-02 @ 07:20)  Cl: 102 (11-04 @ 07:22), 104 (11-02 @ 07:20)  CO2: 25 (11-04 @ 07:22), 26 (11-02 @ 07:20)  BUN: 13 (11-04 @ 07:22), 14 (11-02 @ 07:20)  Cr: 0.98 (11-04 @ 07:22), 1.03 (11-02 @ 07:20)  Glu: 162(11-04 @ 07:22), 111(11-02 @ 07:20)    Hgb: 11.2 (11-04 @ 07:22), 11.4 (11-03 @ 07:09), 10.8 (11-02 @ 07:26)  Hct: 35.7 (11-04 @ 07:22), 35.5 (11-03 @ 07:09), 33.7 (11-02 @ 07:26)  WBC: 5.89 (11-04 @ 07:22), 6.74 (11-03 @ 07:09), 7.18 (11-02 @ 07:26)  Plt: 191 (11-04 @ 07:22), 203 (11-03 @ 07:09), 213 (11-02 @ 07:26)                Assessment/Plan: 82M w/ HTN, DM2, CAD, and BPH-TURP, 10/19/22 p/w L1 vertebral fx s/p mechanical fall; c/b [facility].   IR consulted on 10/27 for drainage of 8 cm abscess interposed between the liver and upper pole R kidney. MR showing diffusion restriction. Concern for hematoma vs abscess.  S/p abdominal abscess drain placement on 11/1/22 in Interventional Radiology with Dr. Putnam.    - 10 fr drain placed into right abdominal collection between liver and kidney. ~100cc purulent fluid aspirated during procedure  - Flush drain with 5cc NS daily forward only; DO NOT aspirate  - Change dressing q3 days or when dressing is saturated.  - Trend vs/labs  - Continue global management per primary team, IR will follow  - If the patient is d/c home with drainage catheter, pt can make an appointment with IR by calling the IR booking office at [phone]; recommend IR follow in 10 days after discharge for tube evaluation.  - Pt will benefit from VNS service to help with drainage catheter care; Pt should continue same drainage catheter care as an outpatient.     Please call IR at [phone] with any questions, concerns, or issues regarding above.    Also available on [employer] TEAMS.

## 2022-11-04 NOTE — PROGRESS NOTE ADULT - ASSESSMENT
82 yo M with HTN/HLD, DM2, BPH, initially with low back pain  Leukocytosis, no fever  Initially with fall  Current complaints of constipation, possible urinary retention  CT chest with evidence aspiration; CT A/P with concern for abscess vs hematoma along liver margin  MR suspicious for abscess  Culture with E coli ESBL  Overall,  1) Abnormal finding on imaging/Abscess  - Collection adjacent to liver--hematoma vs abscess; culture ESBL E coli  - Ertapenem 1g q 24  - S/p IR drain--appreciate procedure  2) Leukocytosis  - Trending down  - F/U BCXs  - Trend WBC to normal  3) PNA  - Possible PNA based on CT, aspiration, small airway disease  - S/p adequate treatment course (if present)  - Monitor resp status    Gal Arroyo MD  Contact on TEAMS messaging from 9am - 5pm  From 5pm-9am, on weekends, or if no response call [phone]

## 2022-11-04 NOTE — PROGRESS NOTE ADULT - ASSESSMENT
81 male h/o htn, chol, dm, here s/p mechanical fall    mechanical fall  imaging noted  surg eval noted  MRI L spine noted with Lspine fx  ortho f/u noted  no acute intervention  pain control  TLSO brace  PT    DM  insulin as ordered  endo f/u  monitor fs    hypoxia  imaging c/w pna  pulm consult noted  supp o2  iv abx  incentive spirometer  nebs    chanel  unclear etiology  possible 2/2 poor oral fluid intake  renal f/u  creat now nl    abnl UA  f/u cult  id f/u  abx as per id    MRI noted with collection btw liver and kidney  possible abscess  s/p drainage by IR 11/1.  cult noted. ecoli resist to zosyn. abx now changed to ertapenem. ID f/u regarding abx plan       suspected renal lesion  gu eval noted   MRI noted with cysts. not suspicious for malignancy        cont other home meds         Advanced care planning was discussed with patient and family.  Advanced care planning forms were reviewed and discussed as appropriate.  Differential diagnosis and plan of care discussed with patient after the evaluation.   Pain assessed and judicious use of narcotics when appropriate was discussed.  Importance of Fall prevention discussed.  Counseling on Smoking and Alcohol cessation was offered when appropriate.  Counseling on Diet, exercise, and medication compliance was done.   Approx 30 minutes spent.

## 2022-11-04 NOTE — PROGRESS NOTE ADULT - SUBJECTIVE AND OBJECTIVE BOX
CC: F/U for Abscess    Saw/spoke to patient. No fevers, no chills. No new complaints.    Allergies  No Known Allergies    ANTIMICROBIALS:  ertapenem  IVPB      PE:    Vital Signs Last 24 Hrs  T(C): 36.5 (04 Nov 2022 12:07), Max: 36.6 (04 Nov 2022 04:32)  T(F): 97.7 (04 Nov 2022 12:07), Max: 97.8 (04 Nov 2022 04:32)  HR: 79 (04 Nov 2022 15:00) (70 - 95)  BP: 113/73 (04 Nov 2022 15:00) (113/73 - 157/82)  RR: 16 (04 Nov 2022 12:07) (16 - 18)  SpO2: 98% (04 Nov 2022 12:07) (97% - 98%)    Gen: AOx3, NAD, non-toxic  CV: Nontachycardic  Resp: Breathing comfortably, RA  Abd: Soft, nontender, drain in place  IV/Skin: No thrombophlebitis    LABS:                        11.2   5.89  )-----------( 191      ( 04 Nov 2022 07:22 )             35.7     11-04    138  |  102  |  13  ----------------------------<  162<H>  3.8   |  25  |  0.98    Ca    9.1      04 Nov 2022 07:22    MICROBIOLOGY:    .Abscess right abdominal collection  11-01-22   Numerous Escherichia coli ESBL  --  Escherichia coli ESBL    .Blood Blood-Venous  10-25-22   No Growth Final  --  --    .Blood Blood  10-25-22   No Growth Final  --  --    Clean Catch Clean Catch (Midstream)  10-19-22   No growth  --  --    RADIOLOGY:    10/26 MR:    IMPRESSION:  *  An 8 cm abscess interposed between the liver and upper pole right   kidney.  *  Bilateral renal cyst. No lesion suspicious for neoplasm.

## 2022-11-04 NOTE — PROGRESS NOTE ADULT - ASSESSMENT
Assessment  DMT2: 82y Male with DM T2 with hyperglycemia, was on insulin at home, now on basal bolus insulin, blood sugars are in overall acceptable range with intermittent elevations, no hypoglycemic episodes. Patient is s/p abdominal  abscess drain placement, eating meals, no acute events.  S/P Fall: workup in progress, stable, monitored.  HTN: Un Controlled,  on antihypertensive medications.            Melba Reyes MD  Cell: [phone]  Office: [phone]               Assessment  DMT2: 82y Male with DM T2 with hyperglycemia, was on insulin at home, now on basal bolus insulin, blood sugars are in overall acceptable range with intermittent elevations,  no hypoglycemic episodes. Patient is s/p abdominal  abscess drain placement, eating meals, no acute events.  S/P Fall: workup in progress, stable, monitored.  HTN: Un Controlled,  on antihypertensive medications.            Melba Reyes MD  Cell: [phone]  Office: [phone]

## 2022-11-05 LAB
GLUCOSE BLDC GLUCOMTR-MCNC: 133 MG/DL — HIGH (ref 70–99)
GLUCOSE BLDC GLUCOMTR-MCNC: 136 MG/DL — HIGH (ref 70–99)
GLUCOSE BLDC GLUCOMTR-MCNC: 142 MG/DL — HIGH (ref 70–99)
GLUCOSE BLDC GLUCOMTR-MCNC: 150 MG/DL — HIGH (ref 70–99)

## 2022-11-05 RX ADMIN — Medication 10 UNIT(S): at 12:38

## 2022-11-05 RX ADMIN — LIDOCAINE 1 PATCH: 4 CREAM TOPICAL at 17:06

## 2022-11-05 RX ADMIN — SENNA PLUS 2 TABLET(S): 8.6 TABLET ORAL at 22:36

## 2022-11-05 RX ADMIN — POLYETHYLENE GLYCOL 3350 17 GRAM(S): 17 POWDER, FOR SOLUTION ORAL at 11:35

## 2022-11-05 RX ADMIN — Medication 1 DROP(S): at 23:05

## 2022-11-05 RX ADMIN — PANTOPRAZOLE SODIUM 40 MILLIGRAM(S): 20 TABLET, DELAYED RELEASE ORAL at 06:05

## 2022-11-05 RX ADMIN — Medication 25 MILLIGRAM(S): at 06:04

## 2022-11-05 RX ADMIN — Medication 10 UNIT(S): at 08:56

## 2022-11-05 RX ADMIN — LIDOCAINE 1 PATCH: 4 CREAM TOPICAL at 05:26

## 2022-11-05 RX ADMIN — TAMSULOSIN HYDROCHLORIDE 0.8 MILLIGRAM(S): 0.4 CAPSULE ORAL at 22:35

## 2022-11-05 RX ADMIN — LIDOCAINE 1 PATCH: 4 CREAM TOPICAL at 20:43

## 2022-11-05 RX ADMIN — INSULIN GLARGINE 25 UNIT(S): 100 INJECTION, SOLUTION SUBCUTANEOUS at 22:37

## 2022-11-05 RX ADMIN — FINASTERIDE 5 MILLIGRAM(S): 5 TABLET, FILM COATED ORAL at 11:35

## 2022-11-05 RX ADMIN — Medication 10 UNIT(S): at 17:28

## 2022-11-05 RX ADMIN — ERTAPENEM SODIUM 120 MILLIGRAM(S): 1 INJECTION, POWDER, LYOPHILIZED, FOR SOLUTION INTRAMUSCULAR; INTRAVENOUS at 11:35

## 2022-11-05 NOTE — PROGRESS NOTE ADULT - SUBJECTIVE AND OBJECTIVE BOX
Chief complaint    Patient is a 82y old  Male who presents with a chief complaint of Fall (03 Nov 2022 13:55)   Review of systems  Patient in bed, appears comfortable.    Labs and Fingersticks  CAPILLARY BLOOD GLUCOSE      POCT Blood Glucose.: 150 mg/dL (05 Nov 2022 17:24)  POCT Blood Glucose.: 142 mg/dL (05 Nov 2022 12:23)  POCT Blood Glucose.: 133 mg/dL (05 Nov 2022 08:14)  POCT Blood Glucose.: 138 mg/dL (04 Nov 2022 22:39)      Anion Gap, Serum: 11 (11-04 @ 07:22)      Calcium, Total Serum: 9.1 (11-04 @ 07:22)          11-04    138  |  102  |  13  ----------------------------<  162<H>  3.8   |  25  |  0.98    Ca    9.1      04 Nov 2022 07:22                          11.2   5.89  )-----------( 191      ( 04 Nov 2022 07:22 )             35.7     Medications  MEDICATIONS  (STANDING):  dextrose 5%. 1000 milliLiter(s) (100 mL/Hr) IV Continuous <Continuous>  dextrose 5%. 1000 milliLiter(s) (50 mL/Hr) IV Continuous <Continuous>  dextrose 50% Injectable 25 Gram(s) IV Push once  dextrose 50% Injectable 12.5 Gram(s) IV Push once  dextrose 50% Injectable 25 Gram(s) IV Push once  ertapenem  IVPB      ertapenem  IVPB 1000 milliGRAM(s) IV Intermittent every 24 hours  finasteride 5 milliGRAM(s) Oral daily  glucagon  Injectable 1 milliGRAM(s) IntraMuscular once  influenza  Vaccine (HIGH DOSE) 0.7 milliLiter(s) IntraMuscular once  insulin glargine Injectable (LANTUS) 25 Unit(s) SubCutaneous at bedtime  insulin lispro (ADMELOG) corrective regimen sliding scale   SubCutaneous Before meals and at bedtime  insulin lispro Injectable (ADMELOG) 10 Unit(s) SubCutaneous three times a day before meals  lidocaine   4% Patch 1 Patch Transdermal every 24 hours  metoprolol succinate ER 25 milliGRAM(s) Oral daily  pantoprazole    Tablet 40 milliGRAM(s) Oral before breakfast  polyethylene glycol 3350 17 Gram(s) Oral daily  senna 2 Tablet(s) Oral at bedtime  tamsulosin 0.8 milliGRAM(s) Oral at bedtime      Physical Exam  General: Patient appears comfortable.  Vital Signs Last 12 Hrs  T(F): 97.9 (11-05-22 @ 12:58), Max: 97.9 (11-05-22 @ 12:58)  HR: 71 (11-05-22 @ 12:58) (71 - 71)  BP: 120/77 (11-05-22 @ 12:58) (120/77 - 120/77)  BP(mean): --  RR: 18 (11-05-22 @ 12:58) (18 - 18)  SpO2: 98% (11-05-22 @ 12:58) (98% - 98%)  Neck: No palpable thyroid nodules.  CVS: S1S2, No murmurs  Respiratory: No wheezing, no crepitations  GI: Abdomen soft, non tender.  Musculoskeletal:  edema lower extremities.     Diagnostics

## 2022-11-05 NOTE — PROGRESS NOTE ADULT - SUBJECTIVE AND OBJECTIVE BOX
KAITLIN CALDERON  82y Male  MRN:[id number]    Patient is a 82y old  Male who presents with a chief complaint of fall    HPI:   81M cantonese speaking PMHx of HTN/HLD, DM2, BPH, (?Afib per chart review, though not on AC's), presents to the ED with mid lower back pain that he sustained after falling in the bathroom while he was urinating at 9pm last night. Pt denies headstrike/LOC. Difficult to obtain full history even with  though pt answering questions appropriately and is AAOx2. Pt denies any pain anywhere else. Per EMS pt has had chronic cough. Unclear if pt was down long. pt last took insulin 2 days ago he states.   	Spoke with stepdaughter Yulissa who lives below pt: Pt couldn't get up from the fall yesterday, has difficulty ambulating with walker since the fall as he complaints of back pain, pt has 9hrs home help daily but not overnight. She states that pt is at baseline mental status and that he's had short term memory problems for some time. Denies any recent fevers/chills, abd pain, v/d, chest pain/sob. States that the cough pt has is chronic.  (19 Oct 2022 15:53)      Patient seen and evaluated at bedside. No acute events overnight except as noted.    Interval HPI: no acute events o/n    PAST MEDICAL & SURGICAL HISTORY:  DM (diabetes mellitus)      HTN (hypertension)      Hypercholesterolemia      CAD (coronary artery disease)      HTN (hypertension)      DM (diabetes mellitus)      S/P TURP      S/P gastrectomy          REVIEW OF SYSTEMS:  as per hpi     VITALS:   Vital Signs Last 24 Hrs  T(C): 36.6 (05 Nov 2022 12:58), Max: 36.9 (05 Nov 2022 04:43)  T(F): 97.9 (05 Nov 2022 12:58), Max: 98.5 (05 Nov 2022 04:43)  HR: 71 (05 Nov 2022 12:58) (71 - 86)  BP: 120/77 (05 Nov 2022 12:58) (117/71 - 126/67)  BP(mean): --  RR: 18 (05 Nov 2022 12:58) (17 - 18)  SpO2: 98% (05 Nov 2022 12:58) (96% - 98%)    Parameters below as of 05 Nov 2022 12:58  Patient On (Oxygen Delivery Method): room air        PHYSICAL EXAM:  GENERAL: NAD, well-developed,    HEAD:  Atraumatic, Normocephalic  EYES: EOMI, PERRLA, conjunctiva and sclera clear  NECK: Supple, No JVD  CHEST/LUNG: clear b/l  HEART: S1, S2; No murmurs, rubs, or gallops  ABDOMEN: Soft, Nontender, Nondistended; Bowel sounds present.   +drain  EXTREMITIES:  2+ Peripheral Pulses, No clubbing, cyanosis, or edema  PSYCH: Normal affect  NEUROLOGY: AAOX1-2; non-focal  SKIN: No rashes or lesions    Consultant(s) Notes Reviewed:  [x ] YES  [ ] NO  Care Discussed with Consultants/Other Providers [ x] YES  [ ] NO    MEDS:   MEDICATIONS  (STANDING):  dextrose 5%. 1000 milliLiter(s) (100 mL/Hr) IV Continuous <Continuous>  dextrose 5%. 1000 milliLiter(s) (50 mL/Hr) IV Continuous <Continuous>  dextrose 50% Injectable 25 Gram(s) IV Push once  dextrose 50% Injectable 12.5 Gram(s) IV Push once  dextrose 50% Injectable 25 Gram(s) IV Push once  ertapenem  IVPB      ertapenem  IVPB 1000 milliGRAM(s) IV Intermittent every 24 hours  finasteride 5 milliGRAM(s) Oral daily  glucagon  Injectable 1 milliGRAM(s) IntraMuscular once  influenza  Vaccine (HIGH DOSE) 0.7 milliLiter(s) IntraMuscular once  insulin glargine Injectable (LANTUS) 25 Unit(s) SubCutaneous at bedtime  insulin lispro (ADMELOG) corrective regimen sliding scale   SubCutaneous Before meals and at bedtime  insulin lispro Injectable (ADMELOG) 10 Unit(s) SubCutaneous three times a day before meals  lidocaine   4% Patch 1 Patch Transdermal every 24 hours  metoprolol succinate ER 25 milliGRAM(s) Oral daily  pantoprazole    Tablet 40 milliGRAM(s) Oral before breakfast  polyethylene glycol 3350 17 Gram(s) Oral daily  senna 2 Tablet(s) Oral at bedtime  tamsulosin 0.8 milliGRAM(s) Oral at bedtime    MEDICATIONS  (PRN):  acetaminophen     Tablet .. 650 milliGRAM(s) Oral every 6 hours PRN mild pain  albuterol/ipratropium for Nebulization 3 milliLiter(s) Nebulizer every 6 hours PRN Shortness of Breath  dextrose Oral Gel 15 Gram(s) Oral once PRN Blood Glucose LESS THAN 70 milliGRAM(s)/deciliter  oxyCODONE    IR 5 milliGRAM(s) Oral every 6 hours PRN Moderate Pain (4 - 6)      ALLERGIES:  No Known Allergies      LABS:                                 11.2   5.89  )-----------( 191      ( 04 Nov 2022 07:22 )             35.7   11-04    138  |  102  |  13  ----------------------------<  162<H>  3.8   |  25  |  0.98    Ca    9.1      04 Nov 2022 07:22            < from: MR Abdomen w/wo IV Cont (10.26.22 @ 17:39) >    IMPRESSION:  *  An 8 cm abscess interposed between the liver and upper pole right   kidney.  *  Bilateral renal cyst. No lesion suspicious for neoplasm.      --- End of Report ---      < end of copied text >         cultures: Culture Results:   No growth (10-19 @ 08:59)       < from: MR Lumbar Spine No Cont (10.20.22 @ 17:28) >    IMPRESSION:  Evidence of acute fracture L1 with fracture line traversing   the vertebral body from anterior to posterior and findings consistent   with a 2 column injury. Paravertebral cuff of soft tissue consistent with   recent injury. Some mild extension of edema into the pedicles though the   transverse and spinous processes as well as the lamina are spared. No   other lesions are identified. Recommend interval follow-up to determine   benign versus pathologic fracture.    --- End of Report ---      < end of copied text >      < from: Xray Chest 1 View- PORTABLE-Urgent (Xray Chest 1 View- PORTABLE-Urgent .) (10.23.22 @ 15:36) >  IMPRESSION:  Bibasilar patchy opacities, compatible with subsegmental atelectasis.   Infectious etiology cannot be ruled out.    --- End of Report ---      < end of copied text >      < from: US Kidney and Bladder (10.24.22 @ 19:31) >  IMPRESSION:  Complex cystic collection superior to the right kidney, correlating to   heterogeneous collection exophytic from the right hepatic lobe.   Differential includes abscess versus hematoma.    Bilateral complex renal cysts.    < end of copied text >  < from: CT Chest No Cont (10.19.22 @ 09:59) >  IMPRESSION:  Limited noncontrast examination.    CHEST:  *  Secretions/debris within the bilateral mainstem bronchi with   multifocal sites of mucous plugging in distal airways.  *  Bilateral tree-in-bud nodularity is likely infectious/inflammatory.   Groundglass opacity within the left upper lobe, which may also be   infectious/inflammatory. Pulmonary nodule measuring 6 m in the right   upper lobe. Suggest 3 month follow-up chest CT to assess for stability or   resolution.  *  A few prominent nonspecific mediastinal lymph nodes.  *  Ascending thoracic aortic aneurysm measuring up to 5 cm.    ABDOMEN AND PELVIS:  *  Low density structure measuring 8.3 cm alongthe inferior margin of   the right hepatic lobe with surrounding inflammatory changes.   Differential includes an intrahepatic/perihepatic abscess, or possibly   hematoma in the setting of trauma. Neoplasm is thought to be less likely   but is not excluded. Contrast enhanced abdominal MRI may be helpful for   further characterization.  *  Mild compression fracture at L1, likely acute. Please refer to the   concurrent dedicated CT lumbar spine report.    Preliminary findings were discussed by Dr. Parks with Dr. Flores   on 10/19/2022 11:10 AM with read back confirmation. Final findings were   discussed with Dr. Pierce by Dr. Mcqueen on 10/19/2022 at 12:57 PM.    --- End of Report ---    ***Please see the addendum at the top of this report. It may contain   additional important information or changes.****      < end of copied text >

## 2022-11-05 NOTE — PROGRESS NOTE ADULT - ASSESSMENT
Assessment  DMT2: 82y Male with DM T2 with hyperglycemia, was on insulin at home, now on basal bolus insulin, blood sugars improving, no hypoglycemic episodes. Patient is s/p abdominal  abscess drain placement, eating meals, no acute events.  S/P Fall: workup in progress, stable, monitored.  HTN: Un Controlled,  on antihypertensive medications.            Melba Reyes MD  Cell: [phone]  Office: [phone]

## 2022-11-06 LAB
CULTURE RESULTS: SIGNIFICANT CHANGE UP
GLUCOSE BLDC GLUCOMTR-MCNC: 129 MG/DL — HIGH (ref 70–99)
GLUCOSE BLDC GLUCOMTR-MCNC: 149 MG/DL — HIGH (ref 70–99)
GLUCOSE BLDC GLUCOMTR-MCNC: 150 MG/DL — HIGH (ref 70–99)
GLUCOSE BLDC GLUCOMTR-MCNC: 192 MG/DL — HIGH (ref 70–99)
ORGANISM # SPEC MICROSCOPIC CNT: SIGNIFICANT CHANGE UP
ORGANISM # SPEC MICROSCOPIC CNT: SIGNIFICANT CHANGE UP
SPECIMEN SOURCE: SIGNIFICANT CHANGE UP

## 2022-11-06 RX ORDER — LANOLIN ALCOHOL/MO/W.PET/CERES
3 CREAM (GRAM) TOPICAL AT BEDTIME
Refills: 0 | Status: DISCONTINUED | OUTPATIENT
Start: 2022-11-06 | End: 2022-11-17

## 2022-11-06 RX ORDER — MAGNESIUM OXIDE 400 MG ORAL TABLET 241.3 MG
400 TABLET ORAL
Refills: 0 | Status: COMPLETED | OUTPATIENT
Start: 2022-11-06 | End: 2022-11-07

## 2022-11-06 RX ADMIN — PANTOPRAZOLE SODIUM 40 MILLIGRAM(S): 20 TABLET, DELAYED RELEASE ORAL at 06:18

## 2022-11-06 RX ADMIN — Medication 10 UNIT(S): at 13:12

## 2022-11-06 RX ADMIN — LIDOCAINE 1 PATCH: 4 CREAM TOPICAL at 05:44

## 2022-11-06 RX ADMIN — Medication 10 UNIT(S): at 17:45

## 2022-11-06 RX ADMIN — MAGNESIUM OXIDE 400 MG ORAL TABLET 400 MILLIGRAM(S): 241.3 TABLET ORAL at 18:12

## 2022-11-06 RX ADMIN — FINASTERIDE 5 MILLIGRAM(S): 5 TABLET, FILM COATED ORAL at 11:45

## 2022-11-06 RX ADMIN — Medication 25 MILLIGRAM(S): at 05:46

## 2022-11-06 RX ADMIN — TAMSULOSIN HYDROCHLORIDE 0.8 MILLIGRAM(S): 0.4 CAPSULE ORAL at 22:28

## 2022-11-06 RX ADMIN — INSULIN GLARGINE 25 UNIT(S): 100 INJECTION, SOLUTION SUBCUTANEOUS at 22:25

## 2022-11-06 RX ADMIN — Medication 3 MILLIGRAM(S): at 22:25

## 2022-11-06 RX ADMIN — LIDOCAINE 1 PATCH: 4 CREAM TOPICAL at 17:46

## 2022-11-06 RX ADMIN — LIDOCAINE 1 PATCH: 4 CREAM TOPICAL at 20:32

## 2022-11-06 RX ADMIN — ERTAPENEM SODIUM 120 MILLIGRAM(S): 1 INJECTION, POWDER, LYOPHILIZED, FOR SOLUTION INTRAMUSCULAR; INTRAVENOUS at 11:45

## 2022-11-06 RX ADMIN — Medication 1: at 13:12

## 2022-11-06 RX ADMIN — Medication 10 UNIT(S): at 09:15

## 2022-11-06 NOTE — PROGRESS NOTE ADULT - ASSESSMENT
Assessment  DMT2: 82y Male with DM T2 with hyperglycemia, was on insulin at home, now on basal bolus insulin, blood sugars trending within acceptable range, no hypoglycemic episodes. Patient is s/p abdominal  abscess drain placement, eating meals, no acute events.  S/P Fall: workup in progress, stable, monitored.  HTN: Un Controlled,  on antihypertensive medications.            Melba Reyes MD  Cell: [phone]  Office: [phone]

## 2022-11-06 NOTE — PROGRESS NOTE ADULT - SUBJECTIVE AND OBJECTIVE BOX
Chief complaint    Patient is a 82y old  Male who presents with a chief complaint of Fall (03 Nov 2022 13:55)   Review of systems  Patient in bed, appears comfortable.    Labs and Fingersticks  CAPILLARY BLOOD GLUCOSE      POCT Blood Glucose.: 192 mg/dL (06 Nov 2022 13:10)  POCT Blood Glucose.: 150 mg/dL (06 Nov 2022 08:48)  POCT Blood Glucose.: 136 mg/dL (05 Nov 2022 22:20)  POCT Blood Glucose.: 150 mg/dL (05 Nov 2022 17:24)                        Medications  MEDICATIONS  (STANDING):  dextrose 5%. 1000 milliLiter(s) (100 mL/Hr) IV Continuous <Continuous>  dextrose 5%. 1000 milliLiter(s) (50 mL/Hr) IV Continuous <Continuous>  dextrose 50% Injectable 25 Gram(s) IV Push once  dextrose 50% Injectable 12.5 Gram(s) IV Push once  dextrose 50% Injectable 25 Gram(s) IV Push once  ertapenem  IVPB 1000 milliGRAM(s) IV Intermittent every 24 hours  ertapenem  IVPB      finasteride 5 milliGRAM(s) Oral daily  glucagon  Injectable 1 milliGRAM(s) IntraMuscular once  influenza  Vaccine (HIGH DOSE) 0.7 milliLiter(s) IntraMuscular once  insulin glargine Injectable (LANTUS) 25 Unit(s) SubCutaneous at bedtime  insulin lispro (ADMELOG) corrective regimen sliding scale   SubCutaneous Before meals and at bedtime  insulin lispro Injectable (ADMELOG) 10 Unit(s) SubCutaneous three times a day before meals  lidocaine   4% Patch 1 Patch Transdermal every 24 hours  metoprolol succinate ER 25 milliGRAM(s) Oral daily  pantoprazole    Tablet 40 milliGRAM(s) Oral before breakfast  polyethylene glycol 3350 17 Gram(s) Oral daily  senna 2 Tablet(s) Oral at bedtime  tamsulosin 0.8 milliGRAM(s) Oral at bedtime      Physical Exam  General: Patient appears comfortable.  Vital Signs Last 12 Hrs  T(F): 98 (11-06-22 @ 13:36), Max: 98 (11-06-22 @ 13:36)  HR: 74 (11-06-22 @ 13:36) (74 - 74)  BP: 122/67 (11-06-22 @ 13:36) (122/67 - 122/67)  BP(mean): --  RR: 18 (11-06-22 @ 13:36) (18 - 18)  SpO2: 94% (11-06-22 @ 13:36) (94% - 94%)  Neck: No palpable thyroid nodules.  CVS: S1S2, No murmurs  Respiratory: No wheezing, no crepitations  GI: Abdomen soft, non tender.  Musculoskeletal:  edema lower extremities.     Diagnostics

## 2022-11-06 NOTE — PROGRESS NOTE ADULT - SUBJECTIVE AND OBJECTIVE BOX
KAITLIN CALDERON  82y Male  MRN:[id number]    Patient is a 82y old  Male who presents with a chief complaint of fall    HPI:   81M cantonese speaking PMHx of HTN/HLD, DM2, BPH, (?Afib per chart review, though not on AC's), presents to the ED with mid lower back pain that he sustained after falling in the bathroom while he was urinating at 9pm last night. Pt denies headstrike/LOC. Difficult to obtain full history even with  though pt answering questions appropriately and is AAOx2. Pt denies any pain anywhere else. Per EMS pt has had chronic cough. Unclear if pt was down long. pt last took insulin 2 days ago he states.   	Spoke with stepdaughter Yulissa who lives below pt: Pt couldn't get up from the fall yesterday, has difficulty ambulating with walker since the fall as he complaints of back pain, pt has 9hrs home help daily but not overnight. She states that pt is at baseline mental status and that he's had short term memory problems for some time. Denies any recent fevers/chills, abd pain, v/d, chest pain/sob. States that the cough pt has is chronic.  (19 Oct 2022 15:53)      Patient seen and evaluated at bedside. No acute events overnight except as noted.    Interval HPI: no acute events o/n    PAST MEDICAL & SURGICAL HISTORY:  DM (diabetes mellitus)      HTN (hypertension)      Hypercholesterolemia      CAD (coronary artery disease)      HTN (hypertension)      DM (diabetes mellitus)      S/P TURP      S/P gastrectomy          REVIEW OF SYSTEMS:  as per hpi     VITALS:   Vital Signs Last 24 Hrs  T(C): 36.6 (06 Nov 2022 04:10), Max: 36.6 (05 Nov 2022 12:58)  T(F): 97.9 (06 Nov 2022 04:10), Max: 97.9 (05 Nov 2022 12:58)  HR: 82 (06 Nov 2022 04:10) (71 - 99)  BP: 124/74 (06 Nov 2022 04:10) (120/77 - 139/66)  BP(mean): --  RR: 18 (06 Nov 2022 04:10) (18 - 18)  SpO2: 94% (06 Nov 2022 04:10) (94% - 98%)    Parameters below as of 06 Nov 2022 04:10  Patient On (Oxygen Delivery Method): room air          PHYSICAL EXAM:  GENERAL: NAD, well-developed,    HEAD:  Atraumatic, Normocephalic  EYES: EOMI, PERRLA, conjunctiva and sclera clear  NECK: Supple, No JVD  CHEST/LUNG: clear b/l  HEART: S1, S2; No murmurs, rubs, or gallops  ABDOMEN: Soft, Nontender, Nondistended; Bowel sounds present.   +drain  EXTREMITIES:  2+ Peripheral Pulses, No clubbing, cyanosis, or edema  PSYCH: Normal affect  NEUROLOGY: AAOX1-2; non-focal  SKIN: No rashes or lesions    Consultant(s) Notes Reviewed:  [x ] YES  [ ] NO  Care Discussed with Consultants/Other Providers [ x] YES  [ ] NO    MEDS:   MEDICATIONS  (STANDING):  dextrose 5%. 1000 milliLiter(s) (100 mL/Hr) IV Continuous <Continuous>  dextrose 5%. 1000 milliLiter(s) (50 mL/Hr) IV Continuous <Continuous>  dextrose 50% Injectable 25 Gram(s) IV Push once  dextrose 50% Injectable 12.5 Gram(s) IV Push once  dextrose 50% Injectable 25 Gram(s) IV Push once  ertapenem  IVPB      ertapenem  IVPB 1000 milliGRAM(s) IV Intermittent every 24 hours  finasteride 5 milliGRAM(s) Oral daily  glucagon  Injectable 1 milliGRAM(s) IntraMuscular once  influenza  Vaccine (HIGH DOSE) 0.7 milliLiter(s) IntraMuscular once  insulin glargine Injectable (LANTUS) 25 Unit(s) SubCutaneous at bedtime  insulin lispro (ADMELOG) corrective regimen sliding scale   SubCutaneous Before meals and at bedtime  insulin lispro Injectable (ADMELOG) 10 Unit(s) SubCutaneous three times a day before meals  lidocaine   4% Patch 1 Patch Transdermal every 24 hours  metoprolol succinate ER 25 milliGRAM(s) Oral daily  pantoprazole    Tablet 40 milliGRAM(s) Oral before breakfast  polyethylene glycol 3350 17 Gram(s) Oral daily  senna 2 Tablet(s) Oral at bedtime  tamsulosin 0.8 milliGRAM(s) Oral at bedtime    MEDICATIONS  (PRN):  acetaminophen     Tablet .. 650 milliGRAM(s) Oral every 6 hours PRN mild pain  albuterol/ipratropium for Nebulization 3 milliLiter(s) Nebulizer every 6 hours PRN Shortness of Breath  dextrose Oral Gel 15 Gram(s) Oral once PRN Blood Glucose LESS THAN 70 milliGRAM(s)/deciliter      ALLERGIES:  No Known Allergies      LABS:                      < from: MR Abdomen w/wo IV Cont (10.26.22 @ 17:39) >    IMPRESSION:  *  An 8 cm abscess interposed between the liver and upper pole right   kidney.  *  Bilateral renal cyst. No lesion suspicious for neoplasm.      --- End of Report ---      < end of copied text >         cultures: Culture Results:   No growth (10-19 @ 08:59)       < from: MR Lumbar Spine No Cont (10.20.22 @ 17:28) >    IMPRESSION:  Evidence of acute fracture L1 with fracture line traversing   the vertebral body from anterior to posterior and findings consistent   with a 2 column injury. Paravertebral cuff of soft tissue consistent with   recent injury. Some mild extension of edema into the pedicles though the   transverse and spinous processes as well as the lamina are spared. No   other lesions are identified. Recommend interval follow-up to determine   benign versus pathologic fracture.    --- End of Report ---      < end of copied text >      < from: Xray Chest 1 View- PORTABLE-Urgent (Xray Chest 1 View- PORTABLE-Urgent .) (10.23.22 @ 15:36) >  IMPRESSION:  Bibasilar patchy opacities, compatible with subsegmental atelectasis.   Infectious etiology cannot be ruled out.    --- End of Report ---      < end of copied text >      < from: US Kidney and Bladder (10.24.22 @ 19:31) >  IMPRESSION:  Complex cystic collection superior to the right kidney, correlating to   heterogeneous collection exophytic from the right hepatic lobe.   Differential includes abscess versus hematoma.    Bilateral complex renal cysts.    < end of copied text >  < from: CT Chest No Cont (10.19.22 @ 09:59) >  IMPRESSION:  Limited noncontrast examination.    CHEST:  *  Secretions/debris within the bilateral mainstem bronchi with   multifocal sites of mucous plugging in distal airways.  *  Bilateral tree-in-bud nodularity is likely infectious/inflammatory.   Groundglass opacity within the left upper lobe, which may also be   infectious/inflammatory. Pulmonary nodule measuring 6 m in the right   upper lobe. Suggest 3 month follow-up chest CT to assess for stability or   resolution.  *  A few prominent nonspecific mediastinal lymph nodes.  *  Ascending thoracic aortic aneurysm measuring up to 5 cm.    ABDOMEN AND PELVIS:  *  Low density structure measuring 8.3 cm alongthe inferior margin of   the right hepatic lobe with surrounding inflammatory changes.   Differential includes an intrahepatic/perihepatic abscess, or possibly   hematoma in the setting of trauma. Neoplasm is thought to be less likely   but is not excluded. Contrast enhanced abdominal MRI may be helpful for   further characterization.  *  Mild compression fracture at L1, likely acute. Please refer to the   concurrent dedicated CT lumbar spine report.    Preliminary findings were discussed by Dr. Parks with Dr. Flores   on 10/19/2022 11:10 AM with read back confirmation. Final findings were   discussed with Dr. Pierce by Dr. Mcqueen on 10/19/2022 at 12:57 PM.    --- End of Report ---    ***Please see the addendum at the top of this report. It may contain   additional important information or changes.****      < end of copied text >

## 2022-11-07 LAB
GLUCOSE BLDC GLUCOMTR-MCNC: 103 MG/DL — HIGH (ref 70–99)
GLUCOSE BLDC GLUCOMTR-MCNC: 122 MG/DL — HIGH (ref 70–99)
GLUCOSE BLDC GLUCOMTR-MCNC: 174 MG/DL — HIGH (ref 70–99)
GLUCOSE BLDC GLUCOMTR-MCNC: 204 MG/DL — HIGH (ref 70–99)

## 2022-11-07 PROCEDURE — 99232 SBSQ HOSP IP/OBS MODERATE 35: CPT

## 2022-11-07 RX ADMIN — TAMSULOSIN HYDROCHLORIDE 0.8 MILLIGRAM(S): 0.4 CAPSULE ORAL at 22:24

## 2022-11-07 RX ADMIN — POLYETHYLENE GLYCOL 3350 17 GRAM(S): 17 POWDER, FOR SOLUTION ORAL at 11:29

## 2022-11-07 RX ADMIN — FINASTERIDE 5 MILLIGRAM(S): 5 TABLET, FILM COATED ORAL at 11:29

## 2022-11-07 RX ADMIN — Medication 3 MILLIGRAM(S): at 22:23

## 2022-11-07 RX ADMIN — SENNA PLUS 2 TABLET(S): 8.6 TABLET ORAL at 22:23

## 2022-11-07 RX ADMIN — INSULIN GLARGINE 25 UNIT(S): 100 INJECTION, SOLUTION SUBCUTANEOUS at 22:24

## 2022-11-07 RX ADMIN — LIDOCAINE 1 PATCH: 4 CREAM TOPICAL at 05:24

## 2022-11-07 RX ADMIN — MAGNESIUM OXIDE 400 MG ORAL TABLET 400 MILLIGRAM(S): 241.3 TABLET ORAL at 08:58

## 2022-11-07 RX ADMIN — PANTOPRAZOLE SODIUM 40 MILLIGRAM(S): 20 TABLET, DELAYED RELEASE ORAL at 06:20

## 2022-11-07 RX ADMIN — Medication 10 UNIT(S): at 18:01

## 2022-11-07 RX ADMIN — Medication 10 UNIT(S): at 13:08

## 2022-11-07 RX ADMIN — LIDOCAINE 1 PATCH: 4 CREAM TOPICAL at 18:03

## 2022-11-07 RX ADMIN — Medication 3 MILLILITER(S): at 05:08

## 2022-11-07 RX ADMIN — ERTAPENEM SODIUM 120 MILLIGRAM(S): 1 INJECTION, POWDER, LYOPHILIZED, FOR SOLUTION INTRAMUSCULAR; INTRAVENOUS at 10:58

## 2022-11-07 RX ADMIN — LIDOCAINE 1 PATCH: 4 CREAM TOPICAL at 17:12

## 2022-11-07 RX ADMIN — Medication 10 UNIT(S): at 08:57

## 2022-11-07 RX ADMIN — Medication 1: at 22:25

## 2022-11-07 RX ADMIN — MAGNESIUM OXIDE 400 MG ORAL TABLET 400 MILLIGRAM(S): 241.3 TABLET ORAL at 11:29

## 2022-11-07 RX ADMIN — Medication 25 MILLIGRAM(S): at 05:07

## 2022-11-07 RX ADMIN — Medication 2: at 08:57

## 2022-11-07 NOTE — PROGRESS NOTE ADULT - ASSESSMENT
80 yo M with HTN/HLD, DM2, BPH, initially with low back pain  Leukocytosis, no fever  Initially with fall  Current complaints of constipation, possible urinary retention  CT chest with evidence aspiration; CT A/P with concern for abscess vs hematoma along liver margin  MR suspicious for abscess  Culture with E coli ESBL  Overall,  1) Abnormal finding on imaging/Abscess  - Collection adjacent to liver--hematoma vs abscess; culture ESBL E coli  - Ertapenem 1g q 24, anticipate 14 day course from drain placement, if DC planning, consider midline/picc placement  - S/p IR drain--appreciate procedure  2) Leukocytosis  - Trending down  - F/U BCXs  - Trend WBC to normal  3) PNA  - Possible PNA based on CT, aspiration, small airway disease  - S/p adequate treatment course (if present)  - Monitor resp status    Gal Arroyo MD  Contact on TEAMS messaging from 9am - 5pm  From 5pm-9am, on weekends, or if no response call [phone]

## 2022-11-07 NOTE — PROGRESS NOTE ADULT - SUBJECTIVE AND OBJECTIVE BOX
Chief complaint  Patient is a 82y old  Male who presents with a chief complaint of Fall (03 Nov 2022 13:55)   Review of systems  Patient in bed, looks comfortable, no hypoglycemic episodes.    Labs and Fingersticks  CAPILLARY BLOOD GLUCOSE      POCT Blood Glucose.: 122 mg/dL (07 Nov 2022 12:15)  POCT Blood Glucose.: 204 mg/dL (07 Nov 2022 08:39)  POCT Blood Glucose.: 149 mg/dL (06 Nov 2022 22:06)  POCT Blood Glucose.: 129 mg/dL (06 Nov 2022 17:38)  POCT Blood Glucose.: 192 mg/dL (06 Nov 2022 13:10)                        Medications  MEDICATIONS  (STANDING):  dextrose 5%. 1000 milliLiter(s) (100 mL/Hr) IV Continuous <Continuous>  dextrose 5%. 1000 milliLiter(s) (50 mL/Hr) IV Continuous <Continuous>  dextrose 50% Injectable 25 Gram(s) IV Push once  dextrose 50% Injectable 12.5 Gram(s) IV Push once  dextrose 50% Injectable 25 Gram(s) IV Push once  ertapenem  IVPB      ertapenem  IVPB 1000 milliGRAM(s) IV Intermittent every 24 hours  finasteride 5 milliGRAM(s) Oral daily  glucagon  Injectable 1 milliGRAM(s) IntraMuscular once  influenza  Vaccine (HIGH DOSE) 0.7 milliLiter(s) IntraMuscular once  insulin glargine Injectable (LANTUS) 25 Unit(s) SubCutaneous at bedtime  insulin lispro (ADMELOG) corrective regimen sliding scale   SubCutaneous Before meals and at bedtime  insulin lispro Injectable (ADMELOG) 10 Unit(s) SubCutaneous three times a day before meals  lidocaine   4% Patch 1 Patch Transdermal every 24 hours  melatonin 3 milliGRAM(s) Oral at bedtime  metoprolol succinate ER 25 milliGRAM(s) Oral daily  pantoprazole    Tablet 40 milliGRAM(s) Oral before breakfast  polyethylene glycol 3350 17 Gram(s) Oral daily  senna 2 Tablet(s) Oral at bedtime  tamsulosin 0.8 milliGRAM(s) Oral at bedtime      Physical Exam  General: Patient comfortable in bed  Vital Signs Last 12 Hrs  T(F): 97.9 (11-07-22 @ 10:46), Max: 98.7 (11-07-22 @ 04:24)  HR: 88 (11-07-22 @ 10:46) (71 - 88)  BP: 95/57 (11-07-22 @ 10:46) (95/57 - 103/71)  BP(mean): --  RR: 18 (11-07-22 @ 10:46) (18 - 18)  SpO2: 95% (11-07-22 @ 10:46) (94% - 95%)  Neck: No palpable thyroid nodules.  CVS: S1S2, No murmurs  Respiratory: No wheezing, no crepitations  GI: Abdomen soft, bowel sounds positive  Musculoskeletal:  edema lower extremities.   Skin: No skin rashes, no ecchymosis    Diagnostics    Free Thyroxine, Serum: AM Sched. Collection: 21-Oct-2022 06:00 (10-20 @ 13:18)  Thyroid Stimulating Hormone, Serum: AM Sched. Collection: 21-Oct-2022 06:00 (10-20 @ 13:18)  A1C with Estimated Average Glucose: Routine (10-20 @ 13:17)           Chief complaint  Patient is a 82y old  Male who presents with a chief complaint of Fall (03 Nov 2022 13:55)   Review of systems  Patient in bed, looks comfortable, no hypoglycemic episodes.    Labs and Fingersticks  CAPILLARY BLOOD GLUCOSE      POCT Blood Glucose.: 122 mg/dL (07 Nov 2022 12:15)  POCT Blood Glucose.: 204 mg/dL (07 Nov 2022 08:39)  POCT Blood Glucose.: 149 mg/dL (06 Nov 2022 22:06)  POCT Blood Glucose.: 129 mg/dL (06 Nov 2022 17:38)  POCT Blood Glucose.: 192 mg/dL (06 Nov 2022 13:10)    Medications  MEDICATIONS  (STANDING):  dextrose 5%. 1000 milliLiter(s) (100 mL/Hr) IV Continuous <Continuous>  dextrose 5%. 1000 milliLiter(s) (50 mL/Hr) IV Continuous <Continuous>  dextrose 50% Injectable 25 Gram(s) IV Push once  dextrose 50% Injectable 12.5 Gram(s) IV Push once  dextrose 50% Injectable 25 Gram(s) IV Push once  ertapenem  IVPB      ertapenem  IVPB 1000 milliGRAM(s) IV Intermittent every 24 hours  finasteride 5 milliGRAM(s) Oral daily  glucagon  Injectable 1 milliGRAM(s) IntraMuscular once  influenza  Vaccine (HIGH DOSE) 0.7 milliLiter(s) IntraMuscular once  insulin glargine Injectable (LANTUS) 25 Unit(s) SubCutaneous at bedtime  insulin lispro (ADMELOG) corrective regimen sliding scale   SubCutaneous Before meals and at bedtime  insulin lispro Injectable (ADMELOG) 10 Unit(s) SubCutaneous three times a day before meals  lidocaine   4% Patch 1 Patch Transdermal every 24 hours  melatonin 3 milliGRAM(s) Oral at bedtime  metoprolol succinate ER 25 milliGRAM(s) Oral daily  pantoprazole    Tablet 40 milliGRAM(s) Oral before breakfast  polyethylene glycol 3350 17 Gram(s) Oral daily  senna 2 Tablet(s) Oral at bedtime  tamsulosin 0.8 milliGRAM(s) Oral at bedtime      Physical Exam  General: Patient comfortable in bed  Vital Signs Last 12 Hrs  T(F): 97.9 (11-07-22 @ 10:46), Max: 98.7 (11-07-22 @ 04:24)  HR: 88 (11-07-22 @ 10:46) (71 - 88)  BP: 95/57 (11-07-22 @ 10:46) (95/57 - 103/71)  BP(mean): --  RR: 18 (11-07-22 @ 10:46) (18 - 18)  SpO2: 95% (11-07-22 @ 10:46) (94% - 95%)  Neck: No palpable thyroid nodules.  CVS: S1S2, No murmurs  Respiratory: No wheezing, no crepitations  GI: Abdomen soft, bowel sounds positive  Musculoskeletal:  edema lower extremities.   Skin: No skin rashes, no ecchymosis    Diagnostics    Free Thyroxine, Serum: AM Sched. Collection: 21-Oct-2022 06:00 (10-20 @ 13:18)  Thyroid Stimulating Hormone, Serum: AM Sched. Collection: 21-Oct-2022 06:00 (10-20 @ 13:18)  A1C with Estimated Average Glucose: Routine (10-20 @ 13:17)

## 2022-11-07 NOTE — PROGRESS NOTE ADULT - SUBJECTIVE AND OBJECTIVE BOX
KAITLIN CALDERON  82y Male  MRN:[id number]    Patient is a 82y old  Male who presents with a chief complaint of fall    HPI:   81M cantonese speaking PMHx of HTN/HLD, DM2, BPH, (?Afib per chart review, though not on AC's), presents to the ED with mid lower back pain that he sustained after falling in the bathroom while he was urinating at 9pm last night. Pt denies headstrike/LOC. Difficult to obtain full history even with  though pt answering questions appropriately and is AAOx2. Pt denies any pain anywhere else. Per EMS pt has had chronic cough. Unclear if pt was down long. pt last took insulin 2 days ago he states.   	Spoke with stepdaughter Yulissa who lives below pt: Pt couldn't get up from the fall yesterday, has difficulty ambulating with walker since the fall as he complaints of back pain, pt has 9hrs home help daily but not overnight. She states that pt is at baseline mental status and that he's had short term memory problems for some time. Denies any recent fevers/chills, abd pain, v/d, chest pain/sob. States that the cough pt has is chronic.  (19 Oct 2022 15:53)      Patient seen and evaluated at bedside. No acute events overnight except as noted.    Interval HPI: no acute events o/n    PAST MEDICAL & SURGICAL HISTORY:  DM (diabetes mellitus)      HTN (hypertension)      Hypercholesterolemia      CAD (coronary artery disease)      HTN (hypertension)      DM (diabetes mellitus)      S/P TURP      S/P gastrectomy          REVIEW OF SYSTEMS:  as per hpi     VITALS:   Vital Signs Last 24 Hrs  T(C): 36.6 (07 Nov 2022 10:46), Max: 37.1 (07 Nov 2022 04:24)  T(F): 97.9 (07 Nov 2022 10:46), Max: 98.7 (07 Nov 2022 04:24)  HR: 88 (07 Nov 2022 10:46) (71 - 92)  BP: 95/57 (07 Nov 2022 10:46) (95/57 - 135/81)  BP(mean): --  RR: 18 (07 Nov 2022 10:46) (18 - 18)  SpO2: 95% (07 Nov 2022 10:46) (94% - 95%)    Parameters below as of 07 Nov 2022 10:46  Patient On (Oxygen Delivery Method): room air          PHYSICAL EXAM:  GENERAL: NAD, well-developed,    HEAD:  Atraumatic, Normocephalic  EYES: EOMI, PERRLA, conjunctiva and sclera clear  NECK: Supple, No JVD  CHEST/LUNG: clear b/l  HEART: S1, S2; No murmurs, rubs, or gallops  ABDOMEN: Soft, Nontender, Nondistended; Bowel sounds present.   +drain  EXTREMITIES:  2+ Peripheral Pulses, No clubbing, cyanosis, or edema  PSYCH: Normal affect  NEUROLOGY: AAOX1-2; non-focal  SKIN: No rashes or lesions    Consultant(s) Notes Reviewed:  [x ] YES  [ ] NO  Care Discussed with Consultants/Other Providers [ x] YES  [ ] NO    MEDS:   MEDICATIONS  (STANDING):  dextrose 5%. 1000 milliLiter(s) (100 mL/Hr) IV Continuous <Continuous>  dextrose 5%. 1000 milliLiter(s) (50 mL/Hr) IV Continuous <Continuous>  dextrose 50% Injectable 25 Gram(s) IV Push once  dextrose 50% Injectable 12.5 Gram(s) IV Push once  dextrose 50% Injectable 25 Gram(s) IV Push once  ertapenem  IVPB      ertapenem  IVPB 1000 milliGRAM(s) IV Intermittent every 24 hours  finasteride 5 milliGRAM(s) Oral daily  glucagon  Injectable 1 milliGRAM(s) IntraMuscular once  influenza  Vaccine (HIGH DOSE) 0.7 milliLiter(s) IntraMuscular once  insulin glargine Injectable (LANTUS) 25 Unit(s) SubCutaneous at bedtime  insulin lispro (ADMELOG) corrective regimen sliding scale   SubCutaneous Before meals and at bedtime  insulin lispro Injectable (ADMELOG) 10 Unit(s) SubCutaneous three times a day before meals  lidocaine   4% Patch 1 Patch Transdermal every 24 hours  melatonin 3 milliGRAM(s) Oral at bedtime  metoprolol succinate ER 25 milliGRAM(s) Oral daily  pantoprazole    Tablet 40 milliGRAM(s) Oral before breakfast  polyethylene glycol 3350 17 Gram(s) Oral daily  senna 2 Tablet(s) Oral at bedtime  tamsulosin 0.8 milliGRAM(s) Oral at bedtime    MEDICATIONS  (PRN):  acetaminophen     Tablet .. 650 milliGRAM(s) Oral every 6 hours PRN mild pain  albuterol/ipratropium for Nebulization 3 milliLiter(s) Nebulizer every 6 hours PRN Shortness of Breath  dextrose Oral Gel 15 Gram(s) Oral once PRN Blood Glucose LESS THAN 70 milliGRAM(s)/deciliter      ALLERGIES:  No Known Allergies      LABS:                      < from: MR Abdomen w/wo IV Cont (10.26.22 @ 17:39) >    IMPRESSION:  *  An 8 cm abscess interposed between the liver and upper pole right   kidney.  *  Bilateral renal cyst. No lesion suspicious for neoplasm.      --- End of Report ---      < end of copied text >         cultures: Culture Results:   No growth (10-19 @ 08:59)       < from: MR Lumbar Spine No Cont (10.20.22 @ 17:28) >    IMPRESSION:  Evidence of acute fracture L1 with fracture line traversing   the vertebral body from anterior to posterior and findings consistent   with a 2 column injury. Paravertebral cuff of soft tissue consistent with   recent injury. Some mild extension of edema into the pedicles though the   transverse and spinous processes as well as the lamina are spared. No   other lesions are identified. Recommend interval follow-up to determine   benign versus pathologic fracture.    --- End of Report ---      < end of copied text >      < from: Xray Chest 1 View- PORTABLE-Urgent (Xray Chest 1 View- PORTABLE-Urgent .) (10.23.22 @ 15:36) >  IMPRESSION:  Bibasilar patchy opacities, compatible with subsegmental atelectasis.   Infectious etiology cannot be ruled out.    --- End of Report ---      < end of copied text >      < from: US Kidney and Bladder (10.24.22 @ 19:31) >  IMPRESSION:  Complex cystic collection superior to the right kidney, correlating to   heterogeneous collection exophytic from the right hepatic lobe.   Differential includes abscess versus hematoma.    Bilateral complex renal cysts.    < end of copied text >  < from: CT Chest No Cont (10.19.22 @ 09:59) >  IMPRESSION:  Limited noncontrast examination.    CHEST:  *  Secretions/debris within the bilateral mainstem bronchi with   multifocal sites of mucous plugging in distal airways.  *  Bilateral tree-in-bud nodularity is likely infectious/inflammatory.   Groundglass opacity within the left upper lobe, which may also be   infectious/inflammatory. Pulmonary nodule measuring 6 m in the right   upper lobe. Suggest 3 month follow-up chest CT to assess for stability or   resolution.  *  A few prominent nonspecific mediastinal lymph nodes.  *  Ascending thoracic aortic aneurysm measuring up to 5 cm.    ABDOMEN AND PELVIS:  *  Low density structure measuring 8.3 cm alongthe inferior margin of   the right hepatic lobe with surrounding inflammatory changes.   Differential includes an intrahepatic/perihepatic abscess, or possibly   hematoma in the setting of trauma. Neoplasm is thought to be less likely   but is not excluded. Contrast enhanced abdominal MRI may be helpful for   further characterization.  *  Mild compression fracture at L1, likely acute. Please refer to the   concurrent dedicated CT lumbar spine report.    Preliminary findings were discussed by Dr. Parks with Dr. Flores   on 10/19/2022 11:10 AM with read back confirmation. Final findings were   discussed with Dr. Pierce by Dr. Mcqueen on 10/19/2022 at 12:57 PM.    --- End of Report ---    ***Please see the addendum at the top of this report. It may contain   additional important information or changes.****      < end of copied text >

## 2022-11-07 NOTE — PROGRESS NOTE ADULT - ASSESSMENT
81 male h/o htn, chol, dm, here s/p mechanical fall    mechanical fall  imaging noted  surg eval noted  MRI L spine noted with Lspine fx  ortho f/u noted  no acute intervention  pain control  TLSO brace  PT    DM  insulin as ordered  endo f/u  monitor fs    hypoxia  imaging c/w pna  pulm consult noted  supp o2  iv abx  incentive spirometer  nebs    chanel  unclear etiology  possible 2/2 poor oral fluid intake  renal f/u  creat now nl    abnl UA  f/u cult  id f/u  abx as per id    MRI noted with collection btw liver and kidney  possible abscess  s/p drainage by IR 11/1.  cult noted. ecoli resist to zosyn. abx now changed to ertapenem. ID f/u regarding abx plan  IR f/u noted. likely to be dc with drain with outpt IR f/u       suspected renal lesion  gu eval noted   MRI noted with cysts. not suspicious for malignancy        cont other home meds         Advanced care planning was discussed with patient and family.  Advanced care planning forms were reviewed and discussed as appropriate.  Differential diagnosis and plan of care discussed with patient after the evaluation.   Pain assessed and judicious use of narcotics when appropriate was discussed.  Importance of Fall prevention discussed.  Counseling on Smoking and Alcohol cessation was offered when appropriate.  Counseling on Diet, exercise, and medication compliance was done.   Approx 30 minutes spent.

## 2022-11-07 NOTE — PROGRESS NOTE ADULT - SUBJECTIVE AND OBJECTIVE BOX
Interventional Radiology Follow-Up Note.    Patient seen and examined @ bedside.    This is a 82 year old Male s/p abdominal abscess drain placement on 11/1/22 in Interventional Radiology with Dr. Putnam.    No complaint offered.      Medication:  ertapenem  IVPB: (11-07)  metoprolol succinate ER: (11-07)    Vitals:  T(F): 97.9, Max: 98.7 (04:24)  HR: 88  BP: 95/57  RR: 18  SpO2: 95%    Physical Exam:  General: Nontoxic, in NAD.  Abdomen: soft, NTND.   Drain Device: Drain intact attached to PEDRO LUIS, Flushed with 5cc NS w/o difficulty. Dressing clean, dry, intact.   24hr Drain output: 50cc                  Assessment/Plan: 82M w/ HTN, DM2, CAD, and BPH-TURP, 10/19/22 p/w L1 vertebral fx s/p mechanical fall; c/b ANTON.   IR consulted on 10/27 for drainage of 8 cm abscess interposed between the liver and upper pole R kidney. MR showing diffusion restriction. Concern for hematoma vs abscess.  S/p abdominal abscess drain placement on 11/1/22 in Interventional Radiology with Dr. Putnam.    - 10 fr drain placed into right abdominal collection between liver and kidney. ~100cc purulent fluid aspirated during procedure  - Flush drain with 5cc NS daily forward only; DO NOT aspirate  - Change dressing q3 days or when dressing is saturated.  - Trend vs/labs  - Continue global management per primary team  - If drain outputs <15cc/24hrs x 2 days, please obtain CT a/p noncon to eval  - If the patient is d/c home with drainage catheter, pt can make an appointment with IR by calling the IR booking office at [phone]; recommend IR follow in 10 days after discharge for tube evaluation.  - Pt will benefit from VNS service to help with drainage catheter care; Pt should continue same drainage catheter care as an outpatient.     Please call IR at [phone] with any questions, concerns, or issues regarding above.    Also available on [employer] TE[employer].

## 2022-11-07 NOTE — PROGRESS NOTE ADULT - SUBJECTIVE AND OBJECTIVE BOX
CC: F/U for Abscess    Saw/spoke to patient. No fevers, no chills. No new complaints.    Allergies  No Known Allergies    ANTIMICROBIALS:  ertapenem  IVPB    ertapenem  IVPB 1000 every 24 hours    PE:    Vital Signs Last 24 Hrs  T(C): 36.6 (07 Nov 2022 10:46), Max: 37.1 (07 Nov 2022 04:24)  T(F): 97.9 (07 Nov 2022 10:46), Max: 98.7 (07 Nov 2022 04:24)  HR: 88 (07 Nov 2022 10:46) (71 - 92)  BP: 95/57 (07 Nov 2022 10:46) (95/57 - 135/81)  RR: 18 (07 Nov 2022 10:46) (18 - 18)  SpO2: 95% (07 Nov 2022 10:46) (94% - 95%)    Gen: AOx3, NAD, non-toxic  CV: Nontachycardic  Resp: Breathing comfortably, RA  Abd: Soft, nontender  IV/Skin: No thrombophlebitis    LABS:    No new available    MICROBIOLOGY:    .Abscess right abdominal collection  11-01-22   Numerous Escherichia coli ESBL  --  Escherichia coli ESBL    .Blood Blood-Venous  10-25-22   No Growth Final  --  --    .Blood Blood  10-25-22   No Growth Final  --  --    Clean Catch Clean Catch (Midstream)  10-19-22   No growth  --  --    RADIOLOGY:    CT    IMPRESSION:  *  An 8 cm abscess interposed between the liver and upper pole right   kidney.  *  Bilateral renal cyst. No lesion suspicious for neoplasm.

## 2022-11-07 NOTE — PROGRESS NOTE ADULT - ASSESSMENT
Assessment  DMT2: 82y Male with DM T2 with hyperglycemia, was on insulin at home, now on basal bolus insulin, blood sugars trending within acceptable range with intermittent elevations, no hypoglycemic episodes. Patient is s/p abdominal abscess drain placement, eating meals, no acute events, likely for DC with drain.  S/P Fall: workup in progress, stable, monitored.  HTN: Un Controlled,  on antihypertensive medications.            Melba Reyes MD  Cell: [phone]  Office: [phone]               Assessment  DMT2: 82y Male with DM T2 with hyperglycemia, was on insulin at home, now on basal bolus insulin, blood sugars trending within acceptable range with intermittent elevations, no hypoglycemic  episodes. Patient is s/p abdominal abscess drain placement, eating meals, no acute events, likely for DC with drain.  S/P Fall: workup in progress, stable, monitored.  HTN: Un Controlled,  on antihypertensive medications.            Melba Reyes MD  Cell: [phone]  Office: [phone]

## 2022-11-08 LAB
GLUCOSE BLDC GLUCOMTR-MCNC: 108 MG/DL — HIGH (ref 70–99)
GLUCOSE BLDC GLUCOMTR-MCNC: 146 MG/DL — HIGH (ref 70–99)
GLUCOSE BLDC GLUCOMTR-MCNC: 164 MG/DL — HIGH (ref 70–99)
GLUCOSE BLDC GLUCOMTR-MCNC: 95 MG/DL — SIGNIFICANT CHANGE UP (ref 70–99)
GLUCOSE BLDC GLUCOMTR-MCNC: 97 MG/DL — SIGNIFICANT CHANGE UP (ref 70–99)
GLUCOSE BLDC GLUCOMTR-MCNC: 99 MG/DL — SIGNIFICANT CHANGE UP (ref 70–99)
SARS-COV-2 RNA SPEC QL NAA+PROBE: DETECTED
SARS-COV-2 RNA SPEC QL NAA+PROBE: DETECTED

## 2022-11-08 PROCEDURE — 99232 SBSQ HOSP IP/OBS MODERATE 35: CPT

## 2022-11-08 RX ORDER — CHLORHEXIDINE GLUCONATE 213 G/1000ML
1 SOLUTION TOPICAL DAILY
Refills: 0 | Status: DISCONTINUED | OUTPATIENT
Start: 2022-11-08 | End: 2022-11-17

## 2022-11-08 RX ADMIN — Medication 10 UNIT(S): at 09:09

## 2022-11-08 RX ADMIN — PANTOPRAZOLE SODIUM 40 MILLIGRAM(S): 20 TABLET, DELAYED RELEASE ORAL at 05:10

## 2022-11-08 RX ADMIN — ERTAPENEM SODIUM 120 MILLIGRAM(S): 1 INJECTION, POWDER, LYOPHILIZED, FOR SOLUTION INTRAMUSCULAR; INTRAVENOUS at 10:22

## 2022-11-08 RX ADMIN — Medication 25 MILLIGRAM(S): at 05:10

## 2022-11-08 RX ADMIN — INSULIN GLARGINE 25 UNIT(S): 100 INJECTION, SOLUTION SUBCUTANEOUS at 23:21

## 2022-11-08 RX ADMIN — LIDOCAINE 1 PATCH: 4 CREAM TOPICAL at 22:41

## 2022-11-08 RX ADMIN — Medication 3 MILLIGRAM(S): at 22:25

## 2022-11-08 RX ADMIN — TAMSULOSIN HYDROCHLORIDE 0.8 MILLIGRAM(S): 0.4 CAPSULE ORAL at 22:25

## 2022-11-08 RX ADMIN — LIDOCAINE 1 PATCH: 4 CREAM TOPICAL at 04:39

## 2022-11-08 RX ADMIN — SENNA PLUS 2 TABLET(S): 8.6 TABLET ORAL at 22:25

## 2022-11-08 RX ADMIN — Medication 10 UNIT(S): at 17:58

## 2022-11-08 RX ADMIN — LIDOCAINE 1 PATCH: 4 CREAM TOPICAL at 18:01

## 2022-11-08 NOTE — PROGRESS NOTE ADULT - SUBJECTIVE AND OBJECTIVE BOX
Chief complaint  Patient is a 82y old  Male who presents with a chief complaint of Fall (03 Nov 2022 13:55)   Review of systems  Patient in bed, looks comfortable, no hypoglycemic episodes.    Labs and Fingersticks  CAPILLARY BLOOD GLUCOSE      POCT Blood Glucose.: 146 mg/dL (08 Nov 2022 08:51)  POCT Blood Glucose.: 174 mg/dL (07 Nov 2022 22:09)  POCT Blood Glucose.: 103 mg/dL (07 Nov 2022 17:52)                        Medications  MEDICATIONS  (STANDING):  dextrose 5%. 1000 milliLiter(s) (100 mL/Hr) IV Continuous <Continuous>  dextrose 5%. 1000 milliLiter(s) (50 mL/Hr) IV Continuous <Continuous>  dextrose 50% Injectable 25 Gram(s) IV Push once  dextrose 50% Injectable 12.5 Gram(s) IV Push once  dextrose 50% Injectable 25 Gram(s) IV Push once  ertapenem  IVPB      ertapenem  IVPB 1000 milliGRAM(s) IV Intermittent every 24 hours  finasteride 5 milliGRAM(s) Oral daily  glucagon  Injectable 1 milliGRAM(s) IntraMuscular once  influenza  Vaccine (HIGH DOSE) 0.7 milliLiter(s) IntraMuscular once  insulin glargine Injectable (LANTUS) 25 Unit(s) SubCutaneous at bedtime  insulin lispro (ADMELOG) corrective regimen sliding scale   SubCutaneous Before meals and at bedtime  insulin lispro Injectable (ADMELOG) 10 Unit(s) SubCutaneous three times a day before meals  lidocaine   4% Patch 1 Patch Transdermal every 24 hours  melatonin 3 milliGRAM(s) Oral at bedtime  metoprolol succinate ER 25 milliGRAM(s) Oral daily  pantoprazole    Tablet 40 milliGRAM(s) Oral before breakfast  polyethylene glycol 3350 17 Gram(s) Oral daily  senna 2 Tablet(s) Oral at bedtime  tamsulosin 0.8 milliGRAM(s) Oral at bedtime      Physical Exam  General: Patient comfortable in bed  Vital Signs Last 12 Hrs  T(F): 99 (11-08-22 @ 04:37), Max: 99 (11-08-22 @ 04:37)  HR: 81 (11-08-22 @ 04:37) (81 - 81)  BP: 107/70 (11-08-22 @ 04:37) (107/70 - 107/70)  BP(mean): --  RR: 17 (11-08-22 @ 04:37) (17 - 17)  SpO2: 96% (11-08-22 @ 04:37) (96% - 96%)  Neck: No palpable thyroid nodules.  CVS: S1S2, No murmurs  Respiratory: No wheezing, no crepitations  GI: Abdomen soft, bowel sounds positive  Musculoskeletal:  edema lower extremities.   Skin: No skin rashes, no ecchymosis    Diagnostics    Free Thyroxine, Serum: AM Sched. Collection: 21-Oct-2022 06:00 (10-20 @ 13:18)  Thyroid Stimulating Hormone, Serum: AM Sched. Collection: 21-Oct-2022 06:00 (10-20 @ 13:18)  A1C with Estimated Average Glucose: Routine (10-20 @ 13:17)           Chief complaint  Patient is a 82y old  Male who presents with a chief complaint of Fall (03 Nov 2022 13:55)   Review of systems  Patient in bed, looks comfortable, no hypoglycemic episodes.    Labs and Fingersticks  CAPILLARY BLOOD GLUCOSE      POCT Blood Glucose.: 146 mg/dL (08 Nov 2022 08:51)  POCT Blood Glucose.: 174 mg/dL (07 Nov 2022 22:09)  POCT Blood Glucose.: 103 mg/dL (07 Nov 2022 17:52)    Medications  MEDICATIONS  (STANDING):  dextrose 5%. 1000 milliLiter(s) (100 mL/Hr) IV Continuous <Continuous>  dextrose 5%. 1000 milliLiter(s) (50 mL/Hr) IV Continuous <Continuous>  dextrose 50% Injectable 25 Gram(s) IV Push once  dextrose 50% Injectable 12.5 Gram(s) IV Push once  dextrose 50% Injectable 25 Gram(s) IV Push once  ertapenem  IVPB      ertapenem  IVPB 1000 milliGRAM(s) IV Intermittent every 24 hours  finasteride 5 milliGRAM(s) Oral daily  glucagon  Injectable 1 milliGRAM(s) IntraMuscular once  influenza  Vaccine (HIGH DOSE) 0.7 milliLiter(s) IntraMuscular once  insulin glargine Injectable (LANTUS) 25 Unit(s) SubCutaneous at bedtime  insulin lispro (ADMELOG) corrective regimen sliding scale   SubCutaneous Before meals and at bedtime  insulin lispro Injectable (ADMELOG) 10 Unit(s) SubCutaneous three times a day before meals  lidocaine   4% Patch 1 Patch Transdermal every 24 hours  melatonin 3 milliGRAM(s) Oral at bedtime  metoprolol succinate ER 25 milliGRAM(s) Oral daily  pantoprazole    Tablet 40 milliGRAM(s) Oral before breakfast  polyethylene glycol 3350 17 Gram(s) Oral daily  senna 2 Tablet(s) Oral at bedtime  tamsulosin 0.8 milliGRAM(s) Oral at bedtime      Physical Exam  General: Patient comfortable in bed  Vital Signs Last 12 Hrs  T(F): 99 (11-08-22 @ 04:37), Max: 99 (11-08-22 @ 04:37)  HR: 81 (11-08-22 @ 04:37) (81 - 81)  BP: 107/70 (11-08-22 @ 04:37) (107/70 - 107/70)  BP(mean): --  RR: 17 (11-08-22 @ 04:37) (17 - 17)  SpO2: 96% (11-08-22 @ 04:37) (96% - 96%)  Neck: No palpable thyroid nodules.  CVS: S1S2, No murmurs  Respiratory: No wheezing, no crepitations  GI: Abdomen soft, bowel sounds positive  Musculoskeletal:  edema lower extremities.   Skin: No skin rashes, no ecchymosis    Diagnostics    Free Thyroxine, Serum: AM Sched. Collection: 21-Oct-2022 06:00 (10-20 @ 13:18)  Thyroid Stimulating Hormone, Serum: AM Sched. Collection: 21-Oct-2022 06:00 (10-20 @ 13:18)  A1C with Estimated Average Glucose: Routine (10-20 @ 13:17)           Chief complaint  Patient is a 82y old  Male who presents with a chief complaint of Fall (03 Nov 2022 13:55)   Review of systems  Patient in bed, looks comfortable, no hypoglycemic episodes.    Labs and Fingersticks  CAPILLARY BLOOD GLUCOSE        POCT Blood Glucose.: 146 mg/dL (08 Nov 2022 08:51)  POCT Blood Glucose.: 174 mg/dL (07 Nov 2022 22:09)  POCT Blood Glucose.: 103 mg/dL (07 Nov 2022 17:52)    Medications  MEDICATIONS  (STANDING):  dextrose 5%. 1000 milliLiter(s) (100 mL/Hr) IV Continuous <Continuous>  dextrose 5%. 1000 milliLiter(s) (50 mL/Hr) IV Continuous <Continuous>  dextrose 50% Injectable 25 Gram(s) IV Push once  dextrose 50% Injectable 12.5 Gram(s) IV Push once  dextrose 50% Injectable 25 Gram(s) IV Push once  ertapenem  IVPB      ertapenem  IVPB 1000 milliGRAM(s) IV Intermittent every 24 hours  finasteride 5 milliGRAM(s) Oral daily  glucagon  Injectable 1 milliGRAM(s) IntraMuscular once  influenza  Vaccine (HIGH DOSE) 0.7 milliLiter(s) IntraMuscular once  insulin glargine Injectable (LANTUS) 25 Unit(s) SubCutaneous at bedtime  insulin lispro (ADMELOG) corrective regimen sliding scale   SubCutaneous Before meals and at bedtime  insulin lispro Injectable (ADMELOG) 10 Unit(s) SubCutaneous three times a day before meals  lidocaine   4% Patch 1 Patch Transdermal every 24 hours  melatonin 3 milliGRAM(s) Oral at bedtime  metoprolol succinate ER 25 milliGRAM(s) Oral daily  pantoprazole    Tablet 40 milliGRAM(s) Oral before breakfast  polyethylene glycol 3350 17 Gram(s) Oral daily  senna 2 Tablet(s) Oral at bedtime  tamsulosin 0.8 milliGRAM(s) Oral at bedtime      Physical Exam  General: Patient comfortable in bed  Vital Signs Last 12 Hrs  T(F): 99 (11-08-22 @ 04:37), Max: 99 (11-08-22 @ 04:37)  HR: 81 (11-08-22 @ 04:37) (81 - 81)  BP: 107/70 (11-08-22 @ 04:37) (107/70 - 107/70)  BP(mean): --  RR: 17 (11-08-22 @ 04:37) (17 - 17)  SpO2: 96% (11-08-22 @ 04:37) (96% - 96%)  Neck: No palpable thyroid nodules.  CVS: S1S2, No murmurs  Respiratory: No wheezing, no crepitations  GI: Abdomen soft, bowel sounds positive  Musculoskeletal:  edema lower extremities.   Skin: No skin rashes, no ecchymosis    Diagnostics    Free Thyroxine, Serum: AM Sched. Collection: 21-Oct-2022 06:00 (10-20 @ 13:18)  Thyroid Stimulating Hormone, Serum: AM Sched. Collection: 21-Oct-2022 06:00 (10-20 @ 13:18)  A1C with Estimated Average Glucose: Routine (10-20 @ 13:17)

## 2022-11-08 NOTE — PROGRESS NOTE ADULT - ASSESSMENT
Assessment  DMT2: 82y Male with DM T2 with hyperglycemia, was on insulin at home, now on basal bolus insulin, blood sugars trending within overall acceptable range, no hypoglycemic  episodes. Patient is s/p abdominal abscess drain placement, eating meals, appears comfortable in NAD, likely for DC with drain.  S/P Fall: workup in progress, stable, monitored.  HTN: Un Controlled,  on antihypertensive medications.            Melba Reyes MD  Cell: [phone]  Office: [phone]               Assessment  DMT2: 82y Male with DM T2 with hyperglycemia, was on insulin at home, now on basal bolus insulin, blood sugars trending within overall acceptable range, no hypoglycemic  episodes. Patient is s/p  abdominal abscess drain placement, eating meals, appears comfortable in NAD, likely for DC with drain.  S/P Fall: workup in progress, stable, monitored.  HTN: Un Controlled,  on antihypertensive medications.            Melba Reyes MD  Cell: [phone]  Office: [phone]               Assessment  DMT2: 82y Male with DM T2 with hyperglycemia, was on insulin at home, now on basal bolus insulin, blood  sugars trending within overall acceptable range, no hypoglycemic  episodes. Patient is s/p  abdominal abscess drain placement, eating meals, appears comfortable in NAD, likely for DC with drain.  S/P Fall: workup in progress, stable, monitored.  HTN: Un Controlled,  on antihypertensive medications.              Melba Reyes MD  Cell: [phone]  Office: [phone]

## 2022-11-08 NOTE — PROGRESS NOTE ADULT - ASSESSMENT
82 yo M with HTN/HLD, DM2, BPH, initially with low back pain  Leukocytosis, no fever  Initially with fall  Current complaints of constipation, possible urinary retention  CT chest with evidence aspiration; CT A/P with concern for abscess vs hematoma along liver margin  MR suspicious for abscess  Culture with E coli ESBL  Overall,  1) Abnormal finding on imaging/Abscess  - Collection adjacent to liver--hematoma vs abscess; culture ESBL E coli  - Ertapenem 1g q 24, anticipate 14 day course from drain placement, when DC planning, okay to place midline/picc as long as no new signs sepsis in the interim  - S/p IR drain--appreciate procedure  2) Leukocytosis  - Trending down  - F/U BCXs  - Trend WBC to normal  3) COVID  - COVID+, RA, no symptoms  - Monitor  - If develops symptoms, plan to start RemD  - Hold on Dexa unless progressive O2 requirements  - Supportive care    Gal Arroyo MD  Contact on TEAMS messaging from 9am - 5pm  From 5pm-9am, on weekends, or if no response call [phone]

## 2022-11-08 NOTE — PROGRESS NOTE ADULT - SUBJECTIVE AND OBJECTIVE BOX
KAITLIN CALDERON  82y Male  MRN:[id number]    Patient is a 82y old  Male who presents with a chief complaint of fall    HPI:   81M cantonese speaking PMHx of HTN/HLD, DM2, BPH, (?Afib per chart review, though not on AC's), presents to the ED with mid lower back pain that he sustained after falling in the bathroom while he was urinating at 9pm last night. Pt denies headstrike/LOC. Difficult to obtain full history even with  though pt answering questions appropriately and is AAOx2. Pt denies any pain anywhere else. Per EMS pt has had chronic cough. Unclear if pt was down long. pt last took insulin 2 days ago he states.   	Spoke with stepdaughter Yulissa who lives below pt: Pt couldn't get up from the fall yesterday, has difficulty ambulating with walker since the fall as he complaints of back pain, pt has 9hrs home help daily but not overnight. She states that pt is at baseline mental status and that he's had short term memory problems for some time. Denies any recent fevers/chills, abd pain, v/d, chest pain/sob. States that the cough pt has is chronic.  (19 Oct 2022 15:53)      Patient seen and evaluated at bedside. No acute events overnight except as noted.    Interval HPI: found to be covid +    PAST MEDICAL & SURGICAL HISTORY:  DM (diabetes mellitus)      HTN (hypertension)      Hypercholesterolemia      CAD (coronary artery disease)      HTN (hypertension)      DM (diabetes mellitus)      S/P TURP      S/P gastrectomy          REVIEW OF SYSTEMS:  as per hpi     VITALS:   Vital Signs Last 24 Hrs  T(C): 37.2 (08 Nov 2022 04:37), Max: 37.2 (08 Nov 2022 04:37)  T(F): 99 (08 Nov 2022 04:37), Max: 99 (08 Nov 2022 04:37)  HR: 81 (08 Nov 2022 04:37) (81 - 81)  BP: 107/70 (08 Nov 2022 04:37) (107/70 - 126/77)  BP(mean): --  RR: 17 (08 Nov 2022 04:37) (17 - 17)  SpO2: 96% (08 Nov 2022 04:37) (96% - 100%)    Parameters below as of 08 Nov 2022 04:37  Patient On (Oxygen Delivery Method): room air            PHYSICAL EXAM:  GENERAL: NAD, well-developed,    HEAD:  Atraumatic, Normocephalic  EYES: EOMI, PERRLA, conjunctiva and sclera clear  NECK: Supple, No JVD  CHEST/LUNG: clear b/l  HEART: S1, S2; No murmurs, rubs, or gallops  ABDOMEN: Soft, Nontender, Nondistended; Bowel sounds present.   +drain  EXTREMITIES:  2+ Peripheral Pulses, No clubbing, cyanosis, or edema  PSYCH: Normal affect  NEUROLOGY: AAOX1-2; non-focal  SKIN: No rashes or lesions    Consultant(s) Notes Reviewed:  [x ] YES  [ ] NO  Care Discussed with Consultants/Other Providers [ x] YES  [ ] NO    MEDS:   MEDICATIONS  (STANDING):  dextrose 5%. 1000 milliLiter(s) (100 mL/Hr) IV Continuous <Continuous>  dextrose 5%. 1000 milliLiter(s) (50 mL/Hr) IV Continuous <Continuous>  dextrose 50% Injectable 25 Gram(s) IV Push once  dextrose 50% Injectable 12.5 Gram(s) IV Push once  dextrose 50% Injectable 25 Gram(s) IV Push once  ertapenem  IVPB      ertapenem  IVPB 1000 milliGRAM(s) IV Intermittent every 24 hours  finasteride 5 milliGRAM(s) Oral daily  glucagon  Injectable 1 milliGRAM(s) IntraMuscular once  influenza  Vaccine (HIGH DOSE) 0.7 milliLiter(s) IntraMuscular once  insulin glargine Injectable (LANTUS) 25 Unit(s) SubCutaneous at bedtime  insulin lispro (ADMELOG) corrective regimen sliding scale   SubCutaneous Before meals and at bedtime  insulin lispro Injectable (ADMELOG) 10 Unit(s) SubCutaneous three times a day before meals  lidocaine   4% Patch 1 Patch Transdermal every 24 hours  melatonin 3 milliGRAM(s) Oral at bedtime  metoprolol succinate ER 25 milliGRAM(s) Oral daily  pantoprazole    Tablet 40 milliGRAM(s) Oral before breakfast  polyethylene glycol 3350 17 Gram(s) Oral daily  senna 2 Tablet(s) Oral at bedtime  tamsulosin 0.8 milliGRAM(s) Oral at bedtime    MEDICATIONS  (PRN):  acetaminophen     Tablet .. 650 milliGRAM(s) Oral every 6 hours PRN mild pain  albuterol/ipratropium for Nebulization 3 milliLiter(s) Nebulizer every 6 hours PRN Shortness of Breath  dextrose Oral Gel 15 Gram(s) Oral once PRN Blood Glucose LESS THAN 70 milliGRAM(s)/deciliter      ALLERGIES:  No Known Allergies      LABS:                      < from: MR Abdomen w/wo IV Cont (10.26.22 @ 17:39) >    IMPRESSION:  *  An 8 cm abscess interposed between the liver and upper pole right   kidney.  *  Bilateral renal cyst. No lesion suspicious for neoplasm.      --- End of Report ---      < end of copied text >         cultures: Culture Results:   No growth (10-19 @ 08:59)       < from: MR Lumbar Spine No Cont (10.20.22 @ 17:28) >    IMPRESSION:  Evidence of acute fracture L1 with fracture line traversing   the vertebral body from anterior to posterior and findings consistent   with a 2 column injury. Paravertebral cuff of soft tissue consistent with   recent injury. Some mild extension of edema into the pedicles though the   transverse and spinous processes as well as the lamina are spared. No   other lesions are identified. Recommend interval follow-up to determine   benign versus pathologic fracture.    --- End of Report ---      < end of copied text >      < from: Xray Chest 1 View- PORTABLE-Urgent (Xray Chest 1 View- PORTABLE-Urgent .) (10.23.22 @ 15:36) >  IMPRESSION:  Bibasilar patchy opacities, compatible with subsegmental atelectasis.   Infectious etiology cannot be ruled out.    --- End of Report ---      < end of copied text >      < from: US Kidney and Bladder (10.24.22 @ 19:31) >  IMPRESSION:  Complex cystic collection superior to the right kidney, correlating to   heterogeneous collection exophytic from the right hepatic lobe.   Differential includes abscess versus hematoma.    Bilateral complex renal cysts.    < end of copied text >  < from: CT Chest No Cont (10.19.22 @ 09:59) >  IMPRESSION:  Limited noncontrast examination.    CHEST:  *  Secretions/debris within the bilateral mainstem bronchi with   multifocal sites of mucous plugging in distal airways.  *  Bilateral tree-in-bud nodularity is likely infectious/inflammatory.   Groundglass opacity within the left upper lobe, which may also be   infectious/inflammatory. Pulmonary nodule measuring 6 m in the right   upper lobe. Suggest 3 month follow-up chest CT to assess for stability or   resolution.  *  A few prominent nonspecific mediastinal lymph nodes.  *  Ascending thoracic aortic aneurysm measuring up to 5 cm.    ABDOMEN AND PELVIS:  *  Low density structure measuring 8.3 cm alongthe inferior margin of   the right hepatic lobe with surrounding inflammatory changes.   Differential includes an intrahepatic/perihepatic abscess, or possibly   hematoma in the setting of trauma. Neoplasm is thought to be less likely   but is not excluded. Contrast enhanced abdominal MRI may be helpful for   further characterization.  *  Mild compression fracture at L1, likely acute. Please refer to the   concurrent dedicated CT lumbar spine report.    Preliminary findings were discussed by Dr. Parks with Dr. Flores   on 10/19/2022 11:10 AM with read back confirmation. Final findings were   discussed with Dr. Pierce by Dr. Mcqueen on 10/19/2022 at 12:57 PM.    --- End of Report ---    ***Please see the addendum at the top of this report. It may contain   additional important information or changes.****      < end of copied text >

## 2022-11-08 NOTE — PROGRESS NOTE ADULT - ASSESSMENT
81 male h/o htn, chol, dm, here s/p mechanical fall    mechanical fall  imaging noted  surg eval noted  MRI L spine noted with Lspine fx  ortho f/u noted  no acute intervention  pain control  TLSO brace  PT    DM  insulin as ordered  endo f/u  monitor fs    hypoxia  imaging c/w pna  pulm consult noted  supp o2  iv abx  incentive spirometer  nebs    chanel  unclear etiology  possible 2/2 poor oral fluid intake  renal f/u  creat now nl    abnl UA  f/u cult  id f/u  abx as per id    MRI noted with collection btw liver and kidney  possible abscess  s/p drainage by IR 11/1.  cult noted. ecoli resist to zosyn. abx now changed to ertapenem.  ID f/u apprec. to cont long term abx as per id  IR f/u noted. likely to be dc with drain with outpt IR f/u       suspected renal lesion  gu eval noted   MRI noted with cysts. not suspicious for malignancy      covid positive  pt without symptoms  monitor      cont other home meds         Advanced care planning was discussed with patient and family.  Advanced care planning forms were reviewed and discussed as appropriate.  Differential diagnosis and plan of care discussed with patient after the evaluation.   Pain assessed and judicious use of narcotics when appropriate was discussed.  Importance of Fall prevention discussed.  Counseling on Smoking and Alcohol cessation was offered when appropriate.  Counseling on Diet, exercise, and medication compliance was done.   Approx 30 minutes spent.

## 2022-11-08 NOTE — PROGRESS NOTE ADULT - SUBJECTIVE AND OBJECTIVE BOX
Interventional Radiology Follow-Up Note.    Patient seen and examined @ bedside.    This is a 82 year old Male s/p abdominal abscess drain placement on 11/1/22 in Interventional Radiology with Dr. Putnam.    No complaint offered.      Medication:  ertapenem  IVPB: (11-08)  metoprolol succinate ER: (11-08)    Vitals:  T(F): 99, Max: 99 (04:37)  HR: 81  BP: 107/70  RR: 17  SpO2: 96%    Physical Exam:  General: Nontoxic, in NAD.  Abdomen: soft, NTND.   Drain Device: Drain intact attached to PEDRO LUIS, Flushed with 5cc NS w/o difficulty. Dressing clean, dry, intact.   24hr Drain output: 30cc, serosanguinous                      Assessment/Plan: 82M w/ HTN, DM2, CAD, and BPH-TURP, 10/19/22 p/w L1 vertebral fx s/p mechanical fall; c/b ANTON.   IR consulted on 10/27 for drainage of 8 cm abscess interposed between the liver and upper pole R kidney. MR showing diffusion restriction. Concern for hematoma vs abscess.  S/p abdominal abscess drain placement on 11/1/22 in Interventional Radiology with Dr. Putnam.    - 11/1: s/p 10 fr drain placed into right abdominal collection between liver and kidney. ~100cc purulent fluid aspirated during procedure  - Flush drain with 5cc NS daily forward only; DO NOT aspirate  - Change dressing q3 days or when dressing is saturated.  - Trend vs/labs  - Continue global management per primary team  - If drain outputs <15cc/24hrs x 2 days, please obtain CT a/p noncon to eval  - If the patient is d/c home with drainage catheter, pt can make an appointment with IR by calling the IR booking office at [phone]; recommend IR follow in 10 days after discharge for tube evaluation.  - Pt will benefit from VNS service to help with drainage catheter care; Pt should continue same drainage catheter care as an outpatient.     Please call IR at [phone] with any questions, concerns, or issues regarding above.    Also available on [employer] TEAMS.

## 2022-11-08 NOTE — PROGRESS NOTE ADULT - SUBJECTIVE AND OBJECTIVE BOX
CC: F/U for Abscess    Saw/spoke to patient. No fevers, no chills. No new complaints.    Allergies  No Known Allergies    ANTIMICROBIALS:  ertapenem  IVPB    ertapenem  IVPB 1000 every 24 hours    PE:    Vital Signs Last 24 Hrs  T(C): 36.4 (08 Nov 2022 14:03), Max: 37.2 (08 Nov 2022 04:37)  T(F): 97.5 (08 Nov 2022 14:03), Max: 99 (08 Nov 2022 04:37)  HR: 70 (08 Nov 2022 14:03) (70 - 81)  BP: 120/70 (08 Nov 2022 14:03) (107/70 - 126/77)  RR: 18 (08 Nov 2022 14:03) (17 - 18)  SpO2: 96% (08 Nov 2022 14:03) (96% - 100%)    Gen: AOx3, NAD, non-toxic  CV: Nontachycardic  Resp: Breathing comfortably, RA  Abd: Soft, nontender  IV/Skin: No thrombophlebitis    LABS:    No new available    MICROBIOLOGY:    .Abscess right abdominal collection  11-01-22   Numerous Escherichia coli ESBL  --  Escherichia coli ESBL    .Blood Blood-Venous  10-25-22   No Growth Final  --  --    .Blood Blood  10-25-22   No Growth Final  --  --    Clean Catch Clean Catch (Midstream)  10-19-22   No growth  --  --    RADIOLOGY:    10/26 CT:    IMPRESSION:  *  An 8 cm abscess interposed between the liver and upper pole right   kidney.  *  Bilateral renal cyst. No lesion suspicious for neoplasm.

## 2022-11-09 DIAGNOSIS — E11.9 TYPE 2 DIABETES MELLITUS WITHOUT COMPLICATIONS: ICD-10-CM

## 2022-11-09 DIAGNOSIS — N28.89 OTHER SPECIFIED DISORDERS OF KIDNEY AND URETER: ICD-10-CM

## 2022-11-09 DIAGNOSIS — N17.9 ACUTE KIDNEY FAILURE, UNSPECIFIED: ICD-10-CM

## 2022-11-09 DIAGNOSIS — J69.0 PNEUMONITIS DUE TO INHALATION OF FOOD AND VOMIT: ICD-10-CM

## 2022-11-09 DIAGNOSIS — S32.009A UNSPECIFIED FRACTURE OF UNSPECIFIED LUMBAR VERTEBRA, INITIAL ENCOUNTER FOR CLOSED FRACTURE: ICD-10-CM

## 2022-11-09 DIAGNOSIS — U07.1 COVID-19: ICD-10-CM

## 2022-11-09 DIAGNOSIS — K65.1 PERITONEAL ABSCESS: ICD-10-CM

## 2022-11-09 DIAGNOSIS — Z29.9 ENCOUNTER FOR PROPHYLACTIC MEASURES, UNSPECIFIED: ICD-10-CM

## 2022-11-09 LAB
ALBUMIN SERPL ELPH-MCNC: 2.9 G/DL — LOW (ref 3.3–5)
ALP SERPL-CCNC: 158 U/L — HIGH (ref 40–120)
ALT FLD-CCNC: 16 U/L — SIGNIFICANT CHANGE UP (ref 10–45)
ANION GAP SERPL CALC-SCNC: 5 MMOL/L — SIGNIFICANT CHANGE UP (ref 5–17)
AST SERPL-CCNC: 22 U/L — SIGNIFICANT CHANGE UP (ref 10–40)
BILIRUB SERPL-MCNC: 0.3 MG/DL — SIGNIFICANT CHANGE UP (ref 0.2–1.2)
BUN SERPL-MCNC: 16 MG/DL — SIGNIFICANT CHANGE UP (ref 7–23)
CALCIUM SERPL-MCNC: 9.3 MG/DL — SIGNIFICANT CHANGE UP (ref 8.4–10.5)
CHLORIDE SERPL-SCNC: 103 MMOL/L — SIGNIFICANT CHANGE UP (ref 96–108)
CO2 SERPL-SCNC: 31 MMOL/L — SIGNIFICANT CHANGE UP (ref 22–31)
CREAT SERPL-MCNC: 0.94 MG/DL — SIGNIFICANT CHANGE UP (ref 0.5–1.3)
CRP SERPL-MCNC: 5 MG/L — HIGH (ref 0–4)
D DIMER BLD IA.RAPID-MCNC: 401 NG/ML DDU — HIGH
EGFR: 80 ML/MIN/1.73M2 — SIGNIFICANT CHANGE UP
ERYTHROCYTE [SEDIMENTATION RATE] IN BLOOD: 26 MM/HR — HIGH (ref 0–20)
GLUCOSE BLDC GLUCOMTR-MCNC: 117 MG/DL — HIGH (ref 70–99)
GLUCOSE BLDC GLUCOMTR-MCNC: 146 MG/DL — HIGH (ref 70–99)
GLUCOSE BLDC GLUCOMTR-MCNC: 193 MG/DL — HIGH (ref 70–99)
GLUCOSE BLDC GLUCOMTR-MCNC: 64 MG/DL — LOW (ref 70–99)
GLUCOSE BLDC GLUCOMTR-MCNC: 66 MG/DL — LOW (ref 70–99)
GLUCOSE BLDC GLUCOMTR-MCNC: 83 MG/DL — SIGNIFICANT CHANGE UP (ref 70–99)
GLUCOSE SERPL-MCNC: 60 MG/DL — LOW (ref 70–99)
HCT VFR BLD CALC: 35.7 % — LOW (ref 39–50)
HGB BLD-MCNC: 11.2 G/DL — LOW (ref 13–17)
MAGNESIUM SERPL-MCNC: 1.7 MG/DL — SIGNIFICANT CHANGE UP (ref 1.6–2.6)
MCHC RBC-ENTMCNC: 28.7 PG — SIGNIFICANT CHANGE UP (ref 27–34)
MCHC RBC-ENTMCNC: 31.4 GM/DL — LOW (ref 32–36)
MCV RBC AUTO: 91.5 FL — SIGNIFICANT CHANGE UP (ref 80–100)
NRBC # BLD: 0 /100 WBCS — SIGNIFICANT CHANGE UP (ref 0–0)
PHOSPHATE SERPL-MCNC: 2.9 MG/DL — SIGNIFICANT CHANGE UP (ref 2.5–4.5)
PLATELET # BLD AUTO: 200 K/UL — SIGNIFICANT CHANGE UP (ref 150–400)
POTASSIUM SERPL-MCNC: 3.9 MMOL/L — SIGNIFICANT CHANGE UP (ref 3.5–5.3)
POTASSIUM SERPL-SCNC: 3.9 MMOL/L — SIGNIFICANT CHANGE UP (ref 3.5–5.3)
PROCALCITONIN SERPL-MCNC: 0.18 NG/ML — HIGH (ref 0.02–0.1)
PROT SERPL-MCNC: 5.9 G/DL — LOW (ref 6–8.3)
RBC # BLD: 3.9 M/UL — LOW (ref 4.2–5.8)
RBC # FLD: 15 % — HIGH (ref 10.3–14.5)
SODIUM SERPL-SCNC: 139 MMOL/L — SIGNIFICANT CHANGE UP (ref 135–145)
WBC # BLD: 3.41 K/UL — LOW (ref 3.8–10.5)
WBC # FLD AUTO: 3.41 K/UL — LOW (ref 3.8–10.5)

## 2022-11-09 PROCEDURE — 99232 SBSQ HOSP IP/OBS MODERATE 35: CPT

## 2022-11-09 RX ORDER — INSULIN LISPRO 100/ML
6 VIAL (ML) SUBCUTANEOUS
Refills: 0 | Status: DISCONTINUED | OUTPATIENT
Start: 2022-11-09 | End: 2022-11-14

## 2022-11-09 RX ORDER — HEPARIN SODIUM 5000 [USP'U]/ML
5000 INJECTION INTRAVENOUS; SUBCUTANEOUS EVERY 8 HOURS
Refills: 0 | Status: DISCONTINUED | OUTPATIENT
Start: 2022-11-09 | End: 2022-11-17

## 2022-11-09 RX ORDER — INSULIN GLARGINE 100 [IU]/ML
20 INJECTION, SOLUTION SUBCUTANEOUS AT BEDTIME
Refills: 0 | Status: DISCONTINUED | OUTPATIENT
Start: 2022-11-09 | End: 2022-11-10

## 2022-11-09 RX ORDER — INSULIN GLARGINE 100 [IU]/ML
22 INJECTION, SOLUTION SUBCUTANEOUS AT BEDTIME
Refills: 0 | Status: DISCONTINUED | OUTPATIENT
Start: 2022-11-09 | End: 2022-11-09

## 2022-11-09 RX ORDER — INSULIN LISPRO 100/ML
8 VIAL (ML) SUBCUTANEOUS
Refills: 0 | Status: DISCONTINUED | OUTPATIENT
Start: 2022-11-09 | End: 2022-11-09

## 2022-11-09 RX ORDER — DEXTROSE 50 % IN WATER 50 %
12.5 SYRINGE (ML) INTRAVENOUS ONCE
Refills: 0 | Status: COMPLETED | OUTPATIENT
Start: 2022-11-09 | End: 2022-11-09

## 2022-11-09 RX ADMIN — LIDOCAINE 1 PATCH: 4 CREAM TOPICAL at 17:44

## 2022-11-09 RX ADMIN — LIDOCAINE 1 PATCH: 4 CREAM TOPICAL at 05:39

## 2022-11-09 RX ADMIN — TAMSULOSIN HYDROCHLORIDE 0.8 MILLIGRAM(S): 0.4 CAPSULE ORAL at 22:34

## 2022-11-09 RX ADMIN — HEPARIN SODIUM 5000 UNIT(S): 5000 INJECTION INTRAVENOUS; SUBCUTANEOUS at 13:24

## 2022-11-09 RX ADMIN — Medication 10 UNIT(S): at 09:06

## 2022-11-09 RX ADMIN — Medication 1: at 17:43

## 2022-11-09 RX ADMIN — Medication 12.5 GRAM(S): at 13:28

## 2022-11-09 RX ADMIN — SENNA PLUS 2 TABLET(S): 8.6 TABLET ORAL at 22:34

## 2022-11-09 RX ADMIN — POLYETHYLENE GLYCOL 3350 17 GRAM(S): 17 POWDER, FOR SOLUTION ORAL at 13:24

## 2022-11-09 RX ADMIN — HEPARIN SODIUM 5000 UNIT(S): 5000 INJECTION INTRAVENOUS; SUBCUTANEOUS at 22:34

## 2022-11-09 RX ADMIN — Medication 6 UNIT(S): at 17:44

## 2022-11-09 RX ADMIN — PANTOPRAZOLE SODIUM 40 MILLIGRAM(S): 20 TABLET, DELAYED RELEASE ORAL at 05:58

## 2022-11-09 RX ADMIN — ERTAPENEM SODIUM 120 MILLIGRAM(S): 1 INJECTION, POWDER, LYOPHILIZED, FOR SOLUTION INTRAMUSCULAR; INTRAVENOUS at 10:06

## 2022-11-09 RX ADMIN — Medication 3 MILLIGRAM(S): at 22:34

## 2022-11-09 RX ADMIN — Medication 25 MILLIGRAM(S): at 05:59

## 2022-11-09 RX ADMIN — FINASTERIDE 5 MILLIGRAM(S): 5 TABLET, FILM COATED ORAL at 13:24

## 2022-11-09 NOTE — PROGRESS NOTE ADULT - SUBJECTIVE AND OBJECTIVE BOX
Interventional Radiology Follow-Up Note.    Patient seen and examined @ bedside.    This is a 82 year old Male s/p abdominal abscess drain placement on 11/1/22 in Interventional Radiology with Dr. Putnam.      No complaint offered.      Medication:  ertapenem  IVPB: (11-08)  metoprolol succinate ER: (11-09)    Vitals:  T(F): 97.6, Max: 97.6 (20:56)  HR: 65  BP: 115/78  RR: 18  SpO2: 94%    Physical Exam:  General: Nontoxic, in NAD.  Abdomen: soft, NTND.   Drain Device: Drain intact attached to PEDRO LUIS, Flushed with 5cc NS w/o difficulty. Dressing clean, dry, intact.   24hr Drain output: 10cc serosanguinous with some debris                              Assessment/Plan: 82M w/ HTN, DM2, CAD, and BPH-TURP, 10/19/22 p/w L1 vertebral fx s/p mechanical fall; c/b ANTON.   IR consulted on 10/27 for drainage of 8 cm abscess interposed between the liver and upper pole R kidney. MR showing diffusion restriction. Concern for hematoma vs abscess.  S/p abdominal abscess drain placement on 11/1/22 in Interventional Radiology with Dr. Putnam.    - decreased ouput noted (10cc/24hrs), if output still <15cc/ 24hrs tomorrow, please obtain CT a/p noncon to eval.  - 11/1: s/p 10 fr drain placed into right abdominal collection between liver and kidney. ~100cc purulent fluid aspirated during procedure  - Flush drain with 5cc NS daily forward only; DO NOT aspirate  - Change dressing q3 days or when dressing is saturated.  - Trend vs/labs  - Continue global management per primary team  - If the patient is d/c home with drainage catheter, pt can make an appointment with IR by calling the IR booking office at [phone]; recommend IR follow in 10 days after discharge for tube evaluation.  - Pt will benefit from VNS service to help with drainage catheter care; Pt should continue same drainage catheter care as an outpatient.     Please call IR at [phone] with any questions, concerns, or issues regarding above.    Also available on [employer] TEAMS.

## 2022-11-09 NOTE — PROGRESS NOTE ADULT - PROBLEM SELECTOR PLAN 3
MRI showing collection between liver and kidney; possible abscess  Also with changes likely consistent with cysts, low suspicion for malignancy. Evaluated by   s/p drainage with IR 11/1. culture noted. Ecoli resistant to zosyn, transitioned to ertapenem    - c/w ertapenem  - ID consulted; recs appreciated  - IR recs appreciated; likelyto be discharged with drain with outpt IR f/u MRI showing collection between liver and kidney; possible abscess  Also with changes likely consistent with cysts, low suspicion for malignancy. Evaluated by   - 11/1 IR drain placement into right abdominal collection between liver and kidney. ~100cc purulent fluid aspirated during procedure. Culture noted. Ecoli resistant to zosyn, transitioned to ertapenem    - c/w ertapenem  - ID consulted; recs appreciated  - IR recs appreciated; decreased ouput noted (10cc/24hrs) today, if output still <15cc/ 24hrs tomorrow, please obtain CT a/p noncon to eval.  - Flush drain with 5cc NS daily forward    - Dressing change q3 days or when dressing saturated  - If d/c'd with drain, f/u with IR at [phone] in 10 days for tube eval  - Needs VNS service for drainage catheter care MRI showing collection between liver and kidney; possible abscess  Also with changes likely consistent with cysts, low suspicion for malignancy. Evaluated by   - 11/1 IR drain placement into right abdominal collection between liver and kidney. ~100cc purulent fluid aspirated during procedure. Culture noted. Ecoli resistant to zosyn, transitioned to ertapenem    - c/w ertapenem 11/1-11/14  - ID consulted; recs appreciated  - IR recs appreciated; decreased ouput noted (10cc/24hrs) today, if output still <15cc/ 24hrs tomorrow, please obtain CT a/p noncon to eval.  - Flush drain with 5cc NS daily forward    - Dressing change q3 days or when dressing saturated  - If d/c'd with drain, f/u with IR at [phone] in 10 days for tube eval  - Needs VNS service for drainage catheter care

## 2022-11-09 NOTE — PROGRESS NOTE ADULT - PROBLEM SELECTOR PLAN 4
-c/w current insulin regimen; Lantus 25 U   - A1c 10/27/22: 10.4    -c/w current insulin regimen; Lantus 25 U and Lispro 10 U premeal Noted to be hypoxic with imaging showing findings concerning for PNA; likely from aspiration pneumonia given worsening dysphagia in the setting of L fracture and deconditioning. Now off NC and stable on RA.   Speech and swallow eval and MBS 10/28, recommendation for puree/mildly thickened liquid.   Speech pathologist Mally Villalobos recs for repeat MBS at [facility] after d/c    - c/w Ertapenem 11/1-11/14  - Pulm consulted with recs earlier in his hospital course  - c/w Duoneb  - c/w incentive spirometer

## 2022-11-09 NOTE — PROVIDER CONTACT NOTE (OTHER) - BACKGROUND
pt admitted status post fall, covid positive
pt dx UTI
Pt came in for s/p fall
Pt admitted s/p fall 10/19. Pt has history of Afib as stated in H&P.
s/p fall, +L1 compression fx

## 2022-11-09 NOTE — PROVIDER CONTACT NOTE (OTHER) - SITUATION
Pt in afib, HR jumping between 130-140s and back down to 90s.
Pt O2 dropping in the 80s
abd distended & tender
Pt returned from MR of abdomen - PA notified that NPO order still active and insulin not given as pt not on the unit and NPO. Also, Miralax given but c/o still has no BM.
pt ordered for 25 units of lantus, fingerstick 97
pt axo3 covid +

## 2022-11-09 NOTE — PROVIDER CONTACT NOTE (HYPOGLYCEMIA EVENT) - NS PROVIDER CONTACT BACKGROUND-HYPO
Age: 82y    Gender: Male    POCT Blood Glucose:  146 mg/dL (11-09-22 @ 13:47)  66 mg/dL (11-09-22 @ 13:21)  64 mg/dL (11-09-22 @ 13:20)  117 mg/dL (11-09-22 @ 08:45)  99 mg/dL (11-08-22 @ 22:55)  95 mg/dL (11-08-22 @ 22:49)  97 mg/dL (11-08-22 @ 21:34)  108 mg/dL (11-08-22 @ 17:17)      eMAR:  dextrose 50% Injectable   12.5 Gram(s) IV Push (11-09-22 @ 13:28)    finasteride   5 milliGRAM(s) Oral (11-09-22 @ 13:24)    insulin glargine Injectable (LANTUS)   25 Unit(s) SubCutaneous (11-08-22 @ 23:21)    insulin lispro Injectable (ADMELOG)   10 Unit(s) SubCutaneous (11-09-22 @ 09:06)   10 Unit(s) SubCutaneous (11-08-22 @ 17:58)

## 2022-11-09 NOTE — PROGRESS NOTE ADULT - ASSESSMENT
81 male h/o htn, chol, dm, here s/p mechanical fall    mechanical fall  imaging noted  surg eval noted  MRI L spine noted with Lspine fx  ortho f/u noted  no acute intervention  pain control  TLSO brace  PT    DM  insulin as ordered  endo f/u  monitor fs    hypoxia  imaging c/w pna  pulm consult noted  supp o2  iv abx  incentive spirometer  nebs    chanel  unclear etiology  possible 2/2 poor oral fluid intake  renal f/u  creat now nl    abnl UA  f/u cult  id f/u  abx as per id    MRI noted with collection btw liver and kidney  possible abscess  s/p drainage by IR 11/1.  cult noted. ecoli resist to zosyn. abx now changed to ertapenem.  ID f/u apprec. to cont long term abx as per id  IR f/u noted. likely to be dc with drain with outpt IR f/u       suspected renal lesion  gu eval noted   MRI noted with cysts. not suspicious for malignancy      covid positive  pt without symptoms  monitor      cont other home meds         Advanced care planning was discussed with patient and family.  Advanced care planning forms were reviewed and discussed as appropriate.  Differential diagnosis and plan of care discussed with patient after the evaluation.   Pain assessed and judicious use of narcotics when appropriate was discussed.  Importance of Fall prevention discussed.  Counseling on Smoking and Alcohol cessation was offered when appropriate.  Counseling on Diet, exercise, and medication compliance was done.   Approx 30 minutes spent.   81 male h/o HTN, HLD, DM who presented with back pain s/p a mechanical fall at home, found to have L spine fracture on MRI, no surgical plan per ortho, on TSLO brace and PT, and course c/b dysphagia with aspiration pneumonia and concern for perinephric fluid and abscess s/p drainage by IR on 11/1, with culture showing VRE E. Coli, being treated with 2 week course of Ertapenem (11/1-11/14). Course also c/b iatrogenic COVID infection, stable on RA without Remdesivir treatment.         chanel  unclear etiology  possible 2/2 poor oral fluid intake  renal f/u  creat now nl    abnl UA  f/u cult  id f/u  abx as per id    MRI noted with collection btw liver and kidney  possible abscess  s/p drainage by IR 11/1.  cult noted. ecoli resist to zosyn. abx now changed to ertapenem.  ID f/u apprec. to cont long term abx as per id  IR f/u noted. likely to be dc with drain with outpt IR f/u       suspected renal lesion  gu eval noted   MRI noted with cysts. not suspicious for malignancy                      81 male h/o HTN, HLD, DM who presented with back pain s/p a mechanical fall at home, found to have L spine fracture on MRI, no surgical plan per ortho, on TSLO brace and PT, and course c/b dysphagia with aspiration pneumonia and concern for perinephric fluid and abscess s/p drainage by IR on 11/1, with culture showing VRE E. Coli, being treated with 2 week course of Ertapenem (11/1-11/14). Course also c/b iatrogenic COVID infection, stable on RA without Remdesivir treatment.       abnl UA  f/u cult  id f/u  abx as per id    MRI noted with collection btw liver and kidney  possible abscess  s/p drainage by IR 11/1.  cult noted. ecoli resist to zosyn. abx now changed to ertapenem.  ID f/u apprec. to cont long term abx as per id  IR f/u noted. likely to be dc with drain with outpt IR f/u       suspected renal lesion  gu eval noted   MRI noted with cysts. not suspicious for malignancy

## 2022-11-09 NOTE — PROGRESS NOTE ADULT - ASSESSMENT
82 yo M with HTN/HLD, DM2, BPH, initially with low back pain  Leukocytosis, no fever  Initially with fall  Current complaints of constipation, possible urinary retention  CT chest with evidence aspiration; CT A/P with concern for abscess vs hematoma along liver margin  MR suspicious for abscess  Culture with E coli ESBL  Overall,  1) Abnormal finding on imaging/Abscess  - Collection adjacent to liver--hematoma vs abscess; culture ESBL E coli  - Ertapenem 1g q 24, anticipate 14 day course from drain placement, when DC planning, okay to place midline/picc as long as no new signs sepsis in the interim  - S/p IR drain--appreciate procedure  2) Leukocytosis  - Trending down  - F/U BCXs  - Trend WBC to normal  3) COVID  - COVID+, RA, no symptoms  - Monitor  - If develops symptoms, plan to start RemD  - Hold on Dexa unless progressive O2 requirements  - Supportive care    Gal Arroyo MD  Contact on TEAMS messaging from 9am - 5pm  From 5pm-9am, on weekends, or if no response call [phone]

## 2022-11-09 NOTE — PROGRESS NOTE ADULT - PROBLEM SELECTOR PLAN 1
Tested positive 11/7  afebrile, stable on room air    -continue to monitor Tested positive 11/7, needs to be inpatient until 11/13 before going to [facility]   afebrile, stable on room air    -continue to monitor without Remdesivir per ID

## 2022-11-09 NOTE — PROGRESS NOTE ADULT - PROBLEM SELECTOR PLAN 1
Will decrease Lantus to 22u at bedtime.  Will decrease Admelog to 8u before each meal and continue Admelog correction scale coverage. Will continue monitoring FS and FU.  Consistent low-carb diet.

## 2022-11-09 NOTE — PROGRESS NOTE ADULT - ASSESSMENT
Assessment  DMT2: 82y Male with DM T2 with hyperglycemia, was on insulin at home, now on basal bolus insulin, blood sugars trending down, had hypoglycemic episode this afternoon, appears comfortable in NAD, eating, covid+.  Abdominal Abscess: s/p drain, monitored.  Covid: stable, monitored, FU ID.  S/P Fall: workup in progress, stable, monitored.  HTN: Un Controlled,  on antihypertensive medications.          Melba Reyes MD  Cell: [phone]  Office: [phone]               Assessment  DMT2: 82y Male with DM T2 with hyperglycemia, was on insulin at home, now on basal bolus insulin, blood sugars trending down, had hypoglycemic episode this afternoon,  appears comfortable in NAD, eating, covid+.  Abdominal Abscess: s/p drain, monitored.  Covid: stable, monitored, FU ID.  S/P Fall: workup in progress, stable, monitored.  HTN: Un Controlled,  on antihypertensive medications.          Melba Reyes MD  Cell: [phone]  Office: [phone]             Deep Sutures: 4-0 Vicryl

## 2022-11-09 NOTE — PROGRESS NOTE ADULT - PROBLEM SELECTOR PLAN 8
DVT ppx: none given  Diet:  Dispo:  Code: Full DVT ppx: Heparin Subq   Diet: Pureed/mildly thickened liquid per Speech and Swallow  Dispo: [facility]; pending COVID course until 11/13   Code: Full

## 2022-11-09 NOTE — PROGRESS NOTE ADULT - PROBLEM SELECTOR PLAN 7
DVT ppx: none given  Diet:  Dispo: Resolved Resolved    unclear etiology; possible 2/2 poor oral fluid intake  creat now normalized

## 2022-11-09 NOTE — PROGRESS NOTE ADULT - SUBJECTIVE AND OBJECTIVE BOX
PROGRESS NOTE:   Authored by Dr. Lela Pena    Patient is a 82y old  Male who presents with a chief complaint of Fall (03 Nov 2022 13:55)      SUBJECTIVE / OVERNIGHT EVENTS:    ADDITIONAL REVIEW OF SYSTEMS:    MEDICATIONS  (STANDING):  chlorhexidine 2% Cloths 1 Application(s) Topical daily  dextrose 5%. 1000 milliLiter(s) (50 mL/Hr) IV Continuous <Continuous>  dextrose 5%. 1000 milliLiter(s) (100 mL/Hr) IV Continuous <Continuous>  dextrose 50% Injectable 25 Gram(s) IV Push once  dextrose 50% Injectable 12.5 Gram(s) IV Push once  dextrose 50% Injectable 25 Gram(s) IV Push once  ertapenem  IVPB      ertapenem  IVPB 1000 milliGRAM(s) IV Intermittent every 24 hours  finasteride 5 milliGRAM(s) Oral daily  glucagon  Injectable 1 milliGRAM(s) IntraMuscular once  influenza  Vaccine (HIGH DOSE) 0.7 milliLiter(s) IntraMuscular once  insulin glargine Injectable (LANTUS) 25 Unit(s) SubCutaneous at bedtime  insulin lispro (ADMELOG) corrective regimen sliding scale   SubCutaneous Before meals and at bedtime  insulin lispro Injectable (ADMELOG) 10 Unit(s) SubCutaneous three times a day before meals  lidocaine   4% Patch 1 Patch Transdermal every 24 hours  melatonin 3 milliGRAM(s) Oral at bedtime  metoprolol succinate ER 25 milliGRAM(s) Oral daily  pantoprazole    Tablet 40 milliGRAM(s) Oral before breakfast  polyethylene glycol 3350 17 Gram(s) Oral daily  senna 2 Tablet(s) Oral at bedtime  tamsulosin 0.8 milliGRAM(s) Oral at bedtime    MEDICATIONS  (PRN):  acetaminophen     Tablet .. 650 milliGRAM(s) Oral every 6 hours PRN mild pain  albuterol/ipratropium for Nebulization 3 milliLiter(s) Nebulizer every 6 hours PRN Shortness of Breath  dextrose Oral Gel 15 Gram(s) Oral once PRN Blood Glucose LESS THAN 70 milliGRAM(s)/deciliter      CAPILLARY BLOOD GLUCOSE      POCT Blood Glucose.: 99 mg/dL (08 Nov 2022 22:55)  POCT Blood Glucose.: 95 mg/dL (08 Nov 2022 22:49)  POCT Blood Glucose.: 97 mg/dL (08 Nov 2022 21:34)  POCT Blood Glucose.: 108 mg/dL (08 Nov 2022 17:17)  POCT Blood Glucose.: 164 mg/dL (08 Nov 2022 13:30)  POCT Blood Glucose.: 146 mg/dL (08 Nov 2022 08:51)    I&O's Summary    08 Nov 2022 07:01  -  09 Nov 2022 07:00  --------------------------------------------------------  IN: 0 mL / OUT: 985 mL / NET: -985 mL        PHYSICAL EXAM:  Vital Signs Last 24 Hrs  T(C): 36.4 (09 Nov 2022 06:28), Max: 36.4 (08 Nov 2022 14:03)  T(F): 97.6 (09 Nov 2022 06:28), Max: 97.6 (08 Nov 2022 20:56)  HR: 65 (09 Nov 2022 06:28) (65 - 77)  BP: 115/78 (09 Nov 2022 06:28) (115/78 - 134/86)  BP(mean): --  RR: 18 (09 Nov 2022 06:28) (18 - 18)  SpO2: 94% (09 Nov 2022 06:28) (94% - 96%)    Parameters below as of 09 Nov 2022 06:28  Patient On (Oxygen Delivery Method): room air        GENERAL: NAD, lying comfortably in bed  HEAD: Atraumatic, normocephalic  EYES: EOMI b/l, conjunctiva and sclera clear  NECK: Supple, No JVD, No LAD  RESPIRATORY: Normal respiratory effort; lungs are clear to auscultation bilaterally  CARDIOVASCULAR: Regular rate and rhythm, normal S1 and S2, no murmur/rub/gallop; No lower extremity edema  ABDOMEN: Nontender, normoactive bowel sounds, no rebound/guarding; No hepatosplenomegaly  MUSCULOSKELETAL: no clubbing or cyanosis of digits; no joint swelling or tenderness to palpation  NEURO: Non focal   SKIN:   PSYCH: A+O to person, place, and time; affect appropriate    LABS:                          RADIOLOGY:    Consulted note reviewed  Reviewed Imaging personally   PROGRESS NOTE:   Authored by Dr. Lela Pena    Patient is a 82y old  Male who presents with a chief complaint of Fall (03 Nov 2022 13:55)      SUBJECTIVE / OVERNIGHT EVENTS: moved to [address] [place] [address]. Cantonese speaking pt, used  service. C/o cough but denies SOB. + headache and lightheadedness. No abdominal pain. Limited historian.     ADDITIONAL REVIEW OF SYSTEMS:  No fever  + Cough, No SOB  + HA, lightheadedness  No abdominal pain      MEDICATIONS  (STANDING):  chlorhexidine 2% Cloths 1 Application(s) Topical daily  dextrose 5%. 1000 milliLiter(s) (50 mL/Hr) IV Continuous <Continuous>  dextrose 5%. 1000 milliLiter(s) (100 mL/Hr) IV Continuous <Continuous>  dextrose 50% Injectable 25 Gram(s) IV Push once  dextrose 50% Injectable 12.5 Gram(s) IV Push once  dextrose 50% Injectable 25 Gram(s) IV Push once  ertapenem  IVPB      ertapenem  IVPB 1000 milliGRAM(s) IV Intermittent every 24 hours  finasteride 5 milliGRAM(s) Oral daily  glucagon  Injectable 1 milliGRAM(s) IntraMuscular once  influenza  Vaccine (HIGH DOSE) 0.7 milliLiter(s) IntraMuscular once  insulin glargine Injectable (LANTUS) 25 Unit(s) SubCutaneous at bedtime  insulin lispro (ADMELOG) corrective regimen sliding scale   SubCutaneous Before meals and at bedtime  insulin lispro Injectable (ADMELOG) 10 Unit(s) SubCutaneous three times a day before meals  lidocaine   4% Patch 1 Patch Transdermal every 24 hours  melatonin 3 milliGRAM(s) Oral at bedtime  metoprolol succinate ER 25 milliGRAM(s) Oral daily  pantoprazole    Tablet 40 milliGRAM(s) Oral before breakfast  polyethylene glycol 3350 17 Gram(s) Oral daily  senna 2 Tablet(s) Oral at bedtime  tamsulosin 0.8 milliGRAM(s) Oral at bedtime    MEDICATIONS  (PRN):  acetaminophen     Tablet .. 650 milliGRAM(s) Oral every 6 hours PRN mild pain  albuterol/ipratropium for Nebulization 3 milliLiter(s) Nebulizer every 6 hours PRN Shortness of Breath  dextrose Oral Gel 15 Gram(s) Oral once PRN Blood Glucose LESS THAN 70 milliGRAM(s)/deciliter      CAPILLARY BLOOD GLUCOSE      POCT Blood Glucose.: 99 mg/dL (08 Nov 2022 22:55)  POCT Blood Glucose.: 95 mg/dL (08 Nov 2022 22:49)  POCT Blood Glucose.: 97 mg/dL (08 Nov 2022 21:34)  POCT Blood Glucose.: 108 mg/dL (08 Nov 2022 17:17)  POCT Blood Glucose.: 164 mg/dL (08 Nov 2022 13:30)  POCT Blood Glucose.: 146 mg/dL (08 Nov 2022 08:51)    I&O's Summary    08 Nov 2022 07:01  -  09 Nov 2022 07:00  --------------------------------------------------------  IN: 0 mL / OUT: 985 mL / NET: -985 mL        PHYSICAL EXAM:  Vital Signs Last 24 Hrs  T(C): 36.4 (09 Nov 2022 06:28), Max: 36.4 (08 Nov 2022 14:03)  T(F): 97.6 (09 Nov 2022 06:28), Max: 97.6 (08 Nov 2022 20:56)  HR: 65 (09 Nov 2022 06:28) (65 - 77)  BP: 115/78 (09 Nov 2022 06:28) (115/78 - 134/86)  BP(mean): --  RR: 18 (09 Nov 2022 06:28) (18 - 18)  SpO2: 94% (09 Nov 2022 06:28) (94% - 96%)    Parameters below as of 09 Nov 2022 06:28  Patient On (Oxygen Delivery Method): room air        GENERAL: NAD, lying comfortably in bed  HEAD: Atraumatic, normocephalic  EYES: Conjunctiva and sclera clear  NECK: Supple   RESPIRATORY: Normal respiratory effort; lungs are clear to auscultation bilaterally in anterior fields   CARDIOVASCULAR: Regular rate and rhythm, normal S1 and S2, no murmur/rub/gallop; No lower extremity edema  ABDOMEN: Nontender, soft, normoactive bowel sounds, no rebound/guarding   MUSCULOSKELETAL: no gross deformities.   NEURO: Non focal   PSYCH: A+O to hospital, self name, not to time; affect appropriate          LABS:  pending collection           RADIOLOGY:    Consulted note reviewed  Reviewed Imaging personally

## 2022-11-09 NOTE — PROVIDER CONTACT NOTE (OTHER) - REASON
Pt O2 dropping in the 80s
Pt in afib, HR jumping between 130-140s and back down to 90s
abd distended & tender
Insulin not given, NPO order, c/o constipation
pt is covid +11/07
pt ordered for 25 units of lantus, fingerstick 97

## 2022-11-09 NOTE — PROGRESS NOTE ADULT - SUBJECTIVE AND OBJECTIVE BOX
CC: F/U for Abscess    Saw/spoke to patient. No fevers, no chills. No new complaints.    Allergies  No Known Allergies    ANTIMICROBIALS:  ertapenem  IVPB    ertapenem  IVPB 1000 every 24 hours    PE:    Vital Signs Last 24 Hrs  T(C): 36.6 (09 Nov 2022 14:13), Max: 36.6 (09 Nov 2022 14:13)  T(F): 97.9 (09 Nov 2022 14:13), Max: 97.9 (09 Nov 2022 14:13)  HR: 71 (09 Nov 2022 14:13) (65 - 77)  BP: 109/69 (09 Nov 2022 14:13) (109/69 - 134/86)  RR: 18 (09 Nov 2022 14:13) (18 - 18)  SpO2: 90% (09 Nov 2022 14:13) (90% - 96%)    Gen: AOx3, NAD, non-toxic  CV: Nontachycardic  Resp: Breathing comfortably, RA  Abd: Soft, nontender  IV/Skin: No thrombophlebitis    LABS:                        11.2   3.41  )-----------( 200      ( 09 Nov 2022 13:35 )             35.7     11-09    139  |  103  |  16  ----------------------------<  60<L>  3.9   |  31  |  0.94    Ca    9.3      09 Nov 2022 13:35  Phos  2.9     11-09  Mg     1.7     11-09    TPro  5.9<L>  /  Alb  2.9<L>  /  TBili  0.3  /  DBili  x   /  AST  22  /  ALT  16  /  AlkPhos  158<H>  11-09    MICROBIOLOGY:    .Abscess right abdominal collection  11-01-22   Numerous Escherichia coli ESBL  --  Escherichia coli ESBL    .Blood Blood-Venous  10-25-22   No Growth Final  --  --    .Blood Blood  10-25-22   No Growth Final  --  --    Clean Catch Clean Catch (Midstream)  10-19-22   No growth  --  --    (otherwise reviewed)    RADIOLOGY:    10/26 MR:  IMPRESSION:  *  An 8 cm abscess interposed between the liver and upper pole right   kidney.  *  Bilateral renal cyst. No lesion suspicious for neoplasm.

## 2022-11-09 NOTE — PROVIDER CONTACT NOTE (OTHER) - ASSESSMENT
Pt is asymptomatic; VS stable; no s/s of distress
Pt resting comfortably, not complaining of pain or discomfort. All other VSS.
no s/s of distress, pt ate some of dinner
pt AOx3, VSS, and resting in bed. pt abd soft, tender & distended. pt w/ 150cc output as of 8AM, pt c/o pain.
pt has no symptoms

## 2022-11-09 NOTE — PROGRESS NOTE ADULT - PROBLEM SELECTOR PLAN 5
Normotensive now A1c 10/27/22: 10.4    -c/w current insulin regimen; Lantus 25 U and Lispro 10 U premeal  - monitor finger stick

## 2022-11-09 NOTE — PROGRESS NOTE ADULT - PROBLEM SELECTOR PLAN 6
Resolved Normotensive now    c/w metoprolol 25mg qd and tamulosin 0.8mg qHs  ' Normotensive now    c/w metoprolol 25mg qd and tamulosin 0.8mg qHs

## 2022-11-09 NOTE — PROVIDER CONTACT NOTE (OTHER) - DATE AND TIME:
23-Oct-2022 00:42
08-Nov-2021 04:30
08-Nov-2022 21:55
24-Oct-2022 23:10
26-Oct-2022 18:55
21-Oct-2022 10:30

## 2022-11-09 NOTE — PROGRESS NOTE ADULT - SUBJECTIVE AND OBJECTIVE BOX
Chief complaint  Patient is a 82y old  Male who presents with a chief complaint of Mechanical Fall (09 Nov 2022 07:26)   Review of systems    Patient is covid+, resting in bed, had hypoglycemic episode.    Labs and Fingersticks  CAPILLARY BLOOD GLUCOSE      POCT Blood Glucose.: 66 mg/dL (09 Nov 2022 13:21)  POCT Blood Glucose.: 64 mg/dL (09 Nov 2022 13:20)  POCT Blood Glucose.: 117 mg/dL (09 Nov 2022 08:45)  POCT Blood Glucose.: 99 mg/dL (08 Nov 2022 22:55)  POCT Blood Glucose.: 95 mg/dL (08 Nov 2022 22:49)  POCT Blood Glucose.: 97 mg/dL (08 Nov 2022 21:34)  POCT Blood Glucose.: 108 mg/dL (08 Nov 2022 17:17)        Medications  MEDICATIONS  (STANDING):  chlorhexidine 2% Cloths 1 Application(s) Topical daily  dextrose 5%. 1000 milliLiter(s) (100 mL/Hr) IV Continuous <Continuous>  dextrose 5%. 1000 milliLiter(s) (50 mL/Hr) IV Continuous <Continuous>  dextrose 50% Injectable 25 Gram(s) IV Push once  dextrose 50% Injectable 12.5 Gram(s) IV Push once  dextrose 50% Injectable 25 Gram(s) IV Push once  ertapenem  IVPB      ertapenem  IVPB 1000 milliGRAM(s) IV Intermittent every 24 hours  finasteride 5 milliGRAM(s) Oral daily  glucagon  Injectable 1 milliGRAM(s) IntraMuscular once  heparin   Injectable 5000 Unit(s) SubCutaneous every 8 hours  influenza  Vaccine (HIGH DOSE) 0.7 milliLiter(s) IntraMuscular once  insulin glargine Injectable (LANTUS) 22 Unit(s) SubCutaneous at bedtime  insulin lispro (ADMELOG) corrective regimen sliding scale   SubCutaneous Before meals and at bedtime  insulin lispro Injectable (ADMELOG) 8 Unit(s) SubCutaneous three times a day before meals  lidocaine   4% Patch 1 Patch Transdermal every 24 hours  melatonin 3 milliGRAM(s) Oral at bedtime  metoprolol succinate ER 25 milliGRAM(s) Oral daily  pantoprazole    Tablet 40 milliGRAM(s) Oral before breakfast  polyethylene glycol 3350 17 Gram(s) Oral daily  senna 2 Tablet(s) Oral at bedtime  tamsulosin 0.8 milliGRAM(s) Oral at bedtime      Physical Exam  General: Patient comfortable in bed  Vital Signs Last 12 Hrs  T(F): 97.6 (11-09-22 @ 06:28), Max: 97.6 (11-09-22 @ 06:28)  HR: 65 (11-09-22 @ 06:28) (65 - 65)  BP: 115/78 (11-09-22 @ 06:28) (115/78 - 115/78)  BP(mean): --  RR: 18 (11-09-22 @ 06:28) (18 - 18)  SpO2: 94% (11-09-22 @ 06:28) (94% - 94%)      Diagnostics    Free Thyroxine, Serum: AM Sched. Collection: 21-Oct-2022 06:00 (10-20 @ 13:18)  Thyroid Stimulating Hormone, Serum: AM Sched. Collection: 21-Oct-2022 06:00 (10-20 @ 13:18)  A1C with Estimated Average Glucose: Routine (10-20 @ 13:17)           Chief complaint  Patient is a 82y old  Male who presents with a chief complaint of Mechanical Fall (09 Nov 2022 07:26)   Review of systems    Patient is covid+, resting in bed, had hypoglycemic episode.    Labs and Fingersticks  CAPILLARY BLOOD GLUCOSE      POCT Blood Glucose.: 66 mg/dL (09 Nov 2022 13:21)  POCT Blood Glucose.: 64 mg/dL (09 Nov 2022 13:20)  POCT Blood Glucose.: 117 mg/dL (09 Nov 2022 08:45)  POCT Blood Glucose.: 99 mg/dL (08 Nov 2022 22:55)  POCT Blood Glucose.: 95 mg/dL (08 Nov 2022 22:49)  POCT Blood Glucose.: 97 mg/dL (08 Nov 2022 21:34)  POCT Blood Glucose.: 108 mg/dL (08 Nov 2022 17:17)        Medications  MEDICATIONS  (STANDING):  chlorhexidine 2% Cloths 1 Application(s) Topical daily  dextrose 5%. 1000 milliLiter(s) (100 mL/Hr) IV Continuous <Continuous>  dextrose 5%. 1000 milliLiter(s) (50 mL/Hr) IV Continuous <Continuous>  dextrose 50% Injectable 25 Gram(s) IV Push once  dextrose 50% Injectable 12.5 Gram(s) IV Push once  dextrose 50% Injectable 25 Gram(s) IV Push once  ertapenem  IVPB      ertapenem  IVPB 1000 milliGRAM(s) IV Intermittent every 24 hours  finasteride 5 milliGRAM(s) Oral daily  glucagon  Injectable 1 milliGRAM(s) IntraMuscular once  heparin   Injectable 5000 Unit(s) SubCutaneous every 8 hours  influenza  Vaccine (HIGH DOSE) 0.7 milliLiter(s) IntraMuscular once  insulin glargine Injectable (LANTUS) 22 Unit(s) SubCutaneous at bedtime  insulin lispro (ADMELOG) corrective regimen sliding scale   SubCutaneous Before meals and at bedtime  insulin lispro Injectable (ADMELOG) 8 Unit(s) SubCutaneous three times a day before meals  lidocaine   4% Patch 1 Patch Transdermal every 24 hours  melatonin 3 milliGRAM(s) Oral at bedtime  metoprolol succinate ER 25 milliGRAM(s) Oral daily  pantoprazole    Tablet 40 milliGRAM(s) Oral before breakfast  polyethylene glycol 3350 17 Gram(s) Oral daily  senna 2 Tablet(s) Oral at bedtime  tamsulosin 0.8 milliGRAM(s) Oral at bedtime      Physical Exam  General: Patient comfortable in bed  Vital Signs Last 12 Hrs  T(F): 97.6 (11-09-22 @ 06:28), Max: 97.6 (11-09-22 @ 06:28)  HR: 65 (11-09-22 @ 06:28) (65 - 65)  BP: 115/78 (11-09-22 @ 06:28) (115/78 - 115/78)  BP(mean): --  RR: 18 (11-09-22 @ 06:28) (18 - 18)  SpO2: 94% (11-09-22 @ 06:28) (94% - 94%)      Diagnostics    Free Thyroxine, Serum: AM Sched. Collection: 21-Oct-2022 06:00 (10-20 @ 13:18)  Thyroid Stimulating Hormone, Serum: AM Sched. Collection: 21-Oct-2022 06:00 (10-20 @ 13:18)  A1C with Estimated Average Glucose: Routine (10-20 @ 13:17)

## 2022-11-10 LAB
ALBUMIN SERPL ELPH-MCNC: 2.7 G/DL — LOW (ref 3.3–5)
ALP SERPL-CCNC: 162 U/L — HIGH (ref 40–120)
ALT FLD-CCNC: 14 U/L — SIGNIFICANT CHANGE UP (ref 10–45)
ANION GAP SERPL CALC-SCNC: 7 MMOL/L — SIGNIFICANT CHANGE UP (ref 5–17)
AST SERPL-CCNC: 19 U/L — SIGNIFICANT CHANGE UP (ref 10–40)
BASOPHILS # BLD AUTO: 0.01 K/UL — SIGNIFICANT CHANGE UP (ref 0–0.2)
BASOPHILS NFR BLD AUTO: 0.3 % — SIGNIFICANT CHANGE UP (ref 0–2)
BILIRUB SERPL-MCNC: 0.4 MG/DL — SIGNIFICANT CHANGE UP (ref 0.2–1.2)
BUN SERPL-MCNC: 18 MG/DL — SIGNIFICANT CHANGE UP (ref 7–23)
CALCIUM SERPL-MCNC: 8.9 MG/DL — SIGNIFICANT CHANGE UP (ref 8.4–10.5)
CHLORIDE SERPL-SCNC: 101 MMOL/L — SIGNIFICANT CHANGE UP (ref 96–108)
CO2 SERPL-SCNC: 29 MMOL/L — SIGNIFICANT CHANGE UP (ref 22–31)
CREAT SERPL-MCNC: 1 MG/DL — SIGNIFICANT CHANGE UP (ref 0.5–1.3)
EGFR: 74 ML/MIN/1.73M2 — SIGNIFICANT CHANGE UP
EOSINOPHIL # BLD AUTO: 0.4 K/UL — SIGNIFICANT CHANGE UP (ref 0–0.5)
EOSINOPHIL NFR BLD AUTO: 13 % — HIGH (ref 0–6)
GLUCOSE BLDC GLUCOMTR-MCNC: 107 MG/DL — HIGH (ref 70–99)
GLUCOSE BLDC GLUCOMTR-MCNC: 110 MG/DL — HIGH (ref 70–99)
GLUCOSE BLDC GLUCOMTR-MCNC: 121 MG/DL — HIGH (ref 70–99)
GLUCOSE BLDC GLUCOMTR-MCNC: 125 MG/DL — HIGH (ref 70–99)
GLUCOSE BLDC GLUCOMTR-MCNC: 148 MG/DL — HIGH (ref 70–99)
GLUCOSE BLDC GLUCOMTR-MCNC: 186 MG/DL — HIGH (ref 70–99)
GLUCOSE SERPL-MCNC: 120 MG/DL — HIGH (ref 70–99)
HCT VFR BLD CALC: 35.8 % — LOW (ref 39–50)
HGB BLD-MCNC: 11.4 G/DL — LOW (ref 13–17)
IMM GRANULOCYTES NFR BLD AUTO: 0.3 % — SIGNIFICANT CHANGE UP (ref 0–0.9)
LYMPHOCYTES # BLD AUTO: 0.58 K/UL — LOW (ref 1–3.3)
LYMPHOCYTES # BLD AUTO: 18.8 % — SIGNIFICANT CHANGE UP (ref 13–44)
MAGNESIUM SERPL-MCNC: 1.6 MG/DL — SIGNIFICANT CHANGE UP (ref 1.6–2.6)
MCHC RBC-ENTMCNC: 28.9 PG — SIGNIFICANT CHANGE UP (ref 27–34)
MCHC RBC-ENTMCNC: 31.8 GM/DL — LOW (ref 32–36)
MCV RBC AUTO: 90.9 FL — SIGNIFICANT CHANGE UP (ref 80–100)
MONOCYTES # BLD AUTO: 0.35 K/UL — SIGNIFICANT CHANGE UP (ref 0–0.9)
MONOCYTES NFR BLD AUTO: 11.4 % — SIGNIFICANT CHANGE UP (ref 2–14)
NEUTROPHILS # BLD AUTO: 1.73 K/UL — LOW (ref 1.8–7.4)
NEUTROPHILS NFR BLD AUTO: 56.2 % — SIGNIFICANT CHANGE UP (ref 43–77)
NRBC # BLD: 0 /100 WBCS — SIGNIFICANT CHANGE UP (ref 0–0)
PHOSPHATE SERPL-MCNC: 2.8 MG/DL — SIGNIFICANT CHANGE UP (ref 2.5–4.5)
PLATELET # BLD AUTO: 209 K/UL — SIGNIFICANT CHANGE UP (ref 150–400)
POTASSIUM SERPL-MCNC: 4.1 MMOL/L — SIGNIFICANT CHANGE UP (ref 3.5–5.3)
POTASSIUM SERPL-SCNC: 4.1 MMOL/L — SIGNIFICANT CHANGE UP (ref 3.5–5.3)
PROT SERPL-MCNC: 5.9 G/DL — LOW (ref 6–8.3)
RBC # BLD: 3.94 M/UL — LOW (ref 4.2–5.8)
RBC # FLD: 14.8 % — HIGH (ref 10.3–14.5)
SODIUM SERPL-SCNC: 137 MMOL/L — SIGNIFICANT CHANGE UP (ref 135–145)
WBC # BLD: 3.08 K/UL — LOW (ref 3.8–10.5)
WBC # FLD AUTO: 3.08 K/UL — LOW (ref 3.8–10.5)

## 2022-11-10 PROCEDURE — 99232 SBSQ HOSP IP/OBS MODERATE 35: CPT

## 2022-11-10 RX ORDER — INSULIN GLARGINE 100 [IU]/ML
14 INJECTION, SOLUTION SUBCUTANEOUS AT BEDTIME
Refills: 0 | Status: DISCONTINUED | OUTPATIENT
Start: 2022-11-10 | End: 2022-11-14

## 2022-11-10 RX ADMIN — Medication 650 MILLIGRAM(S): at 09:55

## 2022-11-10 RX ADMIN — LIDOCAINE 1 PATCH: 4 CREAM TOPICAL at 17:57

## 2022-11-10 RX ADMIN — LIDOCAINE 1 PATCH: 4 CREAM TOPICAL at 06:52

## 2022-11-10 RX ADMIN — TAMSULOSIN HYDROCHLORIDE 0.8 MILLIGRAM(S): 0.4 CAPSULE ORAL at 23:14

## 2022-11-10 RX ADMIN — HEPARIN SODIUM 5000 UNIT(S): 5000 INJECTION INTRAVENOUS; SUBCUTANEOUS at 13:38

## 2022-11-10 RX ADMIN — Medication 3 MILLIGRAM(S): at 23:15

## 2022-11-10 RX ADMIN — Medication 650 MILLIGRAM(S): at 09:10

## 2022-11-10 RX ADMIN — Medication 1: at 13:36

## 2022-11-10 RX ADMIN — Medication 6 UNIT(S): at 13:36

## 2022-11-10 RX ADMIN — INSULIN GLARGINE 14 UNIT(S): 100 INJECTION, SOLUTION SUBCUTANEOUS at 23:27

## 2022-11-10 RX ADMIN — HEPARIN SODIUM 5000 UNIT(S): 5000 INJECTION INTRAVENOUS; SUBCUTANEOUS at 23:15

## 2022-11-10 RX ADMIN — CHLORHEXIDINE GLUCONATE 1 APPLICATION(S): 213 SOLUTION TOPICAL at 12:25

## 2022-11-10 RX ADMIN — ERTAPENEM SODIUM 120 MILLIGRAM(S): 1 INJECTION, POWDER, LYOPHILIZED, FOR SOLUTION INTRAMUSCULAR; INTRAVENOUS at 10:32

## 2022-11-10 RX ADMIN — POLYETHYLENE GLYCOL 3350 17 GRAM(S): 17 POWDER, FOR SOLUTION ORAL at 12:25

## 2022-11-10 RX ADMIN — FINASTERIDE 5 MILLIGRAM(S): 5 TABLET, FILM COATED ORAL at 12:25

## 2022-11-10 RX ADMIN — HEPARIN SODIUM 5000 UNIT(S): 5000 INJECTION INTRAVENOUS; SUBCUTANEOUS at 06:51

## 2022-11-10 RX ADMIN — Medication 6 UNIT(S): at 08:58

## 2022-11-10 RX ADMIN — PANTOPRAZOLE SODIUM 40 MILLIGRAM(S): 20 TABLET, DELAYED RELEASE ORAL at 07:19

## 2022-11-10 RX ADMIN — Medication 25 MILLIGRAM(S): at 06:51

## 2022-11-10 RX ADMIN — SENNA PLUS 2 TABLET(S): 8.6 TABLET ORAL at 23:15

## 2022-11-10 RX ADMIN — Medication 6 UNIT(S): at 18:01

## 2022-11-10 NOTE — PROGRESS NOTE ADULT - PROBLEM SELECTOR PLAN 4
Noted to be hypoxic with imaging showing findings concerning for PNA; likely from aspiration pneumonia given worsening dysphagia in the setting of L fracture and deconditioning. Now off NC and stable on RA.   Speech and swallow eval and MBS 10/28, recommendation for puree/mildly thickened liquid.   Speech pathologist Mally Villalobos recs for repeat MBS at [facility] after d/c    - c/w Ertapenem 11/1-11/14  - Pulm consulted with recs earlier in his hospital course  - c/w Duoneb  - c/w incentive spirometer

## 2022-11-10 NOTE — PROGRESS NOTE ADULT - SUBJECTIVE AND OBJECTIVE BOX
Interventional Radiology Follow-Up Note    This is a 82 year old Male s/p abdominal abscess drain placement on 11/1/22 in Interventional Radiology with Dr. Putnam.    S: Patient seen and examined @ bedside. No complaints offered.     Medication:     ertapenem  IVPB: (11-10)  heparin   Injectable: (11-10)  metoprolol succinate ER: (11-10)    Vitals:   T(F): 97.3, Max: 98.1 (21:53)  HR: 68  BP: 126/79  RR: 18  SpO2: 95%    Physical Exam:  General: Nontoxic, in NAD.   Abdomen: soft, NTND, no peritoneal signs.  Drain Device: Drain intact attached to bulb w/ SS output. Dressing clean, dry, intact. Drain flushed with 10cc NS w/o difficulty, +fluid return noted in tubing. no pericatheter leakage noted.     LABS:  WBC 3.08 / Hgb 11.4 / Hct 35.8 / Plt 209  Na 137 / K 4.1 / CO2 29 / Cl 101 / BUN 18 / Cr 1.00 / Glucose 120  ALT 14 / AST 19 / Alk Phos 162 / Tbili 0.4  Ptt -- / Pt -- / INR --      24hr Drain output: 20cc    Assessment/Plan:  82M w/ HTN, DM2, CAD, and BPH-TURP, 10/19/22 p/w L1 vertebral fx s/p mechanical fall; c/b ANTON.   IR consulted on 10/27 for drainage of 8 cm abscess interposed between the liver and upper pole R kidney. MR showing diffusion restriction. Concern for hematoma vs abscess.  S/p abdominal abscess drain placement on 11/1/22 in Interventional Radiology with Dr. Putnam.    - Output 20cc/24hr. if output still <15cc/ 24hrs tomorrow, please obtain CT a/p noncon to eval.  - 11/1: s/p 10 fr drain placed into right abdominal collection between liver and kidney. ~100cc purulent fluid aspirated during procedure  - Flush drain with 5cc NS daily forward only; DO NOT aspirate  - Change dressing q3 days or when dressing is saturated.  - Trend vs/labs  - Continue global management per primary team  - If the patient is d/c home with drainage catheter, pt can make an appointment with IR by calling the IR booking office at [phone]; recommend IR follow in 10 days after discharge for tube evaluation.  - Pt will benefit from VNS service to help with drainage catheter care; Pt should continue same drainage catheter care as an outpatient.    Katia Goetz PA-C  Available on teams

## 2022-11-10 NOTE — PROGRESS NOTE ADULT - PROBLEM SELECTOR PLAN 1
Tested positive 11/7, needs to be inpatient until 11/13 before going to [facility]   afebrile, stable on room air    -continue to monitor without Remdesivir per ID

## 2022-11-10 NOTE — PROGRESS NOTE ADULT - ASSESSMENT
Assessment  DMT2: 82y Male with DM T2 with hyperglycemia, was on insulin at home, now on basal bolus insulin, decreased dose yesterday, patient did not get Lantus dose 2/2 blood sugars trending down, no new hypoglycemic episodes, NAD.  Abdominal Abscess: s/p drain, monitored.  Covid: stable, monitored, FU ID.  S/P Fall: workup in progress, stable, monitored.  HTN: Un Controlled,  on antihypertensive medications.          Melba Reyes MD  Cell: [phone]  Office: [phone]               Assessment  DMT2: 82y Male with DM T2 with hyperglycemia, was on insulin at home, now on basal bolus insulin, decreased dose yesterday,  patient did not get Lantus dose 2/2 blood sugars trending down, no new hypoglycemic episodes, NAD.  Abdominal Abscess: s/p drain, monitored.  Covid: stable, monitored, FU ID.  S/P Fall: workup in progress, stable, monitored.  HTN: Un Controlled,  on antihypertensive medications.          Melba Reyes MD  Cell: [phone]  Office: [phone]

## 2022-11-10 NOTE — PROGRESS NOTE ADULT - PROBLEM SELECTOR PLAN 8
DVT ppx: Heparin Subq   Diet: Pureed/mildly thickened liquid per Speech and Swallow  Dispo: [facility]; pending COVID course until 11/13   Code: Full

## 2022-11-10 NOTE — PROGRESS NOTE ADULT - ASSESSMENT
81 male h/o HTN, HLD, DM who presented with back pain s/p a mechanical fall at home, found to have L spine fracture on MRI, no surgical plan per ortho, on TSLO brace and PT, and course c/b dysphagia with aspiration pneumonia and concern for perinephric fluid and abscess s/p drainage by IR on 11/1, with culture showing VRE E. Coli, being treated with 2 week course of Ertapenem (11/1-11/14). Course also c/b iatrogenic COVID infection, stable on RA without Remdesivir treatment.

## 2022-11-10 NOTE — PROGRESS NOTE ADULT - PROBLEM SELECTOR PLAN 3
MRI showing collection between liver and kidney; possible abscess  Also with changes likely consistent with cysts, low suspicion for malignancy. Evaluated by   - 11/1 IR drain placement into right abdominal collection between liver and kidney. ~100cc purulent fluid aspirated during procedure. Culture noted. Ecoli resistant to zosyn, transitioned to ertapenem    - c/w ertapenem 11/1-11/14  - ID consulted; recs appreciated  - IR recs appreciated; decreased ouput noted (10cc/24hrs 11/9, 20cc/24hrs 11/10); hold off on repeat CT A/P for now, will follow the drainage tomorrow.   - Flush drain with 5cc NS daily forward    - Dressing change q3 days or when dressing saturated  - If d/c'd with drain, f/u with IR at [phone] in 10 days for tube eval  - Needs VNS service for drainage catheter care

## 2022-11-10 NOTE — PROGRESS NOTE ADULT - SUBJECTIVE AND OBJECTIVE BOX
Chief complaint  Patient is a 82y old  Male who presents with a chief complaint of Mechanical Fall (09 Nov 2022 07:26)   Review of systems  Patient in bed, looks comfortable, no hypoglycemic episodes.    Labs and Fingersticks  CAPILLARY BLOOD GLUCOSE      POCT Blood Glucose.: 186 mg/dL (10 Nov 2022 13:11)  POCT Blood Glucose.: 125 mg/dL (10 Nov 2022 08:37)  POCT Blood Glucose.: 110 mg/dL (10 Nov 2022 05:34)  POCT Blood Glucose.: 83 mg/dL (09 Nov 2022 21:41)  POCT Blood Glucose.: 193 mg/dL (09 Nov 2022 17:29)      Anion Gap, Serum: 7 (11-10 @ 06:41)  Anion Gap, Serum: 5 (11-09 @ 13:35)      Calcium, Total Serum: 8.9 (11-10 @ 06:41)  Calcium, Total Serum: 9.3 (11-09 @ 13:35)  Albumin, Serum: 2.7 *L* (11-10 @ 06:41)  Albumin, Serum: 2.9 *L* (11-09 @ 13:35)    Alanine Aminotransferase (ALT/SGPT): 14 (11-10 @ 06:41)  Alanine Aminotransferase (ALT/SGPT): 16 (11-09 @ 13:35)  Alkaline Phosphatase, Serum: 162 *H* (11-10 @ 06:41)  Alkaline Phosphatase, Serum: 158 *H* (11-09 @ 13:35)  Aspartate Aminotransferase (AST/SGOT): 19 (11-10 @ 06:41)  Aspartate Aminotransferase (AST/SGOT): 22 (11-09 @ 13:35)        11-10    137  |  101  |  18  ----------------------------<  120<H>  4.1   |  29  |  1.00    Ca    8.9      10 Nov 2022 06:41  Phos  2.8     11-10  Mg     1.6     11-10    TPro  5.9<L>  /  Alb  2.7<L>  /  TBili  0.4  /  DBili  x   /  AST  19  /  ALT  14  /  AlkPhos  162<H>  11-10                        11.4   3.08  )-----------( 209      ( 10 Nov 2022 06:41 )             35.8     Medications  MEDICATIONS  (STANDING):  chlorhexidine 2% Cloths 1 Application(s) Topical daily  dextrose 5%. 1000 milliLiter(s) (100 mL/Hr) IV Continuous <Continuous>  dextrose 5%. 1000 milliLiter(s) (50 mL/Hr) IV Continuous <Continuous>  dextrose 50% Injectable 25 Gram(s) IV Push once  dextrose 50% Injectable 12.5 Gram(s) IV Push once  dextrose 50% Injectable 25 Gram(s) IV Push once  ertapenem  IVPB      ertapenem  IVPB 1000 milliGRAM(s) IV Intermittent every 24 hours  finasteride 5 milliGRAM(s) Oral daily  glucagon  Injectable 1 milliGRAM(s) IntraMuscular once  heparin   Injectable 5000 Unit(s) SubCutaneous every 8 hours  influenza  Vaccine (HIGH DOSE) 0.7 milliLiter(s) IntraMuscular once  insulin glargine Injectable (LANTUS) 14 Unit(s) SubCutaneous at bedtime  insulin lispro (ADMELOG) corrective regimen sliding scale   SubCutaneous Before meals and at bedtime  insulin lispro Injectable (ADMELOG) 6 Unit(s) SubCutaneous three times a day before meals  lidocaine   4% Patch 1 Patch Transdermal every 24 hours  melatonin 3 milliGRAM(s) Oral at bedtime  metoprolol succinate ER 25 milliGRAM(s) Oral daily  pantoprazole    Tablet 40 milliGRAM(s) Oral before breakfast  polyethylene glycol 3350 17 Gram(s) Oral daily  senna 2 Tablet(s) Oral at bedtime  tamsulosin 0.8 milliGRAM(s) Oral at bedtime      Physical Exam  General: Patient comfortable in bed  Vital Signs Last 12 Hrs  T(F): 97.3 (11-10-22 @ 14:11), Max: 97.5 (11-10-22 @ 04:51)  HR: 68 (11-10-22 @ 13:27) (68 - 68)  BP: 126/79 (11-10-22 @ 13:27) (111/82 - 126/79)  BP(mean): --  RR: 18 (11-10-22 @ 13:27) (18 - 18)  SpO2: 95% (11-10-22 @ 13:27) (94% - 95%)  Neck: No palpable thyroid nodules.  CVS: S1S2, No murmurs  Respiratory: No wheezing, no crepitations  GI: Abdomen soft, bowel sounds positive  Musculoskeletal:  edema lower extremities.   Skin: No skin rashes, no ecchymosis    Diagnostics    Free Thyroxine, Serum: AM Sched. Collection: 21-Oct-2022 06:00 (10-20 @ 13:18)  Thyroid Stimulating Hormone, Serum: AM Sched. Collection: 21-Oct-2022 06:00 (10-20 @ 13:18)  A1C with Estimated Average Glucose: Routine (10-20 @ 13:17)           Chief complaint  Patient is a 82y old  Male who presents with a chief complaint of Mechanical Fall (09 Nov 2022 07:26)   Review of systems  Patient in bed, looks comfortable, no hypoglycemic episodes.    Labs and Fingersticks  CAPILLARY BLOOD GLUCOSE      POCT Blood Glucose.: 186 mg/dL (10 Nov 2022 13:11)  POCT Blood Glucose.: 125 mg/dL (10 Nov 2022 08:37)  POCT Blood Glucose.: 110 mg/dL (10 Nov 2022 05:34)  POCT Blood Glucose.: 83 mg/dL (09 Nov 2022 21:41)  POCT Blood Glucose.: 193 mg/dL (09 Nov 2022 17:29)      Anion Gap, Serum: 7 (11-10 @ 06:41)  Anion Gap, Serum: 5 (11-09 @ 13:35)      Calcium, Total Serum: 8.9 (11-10 @ 06:41)  Calcium, Total Serum: 9.3 (11-09 @ 13:35)  Albumin, Serum: 2.7 *L* (11-10 @ 06:41)  Albumin, Serum: 2.9 *L* (11-09 @ 13:35)    Alanine Aminotransferase (ALT/SGPT): 14 (11-10 @ 06:41)  Alanine Aminotransferase (ALT/SGPT): 16 (11-09 @ 13:35)  Alkaline Phosphatase, Serum: 162 *H* (11-10 @ 06:41)  Alkaline Phosphatase, Serum: 158 *H* (11-09 @ 13:35)  Aspartate Aminotransferase (AST/SGOT): 19 (11-10 @ 06:41)  Aspartate Aminotransferase (AST/SGOT): 22 (11-09 @ 13:35)        11-10    137  |  101  |  18  ----------------------------<  120<H>  4.1   |  29  |  1.00    Ca    8.9      10 Nov 2022 06:41  Phos  2.8     11-10  Mg     1.6     11-10    TPro  5.9<L>  /  Alb  2.7<L>  /  TBili  0.4  /  DBili  x   /  AST  19  /  ALT  14  /  AlkPhos  162<H>  11-10                        11.4   3.08  )-----------( 209      ( 10 Nov 2022 06:41 )             35.8     Medications  MEDICATIONS  (STANDING):  chlorhexidine 2% Cloths 1 Application(s) Topical daily  dextrose 5%. 1000 milliLiter(s) (100 mL/Hr) IV Continuous <Continuous>  dextrose 5%. 1000 milliLiter(s) (50 mL/Hr) IV Continuous <Continuous>  dextrose 50% Injectable 25 Gram(s) IV Push once  dextrose 50% Injectable 12.5 Gram(s) IV Push once  dextrose 50% Injectable 25 Gram(s) IV Push once  ertapenem  IVPB      ertapenem  IVPB 1000 milliGRAM(s) IV Intermittent every 24 hours  finasteride 5 milliGRAM(s) Oral daily  glucagon  Injectable 1 milliGRAM(s) IntraMuscular once  heparin   Injectable 5000 Unit(s) SubCutaneous every 8 hours  influenza  Vaccine (HIGH DOSE) 0.7 milliLiter(s) IntraMuscular once  insulin glargine Injectable (LANTUS) 14 Unit(s) SubCutaneous at bedtime  insulin lispro (ADMELOG) corrective regimen sliding scale   SubCutaneous Before meals and at bedtime  insulin lispro Injectable (ADMELOG) 6 Unit(s) SubCutaneous three times a day before meals  lidocaine   4% Patch 1 Patch Transdermal every 24 hours  melatonin 3 milliGRAM(s) Oral at bedtime  metoprolol succinate ER 25 milliGRAM(s) Oral daily  pantoprazole    Tablet 40 milliGRAM(s) Oral before breakfast  polyethylene glycol 3350 17 Gram(s) Oral daily  senna 2 Tablet(s) Oral at bedtime  tamsulosin 0.8 milliGRAM(s) Oral at bedtime      Physical Exam  General: Patient comfortable in bed  Vital Signs Last 12 Hrs  T(F): 97.3 (11-10-22 @ 14:11), Max: 97.5 (11-10-22 @ 04:51)  HR: 68 (11-10-22 @ 13:27) (68 - 68)  BP: 126/79 (11-10-22 @ 13:27) (111/82 - 126/79)  BP(mean): --  RR: 18 (11-10-22 @ 13:27) (18 - 18)  SpO2: 95% (11-10-22 @ 13:27) (94% - 95%)  Neck: No palpable thyroid nodules.  CVS: S1S2, No murmurs  Respiratory: No wheezing, no crepitations  GI: Abdomen soft, bowel sounds positive  Musculoskeletal:  edema lower extremities.   Skin: No skin rashes, no ecchymosis    Diagnostics    Free Thyroxine, Serum: AM Sched. Collection: 21-Oct-2022 06:00 (10-20 @ 13:18)  Thyroid Stimulating Hormone, Serum: AM Sched. Collection: 21-Oct-2022 06:00 (10-20 @ 13:18)  A1C with Estimated Average Glucose: Routine (10-20 @ 13:17)

## 2022-11-10 NOTE — PROGRESS NOTE ADULT - PROBLEM SELECTOR PLAN 5
A1c 10/27/22: 10.4    -c/w current insulin regimen; Lantus 25 U and Lispro 10 U premeal  - monitor finger stick

## 2022-11-10 NOTE — PROGRESS NOTE ADULT - SUBJECTIVE AND OBJECTIVE BOX
PROGRESS NOTE:   Authored by Dr. Lela Pena    Patient is a 82y old  Male who presents with a chief complaint of Mechanical Fall (09 Nov 2022 07:26)      SUBJECTIVE / OVERNIGHT EVENTS: No overnight events. History obtained with [facility]  service. Patient continuing to endorse cough and dizziness. Denies SOB, abd pain    ADDITIONAL REVIEW OF SYSTEMS:  limited historian   + dizziness  No CP, SOB  No abd pain     MEDICATIONS  (STANDING):  chlorhexidine 2% Cloths 1 Application(s) Topical daily  dextrose 5%. 1000 milliLiter(s) (100 mL/Hr) IV Continuous <Continuous>  dextrose 5%. 1000 milliLiter(s) (50 mL/Hr) IV Continuous <Continuous>  dextrose 50% Injectable 25 Gram(s) IV Push once  dextrose 50% Injectable 12.5 Gram(s) IV Push once  dextrose 50% Injectable 25 Gram(s) IV Push once  ertapenem  IVPB      ertapenem  IVPB 1000 milliGRAM(s) IV Intermittent every 24 hours  finasteride 5 milliGRAM(s) Oral daily  glucagon  Injectable 1 milliGRAM(s) IntraMuscular once  heparin   Injectable 5000 Unit(s) SubCutaneous every 8 hours  influenza  Vaccine (HIGH DOSE) 0.7 milliLiter(s) IntraMuscular once  insulin glargine Injectable (LANTUS) 14 Unit(s) SubCutaneous at bedtime  insulin lispro (ADMELOG) corrective regimen sliding scale   SubCutaneous Before meals and at bedtime  insulin lispro Injectable (ADMELOG) 6 Unit(s) SubCutaneous three times a day before meals  lidocaine   4% Patch 1 Patch Transdermal every 24 hours  melatonin 3 milliGRAM(s) Oral at bedtime  metoprolol succinate ER 25 milliGRAM(s) Oral daily  pantoprazole    Tablet 40 milliGRAM(s) Oral before breakfast  polyethylene glycol 3350 17 Gram(s) Oral daily  senna 2 Tablet(s) Oral at bedtime  tamsulosin 0.8 milliGRAM(s) Oral at bedtime    MEDICATIONS  (PRN):  acetaminophen     Tablet .. 650 milliGRAM(s) Oral every 6 hours PRN mild pain  albuterol/ipratropium for Nebulization 3 milliLiter(s) Nebulizer every 6 hours PRN Shortness of Breath  dextrose Oral Gel 15 Gram(s) Oral once PRN Blood Glucose LESS THAN 70 milliGRAM(s)/deciliter      CAPILLARY BLOOD GLUCOSE      POCT Blood Glucose.: 186 mg/dL (10 Nov 2022 13:11)  POCT Blood Glucose.: 125 mg/dL (10 Nov 2022 08:37)  POCT Blood Glucose.: 110 mg/dL (10 Nov 2022 05:34)  POCT Blood Glucose.: 83 mg/dL (09 Nov 2022 21:41)  POCT Blood Glucose.: 193 mg/dL (09 Nov 2022 17:29)    I&O's Summary    09 Nov 2022 07:01  -  10 Nov 2022 07:00  --------------------------------------------------------  IN: 0 mL / OUT: 655 mL / NET: -655 mL    10 Nov 2022 07:01  -  10 Nov 2022 15:39  --------------------------------------------------------  IN: 530 mL / OUT: 300 mL / NET: 230 mL        PHYSICAL EXAM:  Vital Signs Last 24 Hrs  T(C): 36.3 (10 Nov 2022 14:11), Max: 36.7 (09 Nov 2022 21:53)  T(F): 97.3 (10 Nov 2022 14:11), Max: 98.1 (09 Nov 2022 21:53)  HR: 68 (10 Nov 2022 13:27) (68 - 68)  BP: 126/79 (10 Nov 2022 13:27) (111/82 - 128/78)  BP(mean): --  RR: 18 (10 Nov 2022 13:27) (18 - 18)  SpO2: 95% (10 Nov 2022 13:27) (93% - 95%)    Parameters below as of 10 Nov 2022 13:27  Patient On (Oxygen Delivery Method): room air        GENERAL: NAD, lying comfortably in bed  HEAD: Atraumatic, normocephalic  EYES: EOMI b/l, conjunctiva and sclera clear  NECK: Supple   RESPIRATORY: Normal respiratory effort; lungs are clear to auscultation bilaterally on anterior fields   CARDIOVASCULAR: Regular rate and rhythm, normal S1 and S2, no murmur/rub/gallop; No lower extremity edema  ABDOMEN: Nontender, normoactive bowel sounds, no rebound/guarding; perinephric drain in place draining serosanguinous fluid (on R)   MUSCULOSKELETAL: no gross deformities. moving UE spontaneously in bed   NEURO: Non focal   PSYCH: alert, limited orientation; irritated affect         LABS:                          11.4   3.08  )-----------( 209      ( 10 Nov 2022 06:41 )             35.8     11-10    137  |  101  |  18  ----------------------------<  120<H>  4.1   |  29  |  1.00    Ca    8.9      10 Nov 2022 06:41  Phos  2.8     11-10  Mg     1.6     11-10    TPro  5.9<L>  /  Alb  2.7<L>  /  TBili  0.4  /  DBili  x   /  AST  19  /  ALT  14  /  AlkPhos  162<H>  11-10                  RADIOLOGY:    Consulted note reviewed  Reviewed Imaging personally

## 2022-11-10 NOTE — PROGRESS NOTE ADULT - PROBLEM SELECTOR PLAN 1
Will decrease Lantus to 14u at bedtime.  Will continue Admelog 6u before each meal and continue Admelog correction scale coverage. Will continue monitoring FS and FU.  Consistent low-carb diet.

## 2022-11-11 LAB
ALBUMIN SERPL ELPH-MCNC: 2.7 G/DL — LOW (ref 3.3–5)
ALP SERPL-CCNC: 154 U/L — HIGH (ref 40–120)
ALT FLD-CCNC: 16 U/L — SIGNIFICANT CHANGE UP (ref 10–45)
ANION GAP SERPL CALC-SCNC: 7 MMOL/L — SIGNIFICANT CHANGE UP (ref 5–17)
AST SERPL-CCNC: 21 U/L — SIGNIFICANT CHANGE UP (ref 10–40)
BASOPHILS # BLD AUTO: 0.01 K/UL — SIGNIFICANT CHANGE UP (ref 0–0.2)
BASOPHILS NFR BLD AUTO: 0.3 % — SIGNIFICANT CHANGE UP (ref 0–2)
BILIRUB SERPL-MCNC: 0.4 MG/DL — SIGNIFICANT CHANGE UP (ref 0.2–1.2)
BUN SERPL-MCNC: 18 MG/DL — SIGNIFICANT CHANGE UP (ref 7–23)
CALCIUM SERPL-MCNC: 9 MG/DL — SIGNIFICANT CHANGE UP (ref 8.4–10.5)
CHLORIDE SERPL-SCNC: 103 MMOL/L — SIGNIFICANT CHANGE UP (ref 96–108)
CO2 SERPL-SCNC: 29 MMOL/L — SIGNIFICANT CHANGE UP (ref 22–31)
CREAT SERPL-MCNC: 0.98 MG/DL — SIGNIFICANT CHANGE UP (ref 0.5–1.3)
EGFR: 76 ML/MIN/1.73M2 — SIGNIFICANT CHANGE UP
EOSINOPHIL # BLD AUTO: 0.35 K/UL — SIGNIFICANT CHANGE UP (ref 0–0.5)
EOSINOPHIL NFR BLD AUTO: 11.1 % — HIGH (ref 0–6)
GLUCOSE BLDC GLUCOMTR-MCNC: 133 MG/DL — HIGH (ref 70–99)
GLUCOSE BLDC GLUCOMTR-MCNC: 134 MG/DL — HIGH (ref 70–99)
GLUCOSE BLDC GLUCOMTR-MCNC: 154 MG/DL — HIGH (ref 70–99)
GLUCOSE BLDC GLUCOMTR-MCNC: 158 MG/DL — HIGH (ref 70–99)
GLUCOSE BLDC GLUCOMTR-MCNC: 92 MG/DL — SIGNIFICANT CHANGE UP (ref 70–99)
GLUCOSE SERPL-MCNC: 138 MG/DL — HIGH (ref 70–99)
HCT VFR BLD CALC: 34.1 % — LOW (ref 39–50)
HGB BLD-MCNC: 10.8 G/DL — LOW (ref 13–17)
IMM GRANULOCYTES NFR BLD AUTO: 0.6 % — SIGNIFICANT CHANGE UP (ref 0–0.9)
LYMPHOCYTES # BLD AUTO: 0.71 K/UL — LOW (ref 1–3.3)
LYMPHOCYTES # BLD AUTO: 22.5 % — SIGNIFICANT CHANGE UP (ref 13–44)
MAGNESIUM SERPL-MCNC: 1.6 MG/DL — SIGNIFICANT CHANGE UP (ref 1.6–2.6)
MCHC RBC-ENTMCNC: 28.6 PG — SIGNIFICANT CHANGE UP (ref 27–34)
MCHC RBC-ENTMCNC: 31.7 GM/DL — LOW (ref 32–36)
MCV RBC AUTO: 90.5 FL — SIGNIFICANT CHANGE UP (ref 80–100)
MONOCYTES # BLD AUTO: 0.28 K/UL — SIGNIFICANT CHANGE UP (ref 0–0.9)
MONOCYTES NFR BLD AUTO: 8.9 % — SIGNIFICANT CHANGE UP (ref 2–14)
NEUTROPHILS # BLD AUTO: 1.78 K/UL — LOW (ref 1.8–7.4)
NEUTROPHILS NFR BLD AUTO: 56.6 % — SIGNIFICANT CHANGE UP (ref 43–77)
NRBC # BLD: 0 /100 WBCS — SIGNIFICANT CHANGE UP (ref 0–0)
PHOSPHATE SERPL-MCNC: 2.6 MG/DL — SIGNIFICANT CHANGE UP (ref 2.5–4.5)
PLATELET # BLD AUTO: 196 K/UL — SIGNIFICANT CHANGE UP (ref 150–400)
POTASSIUM SERPL-MCNC: 4 MMOL/L — SIGNIFICANT CHANGE UP (ref 3.5–5.3)
POTASSIUM SERPL-SCNC: 4 MMOL/L — SIGNIFICANT CHANGE UP (ref 3.5–5.3)
PROT SERPL-MCNC: 5.9 G/DL — LOW (ref 6–8.3)
RBC # BLD: 3.77 M/UL — LOW (ref 4.2–5.8)
RBC # FLD: 14.7 % — HIGH (ref 10.3–14.5)
SODIUM SERPL-SCNC: 139 MMOL/L — SIGNIFICANT CHANGE UP (ref 135–145)
WBC # BLD: 3.15 K/UL — LOW (ref 3.8–10.5)
WBC # FLD AUTO: 3.15 K/UL — LOW (ref 3.8–10.5)

## 2022-11-11 PROCEDURE — 99232 SBSQ HOSP IP/OBS MODERATE 35: CPT

## 2022-11-11 RX ORDER — ERTAPENEM SODIUM 1 G/1
1000 INJECTION, POWDER, LYOPHILIZED, FOR SOLUTION INTRAMUSCULAR; INTRAVENOUS EVERY 24 HOURS
Refills: 0 | Status: DISCONTINUED | OUTPATIENT
Start: 2022-11-12 | End: 2022-11-16

## 2022-11-11 RX ADMIN — HEPARIN SODIUM 5000 UNIT(S): 5000 INJECTION INTRAVENOUS; SUBCUTANEOUS at 22:45

## 2022-11-11 RX ADMIN — CHLORHEXIDINE GLUCONATE 1 APPLICATION(S): 213 SOLUTION TOPICAL at 14:14

## 2022-11-11 RX ADMIN — HEPARIN SODIUM 5000 UNIT(S): 5000 INJECTION INTRAVENOUS; SUBCUTANEOUS at 14:16

## 2022-11-11 RX ADMIN — Medication 6 UNIT(S): at 18:24

## 2022-11-11 RX ADMIN — HEPARIN SODIUM 5000 UNIT(S): 5000 INJECTION INTRAVENOUS; SUBCUTANEOUS at 06:34

## 2022-11-11 RX ADMIN — Medication 0: at 22:44

## 2022-11-11 RX ADMIN — LIDOCAINE 1 PATCH: 4 CREAM TOPICAL at 06:32

## 2022-11-11 RX ADMIN — Medication 1: at 08:45

## 2022-11-11 RX ADMIN — SENNA PLUS 2 TABLET(S): 8.6 TABLET ORAL at 22:45

## 2022-11-11 RX ADMIN — TAMSULOSIN HYDROCHLORIDE 0.8 MILLIGRAM(S): 0.4 CAPSULE ORAL at 22:45

## 2022-11-11 RX ADMIN — Medication 1: at 18:23

## 2022-11-11 RX ADMIN — LIDOCAINE 1 PATCH: 4 CREAM TOPICAL at 17:30

## 2022-11-11 RX ADMIN — PANTOPRAZOLE SODIUM 40 MILLIGRAM(S): 20 TABLET, DELAYED RELEASE ORAL at 06:34

## 2022-11-11 RX ADMIN — ERTAPENEM SODIUM 120 MILLIGRAM(S): 1 INJECTION, POWDER, LYOPHILIZED, FOR SOLUTION INTRAMUSCULAR; INTRAVENOUS at 10:20

## 2022-11-11 RX ADMIN — Medication 6 UNIT(S): at 08:45

## 2022-11-11 RX ADMIN — Medication 3 MILLIGRAM(S): at 22:45

## 2022-11-11 RX ADMIN — Medication 6 UNIT(S): at 14:13

## 2022-11-11 RX ADMIN — INSULIN GLARGINE 14 UNIT(S): 100 INJECTION, SOLUTION SUBCUTANEOUS at 22:44

## 2022-11-11 RX ADMIN — FINASTERIDE 5 MILLIGRAM(S): 5 TABLET, FILM COATED ORAL at 14:13

## 2022-11-11 RX ADMIN — Medication 25 MILLIGRAM(S): at 06:34

## 2022-11-11 RX ADMIN — LIDOCAINE 1 PATCH: 4 CREAM TOPICAL at 18:26

## 2022-11-11 RX ADMIN — POLYETHYLENE GLYCOL 3350 17 GRAM(S): 17 POWDER, FOR SOLUTION ORAL at 14:14

## 2022-11-11 NOTE — PROGRESS NOTE ADULT - PROBLEM SELECTOR PLAN 4
Noted to be hypoxic with imaging showing findings concerning for PNA; likely from aspiration pneumonia given worsening dysphagia in the setting of L fracture and deconditioning. Now off NC and stable on RA.   Speech and swallow eval and MBS 10/28, recommendation for puree/mildly thickened liquid.   Speech pathologist Mally Villalobos recs for repeat MBS at [facility] after d/c    - c/w Ertapenem 11/1-11/14  - Pulm consulted with recs earlier in his hospital course  - c/w Duoneb  - c/w incentive spirometer

## 2022-11-11 NOTE — PROGRESS NOTE ADULT - SUBJECTIVE AND OBJECTIVE BOX
KAITLIN CALDERON  82y Male  MRN:[id number]    Patient is a 82y old  Male who presents with a chief complaint of fall    HPI:   81M cantonese speaking PMHx of HTN/HLD, DM2, BPH, (?Afib per chart review, though not on AC's), presents to the ED with mid lower back pain that he sustained after falling in the bathroom while he was urinating at 9pm last night. Pt denies headstrike/LOC. Difficult to obtain full history even with  though pt answering questions appropriately and is AAOx2. Pt denies any pain anywhere else. Per EMS pt has had chronic cough. Unclear if pt was down long. pt last took insulin 2 days ago he states.   	Spoke with stepdaughter Yulissa who lives below pt: Pt couldn't get up from the fall yesterday, has difficulty ambulating with walker since the fall as he complaints of back pain, pt has 9hrs home help daily but not overnight. She states that pt is at baseline mental status and that he's had short term memory problems for some time. Denies any recent fevers/chills, abd pain, v/d, chest pain/sob. States that the cough pt has is chronic.  (19 Oct 2022 15:53)      Patient seen and evaluated at bedside. No acute events overnight except as noted.    Interval HPI: no acute events o/n     PAST MEDICAL & SURGICAL HISTORY:  DM (diabetes mellitus)      HTN (hypertension)      Hypercholesterolemia      CAD (coronary artery disease)      HTN (hypertension)      DM (diabetes mellitus)      S/P TURP      S/P gastrectomy          REVIEW OF SYSTEMS:  as per hpi     VITALS:   Vital Signs Last 24 Hrs  T(C): 36.4 (11 Nov 2022 06:16), Max: 36.8 (10 Nov 2022 20:29)  T(F): 97.6 (11 Nov 2022 06:16), Max: 98.2 (10 Nov 2022 20:29)  HR: 62 (11 Nov 2022 10:43) (62 - 73)  BP: 106/73 (11 Nov 2022 10:43) (106/73 - 126/79)  BP(mean): --  RR: 18 (11 Nov 2022 06:16) (18 - 18)  SpO2: 96% (11 Nov 2022 10:43) (95% - 98%)    Parameters below as of 11 Nov 2022 10:43  Patient On (Oxygen Delivery Method): room air              PHYSICAL EXAM:  GENERAL: NAD, well-developed,    HEAD:  Atraumatic, Normocephalic  EYES: EOMI, PERRLA, conjunctiva and sclera clear  NECK: Supple, No JVD  CHEST/LUNG: clear b/l  HEART: S1, S2; No murmurs, rubs, or gallops  ABDOMEN: Soft, Nontender, Nondistended; Bowel sounds present.   +drain  EXTREMITIES:  2+ Peripheral Pulses, No clubbing, cyanosis, or edema  PSYCH: Normal affect  NEUROLOGY: AAOX1-2; non-focal  SKIN: No rashes or lesions    Consultant(s) Notes Reviewed:  [x ] YES  [ ] NO  Care Discussed with Consultants/Other Providers [ x] YES  [ ] NO    MEDS:   MEDICATIONS  (STANDING):  chlorhexidine 2% Cloths 1 Application(s) Topical daily  dextrose 5%. 1000 milliLiter(s) (50 mL/Hr) IV Continuous <Continuous>  dextrose 5%. 1000 milliLiter(s) (100 mL/Hr) IV Continuous <Continuous>  dextrose 50% Injectable 25 Gram(s) IV Push once  dextrose 50% Injectable 12.5 Gram(s) IV Push once  dextrose 50% Injectable 25 Gram(s) IV Push once  finasteride 5 milliGRAM(s) Oral daily  glucagon  Injectable 1 milliGRAM(s) IntraMuscular once  heparin   Injectable 5000 Unit(s) SubCutaneous every 8 hours  influenza  Vaccine (HIGH DOSE) 0.7 milliLiter(s) IntraMuscular once  insulin glargine Injectable (LANTUS) 14 Unit(s) SubCutaneous at bedtime  insulin lispro (ADMELOG) corrective regimen sliding scale   SubCutaneous Before meals and at bedtime  insulin lispro Injectable (ADMELOG) 6 Unit(s) SubCutaneous three times a day before meals  lidocaine   4% Patch 1 Patch Transdermal every 24 hours  melatonin 3 milliGRAM(s) Oral at bedtime  metoprolol succinate ER 25 milliGRAM(s) Oral daily  pantoprazole    Tablet 40 milliGRAM(s) Oral before breakfast  polyethylene glycol 3350 17 Gram(s) Oral daily  senna 2 Tablet(s) Oral at bedtime  tamsulosin 0.8 milliGRAM(s) Oral at bedtime    MEDICATIONS  (PRN):  acetaminophen     Tablet .. 650 milliGRAM(s) Oral every 6 hours PRN mild pain  albuterol/ipratropium for Nebulization 3 milliLiter(s) Nebulizer every 6 hours PRN Shortness of Breath  dextrose Oral Gel 15 Gram(s) Oral once PRN Blood Glucose LESS THAN 70 milliGRAM(s)/deciliter      ALLERGIES:  No Known Allergies      LABS:                                        10.8   3.15  )-----------( 196      ( 11 Nov 2022 07:18 )             34.1   11-11    139  |  103  |  18  ----------------------------<  138<H>  4.0   |  29  |  0.98    Ca    9.0      11 Nov 2022 07:16  Phos  2.6     11-11  Mg     1.6     11-11    TPro  5.9<L>  /  Alb  2.7<L>  /  TBili  0.4  /  DBili  x   /  AST  21  /  ALT  16  /  AlkPhos  154<H>  11-11      < from: MR Abdomen w/wo IV Cont (10.26.22 @ 17:39) >    IMPRESSION:  *  An 8 cm abscess interposed between the liver and upper pole right   kidney.  *  Bilateral renal cyst. No lesion suspicious for neoplasm.      --- End of Report ---      < end of copied text >         cultures: Culture Results:   No growth (10-19 @ 08:59)       < from: MR Lumbar Spine No Cont (10.20.22 @ 17:28) >    IMPRESSION:  Evidence of acute fracture L1 with fracture line traversing   the vertebral body from anterior to posterior and findings consistent   with a 2 column injury. Paravertebral cuff of soft tissue consistent with   recent injury. Some mild extension of edema into the pedicles though the   transverse and spinous processes as well as the lamina are spared. No   other lesions are identified. Recommend interval follow-up to determine   benign versus pathologic fracture.    --- End of Report ---      < end of copied text >      < from: Xray Chest 1 View- PORTABLE-Urgent (Xray Chest 1 View- PORTABLE-Urgent .) (10.23.22 @ 15:36) >  IMPRESSION:  Bibasilar patchy opacities, compatible with subsegmental atelectasis.   Infectious etiology cannot be ruled out.    --- End of Report ---      < end of copied text >      < from: US Kidney and Bladder (10.24.22 @ 19:31) >  IMPRESSION:  Complex cystic collection superior to the right kidney, correlating to   heterogeneous collection exophytic from the right hepatic lobe.   Differential includes abscess versus hematoma.    Bilateral complex renal cysts.    < end of copied text >  < from: CT Chest No Cont (10.19.22 @ 09:59) >  IMPRESSION:  Limited noncontrast examination.    CHEST:  *  Secretions/debris within the bilateral mainstem bronchi with   multifocal sites of mucous plugging in distal airways.  *  Bilateral tree-in-bud nodularity is likely infectious/inflammatory.   Groundglass opacity within the left upper lobe, which may also be   infectious/inflammatory. Pulmonary nodule measuring 6 m in the right   upper lobe. Suggest 3 month follow-up chest CT to assess for stability or   resolution.  *  A few prominent nonspecific mediastinal lymph nodes.  *  Ascending thoracic aortic aneurysm measuring up to 5 cm.    ABDOMEN AND PELVIS:  *  Low density structure measuring 8.3 cm alongthe inferior margin of   the right hepatic lobe with surrounding inflammatory changes.   Differential includes an intrahepatic/perihepatic abscess, or possibly   hematoma in the setting of trauma. Neoplasm is thought to be less likely   but is not excluded. Contrast enhanced abdominal MRI may be helpful for   further characterization.  *  Mild compression fracture at L1, likely acute. Please refer to the   concurrent dedicated CT lumbar spine report.    Preliminary findings were discussed by Dr. Parks with Dr. Flores   on 10/19/2022 11:10 AM with read back confirmation. Final findings were   discussed with Dr. Pierce by Dr. Mcqueen on 10/19/2022 at 12:57 PM.    --- End of Report ---    ***Please see the addendum at the top of this report. It may contain   additional important information or changes.****      < end of copied text >

## 2022-11-11 NOTE — PROGRESS NOTE ADULT - SUBJECTIVE AND OBJECTIVE BOX
PROGRESS NOTE:   Authored by Dr. Lela Pena    Patient is a 82y old  Male who presents with a chief complaint of Mechanical fall (10 Nov 2022 15:39)      SUBJECTIVE / OVERNIGHT EVENTS: No events overnight. Patient stable. Limited historian. Attempted to obtain history with Cantonese . States he continues to have cough. No other active complaints.     ADDITIONAL REVIEW OF SYSTEMS:  Limited historian given mental status  + cough  No active complaints.     MEDICATIONS  (STANDING):  chlorhexidine 2% Cloths 1 Application(s) Topical daily  dextrose 5%. 1000 milliLiter(s) (50 mL/Hr) IV Continuous <Continuous>  dextrose 5%. 1000 milliLiter(s) (100 mL/Hr) IV Continuous <Continuous>  dextrose 50% Injectable 25 Gram(s) IV Push once  dextrose 50% Injectable 12.5 Gram(s) IV Push once  dextrose 50% Injectable 25 Gram(s) IV Push once  finasteride 5 milliGRAM(s) Oral daily  glucagon  Injectable 1 milliGRAM(s) IntraMuscular once  heparin   Injectable 5000 Unit(s) SubCutaneous every 8 hours  influenza  Vaccine (HIGH DOSE) 0.7 milliLiter(s) IntraMuscular once  insulin glargine Injectable (LANTUS) 14 Unit(s) SubCutaneous at bedtime  insulin lispro (ADMELOG) corrective regimen sliding scale   SubCutaneous Before meals and at bedtime  insulin lispro Injectable (ADMELOG) 6 Unit(s) SubCutaneous three times a day before meals  lidocaine   4% Patch 1 Patch Transdermal every 24 hours  melatonin 3 milliGRAM(s) Oral at bedtime  metoprolol succinate ER 25 milliGRAM(s) Oral daily  pantoprazole    Tablet 40 milliGRAM(s) Oral before breakfast  polyethylene glycol 3350 17 Gram(s) Oral daily  senna 2 Tablet(s) Oral at bedtime  tamsulosin 0.8 milliGRAM(s) Oral at bedtime    MEDICATIONS  (PRN):  acetaminophen     Tablet .. 650 milliGRAM(s) Oral every 6 hours PRN mild pain  albuterol/ipratropium for Nebulization 3 milliLiter(s) Nebulizer every 6 hours PRN Shortness of Breath  dextrose Oral Gel 15 Gram(s) Oral once PRN Blood Glucose LESS THAN 70 milliGRAM(s)/deciliter      CAPILLARY BLOOD GLUCOSE      POCT Blood Glucose.: 133 mg/dL (11 Nov 2022 13:51)  POCT Blood Glucose.: 154 mg/dL (11 Nov 2022 08:37)  POCT Blood Glucose.: 134 mg/dL (11 Nov 2022 06:08)  POCT Blood Glucose.: 107 mg/dL (10 Nov 2022 23:25)  POCT Blood Glucose.: 121 mg/dL (10 Nov 2022 21:43)  POCT Blood Glucose.: 148 mg/dL (10 Nov 2022 17:11)    I&O's Summary    10 Nov 2022 07:01  -  11 Nov 2022 07:00  --------------------------------------------------------  IN: 1070 mL / OUT: 750 mL / NET: 320 mL    11 Nov 2022 07:01  -  11 Nov 2022 14:21  --------------------------------------------------------  IN: 50 mL / OUT: 0 mL / NET: 50 mL        PHYSICAL EXAM:  Vital Signs Last 24 Hrs  T(C): 36.5 (11 Nov 2022 13:56), Max: 36.8 (10 Nov 2022 20:29)  T(F): 97.7 (11 Nov 2022 13:56), Max: 98.2 (10 Nov 2022 20:29)  HR: 70 (11 Nov 2022 13:56) (62 - 73)  BP: 122/84 (11 Nov 2022 13:56) (106/73 - 122/84)  BP(mean): --  RR: 18 (11 Nov 2022 13:56) (18 - 18)  SpO2: 100% (11 Nov 2022 13:56) (96% - 100%)    Parameters below as of 11 Nov 2022 13:56  Patient On (Oxygen Delivery Method): room air        GENERAL: NAD, lying comfortably in bed  HEAD: Atraumatic, normocephalic  EYES: Conjunctiva and sclera clear  NECK: Supple  RESPIRATORY: Normal respiratory effort; lungs are clear to auscultation bilaterally in anterior fields  CARDIOVASCULAR: Regular rate and rhythm, normal S1 and S2, no murmur/rub/gallop; No lower extremity edema  ABDOMEN: Nontender, normoactive bowel sounds, no rebound/guarding   MUSCULOSKELETAL: no gross deformities   NEURO: Non focal   PSYCH: Alert, orientation could not be assessed; affect appropriate        LABS:                          10.8   3.15  )-----------( 196      ( 11 Nov 2022 07:18 )             34.1     11-11    139  |  103  |  18  ----------------------------<  138<H>  4.0   |  29  |  0.98    Ca    9.0      11 Nov 2022 07:16  Phos  2.6     11-11  Mg     1.6     11-11    TPro  5.9<L>  /  Alb  2.7<L>  /  TBili  0.4  /  DBili  x   /  AST  21  /  ALT  16  /  AlkPhos  154<H>  11-11                  RADIOLOGY:    Consulted note reviewed  Reviewed Imaging personally

## 2022-11-11 NOTE — PROGRESS NOTE ADULT - PROBLEM SELECTOR PLAN 1
Will continue current insulin regimen for now. Will continue monitoring FS, log, and FU.  Patient was on insulin at home, will be DC on current insulin doses.  Patient counseled for compliance with consistent low carb diet.

## 2022-11-11 NOTE — PROGRESS NOTE ADULT - SUBJECTIVE AND OBJECTIVE BOX
CC: F/U abscess    Saw/spoke to patient. No fevers, no chills. No new complaints.    Allergies  No Known Allergies    ANTIMICROBIALS:      PE:    Vital Signs Last 24 Hrs  T(C): 36.5 (11 Nov 2022 13:56), Max: 36.8 (10 Nov 2022 20:29)  T(F): 97.7 (11 Nov 2022 13:56), Max: 98.2 (10 Nov 2022 20:29)  HR: 70 (11 Nov 2022 13:56) (62 - 73)  BP: 122/84 (11 Nov 2022 13:56) (106/73 - 122/84)  RR: 18 (11 Nov 2022 13:56) (18 - 18)  SpO2: 100% (11 Nov 2022 13:56) (96% - 100%)    Gen: AOx3, NAD, non-toxic  CV: Nontachycardic  Resp: Breathing comfortably, RA  Abd: Soft, nontender  IV/Skin: No thrombophlebitis    LABS:                        10.8   3.15  )-----------( 196      ( 11 Nov 2022 07:18 )             34.1     11-11    139  |  103  |  18  ----------------------------<  138<H>  4.0   |  29  |  0.98    Ca    9.0      11 Nov 2022 07:16  Phos  2.6     11-11  Mg     1.6     11-11    TPro  5.9<L>  /  Alb  2.7<L>  /  TBili  0.4  /  DBili  x   /  AST  21  /  ALT  16  /  AlkPhos  154<H>  11-11    MICROBIOLOGY:    .Abscess right abdominal collection  11-01-22   Numerous Escherichia coli ESBL  --  Escherichia coli ESBL    .Blood Blood-Venous  10-25-22   No Growth Final  --  --    .Blood Blood  10-25-22   No Growth Final  --  --    Clean Catch Clean Catch (Midstream)  10-19-22   No growth  --  --    (otherwise reviewed)    RADIOLOGY:    10/26 MR:    IMPRESSION:  *  An 8 cm abscess interposed between the liver and upper pole right   kidney.  *  Bilateral renal cyst. No lesion suspicious for neoplasm.

## 2022-11-11 NOTE — PROGRESS NOTE ADULT - SKIN
----- Message from Margoth Kerns MD sent at 11/10/2022  5:48 PM CST -----  Please let patient know that his stress marker for his heart remained stable. warm and dry/color normal detailed exam

## 2022-11-11 NOTE — PROGRESS NOTE ADULT - PROBLEM SELECTOR PLAN 1
Tested positive 11/7, needs to be inpatient until 11/13 before going to [facility]   afebrile, stable on room air    -continue to monitor without Remdesivir per ID

## 2022-11-11 NOTE — PROGRESS NOTE ADULT - ASSESSMENT
Assessment  DMT2: 82y Male with DM T2 with hyperglycemia, A1C 10.4%, was on insulin at home, now on basal bolus insulin, decreased dose yesterday, blood sugars improving and trending within acceptable range, no new hypoglycemic episodes, eating, no acute events.  Abdominal Abscess: s/p drain, monitored.  Covid: stable, monitored, FU ID.  S/P Fall: workup in progress, stable, monitored.  HTN: Un Controlled,  on antihypertensive medications.          Melba Reyes MD  Cell: [phone]  Office: [phone]               Assessment  DMT2: 82y Male with DM T2 with hyperglycemia, A1C 10.4%, was on insulin at home, now on basal bolus insulin, decreased dose yesterday, blood sugars improving and trending within acceptable range,  no new hypoglycemic episodes, eating, no acute events.  Abdominal Abscess: s/p drain, monitored.  Covid: stable, monitored, FU ID.  S/P Fall: workup in progress, stable, monitored.  HTN: Un Controlled,  on antihypertensive medications.          Melba Reyes MD  Cell: [phone]  Office: [phone]

## 2022-11-11 NOTE — PROGRESS NOTE ADULT - ASSESSMENT
80 yo M with HTN/HLD, DM2, BPH, initially with low back pain  Leukocytosis, no fever  Initially with fall  Current complaints of constipation, possible urinary retention  CT chest with evidence aspiration; CT A/P with concern for abscess vs hematoma along liver margin  MR suspicious for abscess  Culture with E coli ESBL  Overall,  1) Abnormal finding on imaging/Abscess  - Collection adjacent to liver--hematoma vs abscess; culture ESBL E coli  - Ertapenem 1g q 24, continue for 14 day course from drain placement, when DC planning, okay to place midline/picc as long as no new signs sepsis in the interim  - S/p IR drain--appreciate procedure  2) Leukocytosis  - Trending down  - F/U BCXs  - Trend WBC to normal  3) COVID  - COVID+, RA, no symptoms  - Monitor  - If develops symptoms, plan to start RemD  - Hold on Dexa unless progressive O2 requirements  - Supportive care    Gal Arroyo MD  Contact on TEAMS messaging from 9am - 5pm  From 5pm-9am, on weekends, or if no response call [phone]

## 2022-11-11 NOTE — PROGRESS NOTE ADULT - PROBLEM SELECTOR PLAN 3
MRI showing collection between liver and kidney; possible abscess  Also with changes likely consistent with cysts, low suspicion for malignancy. Evaluated by   - 11/1 IR drain placement into right abdominal collection between liver and kidney. ~100cc purulent fluid aspirated during procedure. Culture noted. Ecoli resistant to zosyn, transitioned to ertapenem    - c/w ertapenem 11/1-11/15  - ID consulted; recs appreciated  - IR recs appreciated; decreased ouput noted (10cc/24hrs 11/9, 20cc/24hrs 11/10, 30 cc 11/11); hold off on repeat CT A/P for now, will follow the drainage.  - Flush drain with 5cc NS daily forward    - Dressing change q3 days or when dressing saturated  - If d/c'd with drain, f/u with IR at [phone] in 10 days for tube eval  - Needs VNS service for drainage catheter care

## 2022-11-12 LAB
ALBUMIN SERPL ELPH-MCNC: 2.8 G/DL — LOW (ref 3.3–5)
ALP SERPL-CCNC: 152 U/L — HIGH (ref 40–120)
ALT FLD-CCNC: 17 U/L — SIGNIFICANT CHANGE UP (ref 10–45)
ANION GAP SERPL CALC-SCNC: 9 MMOL/L — SIGNIFICANT CHANGE UP (ref 5–17)
AST SERPL-CCNC: 27 U/L — SIGNIFICANT CHANGE UP (ref 10–40)
BILIRUB SERPL-MCNC: 0.5 MG/DL — SIGNIFICANT CHANGE UP (ref 0.2–1.2)
BUN SERPL-MCNC: 16 MG/DL — SIGNIFICANT CHANGE UP (ref 7–23)
CALCIUM SERPL-MCNC: 9.1 MG/DL — SIGNIFICANT CHANGE UP (ref 8.4–10.5)
CHLORIDE SERPL-SCNC: 104 MMOL/L — SIGNIFICANT CHANGE UP (ref 96–108)
CO2 SERPL-SCNC: 27 MMOL/L — SIGNIFICANT CHANGE UP (ref 22–31)
CREAT SERPL-MCNC: 0.95 MG/DL — SIGNIFICANT CHANGE UP (ref 0.5–1.3)
EGFR: 79 ML/MIN/1.73M2 — SIGNIFICANT CHANGE UP
GLUCOSE BLDC GLUCOMTR-MCNC: 116 MG/DL — HIGH (ref 70–99)
GLUCOSE BLDC GLUCOMTR-MCNC: 123 MG/DL — HIGH (ref 70–99)
GLUCOSE BLDC GLUCOMTR-MCNC: 137 MG/DL — HIGH (ref 70–99)
GLUCOSE BLDC GLUCOMTR-MCNC: 194 MG/DL — HIGH (ref 70–99)
GLUCOSE SERPL-MCNC: 103 MG/DL — HIGH (ref 70–99)
HCT VFR BLD CALC: 33.7 % — LOW (ref 39–50)
HGB BLD-MCNC: 11 G/DL — LOW (ref 13–17)
MAGNESIUM SERPL-MCNC: 1.6 MG/DL — SIGNIFICANT CHANGE UP (ref 1.6–2.6)
MCHC RBC-ENTMCNC: 29.2 PG — SIGNIFICANT CHANGE UP (ref 27–34)
MCHC RBC-ENTMCNC: 32.6 GM/DL — SIGNIFICANT CHANGE UP (ref 32–36)
MCV RBC AUTO: 89.4 FL — SIGNIFICANT CHANGE UP (ref 80–100)
NRBC # BLD: 0 /100 WBCS — SIGNIFICANT CHANGE UP (ref 0–0)
PHOSPHATE SERPL-MCNC: 2.6 MG/DL — SIGNIFICANT CHANGE UP (ref 2.5–4.5)
PLATELET # BLD AUTO: 197 K/UL — SIGNIFICANT CHANGE UP (ref 150–400)
POTASSIUM SERPL-MCNC: 4 MMOL/L — SIGNIFICANT CHANGE UP (ref 3.5–5.3)
POTASSIUM SERPL-SCNC: 4 MMOL/L — SIGNIFICANT CHANGE UP (ref 3.5–5.3)
PROT SERPL-MCNC: 5.8 G/DL — LOW (ref 6–8.3)
RBC # BLD: 3.77 M/UL — LOW (ref 4.2–5.8)
RBC # FLD: 14.7 % — HIGH (ref 10.3–14.5)
SODIUM SERPL-SCNC: 140 MMOL/L — SIGNIFICANT CHANGE UP (ref 135–145)
WBC # BLD: 3.56 K/UL — LOW (ref 3.8–10.5)
WBC # FLD AUTO: 3.56 K/UL — LOW (ref 3.8–10.5)

## 2022-11-12 RX ADMIN — LIDOCAINE 1 PATCH: 4 CREAM TOPICAL at 17:44

## 2022-11-12 RX ADMIN — FINASTERIDE 5 MILLIGRAM(S): 5 TABLET, FILM COATED ORAL at 11:06

## 2022-11-12 RX ADMIN — HEPARIN SODIUM 5000 UNIT(S): 5000 INJECTION INTRAVENOUS; SUBCUTANEOUS at 22:05

## 2022-11-12 RX ADMIN — ERTAPENEM SODIUM 120 MILLIGRAM(S): 1 INJECTION, POWDER, LYOPHILIZED, FOR SOLUTION INTRAMUSCULAR; INTRAVENOUS at 09:18

## 2022-11-12 RX ADMIN — Medication 3 MILLIGRAM(S): at 22:06

## 2022-11-12 RX ADMIN — Medication 0: at 22:03

## 2022-11-12 RX ADMIN — INSULIN GLARGINE 14 UNIT(S): 100 INJECTION, SOLUTION SUBCUTANEOUS at 22:06

## 2022-11-12 RX ADMIN — TAMSULOSIN HYDROCHLORIDE 0.8 MILLIGRAM(S): 0.4 CAPSULE ORAL at 22:06

## 2022-11-12 RX ADMIN — POLYETHYLENE GLYCOL 3350 17 GRAM(S): 17 POWDER, FOR SOLUTION ORAL at 11:06

## 2022-11-12 RX ADMIN — Medication 6 UNIT(S): at 17:21

## 2022-11-12 RX ADMIN — Medication 6 UNIT(S): at 13:40

## 2022-11-12 RX ADMIN — Medication 1: at 09:14

## 2022-11-12 RX ADMIN — CHLORHEXIDINE GLUCONATE 1 APPLICATION(S): 213 SOLUTION TOPICAL at 11:06

## 2022-11-12 RX ADMIN — Medication 25 MILLIGRAM(S): at 05:54

## 2022-11-12 RX ADMIN — Medication 6 UNIT(S): at 09:16

## 2022-11-12 RX ADMIN — PANTOPRAZOLE SODIUM 40 MILLIGRAM(S): 20 TABLET, DELAYED RELEASE ORAL at 05:54

## 2022-11-12 RX ADMIN — HEPARIN SODIUM 5000 UNIT(S): 5000 INJECTION INTRAVENOUS; SUBCUTANEOUS at 05:55

## 2022-11-12 RX ADMIN — HEPARIN SODIUM 5000 UNIT(S): 5000 INJECTION INTRAVENOUS; SUBCUTANEOUS at 13:23

## 2022-11-12 RX ADMIN — LIDOCAINE 1 PATCH: 4 CREAM TOPICAL at 17:02

## 2022-11-12 NOTE — PROGRESS NOTE ADULT - PROBLEM SELECTOR PLAN 3
MRI showing collection between liver and kidney; possible abscess  Also with changes likely consistent with cysts, low suspicion for malignancy. Evaluated by   - 11/1 IR drain placement into right abdominal collection between liver and kidney. ~100cc purulent fluid aspirated during procedure. Culture noted. Ecoli resistant to zosyn, transitioned to ertapenem    - c/w ertapenem 11/1-11/15  - ID consulted; recs appreciated  - IR recs appreciated; decreased ouput noted (10cc/24hrs 11/9, 20cc/24hrs 11/10, 30 cc 11/11); hold off on repeat CT A/P for now, will follow the drainage.  - Flush drain with 5cc NS daily forward    - Dressing change q3 days or when dressing saturated  - If d/c'd with drain, f/u with IR at [phone] in 10 days for tube eval  - Needs VNS service for drainage catheter care MRI showing collection between liver and kidney; possible abscess  Also with changes likely consistent with cysts, low suspicion for malignancy. Evaluated by   - 11/1 IR drain placement into right abdominal collection between liver and kidney. ~100cc purulent fluid aspirated during procedure. Culture noted. Ecoli resistant to zosyn, transitioned to ertapenem    - c/w ertapenem 11/1-11/15  - ID consulted; recs appreciated  - IR recs appreciated; decreased ouput noted (10cc/24hrs 11/9, 20cc/24hrs 11/10, 30 cc 11/11); hold off on repeat CT A/P for now, will follow the drainage.  - Flush drain with 5cc NS daily forward    - Dressing change q3 days or when dressing saturated  - If d/c'd with drain, f/u with IR at [phone] in 10 days for tube eval  - Needs VNS service for drainage catheter care  - output is still 20cc for the day

## 2022-11-12 NOTE — PROGRESS NOTE ADULT - SUBJECTIVE AND OBJECTIVE BOX
John Evans, PGY2  Pager [phone] [facility] or [phone] [facility]    Patient is a 82y old  Male who presents with a chief complaint of Mechanical fall (10 Nov 2022 15:39)      SUBJECTIVE/INTERVAL EVENTS: Patient seen and examined at bedside.  Complaining of headache and the thickened water. Otherwise no cough, sob, cp.     MEDICATIONS  (STANDING):  chlorhexidine 2% Cloths 1 Application(s) Topical daily  dextrose 5%. 1000 milliLiter(s) (50 mL/Hr) IV Continuous <Continuous>  dextrose 5%. 1000 milliLiter(s) (100 mL/Hr) IV Continuous <Continuous>  dextrose 50% Injectable 25 Gram(s) IV Push once  dextrose 50% Injectable 12.5 Gram(s) IV Push once  dextrose 50% Injectable 25 Gram(s) IV Push once  ertapenem  IVPB 1000 milliGRAM(s) IV Intermittent every 24 hours  finasteride 5 milliGRAM(s) Oral daily  glucagon  Injectable 1 milliGRAM(s) IntraMuscular once  heparin   Injectable 5000 Unit(s) SubCutaneous every 8 hours  influenza  Vaccine (HIGH DOSE) 0.7 milliLiter(s) IntraMuscular once  insulin glargine Injectable (LANTUS) 14 Unit(s) SubCutaneous at bedtime  insulin lispro (ADMELOG) corrective regimen sliding scale   SubCutaneous Before meals and at bedtime  insulin lispro Injectable (ADMELOG) 6 Unit(s) SubCutaneous three times a day before meals  lidocaine   4% Patch 1 Patch Transdermal every 24 hours  melatonin 3 milliGRAM(s) Oral at bedtime  metoprolol succinate ER 25 milliGRAM(s) Oral daily  pantoprazole    Tablet 40 milliGRAM(s) Oral before breakfast  polyethylene glycol 3350 17 Gram(s) Oral daily  senna 2 Tablet(s) Oral at bedtime  tamsulosin 0.8 milliGRAM(s) Oral at bedtime    MEDICATIONS  (PRN):  acetaminophen     Tablet .. 650 milliGRAM(s) Oral every 6 hours PRN mild pain  albuterol/ipratropium for Nebulization 3 milliLiter(s) Nebulizer every 6 hours PRN Shortness of Breath  dextrose Oral Gel 15 Gram(s) Oral once PRN Blood Glucose LESS THAN 70 milliGRAM(s)/deciliter      VITAL SIGNS:  T(F): 97.7 (11-12-22 @ 06:17), Max: 98.5 (11-11-22 @ 22:27)  HR: 72 (11-12-22 @ 06:17) (62 - 72)  BP: 113/79 (11-12-22 @ 06:17) (106/73 - 129/78)  RR: 18 (11-12-22 @ 06:17) (18 - 18)  SpO2: 94% (11-12-22 @ 06:17) (94% - 100%)    I&O's Summary    11 Nov 2022 07:01  -  12 Nov 2022 07:00  --------------------------------------------------------  IN: 390 mL / OUT: 420 mL / NET: -30 mL      Daily     Daily     PHYSICAL EXAM:  GENERAL: NAD, lying comfortably in bed  HEAD: Atraumatic, normocephalic  EYES: Conjunctiva and sclera clear  NECK: Supple  RESPIRATORY: Normal respiratory effort; lungs are clear to auscultation bilaterally in anterior fields  CARDIOVASCULAR: Regular rate and rhythm, normal S1 and S2, no murmur/rub/gallop; No lower extremity edema  ABDOMEN: Nontender, normoactive bowel sounds, no rebound/guarding   MUSCULOSKELETAL: no gross deformities   NEURO: Non focal   PSYCH: Alert, orientation could not be assessed; affect appropriate      LABS:                        11.0   3.56  )-----------( 197      ( 12 Nov 2022 07:17 )             33.7     Hgb Trend: 11.0<--, 10.8<--, 11.4<--, 11.2<--  11-12    140  |  104  |  16  ----------------------------<  103<H>  4.0   |  27  |  0.95    Ca    9.1      12 Nov 2022 07:15  Phos  2.6     11-12  Mg     1.6     11-12    TPro  5.8<L>  /  Alb  2.8<L>  /  TBili  0.5  /  DBili  x   /  AST  27  /  ALT  17  /  AlkPhos  152<H>  11-12    Creatinine Trend: 0.95<--, 0.98<--, 1.00<--, 0.94<--, 0.98<--, 1.03<--  LIVER FUNCTIONS - ( 12 Nov 2022 07:15 )  Alb: 2.8 g/dL / Pro: 5.8 g/dL / ALK PHOS: 152 U/L / ALT: 17 U/L / AST: 27 U/L / GGT: x                     CAPILLARY BLOOD GLUCOSE      POCT Blood Glucose.: 194 mg/dL (12 Nov 2022 09:10)  POCT Blood Glucose.: 92 mg/dL (11 Nov 2022 22:30)  POCT Blood Glucose.: 158 mg/dL (11 Nov 2022 18:15)  POCT Blood Glucose.: 133 mg/dL (11 Nov 2022 13:51)      RADIOLOGY & ADDITIONAL TESTS: Reviewed    Imaging Personally Reviewed:    Consultant(s) Notes Reviewed:      Care Discussed with Consultants/Other Providers:

## 2022-11-12 NOTE — PROGRESS NOTE ADULT - ASSESSMENT
Assessment  DMT2: 82y Male with DM T2 with hyperglycemia, A1C 10.4%, was on insulin at home, now on basal bolus insulin, decreased dose yesterday, blood sugars improving, no new hypoglycemic episodes, eating, no acute events.  Abdominal Abscess: s/p drain, monitored.  Covid: stable, monitored, FU ID.  S/P Fall: workup in progress, stable, monitored.  HTN: Un Controlled,  on antihypertensive medications.          Melba Reyes MD  Cell: [phone]  Office: [phone]

## 2022-11-12 NOTE — PROGRESS NOTE ADULT - PROBLEM SELECTOR PLAN 1
Tested positive 11/7, needs to be inpatient until 11/13 before going to [facility]   afebrile, stable on room air    -continue to monitor without Remdesivir per ID

## 2022-11-12 NOTE — PROGRESS NOTE ADULT - SUBJECTIVE AND OBJECTIVE BOX
Chief complaint    Patient is a 82y old  Male who presents with a chief complaint of Mechanical fall (10 Nov 2022 15:39)   Review of systems  Patient in bed, appears comfortable.    Labs and Fingersticks  CAPILLARY BLOOD GLUCOSE      POCT Blood Glucose.: 116 mg/dL (12 Nov 2022 13:33)  POCT Blood Glucose.: 194 mg/dL (12 Nov 2022 09:10)  POCT Blood Glucose.: 92 mg/dL (11 Nov 2022 22:30)  POCT Blood Glucose.: 158 mg/dL (11 Nov 2022 18:15)      Anion Gap, Serum: 9 (11-12 @ 07:15)  Anion Gap, Serum: 7 (11-11 @ 07:16)      Calcium, Total Serum: 9.1 (11-12 @ 07:15)  Calcium, Total Serum: 9.0 (11-11 @ 07:16)  Albumin, Serum: 2.8 *L* (11-12 @ 07:15)  Albumin, Serum: 2.7 *L* (11-11 @ 07:16)    Alanine Aminotransferase (ALT/SGPT): 17 (11-12 @ 07:15)  Alanine Aminotransferase (ALT/SGPT): 16 (11-11 @ 07:16)  Alkaline Phosphatase, Serum: 152 *H* (11-12 @ 07:15)  Alkaline Phosphatase, Serum: 154 *H* (11-11 @ 07:16)  Aspartate Aminotransferase (AST/SGOT): 27 (11-12 @ 07:15)  Aspartate Aminotransferase (AST/SGOT): 21 (11-11 @ 07:16)        11-12    140  |  104  |  16  ----------------------------<  103<H>  4.0   |  27  |  0.95    Ca    9.1      12 Nov 2022 07:15  Phos  2.6     11-12  Mg     1.6     11-12    TPro  5.8<L>  /  Alb  2.8<L>  /  TBili  0.5  /  DBili  x   /  AST  27  /  ALT  17  /  AlkPhos  152<H>  11-12                        11.0   3.56  )-----------( 197      ( 12 Nov 2022 07:17 )             33.7     Medications  MEDICATIONS  (STANDING):  chlorhexidine 2% Cloths 1 Application(s) Topical daily  dextrose 5%. 1000 milliLiter(s) (50 mL/Hr) IV Continuous <Continuous>  dextrose 5%. 1000 milliLiter(s) (100 mL/Hr) IV Continuous <Continuous>  dextrose 50% Injectable 25 Gram(s) IV Push once  dextrose 50% Injectable 12.5 Gram(s) IV Push once  dextrose 50% Injectable 25 Gram(s) IV Push once  ertapenem  IVPB 1000 milliGRAM(s) IV Intermittent every 24 hours  finasteride 5 milliGRAM(s) Oral daily  glucagon  Injectable 1 milliGRAM(s) IntraMuscular once  heparin   Injectable 5000 Unit(s) SubCutaneous every 8 hours  influenza  Vaccine (HIGH DOSE) 0.7 milliLiter(s) IntraMuscular once  insulin glargine Injectable (LANTUS) 14 Unit(s) SubCutaneous at bedtime  insulin lispro (ADMELOG) corrective regimen sliding scale   SubCutaneous Before meals and at bedtime  insulin lispro Injectable (ADMELOG) 6 Unit(s) SubCutaneous three times a day before meals  lidocaine   4% Patch 1 Patch Transdermal every 24 hours  melatonin 3 milliGRAM(s) Oral at bedtime  metoprolol succinate ER 25 milliGRAM(s) Oral daily  pantoprazole    Tablet 40 milliGRAM(s) Oral before breakfast  polyethylene glycol 3350 17 Gram(s) Oral daily  senna 2 Tablet(s) Oral at bedtime  tamsulosin 0.8 milliGRAM(s) Oral at bedtime      Physical Exam  General: Patient appears comfortable.  Vital Signs Last 12 Hrs  T(F): 97.7 (11-12-22 @ 06:17), Max: 97.7 (11-12-22 @ 06:17)  HR: 67 (11-12-22 @ 13:00) (67 - 72)  BP: 119/79 (11-12-22 @ 13:00) (113/79 - 119/79)  BP(mean): --  RR: 18 (11-12-22 @ 13:00) (18 - 18)  SpO2: 96% (11-12-22 @ 13:00) (94% - 96%)  Neck: No palpable thyroid nodules.  CVS: S1S2, No murmurs  Respiratory: No wheezing, no crepitations  GI: Abdomen soft, non tender.  Musculoskeletal:  edema lower extremities.     Diagnostics

## 2022-11-12 NOTE — PROGRESS NOTE ADULT - PROBLEM SELECTOR PLAN 4
Noted to be hypoxic with imaging showing findings concerning for PNA; likely from aspiration pneumonia given worsening dysphagia in the setting of L fracture and deconditioning. Now off NC and stable on RA.   Speech and swallow eval and MBS 10/28, recommendation for puree/mildly thickened liquid.   Speech pathologist Mally Villalobos recs for repeat MBS at [facility] after d/c    - c/w Ertapenem 11/1-11/14  - Pulm consulted with recs earlier in his hospital course  - c/w Duoneb  - c/w incentive spirometer

## 2022-11-12 NOTE — PROGRESS NOTE ADULT - PROBLEM SELECTOR PLAN 8
DVT ppx: Heparin Subq   Diet: Pureed/mildly thickened liquid per Speech and Swallow  Dispo: [facility]; pending COVID course until 11/13 and Abx until 11/15  Code: Full

## 2022-11-13 LAB
ALBUMIN SERPL ELPH-MCNC: 2.7 G/DL — LOW (ref 3.3–5)
ALP SERPL-CCNC: 152 U/L — HIGH (ref 40–120)
ALT FLD-CCNC: 22 U/L — SIGNIFICANT CHANGE UP (ref 10–45)
ANION GAP SERPL CALC-SCNC: 9 MMOL/L — SIGNIFICANT CHANGE UP (ref 5–17)
AST SERPL-CCNC: 28 U/L — SIGNIFICANT CHANGE UP (ref 10–40)
BASOPHILS # BLD AUTO: 0.02 K/UL — SIGNIFICANT CHANGE UP (ref 0–0.2)
BASOPHILS NFR BLD AUTO: 0.5 % — SIGNIFICANT CHANGE UP (ref 0–2)
BILIRUB SERPL-MCNC: 0.5 MG/DL — SIGNIFICANT CHANGE UP (ref 0.2–1.2)
BUN SERPL-MCNC: 18 MG/DL — SIGNIFICANT CHANGE UP (ref 7–23)
CALCIUM SERPL-MCNC: 9 MG/DL — SIGNIFICANT CHANGE UP (ref 8.4–10.5)
CHLORIDE SERPL-SCNC: 104 MMOL/L — SIGNIFICANT CHANGE UP (ref 96–108)
CO2 SERPL-SCNC: 26 MMOL/L — SIGNIFICANT CHANGE UP (ref 22–31)
CREAT SERPL-MCNC: 1.02 MG/DL — SIGNIFICANT CHANGE UP (ref 0.5–1.3)
EGFR: 72 ML/MIN/1.73M2 — SIGNIFICANT CHANGE UP
EOSINOPHIL # BLD AUTO: 0.32 K/UL — SIGNIFICANT CHANGE UP (ref 0–0.5)
EOSINOPHIL NFR BLD AUTO: 8.1 % — HIGH (ref 0–6)
GLUCOSE BLDC GLUCOMTR-MCNC: 112 MG/DL — HIGH (ref 70–99)
GLUCOSE BLDC GLUCOMTR-MCNC: 117 MG/DL — HIGH (ref 70–99)
GLUCOSE BLDC GLUCOMTR-MCNC: 137 MG/DL — HIGH (ref 70–99)
GLUCOSE BLDC GLUCOMTR-MCNC: 151 MG/DL — HIGH (ref 70–99)
GLUCOSE SERPL-MCNC: 98 MG/DL — SIGNIFICANT CHANGE UP (ref 70–99)
HCT VFR BLD CALC: 34.5 % — LOW (ref 39–50)
HGB BLD-MCNC: 11 G/DL — LOW (ref 13–17)
IMM GRANULOCYTES NFR BLD AUTO: 0.5 % — SIGNIFICANT CHANGE UP (ref 0–0.9)
LYMPHOCYTES # BLD AUTO: 0.78 K/UL — LOW (ref 1–3.3)
LYMPHOCYTES # BLD AUTO: 19.8 % — SIGNIFICANT CHANGE UP (ref 13–44)
MAGNESIUM SERPL-MCNC: 1.5 MG/DL — LOW (ref 1.6–2.6)
MCHC RBC-ENTMCNC: 28.7 PG — SIGNIFICANT CHANGE UP (ref 27–34)
MCHC RBC-ENTMCNC: 31.9 GM/DL — LOW (ref 32–36)
MCV RBC AUTO: 90.1 FL — SIGNIFICANT CHANGE UP (ref 80–100)
MONOCYTES # BLD AUTO: 0.37 K/UL — SIGNIFICANT CHANGE UP (ref 0–0.9)
MONOCYTES NFR BLD AUTO: 9.4 % — SIGNIFICANT CHANGE UP (ref 2–14)
NEUTROPHILS # BLD AUTO: 2.42 K/UL — SIGNIFICANT CHANGE UP (ref 1.8–7.4)
NEUTROPHILS NFR BLD AUTO: 61.7 % — SIGNIFICANT CHANGE UP (ref 43–77)
NRBC # BLD: 0 /100 WBCS — SIGNIFICANT CHANGE UP (ref 0–0)
PHOSPHATE SERPL-MCNC: 2.4 MG/DL — LOW (ref 2.5–4.5)
PLATELET # BLD AUTO: 186 K/UL — SIGNIFICANT CHANGE UP (ref 150–400)
POTASSIUM SERPL-MCNC: 4.1 MMOL/L — SIGNIFICANT CHANGE UP (ref 3.5–5.3)
POTASSIUM SERPL-SCNC: 4.1 MMOL/L — SIGNIFICANT CHANGE UP (ref 3.5–5.3)
PROT SERPL-MCNC: 5.8 G/DL — LOW (ref 6–8.3)
RBC # BLD: 3.83 M/UL — LOW (ref 4.2–5.8)
RBC # FLD: 14.9 % — HIGH (ref 10.3–14.5)
SODIUM SERPL-SCNC: 139 MMOL/L — SIGNIFICANT CHANGE UP (ref 135–145)
WBC # BLD: 3.93 K/UL — SIGNIFICANT CHANGE UP (ref 3.8–10.5)
WBC # FLD AUTO: 3.93 K/UL — SIGNIFICANT CHANGE UP (ref 3.8–10.5)

## 2022-11-13 RX ORDER — MECLIZINE HCL 12.5 MG
12.5 TABLET ORAL ONCE
Refills: 0 | Status: COMPLETED | OUTPATIENT
Start: 2022-11-13 | End: 2022-11-13

## 2022-11-13 RX ORDER — SODIUM,POTASSIUM PHOSPHATES 278-250MG
2 POWDER IN PACKET (EA) ORAL ONCE
Refills: 0 | Status: COMPLETED | OUTPATIENT
Start: 2022-11-13 | End: 2022-11-13

## 2022-11-13 RX ORDER — MAGNESIUM SULFATE 500 MG/ML
1 VIAL (ML) INJECTION ONCE
Refills: 0 | Status: COMPLETED | OUTPATIENT
Start: 2022-11-13 | End: 2022-11-13

## 2022-11-13 RX ADMIN — Medication 6 UNIT(S): at 13:18

## 2022-11-13 RX ADMIN — TAMSULOSIN HYDROCHLORIDE 0.8 MILLIGRAM(S): 0.4 CAPSULE ORAL at 22:40

## 2022-11-13 RX ADMIN — CHLORHEXIDINE GLUCONATE 1 APPLICATION(S): 213 SOLUTION TOPICAL at 11:36

## 2022-11-13 RX ADMIN — LIDOCAINE 1 PATCH: 4 CREAM TOPICAL at 18:36

## 2022-11-13 RX ADMIN — PANTOPRAZOLE SODIUM 40 MILLIGRAM(S): 20 TABLET, DELAYED RELEASE ORAL at 05:01

## 2022-11-13 RX ADMIN — Medication 1: at 13:18

## 2022-11-13 RX ADMIN — Medication 2 PACKET(S): at 09:13

## 2022-11-13 RX ADMIN — Medication 12.5 MILLIGRAM(S): at 17:22

## 2022-11-13 RX ADMIN — POLYETHYLENE GLYCOL 3350 17 GRAM(S): 17 POWDER, FOR SOLUTION ORAL at 11:35

## 2022-11-13 RX ADMIN — HEPARIN SODIUM 5000 UNIT(S): 5000 INJECTION INTRAVENOUS; SUBCUTANEOUS at 13:45

## 2022-11-13 RX ADMIN — Medication 6 UNIT(S): at 09:00

## 2022-11-13 RX ADMIN — ERTAPENEM SODIUM 120 MILLIGRAM(S): 1 INJECTION, POWDER, LYOPHILIZED, FOR SOLUTION INTRAMUSCULAR; INTRAVENOUS at 10:21

## 2022-11-13 RX ADMIN — Medication 3 MILLIGRAM(S): at 22:40

## 2022-11-13 RX ADMIN — HEPARIN SODIUM 5000 UNIT(S): 5000 INJECTION INTRAVENOUS; SUBCUTANEOUS at 22:40

## 2022-11-13 RX ADMIN — SENNA PLUS 2 TABLET(S): 8.6 TABLET ORAL at 22:40

## 2022-11-13 RX ADMIN — FINASTERIDE 5 MILLIGRAM(S): 5 TABLET, FILM COATED ORAL at 11:35

## 2022-11-13 RX ADMIN — Medication 6 UNIT(S): at 17:59

## 2022-11-13 RX ADMIN — HEPARIN SODIUM 5000 UNIT(S): 5000 INJECTION INTRAVENOUS; SUBCUTANEOUS at 05:00

## 2022-11-13 RX ADMIN — INSULIN GLARGINE 14 UNIT(S): 100 INJECTION, SOLUTION SUBCUTANEOUS at 22:34

## 2022-11-13 RX ADMIN — LIDOCAINE 1 PATCH: 4 CREAM TOPICAL at 05:05

## 2022-11-13 RX ADMIN — LIDOCAINE 1 PATCH: 4 CREAM TOPICAL at 17:23

## 2022-11-13 RX ADMIN — Medication 25 MILLIGRAM(S): at 05:00

## 2022-11-13 RX ADMIN — Medication 100 GRAM(S): at 09:12

## 2022-11-13 NOTE — PROGRESS NOTE ADULT - PROBLEM SELECTOR PLAN 4
Noted to be hypoxic with imaging showing findings concerning for PNA; likely from aspiration pneumonia given worsening dysphagia in the setting of L fracture and deconditioning. Now off NC and stable on RA.   Speech and swallow eval and MBS 10/28, recommendation for puree/mildly thickened liquid.   Speech pathologist Mally Villalobos recs for repeat MBS at [facility] after d/c  Stable on RA     - c/w Ertapenem 11/1-11/14  - Pulm consulted with recs earlier in his hospital course  - c/w Duoneb  - c/w incentive spirometer

## 2022-11-13 NOTE — PROGRESS NOTE ADULT - PROBLEM SELECTOR PLAN 1
Tested positive 11/7, needs to be inpatient until 11/13 before going to [facility]   afebrile, stable on room air  with mild intermittent cough    -continue to monitor without Remdesivir per ID

## 2022-11-13 NOTE — PROGRESS NOTE ADULT - SUBJECTIVE AND OBJECTIVE BOX
PROGRESS NOTE:   Authored by Dr. Lela Pena    Patient is a 82y old  Male who presents with a chief complaint of Mechanical fall (10 Nov 2022 15:39)      SUBJECTIVE / OVERNIGHT EVENTS: 20cc output yesterday. 15cc output this past 24 hours. Limited historian, examined at bedside with a [facility]  service. Continuing to endorse intermittent productive cough, and dizziness with headache. Denies room spinning sensation. No other active complaints per patient.     ADDITIONAL REVIEW OF SYSTEMS:  No fever  + headache, dizziness, but no vertigo  + intermittent productive cough  No abd pain      MEDICATIONS  (STANDING):  chlorhexidine 2% Cloths 1 Application(s) Topical daily  dextrose 5%. 1000 milliLiter(s) (50 mL/Hr) IV Continuous <Continuous>  dextrose 5%. 1000 milliLiter(s) (100 mL/Hr) IV Continuous <Continuous>  dextrose 50% Injectable 25 Gram(s) IV Push once  dextrose 50% Injectable 12.5 Gram(s) IV Push once  dextrose 50% Injectable 25 Gram(s) IV Push once  ertapenem  IVPB 1000 milliGRAM(s) IV Intermittent every 24 hours  finasteride 5 milliGRAM(s) Oral daily  glucagon  Injectable 1 milliGRAM(s) IntraMuscular once  heparin   Injectable 5000 Unit(s) SubCutaneous every 8 hours  influenza  Vaccine (HIGH DOSE) 0.7 milliLiter(s) IntraMuscular once  insulin glargine Injectable (LANTUS) 14 Unit(s) SubCutaneous at bedtime  insulin lispro (ADMELOG) corrective regimen sliding scale   SubCutaneous Before meals and at bedtime  insulin lispro Injectable (ADMELOG) 6 Unit(s) SubCutaneous three times a day before meals  lidocaine   4% Patch 1 Patch Transdermal every 24 hours  melatonin 3 milliGRAM(s) Oral at bedtime  metoprolol succinate ER 25 milliGRAM(s) Oral daily  pantoprazole    Tablet 40 milliGRAM(s) Oral before breakfast  polyethylene glycol 3350 17 Gram(s) Oral daily  senna 2 Tablet(s) Oral at bedtime  tamsulosin 0.8 milliGRAM(s) Oral at bedtime    MEDICATIONS  (PRN):  acetaminophen     Tablet .. 650 milliGRAM(s) Oral every 6 hours PRN mild pain  albuterol/ipratropium for Nebulization 3 milliLiter(s) Nebulizer every 6 hours PRN Shortness of Breath  dextrose Oral Gel 15 Gram(s) Oral once PRN Blood Glucose LESS THAN 70 milliGRAM(s)/deciliter      CAPILLARY BLOOD GLUCOSE      POCT Blood Glucose.: 112 mg/dL (13 Nov 2022 08:56)  POCT Blood Glucose.: 137 mg/dL (12 Nov 2022 21:27)  POCT Blood Glucose.: 123 mg/dL (12 Nov 2022 17:14)  POCT Blood Glucose.: 116 mg/dL (12 Nov 2022 13:33)    I&O's Summary    12 Nov 2022 07:01  -  13 Nov 2022 07:00  --------------------------------------------------------  IN: 160 mL / OUT: 15 mL / NET: 145 mL        PHYSICAL EXAM:  Vital Signs Last 24 Hrs  T(C): 36.8 (13 Nov 2022 04:59), Max: 36.8 (13 Nov 2022 04:59)  T(F): 98.2 (13 Nov 2022 04:59), Max: 98.2 (13 Nov 2022 04:59)  HR: 83 (13 Nov 2022 04:59) (67 - 83)  BP: 120/76 (13 Nov 2022 04:59) (119/77 - 120/76)  BP(mean): --  RR: 18 (13 Nov 2022 04:59) (18 - 18)  SpO2: 95% (13 Nov 2022 04:59) (95% - 96%)    Parameters below as of 13 Nov 2022 04:59  Patient On (Oxygen Delivery Method): room air        GENERAL: NAD, lying comfortably in bed  HEAD: Atraumatic, normocephalic  EYES: Conjunctiva and sclera clear  NECK: Supple   RESPIRATORY: Normal respiratory effort; lungs are clear to auscultation bilaterally in anterior fields   CARDIOVASCULAR: Regular rate and rhythm, normal S1 and S2, no murmur/rub/gallop; No lower extremity edema  ABDOMEN: Mild tenderness around the umbilicus, normoactive bowel sounds, no rebound/guarding   MUSCULOSKELETAL: no gross deformities. Moving UE spontaneously in bed   NEURO: Non focal   PSYCH: alert but limited orientation; constricted affect         LABS:                          11.0   3.93  )-----------( 186      ( 13 Nov 2022 06:52 )             34.5     11-13    139  |  104  |  18  ----------------------------<  98  4.1   |  26  |  1.02    Ca    9.0      13 Nov 2022 06:49  Phos  2.4     11-13  Mg     1.5     11-13    TPro  5.8<L>  /  Alb  2.7<L>  /  TBili  0.5  /  DBili  x   /  AST  28  /  ALT  22  /  AlkPhos  152<H>  11-13                  RADIOLOGY:    Consulted note reviewed  Reviewed Imaging personally   PROGRESS NOTE:   Authored by Dr. Lela Pena    Patient is a 82y old  Male who presents with a chief complaint of Mechanical fall (10 Nov 2022 15:39)      SUBJECTIVE / OVERNIGHT EVENTS: 20cc output yesterday. 15cc output this past 24 hours. Limited historian, [facility]. Continuing to endorse intermittent productive cough, and dizziness with headache. Denies room spinning sensation. No other active complaints per patient.     ADDITIONAL REVIEW OF SYSTEMS:  No fever  + headache, dizziness, but no vertigo  + intermittent productive cough  No abd pain      MEDICATIONS  (STANDING):  chlorhexidine 2% Cloths 1 Application(s) Topical daily  dextrose 5%. 1000 milliLiter(s) (50 mL/Hr) IV Continuous <Continuous>  dextrose 5%. 1000 milliLiter(s) (100 mL/Hr) IV Continuous <Continuous>  dextrose 50% Injectable 25 Gram(s) IV Push once  dextrose 50% Injectable 12.5 Gram(s) IV Push once  dextrose 50% Injectable 25 Gram(s) IV Push once  ertapenem  IVPB 1000 milliGRAM(s) IV Intermittent every 24 hours  finasteride 5 milliGRAM(s) Oral daily  glucagon  Injectable 1 milliGRAM(s) IntraMuscular once  heparin   Injectable 5000 Unit(s) SubCutaneous every 8 hours  influenza  Vaccine (HIGH DOSE) 0.7 milliLiter(s) IntraMuscular once  insulin glargine Injectable (LANTUS) 14 Unit(s) SubCutaneous at bedtime  insulin lispro (ADMELOG) corrective regimen sliding scale   SubCutaneous Before meals and at bedtime  insulin lispro Injectable (ADMELOG) 6 Unit(s) SubCutaneous three times a day before meals  lidocaine   4% Patch 1 Patch Transdermal every 24 hours  melatonin 3 milliGRAM(s) Oral at bedtime  metoprolol succinate ER 25 milliGRAM(s) Oral daily  pantoprazole    Tablet 40 milliGRAM(s) Oral before breakfast  polyethylene glycol 3350 17 Gram(s) Oral daily  senna 2 Tablet(s) Oral at bedtime  tamsulosin 0.8 milliGRAM(s) Oral at bedtime    MEDICATIONS  (PRN):  acetaminophen     Tablet .. 650 milliGRAM(s) Oral every 6 hours PRN mild pain  albuterol/ipratropium for Nebulization 3 milliLiter(s) Nebulizer every 6 hours PRN Shortness of Breath  dextrose Oral Gel 15 Gram(s) Oral once PRN Blood Glucose LESS THAN 70 milliGRAM(s)/deciliter      CAPILLARY BLOOD GLUCOSE      POCT Blood Glucose.: 112 mg/dL (13 Nov 2022 08:56)  POCT Blood Glucose.: 137 mg/dL (12 Nov 2022 21:27)  POCT Blood Glucose.: 123 mg/dL (12 Nov 2022 17:14)  POCT Blood Glucose.: 116 mg/dL (12 Nov 2022 13:33)    I&O's Summary    12 Nov 2022 07:01  -  13 Nov 2022 07:00  --------------------------------------------------------  IN: 160 mL / OUT: 15 mL / NET: 145 mL        PHYSICAL EXAM:  Vital Signs Last 24 Hrs  T(C): 36.8 (13 Nov 2022 04:59), Max: 36.8 (13 Nov 2022 04:59)  T(F): 98.2 (13 Nov 2022 04:59), Max: 98.2 (13 Nov 2022 04:59)  HR: 83 (13 Nov 2022 04:59) (67 - 83)  BP: 120/76 (13 Nov 2022 04:59) (119/77 - 120/76)  BP(mean): --  RR: 18 (13 Nov 2022 04:59) (18 - 18)  SpO2: 95% (13 Nov 2022 04:59) (95% - 96%)    Parameters below as of 13 Nov 2022 04:59  Patient On (Oxygen Delivery Method): room air        GENERAL: NAD, lying comfortably in bed  HEAD: Atraumatic, normocephalic  EYES: EOMI intact, Conjunctiva and sclera clear  NECK: Supple   RESPIRATORY: Normal respiratory effort; lungs are clear to auscultation bilaterally in anterior fields   CARDIOVASCULAR: Regular rate and rhythm, normal S1 and S2, no murmur/rub/gallop; No lower extremity edema  ABDOMEN: Mild tenderness around the umbilicus, normoactive bowel sounds, no rebound/guarding   MUSCULOSKELETAL: no gross deformities. Moving UE spontaneously in bed   NEURO: Non focal   PSYCH: alert but limited orientation; constricted affect         LABS:                          11.0   3.93  )-----------( 186      ( 13 Nov 2022 06:52 )             34.5     11-13    139  |  104  |  18  ----------------------------<  98  4.1   |  26  |  1.02    Ca    9.0      13 Nov 2022 06:49  Phos  2.4     11-13  Mg     1.5     11-13    TPro  5.8<L>  /  Alb  2.7<L>  /  TBili  0.5  /  DBili  x   /  AST  28  /  ALT  22  /  AlkPhos  152<H>  11-13                  RADIOLOGY:    Consulted note reviewed  Reviewed Imaging personally

## 2022-11-13 NOTE — PROGRESS NOTE ADULT - ASSESSMENT
Assessment  DMT2: 82y Male with DM T2 with hyperglycemia, A1C 10.4%, was on insulin at home, now on basal bolus insulin, blood sugars are stable and trending within overall acceptable range, no new hypoglycemic episodes, eating, has cough, NAD.  Abdominal Abscess: s/p drain, monitored.  Covid: stable, monitored, FU ID.  S/P Fall: workup in progress, stable, monitored.  HTN: Un Controlled,  on antihypertensive medications.          Melba Reyes MD  Cell: [phone]  Office: [phone]               Assessment  DMT2: 82y Male with DM T2 with hyperglycemia, A1C 10.4%, was on insulin at home, now on basal bolus insulin, blood sugars are stable and trending within overall acceptable range, no new hypoglycemic  episodes, eating, has cough, NAD.  Abdominal Abscess: s/p drain, monitored.  Covid: stable, monitored, FU ID.  S/P Fall: workup in progress, stable, monitored.  HTN: Un Controlled,  on antihypertensive medications.          Melba Reyes MD  Cell: [phone]  Office: [phone]

## 2022-11-13 NOTE — PROGRESS NOTE ADULT - SUBJECTIVE AND OBJECTIVE BOX
Chief complaint  Patient is a 82y old  Male who presents with a chief complaint of Mechanical fall (10 Nov 2022 15:39)   Review of systems  Patient in bed, looks comfortable, no hypoglycemic episodes.    Labs and Fingersticks  CAPILLARY BLOOD GLUCOSE      POCT Blood Glucose.: 112 mg/dL (13 Nov 2022 08:56)  POCT Blood Glucose.: 137 mg/dL (12 Nov 2022 21:27)  POCT Blood Glucose.: 123 mg/dL (12 Nov 2022 17:14)  POCT Blood Glucose.: 116 mg/dL (12 Nov 2022 13:33)      Anion Gap, Serum: 9 (11-13 @ 06:49)  Anion Gap, Serum: 9 (11-12 @ 07:15)      Calcium, Total Serum: 9.0 (11-13 @ 06:49)  Calcium, Total Serum: 9.1 (11-12 @ 07:15)  Albumin, Serum: 2.7 *L* (11-13 @ 06:49)  Albumin, Serum: 2.8 *L* (11-12 @ 07:15)    Alanine Aminotransferase (ALT/SGPT): 22 (11-13 @ 06:49)  Alanine Aminotransferase (ALT/SGPT): 17 (11-12 @ 07:15)  Alkaline Phosphatase, Serum: 152 *H* (11-13 @ 06:49)  Alkaline Phosphatase, Serum: 152 *H* (11-12 @ 07:15)  Aspartate Aminotransferase (AST/SGOT): 28 (11-13 @ 06:49)  Aspartate Aminotransferase (AST/SGOT): 27 (11-12 @ 07:15)        11-13    139  |  104  |  18  ----------------------------<  98  4.1   |  26  |  1.02    Ca    9.0      13 Nov 2022 06:49  Phos  2.4     11-13  Mg     1.5     11-13    TPro  5.8<L>  /  Alb  2.7<L>  /  TBili  0.5  /  DBili  x   /  AST  28  /  ALT  22  /  AlkPhos  152<H>  11-13                        11.0   3.93  )-----------( 186      ( 13 Nov 2022 06:52 )             34.5     Medications  MEDICATIONS  (STANDING):  chlorhexidine 2% Cloths 1 Application(s) Topical daily  dextrose 5%. 1000 milliLiter(s) (50 mL/Hr) IV Continuous <Continuous>  dextrose 5%. 1000 milliLiter(s) (100 mL/Hr) IV Continuous <Continuous>  dextrose 50% Injectable 25 Gram(s) IV Push once  dextrose 50% Injectable 12.5 Gram(s) IV Push once  dextrose 50% Injectable 25 Gram(s) IV Push once  ertapenem  IVPB 1000 milliGRAM(s) IV Intermittent every 24 hours  finasteride 5 milliGRAM(s) Oral daily  glucagon  Injectable 1 milliGRAM(s) IntraMuscular once  heparin   Injectable 5000 Unit(s) SubCutaneous every 8 hours  influenza  Vaccine (HIGH DOSE) 0.7 milliLiter(s) IntraMuscular once  insulin glargine Injectable (LANTUS) 14 Unit(s) SubCutaneous at bedtime  insulin lispro (ADMELOG) corrective regimen sliding scale   SubCutaneous Before meals and at bedtime  insulin lispro Injectable (ADMELOG) 6 Unit(s) SubCutaneous three times a day before meals  lidocaine   4% Patch 1 Patch Transdermal every 24 hours  meclizine 12.5 milliGRAM(s) Oral once  melatonin 3 milliGRAM(s) Oral at bedtime  metoprolol succinate ER 25 milliGRAM(s) Oral daily  pantoprazole    Tablet 40 milliGRAM(s) Oral before breakfast  polyethylene glycol 3350 17 Gram(s) Oral daily  senna 2 Tablet(s) Oral at bedtime  tamsulosin 0.8 milliGRAM(s) Oral at bedtime      Physical Exam  General: Patient comfortable in bed  Vital Signs Last 12 Hrs  T(F): 98.2 (11-13-22 @ 04:59), Max: 98.2 (11-13-22 @ 04:59)  HR: 83 (11-13-22 @ 04:59) (83 - 83)  BP: 120/76 (11-13-22 @ 04:59) (120/76 - 120/76)  BP(mean): --  RR: 18 (11-13-22 @ 04:59) (18 - 18)  SpO2: 95% (11-13-22 @ 04:59) (95% - 95%)      Diagnostics    Free Thyroxine, Serum: AM Sched. Collection: 21-Oct-2022 06:00 (10-20 @ 13:18)  Thyroid Stimulating Hormone, Serum: AM Sched. Collection: 21-Oct-2022 06:00 (10-20 @ 13:18)  A1C with Estimated Average Glucose: Routine (10-20 @ 13:17)           Chief complaint  Patient is a 82y old  Male who presents with a chief complaint of Mechanical fall (10 Nov 2022 15:39)   Review of systems  Patient in bed, looks comfortable, no hypoglycemic episodes.    Labs and Fingersticks  CAPILLARY BLOOD GLUCOSE      POCT Blood Glucose.: 112 mg/dL (13 Nov 2022 08:56)  POCT Blood Glucose.: 137 mg/dL (12 Nov 2022 21:27)  POCT Blood Glucose.: 123 mg/dL (12 Nov 2022 17:14)  POCT Blood Glucose.: 116 mg/dL (12 Nov 2022 13:33)      Anion Gap, Serum: 9 (11-13 @ 06:49)  Anion Gap, Serum: 9 (11-12 @ 07:15)      Calcium, Total Serum: 9.0 (11-13 @ 06:49)  Calcium, Total Serum: 9.1 (11-12 @ 07:15)  Albumin, Serum: 2.7 *L* (11-13 @ 06:49)  Albumin, Serum: 2.8 *L* (11-12 @ 07:15)    Alanine Aminotransferase (ALT/SGPT): 22 (11-13 @ 06:49)  Alanine Aminotransferase (ALT/SGPT): 17 (11-12 @ 07:15)  Alkaline Phosphatase, Serum: 152 *H* (11-13 @ 06:49)  Alkaline Phosphatase, Serum: 152 *H* (11-12 @ 07:15)  Aspartate Aminotransferase (AST/SGOT): 28 (11-13 @ 06:49)  Aspartate Aminotransferase (AST/SGOT): 27 (11-12 @ 07:15)        11-13    139  |  104  |  18  ----------------------------<  98  4.1   |  26  |  1.02    Ca    9.0      13 Nov 2022 06:49  Phos  2.4     11-13  Mg     1.5     11-13    TPro  5.8<L>  /  Alb  2.7<L>  /  TBili  0.5  /  DBili  x   /  AST  28  /  ALT  22  /  AlkPhos  152<H>  11-13                        11.0   3.93  )-----------( 186      ( 13 Nov 2022 06:52 )             34.5     Medications  MEDICATIONS  (STANDING):  chlorhexidine 2% Cloths 1 Application(s) Topical daily  dextrose 5%. 1000 milliLiter(s) (50 mL/Hr) IV Continuous <Continuous>  dextrose 5%. 1000 milliLiter(s) (100 mL/Hr) IV Continuous <Continuous>  dextrose 50% Injectable 25 Gram(s) IV Push once  dextrose 50% Injectable 12.5 Gram(s) IV Push once  dextrose 50% Injectable 25 Gram(s) IV Push once  ertapenem  IVPB 1000 milliGRAM(s) IV Intermittent every 24 hours  finasteride 5 milliGRAM(s) Oral daily  glucagon  Injectable 1 milliGRAM(s) IntraMuscular once  heparin   Injectable 5000 Unit(s) SubCutaneous every 8 hours  influenza  Vaccine (HIGH DOSE) 0.7 milliLiter(s) IntraMuscular once  insulin glargine Injectable (LANTUS) 14 Unit(s) SubCutaneous at bedtime  insulin lispro (ADMELOG) corrective regimen sliding scale   SubCutaneous Before meals and at bedtime  insulin lispro Injectable (ADMELOG) 6 Unit(s) SubCutaneous three times a day before meals  lidocaine   4% Patch 1 Patch Transdermal every 24 hours  meclizine 12.5 milliGRAM(s) Oral once  melatonin 3 milliGRAM(s) Oral at bedtime  metoprolol succinate ER 25 milliGRAM(s) Oral daily  pantoprazole    Tablet 40 milliGRAM(s) Oral before breakfast  polyethylene glycol 3350 17 Gram(s) Oral daily  senna 2 Tablet(s) Oral at bedtime  tamsulosin 0.8 milliGRAM(s) Oral at bedtime      Physical Exam  General: Patient comfortable in bed  Vital Signs Last 12 Hrs  T(F): 98.2 (11-13-22 @ 04:59), Max: 98.2 (11-13-22 @ 04:59)  HR: 83 (11-13-22 @ 04:59) (83 - 83)  BP: 120/76 (11-13-22 @ 04:59) (120/76 - 120/76)  BP(mean): --  RR: 18 (11-13-22 @ 04:59) (18 - 18)  SpO2: 95% (11-13-22 @ 04:59) (95% - 95%)      Diagnostics    Free Thyroxine, Serum: AM Sched. Collection: 21-Oct-2022 06:00 (10-20 @ 13:18)  Thyroid Stimulating Hormone, Serum: AM Sched. Collection: 21-Oct-2022 06:00 (10-20 @ 13:18)  A1C with Estimated Average Glucose: Routine (10-20 @ 13:17)

## 2022-11-13 NOTE — PROGRESS NOTE ADULT - PROBLEM SELECTOR PLAN 3
MRI showing collection between liver and kidney; possible abscess  Also with changes likely consistent with cysts, low suspicion for malignancy. Evaluated by   - 11/1 IR drain placement into right abdominal collection between liver and kidney. ~100cc purulent fluid aspirated during procedure. Culture noted. Ecoli resistant to zosyn, transitioned to ertapenem    - c/w ertapenem 11/1-11/15  - ID consulted; recs appreciated  - IR recs appreciated; decreased ouput noted (10cc/24hrs 11/9, 20cc/24hrs 11/10, 30 cc 11/11 20 cc 11/12 and 15cc 11/13); hold off on repeat CT A/P for now, will follow the drainage.  - Flush drain with 5cc NS daily forward    - Dressing change q3 days or when dressing saturated  - If d/c'd with drain, f/u with IR at [phone] in 10 days for tube eval  - Needs VNS service for drainage catheter care  - output is still 20cc for the day

## 2022-11-13 NOTE — PROGRESS NOTE ADULT - PROBLEM SELECTOR PLAN 5
A1c 10/27/22: 10.4    -c/w current insulin regimen; Lantus 14 U and Lispro 6 U premeal  - monitor finger stick

## 2022-11-14 LAB
ALBUMIN SERPL ELPH-MCNC: 2.8 G/DL — LOW (ref 3.3–5)
ALP SERPL-CCNC: 152 U/L — HIGH (ref 40–120)
ALT FLD-CCNC: 29 U/L — SIGNIFICANT CHANGE UP (ref 10–45)
ANION GAP SERPL CALC-SCNC: 7 MMOL/L — SIGNIFICANT CHANGE UP (ref 5–17)
AST SERPL-CCNC: 31 U/L — SIGNIFICANT CHANGE UP (ref 10–40)
BASOPHILS # BLD AUTO: 0.02 K/UL — SIGNIFICANT CHANGE UP (ref 0–0.2)
BASOPHILS NFR BLD AUTO: 0.5 % — SIGNIFICANT CHANGE UP (ref 0–2)
BILIRUB SERPL-MCNC: 0.5 MG/DL — SIGNIFICANT CHANGE UP (ref 0.2–1.2)
BUN SERPL-MCNC: 17 MG/DL — SIGNIFICANT CHANGE UP (ref 7–23)
CALCIUM SERPL-MCNC: 9.1 MG/DL — SIGNIFICANT CHANGE UP (ref 8.4–10.5)
CHLORIDE SERPL-SCNC: 105 MMOL/L — SIGNIFICANT CHANGE UP (ref 96–108)
CO2 SERPL-SCNC: 28 MMOL/L — SIGNIFICANT CHANGE UP (ref 22–31)
CREAT SERPL-MCNC: 1.14 MG/DL — SIGNIFICANT CHANGE UP (ref 0.5–1.3)
EGFR: 63 ML/MIN/1.73M2 — SIGNIFICANT CHANGE UP
EOSINOPHIL # BLD AUTO: 0.3 K/UL — SIGNIFICANT CHANGE UP (ref 0–0.5)
EOSINOPHIL NFR BLD AUTO: 7.5 % — HIGH (ref 0–6)
GLUCOSE BLDC GLUCOMTR-MCNC: 117 MG/DL — HIGH (ref 70–99)
GLUCOSE BLDC GLUCOMTR-MCNC: 132 MG/DL — HIGH (ref 70–99)
GLUCOSE BLDC GLUCOMTR-MCNC: 157 MG/DL — HIGH (ref 70–99)
GLUCOSE BLDC GLUCOMTR-MCNC: 94 MG/DL — SIGNIFICANT CHANGE UP (ref 70–99)
GLUCOSE SERPL-MCNC: 90 MG/DL — SIGNIFICANT CHANGE UP (ref 70–99)
HCT VFR BLD CALC: 35.5 % — LOW (ref 39–50)
HGB BLD-MCNC: 11.2 G/DL — LOW (ref 13–17)
IMM GRANULOCYTES NFR BLD AUTO: 0.3 % — SIGNIFICANT CHANGE UP (ref 0–0.9)
LYMPHOCYTES # BLD AUTO: 0.8 K/UL — LOW (ref 1–3.3)
LYMPHOCYTES # BLD AUTO: 20.1 % — SIGNIFICANT CHANGE UP (ref 13–44)
MAGNESIUM SERPL-MCNC: 1.7 MG/DL — SIGNIFICANT CHANGE UP (ref 1.6–2.6)
MCHC RBC-ENTMCNC: 28.9 PG — SIGNIFICANT CHANGE UP (ref 27–34)
MCHC RBC-ENTMCNC: 31.5 GM/DL — LOW (ref 32–36)
MCV RBC AUTO: 91.7 FL — SIGNIFICANT CHANGE UP (ref 80–100)
MONOCYTES # BLD AUTO: 0.37 K/UL — SIGNIFICANT CHANGE UP (ref 0–0.9)
MONOCYTES NFR BLD AUTO: 9.3 % — SIGNIFICANT CHANGE UP (ref 2–14)
NEUTROPHILS # BLD AUTO: 2.48 K/UL — SIGNIFICANT CHANGE UP (ref 1.8–7.4)
NEUTROPHILS NFR BLD AUTO: 62.3 % — SIGNIFICANT CHANGE UP (ref 43–77)
NRBC # BLD: 0 /100 WBCS — SIGNIFICANT CHANGE UP (ref 0–0)
PHOSPHATE SERPL-MCNC: 3 MG/DL — SIGNIFICANT CHANGE UP (ref 2.5–4.5)
PLATELET # BLD AUTO: 184 K/UL — SIGNIFICANT CHANGE UP (ref 150–400)
POTASSIUM SERPL-MCNC: 4.2 MMOL/L — SIGNIFICANT CHANGE UP (ref 3.5–5.3)
POTASSIUM SERPL-SCNC: 4.2 MMOL/L — SIGNIFICANT CHANGE UP (ref 3.5–5.3)
PROT SERPL-MCNC: 5.9 G/DL — LOW (ref 6–8.3)
RBC # BLD: 3.87 M/UL — LOW (ref 4.2–5.8)
RBC # FLD: 14.7 % — HIGH (ref 10.3–14.5)
SODIUM SERPL-SCNC: 140 MMOL/L — SIGNIFICANT CHANGE UP (ref 135–145)
WBC # BLD: 3.98 K/UL — SIGNIFICANT CHANGE UP (ref 3.8–10.5)
WBC # FLD AUTO: 3.98 K/UL — SIGNIFICANT CHANGE UP (ref 3.8–10.5)

## 2022-11-14 RX ORDER — MECLIZINE HCL 12.5 MG
12.5 TABLET ORAL ONCE
Refills: 0 | Status: COMPLETED | OUTPATIENT
Start: 2022-11-14 | End: 2022-11-14

## 2022-11-14 RX ORDER — INSULIN LISPRO 100/ML
5 VIAL (ML) SUBCUTANEOUS
Refills: 0 | Status: DISCONTINUED | OUTPATIENT
Start: 2022-11-14 | End: 2022-11-17

## 2022-11-14 RX ORDER — INSULIN GLARGINE 100 [IU]/ML
12 INJECTION, SOLUTION SUBCUTANEOUS AT BEDTIME
Refills: 0 | Status: DISCONTINUED | OUTPATIENT
Start: 2022-11-14 | End: 2022-11-15

## 2022-11-14 RX ADMIN — LIDOCAINE 1 PATCH: 4 CREAM TOPICAL at 19:00

## 2022-11-14 RX ADMIN — CHLORHEXIDINE GLUCONATE 1 APPLICATION(S): 213 SOLUTION TOPICAL at 12:37

## 2022-11-14 RX ADMIN — LIDOCAINE 1 PATCH: 4 CREAM TOPICAL at 04:44

## 2022-11-14 RX ADMIN — TAMSULOSIN HYDROCHLORIDE 0.8 MILLIGRAM(S): 0.4 CAPSULE ORAL at 22:06

## 2022-11-14 RX ADMIN — Medication 12.5 MILLIGRAM(S): at 18:06

## 2022-11-14 RX ADMIN — Medication 5 UNIT(S): at 12:45

## 2022-11-14 RX ADMIN — PANTOPRAZOLE SODIUM 40 MILLIGRAM(S): 20 TABLET, DELAYED RELEASE ORAL at 05:47

## 2022-11-14 RX ADMIN — POLYETHYLENE GLYCOL 3350 17 GRAM(S): 17 POWDER, FOR SOLUTION ORAL at 22:08

## 2022-11-14 RX ADMIN — Medication 25 MILLIGRAM(S): at 05:51

## 2022-11-14 RX ADMIN — Medication 5 UNIT(S): at 18:06

## 2022-11-14 RX ADMIN — INSULIN GLARGINE 12 UNIT(S): 100 INJECTION, SOLUTION SUBCUTANEOUS at 22:06

## 2022-11-14 RX ADMIN — Medication 1: at 22:08

## 2022-11-14 RX ADMIN — SENNA PLUS 2 TABLET(S): 8.6 TABLET ORAL at 22:07

## 2022-11-14 RX ADMIN — FINASTERIDE 5 MILLIGRAM(S): 5 TABLET, FILM COATED ORAL at 12:37

## 2022-11-14 RX ADMIN — Medication 3 MILLIGRAM(S): at 22:07

## 2022-11-14 RX ADMIN — LIDOCAINE 1 PATCH: 4 CREAM TOPICAL at 18:07

## 2022-11-14 RX ADMIN — HEPARIN SODIUM 5000 UNIT(S): 5000 INJECTION INTRAVENOUS; SUBCUTANEOUS at 22:06

## 2022-11-14 RX ADMIN — ERTAPENEM SODIUM 120 MILLIGRAM(S): 1 INJECTION, POWDER, LYOPHILIZED, FOR SOLUTION INTRAMUSCULAR; INTRAVENOUS at 12:37

## 2022-11-14 RX ADMIN — HEPARIN SODIUM 5000 UNIT(S): 5000 INJECTION INTRAVENOUS; SUBCUTANEOUS at 14:51

## 2022-11-14 RX ADMIN — HEPARIN SODIUM 5000 UNIT(S): 5000 INJECTION INTRAVENOUS; SUBCUTANEOUS at 05:46

## 2022-11-14 NOTE — PROGRESS NOTE ADULT - PROBLEM SELECTOR PLAN 1
Will decrease Lantus to 12u at bedtime.  Will decrease Admelog to 5u before each meal and continue Admelog correction scale coverage.  Patient was on insulin at home, will be DC on current insulin doses.  Patient counseled for compliance with consistent low carb diet.

## 2022-11-14 NOTE — PROGRESS NOTE ADULT - ASSESSMENT
Assessment  DMT2: 82y Male with DM T2 with hyperglycemia, A1C 10.4%, was on insulin at home, now on basal bolus insulin, blood sugars are in acceptable range/trending down this AM, no new hypoglycemic  episodes, eating meals per discussion with nursing staff, appears comfortable in NAD.  Abdominal Abscess: s/p drain, monitored.  Covid: stable, monitored, FU ID.  S/P Fall: workup in progress, stable, monitored.  HTN: Un Controlled,  on antihypertensive medications.          Melba Reyes MD  Cell: [phone]  Office: [phone]               Assessment  DMT2: 82y Male with DM T2 with hyperglycemia, A1C 10.4%, was on insulin at home, now on basal bolus insulin, blood sugars are in acceptable range/trending down this AM, no new hypoglycemic  episodes,  eating meals per discussion with nursing staff, appears comfortable in NAD.  Abdominal Abscess: s/p drain, monitored.  Covid: stable, monitored, FU ID.  S/P Fall: workup in progress, stable, monitored.  HTN: Un Controlled,  on antihypertensive medications.          Melba Reyes MD  Cell: [phone]  Office: [phone]

## 2022-11-14 NOTE — PROGRESS NOTE ADULT - PROBLEM SELECTOR PLAN 1
MRI showing collection between liver and kidney; possible abscess  Also with changes likely consistent with cysts, low suspicion for malignancy. Evaluated by   - 11/1 IR drain placement into right abdominal collection between liver and kidney. ~100cc purulent fluid aspirated during procedure. Culture noted. Ecoli resistant to zosyn, transitioned to ertapenem    - c/w ertapenem 11/1-11/15  - ID consulted; recs appreciated  - IR recs appreciated; decreased ouput noted (10cc/24hrs 11/9, 20cc/24hrs 11/10, 30 cc 11/11 20 cc 11/12 and 15cc 11/13, 10cc 11/14 but with about 15cc in the drain in the AM); hold off on repeat CT A/P for now, will follow the drainage.  - Flush drain with 5cc NS daily forward    - Dressing change q3 days or when dressing saturated  - If d/c'd with drain, f/u with IR at [phone] in 10 days for tube eval  - Needs VNS service for drainage catheter care  - output is still 20cc for the day

## 2022-11-14 NOTE — PROGRESS NOTE ADULT - PROBLEM SELECTOR PLAN 4
Noted to be hypoxic with imaging showing findings concerning for PNA; likely from aspiration pneumonia given worsening dysphagia in the setting of L fracture and deconditioning. Now off NC and stable on RA.   Speech and swallow eval and MBS 10/28, recommendation for puree/mildly thickened liquid.   Speech pathologist Mally Villalobos recs for repeat MBS at [facility] after d/c  Stable on RA     - c/w Ertapenem 11/1-11/15  - Pulm consulted with recs earlier in his hospital course  - c/w Duoneb  - c/w incentive spirometer

## 2022-11-14 NOTE — PROGRESS NOTE ADULT - SUBJECTIVE AND OBJECTIVE BOX
Chief complaint  Patient is a 82y old  Male who presents with a chief complaint of Mechanical fall (10 Nov 2022 15:39)   Review of systems  Patient in bed, looks comfortable, no hypoglycemic episodes.    Labs and Fingersticks  CAPILLARY BLOOD GLUCOSE      POCT Blood Glucose.: 94 mg/dL (14 Nov 2022 08:59)  POCT Blood Glucose.: 117 mg/dL (13 Nov 2022 21:44)  POCT Blood Glucose.: 137 mg/dL (13 Nov 2022 17:46)  POCT Blood Glucose.: 151 mg/dL (13 Nov 2022 13:15)      Anion Gap, Serum: 7 (11-14 @ 07:12)  Anion Gap, Serum: 9 (11-13 @ 06:49)      Calcium, Total Serum: 9.1 (11-14 @ 07:12)  Calcium, Total Serum: 9.0 (11-13 @ 06:49)  Albumin, Serum: 2.8 *L* (11-14 @ 07:12)  Albumin, Serum: 2.7 *L* (11-13 @ 06:49)    Alanine Aminotransferase (ALT/SGPT): 29 (11-14 @ 07:12)  Alanine Aminotransferase (ALT/SGPT): 22 (11-13 @ 06:49)  Alkaline Phosphatase, Serum: 152 *H* (11-14 @ 07:12)  Alkaline Phosphatase, Serum: 152 *H* (11-13 @ 06:49)  Aspartate Aminotransferase (AST/SGOT): 31 (11-14 @ 07:12)  Aspartate Aminotransferase (AST/SGOT): 28 (11-13 @ 06:49)        11-14    140  |  105  |  17  ----------------------------<  90  4.2   |  28  |  1.14    Ca    9.1      14 Nov 2022 07:12  Phos  3.0     11-14  Mg     1.7     11-14    TPro  5.9<L>  /  Alb  2.8<L>  /  TBili  0.5  /  DBili  x   /  AST  31  /  ALT  29  /  AlkPhos  152<H>  11-14                        11.2   3.98  )-----------( 184      ( 14 Nov 2022 07:12 )             35.5     Medications  MEDICATIONS  (STANDING):  chlorhexidine 2% Cloths 1 Application(s) Topical daily  dextrose 5%. 1000 milliLiter(s) (50 mL/Hr) IV Continuous <Continuous>  dextrose 5%. 1000 milliLiter(s) (100 mL/Hr) IV Continuous <Continuous>  dextrose 50% Injectable 25 Gram(s) IV Push once  dextrose 50% Injectable 12.5 Gram(s) IV Push once  dextrose 50% Injectable 25 Gram(s) IV Push once  ertapenem  IVPB 1000 milliGRAM(s) IV Intermittent every 24 hours  finasteride 5 milliGRAM(s) Oral daily  glucagon  Injectable 1 milliGRAM(s) IntraMuscular once  heparin   Injectable 5000 Unit(s) SubCutaneous every 8 hours  influenza  Vaccine (HIGH DOSE) 0.7 milliLiter(s) IntraMuscular once  insulin glargine Injectable (LANTUS) 12 Unit(s) SubCutaneous at bedtime  insulin lispro (ADMELOG) corrective regimen sliding scale   SubCutaneous Before meals and at bedtime  insulin lispro Injectable (ADMELOG) 5 Unit(s) SubCutaneous three times a day before meals  lidocaine   4% Patch 1 Patch Transdermal every 24 hours  melatonin 3 milliGRAM(s) Oral at bedtime  metoprolol succinate ER 25 milliGRAM(s) Oral daily  pantoprazole    Tablet 40 milliGRAM(s) Oral before breakfast  polyethylene glycol 3350 17 Gram(s) Oral daily  senna 2 Tablet(s) Oral at bedtime  tamsulosin 0.8 milliGRAM(s) Oral at bedtime      Physical Exam  General: Patient comfortable in bed  Vital Signs Last 12 Hrs  T(F): 97.1 (11-14-22 @ 05:18), Max: 97.1 (11-14-22 @ 05:18)  HR: 70 (11-14-22 @ 05:18) (70 - 70)  BP: 130/81 (11-14-22 @ 05:18) (130/81 - 130/81)  BP(mean): --  RR: 18 (11-14-22 @ 05:18) (18 - 18)  SpO2: 97% (11-14-22 @ 05:18) (97% - 97%)  Neck: No palpable thyroid nodules.  CVS: S1S2, No murmurs  Respiratory: No wheezing, no crepitations  GI: Abdomen soft, bowel sounds positive  Musculoskeletal:  edema lower extremities.   Skin: No skin rashes, no ecchymosis    Diagnostics    Free Thyroxine, Serum: AM Sched. Collection: 21-Oct-2022 06:00 (10-20 @ 13:18)  Thyroid Stimulating Hormone, Serum: AM Sched. Collection: 21-Oct-2022 06:00 (10-20 @ 13:18)  A1C with Estimated Average Glucose: Routine (10-20 @ 13:17)           Chief complaint  Patient is a 82y old  Male who presents with a chief complaint of Mechanical fall (10 Nov 2022 15:39)   Review of systems  Patient in bed, looks comfortable, no hypoglycemic episodes.    Labs and Fingersticks  CAPILLARY BLOOD GLUCOSE      POCT Blood Glucose.: 94 mg/dL (14 Nov 2022 08:59)  POCT Blood Glucose.: 117 mg/dL (13 Nov 2022 21:44)  POCT Blood Glucose.: 137 mg/dL (13 Nov 2022 17:46)  POCT Blood Glucose.: 151 mg/dL (13 Nov 2022 13:15)      Anion Gap, Serum: 7 (11-14 @ 07:12)  Anion Gap, Serum: 9 (11-13 @ 06:49)      Calcium, Total Serum: 9.1 (11-14 @ 07:12)  Calcium, Total Serum: 9.0 (11-13 @ 06:49)  Albumin, Serum: 2.8 *L* (11-14 @ 07:12)  Albumin, Serum: 2.7 *L* (11-13 @ 06:49)    Alanine Aminotransferase (ALT/SGPT): 29 (11-14 @ 07:12)  Alanine Aminotransferase (ALT/SGPT): 22 (11-13 @ 06:49)  Alkaline Phosphatase, Serum: 152 *H* (11-14 @ 07:12)  Alkaline Phosphatase, Serum: 152 *H* (11-13 @ 06:49)  Aspartate Aminotransferase (AST/SGOT): 31 (11-14 @ 07:12)  Aspartate Aminotransferase (AST/SGOT): 28 (11-13 @ 06:49)        11-14    140  |  105  |  17  ----------------------------<  90  4.2   |  28  |  1.14    Ca    9.1      14 Nov 2022 07:12  Phos  3.0     11-14  Mg     1.7     11-14    TPro  5.9<L>  /  Alb  2.8<L>  /  TBili  0.5  /  DBili  x   /  AST  31  /  ALT  29  /  AlkPhos  152<H>  11-14                        11.2   3.98  )-----------( 184      ( 14 Nov 2022 07:12 )             35.5     Medications  MEDICATIONS  (STANDING):  chlorhexidine 2% Cloths 1 Application(s) Topical daily  dextrose 5%. 1000 milliLiter(s) (50 mL/Hr) IV Continuous <Continuous>  dextrose 5%. 1000 milliLiter(s) (100 mL/Hr) IV Continuous <Continuous>  dextrose 50% Injectable 25 Gram(s) IV Push once  dextrose 50% Injectable 12.5 Gram(s) IV Push once  dextrose 50% Injectable 25 Gram(s) IV Push once  ertapenem  IVPB 1000 milliGRAM(s) IV Intermittent every 24 hours  finasteride 5 milliGRAM(s) Oral daily  glucagon  Injectable 1 milliGRAM(s) IntraMuscular once  heparin   Injectable 5000 Unit(s) SubCutaneous every 8 hours  influenza  Vaccine (HIGH DOSE) 0.7 milliLiter(s) IntraMuscular once  insulin glargine Injectable (LANTUS) 12 Unit(s) SubCutaneous at bedtime  insulin lispro (ADMELOG) corrective regimen sliding scale   SubCutaneous Before meals and at bedtime  insulin lispro Injectable (ADMELOG) 5 Unit(s) SubCutaneous three times a day before meals  lidocaine   4% Patch 1 Patch Transdermal every 24 hours  melatonin 3 milliGRAM(s) Oral at bedtime  metoprolol succinate ER 25 milliGRAM(s) Oral daily  pantoprazole    Tablet 40 milliGRAM(s) Oral before breakfast  polyethylene glycol 3350 17 Gram(s) Oral daily  senna 2 Tablet(s) Oral at bedtime  tamsulosin 0.8 milliGRAM(s) Oral at bedtime      Physical Exam  General: Patient comfortable in bed  Vital Signs Last 12 Hrs  T(F): 97.1 (11-14-22 @ 05:18), Max: 97.1 (11-14-22 @ 05:18)  HR: 70 (11-14-22 @ 05:18) (70 - 70)  BP: 130/81 (11-14-22 @ 05:18) (130/81 - 130/81)  BP(mean): --  RR: 18 (11-14-22 @ 05:18) (18 - 18)  SpO2: 97% (11-14-22 @ 05:18) (97% - 97%)  Neck: No palpable thyroid nodules.  CVS: S1S2, No murmurs  Respiratory: No wheezing, no crepitations  GI: Abdomen soft, bowel sounds positive  Musculoskeletal:  edema lower extremities.   Skin: No skin rashes, no ecchymosis    Diagnostics    Free Thyroxine, Serum: AM Sched. Collection: 21-Oct-2022 06:00 (10-20 @ 13:18)  Thyroid Stimulating Hormone, Serum: AM Sched. Collection: 21-Oct-2022 06:00 (10-20 @ 13:18)  A1C with Estimated Average Glucose: Routine (10-20 @ 13:17)

## 2022-11-14 NOTE — PROGRESS NOTE ADULT - PROBLEM SELECTOR PLAN 3
s/p mechanical fall at home  MRI showing L spine fracture  surgery consulted; no acute surgical intervention  ortho followup noted    -TLSO brace  -PT  -pain control with acetaminophen 650mg q6hr

## 2022-11-14 NOTE — PROGRESS NOTE ADULT - SUBJECTIVE AND OBJECTIVE BOX
PROGRESS NOTE:   Authored by Dr. Lela Pena    Patient is a 82y old  Male who presents with a chief complaint of Mechanical fall (10 Nov 2022 15:39)      SUBJECTIVE / OVERNIGHT EVENTS: No overnight events. Drained about 10 cc yesterday per chart. Patient stating dizziness is improved, has mild cough. No other active complaints. History obtained with a Cantonese .     ADDITIONAL REVIEW OF SYSTEMS:  limited historian   No fever  + cough  No abd pain  + dizziness but improved from yesterday  No HA    MEDICATIONS  (STANDING):  chlorhexidine 2% Cloths 1 Application(s) Topical daily  dextrose 5%. 1000 milliLiter(s) (50 mL/Hr) IV Continuous <Continuous>  dextrose 5%. 1000 milliLiter(s) (100 mL/Hr) IV Continuous <Continuous>  dextrose 50% Injectable 25 Gram(s) IV Push once  dextrose 50% Injectable 12.5 Gram(s) IV Push once  dextrose 50% Injectable 25 Gram(s) IV Push once  ertapenem  IVPB 1000 milliGRAM(s) IV Intermittent every 24 hours  finasteride 5 milliGRAM(s) Oral daily  glucagon  Injectable 1 milliGRAM(s) IntraMuscular once  heparin   Injectable 5000 Unit(s) SubCutaneous every 8 hours  influenza  Vaccine (HIGH DOSE) 0.7 milliLiter(s) IntraMuscular once  insulin glargine Injectable (LANTUS) 12 Unit(s) SubCutaneous at bedtime  insulin lispro (ADMELOG) corrective regimen sliding scale   SubCutaneous Before meals and at bedtime  insulin lispro Injectable (ADMELOG) 5 Unit(s) SubCutaneous three times a day before meals  lidocaine   4% Patch 1 Patch Transdermal every 24 hours  melatonin 3 milliGRAM(s) Oral at bedtime  metoprolol succinate ER 25 milliGRAM(s) Oral daily  pantoprazole    Tablet 40 milliGRAM(s) Oral before breakfast  polyethylene glycol 3350 17 Gram(s) Oral daily  senna 2 Tablet(s) Oral at bedtime  tamsulosin 0.8 milliGRAM(s) Oral at bedtime    MEDICATIONS  (PRN):  acetaminophen     Tablet .. 650 milliGRAM(s) Oral every 6 hours PRN mild pain  albuterol/ipratropium for Nebulization 3 milliLiter(s) Nebulizer every 6 hours PRN Shortness of Breath  dextrose Oral Gel 15 Gram(s) Oral once PRN Blood Glucose LESS THAN 70 milliGRAM(s)/deciliter      CAPILLARY BLOOD GLUCOSE      POCT Blood Glucose.: 117 mg/dL (14 Nov 2022 12:06)  POCT Blood Glucose.: 94 mg/dL (14 Nov 2022 08:59)  POCT Blood Glucose.: 117 mg/dL (13 Nov 2022 21:44)  POCT Blood Glucose.: 137 mg/dL (13 Nov 2022 17:46)    I&O's Summary    13 Nov 2022 07:01  -  14 Nov 2022 07:00  --------------------------------------------------------  IN: 720 mL / OUT: 210 mL / NET: 510 mL        PHYSICAL EXAM:  Vital Signs Last 24 Hrs  T(C): 36.2 (14 Nov 2022 05:18), Max: 36.7 (13 Nov 2022 13:33)  T(F): 97.1 (14 Nov 2022 05:18), Max: 98.1 (13 Nov 2022 13:33)  HR: 70 (14 Nov 2022 05:18) (70 - 77)  BP: 130/81 (14 Nov 2022 05:18) (104/69 - 130/81)  BP(mean): --  RR: 18 (14 Nov 2022 05:18) (18 - 18)  SpO2: 97% (14 Nov 2022 05:18) (96% - 97%)    Parameters below as of 14 Nov 2022 05:18  Patient On (Oxygen Delivery Method): room air        GENERAL: NAD, lying comfortably in bed  HEAD: Atraumatic, normocephalic  EYES: Conjunctiva and sclera clear  NECK: Supple   RESPIRATORY: Normal respiratory effort; lungs are clear to auscultation bilaterally in anterior fields   CARDIOVASCULAR: Regular rate and rhythm, normal S1 and S2, no murmur/rub/gallop; No lower extremity edema  ABDOMEN: Nontender, normoactive bowel sounds, no rebound/guarding; has a perinephric drain placed draining serosanguinous fluid, ~15cc  MUSCULOSKELETAL: no gross deformities. Moving extremities spontaneously in bed   NEURO: Non focal   PSYCH: alert, orientation could not be assessed ; affect appropriate    LABS:                          11.2   3.98  )-----------( 184      ( 14 Nov 2022 07:12 )             35.5     11-14    140  |  105  |  17  ----------------------------<  90  4.2   |  28  |  1.14    Ca    9.1      14 Nov 2022 07:12  Phos  3.0     11-14  Mg     1.7     11-14    TPro  5.9<L>  /  Alb  2.8<L>  /  TBili  0.5  /  DBili  x   /  AST  31  /  ALT  29  /  AlkPhos  152<H>  11-14                  RADIOLOGY:    Consulted note reviewed  Reviewed Imaging personally

## 2022-11-15 ENCOUNTER — TRANSCRIPTION ENCOUNTER (OUTPATIENT)
Age: 82
End: 2022-11-15

## 2022-11-15 LAB
ALBUMIN SERPL ELPH-MCNC: 2.8 G/DL — LOW (ref 3.3–5)
ALP SERPL-CCNC: 150 U/L — HIGH (ref 40–120)
ALT FLD-CCNC: 29 U/L — SIGNIFICANT CHANGE UP (ref 10–45)
ANION GAP SERPL CALC-SCNC: 6 MMOL/L — SIGNIFICANT CHANGE UP (ref 5–17)
AST SERPL-CCNC: 30 U/L — SIGNIFICANT CHANGE UP (ref 10–40)
BASOPHILS # BLD AUTO: 0.03 K/UL — SIGNIFICANT CHANGE UP (ref 0–0.2)
BASOPHILS NFR BLD AUTO: 0.7 % — SIGNIFICANT CHANGE UP (ref 0–2)
BILIRUB SERPL-MCNC: 0.5 MG/DL — SIGNIFICANT CHANGE UP (ref 0.2–1.2)
BUN SERPL-MCNC: 16 MG/DL — SIGNIFICANT CHANGE UP (ref 7–23)
CALCIUM SERPL-MCNC: 9.1 MG/DL — SIGNIFICANT CHANGE UP (ref 8.4–10.5)
CHLORIDE SERPL-SCNC: 103 MMOL/L — SIGNIFICANT CHANGE UP (ref 96–108)
CO2 SERPL-SCNC: 30 MMOL/L — SIGNIFICANT CHANGE UP (ref 22–31)
CREAT SERPL-MCNC: 1.08 MG/DL — SIGNIFICANT CHANGE UP (ref 0.5–1.3)
EGFR: 68 ML/MIN/1.73M2 — SIGNIFICANT CHANGE UP
EOSINOPHIL # BLD AUTO: 0.27 K/UL — SIGNIFICANT CHANGE UP (ref 0–0.5)
EOSINOPHIL NFR BLD AUTO: 6.3 % — HIGH (ref 0–6)
GLUCOSE BLDC GLUCOMTR-MCNC: 115 MG/DL — HIGH (ref 70–99)
GLUCOSE BLDC GLUCOMTR-MCNC: 122 MG/DL — HIGH (ref 70–99)
GLUCOSE BLDC GLUCOMTR-MCNC: 142 MG/DL — HIGH (ref 70–99)
GLUCOSE BLDC GLUCOMTR-MCNC: 95 MG/DL — SIGNIFICANT CHANGE UP (ref 70–99)
GLUCOSE SERPL-MCNC: 94 MG/DL — SIGNIFICANT CHANGE UP (ref 70–99)
HCT VFR BLD CALC: 35.3 % — LOW (ref 39–50)
HGB BLD-MCNC: 11.3 G/DL — LOW (ref 13–17)
IMM GRANULOCYTES NFR BLD AUTO: 0.5 % — SIGNIFICANT CHANGE UP (ref 0–0.9)
LYMPHOCYTES # BLD AUTO: 0.69 K/UL — LOW (ref 1–3.3)
LYMPHOCYTES # BLD AUTO: 16.1 % — SIGNIFICANT CHANGE UP (ref 13–44)
MAGNESIUM SERPL-MCNC: 1.6 MG/DL — SIGNIFICANT CHANGE UP (ref 1.6–2.6)
MCHC RBC-ENTMCNC: 29.4 PG — SIGNIFICANT CHANGE UP (ref 27–34)
MCHC RBC-ENTMCNC: 32 GM/DL — SIGNIFICANT CHANGE UP (ref 32–36)
MCV RBC AUTO: 91.7 FL — SIGNIFICANT CHANGE UP (ref 80–100)
MONOCYTES # BLD AUTO: 0.4 K/UL — SIGNIFICANT CHANGE UP (ref 0–0.9)
MONOCYTES NFR BLD AUTO: 9.3 % — SIGNIFICANT CHANGE UP (ref 2–14)
NEUTROPHILS # BLD AUTO: 2.87 K/UL — SIGNIFICANT CHANGE UP (ref 1.8–7.4)
NEUTROPHILS NFR BLD AUTO: 67.1 % — SIGNIFICANT CHANGE UP (ref 43–77)
NRBC # BLD: 0 /100 WBCS — SIGNIFICANT CHANGE UP (ref 0–0)
PHOSPHATE SERPL-MCNC: 3 MG/DL — SIGNIFICANT CHANGE UP (ref 2.5–4.5)
PLATELET # BLD AUTO: 191 K/UL — SIGNIFICANT CHANGE UP (ref 150–400)
POTASSIUM SERPL-MCNC: 4.2 MMOL/L — SIGNIFICANT CHANGE UP (ref 3.5–5.3)
POTASSIUM SERPL-SCNC: 4.2 MMOL/L — SIGNIFICANT CHANGE UP (ref 3.5–5.3)
PROT SERPL-MCNC: 5.9 G/DL — LOW (ref 6–8.3)
RBC # BLD: 3.85 M/UL — LOW (ref 4.2–5.8)
RBC # FLD: 14.9 % — HIGH (ref 10.3–14.5)
SARS-COV-2 RNA SPEC QL NAA+PROBE: DETECTED
SODIUM SERPL-SCNC: 139 MMOL/L — SIGNIFICANT CHANGE UP (ref 135–145)
WBC # BLD: 4.28 K/UL — SIGNIFICANT CHANGE UP (ref 3.8–10.5)
WBC # FLD AUTO: 4.28 K/UL — SIGNIFICANT CHANGE UP (ref 3.8–10.5)

## 2022-11-15 PROCEDURE — 99232 SBSQ HOSP IP/OBS MODERATE 35: CPT

## 2022-11-15 RX ORDER — INSULIN GLARGINE 100 [IU]/ML
10 INJECTION, SOLUTION SUBCUTANEOUS AT BEDTIME
Refills: 0 | Status: DISCONTINUED | OUTPATIENT
Start: 2022-11-15 | End: 2022-11-17

## 2022-11-15 RX ADMIN — HEPARIN SODIUM 5000 UNIT(S): 5000 INJECTION INTRAVENOUS; SUBCUTANEOUS at 05:44

## 2022-11-15 RX ADMIN — INSULIN GLARGINE 10 UNIT(S): 100 INJECTION, SOLUTION SUBCUTANEOUS at 22:45

## 2022-11-15 RX ADMIN — FINASTERIDE 5 MILLIGRAM(S): 5 TABLET, FILM COATED ORAL at 11:53

## 2022-11-15 RX ADMIN — LIDOCAINE 1 PATCH: 4 CREAM TOPICAL at 06:37

## 2022-11-15 RX ADMIN — HEPARIN SODIUM 5000 UNIT(S): 5000 INJECTION INTRAVENOUS; SUBCUTANEOUS at 15:06

## 2022-11-15 RX ADMIN — Medication 5 UNIT(S): at 08:39

## 2022-11-15 RX ADMIN — LIDOCAINE 1 PATCH: 4 CREAM TOPICAL at 19:32

## 2022-11-15 RX ADMIN — CHLORHEXIDINE GLUCONATE 1 APPLICATION(S): 213 SOLUTION TOPICAL at 12:22

## 2022-11-15 RX ADMIN — POLYETHYLENE GLYCOL 3350 17 GRAM(S): 17 POWDER, FOR SOLUTION ORAL at 11:54

## 2022-11-15 RX ADMIN — ERTAPENEM SODIUM 120 MILLIGRAM(S): 1 INJECTION, POWDER, LYOPHILIZED, FOR SOLUTION INTRAMUSCULAR; INTRAVENOUS at 11:53

## 2022-11-15 RX ADMIN — Medication 5 UNIT(S): at 18:20

## 2022-11-15 RX ADMIN — TAMSULOSIN HYDROCHLORIDE 0.8 MILLIGRAM(S): 0.4 CAPSULE ORAL at 22:45

## 2022-11-15 RX ADMIN — Medication 3 MILLIGRAM(S): at 22:45

## 2022-11-15 RX ADMIN — LIDOCAINE 1 PATCH: 4 CREAM TOPICAL at 18:26

## 2022-11-15 RX ADMIN — Medication 0: at 12:20

## 2022-11-15 RX ADMIN — SENNA PLUS 2 TABLET(S): 8.6 TABLET ORAL at 22:45

## 2022-11-15 RX ADMIN — Medication 25 MILLIGRAM(S): at 05:44

## 2022-11-15 RX ADMIN — PANTOPRAZOLE SODIUM 40 MILLIGRAM(S): 20 TABLET, DELAYED RELEASE ORAL at 05:50

## 2022-11-15 RX ADMIN — Medication 5 UNIT(S): at 12:21

## 2022-11-15 RX ADMIN — HEPARIN SODIUM 5000 UNIT(S): 5000 INJECTION INTRAVENOUS; SUBCUTANEOUS at 22:45

## 2022-11-15 NOTE — PROGRESS NOTE ADULT - ASSESSMENT
80 yo M with HTN/HLD, DM2, BPH, initially with low back pain  Leukocytosis, no fever  Initially with fall  Current complaints of constipation, possible urinary retention  CT chest with evidence aspiration; CT A/P with concern for abscess vs hematoma along liver margin  MR suspicious for abscess  Culture with E coli ESBL  Overall,  1) Abnormal finding on imaging/Abscess  - Collection adjacent to liver--hematoma vs abscess; culture ESBL E coli  - Ertapenem 1g q 24, continue for 14 day course from drain placement, when DC planning, okay to place midline/picc as long as no new signs sepsis in the interim  - S/p IR drain--appreciate procedure  2) Leukocytosis  - Trending down  - F/U BCXs  - Trend WBC to normal  3) COVID  - COVID+, RA, no symptoms  - Monitor  - If develops symptoms, plan to start RemD  - Hold on Dexa unless progressive O2 requirements  - Supportive care    Signing off. Please call with further questions or change in status.    Gal Arroyo MD  Contact on TEAMS messaging from 9am - 5pm  From 5pm-9am, on weekends, or if no response call [phone]

## 2022-11-15 NOTE — PROGRESS NOTE ADULT - PROBLEM SELECTOR PLAN 1
MRI showing collection between liver and kidney; possible abscess  Also with changes likely consistent with cysts, low suspicion for malignancy. Evaluated by   - 11/1 IR drain placement into right abdominal collection between liver and kidney. ~100cc purulent fluid aspirated during procedure. Culture noted. Ecoli resistant to zosyn, transitioned to ertapenem    - c/w ertapenem 11/4-11/17  - IR recs appreciated; decreased ouput noted (10cc/24hrs 11/9, 20cc/24hrs 11/10, 30 cc 11/11 20 cc 11/12 and 15cc 11/13, 10cc 11/14 but with about 15cc in the drain in the AM); today 30cc. Hold off on repeat CT A/P for now, IR says okay to d/c patient and have him followup for CT scan if drainage <10 cc/24hr for 2 consecutive days.   - D/c with drain and f/u with IR at [phone] in 10 days for tube eval or for repeat CT   - Flush drain with 5cc NS daily forward    - Dressing change q3 days or when dressing saturated  - Needs VNS service for drainage catheter care  - ID consulted; recs appreciated

## 2022-11-15 NOTE — PROGRESS NOTE ADULT - ASSESSMENT
Assessment  DMT2: 82y Male with DM T2 with hyperglycemia, A1C 10.4%, was on insulin at home, now on basal bolus insulin, decreased dose yesterday, blood sugars are in acceptable range/still trending down this AM, no hypoglycemias, eating meals, appears comfortable in NAD.  Abdominal Abscess: s/p drain, monitored.  Covid: stable, monitored, FU ID.  S/P Fall: workup in progress, stable, monitored.  HTN: Un Controlled,  on antihypertensive medications.          Melba Reyes MD  Cell: [phone]  Office: [phone]               Assessment  DMT2: 82y Male with DM T2 with hyperglycemia, A1C 10.4%, was on insulin at home, now on basal bolus insulin, decreased dose yesterday, blood sugars are in acceptable  range/still trending down this AM, no hypoglycemias, eating meals, appears comfortable in NAD.  Abdominal Abscess: s/p drain, monitored.  Covid: stable, monitored, FU ID.  S/P Fall: workup in progress, stable, monitored.  HTN: Un Controlled,  on antihypertensive medications.          Melba Reyes MD  Cell: [phone]  Office: [phone]

## 2022-11-15 NOTE — DISCHARGE NOTE PROVIDER - NSDCCPTREATMENT_GEN_ALL_CORE_FT
PRINCIPAL PROCEDURE  Procedure: Abdominal drain placement  Findings and Treatment: You had a right abdominal drain placed by Dr. Putnam on 11/1/22 in Interventional Radiology (IR).   Monitor site for any sign or symptoms of infection (painful red skin, green/ yellow foul smelling discharge from the insertion site, fever, chills, leakage around drain site).   Flush drain with 5mL daily. If you meet resistance upon flushing, STOP and contact IR.   Empty drainage bag or bulb daily and record output. Once output is <10mL in a 24hr period or if there is a sudden decrease in output please contact IR to schedule  an appointment.  Drain care:  -Disconnect tubing (tube attached to bag/ bulb)  from the catheter (catheter going into skin)  -Clean catheter with alcohol swab  -Twist on the flush syringe to the catheter (be sure to push the air out of syringe)  -Flush catheter with 5mL (DO NOT pull back on syringe). If you meet resistance upon flushing, STOP and contact IR.   -Disconnect syringe from catheter and reconnect drainage bag or bulb.  Dressing care:  -Wash hands thoroughly with water and soap for at least 20 seconds.   -take off old dressing and clean around drain site with gauze soaked with warm water  -After cleaning the site, dry and replace dressing with a new gauze and tegaderm.   -Place one piece of gauze underneath the drain and another piece of gauze on top of drain.   -Apply tegaderm (clear dressing) on top.  -Change dressing every 3 days or when soiled  Follow up 1-2 weeks after discharge. Call to make appt at [phone]

## 2022-11-15 NOTE — PROGRESS NOTE ADULT - SUBJECTIVE AND OBJECTIVE BOX
CC: F/U for Abscess    Saw/spoke to patient. No fevers, no chills. No new complaints.    Allergies  No Known Allergies    ANTIMICROBIALS:  ertapenem  IVPB 1000 every 24 hours    PE:    Vital Signs Last 24 Hrs  T(C): 36.6 (15 Nov 2022 13:47), Max: 36.6 (15 Nov 2022 13:47)  T(F): 97.8 (15 Nov 2022 13:47), Max: 97.8 (15 Nov 2022 13:47)  HR: 64 (15 Nov 2022 14:51) (64 - 97)  BP: 110/71 (15 Nov 2022 14:51) (110/71 - 123/71)  RR: 18 (15 Nov 2022 13:47) (18 - 18)  SpO2: 99% (15 Nov 2022 14:51) (95% - 99%)    Gen: AOx3, NAD, non-toxic  CV: Nontachycardic  Resp: Breathing comfortably, RA  Abd: Soft, nontender  IV/Skin: No thrombophlebitis    LABS:                        11.3   4.28  )-----------( 191      ( 15 Nov 2022 07:42 )             35.3     11-15    139  |  103  |  16  ----------------------------<  94  4.2   |  30  |  1.08    Ca    9.1      15 Nov 2022 07:30  Phos  3.0     11-15  Mg     1.6     11-15    TPro  5.9<L>  /  Alb  2.8<L>  /  TBili  0.5  /  DBili  x   /  AST  30  /  ALT  29  /  AlkPhos  150<H>  11-15    MICROBIOLOGY:    .Abscess right abdominal collection  11-01-22   Numerous Escherichia coli ESBL  --  Escherichia coli ESBL    .Blood Blood-Venous  10-25-22   No Growth Final  --  --    .Blood Blood  10-25-22   No Growth Final  --  --    Clean Catch Clean Catch (Midstream)  10-19-22   No growth  --  --    RADIOLOGY:    10/28 XR:    IMPRESSION:    Laryngeal penetration and aspiration.    For further information and recommendations, please refer to the speech   pathologist's final report, which is available for review in the   electronic medical record.

## 2022-11-15 NOTE — DISCHARGE NOTE PROVIDER - CARE PROVIDER_API CALL
Edmund Putnam  INTERVENTIONAL RADIOLOGY AND DIAGNOSTIC RADIOLOGY  [address] [place] [address]  Phone: [phone]  Phone: [phone]  Fax: (   )    -  Follow Up Time: 1 week   Edmund Putnam  INTERVENTIONAL RADIOLOGY AND DIAGNOSTIC RADIOLOGY  [address] [place] [id number]  Phone: [phone]  Phone: [phone]  Fax: (   )    -  Follow Up Time: 1 week    Juan Carlos Galicia)  [address], [address]  [place] [address]  Phone: [phone]  Fax: [phone]  Follow Up Time: 1 week

## 2022-11-15 NOTE — DISCHARGE NOTE PROVIDER - NSDCCPCAREPLAN_GEN_ALL_CORE_FT
PRINCIPAL DISCHARGE DIAGNOSIS  Diagnosis: Back pain  Assessment and Plan of Treatment: You came in with back pain after a fall at home, and found to have a small fracture      SECONDARY DISCHARGE DIAGNOSES  Diagnosis: Unsteady gait  Assessment and Plan of Treatment:     Diagnosis: Lumbar compression fracture  Assessment and Plan of Treatment:      PRINCIPAL DISCHARGE DIAGNOSIS  Diagnosis: Back pain  Assessment and Plan of Treatment: You came in with back pain after a fall at home, and found to have a small fracture in your lumbar spine (imaging results included below). Orthopedics team (bone doctors) evaluated you and decided that acute surgery was not needed, and that you should continue with conservative measures including wearing the TSLO brace and continuing with physical therapy. Please continue to enage in physical therapy in the rehab and followup in outpatient clinic      SECONDARY DISCHARGE DIAGNOSES  Diagnosis: Perinephric fluid collection  Assessment and Plan of Treatment: You were also found to have some fluid collection between your liver and right kidney. We worked with the interventional radiology team to place a tube in the area to drain the fluid. The collected fluid grew bacteria that was resistant to a number of antibiotics, so we treated with you with broad spectrum antibiotics called Ertapenem for 2 weeks. We have made an appointment for you on 11/23 at 2pm with interventional radiology to repeat the CT scan of your abdomen and to look at  the drain to see if it can be removed. Please make sure you arrive at the appointment 30 minutes before 2pm. Please also return to the ED if you develop a new onset fever or abdominal pain.    Diagnosis: COVID-19  Assessment and Plan of Treatment: You were also found to test positive for COVID-19 virus. Because you had a mild course without oxygen requirement, the infectious disease team in the hospital determined that you did not need any medications to treat COVID.

## 2022-11-15 NOTE — PROGRESS NOTE ADULT - PROBLEM SELECTOR PLAN 1
Will decrease Lantus to 10u at bedtime.  Will continue Admelog 5u before each meal and Admelog correction scale coverage. Will continue monitoring FS and FU.  Patient was on insulin at home, will be DC on current insulin doses.  Patient counseled for compliance with consistent low carb diet.

## 2022-11-15 NOTE — DISCHARGE NOTE PROVIDER - HOSPITAL COURSE
HPI:  81M Cantonese speaking PMHx of HTN/HLD, DM2, BPH, (?Afib per chart review, though not on AC's), presents to the ED with mid lower back pain that he sustained after falling in the bathroom while he was urinating at 9pm last night. Pt denies headstrike/LOC. Difficult to obtain full history even with  though pt answering questions appropriately and is AAOx2. Pt denies any pain anywhere else. Per EMS pt has had chronic cough. Unclear if pt was down long. pt last took insulin 2 days ago he states.   Spoke with stepdaughter Yulissa who lives below pt: Pt couldn't get up from the fall yesterday, has difficulty ambulating with walker since the fall as he complaints of back pain, pt has 9hrs home help daily but not overnight. She states that pt is at baseline mental status and that he's had short term memory problems for some time. Denies any recent fevers/chills, abd pain, v/d, chest pain/sob. States that the cough pt has is chronic.       Floor course:  Orthopedics was consulted who evaluated patient and determined that the fracture did not  involve the posterior elements abd that he can be weightbearing as tolerated. No surgical intervention was planned and patient was given TLSO brace for comfort. PT was also consulted who followed the patient throughout the hospital course. On 10/24, patient was noted to have ANTON, for which renal US was performed that showed bilateral complex renal cysts, abscess vs. hematoma. ANTON was thought to be due to poor PO, and resolved over time. Urology consulted for the findings of renal cysts, who recommended outpatient followup.     During hospital course, patient was also found to have increased oxygen demands (nasal cannula), and workup showed suspicions for PNA, likely from aspiration. Patient was covered with IV Zosyn, then transitioned to IV ertapenem given the sensitivity result. On 11/7, patient also tested positive for COVID with some cough. However, patient did not require any oxygen and ID recommended monitoring the patient without Remdesivir treatment.     Patient was also noted to have fluid collection between liver and R kidney with suspicions for abscess, and underwent drain placement with IR on 11/1. 100cc purulent fluid aspirate was drained during the procedure. Pt was being covered for PNA with Zosyn and it was continued until it was changed to Ertapenem for a 14 day course given abscess culture showing ESBL resistant to Zosyn. Drain output was monitored closely during the hospitalization, with output ranging from 7.5~30cc daily. IR recommended repeat CT A/P if output continued to be <15cc daily for two consecutive days. Given stable output, discharge was planned to [facility] with IR followup for drain check 10 days after discharge. Patient's family Yulissa was notified to follow up with IR if drain output is less than 15cc for two or more days. HPI:  81M Cantonese speaking PMHx of HTN/HLD, DM2, BPH, (?Afib per chart review, though not on AC's), presents to the ED with mid lower back pain that he sustained after falling in the bathroom while he was urinating at 9pm last night. Pt denies headstrike/LOC. Difficult to obtain full history even with  though pt answering questions appropriately and is AAOx2. Pt denies any pain anywhere else. Per EMS pt has had chronic cough. Unclear if pt was down long. pt last took insulin 2 days ago he states.   Spoke with stepdaughter Yulissa who lives below pt: Pt couldn't get up from the fall yesterday, has difficulty ambulating with walker since the fall as he complaints of back pain, pt has 9hrs home help daily but not overnight. She states that pt is at baseline mental status and that he's had short term memory problems for some time. Denies any recent fevers/chills, abd pain, v/d, chest pain/sob. States that the cough pt has is chronic.       Floor course:  Orthopedics was consulted who evaluated patient and determined that the fracture did not  involve the posterior elements abd that he can be weightbearing as tolerated. No surgical intervention was planned and patient was given TLSO brace for comfort. PT was also consulted who followed the patient throughout the hospital course. On 10/24, patient was noted to have ANTON, for which renal US was performed that showed bilateral complex renal cysts, abscess vs. hematoma. ANTON was thought to be due to poor PO, and resolved over time. Urology consulted for the findings of renal cysts, who recommended outpatient followup.     During hospital course, patient was also found to have increased oxygen demands (nasal cannula), and workup showed suspicions for PNA, likely from aspiration. Patient was covered with IV Zosyn, then transitioned to IV ertapenem given the sensitivity result. On 11/7, patient also tested positive for COVID with some cough. However, patient did not require any oxygen and ID recommended monitoring the patient without Remdesivir treatment.     Patient was also noted to have fluid collection between liver and R kidney with suspicions for abscess, and underwent drain placement with IR on 11/1. 100cc purulent fluid aspirate was drained during the procedure. Pt was being covered for PNA with Zosyn and it was continued until it was changed to Ertapenem for a 14 day course given abscess culture showing ESBL resistant to Zosyn. Drain output was monitored closely during the hospitalization, with output ranging from 7.5~30cc daily. IR recommended repeat CT A/P and drain studies given low output after discharge. Appointment is scheduled with IR on 11/23 at 2pm.

## 2022-11-15 NOTE — DISCHARGE NOTE PROVIDER - NSDCFUADDAPPT_GEN_ALL_CORE_FT
Please followup with Interventional Radiology within 10 days after discharge to check the drain. Please monitor drain output daily, and consult interventional radiology if output is less than 15 cc daily for two consecutive days or more and obtain a repeat CT scan of abdomen.      Please followup with Interventional Radiology on 11/23 at 2pm after discharge to check the drain and repeat the CT abdomen and pelvis. Please make sure to arrive 30 minutes before the appointment.     Please also followup with Dr. Galicia of Orthopedics (bone doctor) in 1-2 weeks after discharge for your lumbar spine fracture.    Please also followup with your Primary care doctor regarding this hospitalization and management of your diabetes medications.

## 2022-11-15 NOTE — PROGRESS NOTE ADULT - PROBLEM SELECTOR PLAN 8
DVT ppx: Heparin Subq   Diet: Pureed/mildly thickened liquid per Speech and Swallow  Dispo: [facility]; pending COVID course until 11/13 and Abx until 11/17  Code: Full

## 2022-11-15 NOTE — PROGRESS NOTE ADULT - PROBLEM SELECTOR PLAN 5
A1c 10/27/22: 10.4    -c/w current insulin regimen; Lantus 10 U and Lispro 5 U premeal  - monitor finger stick

## 2022-11-15 NOTE — PROGRESS NOTE ADULT - SUBJECTIVE AND OBJECTIVE BOX
Chief complaint  Patient is a 82y old  Male who presents with a chief complaint of Mechanical fall (10 Nov 2022 15:39)   Review of systems  Patient in bed, looks comfortable, no hypoglycemic episodes.    Labs and Fingersticks  CAPILLARY BLOOD GLUCOSE      POCT Blood Glucose.: 95 mg/dL (15 Nov 2022 08:17)  POCT Blood Glucose.: 157 mg/dL (14 Nov 2022 22:03)  POCT Blood Glucose.: 132 mg/dL (14 Nov 2022 17:26)  POCT Blood Glucose.: 117 mg/dL (14 Nov 2022 12:06)      Anion Gap, Serum: 6 (11-15 @ 07:30)  Anion Gap, Serum: 7 (11-14 @ 07:12)      Calcium, Total Serum: 9.1 (11-15 @ 07:30)  Calcium, Total Serum: 9.1 (11-14 @ 07:12)  Albumin, Serum: 2.8 *L* (11-15 @ 07:30)  Albumin, Serum: 2.8 *L* (11-14 @ 07:12)    Alanine Aminotransferase (ALT/SGPT): 29 (11-15 @ 07:30)  Alanine Aminotransferase (ALT/SGPT): 29 (11-14 @ 07:12)  Alkaline Phosphatase, Serum: 150 *H* (11-15 @ 07:30)  Alkaline Phosphatase, Serum: 152 *H* (11-14 @ 07:12)  Aspartate Aminotransferase (AST/SGOT): 30 (11-15 @ 07:30)  Aspartate Aminotransferase (AST/SGOT): 31 (11-14 @ 07:12)        11-15    139  |  103  |  16  ----------------------------<  94  4.2   |  30  |  1.08    Ca    9.1      15 Nov 2022 07:30  Phos  3.0     11-15  Mg     1.6     11-15    TPro  5.9<L>  /  Alb  2.8<L>  /  TBili  0.5  /  DBili  x   /  AST  30  /  ALT  29  /  AlkPhos  150<H>  11-15                        11.3   4.28  )-----------( 191      ( 15 Nov 2022 07:42 )             35.3     Medications  MEDICATIONS  (STANDING):  chlorhexidine 2% Cloths 1 Application(s) Topical daily  dextrose 5%. 1000 milliLiter(s) (50 mL/Hr) IV Continuous <Continuous>  dextrose 5%. 1000 milliLiter(s) (100 mL/Hr) IV Continuous <Continuous>  dextrose 50% Injectable 25 Gram(s) IV Push once  dextrose 50% Injectable 12.5 Gram(s) IV Push once  dextrose 50% Injectable 25 Gram(s) IV Push once  ertapenem  IVPB 1000 milliGRAM(s) IV Intermittent every 24 hours  finasteride 5 milliGRAM(s) Oral daily  glucagon  Injectable 1 milliGRAM(s) IntraMuscular once  heparin   Injectable 5000 Unit(s) SubCutaneous every 8 hours  influenza  Vaccine (HIGH DOSE) 0.7 milliLiter(s) IntraMuscular once  insulin glargine Injectable (LANTUS) 10 Unit(s) SubCutaneous at bedtime  insulin lispro (ADMELOG) corrective regimen sliding scale   SubCutaneous Before meals and at bedtime  insulin lispro Injectable (ADMELOG) 5 Unit(s) SubCutaneous three times a day before meals  lidocaine   4% Patch 1 Patch Transdermal every 24 hours  melatonin 3 milliGRAM(s) Oral at bedtime  metoprolol succinate ER 25 milliGRAM(s) Oral daily  pantoprazole    Tablet 40 milliGRAM(s) Oral before breakfast  polyethylene glycol 3350 17 Gram(s) Oral daily  senna 2 Tablet(s) Oral at bedtime  tamsulosin 0.8 milliGRAM(s) Oral at bedtime      Physical Exam  General: Patient comfortable in bed  Vital Signs Last 12 Hrs  T(F): 97.4 (11-15-22 @ 04:55), Max: 97.4 (11-15-22 @ 04:55)  HR: 97 (11-15-22 @ 04:55) (97 - 97)  BP: 111/78 (11-15-22 @ 04:55) (111/78 - 111/78)  BP(mean): --  RR: 18 (11-15-22 @ 04:55) (18 - 18)  SpO2: 97% (11-15-22 @ 04:55) (97% - 97%)  Neck: No palpable thyroid nodules.  CVS: S1S2, No murmurs  Respiratory: No wheezing, no crepitations  GI: Abdomen soft, bowel sounds positive  Musculoskeletal:  edema lower extremities.   Skin: No skin rashes, no ecchymosis    Diagnostics    Free Thyroxine, Serum: AM Sched. Collection: 21-Oct-2022 06:00 (10-20 @ 13:18)  Thyroid Stimulating Hormone, Serum: AM Sched. Collection: 21-Oct-2022 06:00 (10-20 @ 13:18)  A1C with Estimated Average Glucose: Routine (10-20 @ 13:17)           Chief complaint  Patient is a 82y old  Male who presents with a chief complaint of Mechanical fall (10 Nov 2022 15:39)   Review of systems  Patient in bed, looks comfortable, no hypoglycemic episodes.    Labs and Fingersticks  CAPILLARY BLOOD GLUCOSE      POCT Blood Glucose.: 95 mg/dL (15 Nov 2022 08:17)  POCT Blood Glucose.: 157 mg/dL (14 Nov 2022 22:03)  POCT Blood Glucose.: 132 mg/dL (14 Nov 2022 17:26)  POCT Blood Glucose.: 117 mg/dL (14 Nov 2022 12:06)      Anion Gap, Serum: 6 (11-15 @ 07:30)  Anion Gap, Serum: 7 (11-14 @ 07:12)      Calcium, Total Serum: 9.1 (11-15 @ 07:30)  Calcium, Total Serum: 9.1 (11-14 @ 07:12)  Albumin, Serum: 2.8 *L* (11-15 @ 07:30)  Albumin, Serum: 2.8 *L* (11-14 @ 07:12)    Alanine Aminotransferase (ALT/SGPT): 29 (11-15 @ 07:30)  Alanine Aminotransferase (ALT/SGPT): 29 (11-14 @ 07:12)  Alkaline Phosphatase, Serum: 150 *H* (11-15 @ 07:30)  Alkaline Phosphatase, Serum: 152 *H* (11-14 @ 07:12)  Aspartate Aminotransferase (AST/SGOT): 30 (11-15 @ 07:30)  Aspartate Aminotransferase (AST/SGOT): 31 (11-14 @ 07:12)        11-15    139  |  103  |  16  ----------------------------<  94  4.2   |  30  |  1.08    Ca    9.1      15 Nov 2022 07:30  Phos  3.0     11-15  Mg     1.6     11-15    TPro  5.9<L>  /  Alb  2.8<L>  /  TBili  0.5  /  DBili  x   /  AST  30  /  ALT  29  /  AlkPhos  150<H>  11-15                        11.3   4.28  )-----------( 191      ( 15 Nov 2022 07:42 )             35.3     Medications  MEDICATIONS  (STANDING):  chlorhexidine 2% Cloths 1 Application(s) Topical daily  dextrose 5%. 1000 milliLiter(s) (50 mL/Hr) IV Continuous <Continuous>  dextrose 5%. 1000 milliLiter(s) (100 mL/Hr) IV Continuous <Continuous>  dextrose 50% Injectable 25 Gram(s) IV Push once  dextrose 50% Injectable 12.5 Gram(s) IV Push once  dextrose 50% Injectable 25 Gram(s) IV Push once  ertapenem  IVPB 1000 milliGRAM(s) IV Intermittent every 24 hours  finasteride 5 milliGRAM(s) Oral daily  glucagon  Injectable 1 milliGRAM(s) IntraMuscular once  heparin   Injectable 5000 Unit(s) SubCutaneous every 8 hours  influenza  Vaccine (HIGH DOSE) 0.7 milliLiter(s) IntraMuscular once  insulin glargine Injectable (LANTUS) 10 Unit(s) SubCutaneous at bedtime  insulin lispro (ADMELOG) corrective regimen sliding scale   SubCutaneous Before meals and at bedtime  insulin lispro Injectable (ADMELOG) 5 Unit(s) SubCutaneous three times a day before meals  lidocaine   4% Patch 1 Patch Transdermal every 24 hours  melatonin 3 milliGRAM(s) Oral at bedtime  metoprolol succinate ER 25 milliGRAM(s) Oral daily  pantoprazole    Tablet 40 milliGRAM(s) Oral before breakfast  polyethylene glycol 3350 17 Gram(s) Oral daily  senna 2 Tablet(s) Oral at bedtime  tamsulosin 0.8 milliGRAM(s) Oral at bedtime      Physical Exam  General: Patient comfortable in bed  Vital Signs Last 12 Hrs  T(F): 97.4 (11-15-22 @ 04:55), Max: 97.4 (11-15-22 @ 04:55)  HR: 97 (11-15-22 @ 04:55) (97 - 97)  BP: 111/78 (11-15-22 @ 04:55) (111/78 - 111/78)  BP(mean): --  RR: 18 (11-15-22 @ 04:55) (18 - 18)  SpO2: 97% (11-15-22 @ 04:55) (97% - 97%)  Neck: No palpable thyroid nodules.  CVS: S1S2, No murmurs  Respiratory: No wheezing, no crepitations  GI: Abdomen soft, bowel sounds positive  Musculoskeletal:  edema lower extremities.   Skin: No skin rashes, no ecchymosis    Diagnostics    Free Thyroxine, Serum: AM Sched. Collection: 21-Oct-2022 06:00 (10-20 @ 13:18)  Thyroid Stimulating Hormone, Serum: AM Sched. Collection: 21-Oct-2022 06:00 (10-20 @ 13:18)  A1C with Estimated Average Glucose: Routine (10-20 @ 13:17)

## 2022-11-15 NOTE — PROGRESS NOTE ADULT - SUBJECTIVE AND OBJECTIVE BOX
Interventional Radiology Follow-Up Note.    Patient seen and examined @ bedside.    This is a 82 year old Male s/p abdominal abscess drain placement on 11/1/22 in Interventional Radiology with Dr. Putnam.    No complaint offered.      Medication:  ertapenem  IVPB: (11-15)  heparin   Injectable: (11-15)  metoprolol succinate ER: (11-15)    Vitals:  T(F): 97.4, Max: 97.5 (21:10)  HR: 97  BP: 111/78  RR: 18  SpO2: 97%    Physical Exam:  General: Nontoxic, in NAD.  Abdomen: soft, NTND.   Drain Device: Drain intact attached to PEDRO LUIS bulb.  Flushed with 5cc NS w/o difficulty, no leaking. Dressing clean, dry, intact.   24hr Drain output: 30cc serosanguinous fluid          LABS:  Na: 139 (11-15 @ 07:30), 140 (11-14 @ 07:12), 139 (11-13 @ 06:49)  K: 4.2 (11-15 @ 07:30), 4.2 (11-14 @ 07:12), 4.1 (11-13 @ 06:49)  Cl: 103 (11-15 @ 07:30), 105 (11-14 @ 07:12), 104 (11-13 @ 06:49)  CO2: 30 (11-15 @ 07:30), 28 (11-14 @ 07:12), 26 (11-13 @ 06:49)  BUN: 16 (11-15 @ 07:30), 17 (11-14 @ 07:12), 18 (11-13 @ 06:49)  Cr: 1.08 (11-15 @ 07:30), 1.14 (11-14 @ 07:12), 1.02 (11-13 @ 06:49)  Glu: 94(11-15 @ 07:30), 90(11-14 @ 07:12), 98(11-13 @ 06:49)    Hgb: 11.3 (11-15 @ 07:42), 11.2 (11-14 @ 07:12), 11.0 (11-13 @ 06:52)  Hct: 35.3 (11-15 @ 07:42), 35.5 (11-14 @ 07:12), 34.5 (11-13 @ 06:52)  WBC: 4.28 (11-15 @ 07:42), 3.98 (11-14 @ 07:12), 3.93 (11-13 @ 06:52)  Plt: 191 (11-15 @ 07:42), 184 (11-14 @ 07:12), 186 (11-13 @ 06:52)    INR:   PTT:           LIVER FUNCTIONS - ( 15 Nov 2022 07:30 )  Alb: 2.8 g/dL / Pro: 5.9 g/dL / ALK PHOS: 150 U/L / ALT: 29 U/L / AST: 30 U/L / GGT: x                 Bilirubin Total, Serum: 0.5 mg/dL (11-15-22 @ 07:30)    Aspartate Aminotransferase (AST/SGOT): 30 U/L (11-15-22 @ 07:30)  Alanine Aminotransferase (ALT/SGPT): 29 U/L (11-15-22 @ 07:30)  Aspartate Aminotransferase (AST/SGOT): 31 U/L (11-14-22 @ 07:12)  Alanine Aminotransferase (ALT/SGPT): 29 U/L (11-14-22 @ 07:12)      Bilirubin Total, Serum: 0.5 mg/dL (11-15-22 @ 07:30)  Bilirubin Total, Serum: 0.5 mg/dL (11-14-22 @ 07:12)  Bilirubin Total, Serum: 0.5 mg/dL (11-13-22 @ 06:49)              Assessment/Plan:  82M w/ HTN, DM2, CAD, and BPH-TURP, 10/19/22 p/w L1 vertebral fx s/p mechanical fall; c/b ANTON.   IR consulted on 10/27 for drainage of 8 cm abscess interposed between the liver and upper pole R kidney. MR showing diffusion restriction. Concern for hematoma vs abscess.  S/p abdominal abscess drain placement on 11/1/22 in Interventional Radiology with Dr. Putnam.    - Output 30cc/24hr. If outputs<15cc/ 24hrs x 2 days, please obtain CT a/p noncon to eval.  - 11/1: s/p 10 fr drain placed into right abdominal collection between liver and kidney. ~100cc purulent fluid aspirated during procedure  - Flush drain with 5cc NS daily forward only; DO NOT aspirate  - Change dressing q3 days or when dressing is saturated.  - Trend vs/labs  - Continue global management per primary team  - If the patient is d/c home with drainage catheter, pt can make an appointment with IR by calling the IR booking office at [phone]; recommend IR follow in 10 days after discharge for tube evaluation.  - Pt will benefit from VNS service to help with drainage catheter care; Pt should continue same drainage catheter care as an outpatient.     Please call IR at [phone] with any questions, concerns, or issues regarding above.    Also available on [employer] TE[employer].

## 2022-11-15 NOTE — DISCHARGE NOTE PROVIDER - NSDCMRMEDTOKEN_GEN_ALL_CORE_FT
atorvastatin 20 mg oral tablet: 1 tab(s) orally once a day  cetirizine 10 mg oral tablet: 1 tab(s) orally once a day  Dexilant 60 mg oral delayed release capsule: 1 cap(s) orally once a day  finasteride 5 mg oral tablet: 1 tab(s) orally once a day  Janumet XR 50 mg-500 mg oral tablet, extended release: 1 tab(s) orally once a day (in the evening)  Myrbetriq 25 mg oral tablet, extended release: 1 tab(s) orally once a day  NovoLOG FlexPen 100 units/mL injectable solution: unit(s) injectable 3 times a day (before meals) **Sliding Scale**  omeprazole 20 mg oral delayed release capsule: 1 cap(s) orally once a day  Ozempic (1 mg dose) 4 mg/3 mL subcutaneous solution: 1 milligram(s) subcutaneous once a week  senna oral tablet: 2 tab(s) orally once a day (at bedtime)  Symbicort 160 mcg-4.5 mcg/inh inhalation aerosol: 2 puff(s) inhaled 2 times a day  tamsulosin 0.4 mg oral capsule: 2 cap(s) orally once a day (at bedtime)  TLSO BRACE Acute L1 Compression FX : once a day   Tresiba FlexTouch 100 units/mL subcutaneous solution: unit(s) subcutaneous once a day (at bedtime) ***Sliding Scale**  Vascepa 1 g oral capsule: 1 cap(s) orally 2 times a day   atorvastatin 20 mg oral tablet: 1 tab(s) orally once a day  cetirizine 10 mg oral tablet: 1 tab(s) orally once a day  Dexilant 60 mg oral delayed release capsule: 1 cap(s) orally once a day  finasteride 5 mg oral tablet: 1 tab(s) orally once a day  insulin glargine 100 units/mL subcutaneous solution: 10 unit(s) subcutaneous once a day (at bedtime)  insulin lispro 100 units/mL injectable solution: 5 unit(s) injectable 3 times a day (before meals)  Janumet XR 50 mg-500 mg oral tablet, extended release: 1 tab(s) orally once a day (in the evening)  metoprolol succinate 25 mg oral tablet, extended release: 1 tab(s) orally once a day  Myrbetriq 25 mg oral tablet, extended release: 1 tab(s) orally once a day  NovoLOG FlexPen 100 units/mL injectable solution: unit(s) injectable 3 times a day (before meals) **Sliding Scale**  omeprazole 20 mg oral delayed release capsule: 1 cap(s) orally once a day  Ozempic (1 mg dose) 4 mg/3 mL subcutaneous solution: 1 milligram(s) subcutaneous once a week  senna oral tablet: 2 tab(s) orally once a day (at bedtime)  Symbicort 160 mcg-4.5 mcg/inh inhalation aerosol: 2 puff(s) inhaled 2 times a day  tamsulosin 0.4 mg oral capsule: 2 cap(s) orally once a day (at bedtime)  TLSO BRACE Acute L1 Compression FX : once a day   Tresiba FlexTouch 100 units/mL subcutaneous solution: unit(s) subcutaneous once a day (at bedtime) ***Sliding Scale**  Vascepa 1 g oral capsule: 1 cap(s) orally 2 times a day   atorvastatin 20 mg oral tablet: 1 tab(s) orally once a day  Dexilant 60 mg oral delayed release capsule: 1 cap(s) orally once a day  finasteride 5 mg oral tablet: 1 tab(s) orally once a day  insulin glargine 100 units/mL subcutaneous solution: 10 unit(s) subcutaneous once a day (at bedtime)  insulin lispro 100 units/mL injectable solution: 5 unit(s) injectable 3 times a day prior to meals.   ipratropium-albuterol 0.5 mg-2.5 mg/3 mL inhalation solution: 3 milliliter(s) inhaled every 6 hours, As needed, Shortness of Breath  metoprolol succinate 25 mg oral tablet, extended release: 1 tab(s) orally once a day  Myrbetriq 25 mg oral tablet, extended release: 1 tab(s) orally once a day  omeprazole 20 mg oral delayed release capsule: 1 cap(s) orally once a day  senna oral tablet: 2 tab(s) orally once a day (at bedtime)  Symbicort 160 mcg-4.5 mcg/inh inhalation aerosol: 2 puff(s) inhaled 2 times a day  tamsulosin 0.4 mg oral capsule: 2 cap(s) orally once a day (at bedtime)  TLSO BRACE Acute L1 Compression FX : once a day   Vascepa 1 g oral capsule: 1 cap(s) orally 2 times a day

## 2022-11-15 NOTE — PROGRESS NOTE ADULT - SUBJECTIVE AND OBJECTIVE BOX
PROGRESS NOTE:   Authored by Dr. Lela Pena    Patient is a 82y old  Male who presents with a chief complaint of Mechanical fall (10 Nov 2022 15:39)      SUBJECTIVE / OVERNIGHT EVENTS: No overnight events. Used [employer]  service to obtain history. Patient says his dizziness has improved and no longer has headache, cough. No SOB, or abd pain. 30cc drainage in the past 24 hrs    ADDITIONAL REVIEW OF SYSTEMS:  No fever  No HA, dizziness improved  No cough, SOB  No abd pain.   Limited historian     MEDICATIONS  (STANDING):  chlorhexidine 2% Cloths 1 Application(s) Topical daily  dextrose 5%. 1000 milliLiter(s) (50 mL/Hr) IV Continuous <Continuous>  dextrose 5%. 1000 milliLiter(s) (100 mL/Hr) IV Continuous <Continuous>  dextrose 50% Injectable 25 Gram(s) IV Push once  dextrose 50% Injectable 12.5 Gram(s) IV Push once  dextrose 50% Injectable 25 Gram(s) IV Push once  ertapenem  IVPB 1000 milliGRAM(s) IV Intermittent every 24 hours  finasteride 5 milliGRAM(s) Oral daily  glucagon  Injectable 1 milliGRAM(s) IntraMuscular once  heparin   Injectable 5000 Unit(s) SubCutaneous every 8 hours  influenza  Vaccine (HIGH DOSE) 0.7 milliLiter(s) IntraMuscular once  insulin glargine Injectable (LANTUS) 10 Unit(s) SubCutaneous at bedtime  insulin lispro (ADMELOG) corrective regimen sliding scale   SubCutaneous Before meals and at bedtime  insulin lispro Injectable (ADMELOG) 5 Unit(s) SubCutaneous three times a day before meals  lidocaine   4% Patch 1 Patch Transdermal every 24 hours  melatonin 3 milliGRAM(s) Oral at bedtime  metoprolol succinate ER 25 milliGRAM(s) Oral daily  pantoprazole    Tablet 40 milliGRAM(s) Oral before breakfast  polyethylene glycol 3350 17 Gram(s) Oral daily  senna 2 Tablet(s) Oral at bedtime  tamsulosin 0.8 milliGRAM(s) Oral at bedtime    MEDICATIONS  (PRN):  acetaminophen     Tablet .. 650 milliGRAM(s) Oral every 6 hours PRN mild pain  albuterol/ipratropium for Nebulization 3 milliLiter(s) Nebulizer every 6 hours PRN Shortness of Breath  dextrose Oral Gel 15 Gram(s) Oral once PRN Blood Glucose LESS THAN 70 milliGRAM(s)/deciliter      CAPILLARY BLOOD GLUCOSE      POCT Blood Glucose.: 95 mg/dL (15 Nov 2022 08:17)  POCT Blood Glucose.: 157 mg/dL (14 Nov 2022 22:03)  POCT Blood Glucose.: 132 mg/dL (14 Nov 2022 17:26)  POCT Blood Glucose.: 117 mg/dL (14 Nov 2022 12:06)    I&O's Summary    14 Nov 2022 07:01  -  15 Nov 2022 07:00  --------------------------------------------------------  IN: 1200 mL / OUT: 1030 mL / NET: 170 mL        PHYSICAL EXAM:  Vital Signs Last 24 Hrs  T(C): 36.3 (15 Nov 2022 04:55), Max: 36.4 (14 Nov 2022 21:10)  T(F): 97.4 (15 Nov 2022 04:55), Max: 97.5 (14 Nov 2022 21:10)  HR: 97 (15 Nov 2022 04:55) (69 - 97)  BP: 111/78 (15 Nov 2022 04:55) (111/78 - 141/85)  BP(mean): --  RR: 18 (15 Nov 2022 04:55) (18 - 18)  SpO2: 97% (15 Nov 2022 04:55) (95% - 97%)    Parameters below as of 15 Nov 2022 04:55  Patient On (Oxygen Delivery Method): room air        GENERAL: NAD, lying comfortably in bed  HEAD: Atraumatic, normocephalic  EYES: Conjunctiva and sclera clear  NECK: Supple   RESPIRATORY: Normal respiratory effort; lungs are clear to auscultation bilaterally in anterior fields   CARDIOVASCULAR: Regular rate and rhythm, normal S1 and S2, no murmur/rub/gallop; No lower extremity edema  ABDOMEN: Nontender, normoactive bowel sounds, no rebound/guarding   MUSCULOSKELETAL: no deformities. Moving extremities sponatneously in bed  NEURO: Non focal   PSYCH: A+O to hospital, November, and self name; irritated affect     LABS:                          11.3   4.28  )-----------( 191      ( 15 Nov 2022 07:42 )             35.3     11-15    139  |  103  |  16  ----------------------------<  94  4.2   |  30  |  1.08    Ca    9.1      15 Nov 2022 07:30  Phos  3.0     11-15  Mg     1.6     11-15    TPro  5.9<L>  /  Alb  2.8<L>  /  TBili  0.5  /  DBili  x   /  AST  30  /  ALT  29  /  AlkPhos  150<H>  11-15                  RADIOLOGY:    Consulted note reviewed  Reviewed Imaging personally

## 2022-11-15 NOTE — DISCHARGE NOTE PROVIDER - PROVIDER TOKENS
FREE:[LAST:[[place]],FIRST:[Edmund],PHONE:[phone]],FAX:[(   )    -],ADDRESS:[[facility]  [address] [place] [address]  Phone: [phone]],FOLLOWUP:[1 week]] FREE:[LAST:[[place]],FIRST:[Edmund],PHONE:[phone]],FAX:[(   )    -],ADDRESS:[[facility]  [address] [place] [address]  Phone: [phone]],FOLLOWUP:[1 week]],PROVIDER:[TOKEN:[[id number]],FOLLOWUP:[1 week]]

## 2022-11-15 NOTE — PROGRESS NOTE ADULT - PROBLEM SELECTOR PLAN 4
Noted to be hypoxic with imaging showing findings concerning for PNA; likely from aspiration pneumonia given worsening dysphagia in the setting of L fracture and deconditioning. Now off NC and stable on RA.   Speech and swallow eval and MBS 10/28, recommendation for puree/mildly thickened liquid.   Speech pathologist Mally Villalobos recs for repeat MBS at [facility] after d/c  Stable on RA     - c/w Ertapenem 11/4-11/17  - Pulm consulted with recs earlier in his hospital course  - c/w Duoneb  - c/w incentive spirometer

## 2022-11-16 LAB
GLUCOSE BLDC GLUCOMTR-MCNC: 122 MG/DL — HIGH (ref 70–99)
GLUCOSE BLDC GLUCOMTR-MCNC: 146 MG/DL — HIGH (ref 70–99)
GLUCOSE BLDC GLUCOMTR-MCNC: 186 MG/DL — HIGH (ref 70–99)
GLUCOSE BLDC GLUCOMTR-MCNC: 95 MG/DL — SIGNIFICANT CHANGE UP (ref 70–99)

## 2022-11-16 RX ADMIN — FINASTERIDE 5 MILLIGRAM(S): 5 TABLET, FILM COATED ORAL at 11:40

## 2022-11-16 RX ADMIN — Medication 3 MILLIGRAM(S): at 22:00

## 2022-11-16 RX ADMIN — Medication 5 UNIT(S): at 18:25

## 2022-11-16 RX ADMIN — Medication 5 UNIT(S): at 13:22

## 2022-11-16 RX ADMIN — PANTOPRAZOLE SODIUM 40 MILLIGRAM(S): 20 TABLET, DELAYED RELEASE ORAL at 05:10

## 2022-11-16 RX ADMIN — Medication 25 MILLIGRAM(S): at 05:10

## 2022-11-16 RX ADMIN — HEPARIN SODIUM 5000 UNIT(S): 5000 INJECTION INTRAVENOUS; SUBCUTANEOUS at 05:10

## 2022-11-16 RX ADMIN — POLYETHYLENE GLYCOL 3350 17 GRAM(S): 17 POWDER, FOR SOLUTION ORAL at 11:40

## 2022-11-16 RX ADMIN — Medication 1: at 18:25

## 2022-11-16 RX ADMIN — LIDOCAINE 1 PATCH: 4 CREAM TOPICAL at 06:06

## 2022-11-16 RX ADMIN — HEPARIN SODIUM 5000 UNIT(S): 5000 INJECTION INTRAVENOUS; SUBCUTANEOUS at 13:23

## 2022-11-16 RX ADMIN — ERTAPENEM SODIUM 120 MILLIGRAM(S): 1 INJECTION, POWDER, LYOPHILIZED, FOR SOLUTION INTRAMUSCULAR; INTRAVENOUS at 11:39

## 2022-11-16 RX ADMIN — Medication 5 UNIT(S): at 08:44

## 2022-11-16 RX ADMIN — INSULIN GLARGINE 10 UNIT(S): 100 INJECTION, SOLUTION SUBCUTANEOUS at 22:11

## 2022-11-16 RX ADMIN — HEPARIN SODIUM 5000 UNIT(S): 5000 INJECTION INTRAVENOUS; SUBCUTANEOUS at 21:59

## 2022-11-16 RX ADMIN — TAMSULOSIN HYDROCHLORIDE 0.8 MILLIGRAM(S): 0.4 CAPSULE ORAL at 21:59

## 2022-11-16 RX ADMIN — SENNA PLUS 2 TABLET(S): 8.6 TABLET ORAL at 22:00

## 2022-11-16 NOTE — PROGRESS NOTE ADULT - PROBLEM SELECTOR PLAN 2
Suggest to continue monitoring, FU primary team recommendations. .
Suggest to continue monitoring, FU primary team recommendations. .
s/p mechanical fall at home  MRI showing L spine fracture  surgery consulted; no acute surgical intervention  ortho followup noted    -TLSO brace  -PT  -pain control with acetaminophen 650mg q6hr
Suggest to continue management, monitoring, FU primary team recommendations.
Suggest to continue monitoring, FU primary team recommendations. .
s/p mechanical fall at home  MRI showing L spine fracture  surgery consulted; no acute surgical intervention  ortho followup noted    -TLSO brace  -PT  -pain control with acetaminophen 650mg q6hr
Suggest to continue monitoring, FU primary team recommendations. .
Tested positive 11/7, needs to be inpatient until 11/13 before going to [facility]   afebrile, stable on room air  with mild intermittent cough    -continue to monitor without Remdesivir per ID
Suggest to continue monitoring, FU primary team recommendations. .
Suggest to continue management, monitoring, FU primary team recommendations.
Suggest to continue management, monitoring, FU primary team recommendations.
Suggest to continue monitoring, FU primary team recommendations. .
Suggest to continue monitoring, FU primary team recommendations. .
Suggest to continue management, monitoring, FU primary team recommendations.
Suggest to continue monitoring, FU primary team recommendations. .
Suggest to continue management, monitoring, FU primary team recommendations.
Suggest to continue management, monitoring, FU primary team recommendations.
Suggest to continue monitoring, FU primary team recommendations. .
Suggest to continue management, monitoring, FU primary team recommendations.
Tested positive 11/7, needs to be inpatient until 11/13 before going to [facility]   afebrile, stable on room air  with mild intermittent cough    -continue to monitor without Remdesivir per ID
Suggest to continue management, monitoring, FU primary team recommendations.
s/p mechanical fall at home  MRI showing L spine fracture  surgery consulted; no acute surgical intervention  ortho followup noted    -TLSO brace  -PT  -pain control with acetaminophen 650mg q6hr
Tested positive 11/7, needs to be inpatient until 11/13 before going to [facility]   afebrile, stable on room air  with mild intermittent cough    -continue to monitor without Remdesivir per ID
s/p mechanical fall at home  MRI showing L spine fracture  surgery consulted; no acute surgical intervention  ortho followup noted    -TLSO brace  -PT  -pain control with acetaminophen 650mg q6hr
s/p mechanical fall at home  MRI showing L spine fracture  surgery consulted; no acute surgical intervention  ortho followup noted    -TLSO brace  -PT  -pain control with acetaminophen 650mg q6hr

## 2022-11-16 NOTE — PROGRESS NOTE ADULT - PROBLEM SELECTOR PLAN 1
MRI showing collection between liver and kidney; possible abscess  Also with changes likely consistent with cysts, low suspicion for malignancy. Evaluated by   - 11/1 IR drain placement into right abdominal collection between liver and kidney. ~100cc purulent fluid aspirated during procedure. Culture noted. Ecoli resistant to zosyn, transitioned to ertapenem    - c/w ertapenem 11/4-11/17  - IR recs appreciated; decreased ouput noted (10cc/24hrs 11/9, 20cc/24hrs 11/10, 30 cc 11/11 20 cc 11/12 and 15cc 11/13, 10cc 11/14 but with some in the morning. 7.5cc 11/16) Hold off on repeat CT A/P for now, IR says okay to d/c patient and have him followup for CT scan if drainage <15 cc/24hr for 2 consecutive days.   - D/c with drain and f/u with IR at [phone] in 10 days for tube eval or for repeat CT   - Flush drain with 5cc NS daily forward    - Dressing change q3 days or when dressing saturated  - Needs VNS service for drainage catheter care  - ID consulted; recs appreciated MRI showing collection between liver and kidney; possible abscess  Also with changes likely consistent with cysts, low suspicion for malignancy. Evaluated by   - 11/1 IR drain placement into right abdominal collection between liver and kidney. ~100cc purulent fluid aspirated during procedure. Culture noted. Ecoli resistant to zosyn, transitioned to ertapenem    - c/w ertapenem 11/4-11/16; d/c today per ID  - IR recs appreciated; decreased ouput noted (10cc/24hrs 11/9, 20cc/24hrs 11/10, 30 cc 11/11 20 cc 11/12 and 15cc 11/13, 10cc 11/14 but with some in the morning. 7.5cc 11/16) Hold off on repeat CT A/P for now, IR says okay to d/c patient and have him followup for CT scan if drainage <15 cc/24hr for 2 consecutive days.   - D/c with drain and f/u with IR at [phone] in 10 days for tube eval or for repeat CT   - Flush drain with 5cc NS daily forward    - Dressing change q3 days or when dressing saturated  - Needs VNS service for drainage catheter care  - ID consulted; recs appreciated

## 2022-11-16 NOTE — PROGRESS NOTE ADULT - ATTENDING COMMENTS
to cont abx as per id  dc plan to rehab with drain
cont long term abx for abd abscess as per id  covid - asymptomatic  dc planning early next week
cont long term abx for abscess as per id  monitor drain outpt  supportive care for covid
dc planning
cont abx  monitor drain outpt. possible repeat CT tomorrow
ok to dc abx as per id  dc planning to rehab
cont iv abx for abscess as per id  IR f/u for drain  covid - supportive care

## 2022-11-16 NOTE — PROGRESS NOTE ADULT - PROBLEM SELECTOR PROBLEM 3
Perinephric fluid collection
COVID
Hypertension
COVID
COVID
Hypertension
COVID
Hypertension
Hypertension
COVID
Hypertension
Perinephric fluid collection
Hypertension
COVID
Perinephric fluid collection
Perinephric fluid collection
COVID
COVID
Fracture of lumbar spine
Perinephric fluid collection
Fracture of lumbar spine
Fracture of lumbar spine

## 2022-11-16 NOTE — PROGRESS NOTE ADULT - NS ATTEND AMEND GEN_ALL_CORE FT
Patient seen and examined today at bedside. Chart, labs, vitals, radiology reviewed. Above H&P reviewed and edited where appropriate. Agree with history and physical exam. Agree with assessment and plan. I reviewed the overnight course of events and discussed the care with the patient and their family  35 minutes spent on total encounter of which more than fifty percent of the encounter was spent counseling and/or coordinating care by the attending physician.
Chart, labs, vitals, radiology reviewed. Above H&P reviewed and edited where appropriate. Agree with history and physical exam. Agree with assessment and plan. I reviewed the overnight course of events and discussed the care with the patient/ family.  35 minutes spent on total encounter of which more than fifty percent of the encounter was spent counseling and/or coordinating care by the attending physician.
Patient seen and examined today at bedside. Chart, labs, vitals, radiology reviewed. Above H&P reviewed and edited where appropriate. Agree with history and physical exam. Agree with assessment and plan. I reviewed the overnight course of events and discussed the care with the patient and their family  35 minutes spent on total encounter of which more than fifty percent of the encounter was spent counseling and/or coordinating care by the attending physician.

## 2022-11-16 NOTE — PROGRESS NOTE ADULT - NS ATTEND OPT1 GEN_ALL_CORE
I attest my time as attending is greater than 50% of the total combined time spent on qualifying patient care activities by the PA/NP and attending.

## 2022-11-16 NOTE — PROGRESS NOTE ADULT - PROBLEM SELECTOR PROBLEM 2
Fracture of lumbar spine
Abscess of abdominal cavity
Fall
2019 novel coronavirus disease (COVID-19)
Abscess of abdominal cavity
Fall
Fall
Abscess of abdominal cavity
Fall
Abscess of abdominal cavity
Abscess of abdominal cavity
Fall
Fall
Fracture of lumbar spine
2019 novel coronavirus disease (COVID-19)
Abscess of abdominal cavity
2019 novel coronavirus disease (COVID-19)
Fracture of lumbar spine

## 2022-11-16 NOTE — PROGRESS NOTE ADULT - SUBJECTIVE AND OBJECTIVE BOX
PROGRESS NOTE:   Authored by Dr. Lela Pena    Patient is a 82y old  Male who presents with a chief complaint of Mechanical fall (10 Nov 2022 15:39)      SUBJECTIVE / OVERNIGHT EVENTS: No events overnight. Bulb drained about 7.5 cc in the past 24 hours. Patient stating he still has some mild cough, headache and dizziness. No abdominal pain per patient or other active complaints. History obtained through a [employer] .     ADDITIONAL REVIEW OF SYSTEMS:  No fever  + mild headache and dizziness  + mild cough  No abd pain, N/V    MEDICATIONS  (STANDING):  chlorhexidine 2% Cloths 1 Application(s) Topical daily  dextrose 5%. 1000 milliLiter(s) (50 mL/Hr) IV Continuous <Continuous>  dextrose 5%. 1000 milliLiter(s) (100 mL/Hr) IV Continuous <Continuous>  dextrose 50% Injectable 25 Gram(s) IV Push once  dextrose 50% Injectable 12.5 Gram(s) IV Push once  dextrose 50% Injectable 25 Gram(s) IV Push once  ertapenem  IVPB 1000 milliGRAM(s) IV Intermittent every 24 hours  finasteride 5 milliGRAM(s) Oral daily  glucagon  Injectable 1 milliGRAM(s) IntraMuscular once  heparin   Injectable 5000 Unit(s) SubCutaneous every 8 hours  influenza  Vaccine (HIGH DOSE) 0.7 milliLiter(s) IntraMuscular once  insulin glargine Injectable (LANTUS) 10 Unit(s) SubCutaneous at bedtime  insulin lispro (ADMELOG) corrective regimen sliding scale   SubCutaneous Before meals and at bedtime  insulin lispro Injectable (ADMELOG) 5 Unit(s) SubCutaneous three times a day before meals  lidocaine   4% Patch 1 Patch Transdermal every 24 hours  melatonin 3 milliGRAM(s) Oral at bedtime  metoprolol succinate ER 25 milliGRAM(s) Oral daily  pantoprazole    Tablet 40 milliGRAM(s) Oral before breakfast  polyethylene glycol 3350 17 Gram(s) Oral daily  senna 2 Tablet(s) Oral at bedtime  tamsulosin 0.8 milliGRAM(s) Oral at bedtime    MEDICATIONS  (PRN):  acetaminophen     Tablet .. 650 milliGRAM(s) Oral every 6 hours PRN mild pain  albuterol/ipratropium for Nebulization 3 milliLiter(s) Nebulizer every 6 hours PRN Shortness of Breath  dextrose Oral Gel 15 Gram(s) Oral once PRN Blood Glucose LESS THAN 70 milliGRAM(s)/deciliter      CAPILLARY BLOOD GLUCOSE      POCT Blood Glucose.: 122 mg/dL (16 Nov 2022 08:23)  POCT Blood Glucose.: 142 mg/dL (15 Nov 2022 22:23)  POCT Blood Glucose.: 115 mg/dL (15 Nov 2022 17:28)  POCT Blood Glucose.: 122 mg/dL (15 Nov 2022 12:17)    I&O's Summary    15 Nov 2022 07:01  -  16 Nov 2022 07:00  --------------------------------------------------------  IN: 150 mL / OUT: 1057.5 mL / NET: -907.5 mL        PHYSICAL EXAM:  Vital Signs Last 24 Hrs  T(C): 36.7 (16 Nov 2022 04:08), Max: 36.7 (16 Nov 2022 04:08)  T(F): 98 (16 Nov 2022 04:08), Max: 98 (16 Nov 2022 04:08)  HR: 72 (16 Nov 2022 04:08) (64 - 84)  BP: 120/70 (16 Nov 2022 04:08) (110/71 - 133/80)  BP(mean): --  RR: 18 (16 Nov 2022 04:08) (18 - 18)  SpO2: 97% (16 Nov 2022 04:08) (94% - 99%)    Parameters below as of 16 Nov 2022 04:08  Patient On (Oxygen Delivery Method): room air        GENERAL: NAD, lying comfortably in bed  HEAD: Atraumatic, normocephalic  EYES: Conjunctiva and sclera clear  NECK: Supple  RESPIRATORY: Normal respiratory effort; lungs are clear to auscultation bilaterally  CARDIOVASCULAR: Regular rate and rhythm, normal S1 and S2, no murmur/rub/gallop; No lower extremity edema  ABDOMEN: Nontender, normoactive bowel sounds, no rebound/guarding; No hepatosplenomegaly  MUSCULOSKELETAL: no clubbing or cyanosis of digits; no joint swelling or tenderness to palpation  NEURO: Non focal   SKIN:   PSYCH: A+O to person, place, and time; affect appropriate    LABS:                          11.3   4.28  )-----------( 191      ( 15 Nov 2022 07:42 )             35.3     11-15    139  |  103  |  16  ----------------------------<  94  4.2   |  30  |  1.08    Ca    9.1      15 Nov 2022 07:30  Phos  3.0     11-15  Mg     1.6     11-15    TPro  5.9<L>  /  Alb  2.8<L>  /  TBili  0.5  /  DBili  x   /  AST  30  /  ALT  29  /  AlkPhos  150<H>  11-15                  RADIOLOGY:    Consulted note reviewed  Reviewed Imaging personally   PROGRESS NOTE:   Authored by Dr. Lela Pena    Patient is a 82y old  Male who presents with a chief complaint of Mechanical fall (10 Nov 2022 15:39)      SUBJECTIVE / OVERNIGHT EVENTS: No events overnight. Bulb drained about 7.5 cc in the past 24 hours. Patient stating he still has some mild cough, headache and dizziness. No abdominal pain per patient or other active complaints. History obtained through a [employer] .     ADDITIONAL REVIEW OF SYSTEMS:  No fever  + mild headache and dizziness  + mild cough  No abd pain, N/V    MEDICATIONS  (STANDING):  chlorhexidine 2% Cloths 1 Application(s) Topical daily  dextrose 5%. 1000 milliLiter(s) (50 mL/Hr) IV Continuous <Continuous>  dextrose 5%. 1000 milliLiter(s) (100 mL/Hr) IV Continuous <Continuous>  dextrose 50% Injectable 25 Gram(s) IV Push once  dextrose 50% Injectable 12.5 Gram(s) IV Push once  dextrose 50% Injectable 25 Gram(s) IV Push once  ertapenem  IVPB 1000 milliGRAM(s) IV Intermittent every 24 hours  finasteride 5 milliGRAM(s) Oral daily  glucagon  Injectable 1 milliGRAM(s) IntraMuscular once  heparin   Injectable 5000 Unit(s) SubCutaneous every 8 hours  influenza  Vaccine (HIGH DOSE) 0.7 milliLiter(s) IntraMuscular once  insulin glargine Injectable (LANTUS) 10 Unit(s) SubCutaneous at bedtime  insulin lispro (ADMELOG) corrective regimen sliding scale   SubCutaneous Before meals and at bedtime  insulin lispro Injectable (ADMELOG) 5 Unit(s) SubCutaneous three times a day before meals  lidocaine   4% Patch 1 Patch Transdermal every 24 hours  melatonin 3 milliGRAM(s) Oral at bedtime  metoprolol succinate ER 25 milliGRAM(s) Oral daily  pantoprazole    Tablet 40 milliGRAM(s) Oral before breakfast  polyethylene glycol 3350 17 Gram(s) Oral daily  senna 2 Tablet(s) Oral at bedtime  tamsulosin 0.8 milliGRAM(s) Oral at bedtime    MEDICATIONS  (PRN):  acetaminophen     Tablet .. 650 milliGRAM(s) Oral every 6 hours PRN mild pain  albuterol/ipratropium for Nebulization 3 milliLiter(s) Nebulizer every 6 hours PRN Shortness of Breath  dextrose Oral Gel 15 Gram(s) Oral once PRN Blood Glucose LESS THAN 70 milliGRAM(s)/deciliter      CAPILLARY BLOOD GLUCOSE      POCT Blood Glucose.: 122 mg/dL (16 Nov 2022 08:23)  POCT Blood Glucose.: 142 mg/dL (15 Nov 2022 22:23)  POCT Blood Glucose.: 115 mg/dL (15 Nov 2022 17:28)  POCT Blood Glucose.: 122 mg/dL (15 Nov 2022 12:17)    I&O's Summary    15 Nov 2022 07:01  -  16 Nov 2022 07:00  --------------------------------------------------------  IN: 150 mL / OUT: 1057.5 mL / NET: -907.5 mL        PHYSICAL EXAM:  Vital Signs Last 24 Hrs  T(C): 36.7 (16 Nov 2022 04:08), Max: 36.7 (16 Nov 2022 04:08)  T(F): 98 (16 Nov 2022 04:08), Max: 98 (16 Nov 2022 04:08)  HR: 72 (16 Nov 2022 04:08) (64 - 84)  BP: 120/70 (16 Nov 2022 04:08) (110/71 - 133/80)  BP(mean): --  RR: 18 (16 Nov 2022 04:08) (18 - 18)  SpO2: 97% (16 Nov 2022 04:08) (94% - 99%)    Parameters below as of 16 Nov 2022 04:08  Patient On (Oxygen Delivery Method): room air        GENERAL: NAD, lying comfortably in bed  HEAD: Atraumatic, normocephalic  EYES: Conjunctiva and sclera clear  NECK: Supple  RESPIRATORY: Normal respiratory effort; lungs are clear to auscultation bilaterally in anterior fields   CARDIOVASCULAR: Regular rate and rhythm, normal S1 and S2, no murmur/rub/gallop; No lower extremity edema  ABDOMEN: Nontender, normoactive bowel sounds, no rebound/guarding; has a bulb on R lateral abdomen draining minimal amount of serosanguinous fluid   MUSCULOSKELETAL: no gross deformities. Moving extremities in bed spontaneously   NEURO: Non focal  PSYCH: alert, orientation could not be assessed; affect appropriate        LABS:                          11.3   4.28  )-----------( 191      ( 15 Nov 2022 07:42 )             35.3     11-15    139  |  103  |  16  ----------------------------<  94  4.2   |  30  |  1.08    Ca    9.1      15 Nov 2022 07:30  Phos  3.0     11-15  Mg     1.6     11-15    TPro  5.9<L>  /  Alb  2.8<L>  /  TBili  0.5  /  DBili  x   /  AST  30  /  ALT  29  /  AlkPhos  150<H>  11-15                  RADIOLOGY:    Consulted note reviewed  Reviewed Imaging personally

## 2022-11-16 NOTE — PROGRESS NOTE ADULT - PROBLEM SELECTOR PLAN 8
DVT ppx: Heparin Subq   Diet: Pureed/mildly thickened liquid per Speech and Swallow  Dispo: [facility]; pending COVID course until 11/13 and Abx until 11/17  Code: Full DVT ppx: Heparin Subq   Diet: Pureed/mildly thickened liquid per Speech and Swallow  Dispo: [facility]; ready to be discharged   Code: Full

## 2022-11-16 NOTE — PROGRESS NOTE ADULT - ASSESSMENT
Assessment  DMT2: 82y Male with DM T2 with hyperglycemia, A1C 10.4%, was on insulin at home, now on basal bolus insulin, decreased dose yesterday, blood sugars are improving/stable and trending within acceptable range, no hypoglycemias, eating meals, no acute events, DC planning.  Abdominal Abscess: s/p drain, monitored.  Covid: stable, monitored, FU ID.  S/P Fall: workup in progress, stable, monitored.  HTN: Un Controlled,  on antihypertensive medications.          Melba Reyes MD  Cell: [phone]  Office: [phone]               Assessment  DMT2: 82y Male with DM T2 with hyperglycemia, A1C 10.4%, was on insulin at home, now on basal bolus insulin, decreased dose yesterday, blood sugars are improving/stable and trending within acceptable range,  no hypoglycemias, eating meals, no acute events, DC planning.  Abdominal Abscess: s/p drain, monitored.  Covid: stable, monitored, FU ID.  S/P Fall: workup in progress, stable, monitored.  HTN: Un Controlled,  on antihypertensive medications.          Melba Reyes MD  Cell: [phone]  Office: [phone]

## 2022-11-16 NOTE — PROGRESS NOTE ADULT - ASSESSMENT
81 male h/o HTN, HLD, DM who presented with back pain s/p a mechanical fall at home, found to have L spine fracture on MRI, no surgical plan per ortho, on TSLO brace and PT, and course c/b dysphagia with aspiration pneumonia and concern for perinephric fluid and abscess s/p drainage by IR on 11/1, with culture showing VRE E. Coli, being treated with 2 week course of Ertapenem (11/1-11/14). Course also c/b iatrogenic COVID infection, stable on RA without Remdesivir treatment.  Pt is 81 male h/o HTN, HLD, DM who presented with back pain s/p a mechanical fall at home, found to have L spine fracture on MRI, no surgical plan per ortho, on TSLO brace and PT, and course c/b dysphagia with aspiration pneumonia and concern for perinephric fluid and abscess s/p drainage by IR on 11/1, with culture showing VRE E. Coli, being treated with 2 week course of Ertapenem (11/1-11/14). Course also c/b iatrogenic COVID infection, stable on RA without Remdesivir treatment.

## 2022-11-16 NOTE — PROGRESS NOTE ADULT - SUBJECTIVE AND OBJECTIVE BOX
Chief complaint  Patient is a 82y old  Male who presents with a chief complaint of Mechanical fall (10 Nov 2022 15:39)   Review of systems  Patient in bed, looks comfortable, no hypoglycemic episodes.    Labs and Fingersticks  CAPILLARY BLOOD GLUCOSE      POCT Blood Glucose.: 122 mg/dL (16 Nov 2022 08:23)  POCT Blood Glucose.: 142 mg/dL (15 Nov 2022 22:23)  POCT Blood Glucose.: 115 mg/dL (15 Nov 2022 17:28)  POCT Blood Glucose.: 122 mg/dL (15 Nov 2022 12:17)      Anion Gap, Serum: 6 (11-15 @ 07:30)      Calcium, Total Serum: 9.1 (11-15 @ 07:30)  Albumin, Serum: 2.8 *L* (11-15 @ 07:30)    Alanine Aminotransferase (ALT/SGPT): 29 (11-15 @ 07:30)  Alkaline Phosphatase, Serum: 150 *H* (11-15 @ 07:30)  Aspartate Aminotransferase (AST/SGOT): 30 (11-15 @ 07:30)        11-15    139  |  103  |  16  ----------------------------<  94  4.2   |  30  |  1.08    Ca    9.1      15 Nov 2022 07:30  Phos  3.0     11-15  Mg     1.6     11-15    TPro  5.9<L>  /  Alb  2.8<L>  /  TBili  0.5  /  DBili  x   /  AST  30  /  ALT  29  /  AlkPhos  150<H>  11-15                        11.3   4.28  )-----------( 191      ( 15 Nov 2022 07:42 )             35.3     Medications  MEDICATIONS  (STANDING):  chlorhexidine 2% Cloths 1 Application(s) Topical daily  dextrose 5%. 1000 milliLiter(s) (50 mL/Hr) IV Continuous <Continuous>  dextrose 5%. 1000 milliLiter(s) (100 mL/Hr) IV Continuous <Continuous>  dextrose 50% Injectable 25 Gram(s) IV Push once  dextrose 50% Injectable 12.5 Gram(s) IV Push once  dextrose 50% Injectable 25 Gram(s) IV Push once  ertapenem  IVPB 1000 milliGRAM(s) IV Intermittent every 24 hours  finasteride 5 milliGRAM(s) Oral daily  glucagon  Injectable 1 milliGRAM(s) IntraMuscular once  heparin   Injectable 5000 Unit(s) SubCutaneous every 8 hours  influenza  Vaccine (HIGH DOSE) 0.7 milliLiter(s) IntraMuscular once  insulin glargine Injectable (LANTUS) 10 Unit(s) SubCutaneous at bedtime  insulin lispro (ADMELOG) corrective regimen sliding scale   SubCutaneous Before meals and at bedtime  insulin lispro Injectable (ADMELOG) 5 Unit(s) SubCutaneous three times a day before meals  lidocaine   4% Patch 1 Patch Transdermal every 24 hours  melatonin 3 milliGRAM(s) Oral at bedtime  metoprolol succinate ER 25 milliGRAM(s) Oral daily  pantoprazole    Tablet 40 milliGRAM(s) Oral before breakfast  polyethylene glycol 3350 17 Gram(s) Oral daily  senna 2 Tablet(s) Oral at bedtime  tamsulosin 0.8 milliGRAM(s) Oral at bedtime      Physical Exam  General: Patient comfortable in bed  Vital Signs Last 12 Hrs  T(F): 98 (11-16-22 @ 04:08), Max: 98 (11-16-22 @ 04:08)  HR: 72 (11-16-22 @ 04:08) (72 - 72)  BP: 120/70 (11-16-22 @ 04:08) (120/70 - 120/70)  BP(mean): --  RR: 18 (11-16-22 @ 04:08) (18 - 18)  SpO2: 97% (11-16-22 @ 04:08) (97% - 97%)  Neck: No palpable thyroid nodules.  CVS: S1S2, No murmurs  Respiratory: No wheezing, no crepitations  GI: Abdomen soft, bowel sounds positive  Musculoskeletal:  edema lower extremities.   Skin: No skin rashes, no ecchymosis    Diagnostics    Free Thyroxine, Serum: AM Sched. Collection: 21-Oct-2022 06:00 (10-20 @ 13:18)  Thyroid Stimulating Hormone, Serum: AM Sched. Collection: 21-Oct-2022 06:00 (10-20 @ 13:18)  A1C with Estimated Average Glucose: Routine (10-20 @ 13:17)           Chief complaint  Patient is a 82y old  Male who presents with a chief complaint of Mechanical fall (10 Nov 2022 15:39)   Review of systems  Patient in bed, looks comfortable, no hypoglycemic episodes.    Labs and Fingersticks  CAPILLARY BLOOD GLUCOSE      POCT Blood Glucose.: 122 mg/dL (16 Nov 2022 08:23)  POCT Blood Glucose.: 142 mg/dL (15 Nov 2022 22:23)  POCT Blood Glucose.: 115 mg/dL (15 Nov 2022 17:28)  POCT Blood Glucose.: 122 mg/dL (15 Nov 2022 12:17)      Anion Gap, Serum: 6 (11-15 @ 07:30)      Calcium, Total Serum: 9.1 (11-15 @ 07:30)  Albumin, Serum: 2.8 *L* (11-15 @ 07:30)    Alanine Aminotransferase (ALT/SGPT): 29 (11-15 @ 07:30)  Alkaline Phosphatase, Serum: 150 *H* (11-15 @ 07:30)  Aspartate Aminotransferase (AST/SGOT): 30 (11-15 @ 07:30)        11-15    139  |  103  |  16  ----------------------------<  94  4.2   |  30  |  1.08    Ca    9.1      15 Nov 2022 07:30  Phos  3.0     11-15  Mg     1.6     11-15    TPro  5.9<L>  /  Alb  2.8<L>  /  TBili  0.5  /  DBili  x   /  AST  30  /  ALT  29  /  AlkPhos  150<H>  11-15                        11.3   4.28  )-----------( 191      ( 15 Nov 2022 07:42 )             35.3     Medications  MEDICATIONS  (STANDING):  chlorhexidine 2% Cloths 1 Application(s) Topical daily  dextrose 5%. 1000 milliLiter(s) (50 mL/Hr) IV Continuous <Continuous>  dextrose 5%. 1000 milliLiter(s) (100 mL/Hr) IV Continuous <Continuous>  dextrose 50% Injectable 25 Gram(s) IV Push once  dextrose 50% Injectable 12.5 Gram(s) IV Push once  dextrose 50% Injectable 25 Gram(s) IV Push once  ertapenem  IVPB 1000 milliGRAM(s) IV Intermittent every 24 hours  finasteride 5 milliGRAM(s) Oral daily  glucagon  Injectable 1 milliGRAM(s) IntraMuscular once  heparin   Injectable 5000 Unit(s) SubCutaneous every 8 hours  influenza  Vaccine (HIGH DOSE) 0.7 milliLiter(s) IntraMuscular once  insulin glargine Injectable (LANTUS) 10 Unit(s) SubCutaneous at bedtime  insulin lispro (ADMELOG) corrective regimen sliding scale   SubCutaneous Before meals and at bedtime  insulin lispro Injectable (ADMELOG) 5 Unit(s) SubCutaneous three times a day before meals  lidocaine   4% Patch 1 Patch Transdermal every 24 hours  melatonin 3 milliGRAM(s) Oral at bedtime  metoprolol succinate ER 25 milliGRAM(s) Oral daily  pantoprazole    Tablet 40 milliGRAM(s) Oral before breakfast  polyethylene glycol 3350 17 Gram(s) Oral daily  senna 2 Tablet(s) Oral at bedtime  tamsulosin 0.8 milliGRAM(s) Oral at bedtime      Physical Exam  General: Patient comfortable in bed  Vital Signs Last 12 Hrs  T(F): 98 (11-16-22 @ 04:08), Max: 98 (11-16-22 @ 04:08)  HR: 72 (11-16-22 @ 04:08) (72 - 72)  BP: 120/70 (11-16-22 @ 04:08) (120/70 - 120/70)  BP(mean): --  RR: 18 (11-16-22 @ 04:08) (18 - 18)  SpO2: 97% (11-16-22 @ 04:08) (97% - 97%)  Neck: No palpable thyroid nodules.  CVS: S1S2, No murmurs  Respiratory: No wheezing, no crepitations  GI: Abdomen soft, bowel sounds positive  Musculoskeletal:  edema lower extremities.   Skin: No skin rashes, no ecchymosis    Diagnostics    Free Thyroxine, Serum: AM Sched. Collection: 21-Oct-2022 06:00 (10-20 @ 13:18)  Thyroid Stimulating Hormone, Serum: AM Sched. Collection: 21-Oct-2022 06:00 (10-20 @ 13:18)  A1C with Estimated Average Glucose: Routine (10-20 @ 13:17)

## 2022-11-16 NOTE — PROGRESS NOTE ADULT - PROBLEM/PLAN-7
DISPLAY PLAN FREE TEXT
WDL
DISPLAY PLAN FREE TEXT
DISPLAY PLAN FREE TEXT

## 2022-11-16 NOTE — PROGRESS NOTE ADULT - PROBLEM SELECTOR PROBLEM 1
DM (diabetes mellitus)
Perinephric fluid collection
DM (diabetes mellitus)
DM (diabetes mellitus)
Perinephric fluid collection
DM (diabetes mellitus)
2019 novel coronavirus disease (COVID-19)
DM (diabetes mellitus)
2019 novel coronavirus disease (COVID-19)
DM (diabetes mellitus)
DM (diabetes mellitus)
2019 novel coronavirus disease (COVID-19)
2019 novel coronavirus disease (COVID-19)
Perinephric fluid collection
2019 novel coronavirus disease (COVID-19)

## 2022-11-16 NOTE — PROGRESS NOTE ADULT - PROBLEM SELECTOR PLAN 1
Will continue current insulin regimen for now. Will continue monitoring FS and FU.  Patient was on insulin at home, will be DC on current insulin doses.  Patient counseled for compliance with consistent low carb diet.

## 2022-11-17 ENCOUNTER — TRANSCRIPTION ENCOUNTER (OUTPATIENT)
Age: 82
End: 2022-11-17

## 2022-11-17 VITALS
DIASTOLIC BLOOD PRESSURE: 82 MMHG | HEART RATE: 76 BPM | TEMPERATURE: 98 F | SYSTOLIC BLOOD PRESSURE: 119 MMHG | RESPIRATION RATE: 16 BRPM | OXYGEN SATURATION: 97 %

## 2022-11-17 DIAGNOSIS — R18.8 OTHER ASCITES: ICD-10-CM

## 2022-11-17 PROBLEM — Z00.00 ENCOUNTER FOR PREVENTIVE HEALTH EXAMINATION: Status: ACTIVE | Noted: 2022-11-17

## 2022-11-17 LAB — GLUCOSE BLDC GLUCOMTR-MCNC: 121 MG/DL — HIGH (ref 70–99)

## 2022-11-17 PROCEDURE — 71045 X-RAY EXAM CHEST 1 VIEW: CPT

## 2022-11-17 PROCEDURE — C1769: CPT

## 2022-11-17 PROCEDURE — 82803 BLOOD GASES ANY COMBINATION: CPT

## 2022-11-17 PROCEDURE — A9585: CPT

## 2022-11-17 PROCEDURE — C1729: CPT

## 2022-11-17 PROCEDURE — 84439 ASSAY OF FREE THYROXINE: CPT

## 2022-11-17 PROCEDURE — 85652 RBC SED RATE AUTOMATED: CPT

## 2022-11-17 PROCEDURE — 84443 ASSAY THYROID STIM HORMONE: CPT

## 2022-11-17 PROCEDURE — 82330 ASSAY OF CALCIUM: CPT

## 2022-11-17 PROCEDURE — 97161 PT EVAL LOW COMPLEX 20 MIN: CPT

## 2022-11-17 PROCEDURE — 87205 SMEAR GRAM STAIN: CPT

## 2022-11-17 PROCEDURE — 87070 CULTURE OTHR SPECIMN AEROBIC: CPT

## 2022-11-17 PROCEDURE — 96365 THER/PROPH/DIAG IV INF INIT: CPT

## 2022-11-17 PROCEDURE — 81001 URINALYSIS AUTO W/SCOPE: CPT

## 2022-11-17 PROCEDURE — 92526 ORAL FUNCTION THERAPY: CPT

## 2022-11-17 PROCEDURE — 92610 EVALUATE SWALLOWING FUNCTION: CPT

## 2022-11-17 PROCEDURE — 80053 COMPREHEN METABOLIC PANEL: CPT

## 2022-11-17 PROCEDURE — 85025 COMPLETE CBC W/AUTO DIFF WBC: CPT

## 2022-11-17 PROCEDURE — 71250 CT THORAX DX C-: CPT | Mod: MA

## 2022-11-17 PROCEDURE — 74183 MRI ABD W/O CNTR FLWD CNTR: CPT

## 2022-11-17 PROCEDURE — 86850 RBC ANTIBODY SCREEN: CPT

## 2022-11-17 PROCEDURE — 87186 SC STD MICRODIL/AGAR DIL: CPT

## 2022-11-17 PROCEDURE — 84100 ASSAY OF PHOSPHORUS: CPT

## 2022-11-17 PROCEDURE — 87040 BLOOD CULTURE FOR BACTERIA: CPT

## 2022-11-17 PROCEDURE — 74176 CT ABD & PELVIS W/O CONTRAST: CPT | Mod: MA

## 2022-11-17 PROCEDURE — 82947 ASSAY GLUCOSE BLOOD QUANT: CPT

## 2022-11-17 PROCEDURE — U0003: CPT

## 2022-11-17 PROCEDURE — 82962 GLUCOSE BLOOD TEST: CPT

## 2022-11-17 PROCEDURE — 82436 ASSAY OF URINE CHLORIDE: CPT

## 2022-11-17 PROCEDURE — 96361 HYDRATE IV INFUSION ADD-ON: CPT

## 2022-11-17 PROCEDURE — 49406 IMAGE CATH FLUID PERI/RETRO: CPT

## 2022-11-17 PROCEDURE — 84133 ASSAY OF URINE POTASSIUM: CPT

## 2022-11-17 PROCEDURE — 83735 ASSAY OF MAGNESIUM: CPT

## 2022-11-17 PROCEDURE — 84300 ASSAY OF URINE SODIUM: CPT

## 2022-11-17 PROCEDURE — 92611 MOTION FLUOROSCOPY/SWALLOW: CPT

## 2022-11-17 PROCEDURE — U0005: CPT

## 2022-11-17 PROCEDURE — 36415 COLL VENOUS BLD VENIPUNCTURE: CPT

## 2022-11-17 PROCEDURE — 72125 CT NECK SPINE W/O DYE: CPT | Mod: MA

## 2022-11-17 PROCEDURE — 72148 MRI LUMBAR SPINE W/O DYE: CPT | Mod: MA

## 2022-11-17 PROCEDURE — 85610 PROTHROMBIN TIME: CPT

## 2022-11-17 PROCEDURE — 86901 BLOOD TYPING SEROLOGIC RH(D): CPT

## 2022-11-17 PROCEDURE — 90662 IIV NO PRSV INCREASED AG IM: CPT

## 2022-11-17 PROCEDURE — 87086 URINE CULTURE/COLONY COUNT: CPT

## 2022-11-17 PROCEDURE — 82435 ASSAY OF BLOOD CHLORIDE: CPT

## 2022-11-17 PROCEDURE — 84132 ASSAY OF SERUM POTASSIUM: CPT

## 2022-11-17 PROCEDURE — 83036 HEMOGLOBIN GLYCOSYLATED A1C: CPT

## 2022-11-17 PROCEDURE — 82550 ASSAY OF CK (CPK): CPT

## 2022-11-17 PROCEDURE — 85730 THROMBOPLASTIN TIME PARTIAL: CPT

## 2022-11-17 PROCEDURE — 97116 GAIT TRAINING THERAPY: CPT

## 2022-11-17 PROCEDURE — 85027 COMPLETE CBC AUTOMATED: CPT

## 2022-11-17 PROCEDURE — 86900 BLOOD TYPING SEROLOGIC ABO: CPT

## 2022-11-17 PROCEDURE — 74230 X-RAY XM SWLNG FUNCJ C+: CPT

## 2022-11-17 PROCEDURE — 82570 ASSAY OF URINE CREATININE: CPT

## 2022-11-17 PROCEDURE — 70450 CT HEAD/BRAIN W/O DYE: CPT | Mod: MA

## 2022-11-17 PROCEDURE — 80048 BASIC METABOLIC PNL TOTAL CA: CPT

## 2022-11-17 PROCEDURE — 94640 AIRWAY INHALATION TREATMENT: CPT

## 2022-11-17 PROCEDURE — 87077 CULTURE AEROBIC IDENTIFY: CPT

## 2022-11-17 PROCEDURE — 85014 HEMATOCRIT: CPT

## 2022-11-17 PROCEDURE — 76770 US EXAM ABDO BACK WALL COMP: CPT

## 2022-11-17 PROCEDURE — 85018 HEMOGLOBIN: CPT

## 2022-11-17 PROCEDURE — 0225U NFCT DS DNA&RNA 21 SARSCOV2: CPT

## 2022-11-17 PROCEDURE — 97110 THERAPEUTIC EXERCISES: CPT

## 2022-11-17 PROCEDURE — 99285 EMERGENCY DEPT VISIT HI MDM: CPT | Mod: 25

## 2022-11-17 PROCEDURE — 84145 PROCALCITONIN (PCT): CPT

## 2022-11-17 PROCEDURE — 99232 SBSQ HOSP IP/OBS MODERATE 35: CPT

## 2022-11-17 PROCEDURE — 85379 FIBRIN DEGRADATION QUANT: CPT

## 2022-11-17 PROCEDURE — 86140 C-REACTIVE PROTEIN: CPT

## 2022-11-17 PROCEDURE — 97530 THERAPEUTIC ACTIVITIES: CPT

## 2022-11-17 PROCEDURE — 83605 ASSAY OF LACTIC ACID: CPT

## 2022-11-17 PROCEDURE — 80076 HEPATIC FUNCTION PANEL: CPT

## 2022-11-17 PROCEDURE — 84295 ASSAY OF SERUM SODIUM: CPT

## 2022-11-17 RX ORDER — INSULIN LISPRO 100/ML
5 VIAL (ML) SUBCUTANEOUS
Qty: 0 | Refills: 0 | DISCHARGE
Start: 2022-11-17

## 2022-11-17 RX ORDER — SEMAGLUTIDE 0.68 MG/ML
1 INJECTION, SOLUTION SUBCUTANEOUS
Qty: 0 | Refills: 0 | DISCHARGE

## 2022-11-17 RX ORDER — METOPROLOL TARTRATE 50 MG
1 TABLET ORAL
Qty: 0 | Refills: 0 | DISCHARGE
Start: 2022-11-17

## 2022-11-17 RX ORDER — CETIRIZINE HYDROCHLORIDE 10 MG/1
1 TABLET ORAL
Qty: 0 | Refills: 0 | DISCHARGE

## 2022-11-17 RX ORDER — SITAGLIPTIN AND METFORMIN HYDROCHLORIDE 500; 50 MG/1; MG/1
1 TABLET, FILM COATED ORAL
Qty: 0 | Refills: 0 | DISCHARGE

## 2022-11-17 RX ORDER — INSULIN DEGLUDEC 100 U/ML
0 INJECTION, SOLUTION SUBCUTANEOUS
Qty: 0 | Refills: 0 | DISCHARGE

## 2022-11-17 RX ORDER — IPRATROPIUM/ALBUTEROL SULFATE 18-103MCG
3 AEROSOL WITH ADAPTER (GRAM) INHALATION
Qty: 0 | Refills: 0 | DISCHARGE
Start: 2022-11-17

## 2022-11-17 RX ORDER — INSULIN GLARGINE 100 [IU]/ML
10 INJECTION, SOLUTION SUBCUTANEOUS
Qty: 0 | Refills: 0 | DISCHARGE
Start: 2022-11-17

## 2022-11-17 RX ORDER — INSULIN ASPART 100 [IU]/ML
0 INJECTION, SOLUTION SUBCUTANEOUS
Qty: 0 | Refills: 0 | DISCHARGE

## 2022-11-17 RX ADMIN — Medication 25 MILLIGRAM(S): at 06:17

## 2022-11-17 RX ADMIN — CHLORHEXIDINE GLUCONATE 1 APPLICATION(S): 213 SOLUTION TOPICAL at 11:05

## 2022-11-17 RX ADMIN — Medication 5 UNIT(S): at 09:36

## 2022-11-17 RX ADMIN — HEPARIN SODIUM 5000 UNIT(S): 5000 INJECTION INTRAVENOUS; SUBCUTANEOUS at 06:17

## 2022-11-17 RX ADMIN — FINASTERIDE 5 MILLIGRAM(S): 5 TABLET, FILM COATED ORAL at 11:06

## 2022-11-17 RX ADMIN — POLYETHYLENE GLYCOL 3350 17 GRAM(S): 17 POWDER, FOR SOLUTION ORAL at 11:05

## 2022-11-17 RX ADMIN — PANTOPRAZOLE SODIUM 40 MILLIGRAM(S): 20 TABLET, DELAYED RELEASE ORAL at 06:17

## 2022-11-17 RX ADMIN — INFLUENZA VIRUS VACCINE 0.7 MILLILITER(S): 15; 15; 15; 15 SUSPENSION INTRAMUSCULAR at 11:01

## 2022-11-17 NOTE — DISCHARGE NOTE NURSING/CASE MANAGEMENT/SOCIAL WORK - PATIENT PORTAL LINK FT
You can access the FollowMyHealth Patient Portal offered by [facility] by registering at the following website: [email or web address]. By joining [employer]’s FollowMyHealth portal, you will also be able to view your health information using other applications (apps) compatible with our system.

## 2022-11-17 NOTE — DISCHARGE NOTE NURSING/CASE MANAGEMENT/SOCIAL WORK - NSDCVIVACCINE_GEN_ALL_CORE_FT
No Vaccines Administered. influenza, high-dose, quadrivalent; 17-Nov-2022 11:01; Kely Washburn (RN); [employer]; [id number] (Exp. Date: 30-Jun-2023); IntraMuscular; Deltoid Right.; 0.7 milliLiter(s); VIS (VIS Published: 06-Aug-2021, VIS Presented: 17-Nov-2022);

## 2022-11-17 NOTE — PROGRESS NOTE ADULT - PROVIDER SPECIALTY LIST ADULT
Infectious Disease
Infectious Disease
Internal Medicine
Intervent Radiology
Nephrology
Orthopedics
Pulmonology
Endocrinology
Infectious Disease
Internal Medicine
Intervent Radiology
Nephrology
Nephrology
Pulmonology
Pulmonology
Urology
Endocrinology
Endocrinology
Infectious Disease
Internal Medicine
Intervent Radiology
Nephrology
Pulmonology
Pulmonology
Urology
Endocrinology
Endocrinology
Intervent Radiology
Pulmonology
Pulmonology
Endocrinology
Internal Medicine
Endocrinology
Internal Medicine
Endocrinology
Internal Medicine

## 2022-11-17 NOTE — PROGRESS NOTE ADULT - SUBJECTIVE AND OBJECTIVE BOX
Interventional Radiology Follow-Up Note    This is a 82 year old Male s/p abdominal abscess drain placement on 11/1/22 in Interventional Radiology with Dr. Putnam.    S:     Medication:     ertapenem  IVPB: (11-16)  heparin   Injectable: (11-17)  metoprolol succinate ER: (11-17)    Vitals:   T(F): 98.1, Max: 98.1 (04:41)  HR: 70  BP: 97/66  RR: 18  SpO2: 99%    Physical Exam:  General: Nontoxic, in NAD.  Abdomen: soft, NTND.   Drain Device: Drain intact attached to PEDRO LUIS bulb.  Flushed with 5cc NS w/o difficulty, no leaking. Dressing clean, dry, intact.   24hr Drain output: 10cc    LABS:        Assessment/Plan:  82M w/ HTN, DM2, CAD, and BPH-TURP, 10/19/22 p/w L1 vertebral fx s/p mechanical fall; c/b ANTON.   IR consulted on 10/27 for drainage of 8 cm abscess interposed between the liver and upper pole R kidney. MR showing diffusion restriction. Concern for hematoma vs abscess.  S/p abdominal abscess drain placement on 11/1/22 in Interventional Radiology with Dr. Putnam.    - 11/1: s/p 10 fr drain placed into right abdominal collection between liver and kidney. ~100cc purulent fluid aspirated during procedure  - Flush drain with 5cc NS daily forward only; DO NOT aspirate  - Change dressing q3 days or when dressing is saturated.  - Trend vs/labs  - Continue global management per primary team  - If the patient is d/c home with drainage catheter, pt can make an appointment with IR by calling the IR booking office at [phone]; recommend IR follow in 10 days after discharge for tube evaluation.  - Pt will benefit from VNS service to help with drainage catheter care; Pt should continue same drainage catheter care as an outpatient.       Katia Goetz PA-C  Available on teams     Interventional Radiology Follow-Up Note    This is a 82 year old Male s/p abdominal abscess drain placement on 11/1/22 in Interventional Radiology with Dr. Putnam.    S: Patient seen and examined at bedside.     Medication:     ertapenem  IVPB: (11-16)  heparin   Injectable: (11-17)  metoprolol succinate ER: (11-17)    Vitals:   T(F): 98.1, Max: 98.1 (04:41)  HR: 70  BP: 97/66  RR: 18  SpO2: 99%    Physical Exam:  General: Nontoxic, in NAD.  Abdomen: soft, NTND.   Drain Device: Drain intact attached to PEDRO LUIS bulb, serous fluid.  Dressing clean, dry, intact.   24hr Drain output: 10cc    LABS:        Assessment/Plan:  82M w/ HTN, DM2, CAD, and BPH-TURP, 10/19/22 p/w L1 vertebral fx s/p mechanical fall; c/b ANTON.   IR consulted on 10/27 for drainage of 8 cm abscess interposed between the liver and upper pole R kidney. MR showing diffusion restriction. Concern for hematoma vs abscess.  S/p abdominal abscess drain placement on 11/1/22 in Interventional Radiology with Dr. Putnam.    - 11/1: s/p 10 fr drain placed into right abdominal collection between liver and kidney. ~100cc purulent fluid aspirated during procedure  - Flush drain with 5cc NS daily forward only; DO NOT aspirate  - Change dressing q3 days or when dressing is saturated.  - Trend vs/labs  - Continue global management per primary team  - If the patient is d/c home with drainage catheter, Appointment facilitated for 11/23/22 @2pm for CT and drain study. Should patient need to reschedule appointment he may call IR booking office at [phone].  - Pt will benefit from VNS service to help with drainage catheter care; Pt should continue same drainage catheter care as an outpatient.       Katia Goetz PA-C  Available on teams

## 2022-11-17 NOTE — DISCHARGE NOTE NURSING/CASE MANAGEMENT/SOCIAL WORK - NSDCFUADDAPPT_GEN_ALL_CORE_FT
Please followup with Interventional Radiology within 10 days after discharge to check the drain. Please monitor drain output daily, and consult interventional radiology if output is less than 15 cc daily for two consecutive days or more and obtain a repeat CT scan of abdomen.

## 2022-11-17 NOTE — DISCHARGE NOTE NURSING/CASE MANAGEMENT/SOCIAL WORK - NSDCPEFALRISK_GEN_ALL_CORE
For information on Fall & Injury Prevention, visit: [email or web address] OR  [email or web address] OR  https://www.cdc.gov/steadi/patient.html

## 2022-11-23 ENCOUNTER — OUTPATIENT (OUTPATIENT)
Dept: OUTPATIENT SERVICES | Facility: HOSPITAL | Age: 82
LOS: 1 days | End: 2022-11-23
Payer: COMMERCIAL

## 2022-11-23 ENCOUNTER — APPOINTMENT (OUTPATIENT)
Dept: CT IMAGING | Facility: HOSPITAL | Age: 82
End: 2022-11-23

## 2022-11-23 ENCOUNTER — RESULT REVIEW (OUTPATIENT)
Age: 82
End: 2022-11-23

## 2022-11-23 ENCOUNTER — TRANSCRIPTION ENCOUNTER (OUTPATIENT)
Age: 82
End: 2022-11-23

## 2022-11-23 VITALS
TEMPERATURE: 98 F | RESPIRATION RATE: 77 BRPM | OXYGEN SATURATION: 97 % | DIASTOLIC BLOOD PRESSURE: 71 MMHG | SYSTOLIC BLOOD PRESSURE: 105 MMHG

## 2022-11-23 DIAGNOSIS — Z90.3 ACQUIRED ABSENCE OF STOMACH [PART OF]: Chronic | ICD-10-CM

## 2022-11-23 DIAGNOSIS — Z90.79 ACQUIRED ABSENCE OF OTHER GENITAL ORGAN(S): Chronic | ICD-10-CM

## 2022-11-23 DIAGNOSIS — R18.8 OTHER ASCITES: ICD-10-CM

## 2022-11-23 PROCEDURE — 74176 CT ABD & PELVIS W/O CONTRAST: CPT | Mod: 26

## 2022-11-23 PROCEDURE — 76080 X-RAY EXAM OF FISTULA: CPT | Mod: 26

## 2022-11-23 PROCEDURE — C1769: CPT

## 2022-11-23 PROCEDURE — 74150 CT ABDOMEN W/O CONTRAST: CPT | Mod: 26,GC

## 2022-11-23 PROCEDURE — 76080 X-RAY EXAM OF FISTULA: CPT

## 2022-11-23 PROCEDURE — 49424 ASSESS CYST CONTRAST INJECT: CPT

## 2022-11-23 PROCEDURE — 74150 CT ABDOMEN W/O CONTRAST: CPT

## 2022-11-23 PROCEDURE — 74176 CT ABD & PELVIS W/O CONTRAST: CPT

## 2022-11-23 PROCEDURE — C1729: CPT

## 2022-11-23 RX ORDER — MIRABEGRON 50 MG/1
1 TABLET, EXTENDED RELEASE ORAL
Qty: 0 | Refills: 0 | DISCHARGE

## 2022-11-23 RX ORDER — OMEPRAZOLE 10 MG/1
1 CAPSULE, DELAYED RELEASE ORAL
Qty: 0 | Refills: 0 | DISCHARGE

## 2022-11-23 RX ORDER — ICOSAPENT ETHYL 500 MG/1
1 CAPSULE, LIQUID FILLED ORAL
Qty: 0 | Refills: 0 | DISCHARGE

## 2022-11-23 RX ORDER — ATORVASTATIN CALCIUM 80 MG/1
1 TABLET, FILM COATED ORAL
Qty: 0 | Refills: 0 | DISCHARGE

## 2022-11-23 RX ORDER — BUDESONIDE AND FORMOTEROL FUMARATE DIHYDRATE 160; 4.5 UG/1; UG/1
2 AEROSOL RESPIRATORY (INHALATION)
Qty: 0 | Refills: 0 | DISCHARGE

## 2022-11-23 RX ORDER — DEXLANSOPRAZOLE 30 MG/1
1 CAPSULE, DELAYED RELEASE ORAL
Qty: 0 | Refills: 0 | DISCHARGE

## 2022-11-23 NOTE — ASU DISCHARGE PLAN (ADULT/PEDIATRIC) - NURSING INSTRUCTIONS
Please feel free to contact us at [phone] if any problems arise. After 6PM, Monday through Friday, on weekends and on holidays, please call [phone] and ask for the radiology resident on call to be paged.

## 2022-11-23 NOTE — PROCEDURE NOTE - PROCEDURE FINDINGS AND DETAILS
Contrast injection demonstrated small collapsed cavity with contrast leaking back at site of insertion. Drain successfully removed over wire. Drain intact upon removal. Sterile dressing applied.

## 2022-11-23 NOTE — PRE PROCEDURE NOTE - PRE PROCEDURE EVALUATION
Interventional Radiology    HPI:  82M w/ HTN, DM2, CAD, and BPH-TURP, 10/19/22 p/w L1 vertebral fx s/p mechanical fall; c/b ANTON.   IR consulted on 10/27 for drainage of 8 cm abscess interposed between the liver and upper pole R kidney. MR showing diffusion restriction. Concern for hematoma vs abscess.  S/p abdominal abscess drain placement on 11/1/22 in Interventional Radiology with Dr. Putnam.  Presents to IR for abdominal abscess drain evaluation.    Son present and translating for patient. Drain outputs <5cc per day. No fevers, chills, n/v.    Allergies: NKDA  Medications (Abx/Cardiac/Anticoagulation/Blood Products)      Data:    T(C): 36.4  HR: --  BP: 105/71  RR: 77  SpO2: 97%    Other ascites    No pertinent family history in first degree relatives    No pertinent family history in first degree relatives    Handoff    DM (diabetes mellitus)    HTN (hypertension)    Hypercholesterolemia    CAD (coronary artery disease)    HTN (hypertension)    DM (diabetes mellitus)    S/P TURP    S/P gastrectomy    SysAdmin_VstLnk        Exam  General: No acute distress  Chest: Non labored breathing          Plan: 82y Male presents for abdominal abscess drain evaluation.  -Risks/Benefits/alternatives explained with the patient and/or healthcare proxy and witnessed informed consent obtained.

## 2022-11-23 NOTE — ASU DISCHARGE PLAN (ADULT/PEDIATRIC) - NS MD DC FALL RISK RISK
For information on Fall & Injury Prevention, visit: [email or web address] OR  [email or web address] OR  https://www.cdc.gov/steadi/patient.html

## 2022-11-23 NOTE — ASU DISCHARGE PLAN (ADULT/PEDIATRIC) - ASU DC SPECIAL INSTRUCTIONSFT
Drain Check and removal    Discharge Instructions  - You have had a drain checked and removed.  - Keep the area clean and dry.  - You may resume your normal diet.  - You may resume your normal medications.  - It is normal to experience some pain over the site for the next few days. You may take apply ice to the area (20 minutes on, 20 minutes off) and take Tylenol for that pain. Do not take more frequently than every 6 hours and do not exceed more than 3000mg of Tylenol in a 24 hour period.    Notify your primary physician and/or Interventional Radiology IMMEDIATELY if you experience any of the following       - Fever of 100.4F  or 38C       - Chills or Rigors/ Shakes       - Swelling and/or Redness in the area of the puncture site       - Worsening Pain       - Blood soaked bandages or worsening bleeding       - Lightheadedness and/or dizziness upon standing       - Chest Pain/ Tightness       - Shortness of Breath       - Difficulty walking    If you have a problem that you believe requires IMMEDIATE attention, please go to your NEAREST Emergency Room. If you believe your problem can safely wait until you speak to a physician, please call Interventional Radiology for any concerns.    During Normal Weekday Business Hours- You can contact the Interventional Radiology department during normal business hours via telephone.  During Evenings and Weekends- If you need to contact Interventional Radiology during off hours, do so by calling the hospital and requesting to be connected to the Interventional Radiologist on call.

## 2022-11-30 ENCOUNTER — APPOINTMENT (OUTPATIENT)
Dept: ORTHOPEDIC SURGERY | Facility: CLINIC | Age: 82
End: 2022-11-30
Payer: MEDICARE

## 2022-11-30 VITALS — SYSTOLIC BLOOD PRESSURE: 108 MMHG | DIASTOLIC BLOOD PRESSURE: 76 MMHG | WEIGHT: 152 LBS

## 2022-11-30 DIAGNOSIS — M54.9 DORSALGIA, UNSPECIFIED: ICD-10-CM

## 2022-11-30 DIAGNOSIS — S32.010A WEDGE COMPRESSION FRACTURE OF FIRST LUMBAR VERTEBRA, INITIAL ENCOUNTER FOR CLOSED FRACTURE: ICD-10-CM

## 2022-11-30 PROCEDURE — 72082 X-RAY EXAM ENTIRE SPI 2/3 VW: CPT

## 2022-11-30 PROCEDURE — 99214 OFFICE O/P EST MOD 30 MIN: CPT | Mod: 57

## 2022-11-30 NOTE — PHYSICAL EXAM
[de-identified] : Lumbar Physical Exam\par \par The patient was able to take steps in the exam room.  He was seated in a wheelchair comfortably\par \par Reflexes\par Patellar - normal\par Gastroc - normal\par Clonus - No\par \par Hip Exam - Normal\par \par Straight leg raise - none\par \par Pulses - 2+ dp/pt\par \par Range of motion - normal\par \par Sensation \par Sensation intact to light touch in L1, L2, L3, L4, L5 and S1 dermatomes bilaterally\par \par Motor\par 	IP	Quad	HS	TA	Gastroc	EHL\par Right	5/5	5/5	5/5	5/5	5/5	5/5\par Left	5/5	5/5	5/5	5/5	5/5	5/5 [de-identified] : Scoliosis radiographs\par L1 compression deformity noted

## 2022-11-30 NOTE — ASSESSMENT
[FreeTextEntry1] : I had a lengthy discussion with the patient in regards to treatment plan and diagnosis. There are no red flag findings on imaging nor are there any red flag findings on clinical exam.  Therefore we will proceed with a course of conservative treatment.  This would include physical therapy/home exercise program, Tylenol, NSAIDs as medically indicated.  The patient will follow up with me in approximately 6 to 8 weeks.  I encouraged the patient to follow-up sooner if there are any new or worsening symptoms.  The patient is aware a TLSO brace for comfort.

## 2022-11-30 NOTE — HISTORY OF PRESENT ILLNESS
[de-identified] : This is a an 82-year-old male that is now approximate 1 month from an accident.  He did have an L1 compression fracture.  Overall he is doing better.  He denies any severe radicular pain.  He denies any severe back pain.  He was working with physical therapy at his rehab facility.

## 2022-12-01 DIAGNOSIS — Z46.82 ENCOUNTER FOR FITTING AND ADJUSTMENT OF NON-VASCULAR CATHETER: ICD-10-CM

## 2023-01-13 ENCOUNTER — APPOINTMENT (OUTPATIENT)
Dept: ORTHOPEDIC SURGERY | Facility: CLINIC | Age: 83
End: 2023-01-13

## 2023-01-24 NOTE — PROCEDURE NOTE - CONTRAST
72 yo man w hx prostate cancer (no tx), hyperthyroidism, CKD, HTN, HLD, r hypercalcemia (found to have parathyroid nodule),  hyperparathyroidism.  Pt was at [facility] for presurgical labs for the planned parathyroidectomy when tripped over object, fell, w left hip pain.  he then called ambulance to come to [place].  W/U here sig for left intertrochanteric fx of prox femur, seen by Ortho for planned surgery.    Labs-today 1/24 wbc 7.89 Hgb 8.5 ,cv 86.5 plts 163, yesterday on day of adm Hgb 10.4, old lab in computer 11/7 hgb 9.9    INR 1.17 PT 13.5 PTT 29.1    Pt reports no surgery in past but multiple teeth extraction without bleeding complications, no family hx bleeding dyscrasia, no abnormal or incr bleed.bruise hx. has chronic anemia    -baseline anemia likely due to chronic disease/malignancy, the precipitous drop is due to blood loss post fx. No other sites or hx of abnromal bleed. Coags adequate.   -agree w one unit of PRBC already ordered by Medicine  -carrie from [facility] for planned Ortho surgery for fx left femur  -follow serial CBC    discussed w pt  discussed w Medicine  thank you, will follow w you None

## 2023-02-16 NOTE — PHYSICAL THERAPY INITIAL EVALUATION ADULT - ORIENTATION, REHAB EVAL
oriented to person, place, time and situation Tremfya Counseling: I discussed with the patient the risks of guselkumab including but not limited to immunosuppression, serious infections, and drug reactions.  The patient understands that monitoring is required including a PPD at baseline and must alert us or the primary physician if symptoms of infection or other concerning signs are noted.

## 2023-03-27 NOTE — ED ADULT NURSE NOTE - ALCOHOL PRE SCREEN (AUDIT - C)
Statement Selected PRE-OP DIAGNOSIS:  CAD (coronary artery disease) 27-Mar-2023 10:25:20  Race, Deepali

## 2023-04-27 NOTE — PROVIDER CONTACT NOTE (OTHER) - SITUATION
DBT at [facility] on 5/1.    After Care:   Discharged from Partial Hospitalization Program (PHP); please follow-up with outpatient providers    Medication Management: Michelle Dodson NP from [facility] switch to Dr Alarcon on 5/24, virtual visit.  Outpatient Therapy: Naldo switch to Lisa Schobert on 6/1, virtual visit.    Proxy & Dependents  The appointment will be a virtual visit run through the [employer] website/ [employer] [place] and Zoom.  To access the visit, you will first need to set up a parent account, then, you will set up a child proxy account. Please go to [email or web address] to create an account as soon as possible as it takes time to verify both your account and your child's account. On the day of the visit, you will log into [employer] (or the [employer] [place] if using a smartphone) to access the visit, complete the pre check-in, and then launch the Zoom video visits.  How can I see the accounts I'm proxy for?  · Sign in to [employer] or the [employer] ir[place].  · You'll see icons for each account you have access to.  · Select the icon for the dependent you'd like to view.  How can I request access to a child's medical information?  To request access, complete the child proxy access form. Proxy access to a child's medical record can  only be granted to the child's parent or legal guardian. Account access should be available within a  few business days.  How can I request access to an adult's medical information?  The adult needs to neto you access to their medical records by completing the proxy invitation form if you don't have power of . If you do have power of , complete the authorization proxy access form to request access to their records.  Why do I need proxy access if I already have power of ?  While power of  lets you make certain decisions about a child or dependent's health care, proxy access allows you to view their medical information in  Before dinner FS was 62, 67 [employer].  You can request proxy access through [employer] or the [employer] ir[place].  What information can I see once I have access to my family member's account?  A full view is available for dependents between the ages of 0-11 and for adults 18 and older. A limited view is available for dependents between the ages of 12-17 - for these dependents, you can see immunizations, allergies, family history and past appointments, and you can message your dependent's provider. However, after-visit summaries for any visit, medication lists or the ability to request refills for your dependent aren't available.  Why can't I see my child's complete health information?  Due to state and federal regulations as well as corporate policy, [facility] limits online access to 12- to 45-bsyg-dyha' patient records. For children 12-17, you can view immunizations, allergies, family history and past appointments as well as message your dependent's provider. However, after visit summaries for any visit, medication lists or the ability to request refills for your dependent aren't available.  What if I can't find an answer to my question?  If you can't find an answer to your question, call the [employer] support team at [phone] or email [email or web address].     NEW VIRTUAL PATIENT  If you have a phone visit, please wait for your provider to call you at the appointment time unless your provider gives other instructions.       If you have a video visit, see the instructions below.      Before your video visit  1. Be sure to complete PreCheck-In up to three days before your       appointment.  2. If you haven't already, download the Zoom web conferencing          tool. If you need help with this, go to:      [email or web address]         Note: You do not need to download Zoom if your video visit is      taking place through the [employer] mobile ir[place].     At the time of your video visit  1. Click Begin Video  Visit a few minutes before your appointment        time. Zoom will open.   2. Stay by your device and keep Zoom open until your provider       joins the video visit.  3. In the unlikely event that your provider hasn't joined 15 minutes       after your appointment start time, you can leave the video visit       and call their office to reschedule.   4. During your visit, your provider may ask about your symptoms,     vital signs or updates from your last appointment.     Need more information?  Get all your questions answered at our Help Center:  [email or web address]     Attention: Driving while participating in a video visit is potentially   dangerous and therefore not permitted. If your plans have changed   and you need to be driving during the time of your visit, please   reschedule your appointment.      Please follow up with your primary care provider for Medical Concerns    Return to the Emergency Department or contact Mobile Crisis for increased depression, suicidal  ideation, or altered thoughts.    Crisis care  Call 988 if you have thoughts of harming yourself or others. When you call or text 988, you will be  connected to trained crisis counselors. An online chat option is also available. Second Half Playbook is free and  available 24/7. 988 counselors will work with [facility] to help you get the care you need.    Call 988 if you:  Have suicidal thoughts, a suicide plan, and a way to carry out the plan  Have serious thoughts of hurting someone else  Have trouble breathing  Are very confused  Feel very drowsy or have trouble awakening  Faint  Have new chest pain that becomes more severe, lasts longer, or spreads into your shoulder, arm,  neck, jaw, or back    When to get medical care  Call your healthcare provider right away if any of these happen:  Your symptoms get worse  You have extreme depression, fear, anxiety, or anger toward yourself or others  You feel out of control  You feel that you may try to harm yourself or  another  You hear voices other people don't hear  You see things other people don't see  You don't sleep or eat for 3 days in a row  Friends or family express concern over your behavior and ask you to get help    Apps & Online Resources:  Didalan1Cvmip, Moving Forward, MY3, A Friend Asks, Virtual Hope Box, Booster Derrick (teens, young  adults), Calm, Headspace, Talkspace  [email or web address]  www.[email or web address]/groups/mental-health-shonda/  www.[email or web address]-Education    Crisis Text Line is free, 24/7 support for those in crisis.  Text \"Home\" to 410689 from anywhere in the [place] to text with a trained Crisis Counselor    We strive to make sure that you get all the information that you need to manage your well-being. You may receive a survey by phone call, email, or text about your experience with us. We appreciate your willingness to provide feedback and use your input to improve the care that we deliver.     After completing your survey, you may receive a phone call from someone on our leadership team to discuss your input.    Thank you for choosing [facility] and allowing us to be a part of your care.    Community Resources for Parents in [facility]:  [facility] Mental Health Crisis Line - [phone]  [facility] - [phone] or Intake Dept [phone]  [facility] Central Scheduling # [phone]  [facility] Emergency Room - [phone]  Children's Mobile Crisis (CMC) Team (formerly MUTT) - [phone]  [facility] - [phone]  The Parenting Network: Parent Helpline [phone]  Youth Connect: program through [facility]: This is a short-term program (90 day) in [facility] to assist children and their families. Please call the Resource and Referral line: [phone]  Community Information Line - [phone] or 211  [facility] - [phone]  [employer]: [phone]  [place] Child Welfare: 414- 220-SAFE or [phone]  [facility]: [phone]  [facility]: [phone]   [facility]: [address] [place] [address]; Phone:  [phone]  [facility]: Provides same-day care for children ages 5-18 experiencing urgent mental health issues, including evaluation of safety issues, brief counseling, care coordination and referrals. .  - Located at [facility] in [place] ([address] [place] [address]) - Clinic hours: 3pm - 9:30 p.m., Seven days a week  - Phone: [phone]  Please note: The clinic is not able to support the following: Medication changes, Medical emergencies (such as a child experiencing suicidal thoughts or self-harm. Please call 911 or visit the closest emergency room.)    Daria Safety Plan    Creation Date: 4/6/23 Last Update Date: 4/21/23   Step 1: Warning signs:   Warning Signs   Triggers: Loud/irritating noises, others not listening, poor boundaries, being touched   Early Warning Signs: Annoyed, panic, shutting down, hitting head, worry, guilt,   Late Warning Signs: Physically aggressive, increased intrusive thoughts, self-harm, impulsive, feeling hopless   Step 2: Internal coping strategies - Things I can do to take my mind off my problems without contacting another person:   Strategies   Coloring, writing   Listen to music   Swim, run, be active   Time with dogs   Get a snack   Fidgeting   \"Free of OCD\"   challenge negative thoughts, mindfulness   Step 3: People and social settings that provide distraction:   Name Contact Information   talk to mom (No Documentation)   sister (No Documentation)   Friends (No Documentation)   Places   Go for a walk   with friends   Step 4: People whom I can ask for help during a crisis:   Name Contact Information   Mom (No Documentation)   Grandma (No Documentation)   Naldo (therapist) (No  Documentation)   Step 5: Professionals or agencies I can contact during a crisis:   Clinicians/Agency Name Phone Emergency Contact   [facility] [phone] (No Documentation)   [facility] Crisis Team [phone] (No Documentation)   24-hour Mental Health Crisis Line [phone] (No Documentation)   [facility] at [facility] [phone] (No Documentation)   Either Dr Alarcon or Michelle Dodson NP for medication (No Documentation) (No Documentation)   Either Lisa Schobert or Naldo for therapy (No Documentation) (No Documentation)   Suicide Prevention Lifeline Phone: Call or Text 802  Crisis Text Line: Text HOME to 446466  Step 6: Making the environment safer (plan for lethal means safety):   Take medication as prescribed and remove all sharps   Attend all follow-up appointments and aftercare plans   Open communication   Use coping skills/safety planning    Optional: What is most important to me and worth living for?:   Family  Daria Safety Plan. Mel Chaves and Chapo Carlin. Used with permission of the authors.

## 2023-09-20 NOTE — PHYSICAL THERAPY INITIAL EVALUATION ADULT - ORIENTATION, REHAB EVAL
Is the patient currently in the state of [place]? YES    Visit mode:VIDEO    If the visit is dropped, the patient can be reconnected by: VIDEO VISIT: Text to cell phone:   Telephone Information:   Mobile [phone]       Will anyone else be joining the visit? NO  (If patient encounters technical issues they should call [phone])    How would you like to obtain your AVS? MyChart    Are changes needed to the allergy or medication list? No    Reason for visit: RECHECK and Video Visit    Nishi KRUGER       oriented to person, place, time and situation

## 2024-01-15 NOTE — PHYSICAL THERAPY INITIAL EVALUATION ADULT - ASR WT BEARING STATUS EVAL
no weight-bearing restrictions
Very likely viral syndrome induced inflammation of tonsils vs strep throat. Rapid strep, strep culture, flu/covid tests. Shared decision making with patient to forgo blood work, as his history and exam appears most consistent with tonsilitis. Will provide symptom relief with oral liquid dexamethasone, toradol. Will reassess.

## 2024-05-16 NOTE — ED ADULT NURSE NOTE - BEFAST FACE
Chart review was about to make PPEE call and noticed patient is at Pediatrician currently for post hospital follow up visit. Can make referral to [facility]DH or diabetic educator if wanted.  Discharge Diagnosis  Vomiting   Malnutrition Diagnosis -new   Issues Requiring Follow-Up  Hypoglycemia    Jsamyne Castillo , RN   Nurse Care Manager   [facility] Department   [phone]    No

## 2024-06-05 NOTE — PHYSICAL THERAPY INITIAL EVALUATION ADULT - IMPAIRMENTS CONTRIBUTING IMPAIRED BED MOBILITY, REHAB EVAL
impaired balance/cognition/impaired coordination/pain/impaired postural control/decreased strength Attending Only Attending with

## 2024-08-14 NOTE — SWALLOW VFSS/MBS ASSESSMENT ADULT - ASPIRATION DURING THE SWALLOW - COUGH
Single episode on initial trial of thick puree-->Trace-mild material aspirated during the swallow, visualized below the vocal cords. Pt sensate by aggressive coughing and material effectively ejected from supra-subglottic region. No material remains. Not reduplicated on further trials.
Mononucleosis

## 2024-09-16 NOTE — ED ADULT NURSE NOTE - NS PRO PASSIVE SMOKE EXP
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 ([facility])    GERD without esophagitis    Thrombocytosis, unspecified    COPD (chronic obstructive pulmonary disease) ([facility])    History of Idiopathic pericarditis    Acute on chronic respiratory failure with hypoxia and hypercapnia ([facility])    Paroxysmal A-fib ([facility])    Past Medical History  Past Medical History:   Diagnosis Date    Acute kidney failure ([facility])     Acute nonspecific idiopathic pericarditis     Anxiety     Breast cancer ([facility]) 2007    Cellulitis of right lower extremity     Chronic pain     Chronic respiratory failure with hypoxia ([facility])     Colitis     Colostomy status ([facility])     Dependence on supplemental oxygen     Depression     Difficult intubation     Excessive daytime sleepiness     Gastroesophageal reflux disease without esophagitis     Gastroparesis     GERD (gastroesophageal reflux disease)     Hyperlipidemia     Ischemic colitis ([facility]) 01/21/2019    Leukocytosis 03/28/2020    Muscular dystrophy ([facility])     Limb-girdle    Osteoporosis     Other nonrheumatic aortic valve disorders     Overactive bladder     Perforated diverticulum 03/28/2020    Pericardial effusion (noninflammatory)     Pneumonitis     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 ([facility])     Rheumatic tricuspid insufficiency     Skin cancer     Skin disorder     Tachycardia 06/02/2021    Vitamin D deficiency      Past Surgical History  Past Surgical History:   Procedure Laterality Date    CARDIAC CATHETERIZATION N/A 8/21/2024    Procedure: Cardiac pci;  Surgeon: Juan Fuller MD;  [facility];  Service: Cardiology    CARDIAC CATHETERIZATION N/A 8/21/2024    Procedure: Cardiac PCI Stent;  Surgeon: Juan  MD Alina;  [facility];  Service: Cardiology    CARDIAC CATHETERIZATION N/A 8/21/2024    Procedure: Cardiac Coronary Angiogram;  Surgeon: Juan Fuller MD;  [facility];  Service: Cardiology    COLONOSCOPY N/A 1/22/2019    Procedure: COLONOSCOPY;  Surgeon: Anthony Mejía MD;  Location: [facility] GI [facility];  Service: Colorectal    CYSTOSCOPY N/A 9/30/2020    Procedure: CYSTOSCOPY; BILATERAL URETERAL CATHETER PLACEMENT, CYSTOTOMY;  Surgeon: Zach Tyler MD;  Location: [facility];  Service: Urology    FL CYSTOGRAM  10/15/2020    HARTMANS PROCEDURE N/A 9/30/2020    Procedure: EXPLORATORY LAPAROTOMY; LYSIS OF ADHESIONS; WASHOUT PELVIC ABSCESS; COMPLEX SIGMOID COLON RESECTION; LOOP TRANSVERSE COLOSTOMY;  Surgeon: Thania Jaffe MD;  Location: [facility];  Service: General    IR DRAINAGE TUBE PLACEMENT  9/24/2020    MASTECTOMY Left 2007    left breast mastectomy     TONSILLECTOMY      TOOTH EXTRACTION      TUBAL LIGATION           09/16/24 0821   Pain Assessment   Pain Assessment Tool 0-10   Pain Score No Pain   Restrictions/Precautions   Weight Bearing Precautions Per Order No   Other Precautions Chair Alarm;Bed Alarm;Fall Risk;Multiple lines;O2   ADL   Where Assessed Edge of bed   Eating Assistance 5  Supervision/Setup   Grooming Assistance 5  Supervision/Setup   UB Bathing Assistance 4  Minimal Assistance   LB Bathing Assistance 3  Moderate Assistance   UB Dressing Assistance 4  Minimal Assistance   LB Dressing Assistance 2  Maximal Assistance   Toileting Assistance  1  Total Assistance   Bed Mobility   Rolling R 2  Maximal assistance   Additional items Assist x 1;Increased time required;Verbal cues;LE management   Rolling L 2  Maximal assistance   Additional items Assist x 1;Increased time required;Verbal cues;LE management   Supine to Sit 2  Maximal assistance   Additional items Assist x 1;Increased time required;Verbal cues;LE management   Sit to Supine 2  Maximal  "assistance   Additional items Assist x 1;Increased time required;LE management;Verbal cues   Transfers   Sit to Stand 1  Dependent   Additional Comments bp's=136/64(EOB), SPO2=98% on 3liters   Functional Mobility   Functional Mobility   (recommend hoyerlift for OOB with nsg)   Therapeutic Exercise - ROM   UE-ROM Yes   Therapeutic Excerise-Strength   UE Strength Yes  (b/l UE AAROM exercises(i.e.shr flex/ext/abd/add), 1 set 10reps)   Subjective   Subjective \"My legs are like jelly.\"   Cognition   Overall Cognitive Status WFL   Arousal/Participation Alert;Cooperative   Attention Attends with cues to redirect   Orientation Level Oriented X4   Memory Within functional limits  (STM=3/3 recall after 5min)   Following Commands Follows one step commands without difficulty   Activity Tolerance   Activity Tolerance Patient limited by fatigue   Medical Staff Made Aware nsg, CM   Assessment   Assessment Pt seen for 44min tx session with focus on functional balance, functional mobility, ADL status, transfer safety, b/l UE strengthening, and cognition. Pt able to tolerate EOB sitting with sitting balance=f/f-. Attempted standing, but pt unable to assist with b/l LE weight bearing; recommend hoyerlift for OOB with nsg. Attempted lateral transfers in bed, but pt dependent; would benefit from sliding board transfer education. Pt demonstrating need assist with his UE and LE ADLs. Pt able to assist with b/l UE AAROM exercises; limited shr AROM(i.e.flex=50-60*). Pt able to demonstrate good cognition(i.e.orientation, memory, direction-following). Pt continues to demonstrate appropriateness for inpt rehab to improve her overall level of level of independence. Tx tolerated well. Pt pleasant and cooperative with tx session. The patient's raw score on the AM-PAC Daily Activity Inpatient Short Form is 13. A raw score of less than 19 suggests the patient may benefit from discharge to post-acute rehabilitation services. Please refer to the " recommendation of the Occupational Therapist for safe discharge planning   Plan   Treatment Interventions ADL retraining;Functional transfer training;UE strengthening/ROM;Endurance training;Cognitive reorientation;Patient/family training;Compensatory technique education;Cardiac education;Equipment evaluation/education   Goal Expiration Date 09/25/24   OT Treatment Day 1   OT Frequency 2-3x/wk   Discharge Recommendation   Rehab Resource Intensity Level, OT II (Moderate Resource Intensity)   AM-PAC Daily Activity Inpatient   Lower Body Dressing 2   Bathing 2   Toileting 1   Upper Body Dressing 2   Grooming 3   Eating 3   Daily Activity Raw Score 13   Daily Activity Standardized Score (Calc for Raw Score >=11) 32.03   AM-PAC Applied Cognition Inpatient   Following a Speech/Presentation 4   Understanding Ordinary Conversation 4   Taking Medications 3   Remembering Where Things Are Placed or Put Away 3   Remembering List of 4-5 Errands 3   Taking Care of Complicated Tasks 3   Applied Cognition Raw Score 20   Applied Cognition Standardized Score 41.76     Chris Matson        No

## 2024-11-10 NOTE — PATIENT PROFILE ADULT - HAS THE PATIENT EXPERIENCED ANY OF THE FOLLOWING WITHIN THE WEEK PRIOR TO ADMISSION?
You can use Ibuprofen for swelling and inflammation.  You can take Tylenol as needed for pain.    I have attached RICE therapy education.  Ensure that you are elevating the extremity when at rest.  Using compression wrap or brace will help with swelling as well.   Continue to monitor symptoms.  If no improvement, I recommend you follow-up with [facility] for further evaluation.       no

## 2024-11-18 NOTE — ED PROVIDER NOTE - PATIENT/CAREGIVER ACCEPTED INTERPRETER SERVICES
Problem: Adult Inpatient Plan of Care  Goal: Plan of Care Review  Outcome: Progressing  Goal: Optimal Comfort and Wellbeing  Outcome: Progressing     Problem: Infection  Goal: Absence of Infection Signs and Symptoms  Outcome: Progressing     Problem: Stroke, Ischemic (Includes Transient Ischemic Attack)  Goal: Optimal Coping  Outcome: Progressing   POC reviewed and updated with the patient. Questions regarding POC were encouraged and addressed with the patientMALIKA. Patient is AO X 1 at this time. Fall/safety precautions maintained, no signs of injury noted during shift. Upon exiting room, patient's bed locked in low position, side rails up x 3, bed alarm set, with call light within reach. Instructed patient to call staff for assistance, verbalized understanding.  No acute signs of distress noted at this time.   yes

## 2024-12-04 NOTE — PROGRESS NOTE ADULT - SUBJECTIVE AND OBJECTIVE BOX
MD notified of BP difference between left and right.       Chief complaint  Patient is a 81y old  Male who presents with a chief complaint of  Review of systems  NAD, no hypoglycemic episodes.    Labs and Fingersticks  CAPILLARY BLOOD GLUCOSE      POCT Blood Glucose.: 154 mg/dL (21 Feb 2022 10:15)  POCT Blood Glucose.: 166 mg/dL (20 Feb 2022 21:23)  POCT Blood Glucose.: 255 mg/dL (20 Feb 2022 16:57)      Anion Gap, Serum: 8 (02-21 @ 06:58)  Anion Gap, Serum: 11 (02-20 @ 07:19)  Anion Gap, Serum: 15 (02-19 @ 16:26)      Calcium, Total Serum: 8.2 *L* (02-21 @ 06:58)  Calcium, Total Serum: 8.9 (02-20 @ 07:19)  Calcium, Total Serum: 8.9 (02-19 @ 16:26)  Albumin, Serum: 2.6 *L* (02-21 @ 06:58)  Albumin, Serum: 2.8 *L* (02-20 @ 07:19)  Albumin, Serum: 2.9 *L* (02-19 @ 16:26)    Alanine Aminotransferase (ALT/SGPT): 19 (02-21 @ 06:58)  Alanine Aminotransferase (ALT/SGPT): 29 (02-20 @ 07:19)  Alanine Aminotransferase (ALT/SGPT): 34 (02-19 @ 16:26)  Alkaline Phosphatase, Serum: 159 *H* (02-21 @ 06:58)  Alkaline Phosphatase, Serum: 186 *H* (02-20 @ 07:19)  Alkaline Phosphatase, Serum: 214 *H* (02-19 @ 16:26)  Aspartate Aminotransferase (AST/SGOT): 13 (02-21 @ 06:58)  Aspartate Aminotransferase (AST/SGOT): 30 (02-20 @ 07:19)  Aspartate Aminotransferase (AST/SGOT): 55 *H* (02-19 @ 16:26)        02-21    134<L>  |  105  |  19  ----------------------------<  156<H>  4.1   |  21<L>  |  1.52<H>    Ca    8.2<L>      21 Feb 2022 06:58  Phos  3.7     02-20  Mg     2.0     02-20    TPro  5.6<L>  /  Alb  2.6<L>  /  TBili  0.9  /  DBili  x   /  AST  13  /  ALT  19  /  AlkPhos  159<H>  02-21                        11.5   11.65 )-----------( 108      ( 21 Feb 2022 06:58 )             34.9     Medications  MEDICATIONS  (STANDING):  aspirin enteric coated 81 milliGRAM(s) Oral daily  budesonide 160 MICROgram(s)/formoterol 4.5 MICROgram(s) Inhaler 2 Puff(s) Inhalation two times a day  dextrose 40% Gel 15 Gram(s) Oral once  dextrose 5%. 1000 milliLiter(s) (50 mL/Hr) IV Continuous <Continuous>  dextrose 5%. 1000 milliLiter(s) (100 mL/Hr) IV Continuous <Continuous>  dextrose 50% Injectable 25 Gram(s) IV Push once  dextrose 50% Injectable 12.5 Gram(s) IV Push once  dextrose 50% Injectable 25 Gram(s) IV Push once  ertapenem  IVPB      ertapenem  IVPB 1000 milliGRAM(s) IV Intermittent every 24 hours  finasteride 5 milliGRAM(s) Oral daily  glucagon  Injectable 1 milliGRAM(s) IntraMuscular once  heparin   Injectable 5000 Unit(s) SubCutaneous every 8 hours  influenza  Vaccine (HIGH DOSE) 0.7 milliLiter(s) IntraMuscular once  insulin glargine Injectable (LANTUS) 18 Unit(s) SubCutaneous at bedtime  insulin lispro (ADMELOG) corrective regimen sliding scale   SubCutaneous three times a day before meals  insulin lispro Injectable (ADMELOG) 8 Unit(s) SubCutaneous three times a day before meals  metoprolol succinate ER 25 milliGRAM(s) Oral daily  pantoprazole    Tablet 40 milliGRAM(s) Oral before breakfast  senna 2 Tablet(s) Oral at bedtime  sodium chloride 0.9%. 1000 milliLiter(s) (75 mL/Hr) IV Continuous <Continuous>  tamsulosin 0.8 milliGRAM(s) Oral at bedtime      Physical Exam  General: Patient comfortable in bed  Vital Signs Last 12 Hrs  T(F): 99 (02-21-22 @ 10:54), Max: 99.1 (02-21-22 @ 05:08)  HR: 93 (02-21-22 @ 10:54) (82 - 96)  BP: 128/83 (02-21-22 @ 10:54) (111/58 - 128/83)  BP(mean): --  RR: 18 (02-21-22 @ 10:54) (18 - 18)  SpO2: 99% (02-21-22 @ 10:54) (98% - 99%)    Diagnostics    Beta Hydroxy-Butyrate: AM Sched. Collection: 21-Feb-2022 06:00 (02-20 @ 09:00)

## 2025-01-01 ENCOUNTER — INPATIENT (INPATIENT)
Facility: HOSPITAL | Age: 85
LOS: 13 days | DRG: 179 | End: 2025-02-19
Attending: INTERNAL MEDICINE | Admitting: SURGERY
Payer: COMMERCIAL

## 2025-01-01 VITALS
DIASTOLIC BLOOD PRESSURE: 56 MMHG | RESPIRATION RATE: 19 BRPM | HEART RATE: 105 BPM | TEMPERATURE: 98 F | SYSTOLIC BLOOD PRESSURE: 94 MMHG | OXYGEN SATURATION: 75 %

## 2025-01-01 VITALS
TEMPERATURE: 98 F | SYSTOLIC BLOOD PRESSURE: 118 MMHG | OXYGEN SATURATION: 91 % | HEART RATE: 94 BPM | RESPIRATION RATE: 18 BRPM | DIASTOLIC BLOOD PRESSURE: 83 MMHG

## 2025-01-01 DIAGNOSIS — Z71.89 OTHER SPECIFIED COUNSELING: ICD-10-CM

## 2025-01-01 DIAGNOSIS — I48.20 CHRONIC ATRIAL FIBRILLATION, UNSPECIFIED: ICD-10-CM

## 2025-01-01 DIAGNOSIS — F03.90 UNSPECIFIED DEMENTIA, UNSPECIFIED SEVERITY, WITHOUT BEHAVIORAL DISTURBANCE, PSYCHOTIC DISTURBANCE, MOOD DISTURBANCE, AND ANXIETY: ICD-10-CM

## 2025-01-01 DIAGNOSIS — Z51.5 ENCOUNTER FOR PALLIATIVE CARE: ICD-10-CM

## 2025-01-01 DIAGNOSIS — I10 ESSENTIAL (PRIMARY) HYPERTENSION: ICD-10-CM

## 2025-01-01 DIAGNOSIS — J96.01 ACUTE RESPIRATORY FAILURE WITH HYPOXIA: ICD-10-CM

## 2025-01-01 DIAGNOSIS — R33.9 RETENTION OF URINE, UNSPECIFIED: ICD-10-CM

## 2025-01-01 DIAGNOSIS — J69.0 PNEUMONITIS DUE TO INHALATION OF FOOD AND VOMIT: ICD-10-CM

## 2025-01-01 DIAGNOSIS — N40.0 BENIGN PROSTATIC HYPERPLASIA WITHOUT LOWER URINARY TRACT SYMPTOMS: ICD-10-CM

## 2025-01-01 DIAGNOSIS — A41.9 SEPSIS, UNSPECIFIED ORGANISM: ICD-10-CM

## 2025-01-01 DIAGNOSIS — E11.9 TYPE 2 DIABETES MELLITUS WITHOUT COMPLICATIONS: ICD-10-CM

## 2025-01-01 DIAGNOSIS — Z90.3 ACQUIRED ABSENCE OF STOMACH [PART OF]: Chronic | ICD-10-CM

## 2025-01-01 DIAGNOSIS — R78.81 BACTEREMIA: ICD-10-CM

## 2025-01-01 DIAGNOSIS — Z90.79 ACQUIRED ABSENCE OF OTHER GENITAL ORGAN(S): Chronic | ICD-10-CM

## 2025-01-01 DIAGNOSIS — E87.0 HYPEROSMOLALITY AND HYPERNATREMIA: ICD-10-CM

## 2025-01-01 DIAGNOSIS — J18.9 PNEUMONIA, UNSPECIFIED ORGANISM: ICD-10-CM

## 2025-01-01 DIAGNOSIS — S22.39XA FRACTURE OF ONE RIB, UNSPECIFIED SIDE, INITIAL ENCOUNTER FOR CLOSED FRACTURE: ICD-10-CM

## 2025-01-01 DIAGNOSIS — S32.010A WEDGE COMPRESSION FRACTURE OF FIRST LUMBAR VERTEBRA, INITIAL ENCOUNTER FOR CLOSED FRACTURE: ICD-10-CM

## 2025-01-01 DIAGNOSIS — E78.00 PURE HYPERCHOLESTEROLEMIA, UNSPECIFIED: ICD-10-CM

## 2025-01-01 DIAGNOSIS — Z29.9 ENCOUNTER FOR PROPHYLACTIC MEASURES, UNSPECIFIED: ICD-10-CM

## 2025-01-01 DIAGNOSIS — G93.41 METABOLIC ENCEPHALOPATHY: ICD-10-CM

## 2025-01-01 LAB
-  AMPICILLIN/SULBACTAM: SIGNIFICANT CHANGE UP
-  AMPICILLIN: SIGNIFICANT CHANGE UP
-  AZTREONAM: SIGNIFICANT CHANGE UP
-  CEFAZOLIN: SIGNIFICANT CHANGE UP
-  CEFEPIME: SIGNIFICANT CHANGE UP
-  CEFOXITIN: SIGNIFICANT CHANGE UP
-  CEFTRIAXONE: SIGNIFICANT CHANGE UP
-  CIPROFLOXACIN: SIGNIFICANT CHANGE UP
-  CTX-M RESISTANCE MARKER: SIGNIFICANT CHANGE UP
-  ERTAPENEM: SIGNIFICANT CHANGE UP
-  ESBL: SIGNIFICANT CHANGE UP
-  GENTAMICIN: SIGNIFICANT CHANGE UP
-  IMIPENEM: SIGNIFICANT CHANGE UP
-  LEVOFLOXACIN: SIGNIFICANT CHANGE UP
-  MEROPENEM: SIGNIFICANT CHANGE UP
-  PIPERACILLIN/TAZOBACTAM: SIGNIFICANT CHANGE UP
-  TOBRAMYCIN: SIGNIFICANT CHANGE UP
-  TRIMETHOPRIM/SULFAMETHOXAZOLE: SIGNIFICANT CHANGE UP
ALBUMIN SERPL ELPH-MCNC: 3.3 G/DL — SIGNIFICANT CHANGE UP (ref 3.3–5)
ALBUMIN SERPL ELPH-MCNC: 3.6 G/DL — SIGNIFICANT CHANGE UP (ref 3.3–5)
ALP SERPL-CCNC: 74 U/L — SIGNIFICANT CHANGE UP (ref 40–120)
ALP SERPL-CCNC: 78 U/L — SIGNIFICANT CHANGE UP (ref 40–120)
ALP SERPL-CCNC: 88 U/L — SIGNIFICANT CHANGE UP (ref 40–120)
ALP SERPL-CCNC: 89 U/L — SIGNIFICANT CHANGE UP (ref 40–120)
ALT FLD-CCNC: 18 U/L — SIGNIFICANT CHANGE UP (ref 10–45)
ALT FLD-CCNC: 18 U/L — SIGNIFICANT CHANGE UP (ref 10–45)
ALT FLD-CCNC: 20 U/L — SIGNIFICANT CHANGE UP (ref 10–45)
ALT FLD-CCNC: 23 U/L — SIGNIFICANT CHANGE UP (ref 10–45)
ANION GAP SERPL CALC-SCNC: 10 MMOL/L — SIGNIFICANT CHANGE UP (ref 5–17)
ANION GAP SERPL CALC-SCNC: 11 MMOL/L — SIGNIFICANT CHANGE UP (ref 5–17)
ANION GAP SERPL CALC-SCNC: 13 MMOL/L — SIGNIFICANT CHANGE UP (ref 5–17)
ANION GAP SERPL CALC-SCNC: 13 MMOL/L — SIGNIFICANT CHANGE UP (ref 5–17)
ANION GAP SERPL CALC-SCNC: 16 MMOL/L — SIGNIFICANT CHANGE UP (ref 5–17)
ANION GAP SERPL CALC-SCNC: 17 MMOL/L — SIGNIFICANT CHANGE UP (ref 5–17)
ANION GAP SERPL CALC-SCNC: 20 MMOL/L — HIGH (ref 5–17)
ANION GAP SERPL CALC-SCNC: 23 MMOL/L — HIGH (ref 5–17)
ANION GAP SERPL CALC-SCNC: 24 MMOL/L — HIGH (ref 5–17)
ANION GAP SERPL CALC-SCNC: 25 MMOL/L — HIGH (ref 5–17)
ANION GAP SERPL CALC-SCNC: 8 MMOL/L — SIGNIFICANT CHANGE UP (ref 5–17)
ANION GAP SERPL CALC-SCNC: 8 MMOL/L — SIGNIFICANT CHANGE UP (ref 5–17)
ANISOCYTOSIS BLD QL: SLIGHT — SIGNIFICANT CHANGE UP
ANISOCYTOSIS BLD QL: SLIGHT — SIGNIFICANT CHANGE UP
APPEARANCE UR: ABNORMAL
APPEARANCE UR: ABNORMAL
APPEARANCE UR: CLEAR — SIGNIFICANT CHANGE UP
APTT BLD: 32.6 SEC — SIGNIFICANT CHANGE UP (ref 24.5–35.6)
APTT BLD: 34.7 SEC — SIGNIFICANT CHANGE UP (ref 24.5–35.6)
APTT BLD: 34.9 SEC — SIGNIFICANT CHANGE UP (ref 24.5–35.6)
AST SERPL-CCNC: 18 U/L — SIGNIFICANT CHANGE UP (ref 10–40)
AST SERPL-CCNC: 21 U/L — SIGNIFICANT CHANGE UP (ref 10–40)
AST SERPL-CCNC: 22 U/L — SIGNIFICANT CHANGE UP (ref 10–40)
AST SERPL-CCNC: 33 U/L — SIGNIFICANT CHANGE UP (ref 10–40)
BACTERIA # UR AUTO: NEGATIVE /HPF — SIGNIFICANT CHANGE UP
BASOPHILS # BLD AUTO: 0 K/UL — SIGNIFICANT CHANGE UP (ref 0–0.2)
BASOPHILS # BLD AUTO: 0 K/UL — SIGNIFICANT CHANGE UP (ref 0–0.2)
BASOPHILS # BLD AUTO: 0.06 K/UL — SIGNIFICANT CHANGE UP (ref 0–0.2)
BASOPHILS NFR BLD AUTO: 0 % — SIGNIFICANT CHANGE UP (ref 0–2)
BASOPHILS NFR BLD AUTO: 0 % — SIGNIFICANT CHANGE UP (ref 0–2)
BASOPHILS NFR BLD AUTO: 0.7 % — SIGNIFICANT CHANGE UP (ref 0–2)
BILIRUB SERPL-MCNC: 0.8 MG/DL — SIGNIFICANT CHANGE UP (ref 0.2–1.2)
BILIRUB SERPL-MCNC: 1.3 MG/DL — HIGH (ref 0.2–1.2)
BILIRUB SERPL-MCNC: 1.5 MG/DL — HIGH (ref 0.2–1.2)
BILIRUB SERPL-MCNC: 1.8 MG/DL — HIGH (ref 0.2–1.2)
BILIRUB UR-MCNC: NEGATIVE — SIGNIFICANT CHANGE UP
BLD GP AB SCN SERPL QL: NEGATIVE — SIGNIFICANT CHANGE UP
BUN SERPL-MCNC: 16 MG/DL — SIGNIFICANT CHANGE UP (ref 7–23)
BUN SERPL-MCNC: 19 MG/DL — SIGNIFICANT CHANGE UP (ref 7–23)
BUN SERPL-MCNC: 22 MG/DL — SIGNIFICANT CHANGE UP (ref 7–23)
BUN SERPL-MCNC: 22 MG/DL — SIGNIFICANT CHANGE UP (ref 7–23)
BUN SERPL-MCNC: 30 MG/DL — HIGH (ref 7–23)
BUN SERPL-MCNC: 30 MG/DL — HIGH (ref 7–23)
BUN SERPL-MCNC: 31 MG/DL — HIGH (ref 7–23)
BUN SERPL-MCNC: 32 MG/DL — HIGH (ref 7–23)
BUN SERPL-MCNC: 34 MG/DL — HIGH (ref 7–23)
BUN SERPL-MCNC: 34 MG/DL — HIGH (ref 7–23)
BUN SERPL-MCNC: 36 MG/DL — HIGH (ref 7–23)
BUN SERPL-MCNC: 36 MG/DL — HIGH (ref 7–23)
BUN SERPL-MCNC: 38 MG/DL — HIGH (ref 7–23)
BUN SERPL-MCNC: 38 MG/DL — HIGH (ref 7–23)
CALCIUM SERPL-MCNC: 10 MG/DL — SIGNIFICANT CHANGE UP (ref 8.4–10.5)
CALCIUM SERPL-MCNC: 10.2 MG/DL — SIGNIFICANT CHANGE UP (ref 8.4–10.5)
CALCIUM SERPL-MCNC: 10.3 MG/DL — SIGNIFICANT CHANGE UP (ref 8.4–10.5)
CALCIUM SERPL-MCNC: 10.6 MG/DL — HIGH (ref 8.4–10.5)
CALCIUM SERPL-MCNC: 8.9 MG/DL — SIGNIFICANT CHANGE UP (ref 8.4–10.5)
CALCIUM SERPL-MCNC: 9 MG/DL — SIGNIFICANT CHANGE UP (ref 8.4–10.5)
CALCIUM SERPL-MCNC: 9.1 MG/DL — SIGNIFICANT CHANGE UP (ref 8.4–10.5)
CALCIUM SERPL-MCNC: 9.3 MG/DL — SIGNIFICANT CHANGE UP (ref 8.4–10.5)
CALCIUM SERPL-MCNC: 9.4 MG/DL — SIGNIFICANT CHANGE UP (ref 8.4–10.5)
CALCIUM SERPL-MCNC: 9.6 MG/DL — SIGNIFICANT CHANGE UP (ref 8.4–10.5)
CALCIUM SERPL-MCNC: 9.8 MG/DL — SIGNIFICANT CHANGE UP (ref 8.4–10.5)
CALCIUM SERPL-MCNC: 9.9 MG/DL — SIGNIFICANT CHANGE UP (ref 8.4–10.5)
CAST: 2 /LPF — SIGNIFICANT CHANGE UP (ref 0–4)
CAST: 2 /LPF — SIGNIFICANT CHANGE UP (ref 0–4)
CAST: 3 /LPF — SIGNIFICANT CHANGE UP (ref 0–4)
CHLORIDE SERPL-SCNC: 106 MMOL/L — SIGNIFICANT CHANGE UP (ref 96–108)
CHLORIDE SERPL-SCNC: 107 MMOL/L — SIGNIFICANT CHANGE UP (ref 96–108)
CHLORIDE SERPL-SCNC: 108 MMOL/L — SIGNIFICANT CHANGE UP (ref 96–108)
CHLORIDE SERPL-SCNC: 108 MMOL/L — SIGNIFICANT CHANGE UP (ref 96–108)
CHLORIDE SERPL-SCNC: 109 MMOL/L — HIGH (ref 96–108)
CHLORIDE SERPL-SCNC: 109 MMOL/L — HIGH (ref 96–108)
CHLORIDE SERPL-SCNC: 110 MMOL/L — HIGH (ref 96–108)
CHLORIDE SERPL-SCNC: 110 MMOL/L — HIGH (ref 96–108)
CHLORIDE SERPL-SCNC: 112 MMOL/L — HIGH (ref 96–108)
CHLORIDE SERPL-SCNC: 114 MMOL/L — HIGH (ref 96–108)
CHLORIDE SERPL-SCNC: 115 MMOL/L — HIGH (ref 96–108)
CHLORIDE SERPL-SCNC: 115 MMOL/L — HIGH (ref 96–108)
CHLORIDE SERPL-SCNC: 117 MMOL/L — HIGH (ref 96–108)
CHLORIDE SERPL-SCNC: 119 MMOL/L — HIGH (ref 96–108)
CK SERPL-CCNC: 235 U/L — HIGH (ref 30–200)
CO2 SERPL-SCNC: 15 MMOL/L — LOW (ref 22–31)
CO2 SERPL-SCNC: 16 MMOL/L — LOW (ref 22–31)
CO2 SERPL-SCNC: 16 MMOL/L — LOW (ref 22–31)
CO2 SERPL-SCNC: 18 MMOL/L — LOW (ref 22–31)
CO2 SERPL-SCNC: 19 MMOL/L — LOW (ref 22–31)
CO2 SERPL-SCNC: 21 MMOL/L — LOW (ref 22–31)
CO2 SERPL-SCNC: 21 MMOL/L — LOW (ref 22–31)
CO2 SERPL-SCNC: 23 MMOL/L — SIGNIFICANT CHANGE UP (ref 22–31)
CO2 SERPL-SCNC: 24 MMOL/L — SIGNIFICANT CHANGE UP (ref 22–31)
CO2 SERPL-SCNC: 26 MMOL/L — SIGNIFICANT CHANGE UP (ref 22–31)
CO2 SERPL-SCNC: 27 MMOL/L — SIGNIFICANT CHANGE UP (ref 22–31)
CO2 SERPL-SCNC: 27 MMOL/L — SIGNIFICANT CHANGE UP (ref 22–31)
COLOR SPEC: ABNORMAL
COLOR SPEC: SIGNIFICANT CHANGE UP
COLOR SPEC: YELLOW — SIGNIFICANT CHANGE UP
CREAT SERPL-MCNC: 0.86 MG/DL — SIGNIFICANT CHANGE UP (ref 0.5–1.3)
CREAT SERPL-MCNC: 0.99 MG/DL — SIGNIFICANT CHANGE UP (ref 0.5–1.3)
CREAT SERPL-MCNC: 1.01 MG/DL — SIGNIFICANT CHANGE UP (ref 0.5–1.3)
CREAT SERPL-MCNC: 1.07 MG/DL — SIGNIFICANT CHANGE UP (ref 0.5–1.3)
CREAT SERPL-MCNC: 1.09 MG/DL — SIGNIFICANT CHANGE UP (ref 0.5–1.3)
CREAT SERPL-MCNC: 1.1 MG/DL — SIGNIFICANT CHANGE UP (ref 0.5–1.3)
CREAT SERPL-MCNC: 1.13 MG/DL — SIGNIFICANT CHANGE UP (ref 0.5–1.3)
CREAT SERPL-MCNC: 1.14 MG/DL — SIGNIFICANT CHANGE UP (ref 0.5–1.3)
CREAT SERPL-MCNC: 1.14 MG/DL — SIGNIFICANT CHANGE UP (ref 0.5–1.3)
CREAT SERPL-MCNC: 1.15 MG/DL — SIGNIFICANT CHANGE UP (ref 0.5–1.3)
CREAT SERPL-MCNC: 1.25 MG/DL — SIGNIFICANT CHANGE UP (ref 0.5–1.3)
CREAT SERPL-MCNC: 1.25 MG/DL — SIGNIFICANT CHANGE UP (ref 0.5–1.3)
CREAT SERPL-MCNC: 1.28 MG/DL — SIGNIFICANT CHANGE UP (ref 0.5–1.3)
CREAT SERPL-MCNC: 1.34 MG/DL — HIGH (ref 0.5–1.3)
CULTURE RESULTS: ABNORMAL
CULTURE RESULTS: ABNORMAL
CULTURE RESULTS: NO GROWTH — SIGNIFICANT CHANGE UP
CULTURE RESULTS: SIGNIFICANT CHANGE UP
DACRYOCYTES BLD QL SMEAR: SLIGHT — SIGNIFICANT CHANGE UP
DIFF PNL FLD: ABNORMAL
E COLI DNA BLD POS QL NAA+NON-PROBE: SIGNIFICANT CHANGE UP
EGFR: 52 ML/MIN/1.73M2 — LOW
EGFR: 55 ML/MIN/1.73M2 — LOW
EGFR: 56 ML/MIN/1.73M2 — LOW
EGFR: 56 ML/MIN/1.73M2 — LOW
EGFR: 62 ML/MIN/1.73M2 — SIGNIFICANT CHANGE UP
EGFR: 63 ML/MIN/1.73M2 — SIGNIFICANT CHANGE UP
EGFR: 65 ML/MIN/1.73M2 — SIGNIFICANT CHANGE UP
EGFR: 66 ML/MIN/1.73M2 — SIGNIFICANT CHANGE UP
EGFR: 68 ML/MIN/1.73M2 — SIGNIFICANT CHANGE UP
EGFR: 72 ML/MIN/1.73M2 — SIGNIFICANT CHANGE UP
EGFR: 74 ML/MIN/1.73M2 — SIGNIFICANT CHANGE UP
EGFR: 84 ML/MIN/1.73M2 — SIGNIFICANT CHANGE UP
ELLIPTOCYTES BLD QL SMEAR: SLIGHT — SIGNIFICANT CHANGE UP
ELLIPTOCYTES BLD QL SMEAR: SLIGHT — SIGNIFICANT CHANGE UP
EOSINOPHIL # BLD AUTO: 0 K/UL — SIGNIFICANT CHANGE UP (ref 0–0.5)
EOSINOPHIL # BLD AUTO: 0.02 K/UL — SIGNIFICANT CHANGE UP (ref 0–0.5)
EOSINOPHIL # BLD AUTO: 0.17 K/UL — SIGNIFICANT CHANGE UP (ref 0–0.5)
EOSINOPHIL NFR BLD AUTO: 0 % — SIGNIFICANT CHANGE UP (ref 0–6)
EOSINOPHIL NFR BLD AUTO: 0.2 % — SIGNIFICANT CHANGE UP (ref 0–6)
EOSINOPHIL NFR BLD AUTO: 1.7 % — SIGNIFICANT CHANGE UP (ref 0–6)
FLUAV AG NPH QL: SIGNIFICANT CHANGE UP
FLUAV AG NPH QL: SIGNIFICANT CHANGE UP
FLUBV AG NPH QL: SIGNIFICANT CHANGE UP
FLUBV AG NPH QL: SIGNIFICANT CHANGE UP
GAS PNL BLDA: SIGNIFICANT CHANGE UP
GAS PNL BLDV: SIGNIFICANT CHANGE UP
GLUCOSE BLDC GLUCOMTR-MCNC: 102 MG/DL — HIGH (ref 70–99)
GLUCOSE BLDC GLUCOMTR-MCNC: 106 MG/DL — HIGH (ref 70–99)
GLUCOSE BLDC GLUCOMTR-MCNC: 110 MG/DL — HIGH (ref 70–99)
GLUCOSE BLDC GLUCOMTR-MCNC: 112 MG/DL — HIGH (ref 70–99)
GLUCOSE BLDC GLUCOMTR-MCNC: 119 MG/DL — HIGH (ref 70–99)
GLUCOSE BLDC GLUCOMTR-MCNC: 125 MG/DL — HIGH (ref 70–99)
GLUCOSE BLDC GLUCOMTR-MCNC: 126 MG/DL — HIGH (ref 70–99)
GLUCOSE BLDC GLUCOMTR-MCNC: 127 MG/DL — HIGH (ref 70–99)
GLUCOSE BLDC GLUCOMTR-MCNC: 131 MG/DL — HIGH (ref 70–99)
GLUCOSE BLDC GLUCOMTR-MCNC: 138 MG/DL — HIGH (ref 70–99)
GLUCOSE BLDC GLUCOMTR-MCNC: 141 MG/DL — HIGH (ref 70–99)
GLUCOSE BLDC GLUCOMTR-MCNC: 141 MG/DL — HIGH (ref 70–99)
GLUCOSE BLDC GLUCOMTR-MCNC: 144 MG/DL — HIGH (ref 70–99)
GLUCOSE BLDC GLUCOMTR-MCNC: 147 MG/DL — HIGH (ref 70–99)
GLUCOSE BLDC GLUCOMTR-MCNC: 147 MG/DL — HIGH (ref 70–99)
GLUCOSE BLDC GLUCOMTR-MCNC: 150 MG/DL — HIGH (ref 70–99)
GLUCOSE BLDC GLUCOMTR-MCNC: 150 MG/DL — HIGH (ref 70–99)
GLUCOSE BLDC GLUCOMTR-MCNC: 155 MG/DL — HIGH (ref 70–99)
GLUCOSE BLDC GLUCOMTR-MCNC: 156 MG/DL — HIGH (ref 70–99)
GLUCOSE BLDC GLUCOMTR-MCNC: 162 MG/DL — HIGH (ref 70–99)
GLUCOSE BLDC GLUCOMTR-MCNC: 168 MG/DL — HIGH (ref 70–99)
GLUCOSE BLDC GLUCOMTR-MCNC: 170 MG/DL — HIGH (ref 70–99)
GLUCOSE BLDC GLUCOMTR-MCNC: 171 MG/DL — HIGH (ref 70–99)
GLUCOSE BLDC GLUCOMTR-MCNC: 173 MG/DL — HIGH (ref 70–99)
GLUCOSE BLDC GLUCOMTR-MCNC: 175 MG/DL — HIGH (ref 70–99)
GLUCOSE BLDC GLUCOMTR-MCNC: 178 MG/DL — HIGH (ref 70–99)
GLUCOSE BLDC GLUCOMTR-MCNC: 186 MG/DL — HIGH (ref 70–99)
GLUCOSE BLDC GLUCOMTR-MCNC: 188 MG/DL — HIGH (ref 70–99)
GLUCOSE BLDC GLUCOMTR-MCNC: 188 MG/DL — HIGH (ref 70–99)
GLUCOSE BLDC GLUCOMTR-MCNC: 189 MG/DL — HIGH (ref 70–99)
GLUCOSE BLDC GLUCOMTR-MCNC: 194 MG/DL — HIGH (ref 70–99)
GLUCOSE BLDC GLUCOMTR-MCNC: 202 MG/DL — HIGH (ref 70–99)
GLUCOSE BLDC GLUCOMTR-MCNC: 205 MG/DL — HIGH (ref 70–99)
GLUCOSE BLDC GLUCOMTR-MCNC: 206 MG/DL — HIGH (ref 70–99)
GLUCOSE BLDC GLUCOMTR-MCNC: 213 MG/DL — HIGH (ref 70–99)
GLUCOSE BLDC GLUCOMTR-MCNC: 217 MG/DL — HIGH (ref 70–99)
GLUCOSE BLDC GLUCOMTR-MCNC: 217 MG/DL — HIGH (ref 70–99)
GLUCOSE BLDC GLUCOMTR-MCNC: 222 MG/DL — HIGH (ref 70–99)
GLUCOSE BLDC GLUCOMTR-MCNC: 230 MG/DL — HIGH (ref 70–99)
GLUCOSE BLDC GLUCOMTR-MCNC: 250 MG/DL — HIGH (ref 70–99)
GLUCOSE BLDC GLUCOMTR-MCNC: 253 MG/DL — HIGH (ref 70–99)
GLUCOSE BLDC GLUCOMTR-MCNC: 312 MG/DL — HIGH (ref 70–99)
GLUCOSE BLDC GLUCOMTR-MCNC: 83 MG/DL — SIGNIFICANT CHANGE UP (ref 70–99)
GLUCOSE BLDC GLUCOMTR-MCNC: 96 MG/DL — SIGNIFICANT CHANGE UP (ref 70–99)
GLUCOSE SERPL-MCNC: 102 MG/DL — HIGH (ref 70–99)
GLUCOSE SERPL-MCNC: 123 MG/DL — HIGH (ref 70–99)
GLUCOSE SERPL-MCNC: 145 MG/DL — HIGH (ref 70–99)
GLUCOSE SERPL-MCNC: 146 MG/DL — HIGH (ref 70–99)
GLUCOSE SERPL-MCNC: 146 MG/DL — HIGH (ref 70–99)
GLUCOSE SERPL-MCNC: 153 MG/DL — HIGH (ref 70–99)
GLUCOSE SERPL-MCNC: 157 MG/DL — HIGH (ref 70–99)
GLUCOSE SERPL-MCNC: 161 MG/DL — HIGH (ref 70–99)
GLUCOSE SERPL-MCNC: 169 MG/DL — HIGH (ref 70–99)
GLUCOSE SERPL-MCNC: 174 MG/DL — HIGH (ref 70–99)
GLUCOSE SERPL-MCNC: 187 MG/DL — HIGH (ref 70–99)
GLUCOSE SERPL-MCNC: 204 MG/DL — HIGH (ref 70–99)
GLUCOSE SERPL-MCNC: 223 MG/DL — HIGH (ref 70–99)
GLUCOSE SERPL-MCNC: 259 MG/DL — HIGH (ref 70–99)
GLUCOSE UR QL: >=1000 MG/DL
GRAM STN FLD: ABNORMAL
HCT VFR BLD CALC: 34.5 % — LOW (ref 39–50)
HCT VFR BLD CALC: 38.1 % — LOW (ref 39–50)
HCT VFR BLD CALC: 38.6 % — LOW (ref 39–50)
HCT VFR BLD CALC: 38.7 % — LOW (ref 39–50)
HCT VFR BLD CALC: 38.7 % — LOW (ref 39–50)
HCT VFR BLD CALC: 39.8 % — SIGNIFICANT CHANGE UP (ref 39–50)
HCT VFR BLD CALC: 40.4 % — SIGNIFICANT CHANGE UP (ref 39–50)
HCT VFR BLD CALC: 40.8 % — SIGNIFICANT CHANGE UP (ref 39–50)
HCT VFR BLD CALC: 41.8 % — SIGNIFICANT CHANGE UP (ref 39–50)
HCT VFR BLD CALC: 42.7 % — SIGNIFICANT CHANGE UP (ref 39–50)
HCT VFR BLD CALC: 42.8 % — SIGNIFICANT CHANGE UP (ref 39–50)
HCT VFR BLD CALC: 42.9 % — SIGNIFICANT CHANGE UP (ref 39–50)
HCT VFR BLD CALC: 43.2 % — SIGNIFICANT CHANGE UP (ref 39–50)
HCT VFR BLD CALC: 43.4 % — SIGNIFICANT CHANGE UP (ref 39–50)
HCT VFR BLD CALC: 44.3 % — SIGNIFICANT CHANGE UP (ref 39–50)
HCT VFR BLD CALC: 46.2 % — SIGNIFICANT CHANGE UP (ref 39–50)
HGB BLD-MCNC: 11 G/DL — LOW (ref 13–17)
HGB BLD-MCNC: 12.3 G/DL — LOW (ref 13–17)
HGB BLD-MCNC: 12.3 G/DL — LOW (ref 13–17)
HGB BLD-MCNC: 12.6 G/DL — LOW (ref 13–17)
HGB BLD-MCNC: 12.6 G/DL — LOW (ref 13–17)
HGB BLD-MCNC: 12.7 G/DL — LOW (ref 13–17)
HGB BLD-MCNC: 12.8 G/DL — LOW (ref 13–17)
HGB BLD-MCNC: 13.1 G/DL — SIGNIFICANT CHANGE UP (ref 13–17)
HGB BLD-MCNC: 13.2 G/DL — SIGNIFICANT CHANGE UP (ref 13–17)
HGB BLD-MCNC: 13.5 G/DL — SIGNIFICANT CHANGE UP (ref 13–17)
HGB BLD-MCNC: 13.6 G/DL — SIGNIFICANT CHANGE UP (ref 13–17)
HGB BLD-MCNC: 13.7 G/DL — SIGNIFICANT CHANGE UP (ref 13–17)
HGB BLD-MCNC: 13.8 G/DL — SIGNIFICANT CHANGE UP (ref 13–17)
HGB BLD-MCNC: 14.2 G/DL — SIGNIFICANT CHANGE UP (ref 13–17)
HGB BLD-MCNC: 14.4 G/DL — SIGNIFICANT CHANGE UP (ref 13–17)
HGB BLD-MCNC: 14.8 G/DL — SIGNIFICANT CHANGE UP (ref 13–17)
IMM GRANULOCYTES NFR BLD AUTO: 0.9 % — SIGNIFICANT CHANGE UP (ref 0–0.9)
INR BLD: 0.89 RATIO — SIGNIFICANT CHANGE UP (ref 0.85–1.16)
INR BLD: 0.91 RATIO — SIGNIFICANT CHANGE UP (ref 0.85–1.16)
INR BLD: 0.95 RATIO — SIGNIFICANT CHANGE UP (ref 0.85–1.16)
KETONES UR-MCNC: 15 MG/DL
KETONES UR-MCNC: ABNORMAL MG/DL
KETONES UR-MCNC: NEGATIVE MG/DL — SIGNIFICANT CHANGE UP
LEGIONELLA AG UR QL: NEGATIVE — SIGNIFICANT CHANGE UP
LEUKOCYTE ESTERASE UR-ACNC: ABNORMAL
LEUKOCYTE ESTERASE UR-ACNC: NEGATIVE — SIGNIFICANT CHANGE UP
LEUKOCYTE ESTERASE UR-ACNC: NEGATIVE — SIGNIFICANT CHANGE UP
LYMPHOCYTES # BLD AUTO: 0.14 K/UL — LOW (ref 1–3.3)
LYMPHOCYTES # BLD AUTO: 0.17 K/UL — LOW (ref 1–3.3)
LYMPHOCYTES # BLD AUTO: 0.35 K/UL — LOW (ref 1–3.3)
LYMPHOCYTES # BLD AUTO: 1.7 % — LOW (ref 13–44)
LYMPHOCYTES # BLD AUTO: 1.7 % — LOW (ref 13–44)
LYMPHOCYTES # BLD AUTO: 2.6 % — LOW (ref 13–44)
MACROCYTES BLD QL: SLIGHT — SIGNIFICANT CHANGE UP
MAGNESIUM SERPL-MCNC: 1.4 MG/DL — LOW (ref 1.6–2.6)
MAGNESIUM SERPL-MCNC: 1.8 MG/DL — SIGNIFICANT CHANGE UP (ref 1.6–2.6)
MAGNESIUM SERPL-MCNC: 1.9 MG/DL — SIGNIFICANT CHANGE UP (ref 1.6–2.6)
MAGNESIUM SERPL-MCNC: 2.1 MG/DL — SIGNIFICANT CHANGE UP (ref 1.6–2.6)
MAGNESIUM SERPL-MCNC: 2.1 MG/DL — SIGNIFICANT CHANGE UP (ref 1.6–2.6)
MAGNESIUM SERPL-MCNC: 2.2 MG/DL — SIGNIFICANT CHANGE UP (ref 1.6–2.6)
MAGNESIUM SERPL-MCNC: 2.3 MG/DL — SIGNIFICANT CHANGE UP (ref 1.6–2.6)
MAGNESIUM SERPL-MCNC: 2.4 MG/DL — SIGNIFICANT CHANGE UP (ref 1.6–2.6)
MANUAL SMEAR VERIFICATION: SIGNIFICANT CHANGE UP
MANUAL SMEAR VERIFICATION: SIGNIFICANT CHANGE UP
MCHC RBC-ENTMCNC: 30 PG — SIGNIFICANT CHANGE UP (ref 27–34)
MCHC RBC-ENTMCNC: 30.3 PG — SIGNIFICANT CHANGE UP (ref 27–34)
MCHC RBC-ENTMCNC: 30.5 PG — SIGNIFICANT CHANGE UP (ref 27–34)
MCHC RBC-ENTMCNC: 30.5 PG — SIGNIFICANT CHANGE UP (ref 27–34)
MCHC RBC-ENTMCNC: 30.7 PG — SIGNIFICANT CHANGE UP (ref 27–34)
MCHC RBC-ENTMCNC: 30.8 PG — SIGNIFICANT CHANGE UP (ref 27–34)
MCHC RBC-ENTMCNC: 30.9 PG — SIGNIFICANT CHANGE UP (ref 27–34)
MCHC RBC-ENTMCNC: 31 PG — SIGNIFICANT CHANGE UP (ref 27–34)
MCHC RBC-ENTMCNC: 31 PG — SIGNIFICANT CHANGE UP (ref 27–34)
MCHC RBC-ENTMCNC: 31.1 PG — SIGNIFICANT CHANGE UP (ref 27–34)
MCHC RBC-ENTMCNC: 31.1 PG — SIGNIFICANT CHANGE UP (ref 27–34)
MCHC RBC-ENTMCNC: 31.2 PG — SIGNIFICANT CHANGE UP (ref 27–34)
MCHC RBC-ENTMCNC: 31.2 PG — SIGNIFICANT CHANGE UP (ref 27–34)
MCHC RBC-ENTMCNC: 31.4 G/DL — LOW (ref 32–36)
MCHC RBC-ENTMCNC: 31.5 G/DL — LOW (ref 32–36)
MCHC RBC-ENTMCNC: 31.6 G/DL — LOW (ref 32–36)
MCHC RBC-ENTMCNC: 31.6 G/DL — LOW (ref 32–36)
MCHC RBC-ENTMCNC: 31.8 G/DL — LOW (ref 32–36)
MCHC RBC-ENTMCNC: 31.8 G/DL — LOW (ref 32–36)
MCHC RBC-ENTMCNC: 31.9 G/DL — LOW (ref 32–36)
MCHC RBC-ENTMCNC: 32 G/DL — SIGNIFICANT CHANGE UP (ref 32–36)
MCHC RBC-ENTMCNC: 32.3 G/DL — SIGNIFICANT CHANGE UP (ref 32–36)
MCHC RBC-ENTMCNC: 32.4 G/DL — SIGNIFICANT CHANGE UP (ref 32–36)
MCHC RBC-ENTMCNC: 32.5 G/DL — SIGNIFICANT CHANGE UP (ref 32–36)
MCHC RBC-ENTMCNC: 32.6 G/DL — SIGNIFICANT CHANGE UP (ref 32–36)
MCHC RBC-ENTMCNC: 32.6 G/DL — SIGNIFICANT CHANGE UP (ref 32–36)
MCHC RBC-ENTMCNC: 33.3 G/DL — SIGNIFICANT CHANGE UP (ref 32–36)
MCV RBC AUTO: 93.6 FL — SIGNIFICANT CHANGE UP (ref 80–100)
MCV RBC AUTO: 93.9 FL — SIGNIFICANT CHANGE UP (ref 80–100)
MCV RBC AUTO: 94.1 FL — SIGNIFICANT CHANGE UP (ref 80–100)
MCV RBC AUTO: 95 FL — SIGNIFICANT CHANGE UP (ref 80–100)
MCV RBC AUTO: 95.3 FL — SIGNIFICANT CHANGE UP (ref 80–100)
MCV RBC AUTO: 95.5 FL — SIGNIFICANT CHANGE UP (ref 80–100)
MCV RBC AUTO: 95.8 FL — SIGNIFICANT CHANGE UP (ref 80–100)
MCV RBC AUTO: 96 FL — SIGNIFICANT CHANGE UP (ref 80–100)
MCV RBC AUTO: 96.6 FL — SIGNIFICANT CHANGE UP (ref 80–100)
MCV RBC AUTO: 96.7 FL — SIGNIFICANT CHANGE UP (ref 80–100)
MCV RBC AUTO: 96.9 FL — SIGNIFICANT CHANGE UP (ref 80–100)
MCV RBC AUTO: 97.2 FL — SIGNIFICANT CHANGE UP (ref 80–100)
MCV RBC AUTO: 97.3 FL — SIGNIFICANT CHANGE UP (ref 80–100)
MCV RBC AUTO: 97.5 FL — SIGNIFICANT CHANGE UP (ref 80–100)
MCV RBC AUTO: 97.9 FL — SIGNIFICANT CHANGE UP (ref 80–100)
MCV RBC AUTO: 98.6 FL — SIGNIFICANT CHANGE UP (ref 80–100)
METHOD TYPE: SIGNIFICANT CHANGE UP
METHOD TYPE: SIGNIFICANT CHANGE UP
MONOCYTES # BLD AUTO: 0.09 K/UL — SIGNIFICANT CHANGE UP (ref 0–0.9)
MONOCYTES # BLD AUTO: 0.53 K/UL — SIGNIFICANT CHANGE UP (ref 0–0.9)
MONOCYTES # BLD AUTO: 1.4 K/UL — HIGH (ref 0–0.9)
MONOCYTES NFR BLD AUTO: 1.1 % — LOW (ref 2–14)
MONOCYTES NFR BLD AUTO: 10.4 % — SIGNIFICANT CHANGE UP (ref 2–14)
MONOCYTES NFR BLD AUTO: 5.2 % — SIGNIFICANT CHANGE UP (ref 2–14)
MRSA PCR RESULT.: SIGNIFICANT CHANGE UP
NEUTROPHILS # BLD AUTO: 11.68 K/UL — HIGH (ref 1.8–7.4)
NEUTROPHILS # BLD AUTO: 7.65 K/UL — HIGH (ref 1.8–7.4)
NEUTROPHILS # BLD AUTO: 9.3 K/UL — HIGH (ref 1.8–7.4)
NEUTROPHILS NFR BLD AUTO: 87 % — HIGH (ref 43–77)
NEUTROPHILS NFR BLD AUTO: 90.5 % — HIGH (ref 43–77)
NEUTROPHILS NFR BLD AUTO: 95.4 % — HIGH (ref 43–77)
NEUTS BAND # BLD: 0.9 % — SIGNIFICANT CHANGE UP (ref 0–8)
NEUTS BAND NFR BLD: 0.9 % — SIGNIFICANT CHANGE UP (ref 0–8)
NITRITE UR-MCNC: NEGATIVE — SIGNIFICANT CHANGE UP
NRBC # BLD: 0 /100 WBCS — SIGNIFICANT CHANGE UP (ref 0–0)
NRBC BLD AUTO-RTO: 0 /100 WBCS — SIGNIFICANT CHANGE UP (ref 0–0)
NRBC BLD-RTO: 0 /100 WBCS — SIGNIFICANT CHANGE UP (ref 0–0)
ORGANISM # SPEC MICROSCOPIC CNT: ABNORMAL
OVALOCYTES BLD QL SMEAR: SLIGHT — SIGNIFICANT CHANGE UP
PH UR: 5 — SIGNIFICANT CHANGE UP (ref 5–8)
PH UR: 5.5 — SIGNIFICANT CHANGE UP (ref 5–8)
PH UR: 5.5 — SIGNIFICANT CHANGE UP (ref 5–8)
PHOSPHATE SERPL-MCNC: 2.1 MG/DL — LOW (ref 2.5–4.5)
PHOSPHATE SERPL-MCNC: 2.4 MG/DL — LOW (ref 2.5–4.5)
PHOSPHATE SERPL-MCNC: 2.7 MG/DL — SIGNIFICANT CHANGE UP (ref 2.5–4.5)
PHOSPHATE SERPL-MCNC: 2.8 MG/DL — SIGNIFICANT CHANGE UP (ref 2.5–4.5)
PHOSPHATE SERPL-MCNC: 2.9 MG/DL — SIGNIFICANT CHANGE UP (ref 2.5–4.5)
PHOSPHATE SERPL-MCNC: 3.4 MG/DL — SIGNIFICANT CHANGE UP (ref 2.5–4.5)
PHOSPHATE SERPL-MCNC: 3.5 MG/DL — SIGNIFICANT CHANGE UP (ref 2.5–4.5)
PHOSPHATE SERPL-MCNC: 4 MG/DL — SIGNIFICANT CHANGE UP (ref 2.5–4.5)
PHOSPHATE SERPL-MCNC: 4.2 MG/DL — SIGNIFICANT CHANGE UP (ref 2.5–4.5)
PHOSPHATE SERPL-MCNC: 4.8 MG/DL — HIGH (ref 2.5–4.5)
PHOSPHATE SERPL-MCNC: 4.9 MG/DL — HIGH (ref 2.5–4.5)
PLAT MORPH BLD: NORMAL — SIGNIFICANT CHANGE UP
PLAT MORPH BLD: NORMAL — SIGNIFICANT CHANGE UP
PLATELET # BLD AUTO: 109 K/UL — LOW (ref 150–400)
PLATELET # BLD AUTO: 109 K/UL — LOW (ref 150–400)
PLATELET # BLD AUTO: 114 K/UL — LOW (ref 150–400)
PLATELET # BLD AUTO: 116 K/UL — LOW (ref 150–400)
PLATELET # BLD AUTO: 117 K/UL — LOW (ref 150–400)
PLATELET # BLD AUTO: 119 K/UL — LOW (ref 150–400)
PLATELET # BLD AUTO: 124 K/UL — LOW (ref 150–400)
PLATELET # BLD AUTO: 137 K/UL — LOW (ref 150–400)
PLATELET # BLD AUTO: 142 K/UL — LOW (ref 150–400)
PLATELET # BLD AUTO: 143 K/UL — LOW (ref 150–400)
PLATELET # BLD AUTO: 143 K/UL — LOW (ref 150–400)
PLATELET # BLD AUTO: 151 K/UL — SIGNIFICANT CHANGE UP (ref 150–400)
PLATELET # BLD AUTO: 169 K/UL — SIGNIFICANT CHANGE UP (ref 150–400)
PLATELET # BLD AUTO: 92 K/UL — LOW (ref 150–400)
PLATELET # BLD AUTO: 95 K/UL — LOW (ref 150–400)
PLATELET # BLD AUTO: 95 K/UL — LOW (ref 150–400)
POIKILOCYTOSIS BLD QL AUTO: SLIGHT — SIGNIFICANT CHANGE UP
POIKILOCYTOSIS BLD QL AUTO: SLIGHT — SIGNIFICANT CHANGE UP
POLYCHROMASIA BLD QL SMEAR: SLIGHT — SIGNIFICANT CHANGE UP
POTASSIUM SERPL-MCNC: 3.5 MMOL/L — SIGNIFICANT CHANGE UP (ref 3.5–5.3)
POTASSIUM SERPL-MCNC: 3.7 MMOL/L — SIGNIFICANT CHANGE UP (ref 3.5–5.3)
POTASSIUM SERPL-MCNC: 3.7 MMOL/L — SIGNIFICANT CHANGE UP (ref 3.5–5.3)
POTASSIUM SERPL-MCNC: 3.9 MMOL/L — SIGNIFICANT CHANGE UP (ref 3.5–5.3)
POTASSIUM SERPL-MCNC: 4 MMOL/L — SIGNIFICANT CHANGE UP (ref 3.5–5.3)
POTASSIUM SERPL-MCNC: 4 MMOL/L — SIGNIFICANT CHANGE UP (ref 3.5–5.3)
POTASSIUM SERPL-MCNC: 4.2 MMOL/L — SIGNIFICANT CHANGE UP (ref 3.5–5.3)
POTASSIUM SERPL-MCNC: 4.4 MMOL/L — SIGNIFICANT CHANGE UP (ref 3.5–5.3)
POTASSIUM SERPL-MCNC: 4.5 MMOL/L — SIGNIFICANT CHANGE UP (ref 3.5–5.3)
POTASSIUM SERPL-MCNC: 4.6 MMOL/L — SIGNIFICANT CHANGE UP (ref 3.5–5.3)
POTASSIUM SERPL-MCNC: 4.6 MMOL/L — SIGNIFICANT CHANGE UP (ref 3.5–5.3)
POTASSIUM SERPL-MCNC: 4.7 MMOL/L — SIGNIFICANT CHANGE UP (ref 3.5–5.3)
POTASSIUM SERPL-SCNC: 3.5 MMOL/L — SIGNIFICANT CHANGE UP (ref 3.5–5.3)
POTASSIUM SERPL-SCNC: 3.7 MMOL/L — SIGNIFICANT CHANGE UP (ref 3.5–5.3)
POTASSIUM SERPL-SCNC: 3.7 MMOL/L — SIGNIFICANT CHANGE UP (ref 3.5–5.3)
POTASSIUM SERPL-SCNC: 3.9 MMOL/L — SIGNIFICANT CHANGE UP (ref 3.5–5.3)
POTASSIUM SERPL-SCNC: 4 MMOL/L — SIGNIFICANT CHANGE UP (ref 3.5–5.3)
POTASSIUM SERPL-SCNC: 4 MMOL/L — SIGNIFICANT CHANGE UP (ref 3.5–5.3)
POTASSIUM SERPL-SCNC: 4.2 MMOL/L — SIGNIFICANT CHANGE UP (ref 3.5–5.3)
POTASSIUM SERPL-SCNC: 4.4 MMOL/L — SIGNIFICANT CHANGE UP (ref 3.5–5.3)
POTASSIUM SERPL-SCNC: 4.5 MMOL/L — SIGNIFICANT CHANGE UP (ref 3.5–5.3)
POTASSIUM SERPL-SCNC: 4.6 MMOL/L — SIGNIFICANT CHANGE UP (ref 3.5–5.3)
POTASSIUM SERPL-SCNC: 4.6 MMOL/L — SIGNIFICANT CHANGE UP (ref 3.5–5.3)
POTASSIUM SERPL-SCNC: 4.7 MMOL/L — SIGNIFICANT CHANGE UP (ref 3.5–5.3)
PROCALCITONIN SERPL-MCNC: 0.48 NG/ML — HIGH (ref 0.02–0.1)
PROCALCITONIN SERPL-MCNC: 64.73 NG/ML — HIGH (ref 0.02–0.1)
PROT SERPL-MCNC: 5.6 G/DL — LOW (ref 6–8.3)
PROT SERPL-MCNC: 6 G/DL — SIGNIFICANT CHANGE UP (ref 6–8.3)
PROT SERPL-MCNC: 6.2 G/DL — SIGNIFICANT CHANGE UP (ref 6–8.3)
PROT SERPL-MCNC: 6.2 G/DL — SIGNIFICANT CHANGE UP (ref 6–8.3)
PROT UR-MCNC: 100 MG/DL
PROT UR-MCNC: 300 MG/DL
PROT UR-MCNC: 300 MG/DL
PROTHROM AB SERPL-ACNC: 10.2 SEC — SIGNIFICANT CHANGE UP (ref 9.9–13.4)
PROTHROM AB SERPL-ACNC: 10.4 SEC — SIGNIFICANT CHANGE UP (ref 9.9–13.4)
PROTHROM AB SERPL-ACNC: 10.8 SEC — SIGNIFICANT CHANGE UP (ref 9.9–13.4)
RBC # BLD: 3.63 M/UL — LOW (ref 4.2–5.8)
RBC # BLD: 3.94 M/UL — LOW (ref 4.2–5.8)
RBC # BLD: 3.98 M/UL — LOW (ref 4.2–5.8)
RBC # BLD: 4.04 M/UL — LOW (ref 4.2–5.8)
RBC # BLD: 4.09 M/UL — LOW (ref 4.2–5.8)
RBC # BLD: 4.1 M/UL — LOW (ref 4.2–5.8)
RBC # BLD: 4.14 M/UL — LOW (ref 4.2–5.8)
RBC # BLD: 4.23 M/UL — SIGNIFICANT CHANGE UP (ref 4.2–5.8)
RBC # BLD: 4.27 M/UL — SIGNIFICANT CHANGE UP (ref 4.2–5.8)
RBC # BLD: 4.45 M/UL — SIGNIFICANT CHANGE UP (ref 4.2–5.8)
RBC # BLD: 4.46 M/UL — SIGNIFICANT CHANGE UP (ref 4.2–5.8)
RBC # BLD: 4.47 M/UL — SIGNIFICANT CHANGE UP (ref 4.2–5.8)
RBC # BLD: 4.5 M/UL — SIGNIFICANT CHANGE UP (ref 4.2–5.8)
RBC # BLD: 4.56 M/UL — SIGNIFICANT CHANGE UP (ref 4.2–5.8)
RBC # BLD: 4.72 M/UL — SIGNIFICANT CHANGE UP (ref 4.2–5.8)
RBC # BLD: 4.77 M/UL — SIGNIFICANT CHANGE UP (ref 4.2–5.8)
RBC # FLD: 14 % — SIGNIFICANT CHANGE UP (ref 10.3–14.5)
RBC # FLD: 14.1 % — SIGNIFICANT CHANGE UP (ref 10.3–14.5)
RBC # FLD: 14.1 % — SIGNIFICANT CHANGE UP (ref 10.3–14.5)
RBC # FLD: 14.2 % — SIGNIFICANT CHANGE UP (ref 10.3–14.5)
RBC # FLD: 14.3 % — SIGNIFICANT CHANGE UP (ref 10.3–14.5)
RBC # FLD: 14.4 % — SIGNIFICANT CHANGE UP (ref 10.3–14.5)
RBC # FLD: 14.5 % — SIGNIFICANT CHANGE UP (ref 10.3–14.5)
RBC # FLD: 14.6 % — HIGH (ref 10.3–14.5)
RBC # FLD: 14.7 % — HIGH (ref 10.3–14.5)
RBC # FLD: 14.7 % — HIGH (ref 10.3–14.5)
RBC # FLD: 14.8 % — HIGH (ref 10.3–14.5)
RBC # FLD: 14.8 % — HIGH (ref 10.3–14.5)
RBC # FLD: 14.9 % — HIGH (ref 10.3–14.5)
RBC # FLD: 14.9 % — HIGH (ref 10.3–14.5)
RBC BLD AUTO: ABNORMAL
RBC BLD AUTO: SIGNIFICANT CHANGE UP
RBC CASTS # UR COMP ASSIST: 0 /HPF — SIGNIFICANT CHANGE UP (ref 0–4)
RBC CASTS # UR COMP ASSIST: 213 /HPF — HIGH (ref 0–4)
RBC CASTS # UR COMP ASSIST: 281 /HPF — HIGH (ref 0–4)
REVIEW: SIGNIFICANT CHANGE UP
REVIEW: SIGNIFICANT CHANGE UP
RH IG SCN BLD-IMP: POSITIVE — SIGNIFICANT CHANGE UP
RSV RNA NPH QL NAA+NON-PROBE: SIGNIFICANT CHANGE UP
RSV RNA NPH QL NAA+NON-PROBE: SIGNIFICANT CHANGE UP
S AUREUS DNA NOSE QL NAA+PROBE: SIGNIFICANT CHANGE UP
SARS-COV-2 RNA SPEC QL NAA+PROBE: SIGNIFICANT CHANGE UP
SARS-COV-2 RNA SPEC QL NAA+PROBE: SIGNIFICANT CHANGE UP
SODIUM SERPL-SCNC: 141 MMOL/L — SIGNIFICANT CHANGE UP (ref 135–145)
SODIUM SERPL-SCNC: 142 MMOL/L — SIGNIFICANT CHANGE UP (ref 135–145)
SODIUM SERPL-SCNC: 142 MMOL/L — SIGNIFICANT CHANGE UP (ref 135–145)
SODIUM SERPL-SCNC: 145 MMOL/L — SIGNIFICANT CHANGE UP (ref 135–145)
SODIUM SERPL-SCNC: 146 MMOL/L — HIGH (ref 135–145)
SODIUM SERPL-SCNC: 148 MMOL/L — HIGH (ref 135–145)
SODIUM SERPL-SCNC: 150 MMOL/L — HIGH (ref 135–145)
SODIUM SERPL-SCNC: 151 MMOL/L — HIGH (ref 135–145)
SODIUM SERPL-SCNC: 152 MMOL/L — HIGH (ref 135–145)
SODIUM SERPL-SCNC: 153 MMOL/L — HIGH (ref 135–145)
SODIUM SERPL-SCNC: 154 MMOL/L — HIGH (ref 135–145)
SODIUM SERPL-SCNC: 156 MMOL/L — HIGH (ref 135–145)
SP GR SPEC: 1.03 — SIGNIFICANT CHANGE UP (ref 1–1.03)
SP GR SPEC: >1.03 — HIGH (ref 1–1.03)
SP GR SPEC: >1.03 — HIGH (ref 1–1.03)
SPECIMEN SOURCE: SIGNIFICANT CHANGE UP
SQUAMOUS # UR AUTO: 1 /HPF — SIGNIFICANT CHANGE UP (ref 0–5)
SQUAMOUS # UR AUTO: 1 /HPF — SIGNIFICANT CHANGE UP (ref 0–5)
SQUAMOUS # UR AUTO: 3 /HPF — SIGNIFICANT CHANGE UP (ref 0–5)
TROPONIN T, HIGH SENSITIVITY RESULT: 44 NG/L — SIGNIFICANT CHANGE UP (ref 0–51)
TROPONIN T, HIGH SENSITIVITY RESULT: 45 NG/L — SIGNIFICANT CHANGE UP (ref 0–51)
UROBILINOGEN FLD QL: 0.2 MG/DL — SIGNIFICANT CHANGE UP (ref 0.2–1)
UROBILINOGEN FLD QL: 0.2 MG/DL — SIGNIFICANT CHANGE UP (ref 0.2–1)
UROBILINOGEN FLD QL: 1 MG/DL — SIGNIFICANT CHANGE UP (ref 0.2–1)
WBC # BLD: 1.59 K/UL — LOW (ref 3.8–10.5)
WBC # BLD: 10.18 K/UL — SIGNIFICANT CHANGE UP (ref 3.8–10.5)
WBC # BLD: 11.02 K/UL — HIGH (ref 3.8–10.5)
WBC # BLD: 11.06 K/UL — HIGH (ref 3.8–10.5)
WBC # BLD: 11.3 K/UL — HIGH (ref 3.8–10.5)
WBC # BLD: 12.15 K/UL — HIGH (ref 3.8–10.5)
WBC # BLD: 12.6 K/UL — HIGH (ref 3.8–10.5)
WBC # BLD: 12.64 K/UL — HIGH (ref 3.8–10.5)
WBC # BLD: 13.42 K/UL — HIGH (ref 3.8–10.5)
WBC # BLD: 13.68 K/UL — HIGH (ref 3.8–10.5)
WBC # BLD: 14.33 K/UL — HIGH (ref 3.8–10.5)
WBC # BLD: 15.73 K/UL — HIGH (ref 3.8–10.5)
WBC # BLD: 8.03 K/UL — SIGNIFICANT CHANGE UP (ref 3.8–10.5)
WBC # BLD: 8.28 K/UL — SIGNIFICANT CHANGE UP (ref 3.8–10.5)
WBC # BLD: 8.38 K/UL — SIGNIFICANT CHANGE UP (ref 3.8–10.5)
WBC # BLD: 8.86 K/UL — SIGNIFICANT CHANGE UP (ref 3.8–10.5)
WBC # FLD AUTO: 1.59 K/UL — LOW (ref 3.8–10.5)
WBC # FLD AUTO: 10.18 K/UL — SIGNIFICANT CHANGE UP (ref 3.8–10.5)
WBC # FLD AUTO: 11.02 K/UL — HIGH (ref 3.8–10.5)
WBC # FLD AUTO: 11.06 K/UL — HIGH (ref 3.8–10.5)
WBC # FLD AUTO: 11.3 K/UL — HIGH (ref 3.8–10.5)
WBC # FLD AUTO: 12.15 K/UL — HIGH (ref 3.8–10.5)
WBC # FLD AUTO: 12.6 K/UL — HIGH (ref 3.8–10.5)
WBC # FLD AUTO: 12.64 K/UL — HIGH (ref 3.8–10.5)
WBC # FLD AUTO: 13.42 K/UL — HIGH (ref 3.8–10.5)
WBC # FLD AUTO: 13.68 K/UL — HIGH (ref 3.8–10.5)
WBC # FLD AUTO: 14.33 K/UL — HIGH (ref 3.8–10.5)
WBC # FLD AUTO: 15.73 K/UL — HIGH (ref 3.8–10.5)
WBC # FLD AUTO: 8.03 K/UL — SIGNIFICANT CHANGE UP (ref 3.8–10.5)
WBC # FLD AUTO: 8.28 K/UL — SIGNIFICANT CHANGE UP (ref 3.8–10.5)
WBC # FLD AUTO: 8.38 K/UL — SIGNIFICANT CHANGE UP (ref 3.8–10.5)
WBC # FLD AUTO: 8.86 K/UL — SIGNIFICANT CHANGE UP (ref 3.8–10.5)
WBC UR QL: 0 /HPF — SIGNIFICANT CHANGE UP (ref 0–5)
WBC UR QL: 163 /HPF — HIGH (ref 0–5)
WBC UR QL: 6 /HPF — HIGH (ref 0–5)

## 2025-01-01 PROCEDURE — 72148 MRI LUMBAR SPINE W/O DYE: CPT | Mod: 26

## 2025-01-01 PROCEDURE — 99497 ADVNCD CARE PLAN 30 MIN: CPT | Mod: 25

## 2025-01-01 PROCEDURE — 72100 X-RAY EXAM L-S SPINE 2/3 VWS: CPT

## 2025-01-01 PROCEDURE — 82330 ASSAY OF CALCIUM: CPT

## 2025-01-01 PROCEDURE — 97110 THERAPEUTIC EXERCISES: CPT

## 2025-01-01 PROCEDURE — 85730 THROMBOPLASTIN TIME PARTIAL: CPT

## 2025-01-01 PROCEDURE — 99223 1ST HOSP IP/OBS HIGH 75: CPT

## 2025-01-01 PROCEDURE — 83605 ASSAY OF LACTIC ACID: CPT

## 2025-01-01 PROCEDURE — 85014 HEMATOCRIT: CPT

## 2025-01-01 PROCEDURE — 80048 BASIC METABOLIC PNL TOTAL CA: CPT

## 2025-01-01 PROCEDURE — 80053 COMPREHEN METABOLIC PANEL: CPT

## 2025-01-01 PROCEDURE — 70450 CT HEAD/BRAIN W/O DYE: CPT | Mod: MC

## 2025-01-01 PROCEDURE — 72125 CT NECK SPINE W/O DYE: CPT | Mod: MC

## 2025-01-01 PROCEDURE — 99233 SBSQ HOSP IP/OBS HIGH 50: CPT | Mod: 25

## 2025-01-01 PROCEDURE — 85610 PROTHROMBIN TIME: CPT

## 2025-01-01 PROCEDURE — 72125 CT NECK SPINE W/O DYE: CPT | Mod: 26

## 2025-01-01 PROCEDURE — 84295 ASSAY OF SERUM SODIUM: CPT

## 2025-01-01 PROCEDURE — 82962 GLUCOSE BLOOD TEST: CPT

## 2025-01-01 PROCEDURE — 96372 THER/PROPH/DIAG INJ SC/IM: CPT

## 2025-01-01 PROCEDURE — 99233 SBSQ HOSP IP/OBS HIGH 50: CPT

## 2025-01-01 PROCEDURE — 87637 SARSCOV2&INF A&B&RSV AMP PRB: CPT

## 2025-01-01 PROCEDURE — 82803 BLOOD GASES ANY COMBINATION: CPT

## 2025-01-01 PROCEDURE — 71045 X-RAY EXAM CHEST 1 VIEW: CPT | Mod: 26

## 2025-01-01 PROCEDURE — 99498 ADVNCD CARE PLAN ADDL 30 MIN: CPT | Mod: 25

## 2025-01-01 PROCEDURE — 51703 INSERT BLADDER CATH COMPLEX: CPT

## 2025-01-01 PROCEDURE — 87077 CULTURE AEROBIC IDENTIFY: CPT

## 2025-01-01 PROCEDURE — 72100 X-RAY EXAM L-S SPINE 2/3 VWS: CPT | Mod: 26

## 2025-01-01 PROCEDURE — 99285 EMERGENCY DEPT VISIT HI MDM: CPT

## 2025-01-01 PROCEDURE — 87150 DNA/RNA AMPLIFIED PROBE: CPT

## 2025-01-01 PROCEDURE — 71045 X-RAY EXAM CHEST 1 VIEW: CPT

## 2025-01-01 PROCEDURE — 72070 X-RAY EXAM THORAC SPINE 2VWS: CPT

## 2025-01-01 PROCEDURE — 99232 SBSQ HOSP IP/OBS MODERATE 35: CPT

## 2025-01-01 PROCEDURE — 71260 CT THORAX DX C+: CPT | Mod: MC

## 2025-01-01 PROCEDURE — 71260 CT THORAX DX C+: CPT | Mod: 26

## 2025-01-01 PROCEDURE — 99222 1ST HOSP IP/OBS MODERATE 55: CPT

## 2025-01-01 PROCEDURE — 87186 SC STD MICRODIL/AGAR DIL: CPT

## 2025-01-01 PROCEDURE — 70450 CT HEAD/BRAIN W/O DYE: CPT | Mod: 26

## 2025-01-01 PROCEDURE — 83735 ASSAY OF MAGNESIUM: CPT

## 2025-01-01 PROCEDURE — 82550 ASSAY OF CK (CPK): CPT

## 2025-01-01 PROCEDURE — 87449 NOS EACH ORGANISM AG IA: CPT

## 2025-01-01 PROCEDURE — 96375 TX/PRO/DX INJ NEW DRUG ADDON: CPT

## 2025-01-01 PROCEDURE — 99223 1ST HOSP IP/OBS HIGH 75: CPT | Mod: 25

## 2025-01-01 PROCEDURE — 84132 ASSAY OF SERUM POTASSIUM: CPT

## 2025-01-01 PROCEDURE — 97166 OT EVAL MOD COMPLEX 45 MIN: CPT

## 2025-01-01 PROCEDURE — 36600 WITHDRAWAL OF ARTERIAL BLOOD: CPT

## 2025-01-01 PROCEDURE — 84484 ASSAY OF TROPONIN QUANT: CPT

## 2025-01-01 PROCEDURE — 87640 STAPH A DNA AMP PROBE: CPT

## 2025-01-01 PROCEDURE — 85025 COMPLETE CBC W/AUTO DIFF WBC: CPT

## 2025-01-01 PROCEDURE — 86900 BLOOD TYPING SEROLOGIC ABO: CPT

## 2025-01-01 PROCEDURE — 82435 ASSAY OF BLOOD CHLORIDE: CPT

## 2025-01-01 PROCEDURE — 97530 THERAPEUTIC ACTIVITIES: CPT

## 2025-01-01 PROCEDURE — 74177 CT ABD & PELVIS W/CONTRAST: CPT | Mod: MC

## 2025-01-01 PROCEDURE — 72070 X-RAY EXAM THORAC SPINE 2VWS: CPT | Mod: 26

## 2025-01-01 PROCEDURE — 72131 CT LUMBAR SPINE W/O DYE: CPT | Mod: 26

## 2025-01-01 PROCEDURE — 87086 URINE CULTURE/COLONY COUNT: CPT

## 2025-01-01 PROCEDURE — 87040 BLOOD CULTURE FOR BACTERIA: CPT

## 2025-01-01 PROCEDURE — 81001 URINALYSIS AUTO W/SCOPE: CPT

## 2025-01-01 PROCEDURE — 71275 CT ANGIOGRAPHY CHEST: CPT | Mod: 26

## 2025-01-01 PROCEDURE — 96374 THER/PROPH/DIAG INJ IV PUSH: CPT

## 2025-01-01 PROCEDURE — 72128 CT CHEST SPINE W/O DYE: CPT | Mod: 26

## 2025-01-01 PROCEDURE — 74177 CT ABD & PELVIS W/CONTRAST: CPT | Mod: 26

## 2025-01-01 PROCEDURE — 87641 MR-STAPH DNA AMP PROBE: CPT

## 2025-01-01 PROCEDURE — 82947 ASSAY GLUCOSE BLOOD QUANT: CPT

## 2025-01-01 PROCEDURE — 85027 COMPLETE CBC AUTOMATED: CPT

## 2025-01-01 PROCEDURE — 94640 AIRWAY INHALATION TREATMENT: CPT

## 2025-01-01 PROCEDURE — 97161 PT EVAL LOW COMPLEX 20 MIN: CPT

## 2025-01-01 PROCEDURE — 72146 MRI CHEST SPINE W/O DYE: CPT | Mod: MC

## 2025-01-01 PROCEDURE — 71275 CT ANGIOGRAPHY CHEST: CPT | Mod: MC

## 2025-01-01 PROCEDURE — 86850 RBC ANTIBODY SCREEN: CPT

## 2025-01-01 PROCEDURE — 84100 ASSAY OF PHOSPHORUS: CPT

## 2025-01-01 PROCEDURE — 99238 HOSP IP/OBS DSCHRG MGMT 30/<: CPT | Mod: GC

## 2025-01-01 PROCEDURE — 36415 COLL VENOUS BLD VENIPUNCTURE: CPT

## 2025-01-01 PROCEDURE — 84145 PROCALCITONIN (PCT): CPT

## 2025-01-01 PROCEDURE — 72148 MRI LUMBAR SPINE W/O DYE: CPT | Mod: MC

## 2025-01-01 PROCEDURE — 85018 HEMOGLOBIN: CPT

## 2025-01-01 PROCEDURE — 72146 MRI CHEST SPINE W/O DYE: CPT | Mod: 26

## 2025-01-01 PROCEDURE — 86901 BLOOD TYPING SEROLOGIC RH(D): CPT

## 2025-01-01 PROCEDURE — 97116 GAIT TRAINING THERAPY: CPT

## 2025-01-01 RX ORDER — HALOPERIDOL 10 MG/1
2.5 TABLET ORAL ONCE
Refills: 0 | Status: COMPLETED | OUTPATIENT
Start: 2025-01-01 | End: 2025-01-01

## 2025-01-01 RX ORDER — MAGNESIUM SULFATE 500 MG/ML
2 SYRINGE (ML) INJECTION ONCE
Refills: 0 | Status: COMPLETED | OUTPATIENT
Start: 2025-01-01 | End: 2025-01-01

## 2025-01-01 RX ORDER — AZITHROMYCIN 250 MG
500 CAPSULE ORAL EVERY 24 HOURS
Refills: 0 | Status: DISCONTINUED | OUTPATIENT
Start: 2025-01-01 | End: 2025-01-01

## 2025-01-01 RX ORDER — POLYETHYLENE GLYCOL 3350 17 G/17G
17 POWDER, FOR SOLUTION ORAL DAILY
Refills: 0 | Status: DISCONTINUED | OUTPATIENT
Start: 2025-01-01 | End: 2025-01-01

## 2025-01-01 RX ORDER — OLANZAPINE 10 MG/1
5 TABLET ORAL ONCE
Refills: 0 | Status: COMPLETED | OUTPATIENT
Start: 2025-01-01 | End: 2025-01-01

## 2025-01-01 RX ORDER — FINASTERIDE 1 MG/1
5 TABLET, FILM COATED ORAL DAILY
Refills: 0 | Status: DISCONTINUED | OUTPATIENT
Start: 2025-01-01 | End: 2025-01-01

## 2025-01-01 RX ORDER — ATORVASTATIN CALCIUM 80 MG/1
20 TABLET, FILM COATED ORAL AT BEDTIME
Refills: 0 | Status: DISCONTINUED | OUTPATIENT
Start: 2025-01-01 | End: 2025-01-01

## 2025-01-01 RX ORDER — HYDROMORPHONE/SOD CHLOR,ISO/PF 2 MG/10 ML
0.2 SYRINGE (ML) INJECTION
Refills: 0 | Status: DISCONTINUED | OUTPATIENT
Start: 2025-01-01 | End: 2025-01-01

## 2025-01-01 RX ORDER — ORAL SEMAGLUTIDE 14 MG/1
1 TABLET ORAL
Refills: 0 | DISCHARGE

## 2025-01-01 RX ORDER — HYDROMORPHONE/SOD CHLOR,ISO/PF 2 MG/10 ML
0.3 SYRINGE (ML) INJECTION
Refills: 0 | Status: DISCONTINUED | OUTPATIENT
Start: 2025-01-01 | End: 2025-01-01

## 2025-01-01 RX ORDER — MIDODRINE HYDROCHLORIDE 5 MG/1
20 TABLET ORAL EVERY 8 HOURS
Refills: 0 | Status: DISCONTINUED | OUTPATIENT
Start: 2025-01-01 | End: 2025-01-01

## 2025-01-01 RX ORDER — CEFTRIAXONE 500 MG/1
1000 INJECTION, POWDER, FOR SOLUTION INTRAMUSCULAR; INTRAVENOUS ONCE
Refills: 0 | Status: COMPLETED | OUTPATIENT
Start: 2025-01-01 | End: 2025-01-01

## 2025-01-01 RX ORDER — IPRATROPIUM BROMIDE AND ALBUTEROL SULFATE .5; 2.5 MG/3ML; MG/3ML
3 SOLUTION RESPIRATORY (INHALATION) ONCE
Refills: 0 | Status: COMPLETED | OUTPATIENT
Start: 2025-01-01 | End: 2025-01-01

## 2025-01-01 RX ORDER — ACETAMINOPHEN 500 MG/5ML
1000 LIQUID (ML) ORAL EVERY 6 HOURS
Refills: 0 | Status: COMPLETED | OUTPATIENT
Start: 2025-01-01 | End: 2025-01-01

## 2025-01-01 RX ORDER — AZITHROMYCIN 250 MG
500 CAPSULE ORAL ONCE
Refills: 0 | Status: COMPLETED | OUTPATIENT
Start: 2025-01-01 | End: 2025-01-01

## 2025-01-01 RX ORDER — LORAZEPAM 4 MG/ML
0.5 VIAL (ML) INJECTION
Refills: 0 | Status: DISCONTINUED | OUTPATIENT
Start: 2025-01-01 | End: 2025-01-01

## 2025-01-01 RX ORDER — OLANZAPINE 10 MG/1
2.5 TABLET ORAL EVERY 6 HOURS
Refills: 0 | Status: DISCONTINUED | OUTPATIENT
Start: 2025-01-01 | End: 2025-01-01

## 2025-01-01 RX ORDER — SODIUM CHLORIDE 9 G/1000ML
1000 INJECTION, SOLUTION INTRAVENOUS
Refills: 0 | Status: DISCONTINUED | OUTPATIENT
Start: 2025-01-01 | End: 2025-01-01

## 2025-01-01 RX ORDER — GLYCOPYRROLATE 0.2 MG/ML
0.4 INJECTION INTRAMUSCULAR; INTRAVENOUS EVERY 6 HOURS
Refills: 0 | Status: DISCONTINUED | OUTPATIENT
Start: 2025-01-01 | End: 2025-01-01

## 2025-01-01 RX ORDER — PIPERACILLIN-TAZO-DEXTROSE,ISO 3.375G/5
3.38 IV SOLUTION, PIGGYBACK PREMIX FROZEN(ML) INTRAVENOUS ONCE
Refills: 0 | Status: COMPLETED | OUTPATIENT
Start: 2025-01-01 | End: 2025-01-01

## 2025-01-01 RX ORDER — ACETAMINOPHEN 500 MG/5ML
650 LIQUID (ML) ORAL EVERY 6 HOURS
Refills: 0 | Status: DISCONTINUED | OUTPATIENT
Start: 2025-01-01 | End: 2025-01-01

## 2025-01-01 RX ORDER — VANCOMYCIN HCL IN 5 % DEXTROSE 1.5G/250ML
750 PLASTIC BAG, INJECTION (ML) INTRAVENOUS ONCE
Refills: 0 | Status: COMPLETED | OUTPATIENT
Start: 2025-01-01 | End: 2025-01-01

## 2025-01-01 RX ORDER — MIDODRINE HYDROCHLORIDE 5 MG/1
10 TABLET ORAL EVERY 8 HOURS
Refills: 0 | Status: DISCONTINUED | OUTPATIENT
Start: 2025-01-01 | End: 2025-01-01

## 2025-01-01 RX ORDER — METOPROLOL SUCCINATE 50 MG/1
5 TABLET, EXTENDED RELEASE ORAL EVERY 6 HOURS
Refills: 0 | Status: DISCONTINUED | OUTPATIENT
Start: 2025-01-01 | End: 2025-01-01

## 2025-01-01 RX ORDER — LORAZEPAM 4 MG/ML
0.5 VIAL (ML) INJECTION EVERY 4 HOURS
Refills: 0 | Status: DISCONTINUED | OUTPATIENT
Start: 2025-01-01 | End: 2025-01-01

## 2025-01-01 RX ORDER — LINACLOTIDE 290 UG/1
1 CAPSULE, GELATIN COATED ORAL
Refills: 0 | DISCHARGE

## 2025-01-01 RX ORDER — LIDOCAINE HYDROCHLORIDE 20 MG/ML
1 JELLY TOPICAL EVERY 24 HOURS
Refills: 0 | Status: DISCONTINUED | OUTPATIENT
Start: 2025-01-01 | End: 2025-01-01

## 2025-01-01 RX ORDER — CEFTRIAXONE 500 MG/1
1000 INJECTION, POWDER, FOR SOLUTION INTRAMUSCULAR; INTRAVENOUS EVERY 24 HOURS
Refills: 0 | Status: DISCONTINUED | OUTPATIENT
Start: 2025-01-01 | End: 2025-01-01

## 2025-01-01 RX ORDER — OXYCODONE HYDROCHLORIDE 30 MG/1
5 TABLET ORAL EVERY 4 HOURS
Refills: 0 | Status: DISCONTINUED | OUTPATIENT
Start: 2025-01-01 | End: 2025-01-01

## 2025-01-01 RX ORDER — SODIUM CHLORIDE 9 G/1000ML
1000 INJECTION, SOLUTION INTRAVENOUS
Refills: 0 | Status: COMPLETED | OUTPATIENT
Start: 2025-01-01 | End: 2025-01-01

## 2025-01-01 RX ORDER — MIDODRINE HYDROCHLORIDE 5 MG/1
20 TABLET ORAL ONCE
Refills: 0 | Status: COMPLETED | OUTPATIENT
Start: 2025-01-01 | End: 2025-01-01

## 2025-01-01 RX ORDER — HEPARIN SODIUM 1000 [USP'U]/ML
5000 INJECTION INTRAVENOUS; SUBCUTANEOUS EVERY 8 HOURS
Refills: 0 | Status: DISCONTINUED | OUTPATIENT
Start: 2025-01-01 | End: 2025-01-01

## 2025-01-01 RX ORDER — TAMSULOSIN HYDROCHLORIDE 0.4 MG/1
0.8 CAPSULE ORAL AT BEDTIME
Refills: 0 | Status: DISCONTINUED | OUTPATIENT
Start: 2025-01-01 | End: 2025-01-01

## 2025-01-01 RX ORDER — PIPERACILLIN-TAZO-DEXTROSE,ISO 3.375G/5
3.38 IV SOLUTION, PIGGYBACK PREMIX FROZEN(ML) INTRAVENOUS EVERY 8 HOURS
Refills: 0 | Status: DISCONTINUED | OUTPATIENT
Start: 2025-01-01 | End: 2025-01-01

## 2025-01-01 RX ORDER — HYDROMORPHONE/SOD CHLOR,ISO/PF 2 MG/10 ML
0.25 SYRINGE (ML) INJECTION
Refills: 0 | Status: DISCONTINUED | OUTPATIENT
Start: 2025-01-01 | End: 2025-01-01

## 2025-01-01 RX ORDER — HYDROMORPHONE/SOD CHLOR,ISO/PF 2 MG/10 ML
0.5 SYRINGE (ML) INJECTION
Refills: 0 | Status: DISCONTINUED | OUTPATIENT
Start: 2025-01-01 | End: 2025-01-01

## 2025-01-01 RX ORDER — SITAGLIPTIN AND METFORMIN HYDROCHLORIDE 1000; 50 MG/1; MG/1
1 TABLET, FILM COATED ORAL
Refills: 0 | DISCHARGE

## 2025-01-01 RX ORDER — ACETAMINOPHEN 500 MG/5ML
1000 LIQUID (ML) ORAL ONCE
Refills: 0 | Status: COMPLETED | OUTPATIENT
Start: 2025-01-01 | End: 2025-01-01

## 2025-01-01 RX ORDER — DOXAZOSIN MESYLATE 8 MG/1
1 TABLET ORAL AT BEDTIME
Refills: 0 | Status: DISCONTINUED | OUTPATIENT
Start: 2025-01-01 | End: 2025-01-01

## 2025-01-01 RX ORDER — INSULIN LISPRO 100 U/ML
INJECTION, SOLUTION INTRAVENOUS; SUBCUTANEOUS EVERY 6 HOURS
Refills: 0 | Status: DISCONTINUED | OUTPATIENT
Start: 2025-01-01 | End: 2025-01-01

## 2025-01-01 RX ORDER — INSULIN GLARGINE-YFGN 100 [IU]/ML
18 INJECTION, SOLUTION SUBCUTANEOUS
Refills: 0 | DISCHARGE

## 2025-01-01 RX ORDER — IPRATROPIUM BROMIDE AND ALBUTEROL SULFATE .5; 2.5 MG/3ML; MG/3ML
3 SOLUTION RESPIRATORY (INHALATION) EVERY 6 HOURS
Refills: 0 | Status: DISCONTINUED | OUTPATIENT
Start: 2025-01-01 | End: 2025-01-01

## 2025-01-01 RX ORDER — SENNA 187 MG
2 TABLET ORAL AT BEDTIME
Refills: 0 | Status: DISCONTINUED | OUTPATIENT
Start: 2025-01-01 | End: 2025-01-01

## 2025-01-01 RX ORDER — CEFTRIAXONE 500 MG/1
INJECTION, POWDER, FOR SOLUTION INTRAMUSCULAR; INTRAVENOUS
Refills: 0 | Status: DISCONTINUED | OUTPATIENT
Start: 2025-01-01 | End: 2025-01-01

## 2025-01-01 RX ORDER — AZITHROMYCIN 250 MG
CAPSULE ORAL
Refills: 0 | Status: DISCONTINUED | OUTPATIENT
Start: 2025-01-01 | End: 2025-01-01

## 2025-01-01 RX ORDER — ERTAPENEM SODIUM 1 G/1
1000 INJECTION, POWDER, LYOPHILIZED, FOR SOLUTION INTRAMUSCULAR; INTRAVENOUS EVERY 24 HOURS
Refills: 0 | Status: DISCONTINUED | OUTPATIENT
Start: 2025-01-01 | End: 2025-01-01

## 2025-01-01 RX ORDER — DROXIDOPA 300 MG/1
100 CAPSULE ORAL THREE TIMES A DAY
Refills: 0 | Status: DISCONTINUED | OUTPATIENT
Start: 2025-01-01 | End: 2025-01-01

## 2025-01-01 RX ORDER — POTASSIUM PHOSPHATE, MONOBASIC POTASSIUM PHOSPHATE, DIBASIC INJECTION, 236; 224 MG/ML; MG/ML
30 SOLUTION, CONCENTRATE INTRAVENOUS ONCE
Refills: 0 | Status: COMPLETED | OUTPATIENT
Start: 2025-01-01 | End: 2025-01-01

## 2025-01-01 RX ORDER — OXYCODONE HYDROCHLORIDE 30 MG/1
2.5 TABLET ORAL EVERY 4 HOURS
Refills: 0 | Status: DISCONTINUED | OUTPATIENT
Start: 2025-01-01 | End: 2025-01-01

## 2025-01-01 RX ORDER — IPRATROPIUM BROMIDE AND ALBUTEROL SULFATE .5; 2.5 MG/3ML; MG/3ML
3 SOLUTION RESPIRATORY (INHALATION) EVERY 6 HOURS
Refills: 0 | Status: COMPLETED | OUTPATIENT
Start: 2025-01-01 | End: 2025-01-01

## 2025-01-01 RX ORDER — INSULIN LISPRO 100 U/ML
INJECTION, SOLUTION INTRAVENOUS; SUBCUTANEOUS
Refills: 0 | Status: DISCONTINUED | OUTPATIENT
Start: 2025-01-01 | End: 2025-01-01

## 2025-01-01 RX ORDER — NOREPINEPHRINE BITARTRATE 8 MG
0.04 SOLUTION INTRAVENOUS
Qty: 8 | Refills: 0 | Status: DISCONTINUED | OUTPATIENT
Start: 2025-01-01 | End: 2025-01-01

## 2025-01-01 RX ORDER — BISACODYL 5 MG
10 TABLET, DELAYED RELEASE (ENTERIC COATED) ORAL DAILY
Refills: 0 | Status: DISCONTINUED | OUTPATIENT
Start: 2025-01-01 | End: 2025-01-01

## 2025-01-01 RX ORDER — EMPAGLIFLOZIN 25 MG/1
1 TABLET, FILM COATED ORAL
Refills: 0 | DISCHARGE

## 2025-01-01 RX ADMIN — OXYCODONE HYDROCHLORIDE 5 MILLIGRAM(S): 30 TABLET ORAL at 05:21

## 2025-01-01 RX ADMIN — Medication 50 GRAM(S): at 00:09

## 2025-01-01 RX ADMIN — POLYETHYLENE GLYCOL 3350 17 GRAM(S): 17 POWDER, FOR SOLUTION ORAL at 12:23

## 2025-01-01 RX ADMIN — METOPROLOL SUCCINATE 5 MILLIGRAM(S): 50 TABLET, EXTENDED RELEASE ORAL at 23:25

## 2025-01-01 RX ADMIN — LIDOCAINE HYDROCHLORIDE 1 PATCH: 20 JELLY TOPICAL at 10:40

## 2025-01-01 RX ADMIN — Medication 40 MILLIGRAM(S): at 11:45

## 2025-01-01 RX ADMIN — ERTAPENEM SODIUM 100 MILLIGRAM(S): 1 INJECTION, POWDER, LYOPHILIZED, FOR SOLUTION INTRAMUSCULAR; INTRAVENOUS at 12:23

## 2025-01-01 RX ADMIN — Medication 4 MILLILITER(S): at 05:37

## 2025-01-01 RX ADMIN — SODIUM CHLORIDE 75 MILLILITER(S): 9 INJECTION, SOLUTION INTRAVENOUS at 11:57

## 2025-01-01 RX ADMIN — SODIUM CHLORIDE 100 MILLILITER(S): 9 INJECTION, SOLUTION INTRAVENOUS at 21:04

## 2025-01-01 RX ADMIN — OXYCODONE HYDROCHLORIDE 5 MILLIGRAM(S): 30 TABLET ORAL at 10:20

## 2025-01-01 RX ADMIN — MIDODRINE HYDROCHLORIDE 20 MILLIGRAM(S): 5 TABLET ORAL at 05:28

## 2025-01-01 RX ADMIN — Medication 0.25 MILLIGRAM(S): at 07:40

## 2025-01-01 RX ADMIN — OLANZAPINE 2.5 MILLIGRAM(S): 10 TABLET ORAL at 14:12

## 2025-01-01 RX ADMIN — LIDOCAINE HYDROCHLORIDE 1 PATCH: 20 JELLY TOPICAL at 21:53

## 2025-01-01 RX ADMIN — Medication 1000 MILLIGRAM(S): at 05:42

## 2025-01-01 RX ADMIN — Medication 2 TABLET(S): at 22:10

## 2025-01-01 RX ADMIN — OXYCODONE HYDROCHLORIDE 5 MILLIGRAM(S): 30 TABLET ORAL at 22:21

## 2025-01-01 RX ADMIN — METOPROLOL SUCCINATE 5 MILLIGRAM(S): 50 TABLET, EXTENDED RELEASE ORAL at 00:04

## 2025-01-01 RX ADMIN — IPRATROPIUM BROMIDE AND ALBUTEROL SULFATE 3 MILLILITER(S): .5; 2.5 SOLUTION RESPIRATORY (INHALATION) at 03:31

## 2025-01-01 RX ADMIN — POTASSIUM PHOSPHATE, MONOBASIC POTASSIUM PHOSPHATE, DIBASIC INJECTION, 83.33 MILLIMOLE(S): 236; 224 SOLUTION, CONCENTRATE INTRAVENOUS at 09:10

## 2025-01-01 RX ADMIN — DOXAZOSIN MESYLATE 1 MILLIGRAM(S): 8 TABLET ORAL at 22:11

## 2025-01-01 RX ADMIN — SODIUM CHLORIDE 100 MILLILITER(S): 9 INJECTION, SOLUTION INTRAVENOUS at 09:19

## 2025-01-01 RX ADMIN — LIDOCAINE HYDROCHLORIDE 1 PATCH: 20 JELLY TOPICAL at 09:31

## 2025-01-01 RX ADMIN — LIDOCAINE HYDROCHLORIDE 1 PATCH: 20 JELLY TOPICAL at 22:00

## 2025-01-01 RX ADMIN — ATORVASTATIN CALCIUM 20 MILLIGRAM(S): 80 TABLET, FILM COATED ORAL at 22:02

## 2025-01-01 RX ADMIN — Medication 1000 MILLILITER(S): at 21:46

## 2025-01-01 RX ADMIN — INSULIN LISPRO 2: 100 INJECTION, SOLUTION INTRAVENOUS; SUBCUTANEOUS at 17:34

## 2025-01-01 RX ADMIN — LIDOCAINE HYDROCHLORIDE 1 PATCH: 20 JELLY TOPICAL at 11:56

## 2025-01-01 RX ADMIN — Medication 40 MILLIGRAM(S): at 11:22

## 2025-01-01 RX ADMIN — METOPROLOL SUCCINATE 5 MILLIGRAM(S): 50 TABLET, EXTENDED RELEASE ORAL at 05:18

## 2025-01-01 RX ADMIN — INSULIN LISPRO 2: 100 INJECTION, SOLUTION INTRAVENOUS; SUBCUTANEOUS at 05:18

## 2025-01-01 RX ADMIN — DOXAZOSIN MESYLATE 1 MILLIGRAM(S): 8 TABLET ORAL at 22:02

## 2025-01-01 RX ADMIN — LIDOCAINE HYDROCHLORIDE 1 PATCH: 20 JELLY TOPICAL at 07:45

## 2025-01-01 RX ADMIN — METOPROLOL SUCCINATE 5 MILLIGRAM(S): 50 TABLET, EXTENDED RELEASE ORAL at 00:31

## 2025-01-01 RX ADMIN — METOPROLOL SUCCINATE 5 MILLIGRAM(S): 50 TABLET, EXTENDED RELEASE ORAL at 05:36

## 2025-01-01 RX ADMIN — FINASTERIDE 5 MILLIGRAM(S): 1 TABLET, FILM COATED ORAL at 13:27

## 2025-01-01 RX ADMIN — LIDOCAINE HYDROCHLORIDE 1 PATCH: 20 JELLY TOPICAL at 21:44

## 2025-01-01 RX ADMIN — LIDOCAINE HYDROCHLORIDE 1 PATCH: 20 JELLY TOPICAL at 11:22

## 2025-01-01 RX ADMIN — INSULIN LISPRO 2: 100 INJECTION, SOLUTION INTRAVENOUS; SUBCUTANEOUS at 12:13

## 2025-01-01 RX ADMIN — LIDOCAINE HYDROCHLORIDE 1 PATCH: 20 JELLY TOPICAL at 11:58

## 2025-01-01 RX ADMIN — METOPROLOL SUCCINATE 5 MILLIGRAM(S): 50 TABLET, EXTENDED RELEASE ORAL at 17:27

## 2025-01-01 RX ADMIN — LIDOCAINE HYDROCHLORIDE 1 PATCH: 20 JELLY TOPICAL at 19:18

## 2025-01-01 RX ADMIN — ERTAPENEM SODIUM 100 MILLIGRAM(S): 1 INJECTION, POWDER, LYOPHILIZED, FOR SOLUTION INTRAMUSCULAR; INTRAVENOUS at 10:27

## 2025-01-01 RX ADMIN — METOPROLOL SUCCINATE 5 MILLIGRAM(S): 50 TABLET, EXTENDED RELEASE ORAL at 18:08

## 2025-01-01 RX ADMIN — Medication 0.3 MILLIGRAM(S): at 22:24

## 2025-01-01 RX ADMIN — INSULIN LISPRO 4: 100 INJECTION, SOLUTION INTRAVENOUS; SUBCUTANEOUS at 00:27

## 2025-01-01 RX ADMIN — ATORVASTATIN CALCIUM 20 MILLIGRAM(S): 80 TABLET, FILM COATED ORAL at 21:32

## 2025-01-01 RX ADMIN — Medication 25 GRAM(S): at 01:27

## 2025-01-01 RX ADMIN — Medication 0.5 MILLIGRAM(S): at 08:17

## 2025-01-01 RX ADMIN — INSULIN LISPRO 2: 100 INJECTION, SOLUTION INTRAVENOUS; SUBCUTANEOUS at 12:23

## 2025-01-01 RX ADMIN — CEFTRIAXONE 100 MILLIGRAM(S): 500 INJECTION, POWDER, FOR SOLUTION INTRAMUSCULAR; INTRAVENOUS at 13:37

## 2025-01-01 RX ADMIN — Medication 400 MILLIGRAM(S): at 22:37

## 2025-01-01 RX ADMIN — MIDODRINE HYDROCHLORIDE 20 MILLIGRAM(S): 5 TABLET ORAL at 22:11

## 2025-01-01 RX ADMIN — FINASTERIDE 5 MILLIGRAM(S): 1 TABLET, FILM COATED ORAL at 11:21

## 2025-01-01 RX ADMIN — Medication 400 MILLIGRAM(S): at 23:25

## 2025-01-01 RX ADMIN — Medication 1 APPLICATION(S): at 17:58

## 2025-01-01 RX ADMIN — LIDOCAINE HYDROCHLORIDE 1 PATCH: 20 JELLY TOPICAL at 07:48

## 2025-01-01 RX ADMIN — LIDOCAINE HYDROCHLORIDE 1 PATCH: 20 JELLY TOPICAL at 08:50

## 2025-01-01 RX ADMIN — Medication 4 MILLILITER(S): at 23:39

## 2025-01-01 RX ADMIN — INSULIN LISPRO 4: 100 INJECTION, SOLUTION INTRAVENOUS; SUBCUTANEOUS at 05:19

## 2025-01-01 RX ADMIN — METOPROLOL SUCCINATE 5 MILLIGRAM(S): 50 TABLET, EXTENDED RELEASE ORAL at 05:37

## 2025-01-01 RX ADMIN — METOPROLOL SUCCINATE 5 MILLIGRAM(S): 50 TABLET, EXTENDED RELEASE ORAL at 17:50

## 2025-01-01 RX ADMIN — Medication 1000 MILLIGRAM(S): at 23:55

## 2025-01-01 RX ADMIN — CEFTRIAXONE 100 MILLIGRAM(S): 500 INJECTION, POWDER, FOR SOLUTION INTRAMUSCULAR; INTRAVENOUS at 13:25

## 2025-01-01 RX ADMIN — OXYCODONE HYDROCHLORIDE 5 MILLIGRAM(S): 30 TABLET ORAL at 06:09

## 2025-01-01 RX ADMIN — Medication 4 MILLILITER(S): at 05:28

## 2025-01-01 RX ADMIN — CEFTRIAXONE 100 MILLIGRAM(S): 500 INJECTION, POWDER, FOR SOLUTION INTRAMUSCULAR; INTRAVENOUS at 15:01

## 2025-01-01 RX ADMIN — MIDODRINE HYDROCHLORIDE 10 MILLIGRAM(S): 5 TABLET ORAL at 05:07

## 2025-01-01 RX ADMIN — INSULIN LISPRO 2: 100 INJECTION, SOLUTION INTRAVENOUS; SUBCUTANEOUS at 18:08

## 2025-01-01 RX ADMIN — LIDOCAINE HYDROCHLORIDE 1 PATCH: 20 JELLY TOPICAL at 18:20

## 2025-01-01 RX ADMIN — Medication 1 APPLICATION(S): at 05:28

## 2025-01-01 RX ADMIN — METOPROLOL SUCCINATE 5 MILLIGRAM(S): 50 TABLET, EXTENDED RELEASE ORAL at 11:45

## 2025-01-01 RX ADMIN — Medication 4 MILLILITER(S): at 23:32

## 2025-01-01 RX ADMIN — Medication 2 TABLET(S): at 21:35

## 2025-01-01 RX ADMIN — Medication 2 TABLET(S): at 21:32

## 2025-01-01 RX ADMIN — Medication 1000 MILLIGRAM(S): at 18:23

## 2025-01-01 RX ADMIN — LIDOCAINE HYDROCHLORIDE 1 PATCH: 20 JELLY TOPICAL at 19:02

## 2025-01-01 RX ADMIN — Medication 1 APPLICATION(S): at 06:37

## 2025-01-01 RX ADMIN — Medication 4 MILLILITER(S): at 18:23

## 2025-01-01 RX ADMIN — INSULIN LISPRO 4: 100 INJECTION, SOLUTION INTRAVENOUS; SUBCUTANEOUS at 06:00

## 2025-01-01 RX ADMIN — DOXAZOSIN MESYLATE 1 MILLIGRAM(S): 8 TABLET ORAL at 21:32

## 2025-01-01 RX ADMIN — Medication 250 MILLIGRAM(S): at 16:51

## 2025-01-01 RX ADMIN — HEPARIN SODIUM 5000 UNIT(S): 1000 INJECTION INTRAVENOUS; SUBCUTANEOUS at 05:16

## 2025-01-01 RX ADMIN — Medication 40 MILLIGRAM(S): at 11:02

## 2025-01-01 RX ADMIN — METOPROLOL SUCCINATE 5 MILLIGRAM(S): 50 TABLET, EXTENDED RELEASE ORAL at 23:39

## 2025-01-01 RX ADMIN — METOPROLOL SUCCINATE 5 MILLIGRAM(S): 50 TABLET, EXTENDED RELEASE ORAL at 11:25

## 2025-01-01 RX ADMIN — METOPROLOL SUCCINATE 5 MILLIGRAM(S): 50 TABLET, EXTENDED RELEASE ORAL at 05:21

## 2025-01-01 RX ADMIN — IPRATROPIUM BROMIDE AND ALBUTEROL SULFATE 3 MILLILITER(S): .5; 2.5 SOLUTION RESPIRATORY (INHALATION) at 22:29

## 2025-01-01 RX ADMIN — CEFTRIAXONE 100 MILLIGRAM(S): 500 INJECTION, POWDER, FOR SOLUTION INTRAMUSCULAR; INTRAVENOUS at 21:46

## 2025-01-01 RX ADMIN — METOPROLOL SUCCINATE 5 MILLIGRAM(S): 50 TABLET, EXTENDED RELEASE ORAL at 17:11

## 2025-01-01 RX ADMIN — METOPROLOL SUCCINATE 5 MILLIGRAM(S): 50 TABLET, EXTENDED RELEASE ORAL at 17:36

## 2025-01-01 RX ADMIN — Medication 250 MILLIGRAM(S): at 23:50

## 2025-01-01 RX ADMIN — Medication 40 MILLIGRAM(S): at 12:12

## 2025-01-01 RX ADMIN — IPRATROPIUM BROMIDE AND ALBUTEROL SULFATE 3 MILLILITER(S): .5; 2.5 SOLUTION RESPIRATORY (INHALATION) at 09:52

## 2025-01-01 RX ADMIN — METOPROLOL SUCCINATE 5 MILLIGRAM(S): 50 TABLET, EXTENDED RELEASE ORAL at 17:56

## 2025-01-01 RX ADMIN — Medication 200 GRAM(S): at 12:15

## 2025-01-01 RX ADMIN — SODIUM CHLORIDE 75 MILLILITER(S): 9 INJECTION, SOLUTION INTRAVENOUS at 11:22

## 2025-01-01 RX ADMIN — Medication 250 MILLIGRAM(S): at 12:33

## 2025-01-01 RX ADMIN — OLANZAPINE 5 MILLIGRAM(S): 10 TABLET ORAL at 00:18

## 2025-01-01 RX ADMIN — INSULIN LISPRO 2: 100 INJECTION, SOLUTION INTRAVENOUS; SUBCUTANEOUS at 18:05

## 2025-01-01 RX ADMIN — LIDOCAINE HYDROCHLORIDE 1 PATCH: 20 JELLY TOPICAL at 09:47

## 2025-01-01 RX ADMIN — SODIUM CHLORIDE 100 MILLILITER(S): 9 INJECTION, SOLUTION INTRAVENOUS at 00:26

## 2025-01-01 RX ADMIN — LIDOCAINE HYDROCHLORIDE 1 PATCH: 20 JELLY TOPICAL at 08:07

## 2025-01-01 RX ADMIN — IPRATROPIUM BROMIDE AND ALBUTEROL SULFATE 3 MILLILITER(S): .5; 2.5 SOLUTION RESPIRATORY (INHALATION) at 05:28

## 2025-01-01 RX ADMIN — Medication 0.3 MILLIGRAM(S): at 08:17

## 2025-01-01 RX ADMIN — INSULIN LISPRO 2: 100 INJECTION, SOLUTION INTRAVENOUS; SUBCUTANEOUS at 06:35

## 2025-01-01 RX ADMIN — Medication 1 APPLICATION(S): at 05:17

## 2025-01-01 RX ADMIN — METOPROLOL SUCCINATE 5 MILLIGRAM(S): 50 TABLET, EXTENDED RELEASE ORAL at 12:01

## 2025-01-01 RX ADMIN — OXYCODONE HYDROCHLORIDE 5 MILLIGRAM(S): 30 TABLET ORAL at 23:01

## 2025-01-01 RX ADMIN — DOXAZOSIN MESYLATE 1 MILLIGRAM(S): 8 TABLET ORAL at 22:10

## 2025-01-01 RX ADMIN — Medication 1 APPLICATION(S): at 05:35

## 2025-01-01 RX ADMIN — Medication 2 TABLET(S): at 21:22

## 2025-01-01 RX ADMIN — SODIUM CHLORIDE 100 MILLILITER(S): 9 INJECTION, SOLUTION INTRAVENOUS at 18:20

## 2025-01-01 RX ADMIN — Medication 650 MILLIGRAM(S): at 12:27

## 2025-01-01 RX ADMIN — METOPROLOL SUCCINATE 5 MILLIGRAM(S): 50 TABLET, EXTENDED RELEASE ORAL at 05:12

## 2025-01-01 RX ADMIN — POLYETHYLENE GLYCOL 3350 17 GRAM(S): 17 POWDER, FOR SOLUTION ORAL at 11:21

## 2025-01-01 RX ADMIN — INSULIN LISPRO 2: 100 INJECTION, SOLUTION INTRAVENOUS; SUBCUTANEOUS at 11:47

## 2025-01-01 RX ADMIN — MIDODRINE HYDROCHLORIDE 20 MILLIGRAM(S): 5 TABLET ORAL at 05:48

## 2025-01-01 RX ADMIN — OXYCODONE HYDROCHLORIDE 5 MILLIGRAM(S): 30 TABLET ORAL at 21:21

## 2025-01-01 RX ADMIN — INSULIN LISPRO 4: 100 INJECTION, SOLUTION INTRAVENOUS; SUBCUTANEOUS at 05:32

## 2025-01-01 RX ADMIN — Medication 25 GRAM(S): at 22:10

## 2025-01-01 RX ADMIN — Medication 4 MILLIGRAM(S): at 03:23

## 2025-01-01 RX ADMIN — Medication 40 MILLIGRAM(S): at 11:56

## 2025-01-01 RX ADMIN — Medication 25 GRAM(S): at 05:28

## 2025-01-01 RX ADMIN — Medication 4 MILLILITER(S): at 17:26

## 2025-01-01 RX ADMIN — LIDOCAINE HYDROCHLORIDE 1 PATCH: 20 JELLY TOPICAL at 18:37

## 2025-01-01 RX ADMIN — INSULIN LISPRO 2: 100 INJECTION, SOLUTION INTRAVENOUS; SUBCUTANEOUS at 18:46

## 2025-01-01 RX ADMIN — Medication 1000 MILLIGRAM(S): at 12:39

## 2025-01-01 RX ADMIN — IPRATROPIUM BROMIDE AND ALBUTEROL SULFATE 3 MILLILITER(S): .5; 2.5 SOLUTION RESPIRATORY (INHALATION) at 23:32

## 2025-01-01 RX ADMIN — LIDOCAINE HYDROCHLORIDE 1 PATCH: 20 JELLY TOPICAL at 19:17

## 2025-01-01 RX ADMIN — INSULIN LISPRO 2: 100 INJECTION, SOLUTION INTRAVENOUS; SUBCUTANEOUS at 08:06

## 2025-01-01 RX ADMIN — FINASTERIDE 5 MILLIGRAM(S): 1 TABLET, FILM COATED ORAL at 11:37

## 2025-01-01 RX ADMIN — POLYETHYLENE GLYCOL 3350 17 GRAM(S): 17 POWDER, FOR SOLUTION ORAL at 13:26

## 2025-01-01 RX ADMIN — LIDOCAINE HYDROCHLORIDE 1 PATCH: 20 JELLY TOPICAL at 08:01

## 2025-01-01 RX ADMIN — Medication 0.25 MILLIGRAM(S): at 07:11

## 2025-01-01 RX ADMIN — Medication 250 MILLIGRAM(S): at 14:32

## 2025-01-01 RX ADMIN — MIDODRINE HYDROCHLORIDE 20 MILLIGRAM(S): 5 TABLET ORAL at 12:06

## 2025-01-01 RX ADMIN — Medication 650 MILLIGRAM(S): at 11:37

## 2025-01-01 RX ADMIN — INSULIN LISPRO 2: 100 INJECTION, SOLUTION INTRAVENOUS; SUBCUTANEOUS at 00:08

## 2025-01-01 RX ADMIN — METOPROLOL SUCCINATE 5 MILLIGRAM(S): 50 TABLET, EXTENDED RELEASE ORAL at 07:11

## 2025-01-01 RX ADMIN — Medication 40 MILLIGRAM(S): at 13:26

## 2025-01-01 RX ADMIN — IPRATROPIUM BROMIDE AND ALBUTEROL SULFATE 3 MILLILITER(S): .5; 2.5 SOLUTION RESPIRATORY (INHALATION) at 11:03

## 2025-01-01 RX ADMIN — ATORVASTATIN CALCIUM 20 MILLIGRAM(S): 80 TABLET, FILM COATED ORAL at 22:11

## 2025-01-01 RX ADMIN — LIDOCAINE HYDROCHLORIDE 1 PATCH: 20 JELLY TOPICAL at 00:00

## 2025-01-01 RX ADMIN — FINASTERIDE 5 MILLIGRAM(S): 1 TABLET, FILM COATED ORAL at 13:43

## 2025-01-01 RX ADMIN — LIDOCAINE HYDROCHLORIDE 1 PATCH: 20 JELLY TOPICAL at 23:00

## 2025-01-01 RX ADMIN — Medication 400 MILLIGRAM(S): at 01:27

## 2025-01-01 RX ADMIN — Medication 1 APPLICATION(S): at 05:48

## 2025-01-01 RX ADMIN — CEFTRIAXONE 100 MILLIGRAM(S): 500 INJECTION, POWDER, FOR SOLUTION INTRAMUSCULAR; INTRAVENOUS at 13:55

## 2025-01-01 RX ADMIN — LIDOCAINE HYDROCHLORIDE 1 PATCH: 20 JELLY TOPICAL at 19:00

## 2025-01-01 RX ADMIN — Medication 4 MILLILITER(S): at 11:21

## 2025-01-01 RX ADMIN — INSULIN LISPRO 6: 100 INJECTION, SOLUTION INTRAVENOUS; SUBCUTANEOUS at 18:11

## 2025-01-01 RX ADMIN — SODIUM CHLORIDE 100 MILLILITER(S): 9 INJECTION, SOLUTION INTRAVENOUS at 04:26

## 2025-01-01 RX ADMIN — Medication 1 APPLICATION(S): at 05:47

## 2025-01-01 RX ADMIN — INSULIN LISPRO 2: 100 INJECTION, SOLUTION INTRAVENOUS; SUBCUTANEOUS at 00:26

## 2025-01-01 RX ADMIN — INSULIN LISPRO 2: 100 INJECTION, SOLUTION INTRAVENOUS; SUBCUTANEOUS at 00:27

## 2025-01-01 RX ADMIN — METOPROLOL SUCCINATE 5 MILLIGRAM(S): 50 TABLET, EXTENDED RELEASE ORAL at 06:10

## 2025-01-01 RX ADMIN — IPRATROPIUM BROMIDE AND ALBUTEROL SULFATE 3 MILLILITER(S): .5; 2.5 SOLUTION RESPIRATORY (INHALATION) at 23:39

## 2025-01-01 RX ADMIN — HEPARIN SODIUM 5000 UNIT(S): 1000 INJECTION INTRAVENOUS; SUBCUTANEOUS at 14:20

## 2025-01-01 RX ADMIN — INSULIN LISPRO 4: 100 INJECTION, SOLUTION INTRAVENOUS; SUBCUTANEOUS at 18:30

## 2025-01-01 RX ADMIN — INSULIN LISPRO 2: 100 INJECTION, SOLUTION INTRAVENOUS; SUBCUTANEOUS at 13:26

## 2025-01-01 RX ADMIN — DROXIDOPA 100 MILLIGRAM(S): 300 CAPSULE ORAL at 12:41

## 2025-01-01 RX ADMIN — Medication 1 APPLICATION(S): at 06:36

## 2025-01-01 RX ADMIN — Medication 4 MILLILITER(S): at 11:03

## 2025-01-01 RX ADMIN — Medication 500 MILLILITER(S): at 02:18

## 2025-01-01 RX ADMIN — Medication 0.5 MILLIGRAM(S): at 08:49

## 2025-01-01 RX ADMIN — INSULIN LISPRO 4: 100 INJECTION, SOLUTION INTRAVENOUS; SUBCUTANEOUS at 00:31

## 2025-01-01 RX ADMIN — Medication 400 MILLIGRAM(S): at 05:12

## 2025-01-01 RX ADMIN — INSULIN LISPRO 4: 100 INJECTION, SOLUTION INTRAVENOUS; SUBCUTANEOUS at 17:59

## 2025-01-01 RX ADMIN — INSULIN LISPRO 4: 100 INJECTION, SOLUTION INTRAVENOUS; SUBCUTANEOUS at 11:22

## 2025-01-01 RX ADMIN — INSULIN LISPRO 4: 100 INJECTION, SOLUTION INTRAVENOUS; SUBCUTANEOUS at 06:13

## 2025-01-01 RX ADMIN — CEFTRIAXONE 100 MILLIGRAM(S): 500 INJECTION, POWDER, FOR SOLUTION INTRAMUSCULAR; INTRAVENOUS at 16:11

## 2025-01-01 RX ADMIN — FINASTERIDE 5 MILLIGRAM(S): 1 TABLET, FILM COATED ORAL at 16:54

## 2025-01-01 RX ADMIN — METOPROLOL SUCCINATE 5 MILLIGRAM(S): 50 TABLET, EXTENDED RELEASE ORAL at 00:53

## 2025-01-01 RX ADMIN — ATORVASTATIN CALCIUM 20 MILLIGRAM(S): 80 TABLET, FILM COATED ORAL at 21:35

## 2025-01-01 RX ADMIN — LIDOCAINE HYDROCHLORIDE 1 PATCH: 20 JELLY TOPICAL at 18:38

## 2025-01-01 RX ADMIN — HEPARIN SODIUM 5000 UNIT(S): 1000 INJECTION INTRAVENOUS; SUBCUTANEOUS at 21:32

## 2025-01-01 RX ADMIN — MIDODRINE HYDROCHLORIDE 20 MILLIGRAM(S): 5 TABLET ORAL at 13:43

## 2025-01-01 RX ADMIN — Medication 1 APPLICATION(S): at 05:56

## 2025-01-01 RX ADMIN — Medication 400 MILLIGRAM(S): at 11:56

## 2025-01-01 RX ADMIN — LIDOCAINE HYDROCHLORIDE 1 PATCH: 20 JELLY TOPICAL at 21:00

## 2025-01-01 RX ADMIN — Medication 25 GRAM(S): at 14:59

## 2025-01-01 RX ADMIN — IPRATROPIUM BROMIDE AND ALBUTEROL SULFATE 3 MILLILITER(S): .5; 2.5 SOLUTION RESPIRATORY (INHALATION) at 18:23

## 2025-01-01 RX ADMIN — Medication 1 APPLICATION(S): at 05:07

## 2025-01-01 RX ADMIN — METOPROLOL SUCCINATE 5 MILLIGRAM(S): 50 TABLET, EXTENDED RELEASE ORAL at 17:58

## 2025-01-01 RX ADMIN — METOPROLOL SUCCINATE 5 MILLIGRAM(S): 50 TABLET, EXTENDED RELEASE ORAL at 11:56

## 2025-01-01 RX ADMIN — IPRATROPIUM BROMIDE AND ALBUTEROL SULFATE 3 MILLILITER(S): .5; 2.5 SOLUTION RESPIRATORY (INHALATION) at 11:21

## 2025-01-01 RX ADMIN — Medication 1 APPLICATION(S): at 05:15

## 2025-01-01 RX ADMIN — Medication 2 TABLET(S): at 22:02

## 2025-01-01 RX ADMIN — DROXIDOPA 100 MILLIGRAM(S): 300 CAPSULE ORAL at 16:54

## 2025-01-01 RX ADMIN — INSULIN LISPRO 8: 100 INJECTION, SOLUTION INTRAVENOUS; SUBCUTANEOUS at 17:54

## 2025-01-01 RX ADMIN — METOPROLOL SUCCINATE 5 MILLIGRAM(S): 50 TABLET, EXTENDED RELEASE ORAL at 18:16

## 2025-01-01 RX ADMIN — INSULIN LISPRO 2: 100 INJECTION, SOLUTION INTRAVENOUS; SUBCUTANEOUS at 12:57

## 2025-01-01 RX ADMIN — INSULIN LISPRO 2: 100 INJECTION, SOLUTION INTRAVENOUS; SUBCUTANEOUS at 08:45

## 2025-01-01 RX ADMIN — Medication 1000 MILLIGRAM(S): at 11:52

## 2025-01-01 RX ADMIN — Medication 650 MILLIGRAM(S): at 13:43

## 2025-01-01 RX ADMIN — Medication 2 TABLET(S): at 22:11

## 2025-01-01 RX ADMIN — HALOPERIDOL 2.5 MILLIGRAM(S): 10 TABLET ORAL at 04:25

## 2025-01-01 RX ADMIN — ERTAPENEM SODIUM 100 MILLIGRAM(S): 1 INJECTION, POWDER, LYOPHILIZED, FOR SOLUTION INTRAMUSCULAR; INTRAVENOUS at 08:59

## 2025-01-01 RX ADMIN — LIDOCAINE HYDROCHLORIDE 1 PATCH: 20 JELLY TOPICAL at 10:27

## 2025-01-01 RX ADMIN — DOXAZOSIN MESYLATE 1 MILLIGRAM(S): 8 TABLET ORAL at 21:21

## 2025-01-01 RX ADMIN — Medication 40 MILLIGRAM(S): at 11:21

## 2025-01-01 RX ADMIN — OXYCODONE HYDROCHLORIDE 5 MILLIGRAM(S): 30 TABLET ORAL at 09:50

## 2025-01-01 RX ADMIN — Medication 1 APPLICATION(S): at 05:18

## 2025-01-01 RX ADMIN — Medication 40 MILLIGRAM(S): at 11:38

## 2025-01-01 RX ADMIN — OLANZAPINE 5 MILLIGRAM(S): 10 TABLET ORAL at 01:28

## 2025-01-01 RX ADMIN — Medication 40 MILLIGRAM(S): at 15:29

## 2025-01-01 RX ADMIN — IPRATROPIUM BROMIDE AND ALBUTEROL SULFATE 3 MILLILITER(S): .5; 2.5 SOLUTION RESPIRATORY (INHALATION) at 17:27

## 2025-01-01 RX ADMIN — LIDOCAINE HYDROCHLORIDE 1 PATCH: 20 JELLY TOPICAL at 00:31

## 2025-01-01 RX ADMIN — Medication 400 MILLIGRAM(S): at 11:22

## 2025-01-01 RX ADMIN — INSULIN LISPRO 2: 100 INJECTION, SOLUTION INTRAVENOUS; SUBCUTANEOUS at 18:09

## 2025-01-01 RX ADMIN — Medication 650 MILLIGRAM(S): at 14:43

## 2025-01-01 RX ADMIN — Medication 40 MILLIGRAM(S): at 12:23

## 2025-01-01 RX ADMIN — OXYCODONE HYDROCHLORIDE 5 MILLIGRAM(S): 30 TABLET ORAL at 22:01

## 2025-01-01 RX ADMIN — METOPROLOL SUCCINATE 5 MILLIGRAM(S): 50 TABLET, EXTENDED RELEASE ORAL at 11:03

## 2025-01-01 RX ADMIN — METOPROLOL SUCCINATE 5 MILLIGRAM(S): 50 TABLET, EXTENDED RELEASE ORAL at 00:27

## 2025-01-01 RX ADMIN — POLYETHYLENE GLYCOL 3350 17 GRAM(S): 17 POWDER, FOR SOLUTION ORAL at 16:46

## 2025-01-01 RX ADMIN — MIDODRINE HYDROCHLORIDE 10 MILLIGRAM(S): 5 TABLET ORAL at 21:35

## 2025-01-01 RX ADMIN — DOXAZOSIN MESYLATE 1 MILLIGRAM(S): 8 TABLET ORAL at 21:35

## 2025-01-01 RX ADMIN — ERTAPENEM SODIUM 100 MILLIGRAM(S): 1 INJECTION, POWDER, LYOPHILIZED, FOR SOLUTION INTRAMUSCULAR; INTRAVENOUS at 09:57

## 2025-01-01 RX ADMIN — METOPROLOL SUCCINATE 5 MILLIGRAM(S): 50 TABLET, EXTENDED RELEASE ORAL at 06:16

## 2025-01-01 RX ADMIN — MIDODRINE HYDROCHLORIDE 10 MILLIGRAM(S): 5 TABLET ORAL at 14:45

## 2025-01-01 RX ADMIN — ERTAPENEM SODIUM 100 MILLIGRAM(S): 1 INJECTION, POWDER, LYOPHILIZED, FOR SOLUTION INTRAMUSCULAR; INTRAVENOUS at 09:32

## 2025-01-01 RX ADMIN — LIDOCAINE HYDROCHLORIDE 1 PATCH: 20 JELLY TOPICAL at 20:14

## 2025-01-01 RX ADMIN — Medication 400 MILLIGRAM(S): at 17:57

## 2025-01-01 RX ADMIN — ATORVASTATIN CALCIUM 20 MILLIGRAM(S): 80 TABLET, FILM COATED ORAL at 21:21

## 2025-01-01 RX ADMIN — METOPROLOL SUCCINATE 5 MILLIGRAM(S): 50 TABLET, EXTENDED RELEASE ORAL at 12:13

## 2025-01-01 RX ADMIN — INSULIN LISPRO 2: 100 INJECTION, SOLUTION INTRAVENOUS; SUBCUTANEOUS at 00:04

## 2025-01-01 RX ADMIN — IPRATROPIUM BROMIDE AND ALBUTEROL SULFATE 3 MILLILITER(S): .5; 2.5 SOLUTION RESPIRATORY (INHALATION) at 05:39

## 2025-01-01 RX ADMIN — LIDOCAINE HYDROCHLORIDE 1 PATCH: 20 JELLY TOPICAL at 21:52

## 2025-01-01 RX ADMIN — Medication 1 APPLICATION(S): at 06:03

## 2025-01-01 RX ADMIN — Medication 1 APPLICATION(S): at 05:46

## 2025-01-01 RX ADMIN — OLANZAPINE 2.5 MILLIGRAM(S): 10 TABLET ORAL at 08:39

## 2025-01-01 RX ADMIN — INSULIN LISPRO 2: 100 INJECTION, SOLUTION INTRAVENOUS; SUBCUTANEOUS at 12:37

## 2025-01-01 RX ADMIN — METOPROLOL SUCCINATE 5 MILLIGRAM(S): 50 TABLET, EXTENDED RELEASE ORAL at 11:58

## 2025-02-05 NOTE — ED PROVIDER NOTE - PROGRESS NOTE DETAILS
Yane PGY-1 DO:  Patient's lactate is 8, will give patient 2 L of fluids, empirically treat for aspiration pneumonia with ceftriaxone azithromycin, pain medicine, expedite patient's CT chest. Yane PGY-1 DO:   Spoke to Ortho and trauma team, they will come to assess the patient for new L4 and L1 fractures.  As well as nondisplaced sixth rib fracture.  Patient's lactate only came down to 7.3 after 2 L will give another 500 bolus.  Likely admission Attending Dr. Murray: Patient signed out to me pending pan scans in the setting of elevated lactate and tachycardia concerning for sepsis.  Poor historian even with translation services.  Moaning incoherently but awake and alert.  Lactate only improved from 8-7.6.  CT with multiple spinal fractures.  Spine consulted.  POCUS with flat IVC.  Small to moderate pericardial effusion with grossly normal squeeze.  Aneurysmal disease in line with CT findings as well which are stable.  Unclear etiology of patient's elevated lactate but appears overtly comfortable.  Will give fluid bolus 500 cc and recheck.  Pending spine consultation. Yane PGY-1 DO:   Trauma team and Ortho consulted, no acute trauma recs at this time, Ortho states MRI of T and lumbar spine and will follow-up once patient is admitted.  Patient's lactate only came down to 7.6, will give more fluids, clinically suspect sepsis/pneumonia.  Will admit patient Trauma saw patient, will admit Yane PGY-1 DO:    Ortho consulted, no acute recs at this time, Ortho states MRI of T and lumbar spine and will follow-up once patient is admitted.  Patient's lactate only came down to 7.6, will give more fluids, clinically suspect sepsis/pneumonia.  Will admit patient

## 2025-02-05 NOTE — ED ADULT NURSE REASSESSMENT NOTE - NS ED NURSE REASSESS COMMENT FT1
Pt straight cathed for urine with second RN at bedside ensuring sterile technique. Pt voided 200 cc's of clear yellow urine tolerating procedure well. Urine sample sent to lab.

## 2025-02-05 NOTE — ED PROVIDER NOTE - ATTENDING CONTRIBUTION TO CARE
This is a 84-year-old gentleman much of the history is from the daughter-in-law who I was able to reach on the telephone.  He has a history of diabetes hypertension coronary artery disease and has had memory issues.  He gets daytime home health however at night he has only access to the call bell and he lives in the basement unit of he is son and daughter-in-law.  He has had numerous falls recently and he has been having back pain for quite some time.  He has had additional falls including 1 overnight and this was the reason that they called EMS.  He was going to the bathroom at the time.  Cannot be independently verified whether there is a head strike.  Not on blood thinners  Awake hard of hearing almost impossible to communicate with.  Dry appearing oropharynx.  Regular rate and rhythm.  Clear lungs.  Nontender abdomen.  Patient has significant pain to moving the bed.  Diffuse and nonfocal spinal tenderness.  A little bit of chest wall tenderness.  Full range of motion of bilateral hips knees shoulders and elbows with no bony extremity tenderness.  His skin has multiple patches of dark discoloration which are nontender and do not vladislav.  .  CBC CMP CT head and C-spine.  CT chest abdomen pelvis with IV contrast given how high the lactate is.  Perhaps the lactate is related to his seizure as the patient currently does not have any signs or symptoms of bacterial infection however I think is reasonable to do imaging of the whole body to screen for infection.

## 2025-02-05 NOTE — ED PROVIDER NOTE - MUSCULOSKELETAL MINIMAL EXAM
Patient unable to sit up in bed without significant pain, history of L-spine fracture/RANGE OF MOTION LIMITED

## 2025-02-05 NOTE — ED ADULT NURSE NOTE - NSFALLHARMRISKINTERV_ED_ALL_ED

## 2025-02-05 NOTE — ED PROVIDER NOTE - CLINICAL SUMMARY MEDICAL DECISION MAKING FREE TEXT BOX
84-year-old male past medical history of 84-year-old male with past medical history of of HTN/HLD, DM2, BPH, (?Afib per chart review, though not on AC's) presents to the ED brought in by EMS for evaluation of fall.  Patient is Cantonese speaking, poor historian, cannot verbalize what has happened. As per daughter patient has had more frequent falls recently, yesterday fell while walking to the bathroom.  Patient unable to provide any other history.  On physical exam no signs of trauma, laceration, vital signs stable except for oxygen saturation of 91%.  Patient is gurgling, coarse breath sounds however is able to tolerate his secretions.  Upon chart review has history of aspiration pneumonia.  He is afebrile here rectally however differential diagnosis still includes aspiration pneumonia, cardiac causes of syncope, UTI, viral illness.  Will get CT head and C-spine to assess for ICH and fracture, chest x-ray/chest CT to assess for aspiration pneumonia, lab work to assess for leukocytosis and electrolyte abnormalities, CT thoracic and lumbar spine to assess for pathologic fracture and reassess

## 2025-02-05 NOTE — ED PROVIDER NOTE - OBJECTIVE STATEMENT
84-year-old male past medical history of 84-year-old male with past medical history of of HTN/HLD, DM2, BPH, (?Afib per chart review, though not on AC's) presents to the ED brought in by EMS for evaluation of fall.  Patient is Cantonese speaking, poor historian, cannot verbalize what has happened.  History mainly obtained from daughter who was called.  Daughter states patient lives in the basement of their house, has been having worsening memory over the past year, unable to tell what day of the week it is anymore, falling more often.  She states yesterday he fell while walking to the bathroom, unwitnessed. Brunilda : [id number]  84-year-old male past medical history of 84-year-old male with past medical history of of HTN/HLD, DM2, BPH, (?Afib per chart review, though not on AC's) presents to the ED brought in by EMS for evaluation of fall.  Patient is Cantonese speaking, poor historian, cannot verbalize what has happened.  History mainly obtained from daughter who was called.  Daughter states patient lives in the basement of their house, has been having worsening memory over the past year, unable to tell what day of the week it is anymore, falling more often.  She states yesterday he fell while walking to the bathroom, unwitnessed.    18-year-old female G5, P1 estimated 6 weeks pregnant last menstrual period mid December presents to the emergency department for the evaluation of crampy abdominal pain.  She reports seeing OB twice hCG outpatient about 3000 with an ultrasound on the 28th that was inconclusive, patient reports that she had bleeding 2 days ago that subsequently resolved. Brunilda : [id number]  84-year-old male past medical history of 84-year-old male with past medical history of of HTN/HLD, DM2, BPH, (?Afib per chart review, though not on AC's) presents to the ED brought in by EMS for evaluation of fall.  Patient is Cantonese speaking, poor historian, cannot verbalize what has happened.  History mainly obtained from daughter who was called.  Daughter states patient lives in the basement of their house, has been having worsening memory over the past year, unable to tell what day of the week it is anymore, falling more often.  She states yesterday he fell while walking to the bathroom, unwitnessed.

## 2025-02-05 NOTE — ED ADULT NURSE NOTE - OBJECTIVE STATEMENT
Pt is a 85 y/o M with PMH of HTN/HLD, DM2, BPH, and Afib BIB EMS from home EMS endorses for evaluation of multiple falls. Pt is poor historian of HPI/PMH. Upon assessment pt is a/o x 0 speaking incoherently in short sentences. Pt is breathing unlabored and equal BL with coarse breath sounds BL, radial pulses are 2+ BL, abdomen is soft, nondistended, and nontender, skin is warm and dry with no notable skin tears/abrasions/lacerations.  Pt is in stretcher in lowest position with side rails up.

## 2025-02-05 NOTE — ED PROVIDER NOTE - CARE PLAN
English Principal Discharge DX:	Pneumonia, aspiration  Secondary Diagnosis:	Lumbar vertebral fracture   1

## 2025-02-06 NOTE — H&P ADULT - ATTENDING COMMENTS
Patient seen and examined and agree with above.  84 year old man with PMH significant for HTN, HLD, DM and atrial fibrillation who presents to the ED after a fall. His injuries include an L1 burst fracture with mild bony retropulsion, acute mild compression fracture of L1 and L4 and a left 6th rib fracture.  He is hemodynamically appropriate.  I have reviewed PMh, PSH, labs, meds and imaging of what I can obtain. THe patient has altered mental status and is Cantonese speaking and difficult to obtain a history.  On exam he is neurologically intact with spine precautions.  On 3 liters nasal cannula with SpO2 92%  Abdomen is soft nontender, nondistended.  No gross deformities noted on the extremities.   Labs with elevated leukocytosis 13.42  Lactic acidosis 8.1 likely secondary to hypovolemia present on admission. The patient was given IVF resuscitation and this was improved to 5.1 and subsequently 3.0     Appreciate ortho consult -they are requesting MRI    A/P: 84 year old man with L1 burst fx and L1 and L4 compression fractures admitted for neuromonitoring. He also has 1 left sided 6th rib fracture.   -will discuss with spine surgeons to see if the MRI will add any value in decision to operate on this patient given he is at a high aspiration risk. Will suction aggressively given it sounds like secretions are at the patients base of the throat.  Will continue with q1 neurochecks  -continue spine precautions  - pain control with PRN medications    Elevated lactic acidosis -will continue to resuscitate as needed  -will monitor urine output    Left rib fracture -will encourage incentive spirometer use.  -multimodal pain therapy   -continue to wean oxygen supplementation as needed for SpO2 goal > 92%    GI-NPO at this time     Endo- hyperglycemia-will monitor and place on ISS with goal 140-180

## 2025-02-06 NOTE — CONSULT NOTE ADULT - ASSESSMENT
84M PMHx of dementia, HTN/HLD, DM2, BPH, AF presents to the ED brought in by EMS for evaluation of fall.     Notable Injuries   - acute moderate burst fracture of L1 with mild bony retropulsion, acute mild compression fracture of L4,  - acute nondisplaced L 6th rib fracture.     Rec:  - no acute trauma surgery intervention for isolated nondisplaced L 6th rib fxr > pain control and IS  - rec ortho eval of acute L1 burst fxr and L4 compression fxr > rec MRI and TSLO brace  - med evaluation for leukocytosis and elevated lactate, CT findings c/f possible infectious/inflammatory airspace disease  - trend lactate after IVF resus  - Trauma team to perform tertiary survey in am  - PT eval  - discussed with fellow       Trauma Surgery [id number]  Chris Aldridge MD (PGY2)   Chris Aldridge MD (PGY2)

## 2025-02-06 NOTE — PROGRESS NOTE ADULT - SUBJECTIVE AND OBJECTIVE BOX
Patient 84 Y male evaluated custom measured fitted for TLSO  spinal orthosis to immobilize and treat post L1 L4 fracture  to maintain spine  in neutral healing position. To assist patient in  recovery reduce pain control motion os spine in all planes  patient custom measured fitted today as ordered by Trauma  delivered by [facility] [phone] .

## 2025-02-06 NOTE — PATIENT PROFILE ADULT - FALL HARM RISK - HARM RISK INTERVENTIONS
Assistance with ambulation/Assistance OOB with selected safe patient handling equipment/Communicate Risk of Fall with Harm to all staff/Discuss with provider need for PT consult/Monitor for mental status changes/Monitor gait and stability/Move patient closer to nurses' station/Reinforce activity limits and safety measures with patient and family/Reorient to person, place and time as needed/Tailored Fall Risk Interventions/Toileting schedule using arm’s reach rule for commode and bathroom/Use of alarms - bed, chair and/or voice tab/Visual Cue: Yellow wristband and red socks/Bed in lowest position, wheels locked, appropriate side rails in place/Call bell, personal items and telephone in reach/Instruct patient to call for assistance before getting out of bed or chair/Non-slip footwear when patient is out of bed/[place] to call system/Physically safe environment - no spills, clutter or unnecessary equipment/Purposeful Proactive Rounding/Room/bathroom lighting operational, light cord in reach

## 2025-02-06 NOTE — CONSULT NOTE ADULT - SUBJECTIVE AND OBJECTIVE BOX
Patient is a Poor historian/cantonese speaking 84yMale home ambulator without assistive devices who presents to ED after fall. Patient unable to follow command. History provided by daughter over telephone. Reportedly ability to ambulate immediately following the injury. Unclear if patient endorses any radicular symptoms, numbness, tingling, or weakness down extremities. Per chart, treated with non operative conservative management of L1 Burst Fracture in 2022.          PAST MEDICAL & SURGICAL HISTORY:  DM (diabetes mellitus)      HTN (hypertension)      Hypercholesterolemia      CAD (coronary artery disease)      HTN (hypertension)      DM (diabetes mellitus)      S/P TURP      S/P gastrectomy          Vital Signs Last 24 Hrs  T(C): 36.1 (06 Feb 2025 02:19), Max: 37.1 (05 Feb 2025 20:37)  T(F): 96.9 (06 Feb 2025 02:19), Max: 98.8 (05 Feb 2025 20:37)  HR: 116 (06 Feb 2025 02:19) (94 - 116)  BP: 149/100 (06 Feb 2025 02:19) (118/83 - 162/98)  BP(mean): --  RR: 17 (06 Feb 2025 02:19) (17 - 19)  SpO2: 96% (06 Feb 2025 02:19) (88% - 96%)    Parameters below as of 06 Feb 2025 02:19  Patient On (Oxygen Delivery Method): nasal cannula  O2 Flow (L/min): 3    I&O's Detail      LABS:                        14.4   13.42 )-----------( 143      ( 05 Feb 2025 20:44 )             44.3     02-05    143  |  106  |  17  ----------------------------<  192[H]  4.0   |  21[L]  |  0.98    Ca    9.7      05 Feb 2025 20:44  Mg     1.4     02-05    TPro  6.2  /  Alb  3.7  /  TBili  1.4[H]  /  DBili  x   /  AST  34  /  ALT  23  /  AlkPhos  86  02-05      Urinalysis Basic - ( 05 Feb 2025 20:47 )    Color: Yellow / Appearance: Cloudy / SG: >1.030 / pH: x  Gluc: x / Ketone: Trace mg/dL  / Bili: Negative / Urobili: 0.2 mg/dL   Blood: x / Protein: 300 mg/dL / Nitrite: Negative   Leuk Esterase: Negative / RBC: 0 /HPF / WBC 0 /HPF   Sq Epi: x / Non Sq Epi: 1 /HPF / Bacteria: Negative /HPF        PHYSICAL EXAM:  General  AOx0  Compartments soft and compressible  Grossly moving all extremities  2+ radial pulses  2+ DP Pulses    Motor/Sensory  Unable to obtain due to mental status    Babinski Negative Bilaterally  Clonus Negative Bilaterally  Whalen Negative Bilaterally     Secondary Assessment:  NC/AT, NTTP of clavicles, NTTP of C-,T-,L-Spine, NTTP of Pelvis  UEs: NTTP of Shoulders, Elbows, Wrists, Hands; NT with PROM of Shoulders, Elbows, Wrists, Hands  LEs: NT with Log Roll, NT with Heel Strike, NTTP of Hips, Knees, Ankles, Feet; NT with PROM of Hips, Knees, Ankles, Feet      Imaging:  Acute on chronic L1 Burst fracture  Acute L4 VCF                                            A/P :   Patient is a Poor historian/cantonese speaking 84yMale w/Acute on chronic L1 Burst fracture and Acute L4 VCF, Clinical presentation and physical exam are limited due to current mental status and inability to answer questioning regarding symptoms.    Recommend MR T/L spine WO   PTOT   WBAT w/TLSO  Recommend Ordering TLSO to be worn at all times.   FU XR T/LSpine  Flexeril TID  pain control as needed.     To discuss with attending and provide further recommendations as needed    Patient is a Poor historian/cantonese speaking 84yMale home ambulator without assistive devices who presents to ED after fall. Patient unable to follow command. History provided by daughter over telephone. Reportedly ability to ambulate immediately following the injury. Unclear if patient endorses any radicular symptoms, numbness, tingling, or weakness down extremities. Per chart, treated with non operative conservative management of L1 Burst Fracture in 2022.          PAST MEDICAL & SURGICAL HISTORY:  DM (diabetes mellitus)      HTN (hypertension)      Hypercholesterolemia      CAD (coronary artery disease)      HTN (hypertension)      DM (diabetes mellitus)      S/P TURP      S/P gastrectomy          Vital Signs Last 24 Hrs  T(C): 36.1 (06 Feb 2025 02:19), Max: 37.1 (05 Feb 2025 20:37)  T(F): 96.9 (06 Feb 2025 02:19), Max: 98.8 (05 Feb 2025 20:37)  HR: 116 (06 Feb 2025 02:19) (94 - 116)  BP: 149/100 (06 Feb 2025 02:19) (118/83 - 162/98)  BP(mean): --  RR: 17 (06 Feb 2025 02:19) (17 - 19)  SpO2: 96% (06 Feb 2025 02:19) (88% - 96%)    Parameters below as of 06 Feb 2025 02:19  Patient On (Oxygen Delivery Method): nasal cannula  O2 Flow (L/min): 3    I&O's Detail      LABS:                        14.4   13.42 )-----------( 143      ( 05 Feb 2025 20:44 )             44.3     02-05    143  |  106  |  17  ----------------------------<  192[H]  4.0   |  21[L]  |  0.98    Ca    9.7      05 Feb 2025 20:44  Mg     1.4     02-05    TPro  6.2  /  Alb  3.7  /  TBili  1.4[H]  /  DBili  x   /  AST  34  /  ALT  23  /  AlkPhos  86  02-05      Urinalysis Basic - ( 05 Feb 2025 20:47 )    Color: Yellow / Appearance: Cloudy / SG: >1.030 / pH: x  Gluc: x / Ketone: Trace mg/dL  / Bili: Negative / Urobili: 0.2 mg/dL   Blood: x / Protein: 300 mg/dL / Nitrite: Negative   Leuk Esterase: Negative / RBC: 0 /HPF / WBC 0 /HPF   Sq Epi: x / Non Sq Epi: 1 /HPF / Bacteria: Negative /HPF        PHYSICAL EXAM:  General  AOx0  Compartments soft and compressible  Grossly moving all extremities  2+ radial pulses  2+ DP Pulses    Motor/Sensory  Unable to obtain due to mental status    Babinski Negative Bilaterally  Clonus Negative Bilaterally  Whalen Negative Bilaterally     Secondary Assessment:  NC/AT, NTTP of clavicles, NTTP of C-,T-,L-Spine, NTTP of Pelvis  UEs: NTTP of Shoulders, Elbows, Wrists, Hands; NT with PROM of Shoulders, Elbows, Wrists, Hands  LEs: NT with Log Roll, NT with Heel Strike, NTTP of Hips, Knees, Ankles, Feet; NT with PROM of Hips, Knees, Ankles, Feet      Imaging:  Acute on chronic L1 Burst fracture  Acute L4 VCF                                            A/P :   Patient is a Poor historian/cantonese speaking 84yMale w/Acute on chronic L1 Burst fracture and Acute L4 VCF, Clinical presentation and physical exam are limited due to current mental status and inability to answer questioning regarding symptoms.    Recommend MR T/L spine WO   PTOT   WBAT w/TLSO  Recommend Ordering TLSO to be worn at all times.   FU XR T/LSpine  Flexeril TID  pain control as needed.     Discussed with Dr. Galicia who is aware and agrees with above plan.

## 2025-02-06 NOTE — ED ADULT NURSE REASSESSMENT NOTE - NS ED NURSE REASSESS COMMENT FT1
Pt ripping clothes off and pulling at monitoring and IV lines remaining restelss, Dr. Murray notified.

## 2025-02-06 NOTE — PATIENT PROFILE ADULT - FUNCTIONAL SCREEN CURRENT LEVEL: COMMUNICATION, MLM
2 = difficulty understanding and speaking (not related to language barrier) Pt poor historian/2 = difficulty understanding and speaking (not related to language barrier)

## 2025-02-06 NOTE — ED CLERICAL - NS ED CLERK UNITS
As a follow-up, a second attempt has been made for outreach via fax to facility. Please see Contacts section for details.    Thank you  Shahid Otero MA    APER

## 2025-02-06 NOTE — CONSULT NOTE ADULT - SUBJECTIVE AND OBJECTIVE BOX
HISTORY OF PRESENT ILLNESS:  KAITLIN CALDERON is a 84y Male 84M PMHx of of HTN/HLD, DM2, BPH, AF presents to the ED brought in by EMS for evaluation of fall.  Patient is Cantonese speaking, poor historian, cannot verbalize what has happened. As per daughter patient has had more frequent falls recently, yesterday fell while walking to the bathroom.  Patient unable to provide any other history. In the ED VSS except for oxygen saturation of 91%. Labs notable for W13.4, VBG lac 8.1, and pan scan findings notable for acute moderate burst fracture of L1 with mild bony retropulsion, acute mild compression fracture of L4, and an acute nondisplaced L 6th rib fracture.       PAST MEDICAL HISTORY: DM (diabetes mellitus)    HTN (hypertension)    Hypercholesterolemia    CAD (coronary artery disease)    HTN (hypertension)    DM (diabetes mellitus)        PAST SURGICAL HISTORY: S/P TURP    S/P gastrectomy        FAMILY HISTORY: No pertinent family history in first degree relatives    No pertinent family history in first degree relatives        SOCIAL HISTORY:    CODE STATUS:     HOME MEDICATIONS:    ALLERGIES: No Known Allergies      VITAL SIGNS:  ICU Vital Signs Last 24 Hrs  T(C): 36.3 (06 Feb 2025 04:52), Max: 37.1 (05 Feb 2025 20:37)  T(F): 97.3 (06 Feb 2025 04:52), Max: 98.8 (05 Feb 2025 20:37)  HR: 106 (06 Feb 2025 04:52) (94 - 116)  BP: 161/77 (06 Feb 2025 04:52) (118/83 - 162/98)  BP(mean): --  ABP: --  ABP(mean): --  RR: 19 (06 Feb 2025 04:52) (17 - 20)  SpO2: 97% (06 Feb 2025 04:52) (88% - 98%)    O2 Parameters below as of 06 Feb 2025 04:52  Patient On (Oxygen Delivery Method): nasal cannula  O2 Flow (L/min): 3          NEURO  Exam:      RESPIRATORY  Mechanical Ventilation:   ABG - ( 06 Feb 2025 00:18 )  pH: x     /  pCO2: x     /  pO2: x     / HCO3: x     / Base Excess: x     /  SaO2: x       Lactate: 7.6              Exam:      CARDIOVASCULAR  VBG - ( 06 Feb 2025 04:48 )  pH: 7.32  /  pCO2: 41    /  pO2: 45    / HCO3: 21    / Base Excess: -4.8  /  SaO2: 74.7   Lactate: 5.1              Exam:  Cardiac Rhythm:      GI/NUTRITION  Exam:  Diet:      GENITOURINARY/RENAL        02-05    143  |  106  |  17  ----------------------------<  192[H]  4.0   |  21[L]  |  0.98    Ca    9.7      05 Feb 2025 20:44  Mg     1.4     02-05    TPro  6.2  /  Alb  3.7  /  TBili  1.4[H]  /  DBili  x   /  AST  34  /  ALT  23  /  AlkPhos  86  02-05    [ ] Allen catheter, indication: urine output monitoring in critically ill patient    HEMATOLOGIC  [ ] VTE Prophylaxis:                          14.4   13.42 )-----------( 143      ( 05 Feb 2025 20:44 )             44.3       Transfusion: [ ] PRBC	[ ] Platelets	[ ] FFP	[ ] Cryoprecipitate      INFECTIOUS DISEASES    RECENT CULTURES:      ENDOCRINE    CAPILLARY BLOOD GLUCOSE          PATIENT CARE ACCESS DEVICES:  [ ] Peripheral IV  [ ] Central Venous Line	[ ] R	[ ] L	[ ] IJ	[ ] Fem	[ ] SC	Placed:   [ ] Arterial Line		[ ] R	[ ] L	[ ] Fem	[ ] Rad	[ ] Ax	Placed:   [ ] PICC:					[ ] Mediport  [ ] Urinary Catheter, Date Placed:   [x] Necessity of urinary, arterial, and venous catheters discussed    OTHER MEDICATIONS:     IMAGING STUDIES: HISTORY OF PRESENT ILLNESS:  KAITLIN CALDERON is a 85y/o male with a history of HTN/HLD, DM2, BPH, AF, cognitive decline, frequent falls who brought in by brought in by EMS for evaluation of unwitnessed fall.  Patient is Cantonese speaking, poor historian, cannot verbalize what happened. Per daughter, patient fell while walking to the bathroom yesterday  Patient unable to provide any other history.     In the ED VSS except for oxygen saturation of 91%. Labs notable for W13.4, VBG lac 8.1, and pan scan findings notable for acute moderate burst fracture of L1 with mild bony retropulsion, acute mild compression fracture of L4, and an acute nondisplaced L 6th rib fracture.     Patient received 2.5L bolus, azithromycin, ceftriaxone, Tylenol, required doses of Zyprexa.    SICU consulted for elevated lactate and aspiration risk.    PAST MEDICAL HISTORY: DM (diabetes mellitus)    HTN (hypertension)    Hypercholesterolemia    CAD (coronary artery disease)    HTN (hypertension)    DM (diabetes mellitus)        PAST SURGICAL HISTORY: S/P TURP    S/P gastrectomy        FAMILY HISTORY: No pertinent family history in first degree relatives    No pertinent family history in first degree relatives        SOCIAL HISTORY:    CODE STATUS:     HOME MEDICATIONS:    ALLERGIES: No Known Allergies      VITAL SIGNS:  ICU Vital Signs Last 24 Hrs  T(C): 36.3 (06 Feb 2025 04:52), Max: 37.1 (05 Feb 2025 20:37)  T(F): 97.3 (06 Feb 2025 04:52), Max: 98.8 (05 Feb 2025 20:37)  HR: 106 (06 Feb 2025 04:52) (94 - 116)  BP: 161/77 (06 Feb 2025 04:52) (118/83 - 162/98)  BP(mean): --  ABP: --  ABP(mean): --  RR: 19 (06 Feb 2025 04:52) (17 - 20)  SpO2: 97% (06 Feb 2025 04:52) (88% - 98%)    O2 Parameters below as of 06 Feb 2025 04:52  Patient On (Oxygen Delivery Method): nasal cannula  O2 Flow (L/min): 3          NEURO  Exam:      RESPIRATORY  Mechanical Ventilation:   ABG - ( 06 Feb 2025 00:18 )  pH: x     /  pCO2: x     /  pO2: x     / HCO3: x     / Base Excess: x     /  SaO2: x       Lactate: 7.6              Exam:      CARDIOVASCULAR  VBG - ( 06 Feb 2025 04:48 )  pH: 7.32  /  pCO2: 41    /  pO2: 45    / HCO3: 21    / Base Excess: -4.8  /  SaO2: 74.7   Lactate: 5.1              Exam:  Cardiac Rhythm:      GI/NUTRITION  Exam:  Diet:      GENITOURINARY/RENAL        02-05    143  |  106  |  17  ----------------------------<  192[H]  4.0   |  21[L]  |  0.98    Ca    9.7      05 Feb 2025 20:44  Mg     1.4     02-05    TPro  6.2  /  Alb  3.7  /  TBili  1.4[H]  /  DBili  x   /  AST  34  /  ALT  23  /  AlkPhos  86  02-05    [ ] Allen catheter, indication: urine output monitoring in critically ill patient    HEMATOLOGIC  [ ] VTE Prophylaxis:                          14.4   13.42 )-----------( 143      ( 05 Feb 2025 20:44 )             44.3       Transfusion: [ ] PRBC	[ ] Platelets	[ ] FFP	[ ] Cryoprecipitate      INFECTIOUS DISEASES    RECENT CULTURES:      ENDOCRINE    CAPILLARY BLOOD GLUCOSE          PATIENT CARE ACCESS DEVICES:  [ ] Peripheral IV  [ ] Central Venous Line	[ ] R	[ ] L	[ ] IJ	[ ] Fem	[ ] SC	Placed:   [ ] Arterial Line		[ ] R	[ ] L	[ ] Fem	[ ] Rad	[ ] Ax	Placed:   [ ] PICC:					[ ] Mediport  [ ] Urinary Catheter, Date Placed:   [x] Necessity of urinary, arterial, and venous catheters discussed    OTHER MEDICATIONS:     IMAGING STUDIES: HISTORY OF PRESENT ILLNESS:  KAITLIN CALDERON is a 85y/o male with a history of HTN/HLD, DM2, BPH, AF, cognitive decline, frequent falls who brought in by brought in by EMS for evaluation of unwitnessed fall.  Patient is Cantonese speaking, poor historian, cannot verbalize what happened. Per daughter, patient fell while walking to the bathroom yesterday  Patient unable to provide any other history.     In the ED VSS except for oxygen saturation of 91%. Labs notable for W13.4, VBG lac 8.1, and pan scan findings notable for acute moderate burst fracture of L1 with mild bony retropulsion, acute mild compression fracture of L4, and an acute nondisplaced L 6th rib fracture.     Patient received 2.5L bolus, azithromycin, ceftriaxone, Tylenol, required doses of Zyprexa.    SICU consulted for elevated lactate and aspiration risk.    PAST MEDICAL HISTORY: DM (diabetes mellitus)    HTN (hypertension)    Hypercholesterolemia    CAD (coronary artery disease)    HTN (hypertension)    DM (diabetes mellitus)  \  PAST SURGICAL HISTORY: S/P TURP    S/P gastrectomy        FAMILY HISTORY: No pertinent family history in first degree relatives    No pertinent family history in first degree relatives        SOCIAL HISTORY:    CODE STATUS:     HOME MEDICATIONS:    ALLERGIES: No Known Allergies      VITAL SIGNS:  ICU Vital Signs Last 24 Hrs  T(C): 36.3 (06 Feb 2025 04:52), Max: 37.1 (05 Feb 2025 20:37)  T(F): 97.3 (06 Feb 2025 04:52), Max: 98.8 (05 Feb 2025 20:37)  HR: 106 (06 Feb 2025 04:52) (94 - 116)  BP: 161/77 (06 Feb 2025 04:52) (118/83 - 162/98)  BP(mean): --  ABP: --  ABP(mean): --  RR: 19 (06 Feb 2025 04:52) (17 - 20)  SpO2: 97% (06 Feb 2025 04:52) (88% - 98%)    O2 Parameters below as of 06 Feb 2025 04:52  Patient On (Oxygen Delivery Method): nasal cannula  O2 Flow (L/min): 3          NEURO  Exam:      RESPIRATORY  Mechanical Ventilation:   ABG - ( 06 Feb 2025 00:18 )  pH: x     /  pCO2: x     /  pO2: x     / HCO3: x     / Base Excess: x     /  SaO2: x       Lactate: 7.6              Exam:      CARDIOVASCULAR  VBG - ( 06 Feb 2025 04:48 )  pH: 7.32  /  pCO2: 41    /  pO2: 45    / HCO3: 21    / Base Excess: -4.8  /  SaO2: 74.7   Lactate: 5.1              Exam:  Cardiac Rhythm:      GI/NUTRITION  Exam:  Diet:      GENITOURINARY/RENAL        02-05    143  |  106  |  17  ----------------------------<  192[H]  4.0   |  21[L]  |  0.98    Ca    9.7      05 Feb 2025 20:44  Mg     1.4     02-05    TPro  6.2  /  Alb  3.7  /  TBili  1.4[H]  /  DBili  x   /  AST  34  /  ALT  23  /  AlkPhos  86  02-05    [ ] Allen catheter, indication: urine output monitoring in critically ill patient    HEMATOLOGIC  [ ] VTE Prophylaxis:                          14.4   13.42 )-----------( 143      ( 05 Feb 2025 20:44 )             44.3       Transfusion: [ ] PRBC	[ ] Platelets	[ ] FFP	[ ] Cryoprecipitate      INFECTIOUS DISEASES    RECENT CULTURES:      ENDOCRINE    CAPILLARY BLOOD GLUCOSE          PATIENT CARE ACCESS DEVICES:  [ ] Peripheral IV  [ ] Central Venous Line	[ ] R	[ ] L	[ ] IJ	[ ] Fem	[ ] SC	Placed:   [ ] Arterial Line		[ ] R	[ ] L	[ ] Fem	[ ] Rad	[ ] Ax	Placed:   [ ] PICC:					[ ] Mediport  [ ] Urinary Catheter, Date Placed:   [x] Necessity of urinary, arterial, and venous catheters discussed    OTHER MEDICATIONS:     IMAGING STUDIES: HISTORY OF PRESENT ILLNESS:  KAITLIN CALDERON is a 85y/o male with a history of HTN/HLD, DM2, BPH, AF, cognitive decline, frequent falls who brought in by brought in by EMS for evaluation of unwitnessed fall.  Patient is Cantonese speaking, poor historian, cannot verbalize what happened. Per daughter, patient fell while walking to the bathroom yesterday  Patient unable to provide any other history.     In the ED VSS except for oxygen saturation of 91%. Labs notable for W13.4, VBG lac 8.1, and pan scan findings notable for acute moderate burst fracture of L1 with mild bony retropulsion, acute mild compression fracture of L4, and an acute nondisplaced L 6th rib fracture.     Patient received 2.5L bolus, azithromycin, ceftriaxone, Tylenol, required doses of Zyprexa.    SICU consulted for elevated lactate and aspiration risk.    PAST MEDICAL HISTORY: DM (diabetes mellitus)    HTN (hypertension)    Hypercholesterolemia    CAD (coronary artery disease)    HTN (hypertension)    DM (diabetes mellitus)  \  PAST SURGICAL HISTORY: S/P TURP    S/P gastrectomy        FAMILY HISTORY: No pertinent family history in first degree relatives    No pertinent family history in first degree relatives        SOCIAL HISTORY:    CODE STATUS:     HOME MEDICATIONS:    ALLERGIES: No Known Allergies      VITAL SIGNS:  ICU Vital Signs Last 24 Hrs  T(C): 36.3 (06 Feb 2025 04:52), Max: 37.1 (05 Feb 2025 20:37)  T(F): 97.3 (06 Feb 2025 04:52), Max: 98.8 (05 Feb 2025 20:37)  HR: 106 (06 Feb 2025 04:52) (94 - 116)  BP: 161/77 (06 Feb 2025 04:52) (118/83 - 162/98)  BP(mean): --  ABP: --  ABP(mean): --  RR: 19 (06 Feb 2025 04:52) (17 - 20)  SpO2: 97% (06 Feb 2025 04:52) (88% - 98%)    O2 Parameters below as of 06 Feb 2025 04:52  Patient On (Oxygen Delivery Method): nasal cannula  O2 Flow (L/min): 3    PHYSICAL EXAM  GENERAL: no acute distress, non-toxic appearing, intermittently gurgling  HEAD: normocephalic, atraumatic  HEENT: oral mucosa slightly dry, full ROM of neck  CARDIAC: regular rate rhythm, normal S1/S2  CHEST: coarse breath sounds bilaterally  ABDOMEN: normal BS, soft, diffusely tender  EXTREMITY: no gross deformity, no edema, good perfusion   NEURO: alert, no focal deficits      NEURO  Exam:      RESPIRATORY  Mechanical Ventilation:   ABG - ( 06 Feb 2025 00:18 )  pH: x     /  pCO2: x     /  pO2: x     / HCO3: x     / Base Excess: x     /  SaO2: x       Lactate: 7.6              Exam:      CARDIOVASCULAR  VBG - ( 06 Feb 2025 04:48 )  pH: 7.32  /  pCO2: 41    /  pO2: 45    / HCO3: 21    / Base Excess: -4.8  /  SaO2: 74.7   Lactate: 5.1              Exam:  Cardiac Rhythm:      GI/NUTRITION  Exam:  Diet:      GENITOURINARY/RENAL        02-05    143  |  106  |  17  ----------------------------<  192[H]  4.0   |  21[L]  |  0.98    Ca    9.7      05 Feb 2025 20:44  Mg     1.4     02-05    TPro  6.2  /  Alb  3.7  /  TBili  1.4[H]  /  DBili  x   /  AST  34  /  ALT  23  /  AlkPhos  86  02-05    [ ] Allen catheter, indication: urine output monitoring in critically ill patient    HEMATOLOGIC  [ ] VTE Prophylaxis:                          14.4   13.42 )-----------( 143      ( 05 Feb 2025 20:44 )             44.3       Transfusion: [ ] PRBC	[ ] Platelets	[ ] FFP	[ ] Cryoprecipitate      INFECTIOUS DISEASES    RECENT CULTURES:      ENDOCRINE    CAPILLARY BLOOD GLUCOSE          PATIENT CARE ACCESS DEVICES:  [ ] Peripheral IV  [ ] Central Venous Line	[ ] R	[ ] L	[ ] IJ	[ ] Fem	[ ] SC	Placed:   [ ] Arterial Line		[ ] R	[ ] L	[ ] Fem	[ ] Rad	[ ] Ax	Placed:   [ ] PICC:					[ ] Mediport  [ ] Urinary Catheter, Date Placed:   [x] Necessity of urinary, arterial, and venous catheters discussed    OTHER MEDICATIONS:     IMAGING STUDIES:

## 2025-02-06 NOTE — CONSULT NOTE ADULT - ATTENDING COMMENTS
84M PMHx of dementia, HTN/HLD, DM2, BPH, AF presents to the ED brought in by EMS for evaluation of fall.   Injuries include  Acute on chr L1 burst Fx, Acute L4 compression fx  L 6rib fx    Pt non communicative, non focal  Pain control  MRI spine when stable for transport  TLSO    Pt very rhodochrous, received 2L IVF in ER, check XRAY for fluid overload, start BD aggressive pul toileting  A fib with RVR control with IV Lopressor,, pt on Lopressor at home  NPO for now  Hold IVF  Septic work up, trend Lactate, down to 3.0  Hold pharm DVT PPx till MRI done to r/o epidural hematoma  PT    Pt has recent decline in mental status non communicative in recent days  Spoke to family, pt is full code now, family coming at 9AM for further discussion 84M PMHx of dementia, HTN/HLD, DM2, BPH, AF presents to the ED brought in by EMS for evaluation of fall.   Injuries include  Acute on chr L1 burst Fx, Acute L4 compression fx  L 6rib fx    Pt non communicative, non focal  Pain control  MRI spine when stable for transport  TLSO    Pt very rhodochrous, received 2L IVF in ER, check XRAY for fluid overload, start BD aggressive pul toileting  A fib with RVR control with IV Lopressor,, pt on Lopressor at home  NPO for now  Hold IVF  Septic work up, trend Lactate, down to 3.0, UA neg  Hold pharm DVT PPx till MRI done to r/o epidural hematoma  PT    Pt has recent decline in mental status non communicative in recent days  Spoke to family, pt is full code now, family coming at 9AM for further discussion

## 2025-02-06 NOTE — PROCEDURE NOTE - ADDITIONAL PROCEDURE DETAILS
Urology paged for difficult traore insertion s/p failed insertion attempts by RN  Under sterile condition, an 18Fr coude traore catheter successfully inserted into urinary bladder per urethral meatus with immediate drainage of 400ml of yellow urine   Traore balloon inflated with 10cc of sterile water.  Pt tolerated procedure well w/o complication Urology paged for difficult traore insertion s/p failed insertion attempts by RN  Under sterile condition, an 18Fr coude traore catheter successfully inserted into urinary bladder per urethral meatus with immediate drainage of 400ml of yellow urine   Traore balloon inflated with 10cc of sterile water.  Pt tolerated procedure well w/o complication  TOV per primary team

## 2025-02-06 NOTE — CONSULT NOTE ADULT - ASSESSMENT
ASSESSMENT:    PLAN:    Neuro:  - Assess neurological neurovascular status every  hours in the setting of  - Sedation while intubated w/  - Multimodal pain control w/    Resp:  - Out of bed to chair, ambulate as tolerated, and incentive spirometry to prevent atelectasis  - Mechanical ventilation with    CV:  - goal MAP > 65 mmHg    GI:   - Diet:   - Bowel regimen with senna & Miralax  - Protonix for stress ulcer prophylaxis    Renal:  - Monitor I&Os and electrolytes w/ repletions as necessary    Heme:  - Monitor CBC and coags  - Lovenox for VTE prophylaxis  - SCDs for VTE prophylaxis    ID:   - Monitor for clinical evidence of active infection  - Monitor WBC, temperature, and procalcitonin  - Empiric antibiotics    Endo:   - Monitor glucose ; HgbA1C  - for HLD  - for hypothyroidism    Code Status: Full Code    Disposition: SICU ASSESSMENT: 84M PMHx of dementia, HTN/HLD, DM2, BPH, AF presents to the ED brought in by EMS for evaluation of fall. Admitted to SICU for elevated lactate and aspiration risk.    PLAN:    Neuro:  - Cognitive decline, intermittently agitated  - s/p Zyprexa x3 in the ED  - Tylenol, lidocaine patch PRN    Resp:  - On RA satting well.  - Encourage IS    CV:  - goal MAP > 65 mmHg  - No pressor requirement    GI:   - Diet:     Renal:  - Monitor I&Os and electrolytes w/ repletions as necessary    Heme:  - Monitor CBC and coags  - VTE prophylaxis:  - SCDs for VTE prophylaxis    ID:   - Monitor for clinical evidence of active infection  - Initial WBC 13.42  - Monitor WBC, fever curve  - s/p cefepime and azithro for possible aspiration pneumonia    MSK  - acute moderate burst fracture of L1 with mild bony retropulsion, acute mild compression fracture of L4, and an acute nondisplaced L 6th rib fracture.   - Pain control  - Ortho on bord; follow up recs    Endo:   - Monitor glucose    Code Status: Full Code    Disposition: SICU ASSESSMENT: 84M PMHx of dementia, HTN/HLD, DM2, BPH, AF presents to the ED brought in by EMS for evaluation of fall. Admitted to SICU for elevated lactate and aspiration risk.    PLAN:    Neuro:  - Cognitive decline, intermittently agitated  - s/p Zyprexa x3 in the ED  - Tylenol, lidocaine patch  - Dilaudid 0.25 Q3h PRN    Resp:  - On RA satting well.  - Encourage IS  - Patient gurgling with coarse breath sounds  - CT chest notable for patchy groundglass opacities  - Trial Duoneb    CV:  - goal MAP > 65 mmHg  - No pressor requirement  - Hx of Afib not on AC, tachycardic  - Metoprolol 5 Q6h  - Lactate initially 8.1, now 5.1; continue to trend    GI:   - Diet: NPO    Renal:  - Monitor I&Os and electrolytes w/ repletions as necessary    Heme:  - Monitor CBC and coags  - VTE prophylaxis: hold for now  - SCDs for VTE prophylaxis    ID:   - Monitor for clinical evidence of active infection  - Initial WBC 13.42  - Monitor WBC, fever curve  - s/p cefepime and azithro for possible aspiration pneumonia    MSK  - acute moderate burst fracture of L1 with mild bony retropulsion, acute mild compression fracture of L4, and an acute nondisplaced L 6th rib fracture.   - Pain control  - Ortho on board; follow up recs    Endo:   - Monitor glucose    Code Status: Full Code    Disposition: SICU

## 2025-02-06 NOTE — CONSULT NOTE ADULT - SUBJECTIVE AND OBJECTIVE BOX
*** SURGERY CONSULT NOTE    Consulting Team: Trauma     Patient: KAITLIN CALDERON , 84y ([birth year])Male   MRN: [id number]  Location: [facility] ED  Visit: 02-05-25 Emergency  Date: 02-06-25 @ 03:17      HPI: 84M PMHx of of HTN/HLD, DM2, BPH, AF presents to the ED brought in by EMS for evaluation of fall.  Patient is Cantonese speaking, poor historian, cannot verbalize what has happened. As per daughter patient has had more frequent falls recently, yesterday fell while walking to the bathroom.  Patient unable to provide any other history. In the ED VSS except for oxygen saturation of 91%. Labs notable for W13.4, VBG lac 8.1, and pan scan findings notable for acute moderate burst fracture of L1 with mild bony retropulsion, acute mild compression fracture of L4, and an acute nondisplaced L 6th rib fracture.       PAST MEDICAL HISTORY:  DM (diabetes mellitus)    HTN (hypertension)    Hypercholesterolemia    CAD (coronary artery disease)    HTN (hypertension)    DM (diabetes mellitus)        PAST SURGICAL HISTORY:  S/P TURP    S/P gastrectomy        MEDICATIONS:  morphine  - Injectable 4 milliGRAM(s) IV Push Once      ALLERGIES:  No Known Allergies      VITALS & I/Os:  Vital Signs Last 24 Hrs  T(C): 36.1 (06 Feb 2025 02:19), Max: 37.1 (05 Feb 2025 20:37)  T(F): 96.9 (06 Feb 2025 02:19), Max: 98.8 (05 Feb 2025 20:37)  HR: 110 (06 Feb 2025 03:11) (94 - 116)  BP: 149/110 (06 Feb 2025 03:11) (118/83 - 162/98)  BP(mean): --  RR: 20 (06 Feb 2025 03:11) (17 - 20)  SpO2: 96% (06 Feb 2025 02:19) (88% - 96%)    Parameters below as of 06 Feb 2025 03:11  Patient On (Oxygen Delivery Method): nasal cannula  O2 Flow (L/min): 3      I&O's Summary      PHYSICAL EXAM:  General Appearance: no acute distress, A&Ox0  Chest: airway intact, non-labored breathing  CV: no JVD, palpable pulses b/l  Abdomen: soft, non-tender, non-distended   Extremities: WWP, no gross deformity     LABS:                        14.4   13.42 )-----------( 143      ( 05 Feb 2025 20:44 )             44.3     02-05    143  |  106  |  17  ----------------------------<  192[H]  4.0   |  21[L]  |  0.98    Ca    9.7      05 Feb 2025 20:44  Mg     1.4     02-05    TPro  6.2  /  Alb  3.7  /  TBili  1.4[H]  /  DBili  x   /  AST  34  /  ALT  23  /  AlkPhos  86  02-05    Lactate: Lactate, Blood: 7.6 mmol/L (02-06 @ 00:18)   02-05 @ 20:36  8.1              Urinalysis Basic - ( 05 Feb 2025 20:47 )    Color: Yellow / Appearance: Cloudy / SG: >1.030 / pH: x  Gluc: x / Ketone: Trace mg/dL  / Bili: Negative / Urobili: 0.2 mg/dL   Blood: x / Protein: 300 mg/dL / Nitrite: Negative   Leuk Esterase: Negative / RBC: 0 /HPF / WBC 0 /HPF   Sq Epi: x / Non Sq Epi: 1 /HPF / Bacteria: Negative /HPF          IMAGING:  PROCEDURE:  1. CT of the Chest, Abdomen and Pelvis was performed.  Imaging was performed through the chest in the arterial phase followed by   imaging of the abdomen and pelvis in the portal venous phase.  Sagittal and coronal reformats were performed.  2. Dedicated axial, coronal and sagittal reformats of the thoracic and   lumbar spine were generated from the source data of the CT chest, abdomen   and pelvis.    FINDINGS: Motion artifact degrades image quality and limits evaluation.  CHEST:  LUNGS AND LARGE AIRWAYS: Patent central airways. Aerated secretions noted   in the dependent aspect of the trachea extending into the both mainstem   bronchi. Bronchial wall thickening suggesting infectious versus   inflammatory bronchitis. Patchy groundglass opacities are seen throughout   the left lung, improved since prior study. Right upper lobe 6 mm nodule   (310-122), unchanged. Calcified granuloma in the lingula and the right   lower lobe. Left upper lobe scarring. Probable atelectasis in the   dependent aspect of the right lower lobe.  PLEURA: No pleural effusion. No pneumothorax.  VESSELS: Aortic and coronary artery calcifications. Bovine aortic arch, a   normal anatomic variant. Aortic root aneurysm, measuring 4.4 cm diameter,   similar to prior study. Ascending aortic aneurysm, measuring 5.1 cm in   diameter, similar to prior study. Thoracic aortic aneurysm, measuring 3.8   cm in diameter, similar to prior study. Main pulmonary artery measures   4.8 cm diameter suggesting pulmonary hypertension.  HEART: Cardiomegaly. Small/moderate pericardial effusion.  MEDIASTINUM AND ERNIE: No lymphadenopathy. Calcified mediastinal and   bilateral hilar lymph nodes, likely sequela of prior renal disease.  CHEST WALL AND LOWER NECK: See below.    ABDOMEN AND PELVIS:  LIVER: Within normal limits.  BILE DUCTS: Normal caliber.  GALLBLADDER: Within normal limits.  SPLEEN: Within normal limits.  PANCREAS: Atrophic.  ADRENALS: Nodular thickening of both adrenal glands is again noted.  KIDNEYS/URETERS: Multiple bilateral renal cysts, some of which are   complex, along with subcentimeter hypodensities too small to   characterize, as seen on the comparison study. No hydroureteronephrosis.    BLADDER: Small diverticulum is seen arising from the left lateral aspect   of the urinary bladder.  REPRODUCTIVE ORGANS: Unremarkable at CT.    BOWEL: There is a prior gastric surgery No bowel obstruction. Moderate   stool ball is seen within the rectum. Appendix is normal.  PERITONEUM/RETROPERITONEUM: See below.  VESSELS: Atherosclerotic changes.  LYMPH NODES: No lymphadenopathy.  ABDOMINAL WALL: Tiny/small bilateral fat-containing inguinal hernias  BONES: Degenerative changes. Acute nondisplaced left anterior sixth rib   fracture.    THORACIC AND LUMBAR: Acute moderate burst fracture of L1 with mild bony   retropulsion, causing mild-to-moderate spinal canal narrowing. Acute mild   compression fracture of L4 without bony retropulsion. Associated   paravertebral fat stranding.      IMPRESSION:  Acute moderate burst fracture of L1 with mild bony retropulsion, causing   mild-to-moderate spinal canal narrowing. Acute mild compression fracture   of L4 without bony retropulsion. Recommend thoracic MRI for further   evaluation.    Acute nondisplaced left sixth rib fracture. No pneumothorax.    Patchy groundglass opacities are seen throughout the left lung, improved   since prior study. Findings as above infectious versus inflammatory   airspace disease. Unlikely to represent developing pulmonary contusions.    Cardiomegaly with small/moderate pericardial effusion.    Other findings, as above.    --- End of Report ---

## 2025-02-06 NOTE — H&P ADULT - NSHPPHYSICALEXAM_GEN_ALL_CORE
General Appearance: no acute distress, A&Ox0  Chest: airway intact, non-labored breathing  CV: no JVD, palpable pulses b/l  Abdomen: soft, non-tender, non-distended   Extremities: WWP, no gross deformity

## 2025-02-06 NOTE — H&P ADULT - HISTORY OF PRESENT ILLNESS
84M PMHx of of HTN/HLD, DM2, BPH, AF presents to the ED brought in by EMS for evaluation of fall.  Patient is Cantonese speaking, poor historian, cannot verbalize what has happened. As per daughter patient has had more frequent falls recently, yesterday fell while walking to the bathroom.  Patient unable to provide any other history. In the ED VSS except for oxygen saturation of 91%. Labs notable for W13.4, VBG lac 8.1, and pan scan findings notable for acute moderate burst fracture of L1 with mild bony retropulsion, acute mild compression fracture of L4, and an acute nondisplaced L 6th rib fracture.

## 2025-02-06 NOTE — H&P ADULT - ASSESSMENT
84M PMHx of dementia, HTN/HLD, DM2, BPH, AF presents to the ED brought in by EMS for evaluation of fall.     Notable Injuries   - acute moderate burst fracture of L1 with mild bony retropulsion, acute mild compression fracture of L4,  - acute nondisplaced L 6th rib fracture.     Rec:  - Admit to ACS/Trauma   - Will monitor in SICU for airway monitoring and neurochecks (difficult to assess given A&O status)  - isolated nondisplaced L 6th rib fxr > pain control and IS  - f/u ortho eval of acute L1 burst fxr and L4 compression fxr > rec TSLO brace and MRI > urgent T and L spine MRI ordered in ED  - med evaluation for leukocytosis and elevated lactate, CT findings c/f possible infectious/inflammatory airspace disease  - trend lactate after IVF resus  - Trauma team to perform tertiary survey in am  - PT eval  - need to get in touch with family re DNR/DNI status   - discussed with fellow       Trauma Surgery [id number]  Chris Aldridge MD ([id number])  84M PMHx of dementia, HTN/HLD, DM2, BPH, AF presents to the ED brought in by EMS for evaluation of fall.     Notable Injuries   - acute moderate burst fracture of L1 with mild bony retropulsion, acute mild compression fracture of L4,  - acute nondisplaced L 6th rib fracture.     Rec:  - Admit to ACS/Trauma   - Will monitor in SICU for airway monitoring and neurochecks (difficult to assess given A&O status)  - isolated nondisplaced L 6th rib fxr > pain control and IS  - f/u ortho eval of acute L1 burst fxr and L4 compression fxr > rec TSLO brace and MRI > urgent T and L spine MRI ordered in ED  - med evaluation for leukocytosis and elevated lactate, CT findings c/f possible infectious/inflammatory airspace disease  - trend lactate after IVF resus  - Trauma team to perform tertiary survey in am  - PT eval  - need to get in touch with family re DNR/DNI status and clarification of med rec **  - discussed with fellow       Trauma Surgery [id number]  Chris Aldridge MD ([id number])  84M PMHx of dementia, HTN/HLD, DM2, BPH, AF presents to the ED brought in by EMS for evaluation of fall.     Notable Injuries   - acute moderate burst fracture of L1 with mild bony retropulsion, acute mild compression fracture of L4,  - acute nondisplaced L 6th rib fracture.     Rec:  - Admit to ACS/Trauma   - Will monitor in SICU for airway monitoring and neurochecks (difficult to assess given A&O status)  - isolated nondisplaced L 6th rib fxr > pain control and IS  - f/u ortho eval of acute L1 burst fxr and L4 compression fxr > rec TSLO brace and MRI > urgent T and L spine MRI ordered in ED  - med evaluation for leukocytosis and elevated lactate, CT findings c/f possible infectious/inflammatory airspace disease  - trend lactate after IVF resus  - Trauma team to perform tertiary survey in am  - MRI H/N ONLY IF plan for surgical intervention  - PT eval  - need to get in touch with family re DNR/DNI status and clarification of med rec **  - discussed with fellow       Trauma Surgery [id number]  Chris Aldridge MD (PGY2)

## 2025-02-07 NOTE — PHYSICAL THERAPY INITIAL EVALUATION ADULT - GENERAL OBSERVATIONS, REHAB EVAL
Pt received supine in bed, sidelying to R side, +TLSO brace (however sternal piece placed high against lower chin), +IVL, +face tent. Pt is Cantonese speaking, MICU RADHA Melchor at bedside translating as needed, RADHA Mcmahon attempted use of Cantonese interpretor without success, pt is A&OX0-1, not following commands, PROM as tolerated, no active movements at this time.

## 2025-02-07 NOTE — PROGRESS NOTE ADULT - ATTENDING COMMENTS
hypoxia  -SpO2 = 97% on humidified face tent (FiO2 = 50%)    L1 vertebral body fracture with significant retropulsion  L4 vertebral body fracture  -no lower extremity neurologic deficit  -MRI lumbar spine to evaluate posterior ligamentous complex    delirium  -hospitalist consult for transfer to medical service today hypoxia  -SpO2 = 97% on humidified face tent (FiO2 = 50%)    L1 vertebral body fracture with significant retropulsion  L4 vertebral body fracture  -no lower extremity neurologic deficit  -TLSO at all times  -MRI lumbar spine to evaluate posterior ligamentous complex    delirium  -hospitalist consult for transfer to medical service today

## 2025-02-07 NOTE — OCCUPATIONAL THERAPY INITIAL EVALUATION ADULT - ADDITIONAL COMMENTS
Per chart in 2022, Pt resides in a  with his son and daughter in law +5 steps to go down to basement level. PTA pt was ambulating with R/W, has wheelchair for longer distances. Has HHA services 9hrs/day x 7 days, Mon-Wed 7:30-4:30p, Thurs-Sun 9-6pm. **WILL need to confirm current social history and functional status**

## 2025-02-07 NOTE — DIETITIAN INITIAL EVALUATION ADULT - PERTINENT MEDS FT
MEDICATIONS  (STANDING):  albuterol/ipratropium for Nebulization 3 milliLiter(s) Nebulizer every 6 hours  chlorhexidine 2% Cloths 1 Application(s) Topical <User Schedule>  insulin lispro (ADMELOG) corrective regimen sliding scale   SubCutaneous every 6 hours  lidocaine   4% Patch 1 Patch Transdermal every 24 hours  metoprolol tartrate Injectable 5 milliGRAM(s) IV Push every 6 hours  pantoprazole  Injectable 40 milliGRAM(s) IV Push daily  sodium chloride 3%  Inhalation 4 milliLiter(s) Inhalation every 6 hours    MEDICATIONS  (PRN):

## 2025-02-07 NOTE — DIETITIAN INITIAL EVALUATION ADULT - REASON INDICATOR FOR ASSESSMENT
Consult received for MST Score 2 or >, pressure injury stage II or greater.  Information obtained from RN, medical record.

## 2025-02-07 NOTE — PROGRESS NOTE ADULT - ASSESSMENT
Interval Events:  - pending MRI when stable  - TLSO   - UA neg  - fu Bcx, MRSA swab  - repeat procal 02/08    Neuro:  - Cognitive decline, intermittently agitated  - AO x0 following commands, pupils sluggish   - s/p Zyprexa x3 in the ED  - Spinal precautions, TLSO when OOB    Resp:  - On RA satting well  - Encourage IS  - Patient gurgling with coarse breath sounds  - CT chest notable for patchy ground glass opacities    CV:  - goal MAP > 65 mmHg  - No pressor requirement  - Hx of Afib not on AC, tachycardic  - Metoprolol 5 Q6h  - Lactate initially 8.1, cleared now 1.3    GI:   - Diet: NPO    Renal:  - Monitor I&Os and electrolytes w/ repletions as necessary    Heme:  - Monitor CBC and coags  - VTE prophylaxis: hold for 72 hrs per ortho  - SCDs     ID:   - Monitor for clinical evidence of active infection  - Initial WBC 13.42  - Monitor WBC, fever curve  - s/p cefepime and azithro for possible aspiration pneumonia  - fu Bcx  - UA neg  - MRSA neg  - procal .48    MSK  - acute moderate burst fracture of L1 with mild bony retropulsion, acute mild compression fracture of L4, and an acute nondisplaced L 6th rib fracture.   - Pain control  - WBAT in TLSO when OOB  - spinal precautions     Endo:   - Monitor glucose  - ISS    Code Status: Full Code    Disposition: SICU

## 2025-02-07 NOTE — DIETITIAN INITIAL EVALUATION ADULT - SIGNS/SYMPTOMS
mild muscle and fat depletions, BMI 18.9 stage II pressure injury per flowsheets, rib/spinal fractures

## 2025-02-07 NOTE — PHYSICAL THERAPY INITIAL EVALUATION ADULT - LEVEL OF INDEPENDENCE: BED TO CHAIR, REHAB EVAL
Deferred 2/2 pt confused and unable to follow directions at this time posing safety risk for further mobility

## 2025-02-07 NOTE — OCCUPATIONAL THERAPY INITIAL EVALUATION ADULT - NS ASR FOLLOW COMMAND OT EVAL
unable to follow commands answers questions/75% of the time/50% of the time/able to follow single-step instructions/unable to follow commands

## 2025-02-07 NOTE — PHYSICAL THERAPY INITIAL EVALUATION ADULT - ADDITIONAL COMMENTS
Per chart in 2022, Pt resides in a  with his son and daughter in law +5 steps to go down to basement level. PTA pt was ambulating with R/W, has wheelchair for longer distances. Has HHA services 9hrs/day x 7 days, Mon-Wed 7:30-4:30p, Thurs-Sun 9-6pm. **WILL need to confirm current social history and functional status** Per care coordination note, Pt resides in a PH with his son and daughter in law +5 steps to go down to basement level. PTA pt was ambulating with R/W, has wheelchair for longer distances. Has HHA services 9hrs/day x 7 days, Mon-Wed 7:30-4:30p, Thurs-Sun 9-6pm. **WILL need to confirm current social history and functional status**

## 2025-02-07 NOTE — OCCUPATIONAL THERAPY INITIAL EVALUATION ADULT - NSOTDISCHREC_GEN_A_CORE
TBD pending further functional performance if home, pt would need home OT and assist for all ADL at home/Sub-acute Rehab

## 2025-02-07 NOTE — PHYSICAL THERAPY INITIAL EVALUATION ADULT - MANUAL MUSCLE TESTING RESULTS, REHAB EVAL
poor assessment at this time pt unable to follow directions for formal assessment of strength, grossly 2-/5 through all extremities with spontaneous extremity movement in bed/not tested due to

## 2025-02-07 NOTE — PROGRESS NOTE ADULT - SUBJECTIVE AND OBJECTIVE BOX
24 HOUR EVENTS:  Interval Events:  - pending MRI when stable  - TLSO   - UA neg  - fu Bcx, MRSA swab  - repeat procal 02/08    NEURO  RASS (if intubated): 		[facility] ICU (if concern for delirium):  Exam:   Meds: acetaminophen   IVPB .. 1000 milliGRAM(s) IV Intermittent every 6 hours      RESPIRATORY  RR: 19 (02-07-25 @ 00:00) (15 - 25)  SpO2: 98% (02-07-25 @ 00:00) (92% - 100%)  Wt(kg): --  Exam: Lungs CTA b/l  Mechanical Ventilation:   ABG - ( 06 Feb 2025 00:18 )  pH: x     /  pCO2: x     /  pO2: x     / HCO3: x     / Base Excess: x     /  SaO2: x       Lactate: 7.6              Meds: albuterol/ipratropium for Nebulization 3 milliLiter(s) Nebulizer every 6 hours  sodium chloride 3%  Inhalation 4 milliLiter(s) Inhalation every 6 hours      CARDIOVASCULAR  HR: 68 (02-07-25 @ 00:00) (68 - 133)  BP: 123/67 (02-07-25 @ 00:00) (96/78 - 161/77)  BP(mean): 87 (02-07-25 @ 00:00) (74 - 123)  ABP: --  ABP(mean): --  Wt(kg): --  CVP(cm H2O): --  VBG - ( 06 Feb 2025 21:00 )  pH: 7.25  /  pCO2: 56    /  pO2: 26    / HCO3: 25    / Base Excess: -3.4  /  SaO2: 42.4   Lactate: 1.3                Exam: Normal S1/S2 w/o murmurs or rubs  Cardiac Rhythm:   Perfusion     [ ]Adequate   [ ]Inadequate  Mentation   [ ]Normal       [ ]Reduced  Extremities  [ ]Warm         [ ]Cool  Volume Status [ ]Hypervolemic [ ]Euvolemic [ ]Hypovolemic  Meds: metoprolol tartrate Injectable 5 milliGRAM(s) IV Push every 6 hours      GI/NUTRITION  Exam:   Diet:   Last Bowel Movement: 06-Feb-2025 (02-06-25 @ 19:00)  Last Bowel Movement: 06-Feb-2025 (02-06-25 @ 07:00)  Last Bowel Movement: 06-Feb-2025 (02-06-25 @ 06:40)    Meds: pantoprazole  Injectable 40 milliGRAM(s) IV Push daily      GENITOURINARY  I&O's Detail    02-06 @ 07:01  -  02-07 @ 00:26  --------------------------------------------------------  IN:    IV PiggyBack: 100 mL    IV PiggyBack: 749.8 mL  Total IN: 849.8 mL    OUT:    Indwelling Catheter - Urethral (mL): 1007 mL  Total OUT: 1007 mL    Total NET: -157.2 mL        Weight (kg): 61.7 (02-06 @ 06:40)  02-06    146[H]  |  109[H]  |  19  ----------------------------<  123[H]  4.6   |  21[L]  |  1.07    Ca    9.0      06 Feb 2025 21:20  Phos  4.8     02-06  Mg     1.8     02-06    TPro  5.6[L]  /  Alb  3.3  /  TBili  1.5[H]  /  DBili  x   /  AST  22  /  ALT  20  /  AlkPhos  74  02-06    Meds:     HEMATOLOGIC  Meds:                         13.1   11.30 )-----------( 114      ( 06 Feb 2025 21:20 )             40.4     PT/INR - ( 06 Feb 2025 21:20 )   PT: 10.8 sec;   INR: 0.95 ratio         PTT - ( 06 Feb 2025 21:20 )  PTT:34.7 sec    INFECTIOUS DISEASES  T(C): 37 (02-06-25 @ 22:00), Max: 37 (02-06-25 @ 22:00)  Wt(kg): --  WBC Count: 11.30 K/uL (02-06 @ 21:20)  WBC Count: 11.06 K/uL (02-06 @ 14:38)  WBC Count: 11.02 K/uL (02-06 @ 07:49)    Recent Cultures:  Specimen Source: Clean Catch Clean Catch (Midstream), 02-05 @ 20:47; Results   No growth; Gram Stain: --; Organism: --    Meds:     ENDOCRINE  Capillary Blood Glucose    Meds: insulin lispro (ADMELOG) corrective regimen sliding scale   SubCutaneous every 6 hours      ACCESS DEVICES:  [ ] Peripheral IV  [ ] Central Venous Line		[ ] R	[ ] L	[ ] IJ	[ ] Fem	[ ] SC	Placed:   [ ] Arterial Line			[ ] R	[ ] L	[ ] Fem	[ ] Rad	[ ] Ax	Placed:   [ ] PICC:					[ ] Mediport  [ ] Urinary Catheter, Date Placed:   [ ] Necessity of urinary, arterial, and venous catheters discussed    OTHER MEDICATIONS:  chlorhexidine 2% Cloths 1 Application(s) Topical <User Schedule>  lidocaine   4% Patch 1 Patch Transdermal every 24 hours      IMAGING:

## 2025-02-07 NOTE — OCCUPATIONAL THERAPY INITIAL EVALUATION ADULT - PERTINENT HX OF CURRENT PROBLEM, REHAB EVAL
Pt is a 84M admitted to [facility] on 2/6/25 PMHx of of HTN/HLD, DM2, BPH, AF presents to the ED brought in by EMS for evaluation of fall.  Patient is Cantonese speaking, poor historian, cannot verbalize what has happened. As per daughter patient has had more frequent falls recently, yesterday fell while walking to the bathroom.  Patient unable to provide any other history. hospital course: In the ED VSS except for oxygen saturation of 91%. Labs notable for W13.4, VBG lac 8.1, and pan scan findings +notable for acute moderate burst fracture of L1 with mild bony retropulsion, acute mild compression fracture of L4, and an acute nondisplaced L 6th rib fracture. Will monitor in SICU for airway monitoring and neurochecks (difficult to assess given A&O status), 2/7 SICU border in MICU, to be transferred to . isolated nondisplaced L 6th rib fxr > pain control and IS, f/u ortho eval of acute L1 burst fxr and L4 compression fxr > rec TSLO brace and MRI > urgent T and L spine MRI ordered in ED, med evaluation for leukocytosis and elevated lactate, CT findings c/f possible infectious/inflammatory airspace disease, need to get in touch with family re DNR/DNI status. CT chest: Acute moderate burst fracture of L1 with mild bony retropulsion, causing mild-to-moderate spinal canal narrowing. Acute mild compression fracture of L4 without bony retropulsion. Recommend thoracic MRI for further evaluation. Acute nondisplaced left sixth rib fracture. No pneumothorax. Patchy groundglass opacities are seen throughout the left lung, improved since prior study. Findings as above infectious versus inflammatory airspace disease. Unlikely to represent developing pulmonary contusions.  Per ortho, MR was cancelled 2/2 to unable to tolerate. Will attempt again today. WBAT w/TLSO, to be worn at all times.

## 2025-02-07 NOTE — PHYSICAL THERAPY INITIAL EVALUATION ADULT - ACTIVE RANGE OF MOTION EXAMINATION, REHAB EVAL
PROM in sidelying bilateral upper extremity Active ROM was WFL (within functional limits)/bilateral  lower extremity Active ROM was WFL (within functional limits)

## 2025-02-07 NOTE — OCCUPATIONAL THERAPY INITIAL EVALUATION ADULT - NSOTDMEREC_GEN_A_CORE
TBD pending further functional performance If home, pt would need RW, 3:1 commode, shower chair, polyfly w/c for long distances

## 2025-02-07 NOTE — DIETITIAN INITIAL EVALUATION ADULT - OTHER INFO
- Pt s/p fall with multiple fractures (ribs, spine) with TLSO brace  - Hx T2DM, no updated A1c available at this time. Per H&P, possibly taking insulin PTA. Currently on sliding scale insulin.    Weight Hx:  - No noted dosing height, per previous RD notes pt 5'11'' - ?accuracy of height as pt noted as 5'10'' and 5'8'' in past. No weight history in medical record since 2022. Dosing weight 136lbs, current weight 138.8lbs. Will monitor/trend as able.

## 2025-02-07 NOTE — DISCHARGE NOTE PROVIDER - NSDCMRMEDTOKEN_GEN_ALL_CORE_FT
atorvastatin 20 mg oral tablet: 1 tab(s) orally once a day  finasteride 5 mg oral tablet: 1 tab(s) orally once a day  Insulin Glargine: 18 unit(s) subcutaneous once a day (at bedtime)  insulin glargine 100 units/mL subcutaneous solution: 22 unit(s) subcutaneous once a day (in the morning)  insulin lispro 100 units/mL injectable solution: 5 unit(s) injectable 3 times a day prior to meals.   Janumet 50 mg-1000 mg oral tablet: 1 tab(s) orally once a day  Jardiance 25 mg oral tablet: 1 tab(s) orally once a day  Linzess 290 mcg oral capsule: 1 cap(s) orally 2 times a day  omeprazole 20 mg oral delayed release capsule: 1 cap(s) orally once a day  semaglutide 1 mg/0.5 mL (1 mg dose) subcutaneous solution: 1 milligram(s) subcutaneously once a week  senna oral tablet: 2 tab(s) orally once a day (at bedtime)  Symbicort 160 mcg-4.5 mcg/inh inhalation aerosol: 2 puff(s) inhaled 2 times a day  tamsulosin 0.4 mg oral capsule: 2 cap(s) orally once a day (at bedtime)  TLSO brace: use as instructed  TLSO BRACE Acute L1 Compression FX : once a day   Vascepa 1 g oral capsule: 1 cap(s) orally 2 times a day

## 2025-02-07 NOTE — PHYSICAL THERAPY INITIAL EVALUATION ADULT - PERTINENT HX OF CURRENT PROBLEM, REHAB EVAL
Pt is a 84M admitted to [facility] on 2/6/25 PMHx of of HTN/HLD, DM2, BPH, AF presents to the ED brought in by EMS for evaluation of fall.  Patient is Cantonese speaking, poor historian, cannot verbalize what has happened. As per daughter patient has had more frequent falls recently, yesterday fell while walking to the bathroom.  Patient unable to provide any other history. hospital course: In the ED VSS except for oxygen saturation of 91%. Labs notable for W13.4, VBG lac 8.1, and pan scan findings +notable for acute moderate burst fracture of L1 with mild bony retropulsion, acute mild compression fracture of L4, and an acute nondisplaced L 6th rib fracture. Will monitor in SICU for airway monitoring and neurochecks (difficult to assess given A&O status), 2/7 SICU border in MICU, to be transferred to . isolated nondisplaced L 6th rib fxr > pain control and IS, f/u ortho eval of acute L1 burst fxr and L4 compression fxr > rec TSLO brace and MRI > urgent T and L spine MRI ordered in ED, med evaluation for leukocytosis and elevated lactate, CT findings c/f possible infectious/inflammatory airspace disease, need to get in touch with family re DNR/DNI status. CT chest: Acute moderate burst fracture of L1 with mild bony retropulsion, causing mild-to-moderate spinal canal narrowing. Acute mild compression fracture of L4 without bony retropulsion. Recommend thoracic MRI for further evaluation. Acute nondisplaced left sixth rib fracture. No pneumothorax. Patchy groundglass opacities are seen throughout the left lung, improved since prior study. Findings as above infectious versus inflammatory airspace disease. Unlikely to represent developing pulmonary contusions.  Per ortho, MR was cancelled 2/2 to unable to tolerate. Will attempt again today. WBAT w/TLSO, to be worn at all times.

## 2025-02-07 NOTE — DIETITIAN INITIAL EVALUATION ADULT - PERTINENT LABORATORY DATA
02-07    145  |  109[H]  |  22  ----------------------------<  102[H]  4.5   |  19[L]  |  1.13    Ca    9.1      07 Feb 2025 05:24  Phos  4.9     02-07  Mg     2.4     02-07    TPro  6.0  /  Alb  3.3  /  TBili  1.3[H]  /  DBili  x   /  AST  21  /  ALT  18  /  AlkPhos  78  02-07  POCT Blood Glucose.: 96 mg/dL (02-07-25 @ 11:42)

## 2025-02-07 NOTE — DISCHARGE NOTE PROVIDER - DETAILS OF MALNUTRITION DIAGNOSIS/DIAGNOSES
This patient has been assessed with a concern for Malnutrition and was treated during this hospitalization for the following Nutrition diagnosis/diagnoses:     -  02/07/2025: Mild protein-calorie malnutrition   -  02/07/2025: Underweight (BMI < 19)

## 2025-02-07 NOTE — DIETITIAN INITIAL EVALUATION ADULT - ORAL INTAKE PTA/DIET HISTORY
Unable to obtain background information at this time; pt confused on face tent for O2 support. NKFA or intolerances per medical record/previous RD notes. Chewing/swallowing capability at baseline unknown at this time.

## 2025-02-07 NOTE — PROGRESS NOTE ADULT - SUBJECTIVE AND OBJECTIVE BOX
Progress Note    Patient examined at the bedside. Laying comfortably, in no acute distress. unable to get hx or ROS 2/2 patients mental status. Received TLSO yesterday. MR was cancelled 2/2 to unable to tolerate. Will attempt again today.     LABS:                        13.6   12.64 )-----------( 116      ( 07 Feb 2025 05:24 )             42.8     02-07    145  |  109[H]  |  22  ----------------------------<  102[H]  4.5   |  19[L]  |  1.13    Ca    9.1      07 Feb 2025 05:24  Phos  4.9     02-07  Mg     2.4     02-07    TPro  6.0  /  Alb  3.3  /  TBili  1.3[H]  /  DBili  x   /  AST  21  /  ALT  18  /  AlkPhos  78  02-07    PT/INR - ( 07 Feb 2025 05:24 )   PT: 10.4 sec;   INR: 0.91 ratio         PTT - ( 07 Feb 2025 05:24 )  PTT:34.9 sec    VITALS:  T(C): 36.9 (02-07-25 @ 05:00), Max: 37 (02-06-25 @ 22:00)  HR: 65 (02-07-25 @ 05:34) (65 - 133)  BP: 140/79 (02-07-25 @ 05:00) (96/78 - 148/90)  RR: 26 (02-07-25 @ 05:00) (15 - 26)  SpO2: 94% (02-07-25 @ 05:34) (91% - 100%)    PHYSICAL EXAM:  General: In no acute distress, well appearing, in TLSO, A&Ox0  Grossly moving all extremities spontaneously, withdraws to noxious stimuli   Compartments soft and compressible  DP + b/l  RP + b/l  Whlaen neg b/l  Babinski neg b/l  Clonus neg b/l    ASSESSMENT AND PLAN:   Patient is a Poor historian/cantonese speaking 84yMale w/Acute on chronic L1 Burst fracture and Acute L4 VCF, Clinical presentation and physical exam are limited due to current mental status and inability to answer questioning regarding symptoms.    Recommend MR T/L spine WO - will attempt again today   PTOT   WBAT w/TLSO, to be worn at all times.   Flexeril TID  pain control as needed.     TO discuss with Dr. Galicia for any further recommendations and management  Progress Note    Patient examined at the bedside. Laying comfortably, in no acute distress. unable to get hx or ROS 2/2 patients mental status. Received TLSO yesterday. MR was cancelled 2/2 to unable to tolerate. Will attempt again today.     LABS:                        13.6   12.64 )-----------( 116      ( 07 Feb 2025 05:24 )             42.8     02-07    145  |  109[H]  |  22  ----------------------------<  102[H]  4.5   |  19[L]  |  1.13    Ca    9.1      07 Feb 2025 05:24  Phos  4.9     02-07  Mg     2.4     02-07    TPro  6.0  /  Alb  3.3  /  TBili  1.3[H]  /  DBili  x   /  AST  21  /  ALT  18  /  AlkPhos  78  02-07    PT/INR - ( 07 Feb 2025 05:24 )   PT: 10.4 sec;   INR: 0.91 ratio         PTT - ( 07 Feb 2025 05:24 )  PTT:34.9 sec    VITALS:  T(C): 36.9 (02-07-25 @ 05:00), Max: 37 (02-06-25 @ 22:00)  HR: 65 (02-07-25 @ 05:34) (65 - 133)  BP: 140/79 (02-07-25 @ 05:00) (96/78 - 148/90)  RR: 26 (02-07-25 @ 05:00) (15 - 26)  SpO2: 94% (02-07-25 @ 05:34) (91% - 100%)    PHYSICAL EXAM:  General: In no acute distress, well appearing, in TLSO, A&Ox0  Grossly moving all extremities spontaneously, withdraws to noxious stimuli   Compartments soft and compressible  DP + b/l  RP + b/l  Whalen neg b/l  Babinski neg b/l  Clonus neg b/l    ASSESSMENT AND PLAN:   Patient is a Poor historian/cantonese speaking 84yMale w/Acute on chronic L1 Burst fracture and Acute L4 VCF, Clinical presentation and physical exam are limited due to current mental status and inability to answer questioning regarding symptoms.    Recommend MR T/L spine WO - will attempt again today   PTOT   WBAT w/TLSO for comfort   Flexeril TID  pain control as needed.     TO discuss with Dr. Galicia for any further recommendations and management

## 2025-02-07 NOTE — PROGRESS NOTE ADULT - ASSESSMENT
84M PMHx of dementia, HTN/HLD, DM2, BPH, AF presents to the ED brought in by EMS for evaluation of fall.     Notable Injuries   - acute moderate burst fracture of L1 with mild bony retropulsion, acute mild compression fracture of L4,  - acute nondisplaced L 6th rib fracture.     Rec:  - Will monitor in SICU for airway monitoring and neurochecks (difficult to assess given A&O status)  - isolated nondisplaced L 6th rib fxr > pain control and IS  - appreciate ortho recs of acute L1 burst fxr and L4 compression fxr > rec TSLO brace and MRI, f/u if any intervention  - f/u Bcx  - tertiary exam and incidentals pending  - MRI H/N ONLY IF plan for surgical intervention  - PT eval  - c/w 1:1  - need to get in touch with family re DNR/DNI status and clarification of med rec **    ACS/Trauma Surgery  [phone]

## 2025-02-07 NOTE — DIETITIAN INITIAL EVALUATION ADULT - CONTINUE CURRENT NUTRITION CARE PLAN
Diet advancement per team, recommend formal swallow evaluation when medically appropriate. If able to tolerate PO, add consistent carbohydrate restriction.

## 2025-02-07 NOTE — DISCHARGE NOTE PROVIDER - NSDCCPCAREPLAN_GEN_ALL_CORE_FT
PRINCIPAL DISCHARGE DIAGNOSIS  Diagnosis: Lumbar vertebral fracture  Assessment and Plan of Treatment:       SECONDARY DISCHARGE DIAGNOSES  Diagnosis: Lumbar vertebral fracture  Assessment and Plan of Treatment:     Diagnosis: Abnormal findings on imaging test  Assessment and Plan of Treatment: - Age indeterminate subcentimeter infarct involving the right thalamus  - Bronchial wall thickening suggesting infectious versus inflammatory bronchitis  - Right upper lobe 6 mm nodule  - Calcified granuloma in the lingula and the right lower lobe  - Aortic root aneurysm (4.4 cm), ascending aortic aneurysm (5.1 cm), thoracic aortic aneurysm (3.8 cm)  - Calcified mediastinal and bilateral hilar lymph nodes  - Nodular thickening of both adrenal glands  - Multiple bilateral renal cysts  - Small diverticulum arising from the left lateral aspect of the urinary bladder  - Tiny bilateral fat-containing inguinal hernias    
Principal Discharge DX:	Subdural hematoma  Goal:	managed conservatively  Assessment and plan of treatment:	.  .  .  Please make an appointment for follow up with neurosurgeon Dr. Javier Slade within 7-10 days. Call [phone] to schedule an appointment. He will discuss with you the need for possible evacuation of subdural hematoma at follow up appointment.     NO heavy lifting, strenuous activity, twisting, bending, driving, or working until cleared by your physician.   Return to ER immediately for any of the following: worsening headaches, slurred speech, fever, bleeding, new onset numbness/tingling/weakness, nausea and/or vomiting, chest pain, shortness of breath, confusion, seizure, altered mental status, urinary and/or fecal incontinence or retention.  Secondary Diagnosis:	CAD (coronary artery disease)  Assessment and plan of treatment:	.  .  Please make an appointment for follow up with your cardiologist after discharge.     **DO NOT RESTART Aspirin or Plavix until instructed to do so by Dr. Slade.**  Secondary Diagnosis:	HTN (hypertension)  Assessment and plan of treatment:	Please make an appointment for follow up with your primary care physician after discharge. Continue previous anti-hypertensive medications.  Secondary Diagnosis:	HLD (hyperlipidemia)  Assessment and plan of treatment:	Please make an appointment for follow up with your primary care physician after discharge. Continue Crestor.  Secondary Diagnosis:	Type 2 diabetes mellitus without complication, without long-term current use of insulin  Assessment and plan of treatment:	Please make an appointment for follow up with your primary care physician after discharge. Continue Janumet as previously taking. Your blood glucose may be elevated while you are on steroids.  Secondary Diagnosis:	Glaucoma  Assessment and plan of treatment:	Please make an appointment for follow up with your primary care physician after discharge. Continue Timolol eye drops.

## 2025-02-07 NOTE — CHART NOTE - NSCHARTNOTEFT_GEN_A_CORE
TERTIARY TRAUMA SURVEY (TTS)    Date of TTS:                              Time:   Admit Date:                              Trauma Activation:  Admit GCS: E-     V-     M-     HPI:  84M PMHx of of HTN/HLD, DM2, BPH, AF presents to the ED brought in by EMS for evaluation of fall.  Patient is Cantonese speaking, poor historian, cannot verbalize what has happened. As per daughter patient has had more frequent falls recently, yesterday fell while walking to the bathroom.  Patient unable to provide any other history. In the ED VSS except for oxygen saturation of 91%. Labs notable for W13.4, VBG lac 8.1, and pan scan findings notable for acute moderate burst fracture of L1 with mild bony retropulsion, acute mild compression fracture of L4, and an acute nondisplaced L 6th rib fracture.  (06 Feb 2025 05:48)    INTERVAL EVENTS:    PAST MEDICAL & SURGICAL HISTORY:  DM (diabetes mellitus)  HTN (hypertension)  Hypercholesterolemia  CAD (coronary artery disease)  HTN (hypertension  DM (diabetes mellitus)  S/P TURP  S/P gastrectomy      FAMILY HISTORY:  No pertinent family history in first degree relatives    CURRENT MEDICATIONS:   Medications (inpatient): albuterol/ipratropium for Nebulization 3 milliLiter(s) Nebulizer every 6 hours  chlorhexidine 2% Cloths 1 Application(s) Topical <User Schedule>  insulin lispro (ADMELOG) corrective regimen sliding scale   SubCutaneous every 6 hours  lidocaine   4% Patch 1 Patch Transdermal every 24 hours  metoprolol tartrate Injectable 5 milliGRAM(s) IV Push every 6 hours  pantoprazole  Injectable 40 milliGRAM(s) IV Push daily  sodium chloride 3%  Inhalation 4 milliLiter(s) Inhalation every 6 hours    Medications (PRN):    ALLERGIES:  Allergies: No Known Allergies  (Intolerances: )  ------------------------------------------------------------------------------------------  VITAL SIGNS  Vital Signs Last 24 Hrs  T(C): 36.8 (07 Feb 2025 12:13), Max: 37 (06 Feb 2025 22:00)  T(F): 98.2 (07 Feb 2025 12:13), Max: 98.6 (06 Feb 2025 22:00)  HR: 78 (07 Feb 2025 13:00) (63 - 85)  BP: 112/59 (07 Feb 2025 13:00) (91/55 - 140/79)  BP(mean): 80 (07 Feb 2025 13:00) (70 - 104)  RR: 16 (07 Feb 2025 11:00) (15 - 26)  SpO2: 100% (07 Feb 2025 13:00) (91% - 100%)    Parameters below as of 07 Feb 2025 11:21  Patient On (Oxygen Delivery Method): face tent      Drug Dosing Weight    Weight (kg): 61.7 (06 Feb 2025 06:40)      INS/OUTS:    02-06 @ 07:01  -  02-07 @ 07:00  --------------------------------------------------------  IN:    IV PiggyBack: 100 mL    IV PiggyBack: 849.8 mL  Total IN: 949.8 mL    OUT:    Indwelling Catheter - Urethral (mL): 1322 mL    Oral Fluid: 0 mL  Total OUT: 1322 mL    Total NET: -372.2 mL      02-07 @ 07:01  -  02-07 @ 14:00  --------------------------------------------------------  IN:  Total IN: 0 mL    OUT:    Indwelling Catheter - Urethral (mL): 170 mL  Total OUT: 170 mL    Total NET: -170 mL        Drug Dosing Weight    Weight (kg): 61.7 (06 Feb 2025 06:40)    PHYSICAL EXAM:  GEN: resting comfortably in bed, in NAD   HEAD: normocephalic, nontender to palpation   NECK: no JVD, nontender to palpation   CHEST: nontender to palpation across clavicles and b/l anterior ribs   BACK: nontender to palpation along midline and b/l posterior ribs   ABD: soft, nontender, nondistended   EXTREM: b/l UE non-tender to palpation                   b/l LE non-tender to palpation                    Moving all extremities spontaneously; warm, well-perfused, palpable distal pulses   NEURO: A&Ox3, no focal neuro deficits                               13.6   12.64 )-----------( 116      ( 07 Feb 2025 05:24 )             42.8     02-07    145  |  109[H]  |  22  ----------------------------<  102[H]  4.5   |  19[L]  |  1.13    Ca    9.1      07 Feb 2025 05:24  Phos  4.9     02-07  Mg     2.4     02-07    TPro  6.0  /  Alb  3.3  /  TBili  1.3[H]  /  DBili  x   /  AST  21  /  ALT  18  /  AlkPhos  78  02-07    PT/INR - ( 07 Feb 2025 05:24 )   PT: 10.4 sec;   INR: 0.91 ratio         PTT - ( 07 Feb 2025 05:24 )  PTT:34.9 sec  Urinalysis Basic - ( 07 Feb 2025 05:24 )    Color: x / Appearance: x / SG: x / pH: x  Gluc: 102 mg/dL / Ketone: x  / Bili: x / Urobili: x   Blood: x / Protein: x / Nitrite: x   Leuk Esterase: x / RBC: x / WBC x   Sq Epi: x / Non Sq Epi: x / Bacteria: x        List Injuries Identified to Date:  - acute moderate burst fracture of L1 with mild bony retropulsion, acute mild compression fracture of L4,  - acute nondisplaced L 6th rib fracture.     List Operative and Interventional Radiological Procedures:   - None    Consults (Date):  [  ] Neurosurgery   [ X ] Orthopedics  [  ] Plastics  [  ] Urology  [  ] PM&R  [  ] Social Work    RADIOLOGICAL FINDINGS REVIEW:    < from: CT Chest w/ IV Cont (02.05.25 @ 23:00) >    IMPRESSION:  Acute moderate burst fracture of L1 with mild bony retropulsion, causing   mild-to-moderate spinal canal narrowing. Acute mild compression fracture   of L4 without bony retropulsion. Recommend thoracic MRI for further   evaluation.    Acute nondisplaced left sixth rib fracture. No pneumothorax.    Patchy groundglass opacities are seen throughout the left lung, improved   since prior study. Findings as above infectious versus inflammatory   airspace disease. Unlikely to represent developing pulmonary contusions.    < end of copied text >        PLAN:      ACS/Trauma Surgery  [id number] TERTIARY TRAUMA SURVEY (TTS)    Date of TTS: 02/07/25                             Time: 16:40  Admit Date:  02/05/25                            Trauma Activation: No      HPI:  84M PMHx of of HTN/HLD, DM2, BPH, AF presents to the ED brought in by EMS for evaluation of fall.  Patient is Cantonese speaking, poor historian, cannot verbalize what has happened. As per daughter patient has had more frequent falls recently, yesterday fell while walking to the bathroom.  Patient unable to provide any other history. In the ED VSS except for oxygen saturation of 91%. Labs notable for W13.4, VBG lac 8.1, and pan scan findings notable for acute moderate burst fracture of L1 with mild bony retropulsion, acute mild compression fracture of L4, and an acute nondisplaced L 6th rib fracture.  (06 Feb 2025 05:48)    INTERVAL EVENTS:    PAST MEDICAL & SURGICAL HISTORY:  DM (diabetes mellitus)  HTN (hypertension)  Hypercholesterolemia  CAD (coronary artery disease)  HTN (hypertension  DM (diabetes mellitus)  S/P TURP  S/P gastrectomy      FAMILY HISTORY:  No pertinent family history in first degree relatives    CURRENT MEDICATIONS:   Medications (inpatient): albuterol/ipratropium for Nebulization 3 milliLiter(s) Nebulizer every 6 hours  chlorhexidine 2% Cloths 1 Application(s) Topical <User Schedule>  insulin lispro (ADMELOG) corrective regimen sliding scale   SubCutaneous every 6 hours  lidocaine   4% Patch 1 Patch Transdermal every 24 hours  metoprolol tartrate Injectable 5 milliGRAM(s) IV Push every 6 hours  pantoprazole  Injectable 40 milliGRAM(s) IV Push daily  sodium chloride 3%  Inhalation 4 milliLiter(s) Inhalation every 6 hours    Medications (PRN):    ALLERGIES:  Allergies: No Known Allergies  (Intolerances: )  ------------------------------------------------------------------------------------------  VITAL SIGNS  Vital Signs Last 24 Hrs  T(C): 36.8 (07 Feb 2025 12:13), Max: 37 (06 Feb 2025 22:00)  T(F): 98.2 (07 Feb 2025 12:13), Max: 98.6 (06 Feb 2025 22:00)  HR: 78 (07 Feb 2025 13:00) (63 - 85)  BP: 112/59 (07 Feb 2025 13:00) (91/55 - 140/79)  BP(mean): 80 (07 Feb 2025 13:00) (70 - 104)  RR: 16 (07 Feb 2025 11:00) (15 - 26)  SpO2: 100% (07 Feb 2025 13:00) (91% - 100%)    Parameters below as of 07 Feb 2025 11:21  Patient On (Oxygen Delivery Method): face tent      Drug Dosing Weight    Weight (kg): 61.7 (06 Feb 2025 06:40)      INS/OUTS:    02-06 @ 07:01  -  02-07 @ 07:00  --------------------------------------------------------  IN:    IV PiggyBack: 100 mL    IV PiggyBack: 849.8 mL  Total IN: 949.8 mL    OUT:    Indwelling Catheter - Urethral (mL): 1322 mL    Oral Fluid: 0 mL  Total OUT: 1322 mL    Total NET: -372.2 mL      02-07 @ 07:01  -  02-07 @ 14:00  --------------------------------------------------------  IN:  Total IN: 0 mL    OUT:    Indwelling Catheter - Urethral (mL): 170 mL  Total OUT: 170 mL    Total NET: -170 mL        Drug Dosing Weight    Weight (kg): 61.7 (06 Feb 2025 06:40)    PHYSICAL EXAM: Limited due to patient's mental status, patient nonresponsive even with   GEN: resting comfortably in bed, in NAD   HEAD: normocephalic, no visible deformities  NECK: no JVD, no cervical spine deformity  CHEST: No gross deformities of the clavicle or of the anterior ribs   BACK: No gross deformities of the midline spine or the posterior ribs  ABD: soft, nondistended   EXTREM: b/l UE without gross deformity, right hand ecchymoses over dorsal and lateral aspects                   b/l LE without gross deformity                   Extremities warm, well-perfused, palpable distal pulses   NEURO: A&Ox0                              13.6   12.64 )-----------( 116      ( 07 Feb 2025 05:24 )             42.8     02-07    145  |  109[H]  |  22  ----------------------------<  102[H]  4.5   |  19[L]  |  1.13    Ca    9.1      07 Feb 2025 05:24  Phos  4.9     02-07  Mg     2.4     02-07    TPro  6.0  /  Alb  3.3  /  TBili  1.3[H]  /  DBili  x   /  AST  21  /  ALT  18  /  AlkPhos  78  02-07    PT/INR - ( 07 Feb 2025 05:24 )   PT: 10.4 sec;   INR: 0.91 ratio         PTT - ( 07 Feb 2025 05:24 )  PTT:34.9 sec  Urinalysis Basic - ( 07 Feb 2025 05:24 )    Color: x / Appearance: x / SG: x / pH: x  Gluc: 102 mg/dL / Ketone: x  / Bili: x / Urobili: x   Blood: x / Protein: x / Nitrite: x   Leuk Esterase: x / RBC: x / WBC x   Sq Epi: x / Non Sq Epi: x / Bacteria: x        List Injuries Identified to Date:  - acute moderate burst fracture of L1 with mild bony retropulsion, acute mild compression fracture of L4,  - acute nondisplaced L 6th rib fracture.     List Operative and Interventional Radiological Procedures:   - None    Consults (Date):  [  ] Neurosurgery   [ X ] Orthopedics  [  ] Plastics  [  ] Urology  [  ] PM&R  [  ] Social Work    RADIOLOGICAL FINDINGS REVIEW:    < from: CT Chest w/ IV Cont (02.05.25 @ 23:00) >    IMPRESSION:  Acute moderate burst fracture of L1 with mild bony retropulsion, causing   mild-to-moderate spinal canal narrowing. Acute mild compression fracture   of L4 without bony retropulsion. Recommend thoracic MRI for further   evaluation.    Acute nondisplaced left sixth rib fracture. No pneumothorax.    Patchy groundglass opacities are seen throughout the left lung, improved   since prior study. Findings as above infectious versus inflammatory   airspace disease. Unlikely to represent developing pulmonary contusions.    < end of copied text >    PLAN:  - MRI if stable  - TLSO obtained yesterday  - UA neg  - repeat procal 02/08    ACS/Trauma Surgery  [id number]

## 2025-02-07 NOTE — OCCUPATIONAL THERAPY INITIAL EVALUATION ADULT - LEVEL OF INDEPENDENCE, REHAB EVAL
+TLSO brace/moderate assist (50% patients effort)/maximum assist (25% patients effort) +TLSO brace/minimum assist (75% patients effort)/moderate assist (50% patients effort)

## 2025-02-07 NOTE — CHART NOTE - NSCHARTNOTEFT_GEN_A_CORE
Incidental findings are findings on history, physical examination, lab work, and imaging that are not directly related to the patient's chief complaint and cause for hospital admission.     1. 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 Were these findings explained to the patient and/or their family (in the event that either the patient requests communication with their family or the patient is unable to understand or remember the findings and plan)?      3. Was a copy of the lab work/ imaging report given to the patient and/or their family?    4. Was the patient asked whether they can follow up with their PMD regarding this finding? If the patient says no, or does not have a PMD, you must identify a provider (i.e. Medicine Clinic or specialist) with whom the patient will follow up.    5. Was the finding documented on the discharge summary? Incidental findings are findings on history, physical examination, lab work, and imaging that are not directly related to the patient's chief complaint and cause for hospital admission.     1. 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 Were these findings explained to the patient and/or their family (in the event that either the patient requests communication with their family or the patient is unable to understand or remember the findings and plan)?  - Yes    3. Was a copy of the lab work/ imaging report given to the patient and/or their family?  - Yes, Yulissa Natarajan    4. Was the patient asked whether they can follow up with their PMD regarding this finding? If the patient says no, or does not have a PMD, you must identify a provider (i.e. Medicine Clinic or specialist) with whom the patient will follow up.  - Yes    5. Was the finding documented on the discharge summary?  - Yes

## 2025-02-07 NOTE — PHYSICAL THERAPY INITIAL EVALUATION ADULT - TRANSFER TRAINING, PT EVAL
GOAL: Patient will perform sit to stand transfers minAx2 at rolling walker with proper hand placement in 2 weeks.

## 2025-02-07 NOTE — PROGRESS NOTE ADULT - SUBJECTIVE AND OBJECTIVE BOX
Surgery Daily Progress Note  =====================================================    INTERVAL EVENTS:   - pending MRI when stable, unable to tolerate this AM  - TLSO obtained yesterday  - UA neg  - fu Bcx, MRSA swab  - repeat procal 02/08    SUBJECTIVE: Patient seen at bedside during AM rounds. Unable to vocally participate, but squeezes hand when prompted. Supine in TLSO brace.     --------------------------------------------------------------------------------------  VITAL SIGNS:  T(C): 36.9 (02-07-25 @ 05:00), Max: 37 (02-06-25 @ 22:00)  HR: 63 (02-07-25 @ 07:00) (63 - 116)  BP: 107/63 (02-07-25 @ 07:00) (96/78 - 140/79)  RR: 22 (02-07-25 @ 07:00) (15 - 26)  SpO2: 100% (02-07-25 @ 07:00) (91% - 100%)  --------------------------------------------------------------------------------------  MEDICATIONS:   albuterol/ipratropium for Nebulization 3 milliLiter(s) Nebulizer every 6 hours  chlorhexidine 2% Cloths 1 Application(s) Topical <User Schedule>  insulin lispro (ADMELOG) corrective regimen sliding scale   SubCutaneous every 6 hours  lidocaine   4% Patch 1 Patch Transdermal every 24 hours  metoprolol tartrate Injectable 5 milliGRAM(s) IV Push every 6 hours  pantoprazole  Injectable 40 milliGRAM(s) IV Push daily  sodium chloride 3%  Inhalation 4 milliLiter(s) Inhalation every 6 hours    --------------------------------------------------------------------------------------  INTAKE/OUTPUT:     02-06-25 @ 07:01  -  02-07-25 @ 07:00  --------------------------------------------------------  IN: 949.8 mL / OUT: 1322 mL / NET: -372.2 mL      --------------------------------------------------------------------------------------  EXAM    General Appearance: no acute distress, A&Ox0  Chest: airway intact, non-labored breathing  Back: In TLSO brace, supine  Extremities: WWP, no gross deformity  --------------------------------------------------------------------------------------

## 2025-02-07 NOTE — OCCUPATIONAL THERAPY INITIAL EVALUATION ADULT - PHYSICAL ASSIST/NONPHYSICAL ASSIST, REHAB EVAL
verbal cues/nonverbal cues (demo/gestures)/1 person + 1 person to manage equipment verbal cues/nonverbal cues (demo/gestures)/1 person assist

## 2025-02-07 NOTE — OCCUPATIONAL THERAPY INITIAL EVALUATION ADULT - LEVEL OF INDEPENDENCE: BED TO CHAIR, REHAB EVAL
ambulated 8-10 steps to chair/minimum assist (75% patients effort)/moderate assist (50% patients effort)

## 2025-02-08 NOTE — CHART NOTE - NSCHARTNOTEFT_GEN_A_CORE
MR T/L Spine reviewed with Dr. Galicia, no more orthopedic work up indicated while inpatient. No acute orthopedic intervention indicated at this time.      Please have patient FU with Dr. Galicia outpatient 1 week after discharge, call office to make an appointment    Please repage orthopedics for any further questions or concerns regarding this patient's care    For all questions related to patient care, please reach out to the on-call team via the pager.     Dee Jorgensen, PGY 3  Orthopaedic Surgery  [facility] [phone]  [facility] [place] [phone]  [facility] [phone]

## 2025-02-08 NOTE — PROGRESS NOTE ADULT - SUBJECTIVE AND OBJECTIVE BOX
Patient is a 84y old  Male who presents with a chief complaint of Fall (07 Feb 2025 16:46)      SUBJECTIVE / OVERNIGHT EVENTS: pt evaluated at bedside, currently receiving nebulizer treatment     MEDICATIONS  (STANDING):  albuterol/ipratropium for Nebulization 3 milliLiter(s) Nebulizer every 6 hours  atorvastatin 20 milliGRAM(s) Oral at bedtime  chlorhexidine 2% Cloths 1 Application(s) Topical <User Schedule>  finasteride 5 milliGRAM(s) Oral daily  insulin lispro (ADMELOG) corrective regimen sliding scale   SubCutaneous every 6 hours  lidocaine   4% Patch 1 Patch Transdermal every 24 hours  metoprolol tartrate Injectable 5 milliGRAM(s) IV Push every 6 hours  pantoprazole  Injectable 40 milliGRAM(s) IV Push daily  sodium chloride 3%  Inhalation 4 milliLiter(s) Inhalation every 6 hours  tamsulosin 0.8 milliGRAM(s) Oral at bedtime    MEDICATIONS  (PRN):        CAPILLARY BLOOD GLUCOSE      POCT Blood Glucose.: 178 mg/dL (08 Feb 2025 12:07)  POCT Blood Glucose.: 147 mg/dL (08 Feb 2025 08:35)  POCT Blood Glucose.: 131 mg/dL (08 Feb 2025 05:53)  POCT Blood Glucose.: 125 mg/dL (07 Feb 2025 23:45)  POCT Blood Glucose.: 189 mg/dL (07 Feb 2025 21:25)  POCT Blood Glucose.: 119 mg/dL (07 Feb 2025 18:33)    I&O's Summary    07 Feb 2025 07:01  -  08 Feb 2025 07:00  --------------------------------------------------------  IN: 0 mL / OUT: 885 mL / NET: -885 mL        PHYSICAL EXAM:  GENERAL: NAD, well-developed +tlso brace   HEAD:  Atraumatic, Normocephalic  EYES: conjunctiva and sclera clear  CHEST/LUNG: +rhonchi   HEART: Regular rate and rhythm; No murmurs, rubs, or gallops  ABDOMEN: Soft, Nontender, Nondistended; Bowel sounds present  EXTREMITIES:  2+ Peripheral Pulses  PSYCH: AAOx0    LABS:                        13.7   13.68 )-----------( 142      ( 08 Feb 2025 10:02 )             43.4     02-08    148[H]  |  108  |  34[H]  ----------------------------<  169[H]  4.6   |  15[L]  |  1.28    Ca    9.6      08 Feb 2025 10:02  Phos  4.0     02-08  Mg     2.3     02-08    TPro  6.2  /  Alb  3.3  /  TBili  0.8  /  DBili  x   /  AST  18  /  ALT  18  /  AlkPhos  89  02-08    PT/INR - ( 07 Feb 2025 05:24 )   PT: 10.4 sec;   INR: 0.91 ratio         PTT - ( 07 Feb 2025 05:24 )  PTT:34.9 sec      Urinalysis Basic - ( 08 Feb 2025 10:02 )    Color: x / Appearance: x / SG: x / pH: x  Gluc: 169 mg/dL / Ketone: x  / Bili: x / Urobili: x   Blood: x / Protein: x / Nitrite: x   Leuk Esterase: x / RBC: x / WBC x   Sq Epi: x / Non Sq Epi: x / Bacteria: x        RADIOLOGY & ADDITIONAL TESTS:    Imaging Personally Reviewed:    Consultant(s) Notes Reviewed:      Care Discussed with Consultants/Other Providers:

## 2025-02-08 NOTE — PROGRESS NOTE ADULT - PROBLEM SELECTOR PLAN 1
Pt with acute encephalopathy superimposed on dementia in the setting of trauma/fall vs pna   Discussed with both son Jamil and daughter in law Yulissa   At baseline pt is AAOx2 recognizes and communicates with family   Yulissa reports she saw the pt yesterday and pt was unbable to recognize and communicate with her   On my exam pt awake with eyes open however AAOx0  Reported by staff that pt pulling on IV   Continue treatment as below for fall/fractures/pna   Zypreza 2.5mg q6 prn for agitation   Frequent reorientation   Avoid sedatives and anxiolytics   PT eval

## 2025-02-08 NOTE — CHART NOTE - NSCHARTNOTEFT_GEN_A_CORE
Notified by RN PT pulled out Traore   PT noted overt bleeding, in bed with catheter intact   will order bladder scan monitor retention  keep traore prevent further  trauma   Continue to monitor for changes in VS      Endorsed to night team for follow up   Department of Medicine   [facility]AP[place]O [place]RAHAM-c [id number]

## 2025-02-08 NOTE — ADVANCED PRACTICE NURSE CONSULT - RECOMMEDATIONS
Impression   fecal incontinence documented in patient chart    laly rectal incontinence assocated dermatitis.  bilateral sacrum/buttock deep tissue injury present on admission.  thoracic spine deep tissue injury, present on admission.   left trochanter deep tissue injury, present on admission.   Recommendations   1. Bilateral sacrum/buttock deep tissue injury       laly rectal incontinence dermatitis  Topical therapy- sacral/bilateral buttocks- cleanse w/incontinent cleanser, pat dry & apply kathleen cream  twice daily & prn soiling . Monitor for changes .  2. Right and left heel  deep tissue injury- Elevate heels; apply Complete Cair air fluidized boots; ensure that the soles of the feet are not resting on the foot board of the bed.  3  Incontinent management - incontinent cleanser, pads,  laly care  BID  4. Maintain on an alternating air with low air loss surface   5. Turn & reposition every 2 hr; Use positioning pillow to turn and reposition, soft pillow between bony prominences; continue measures to decrease friction/shear/pressure.  6. Nutrition optimization.  7.  Place waffle cushion when out of bed to chair .   8. left trochanter deep tissue injury      thoracic spine deep tissue injury   topical therapy -  cleans area  with normal saline pat dry and apply no sting barrier film ( Cavilon ) -then apply ALLEVYN Life  change daily and monitor for changes.   will not follow , please recall for new issues  plan of care reviewed with covering staff RN

## 2025-02-08 NOTE — PROGRESS NOTE ADULT - PROBLEM SELECTOR PLAN 11
Discussed GOC and nutritional GOC with son Jamil Benítez [phone]  Son is the point of contact   Emergency contact in sun[place] in the daughter in law Yulissa   Discussed care and updated Yulissa as well   Medication Reconciliation completed 2/8  Pt made DNR/DNI, MOLST filled out and in chart 2/8  Pending PT and SS chris   Son reports pt lives at home and has HHA    Pt was initially admitted to the trauma surgery service, unclear as to when pt was accepted and transferred to medicine

## 2025-02-08 NOTE — ADVANCED PRACTICE NURSE CONSULT - ASSESSMENT
arrived on unit, patient was found lying in a low air loss pressure redistribution support surface style bed. The patient  is unable to turn independently and staff assistance x 2 was provided. Once turned,  able to view his skin., indwelling  urethral cathter present,  fecal incontinence documented in patient chart .  Miriam rectal area with erythema and indurated consistent with incontinence dermatitis.  bilateral sacrum/ buttock there is  non- blanchable deep red  discoloration and hyperpigmentation  noted to measure approximately 8 cm x 6 cm x 0cm,consistent with a deep tissue injury,  present on admission.  thoracic spine there is  non- blanchable deep red and purple  discoloration and hyperpigmentation  noted to measure approximately 6 cm x 4 cm x 0cm,consistent with a deep tissue injury,  present on admission.  left trochanter there is  non- blanchable deep red  discoloration and hyperpigmentation  noted to measure approximately 4 cm x 4  cm x 0cm,consistent with a deep tissue injury,  present on admission.  plan of care reviewed with covering staff Samantha Pillai (RADHA)

## 2025-02-08 NOTE — ADVANCED PRACTICE NURSE CONSULT - REASON FOR CONSULT
Consult to assess patient skin initiated by RN for sacrum deep tissue injury present on admission.     History of Present Illness:  Reason for Admission: Fall  History of Present Illness:   84M PMHx of of HTN/HLD, DM2, BPH, AF presents to the ED brought in by EMS for evaluation of fall.  Patient is Cantonese speaking, poor historian, cannot verbalize what has happened. As per daughter patient has had more frequent falls recently, yesterday fell while walking to the bathroom.  Patient unable to provide any other history. In the ED VSS except for oxygen saturation of 91%. Labs notable for W13.4, VBG lac 8.1, and pan scan findings notable for acute moderate burst fracture of L1 with mild bony retropulsion, acute mild compression fracture of L4, and an acute nondisplaced L 6th rib fracture.

## 2025-02-08 NOTE — PROGRESS NOTE ADULT - PROBLEM SELECTOR PLAN 2
At baseline pt is AAOx2 recognizes and communicates with family   Currently AAOx0   Daughter in law Yulissa reports pt eats soft food/noodles/soup at baseline  Frequent reorientation   Avoid sedatives and anxiolytics   Discussed nutritional GOC with both son and daughter in law as pt has been NPO since admission   Both agree for trial of puree diet, understand risk of aspiration   Will initiate puree diet  If pt's mental status does not improve revert back to NPO- D/w ACP Fernanda  SS eval if pt able to tolerate if mental status improves

## 2025-02-09 NOTE — CHART NOTE - NSCHARTNOTEFT_GEN_A_CORE
MEDICINE NP-EPISODIC NOTE    Notified by RN patient with hypoxia, 89% on RA improved to 93% on NC/face mask. Seen and examined patient at bedside. Patient is alert, NAD. PATRICIA ROS d/t underlying cognitive impairment. PT asya ARNDT brace on.     VITAL SIGNS:  Vital Signs Last 24 Hrs  T(C): 36.4 (09 Feb 2025 20:36), Max: 36.6 (08 Feb 2025 23:30)  T(F): 97.5 (09 Feb 2025 20:36), Max: 97.8 (08 Feb 2025 23:30)  HR: 76 (09 Feb 2025 20:36) (64 - 101)  BP: 150/95 (09 Feb 2025 20:36) (130/75 - 161/92)  BP(mean): --  RR: 19 (09 Feb 2025 20:36) (18 - 19)  SpO2: 94% (09 Feb 2025 20:36) (93% - 99%)      LABORATORY:                          13.2   12.15 )-----------( 151      ( 09 Feb 2025 07:06 )             41.8       02-09    150[H]  |  110[H]  |  38[H]  ----------------------------<  145[H]  4.2   |  16[L]  |  1.34[H]    Ca    10.0      09 Feb 2025 07:05  Phos  4.0     02-08  Mg     2.3     02-08    TPro  6.2  /  Alb  3.3  /  TBili  0.8  /  DBili  x   /  AST  18  /  ALT  18  /  AlkPhos  89  02-08          MICROBIOLOGY:   Culture - Blood (02.06.25 @ 07:48)   Specimen Source: .Blood BLOOD  Culture Results:   No growth at 72 Hours  Culture - Blood (02.06.25 @ 07:48)   Specimen Source: .Blood BLOOD  Culture Results:   No growth at 72 Hours          RADIOLOGY:    PROCEDURE DATE:  02/07/2025          INTERPRETATION:  HISTORY: Admitting Dxs: J69.0 MULTIPLE FALLS;  eval   atelectasis;  TECHNIQUE: Portable frontal view of the chest, 1 view.  COMPARISON: 2/6.  FINDINGS/  IMPRESSION:    HEART:  Enlarged.  LUNGS: Right infrahilar and left basilar atelectasis. Correlate   clinically for infection..  BONES: There is a metallic device which overlies the spine.          PHYSICAL EXAM:    Constitutional: alert, NAD    Respiratory: cleat to mild rhonchi at bases    Cardiovascular: S1 S2. No murmurs.    Gastrointestinal: BS X4 active. soft. nontender.    Extremities/Vascular: +2 pulses bilaterally. No BLE edema.      ASSESSMENT/PLAN:   84M PMHx of of HTN/HLD, DM2, BPH, AF presents to the ED brought in by EMS s/p fall c/b failed bedside dysphagia screen , made NPO pending S/S eval,  PNA on CTX/zithromax.     Now w/ hypoxia on RA improved on NC 4L. Possible aspiration.    --CXR  --FLU/RSV/covid panel  --duoneb prn    F/U primary team in     [id number] MEDICINE NP-EPISODIC NOTE    Notified by RN patient with hypoxia, 89% on RA improved to 93% on NC/face mask. Seen and examined patient at bedside. Patient is alert, NAD. PATRICIA ROS d/t underlying cognitive impairment. PT asya ARNDT brace on.     VITAL SIGNS:  Vital Signs Last 24 Hrs  T(C): 36.4 (09 Feb 2025 20:36), Max: 36.6 (08 Feb 2025 23:30)  T(F): 97.5 (09 Feb 2025 20:36), Max: 97.8 (08 Feb 2025 23:30)  HR: 76 (09 Feb 2025 20:36) (64 - 101)  BP: 150/95 (09 Feb 2025 20:36) (130/75 - 161/92)  BP(mean): --  RR: 19 (09 Feb 2025 20:36) (18 - 19)  SpO2: 94% (09 Feb 2025 20:36) (93% - 99%)      LABORATORY:                          13.2   12.15 )-----------( 151      ( 09 Feb 2025 07:06 )             41.8       02-09    150[H]  |  110[H]  |  38[H]  ----------------------------<  145[H]  4.2   |  16[L]  |  1.34[H]    Ca    10.0      09 Feb 2025 07:05  Phos  4.0     02-08  Mg     2.3     02-08    TPro  6.2  /  Alb  3.3  /  TBili  0.8  /  DBili  x   /  AST  18  /  ALT  18  /  AlkPhos  89  02-08          MICROBIOLOGY:   Culture - Blood (02.06.25 @ 07:48)   Specimen Source: .Blood BLOOD  Culture Results:   No growth at 72 Hours  Culture - Blood (02.06.25 @ 07:48)   Specimen Source: .Blood BLOOD  Culture Results:   No growth at 72 Hours          RADIOLOGY:    PROCEDURE DATE:  02/07/2025          INTERPRETATION:  HISTORY: Admitting Dxs: J69.0 MULTIPLE FALLS;  eval   atelectasis;  TECHNIQUE: Portable frontal view of the chest, 1 view.  COMPARISON: 2/6.  FINDINGS/  IMPRESSION:    HEART:  Enlarged.  LUNGS: Right infrahilar and left basilar atelectasis. Correlate   clinically for infection..  BONES: There is a metallic device which overlies the spine.          PHYSICAL EXAM:    Constitutional: alert, NAD    Respiratory: cleat to mild rhonchi at bases    Cardiovascular: S1 S2. No murmurs.    Gastrointestinal: BS X4 active. soft. nontender.    Extremities/Vascular: +2 pulses bilaterally. No BLE edema.      ASSESSMENT/PLAN:   84M PMHx of of HTN/HLD, DM2, BPH, AF presents to the ED brought in by EMS s/p fall c/b failed bedside dysphagia screen , made NPO pending S/S eval,  PNA on CTX/zithromax.     Now w/ hypoxia on RA improved on NC 4L. Possible aspiration.    --CXR  --FLU/RSV/covid panel  --duoneb prn  --c/w CTX/zithromax    F/U primary team in AM    [id number]

## 2025-02-09 NOTE — PROGRESS NOTE ADULT - PROBLEM SELECTOR PLAN 6
C/w TLSO brace  Pain control with Tylenol ATC and lidocaine patch    MRI T/L Spine - Per orthopedics, no more orthopedic work up indicated while inpatient. No acute orthopedic intervention indicated at this time. Please have patient FU with Dr. Galicia outpatient 1 week after discharge, call office to make an appointment

## 2025-02-09 NOTE — PROGRESS NOTE ADULT - SUBJECTIVE AND OBJECTIVE BOX
[facility] Division of Hospital Medicine  Mai Diaz DO  MS Teams PREFERRED    History obtained in Cantonese as shared language with provider - however limited due to baseline cognitive impairment.  SUBJECTIVE / OVERNIGHT EVENTS: Patient awake, alert, interactive. Appears comfortable. Currently undergoing TOV.  ADDITIONAL REVIEW OF SYSTEMS:    MEDICATIONS  (STANDING):  acetaminophen     Tablet .. 650 milliGRAM(s) Oral every 6 hours  atorvastatin 20 milliGRAM(s) Oral at bedtime  azithromycin  IVPB      azithromycin  IVPB 500 milliGRAM(s) IV Intermittent every 24 hours  cefTRIAXone   IVPB 1000 milliGRAM(s) IV Intermittent every 24 hours  cefTRIAXone   IVPB      chlorhexidine 2% Cloths 1 Application(s) Topical <User Schedule>  dextrose 5% + sodium chloride 0.45%. 1000 milliLiter(s) (75 mL/Hr) IV Continuous <Continuous>  finasteride 5 milliGRAM(s) Oral daily  insulin lispro (ADMELOG) corrective regimen sliding scale   SubCutaneous every 6 hours  lidocaine   4% Patch 1 Patch Transdermal every 24 hours  metoprolol tartrate Injectable 5 milliGRAM(s) IV Push every 6 hours  pantoprazole  Injectable 40 milliGRAM(s) IV Push daily  polyethylene glycol 3350 17 Gram(s) Oral daily  senna 2 Tablet(s) Oral at bedtime  tamsulosin 0.8 milliGRAM(s) Oral at bedtime    MEDICATIONS  (PRN):  OLANZapine Injectable 2.5 milliGRAM(s) IntraMuscular every 6 hours PRN Agitation  oxyCODONE    IR 2.5 milliGRAM(s) Oral every 4 hours PRN Moderate Pain (4 - 6)  oxyCODONE    IR 5 milliGRAM(s) Oral every 4 hours PRN Severe Pain (7 - 10)      I&O's Summary    08 Feb 2025 07:01  -  09 Feb 2025 07:00  --------------------------------------------------------  IN: 0 mL / OUT: 700 mL / NET: -700 mL    09 Feb 2025 07:01  -  09 Feb 2025 12:18  --------------------------------------------------------  IN: 0 mL / OUT: 550 mL / NET: -550 mL        PHYSICAL EXAM:  Vital Signs Last 24 Hrs  T(C): 36.3 (09 Feb 2025 08:53), Max: 36.6 (08 Feb 2025 23:30)  T(F): 97.3 (09 Feb 2025 08:53), Max: 97.8 (08 Feb 2025 23:30)  HR: 101 (09 Feb 2025 12:00) (64 - 101)  BP: 143/90 (09 Feb 2025 12:00) (110/67 - 161/92)  BP(mean): --  RR: 18 (09 Feb 2025 08:53) (18 - 18)  SpO2: 94% (09 Feb 2025 08:53) (93% - 99%)    Parameters below as of 09 Feb 2025 08:53  Patient On (Oxygen Delivery Method): room air      CONSTITUTIONAL: NAD   EYES: Conjunctiva and sclera clear  ENMT: Moist oral mucosa, no pharyngeal injection or exudates   NECK: Supple, midline  RESPIRATORY: Normal respiratory effort  CARDIOVASCULAR: Regular rate and rhythm, normal S1 and S2.   ABDOMEN: Nontender to palpation, no rebound/guarding  PSYCH: Calm  MSK: +TLSO Brace      LABS:                        13.2   12.15 )-----------( 151      ( 09 Feb 2025 07:06 )             41.8     02-09    150[H]  |  110[H]  |  38[H]  ----------------------------<  145[H]  4.2   |  16[L]  |  1.34[H]    Ca    10.0      09 Feb 2025 07:05  Phos  4.0     02-08  Mg     2.3     02-08    TPro  6.2  /  Alb  3.3  /  TBili  0.8  /  DBili  x   /  AST  18  /  ALT  18  /  AlkPhos  89  02-08          Urinalysis Basic - ( 09 Feb 2025 07:05 )    Color: x / Appearance: x / SG: x / pH: x  Gluc: 145 mg/dL / Ketone: x  / Bili: x / Urobili: x   Blood: x / Protein: x / Nitrite: x   Leuk Esterase: x / RBC: x / WBC x   Sq Epi: x / Non Sq Epi: x / Bacteria: x          RADIOLOGY & ADDITIONAL TESTS:  Results Reviewed:   Imaging Personally Reviewed:  Electrocardiogram Personally Reviewed:    COORDINATION OF CARE:  Care Discussed with Consultants/Other Providers [Y/N]: Y  Prior or Outpatient Records Reviewed [Y/N]: Y

## 2025-02-09 NOTE — PROGRESS NOTE ADULT - PROBLEM SELECTOR PLAN 1
Pt with acute encephalopathy superimposed on dementia in the setting of trauma/fall vs PNA  Patient currently with wrist restraints as pulling on IV   Frequent reassurance and verbal orientation  Family members or other familiar persons by his bedside  Monitor for constipation, urinary retention, pain, hunger, thirst etc.  Promote sleep wake cycle and reorientation as indicated  Minimize use of benzodiazepines, opioids, anticholinergics and other deliriogenic agents whenever possible.  Zyprexa PRN Agitation

## 2025-02-09 NOTE — PROGRESS NOTE ADULT - PROBLEM SELECTOR PLAN 12
DVT Ppx: SCDs  Prior hospitalist discussed GOC and nutritional GOC with son Jamil Benítez [phone]  Son is the point of contact   Emergency contact in [place] is the daughter in law Yulissa   Medication reconciliation completed 2/8  Pt made DNR/DNI, MOLST filled out and in chart 2/8  Son reports pt lives at home and has HHA    Dispo: PT rec ROCHELLE, resolution of PNA, hypernatremia, ANTON. Pending SS evaluation DVT Ppx: SCDs  Prior hospitalist discussed GOC and nutritional GOC with son Jamil Benítez [phone]  Son is the point of contact   Emergency contact in [place] is the daughter in law Yulissa   Medication reconciliation completed 2/8  Pt made DNR/DNI, MOLST filled out and in chart 2/8  Son reports pt lives at home and has HHA    Dispo: PT rec ROCHELLE, resolution of PNA, hypernatremia, ANTON. Pending SS evaluation    2/9- Care plan discussed with daughter-in-law, Yulissa, via phone. Discussed possibility of transferring to [facility] as patient still restrained- family was agreeable.

## 2025-02-09 NOTE — PROGRESS NOTE ADULT - PROBLEM SELECTOR PLAN 2
At baseline, pt is AAOx2 recognizes and communicates with family   Per family, patient eats soft food/noodles/soup at baseline  Prior hospitalist discussed nutritional GOC with both son and daughter in law as pt has been NPO since admission   Both agree for trial of puree diet, understand risk of aspiration   Initiated pureed diet however, patient failed bedside dysphagia screen - currently NPO.  Follow-up S&S  Gentle maintenance fluids while NPO

## 2025-02-10 NOTE — SWALLOW BEDSIDE ASSESSMENT ADULT - ADDITIONAL RECOMMENDATIONS
1. Frequent and thorough oral care  2. Consider palliative consult  3. This service will f/u pending Pt mentation improvement 1. Frequent and thorough oral care  2. Consider palliative consult for nutritional GOC  3. This service will f/u pending Pt mentation improvement

## 2025-02-10 NOTE — PROGRESS NOTE ADULT - SUBJECTIVE AND OBJECTIVE BOX
PROGRESS NOTE:   Authored by Dr. Durga Padron MD, Available on MS Teams    Patient is a 84y old  Male who presents with a chief complaint of Fall (09 Feb 2025 12:18)      SUBJECTIVE / OVERNIGHT EVENTS: Language line solutions ID [id number]. Patient's voice is muffled,  unable to understand.     ADDITIONAL REVIEW OF SYSTEMS:    MEDICATIONS  (STANDING):  atorvastatin 20 milliGRAM(s) Oral at bedtime  cefTRIAXone   IVPB 1000 milliGRAM(s) IV Intermittent every 24 hours  cefTRIAXone   IVPB      chlorhexidine 2% Cloths 1 Application(s) Topical <User Schedule>  dextrose 5% + sodium chloride 0.45%. 1000 milliLiter(s) (75 mL/Hr) IV Continuous <Continuous>  finasteride 5 milliGRAM(s) Oral daily  insulin lispro (ADMELOG) corrective regimen sliding scale   SubCutaneous every 6 hours  lidocaine   4% Patch 1 Patch Transdermal every 24 hours  metoprolol tartrate Injectable 5 milliGRAM(s) IV Push every 6 hours  pantoprazole  Injectable 40 milliGRAM(s) IV Push daily  polyethylene glycol 3350 17 Gram(s) Oral daily  senna 2 Tablet(s) Oral at bedtime  tamsulosin 0.8 milliGRAM(s) Oral at bedtime    MEDICATIONS  (PRN):  albuterol/ipratropium for Nebulization 3 milliLiter(s) Nebulizer every 6 hours PRN Shortness of Breath and/or Wheezing  OLANZapine Injectable 2.5 milliGRAM(s) IntraMuscular every 6 hours PRN Agitation  oxyCODONE    IR 2.5 milliGRAM(s) Oral every 4 hours PRN Moderate Pain (4 - 6)  oxyCODONE    IR 5 milliGRAM(s) Oral every 4 hours PRN Severe Pain (7 - 10)      CAPILLARY BLOOD GLUCOSE      POCT Blood Glucose.: 138 mg/dL (10 Feb 2025 12:04)  POCT Blood Glucose.: 141 mg/dL (10 Feb 2025 06:02)  POCT Blood Glucose.: 110 mg/dL (10 Feb 2025 00:25)  POCT Blood Glucose.: 156 mg/dL (09 Feb 2025 17:59)    I&O's Summary    09 Feb 2025 07:01  -  10 Feb 2025 07:00  --------------------------------------------------------  IN: 0 mL / OUT: 750 mL / NET: -750 mL    10 Feb 2025 07:01  -  10 Feb 2025 17:31  --------------------------------------------------------  IN: 0 mL / OUT: 450 mL / NET: -450 mL        PHYSICAL EXAM:  Vital Signs Last 24 Hrs  T(C): 36.6 (10 Feb 2025 12:09), Max: 36.8 (10 Feb 2025 00:50)  T(F): 97.8 (10 Feb 2025 12:09), Max: 98.2 (10 Feb 2025 00:50)  HR: 81 (10 Feb 2025 17:01) (72 - 96)  BP: 137/84 (10 Feb 2025 17:01) (117/81 - 162/93)  BP(mean): --  RR: 18 (10 Feb 2025 17:01) (18 - 19)  SpO2: 95% (10 Feb 2025 17:01) (93% - 95%)    Parameters below as of 10 Feb 2025 17:01  Patient On (Oxygen Delivery Method): room air        CONSTITUTIONAL: NAD  RESPIRATORY: Normal respiratory effort; lungs are clear to auscultation bilaterally  CARDIOVASCULAR: Regular rate and rhythm, normal S1 and S2, no murmur/rub/gallop; No lower extremity edema  ABDOMEN: Nontender to palpation, normoactive bowel sounds, no rebound/guarding  MUSCLOSKELETAL: no clubbing or cyanosis of digits; no joint swelling or tenderness to palpation  PSYCH: A+Ox0  NEURO: follows commands, moves extremities     LABS:                        14.8   8.86  )-----------( 169      ( 10 Feb 2025 06:18 )             46.2     02-10    151[H]  |  112[H]  |  36[H]  ----------------------------<  146[H]  4.7   |  16[L]  |  1.25    Ca    10.6[H]      10 Feb 2025 06:18            Urinalysis Basic - ( 10 Feb 2025 06:18 )    Color: x / Appearance: x / SG: x / pH: x  Gluc: 146 mg/dL / Ketone: x  / Bili: x / Urobili: x   Blood: x / Protein: x / Nitrite: x   Leuk Esterase: x / RBC: x / WBC x   Sq Epi: x / Non Sq Epi: x / Bacteria: x

## 2025-02-10 NOTE — PROGRESS NOTE ADULT - PROBLEM SELECTOR PLAN 12
DVT Ppx: SCDs  Discussed GOC and nutritional GOC with son Jamil Benítez [phone]  Son is the point of contact   Emergency contact in [place] is the daughter in law Yulissa   Medication reconciliation completed 2/8  Pt made DNR/DNI, MOLST filled out and in chart 2/8  Son reports pt lives at home and has HHA    Dispo: PT rec ROCHELLE, resolution of PNA, hypernatremia, ANTON    2/10- Discussed with harry Negron

## 2025-02-10 NOTE — SWALLOW BEDSIDE ASSESSMENT ADULT - NS SPL SWALLOW CLINIC TRIAL FT
Pt did not arouse to ice chip in oral cavity. SLP suctioned ice chip out of mouth. No attempts at oral grading/ no initiation of oral action. Bolus suctioned from oral cavity. No further trials provided.

## 2025-02-10 NOTE — SWALLOW BEDSIDE ASSESSMENT ADULT - COMMENTS
Hx continued:  Isolated nondisplaced L 6th rib, f/u ortho eval of acute L1 burst fxr and L4 compression fxr > rec TSLO brace and MRI > urgent T and L spine MRI ordered in ED, med evaluation for leukocytosis and elevated lactate, CT findings c/f possible infectious/inflammatory airspace disease. CT chest: Acute moderate burst fracture of L1 with mild bony retropulsion, causing mild-to-moderate spinal canal narrowing. Acute mild compression fracture of L4 without bony retropulsion. Recommend thoracic MRI for further evaluation. Acute nondisplaced left sixth rib fracture. No pneumothorax. Patchy groundglass opacities are seen throughout the left lung, improved since prior study. Findings as above infectious versus inflammatory airspace disease. Unlikely to represent developing pulmonary contusions. Per ortho, MR was cancelled 2/2 to unable to tolerate. Will attempt. WBAT w/TLSO, to be worn at all times. Pt. placed on NC to bring O2 back up. Pt. seems like he his having a hard time getting good breaths in. Pt was sat up in bed and suctioned PRN. PT/OT evaluation indicates recommendations for sub acute discharge, Pt requiring moderate assistance. Noted that Pt uses wheelchair for longer distances. OT evaluation states unable to fully assess due to mental status.    Swallow Hx:   Pt is known to this service. 2020 Pt was recommended for mechanical soft diet w/ thin liquids. Pt was seen back in 2022, 10/26 with recommendations for puree/thin liquids and MBS recommendation. Recommendations given were puree/mildly thick. Pt with reduced lingual strength/control, delayed pharyngeal trigger, reduced hyolaryngeal elevation, reduced laryngeal vestibule closure, reduced supraglottic sensation

## 2025-02-10 NOTE — PROGRESS NOTE ADULT - PROBLEM SELECTOR PLAN 1
Pt with acute encephalopathy superimposed on dementia in the setting of trauma/fall vs PNA  Patient currently with wrist restraints as pulling on IV   Frequent reassurance and verbal orientation  Monitor for constipation, urinary retention, pain, hunger, thirst etc.  Promote sleep wake cycle and reorientation as indicated  Minimize use of benzodiazepines, opioids, anticholinergics and other deliriogenic agents whenever possible.  Zyprexa PRN Agitation

## 2025-02-10 NOTE — PROGRESS NOTE ADULT - PROBLEM SELECTOR PLAN 6
C/w TLSO brace when out of bed  Pain control with Tylenol ATC and lidocaine patch    MRI T/L Spine - Per orthopedics, no more orthopedic work up indicated while inpatient. No acute orthopedic intervention indicated at this time. Please have patient FU with Dr. Galicia outpatient 1 week after discharge, call office to make an appointment

## 2025-02-10 NOTE — SWALLOW BEDSIDE ASSESSMENT ADULT - SWALLOW EVAL: DIAGNOSIS
Pt is a 84M PMHx of of HTN/HLD, DM2, BPH, AF presents to the ED brought in by EMS for evaluation of fall. Patient is Cantonese speaking, poor historian, cannot verbalize what has happened. Unable to arouse Pt for PO trials. Pt did not respond to ice chip being placed in mouth, SLP suctioned. Given current mental status, Pt is not an appropriate candidate for swallowing intervention at this time. It is recommended that Pt remains NPO given mentation status and high risk for aspiration/ laryngeal penetration. This service will continue to follow up pending Pt's improved mentation status. Pt is a 84M PMHx of of HTN/HLD, DM2, BPH, AF presents to the ED brought in by EMS for evaluation of fall. Patient is Cantonese speaking, poor historian, cannot verbalize what has happened. Pt presents with insufficient mentation for safe PO trials. Difficult to rouse, or briefly rouses. Nonverbal, not following commands. Pt did not respond to ice chip being placed in mouth, requiring suctioning. Given current mental status, Pt is not an appropriate candidate for PO diet at this time. It is recommended that Pt remains NPO given mentation status and high risk for aspiration/ laryngeal penetration. This service will follow up pending Pt's improved mentation status.

## 2025-02-10 NOTE — SWALLOW BEDSIDE ASSESSMENT ADULT - SLP PERTINENT HISTORY OF CURRENT PROBLEM
Pt is a 84M PMHx of of HTN/HLD, DM2, BPH, AF presents to the ED brought in by EMS for evaluation of fall.  Patient is Cantonese speaking, poor historian, cannot verbalize what has happened. As per daughter patient has had more frequent falls recently, yesterday fell while walking to the bathroom.  Patient unable to provide any other history. Hospital course: In the ED VSS except for oxygen saturation of 91%. Labs notable for W13.4, VBG lac 8.1, and pan scan findings +notable for acute moderate burst fracture of L1 with mild bony retropulsion, acute mild compression fracture of L4, and an acute nondisplaced L 6th rib fracture. Will monitor in SICU for airway monitoring and neurochecks (difficult to assess given A&O status), 2/7 SICU border in MICU, to be transferred to .

## 2025-02-10 NOTE — PROGRESS NOTE ADULT - PROBLEM SELECTOR PLAN 2
At baseline, pt is AAOx2 recognizes and communicates with family   Per family, patient eats soft food/noodles/soup at baseline  Prior hospitalist discussed nutritional GOC with both son and daughter in law as pt has been NPO since admission   Both agree for trial of puree diet, understand risk of aspiration   Initiated pureed diet however, patient failed bedside dysphagia screen  Discussed with son the options, currently opting for pleasure feeds. Patient is DNR/DNI

## 2025-02-10 NOTE — SWALLOW BEDSIDE ASSESSMENT ADULT - SLP GENERAL OBSERVATIONS
Pt encounter asleep in bed, on room air, open mouth posture, B/L wrist restraints. Cantonese  used at beside. Pt intermittently opened eyes to 's voice, however immediately fell back to sleep. Pt unable to fully arouse to alertness despite multiple attempts by SLP. Pt did not respond to thermal-tactile stimulation. Pt with baseline dried oral secretions, oral care provided. Pt did not arouse to oral suctioning, noted to have no supraglottic sensation. Pt encounter asleep in bed, on room air, open mouth posture, B/L wrist restraints. Cantonese  used at beside. Pt intermittently opened eyes to 's voice, however immediately fell back to sleep. Pt unable to fully arouse to alert state despite multiple attempts by SLP. Pt did not respond to thermal-tactile stimulation. Pt with baseline dried oral secretions, oral care provided. Pt did not arouse to oral suctioning, noted to have no gag reflex

## 2025-02-11 NOTE — CONSULT NOTE ADULT - SUBJECTIVE AND OBJECTIVE BOX
HPI:  84M with HTN/HLD, DM2, BPH, AF presents to the ED after a fall, with pan scan findings notable for acute moderate burst fracture of L1 with mild bony retropulsion, acute mild compression fracture of L4, and an acute nondisplaced L 6th rib fracture. Pt was also treated for pneumonia with abx during his hospital course. Pt's mental status remained lethargic throughout. Geriatrics and Palliative Medicine Team is consulted for assistance with GOC    Patient seen this morning, lethargic and barely arousable by voice. Opened his eyes before quickly falling back asleep. D/w RN who observes that pt has been somnolent for the majority of his stay, no signs of pain or distress. Please see [facility] section below for discussion details with pt's 2 sons.    PERTINENT PM/SXH:   DM (diabetes mellitus)    HTN (hypertension)    Hypercholesterolemia    CAD (coronary artery disease)    HTN (hypertension)    DM (diabetes mellitus)      S/P TURP    S/P gastrectomy      FAMILY HISTORY:  No pertinent family history in first degree relatives      Family Hx substance abuse [ ]yes [ ]no  ITEMS NOT CHECKED ARE NOT PRESENT    SOCIAL HISTORY:   Significant other/partner[ ]  Children[ x]  Yazidism/Spirituality:  Substance hx:  [ ]   Tobacco hx:  [ ]   Alcohol hx: [ ]   Home Opioid hx:  [ ] I-Stop Reference No:  Living Situation: [x ]Home  [ ]Long term care  [ ]Rehab [ ]Other    ADVANCE DIRECTIVES:    DNR/MOLST  [ ]  Living Will  [ ]   DECISION MAKER(s):  [ ] Health Care Proxy(s)  [x ] Surrogate(s)  [ ] Guardian           Name(s): Phone Number(s): 2 sons Je Evans and Jamil Shawn Evans     BASELINE (I)ADL(s) (prior to admission):  [place]: [ ]Total  [x ] Moderate [ ]Dependent    Allergies    No Known Allergies    Intolerances    MEDICATIONS  (STANDING):  atorvastatin 20 milliGRAM(s) Oral at bedtime  cefTRIAXone   IVPB 1000 milliGRAM(s) IV Intermittent every 24 hours  cefTRIAXone   IVPB      chlorhexidine 2% Cloths 1 Application(s) Topical <User Schedule>  dextrose 5% + sodium chloride 0.45%. 1000 milliLiter(s) (100 mL/Hr) IV Continuous <Continuous>  finasteride 5 milliGRAM(s) Oral daily  insulin lispro (ADMELOG) corrective regimen sliding scale   SubCutaneous every 6 hours  lidocaine   4% Patch 1 Patch Transdermal every 24 hours  metoprolol tartrate Injectable 5 milliGRAM(s) IV Push every 6 hours  pantoprazole  Injectable 40 milliGRAM(s) IV Push daily  polyethylene glycol 3350 17 Gram(s) Oral daily  senna 2 Tablet(s) Oral at bedtime  tamsulosin 0.8 milliGRAM(s) Oral at bedtime    MEDICATIONS  (PRN):  albuterol/ipratropium for Nebulization 3 milliLiter(s) Nebulizer every 6 hours PRN Shortness of Breath and/or Wheezing  OLANZapine Injectable 2.5 milliGRAM(s) IntraMuscular every 6 hours PRN Agitation  oxyCODONE    IR 2.5 milliGRAM(s) Oral every 4 hours PRN Moderate Pain (4 - 6)  oxyCODONE    IR 5 milliGRAM(s) Oral every 4 hours PRN Severe Pain (7 - 10)    PRESENT SYMPTOMS: [x ]Unable to self-report  [ ] CPOT [x ] PAINADs [x ] RDOS  Source if other than patient:  [ ]Family   [ ]Team     Pain: [ ]yes [ ]no  QOL impact -   Location -                    Aggravating factors -  Quality -  Radiation -  Timing-  Severity (0-10 scale):  Minimal acceptable level (0-10 scale):     Dyspnea:                           [ ]Mild [ ]Moderate [ ]Severe  Anxiety:                             [ ]Mild [ ]Moderate [ ]Severe  Fatigue:                             [ ]Mild [ ]Moderate [ ]Severe  Nausea:                             [ ]Mild [ ]Moderate [ ]Severe  Loss of appetite:              [ ]Mild [ ]Moderate [ ]Severe  Constipation:                    [ ]Mild [ ]Moderate [ ]Severe    PCSSQ[Palliative Care Spiritual Screening Question]   Severity (0-10):  Score of 4 or > indicate consideration of Chaplaincy referral.  Chaplaincy Referral: [ ] yes [ ] refused [ ] following [ x] Deferred     Caregiver [place]? : [ ] yes [ x] no [ ] Deferred [ ] Declined             Social work referral [ ] Patient & Family Centered Care Referral [ ]     Anticipatory Grief present?:  [ x] yes [ ] no  [ ] Deferred                  Social work referral [ ] Chaplaincy Referral[ ]      Other Symptoms:  [x ]All other review of systems negative - Unable to obtain due to mental status     Palliative Performance Status Version 2:     20    %    http://npcrc.org/files/news/palliative_performance_scale_ppsv2.pdf    PHYSICAL EXAM:  Vital Signs Last 24 Hrs  T(C): 36.3 (11 Feb 2025 12:56), Max: 36.9 (11 Feb 2025 00:30)  T(F): 97.4 (11 Feb 2025 12:56), Max: 98.4 (11 Feb 2025 00:30)  HR: 80 (11 Feb 2025 12:56) (72 - 90)  BP: 124/83 (11 Feb 2025 12:56) (121/79 - 156/81)  BP(mean): --  RR: 18 (11 Feb 2025 12:56) (18 - 19)  SpO2: 96% (11 Feb 2025 12:56) (94% - 97%)    Parameters below as of 11 Feb 2025 12:56  Patient On (Oxygen Delivery Method): mask, nonrebreather  O2 Flow (L/min): 2   I&O's Summary    10 Feb 2025 07:01  -  11 Feb 2025 07:00  --------------------------------------------------------  IN: 200 mL / OUT: 1200 mL / NET: -1000 mL    11 Feb 2025 07:01  -  11 Feb 2025 16:10  --------------------------------------------------------  IN: 0 mL / OUT: 200 mL / NET: -200 mL      GENERAL: [ ]Cachexia    [ ]Alert  [ ]Oriented x   [x ]Lethargic  [ ]Unarousable  [ ]Verbal  [x ]Non-Verbal  Behavioral:   [ ] Anxiety  [x ] Delirium [ ] Agitation [ ] Other  HEENT:  [ ]Normal   [ ]Dry mouth   [ ]ET Tube/Trach  [ ]Oral lesions  PULMONARY:   [ ]Clear [ ]Tachypnea  [ ]Audible excessive secretions   [ ]Rhonchi        [ ]Right [ ]Left [ ]Bilateral  [ ]Crackles        [ ]Right [ ]Left [ ]Bilateral  [ ]Wheezing     [ ]Right [ ]Left [ ]Bilateral  [x ]Diminished breath sounds [ ]right [ ]left [ ]bilateral  CARDIOVASCULAR:    [ ]Regular [x ]Irregular [ ]Tachy  [ ]Hayes [ ]Murmur [ ]Other  GASTROINTESTINAL:  [x ]Soft  [ ]Distended   [ ]+BS  [x ]Non tender [ ]Tender  [ ]Other [ ]PEG [ ]OGT/ NGT  Last BM:  GENITOURINARY:  [ ]Normal [ x] Incontinent   [ ]Oliguria/Anuria   [ ]Allen  MUSCULOSKELETAL:   [ ]Normal   [x ]Weakness  [ ]Bed/Wheelchair bound [ ]Edema  NEUROLOGIC:   [ ]No focal deficits  [ x]Cognitive impairment  [ ]Dysphagia [ ]Dysarthria [ ]Paresis [ ]Other   SKIN:   [ ]Normal  [ ]Rash  [ ]Other  [ ]Pressure ulcer(s)       Present on admission [ ]y [ ]n    CRITICAL CARE:  [ ] Shock Present  [ ]Septic [ ]Cardiogenic [ ]Neurologic [ ]Hypovolemic  [ ]  Vasopressors [ ]  Inotropes   [ ]Respiratory failure present [ ]Mechanical ventilation [ ]Non-invasive ventilatory support [ ]High flow    [ ]Acute  [ ]Chronic [ ]Hypoxic  [ ]Hypercarbic [ ]Other  [ ]Other organ failure     LABS:                        14.8   8.86  )-----------( 169      ( 10 Feb 2025 06:18 )             46.2   02-11    153[H]  |  115[H]  |  31[H]  ----------------------------<  161[H]  4.0   |  18[L]  |  1.10    Ca    10.3      11 Feb 2025 06:58  Phos  2.8     02-11  Mg     1.9     02-11        Urinalysis Basic - ( 11 Feb 2025 06:58 )    Color: x / Appearance: x / SG: x / pH: x  Gluc: 161 mg/dL / Ketone: x  / Bili: x / Urobili: x   Blood: x / Protein: x / Nitrite: x   Leuk Esterase: x / RBC: x / WBC x   Sq Epi: x / Non Sq Epi: x / Bacteria: x      RADIOLOGY & ADDITIONAL STUDIES:    < from: CT Head No Cont (02.05.25 @ 23:00) >  IMPRESSION:    CT HEAD:  No acute intracranial hemorrhage, mass effect, or midline shift.    Age-indeterminate subcentimeter infarct involving the right thalamus, new   since prior study. MRI recommended for further evaluation, if clinically   warranted.    Vertebrobasilar dolichoectasia again noted.    CT CERVICAL SPINE:  No acute fracture or traumatic subluxation.    Multi-level degenerative changes. MRI may be obtained for further   evaluation, if clinically warranted.    --- End of Report ---    < end of copied text >    < from: MR Thoracic Spine No Cont (02.08.25 @ 15:22) >  IMPRESSION:    There are acute compression deformities of L1 and L4. There is mild bony   retropulsion at the T12-L1 level due to the posterior superior cortex of   L1 without cord compression. There is no bony retropulsion at L4.    < end of copied text >      PROTEIN CALORIE MALNUTRITION PRESENT: [ ]mild [ ]moderate [ ]severe [ ]underweight [ ]morbid obesity  https://www.andeal.org/vault/2440/web/files/ONC/Table_Clinical%20Characteristics%20to%20Document%20Malnutrition-White%20JV%20et%20al%155427.pdf      Weight (kg): 61.7 (02-06-25 @ 06:40)    [ ]PPSV2 < or = to 30% [ ]significant weight loss  [ ]poor nutritional intake  [ ]anasarca[ ]Artificial Nutrition      Other REFERRALS:  [ ]Hospice  [ ]Child Life  [ ]Social Work  [ ]Case management [ ]Holistic Therapy

## 2025-02-11 NOTE — CONSULT NOTE ADULT - CONVERSATION DETAILS
Discussion held with pt's 2 sons Je Evans and Jamil Evans this afternoon. Je lives with the patient and reports that at home, pt was also having periods of sleeping, though he is otherwise independent with ambulation and ADLs. Pt is  and his two sons are acting as his healthcare surrogates.    Both sons confirmed pt's code status of DNR/I. We discussed his current conditions and potential causes for ongoing AMS. I advised delirium is likely the cause, in setting of hospitalization, fall, fractures, pna, and elderly age. We discussed the role of nutrition optimization as a potential avenue for further management, with explanation of risks and burdens of NGT trial. Alternatively, transition to hospice (home vs PCU) may be considered if we want to shift the focus of care to comfort measures only. Family wishes for a time limited trial of NGT to see if nutrition optimization may change his trajectory. They agree to my f/u in a few days to further discuss pending course.

## 2025-02-11 NOTE — PROGRESS NOTE ADULT - PROBLEM SELECTOR PLAN 12
DVT Ppx: SCDs  Discussed GOC and nutritional GOC with son Jamil Benítez [phone], son is the point of contact   Emergency contact in [place] is the daughter in law Yulissa   Medication reconciliation completed 2/8  Pt made DNR/DNI, MOLST filled out and in chart 2/8  Son reports pt lives at home and has HHA    Dispo: PT rec ROCHELLE, resolution of PNA, hypernatremia, ANTON  Palliative consulted to assist with GOC

## 2025-02-11 NOTE — CONSULT NOTE ADULT - PROBLEM SELECTOR RECOMMENDATION 6
- Case d/w primary team to inform family wishes to pursue a time limited trial of NGT with TFs  - Geriatrics and Palliative Medicine Team will continue to follow with you for ongoing support and GOC, pending course    An MD Kimberley  [facility] Team Consults  Please call if we can be of assistance, [phone]

## 2025-02-11 NOTE — CONSULT NOTE ADULT - ASSESSMENT
84M with HTN/HLD, DM2, BPH, AF presents to the ED after a fall, with pan scan findings notable for acute moderate burst fracture of L1 with mild bony retropulsion, acute mild compression fracture of L4, and an acute nondisplaced L 6th rib fracture. Pt was also treated for pneumonia with abx during his hospital course. Pt's mental status remained lethargic throughout. Geriatrics and Palliative Medicine Team is consulted for assistance with GOC

## 2025-02-11 NOTE — PROGRESS NOTE ADULT - PROBLEM SELECTOR PLAN 1
Pt with acute encephalopathy superimposed on dementia in the setting of trauma/fall vs PNA  Frequent reassurance and verbal orientation  Monitor for constipation, urinary retention, pain, hunger, thirst etc.  Promote sleep wake cycle and reorientation as indicated  Minimize use of benzodiazepines, opioids, anticholinergics and other deliriogenic agents whenever possible  Zyprexa PRN Agitation

## 2025-02-11 NOTE — CONSULT NOTE ADULT - PROBLEM SELECTOR RECOMMENDATION 2
At baseline, per family, pt is AAox2 with periods of sleepiness, though ambulatory and independent with ADLs at home

## 2025-02-11 NOTE — CONSULT NOTE ADULT - PROBLEM SELECTOR RECOMMENDATION 9
In setting of elderly age, hospitalization, pna, fractures  - Delirium precautions  - zyprexa prn ordered in case episodes of agitation, though pt is mostly hypoactive

## 2025-02-11 NOTE — PROGRESS NOTE ADULT - PROBLEM SELECTOR PLAN 2
At baseline, pt is AAOx2 recognizes and communicates with family   Per family, patient eats soft food/noodles/soup at baseline  Prior hospitalist discussed nutritional GOC with both son and daughter in law, agree for trial of puree diet, understand risk of aspiration   Initiated pureed diet however, patient failed bedside dysphagia screen  Discussed with son options, currently opting for pleasure feeds. Patient is DNR/DNI At baseline, pt is AAOx2 recognizes and communicates with family   Per family, patient eats soft food/noodles/soup at baseline  Prior hospitalist discussed nutritional GOC with both son and daughter in law, agree for trial of puree diet, understand risk of aspiration   Initiated pureed diet however, patient failed bedside dysphagia screen  Discussed with palliative, will trial NGT with feeds to see if patient has any recovery in mental status

## 2025-02-11 NOTE — PROGRESS NOTE ADULT - SUBJECTIVE AND OBJECTIVE BOX
PROGRESS NOTE:   Authored by Dr. Durga Padron MD, Available on MS Teams    Patient is a 84y old  Male who presents with a chief complaint of Fall (10 Feb 2025 17:31)      SUBJECTIVE / OVERNIGHT EVENTS: Unable to obtain hx due to mental status, patient is lethargic but arouseable, only moaning.     ADDITIONAL REVIEW OF SYSTEMS:    MEDICATIONS  (STANDING):  atorvastatin 20 milliGRAM(s) Oral at bedtime  cefTRIAXone   IVPB 1000 milliGRAM(s) IV Intermittent every 24 hours  cefTRIAXone   IVPB      chlorhexidine 2% Cloths 1 Application(s) Topical <User Schedule>  dextrose 5% + sodium chloride 0.45%. 1000 milliLiter(s) (100 mL/Hr) IV Continuous <Continuous>  finasteride 5 milliGRAM(s) Oral daily  insulin lispro (ADMELOG) corrective regimen sliding scale   SubCutaneous every 6 hours  lidocaine   4% Patch 1 Patch Transdermal every 24 hours  metoprolol tartrate Injectable 5 milliGRAM(s) IV Push every 6 hours  pantoprazole  Injectable 40 milliGRAM(s) IV Push daily  polyethylene glycol 3350 17 Gram(s) Oral daily  senna 2 Tablet(s) Oral at bedtime  tamsulosin 0.8 milliGRAM(s) Oral at bedtime    MEDICATIONS  (PRN):  albuterol/ipratropium for Nebulization 3 milliLiter(s) Nebulizer every 6 hours PRN Shortness of Breath and/or Wheezing  OLANZapine Injectable 2.5 milliGRAM(s) IntraMuscular every 6 hours PRN Agitation  oxyCODONE    IR 2.5 milliGRAM(s) Oral every 4 hours PRN Moderate Pain (4 - 6)  oxyCODONE    IR 5 milliGRAM(s) Oral every 4 hours PRN Severe Pain (7 - 10)      CAPILLARY BLOOD GLUCOSE      POCT Blood Glucose.: 168 mg/dL (11 Feb 2025 12:05)  POCT Blood Glucose.: 141 mg/dL (11 Feb 2025 05:33)  POCT Blood Glucose.: 147 mg/dL (10 Feb 2025 23:41)  POCT Blood Glucose.: 156 mg/dL (10 Feb 2025 18:01)    I&O's Summary    10 Feb 2025 07:01  -  11 Feb 2025 07:00  --------------------------------------------------------  IN: 200 mL / OUT: 1200 mL / NET: -1000 mL    11 Feb 2025 07:01  -  11 Feb 2025 14:58  --------------------------------------------------------  IN: 0 mL / OUT: 200 mL / NET: -200 mL        PHYSICAL EXAM:  Vital Signs Last 24 Hrs  T(C): 36.3 (11 Feb 2025 12:56), Max: 36.9 (11 Feb 2025 00:30)  T(F): 97.4 (11 Feb 2025 12:56), Max: 98.4 (11 Feb 2025 00:30)  HR: 80 (11 Feb 2025 12:56) (72 - 90)  BP: 124/83 (11 Feb 2025 12:56) (121/79 - 156/81)  BP(mean): --  RR: 18 (11 Feb 2025 12:56) (18 - 19)  SpO2: 96% (11 Feb 2025 12:56) (94% - 97%)    Parameters below as of 11 Feb 2025 12:56  Patient On (Oxygen Delivery Method): mask, nonrebreather  O2 Flow (L/min): 2      CONSTITUTIONAL: ill appearing   RESPIRATORY: Normal respiratory effort; lungs are clear to auscultation bilaterally  CARDIOVASCULAR: Regular rate and rhythm, normal S1 and S2, no murmur/rub/gallop; No lower extremity edema  ABDOMEN: Nontender to palpation, normoactive bowel sounds, no rebound/guarding  MUSCLOSKELETAL: no clubbing or cyanosis of digits  PSYCH: A+O x0    LABS:                        14.8   8.86  )-----------( 169      ( 10 Feb 2025 06:18 )             46.2     02-11    153[H]  |  115[H]  |  31[H]  ----------------------------<  161[H]  4.0   |  18[L]  |  1.10    Ca    10.3      11 Feb 2025 06:58  Phos  2.8     02-11  Mg     1.9     02-11    Urinalysis Basic - ( 11 Feb 2025 06:58 )    Color: x / Appearance: x / SG: x / pH: x  Gluc: 161 mg/dL / Ketone: x  / Bili: x / Urobili: x   Blood: x / Protein: x / Nitrite: x   Leuk Esterase: x / RBC: x / WBC x   Sq Epi: x / Non Sq Epi: x / Bacteria: x

## 2025-02-11 NOTE — CONSULT NOTE ADULT - TIME BILLING
Please note that over 55 minutes of time was spent in care of this patient including  - pre visit preparation  - in person visit  - post visit documentation  - review of imaging  - discussion with colleagues
Symptom assessment and management, supportive counseling, coordination of care

## 2025-02-11 NOTE — CONSULT NOTE ADULT - PROBLEM SELECTOR RECOMMENDATION 5
- Pt's 2 sons Je Evans and Jamil Evans acting as healthcare surrogates  - Code status DNR/I  - GOC: trial of NGT for nutrition optimization and further discuss GOC pending course

## 2025-02-12 NOTE — CHART NOTE - NSCHARTNOTEFT_GEN_A_CORE
Pt with Urinary Retention - Bladder scan 580cc s/p straight cath x1 post void (200cc via condom cath)  If pt continues to retain will be need be traore    Pt with Hypernatremia - Na 156  Started  NGT feeds today and  started with FWB and D5W with 1/2 NS.    [id number]

## 2025-02-12 NOTE — PROGRESS NOTE ADULT - PROBLEM SELECTOR PLAN 12
DVT Ppx: SCDs  Discussed GOC and nutritional GOC with son Jamil Benítez [phone], son is the point of contact   Emergency contact in [place] is the daughter in law Yulissa   Medication reconciliation completed 2/8  Pt made DNR/DNI, MOLST filled out and in chart 2/8  Son reports pt lives at home and has HHA    Dispo: PT rec [facility], resolution of PNA, hypernatremia, nutritional status

## 2025-02-12 NOTE — PROGRESS NOTE ADULT - SUBJECTIVE AND OBJECTIVE BOX
PROGRESS NOTE:   Authored by Dr. Durga Padron MD, Available on MS Teams    Patient is a 84y old  Male who presents with a chief complaint of Fall (11 Feb 2025 16:09)      SUBJECTIVE / OVERNIGHT EVENTS: Patient started on NGT feeds. Still lethargic, arouseable but not answering questions     ADDITIONAL REVIEW OF SYSTEMS:    MEDICATIONS  (STANDING):  atorvastatin 20 milliGRAM(s) Oral at bedtime  chlorhexidine 2% Cloths 1 Application(s) Topical <User Schedule>  dextrose 5% + sodium chloride 0.45%. 1000 milliLiter(s) (100 mL/Hr) IV Continuous <Continuous>  finasteride 5 milliGRAM(s) Oral daily  insulin lispro (ADMELOG) corrective regimen sliding scale   SubCutaneous every 6 hours  lidocaine   4% Patch 1 Patch Transdermal every 24 hours  metoprolol tartrate Injectable 5 milliGRAM(s) IV Push every 6 hours  pantoprazole  Injectable 40 milliGRAM(s) IV Push daily  polyethylene glycol 3350 17 Gram(s) Oral daily  senna 2 Tablet(s) Oral at bedtime  tamsulosin 0.8 milliGRAM(s) Oral at bedtime    MEDICATIONS  (PRN):  albuterol/ipratropium for Nebulization 3 milliLiter(s) Nebulizer every 6 hours PRN Shortness of Breath and/or Wheezing  OLANZapine Injectable 2.5 milliGRAM(s) IntraMuscular every 6 hours PRN Agitation  oxyCODONE    IR 2.5 milliGRAM(s) Oral every 4 hours PRN Moderate Pain (4 - 6)  oxyCODONE    IR 5 milliGRAM(s) Oral every 4 hours PRN Severe Pain (7 - 10)      CAPILLARY BLOOD GLUCOSE      POCT Blood Glucose.: 171 mg/dL (12 Feb 2025 18:04)  POCT Blood Glucose.: 155 mg/dL (12 Feb 2025 11:42)  POCT Blood Glucose.: 162 mg/dL (12 Feb 2025 05:05)  POCT Blood Glucose.: 171 mg/dL (11 Feb 2025 23:49)    I&O's Summary    11 Feb 2025 07:01  -  12 Feb 2025 07:00  --------------------------------------------------------  IN: 0 mL / OUT: 750 mL / NET: -750 mL    12 Feb 2025 07:01  -  12 Feb 2025 18:11  --------------------------------------------------------  IN: 0 mL / OUT: 700 mL / NET: -700 mL        PHYSICAL EXAM:  Vital Signs Last 24 Hrs  T(C): 36.3 (12 Feb 2025 16:28), Max: 36.7 (12 Feb 2025 07:58)  T(F): 97.4 (12 Feb 2025 16:28), Max: 98 (12 Feb 2025 07:58)  HR: 95 (12 Feb 2025 16:28) (62 - 109)  BP: 124/87 (12 Feb 2025 16:28) (124/84 - 146/95)  BP(mean): --  RR: 19 (12 Feb 2025 16:28) (18 - 19)  SpO2: 100% (12 Feb 2025 16:28) (94% - 100%)    Parameters below as of 12 Feb 2025 16:28  Patient On (Oxygen Delivery Method): nasal cannula  O2 Flow (L/min): 2      CONSTITUTIONAL:  ill appearing   RESPIRATORY: Normal respiratory effort; no wheezing  CARDIOVASCULAR: Regular rate and rhythm, normal S1 and S2, no murmur/rub/gallop; No lower extremity edema  ABDOMEN: Nontender to palpation, normoactive bowel sounds, no rebound/guarding  MUSCLOSKELETAL: no clubbing or cyanosis of digits; no joint swelling or tenderness to palpation  PSYCH: A+O x0    LABS:                        13.8   8.38  )-----------( 143      ( 12 Feb 2025 14:18 )             43.2     02-12    156[H]  |  119[H]  |  22  ----------------------------<  157[H]  4.0   |  27  |  1.09    Ca    10.2      12 Feb 2025 14:18  Phos  2.8     02-11  Mg     1.9     02-11            Urinalysis Basic - ( 12 Feb 2025 14:18 )    Color: x / Appearance: x / SG: x / pH: x  Gluc: 157 mg/dL / Ketone: x  / Bili: x / Urobili: x   Blood: x / Protein: x / Nitrite: x   Leuk Esterase: x / RBC: x / WBC x   Sq Epi: x / Non Sq Epi: x / Bacteria: x

## 2025-02-12 NOTE — CHART NOTE - NSCHARTNOTEFT_GEN_A_CORE
NUTRITION FOLLOW UP NOTE    Consult for: Tube Feeding / Malnutrition follow up.     SOURCE: [] Patient  [x] Current Medical Record  [] RN  [] Family/support person at bedside  [x] Patient unavailable/inappropriate (AMS)  [x] Other: Team    CHART REVIEWED/EVENTS NOTED.  [] No changes to nutrition care plan to note  [x] Nutrition Status:  - S/p swallow assessment 2/10, SLP recommends "NPO w/ non-oral hydration/nutrition/medication."  - Per GOC discussion, pt's sons willing to trial NGT to "see if nutrition optimization may change his trajectory."   - Neuro: encephalopathic.   - MSK: S/p Compression fracture of L1 vertebra, s/p rib fracture.     DIET ORDER:   Diet, NPO with Tube Feed:   Tube Feeding Modality: Nasogastric  Jevity 1.2 Claudy (JEVITY1.2RTH)  Total Volume for 24 Hours (mL): 480  Continuous  Increase Tube Feed Rate by (mL): 10     Every 6 hours  Until Goal Tube Feed Rate (mL per Hour): 20  Tube Feed Duration (in Hours): 24  Tube Feed Start Time: 09:00 (25)      CURRENT DIET ORDER IS:  [x] Inadequate: see recommendations detailed below.     NUTRITION INTAKE/PROVISION:  [x] ENTERAL NUTRITION  EN Order at goal will Provide:  470 ml formula, 576 kcal, 26 g protein, 376 ml free water; meets ~9 kcal/kg and ~0.4 g protein/kg, based on dosing wt: 61.6 kg      ANTHROPOMETRICS:  Drug Dosing Weight  Weight (kg): 61.7 (2025 06:40)  Weights:   Daily Weight in k ()     MEDICATIONS:  MEDICATIONS  (STANDING):  atorvastatin 20 milliGRAM(s) Oral at bedtime  cefTRIAXone   IVPB      cefTRIAXone   IVPB 1000 milliGRAM(s) IV Intermittent every 24 hours  chlorhexidine 2% Cloths 1 Application(s) Topical <User Schedule>  dextrose 5% + sodium chloride 0.45%. 1000 milliLiter(s) (100 mL/Hr) IV Continuous <Continuous>  finasteride 5 milliGRAM(s) Oral daily  insulin lispro (ADMELOG) corrective regimen sliding scale   SubCutaneous every 6 hours  lidocaine   4% Patch 1 Patch Transdermal every 24 hours  metoprolol tartrate Injectable 5 milliGRAM(s) IV Push every 6 hours  pantoprazole  Injectable 40 milliGRAM(s) IV Push daily  polyethylene glycol 3350 17 Gram(s) Oral daily  senna 2 Tablet(s) Oral at bedtime  tamsulosin 0.8 milliGRAM(s) Oral at bedtime    MEDICATIONS  (PRN):  albuterol/ipratropium for Nebulization 3 milliLiter(s) Nebulizer every 6 hours PRN Shortness of Breath and/or Wheezing  OLANZapine Injectable 2.5 milliGRAM(s) IntraMuscular every 6 hours PRN Agitation  oxyCODONE    IR 5 milliGRAM(s) Oral every 4 hours PRN Severe Pain (7 - 10)  oxyCODONE    IR 2.5 milliGRAM(s) Oral every 4 hours PRN Moderate Pain (4 - 6)      NUTRITIONALLY PERTINENT LABS:   Na153 mmol/L[H] Glu 161 mg/dL[H] K+ 4.0 mmol/L Cr  1.10 mg/dL BUN 31 mg/dL[H]  Phos 2.8 mg/dL  Alb 3.3 g/dL ALT 18 U/L AST 18 U/L Alkaline Phosphatase 89 U/L      Finger Sticks:  POCT Blood Glucose.: 162 mg/dL ( @ 05:05)  POCT Blood Glucose.: 171 mg/dL ( @ 23:49)  POCT Blood Glucose.: 312 mg/dL ( @ 17:52)  POCT Blood Glucose.: 168 mg/dL ( @ 12:05)      NUTRITIONALLY PERTINENT MEDICATIONS/LABS:  [x] Reviewed  [x] Relevant notes on medications/labs:  - Endo: SSI for coverage.   - Hypernatremic.     EDEMA:  [x] Reviewed  [x] Relevant notes: +1 R hand per chart.     GI/ I&O:  [x] Reviewed  [x] Relevant notes: Last BM . Bowel regimen: miralax, senna.    SKIN:   [x] Change in pressure injury documentation Per Wound Care :  - bilateral sacrum/buttock deep tissue injury present on admission.  - thoracic spine deep tissue injury, present on admission.   - left trochanter deep tissue injury, present on admission.    ESTIMATED NEEDS:  [x] No change:  Energy: 9271-0906 kcal/day (27-32 kcal/kg)  Protein: 73.92-92.4 g/day (1.2-1.5 g/kg)  Fluid: [x] defer to team  Based on: dosing wt 61.6 kg     NUTRITION DIAGNOSIS:  [x] Prior Dx: Underweight mild acute Malnutrition, Increased nutrient needs     EDUCATION:  [x] Not appropriate/warranted (AMS)    NUTRITION CARE PLAN:  1. Diet: Recommend CHANGE EN regimen as follows: Provide 10 ml Glucerna 1.2, advance by 10 ml q6hrs as tolerated to GOAL rate @ 60 ml/hr x 24hrs.          -- Regimen at goal will provide: 1440 ml formula, 1728 kcal (28 kcal/kg), 86.4 g pro (1.4 g pro/kg), and ~1160 ml free water based on dosing wt 61.6 kg.   2. Multivitamin/mineral supplementation: add Multivitamin, vitamin C daily to promote wound healing pending no medical contraindications.   3: RD remains available to adjust EN regimen PRN.     [] Achieved - Continue current nutrition intervention(s)  [] Current medical condition precludes nutrition intervention at this time.    MONITORING AND EVALUATION:   RD remains available upon request and will follow up per protocol.    Catherine Gracia RDN, CDN / TEAMS   Available on MS TEAMS

## 2025-02-12 NOTE — PROGRESS NOTE ADULT - PROBLEM SELECTOR PLAN 2
At baseline, pt is AAOx2 recognizes and communicates with family   Per family, patient eats soft food/noodles/soup at baseline  Prior hospitalist discussed nutritional GOC with both son and daughter in law, agree for trial of puree diet, understand risk of aspiration   Initiated pureed diet however, patient failed bedside dysphagia screen  Discussed with palliative, will trial NGT with feeds to see if patient has any recovery in mental status

## 2025-02-13 NOTE — PROGRESS NOTE ADULT - PROBLEM SELECTOR PLAN 1
Pt with acute encephalopathy superimposed on dementia in the setting of trauma/fall vs PNA vs retention  Frequent reassurance and verbal orientation  Monitor for constipation, urinary retention, pain, hunger, thirst etc.  Promote sleep wake cycle and reorientation as indicated  Minimize use of benzodiazepines, opioids, anticholinergics and other deliriogenic agents whenever possible  Zyprexa PRN Agitation  Mental status waxing and waning

## 2025-02-13 NOTE — PROGRESS NOTE ADULT - PROBLEM SELECTOR PLAN 6
Pt with R 6th rib fracture   Pain control with Tylenol ATC and lidocaine patch. Oxycodone PRN with bowel regimen.  Patient unlikely to cooperate with incentive spirometer   PT Eval: [facility] vs home

## 2025-02-13 NOTE — PROGRESS NOTE ADULT - SUBJECTIVE AND OBJECTIVE BOX
PROGRESS NOTE:   Authored by Dr. Durga Padron MD, Available on MS Teams    Patient is a 84y old  Male who presents with a chief complaint of Fall (12 Feb 2025 18:11)      SUBJECTIVE / OVERNIGHT EVENTS: Unable to obtain hx this am, patient was lethargic. Later reported that patient is more awake and alert.     ADDITIONAL REVIEW OF SYSTEMS:    MEDICATIONS  (STANDING):  atorvastatin 20 milliGRAM(s) Oral at bedtime  chlorhexidine 2% Cloths 1 Application(s) Topical <User Schedule>  doxazosin 1 milliGRAM(s) Oral at bedtime  finasteride 5 milliGRAM(s) Oral daily  insulin lispro (ADMELOG) corrective regimen sliding scale   SubCutaneous every 6 hours  lidocaine   4% Patch 1 Patch Transdermal every 24 hours  metoprolol tartrate Injectable 5 milliGRAM(s) IV Push every 6 hours  pantoprazole  Injectable 40 milliGRAM(s) IV Push daily  polyethylene glycol 3350 17 Gram(s) Oral daily  senna 2 Tablet(s) Oral at bedtime    MEDICATIONS  (PRN):  albuterol/ipratropium for Nebulization 3 milliLiter(s) Nebulizer every 6 hours PRN Shortness of Breath and/or Wheezing  OLANZapine Injectable 2.5 milliGRAM(s) IntraMuscular every 6 hours PRN Agitation  oxyCODONE    IR 2.5 milliGRAM(s) Oral every 4 hours PRN Moderate Pain (4 - 6)  oxyCODONE    IR 5 milliGRAM(s) Oral every 4 hours PRN Severe Pain (7 - 10)      CAPILLARY BLOOD GLUCOSE      POCT Blood Glucose.: 206 mg/dL (13 Feb 2025 11:19)  POCT Blood Glucose.: 213 mg/dL (13 Feb 2025 05:56)  POCT Blood Glucose.: 144 mg/dL (13 Feb 2025 00:43)  POCT Blood Glucose.: 171 mg/dL (12 Feb 2025 18:04)    I&O's Summary    12 Feb 2025 07:01  -  13 Feb 2025 07:00  --------------------------------------------------------  IN: 390 mL / OUT: 950 mL / NET: -560 mL    13 Feb 2025 07:01  -  13 Feb 2025 17:18  --------------------------------------------------------  IN: 343 mL / OUT: 0 mL / NET: 343 mL        PHYSICAL EXAM:  Vital Signs Last 24 Hrs  T(C): 36.3 (13 Feb 2025 16:22), Max: 36.7 (12 Feb 2025 22:04)  T(F): 97.4 (13 Feb 2025 16:22), Max: 98 (12 Feb 2025 22:04)  HR: 96 (13 Feb 2025 16:22) (71 - 114)  BP: 103/74 (13 Feb 2025 16:22) (103/74 - 121/81)  BP(mean): --  RR: 18 (13 Feb 2025 08:00) (17 - 19)  SpO2: 97% (13 Feb 2025 16:22) (93% - 98%)    Parameters below as of 13 Feb 2025 09:30  Patient On (Oxygen Delivery Method): nasal cannula        CONSTITUTIONAL: ill appearing  RESPIRATORY: coarse, no wheezing   CARDIOVASCULAR: Regular rate and rhythm, normal S1 and S2, no murmur/rub/gallop; No lower extremity edema  ABDOMEN: Nontender to palpation, normoactive bowel sounds, no rebound/guarding  MUSCLOSKELETAL: no clubbing or cyanosis of digits  PSYCH: A+Ox0    LABS:                        12.3   8.28  )-----------( 137      ( 13 Feb 2025 10:19 )             38.7     02-13    154[H]  |  117[H]  |  30[H]  ----------------------------<  223[H]  3.5   |  26  |  1.14    Ca    9.8      13 Feb 2025 10:17  Phos  2.4     02-13  Mg     1.8     02-13            Urinalysis Basic - ( 13 Feb 2025 10:17 )    Color: x / Appearance: x / SG: x / pH: x  Gluc: 223 mg/dL / Ketone: x  / Bili: x / Urobili: x   Blood: x / Protein: x / Nitrite: x   Leuk Esterase: x / RBC: x / WBC x   Sq Epi: x / Non Sq Epi: x / Bacteria: x

## 2025-02-13 NOTE — PROGRESS NOTE ADULT - PROBLEM SELECTOR PLAN 7
C/w TLSO brace when out of bed  Pain control with Tylenol ATC and lidocaine patch    MRI T/L Spine - Per orthopedics, no more orthopedic work up indicated while inpatient. No acute orthopedic intervention indicated at this time. Please have patient FU with Dr. Galciia outpatient 1 week after discharge, call office to make an appointment

## 2025-02-14 NOTE — CHART NOTE - NSCHARTNOTEFT_GEN_A_CORE
Patient is a 84y old  Male who presents with a chief complaint of Fall (14 Feb 2025 13:21)    RRT called for hypotension BP 60s/40s, pt was started on levophed gtt and 1L NS bolus via pressure bag. Pt remains DNR/DNI. MICU consulted for hypotension.  See RRT note for details.       Vital Signs Last 24 Hrs  T(C): 36.6 (14 Feb 2025 14:29), Max: 38.3 (14 Feb 2025 12:33)  T(F): 97.8 (14 Feb 2025 14:29), Max: 100.9 (14 Feb 2025 12:33)  HR: 72 (14 Feb 2025 14:29) (72 - 138)  BP: 90/50 (14 Feb 2025 14:29) (90/50 - 125/75)  BP(mean): --  RR: 18 (14 Feb 2025 14:29) (18 - 87)  SpO2: 92% (14 Feb 2025 14:29) (92% - 99%)    Parameters below as of 14 Feb 2025 14:29  Patient On (Oxygen Delivery Method): nasal cannula, high flow      Labs:                          12.8   8.03  )-----------( 117      ( 14 Feb 2025 12:15 )             40.8     02-14    152[H]  |  115[H]  |  32[H]  ----------------------------<  153[H]  3.7   |  24  |  1.15    Ca    9.9      14 Feb 2025 10:00  Phos  2.7     02-14  Mg     1.8     02-14          ABG - ( 14 Feb 2025 12:00 )  pH, Arterial: 7.30  pH, Blood: x     /  pCO2: 54    /  pO2: 60    / HCO3: 27    / Base Excess: -0.6  /  SaO2: 89.3        Radiology:    HPI:  84M PMHx of of HTN/HLD, DM2, BPH, AF presents to the ED brought in by EMS for evaluation of fall.  Patient is Cantonese speaking, poor historian, cannot verbalize what has happened. As per daughter patient has had more frequent falls recently, yesterday fell while walking to the bathroom.  Patient unable to provide any other history. In the ED VSS except for oxygen saturation of 91%. Labs notable for W13.4, VBG lac 8.1, and pan scan findings notable for acute moderate burst fracture of L1 with mild bony retropulsion, acute mild compression fracture of L4, and an acute nondisplaced L 6th rib fracture.  (06 Feb 2025 05:48)    s/p RRT for hypotension and hypoxia. See RRT note for details.    Assessment & Plan:  >HFNC  > 2L NS IV fluid bolus  >20mg midodrine  >100mg droxydopa  >3.375mg zosyn  >1g vancomycin  >Remained on unit  Plan discussed with Dr. Vito Ashton NP-BC   #[id number]

## 2025-02-14 NOTE — PROGRESS NOTE ADULT - PROBLEM SELECTOR PLAN 6
Hypernatremia  Likely due to poor oral intake as patient has decreased po due to mental status   Increase free water pushes with tube feeds   Continue to monitor

## 2025-02-14 NOTE — PROGRESS NOTE ADULT - PROBLEM SELECTOR PROBLEM 12
Prophylactic measure
Hypercholesterolemia
Chronic atrial fibrillation

## 2025-02-14 NOTE — PROGRESS NOTE ADULT - PROBLEM SELECTOR PLAN 2
At baseline, per family, pt is AAox2 with periods of sleepiness, though ambulatory and independent with ADLs at home.

## 2025-02-14 NOTE — RAPID RESPONSE TEAM SUMMARY - NSADDTLFINDINGSRRT_GEN_ALL_CORE
Upon arrival, BP 60s/40s, started on levophed gtt and pressure bag 1L NS. Pt AO0 but protecting airway. Palliative care team at bedside called family and confirmed DNR/DNI status and OK for pressors and ICU care if needed. MICU consulted for hypotension. NRB placed for hypoxia --> transitioned to HFNC. T 100.9, gave zosyn and vanc for concern for aspiration pneumonia. Ultimately gave 2L NS, 20mg midodrine, 100mg droxydopa and was able to wean off levophed. MAPs consistently >70 after these interventions. MICU deeming no ICU level of care necessary if pt able to sustain BP off of iv pressors. Plan for family meeting later today to further clarify goals of care. Primary team present and aware of interventions and plan.

## 2025-02-14 NOTE — PROGRESS NOTE ADULT - PROBLEM SELECTOR PLAN 1
In setting of elderly age, hospitalization, pna, fractures  - Delirium precautions  - zyprexa prn ordered in case episodes of agitation, though pt is mostly hypoactive.

## 2025-02-14 NOTE — CONSULT NOTE ADULT - SUBJECTIVE AND OBJECTIVE BOX
CHIEF COMPLAINT:    HPI: 84M pmhx of HTN/HLD, T2DM, (?) AFib not on AC, BPH p/w fall at home. At Baseline patient is A/Ox2 Cantonese speaking. Fall c/b L1 burst fracture with mild bony retropulsion, acute mild compression fracture of L1 and L4 and a left 6th rib fracture requiring admission to SICU for q1h neurochecks. No plan for spinal surgical intervention. GOC established at that time as DNR/DNI. RRT called on 2/14 for Hypotension. Reportedly 60s/40s and placed briefly on low dose levophed (.04) w/ MAPs of 65. Patient further resuscitated with 2L IVF, Midodrine 20, and Droxidopa 200 with improvements of MAP and levophed was discontinued. Palliative care was consulted from RRT and reportedly primary team re-established GOC as DNR/DNI. Family meeting pending for the afternoon. MICU consulted vinay Esparza initially requiring levophed gtt.        PAST MEDICAL & SURGICAL HISTORY:  DM (diabetes mellitus)      HTN (hypertension)      Hypercholesterolemia      CAD (coronary artery disease)      HTN (hypertension)      DM (diabetes mellitus)      S/P TURP      S/P gastrectomy          FAMILY HISTORY:  No pertinent family history in first degree relatives        SOCIAL HISTORY:    Allergies    No Known Allergies    Intolerances        HOME MEDICATIONS:    REVIEW OF SYSTEMS:    [X] Unable to assess ROS because AMS    OBJECTIVE:  ICU Vital Signs Last 24 Hrs  T(C): 36.4 (14 Feb 2025 05:20), Max: 36.4 (14 Feb 2025 00:00)  T(F): 97.5 (14 Feb 2025 05:20), Max: 97.6 (14 Feb 2025 00:00)  HR: 79 (14 Feb 2025 05:53) (74 - 96)  BP: 122/80 (14 Feb 2025 05:53) (102/70 - 125/75)  BP(mean): --  ABP: --  ABP(mean): --  RR: 18 (14 Feb 2025 05:20) (18 - 18)  SpO2: 97% (14 Feb 2025 05:20) (96% - 99%)    O2 Parameters below as of 14 Feb 2025 05:20  Patient On (Oxygen Delivery Method): nasal cannula  O2 Flow (L/min): 2            02-13 @ 07:01  -  02-14 @ 07:00  --------------------------------------------------------  IN: 1659 mL / OUT: 975 mL / NET: 684 mL    02-14 @ 07:01 - 02-14 @ 13:22  --------------------------------------------------------  IN: 0 mL / OUT: 250 mL / NET: -250 mL      CAPILLARY BLOOD GLUCOSE      POCT Blood Glucose.: 162 mg/dL (14 Feb 2025 11:17)      Vital Signs Last 24 Hrs  T(C): 36.4 (14 Feb 2025 05:20), Max: 36.4 (14 Feb 2025 00:00)  T(F): 97.5 (14 Feb 2025 05:20), Max: 97.6 (14 Feb 2025 00:00)  HR: 79 (14 Feb 2025 05:53) (74 - 96)  BP: 122/80 (14 Feb 2025 05:53) (102/70 - 125/75)  BP(mean): --  RR: 18 (14 Feb 2025 05:20) (18 - 18)  SpO2: 97% (14 Feb 2025 05:20) (96% - 99%)    Parameters below as of 14 Feb 2025 05:20  Patient On (Oxygen Delivery Method): nasal cannula  O2 Flow (L/min): 2      General: ill-appearing  Neurology: A&Ox0, nonfocal, NARVAEZ x 4  Eyes: PERRLA/ EOMI, Gross vision intact  ENT/Neck: Neck supple, trachea midline, No JVD, Gross hearing intact  Respiratory: CTA B/L, No wheezing, rales, rhonchi  CV: RRR, +S1/S2, -S3/S4, no murmurs, rubs or gallops  Abdominal: Soft, NT, ND +BS, No HSM  MSK: 5/5 strength UE/LE bilaterally  Extremities: No edema, 2+ peripheral pulses  Skin: No Rashes, Hematoma, Ecchymosis  Incisions:   Tubes:    HOSPITAL MEDICATIONS:  MEDICATIONS  (STANDING):  atorvastatin 20 milliGRAM(s) Oral at bedtime  chlorhexidine 2% Cloths 1 Application(s) Topical <User Schedule>  doxazosin 1 milliGRAM(s) Oral at bedtime  droxidopa 100 milliGRAM(s) Oral three times a day  finasteride 5 milliGRAM(s) Oral daily  insulin lispro (ADMELOG) corrective regimen sliding scale   SubCutaneous every 6 hours  lidocaine   4% Patch 1 Patch Transdermal every 24 hours  metoprolol tartrate Injectable 5 milliGRAM(s) IV Push every 6 hours  norepinephrine Infusion 0.04 MICROgram(s)/kG/Min (4.63 mL/Hr) IV Continuous <Continuous>  pantoprazole  Injectable 40 milliGRAM(s) IV Push daily  piperacillin/tazobactam IVPB.- 3.375 Gram(s) IV Intermittent once  piperacillin/tazobactam IVPB.. 3.375 Gram(s) IV Intermittent every 8 hours  polyethylene glycol 3350 17 Gram(s) Oral daily  senna 2 Tablet(s) Oral at bedtime    MEDICATIONS  (PRN):  albuterol/ipratropium for Nebulization 3 milliLiter(s) Nebulizer every 6 hours PRN Shortness of Breath and/or Wheezing  OLANZapine Injectable 2.5 milliGRAM(s) IntraMuscular every 6 hours PRN Agitation  oxyCODONE    IR 2.5 milliGRAM(s) Oral every 4 hours PRN Moderate Pain (4 - 6)  oxyCODONE    IR 5 milliGRAM(s) Oral every 4 hours PRN Severe Pain (7 - 10)      LABS:                        12.8   8.03  )-----------( 117      ( 14 Feb 2025 12:15 )             40.8     02-14    152[H]  |  115[H]  |  32[H]  ----------------------------<  153[H]  3.7   |  24  |  1.15    Ca    9.9      14 Feb 2025 10:00  Phos  2.7     02-14  Mg     1.8     02-14        Urinalysis Basic - ( 14 Feb 2025 10:00 )    Color: x / Appearance: x / SG: x / pH: x  Gluc: 153 mg/dL / Ketone: x  / Bili: x / Urobili: x   Blood: x / Protein: x / Nitrite: x   Leuk Esterase: x / RBC: x / WBC x   Sq Epi: x / Non Sq Epi: x / Bacteria: x      Arterial Blood Gas:  02-14 @ 12:00  7.30/54/60/27/89.3/-0.6  ABG lactate: --        MICROBIOLOGY:     RADIOLOGY:  [ ] Reviewed and interpreted by me    EKG:

## 2025-02-14 NOTE — PROVIDER CONTACT NOTE (CHANGE IN STATUS NOTIFICATION) - NAME OF MD/NP/PA/DO NOTIFIED:
Robert Applyby NP Paramedian Forehead Flap Text: A decision was made to reconstruct the defect utilizing an interpolation axial flap and a staged reconstruction.  A telfa template was made of the defect.  This telfa template was then used to outline the paramedian forehead pedicle flap.  The donor area for the pedicle flap was then injected with anesthesia.  The flap was excised through the skin and subcutaneous tissue down to the layer of the underlying musculature.  The pedicle flap was carefully excised within this deep plane to maintain its blood supply.  The edges of the donor site were undermined.   The donor site was closed in a primary fashion.  The pedicle was then rotated into position and sutured.  Once the tube was sutured into place, adequate blood supply was confirmed with blanching and refill.  The pedicle was then wrapped with xeroform gauze and dressed appropriately with a telfa and gauze bandage to ensure continued blood supply and protect the attached pedicle.

## 2025-02-14 NOTE — PROGRESS NOTE ADULT - PROBLEM SELECTOR PLAN 1
Patient with acute hypoxic respiratory failure now requiring HFNC, likely in the setting of aspiration  - continue abx below  - wean down as tolerated

## 2025-02-14 NOTE — PROGRESS NOTE ADULT - PROBLEM SELECTOR PLAN 2
Patient febrile today to 100.9 with worsening hypoxia, tachycardia likely aspiration  - restarted zoysn, s/p vanc x1  - continue abx  - f/u procal in am

## 2025-02-14 NOTE — PROGRESS NOTE ADULT - PROBLEM SELECTOR PLAN 7
Pt with GGO on imaging   Pt with leukocytosis and hypoxia sating 92% on 2L NC -> now on room air   s/p 5 days of ceftriaxone for pna, now on zosyn for concern for aspiration pna  Blood Clx NGTD

## 2025-02-14 NOTE — PROGRESS NOTE ADULT - SUBJECTIVE AND OBJECTIVE BOX
SUBJECTIVE AND OBJECTIVE  Indication for Geriatrics and Palliative Care Services/INTERVAL HPI: GOC    OVERNIGHT EVENTS: this morning upon entering pt's room, RN is trying to obtain his BP which eventually came back low 60s/30s. Pt is obtunded and unresponsive. RR is called and pt was given midodrine with improvement in BP. For his hypoxic respiratory failure he is put on HFNC. Please see GOC section below for discussion details with pt's sons.    DNR on chart:DNI  DNI      Allergies    No Known Allergies    Intolerances    MEDICATIONS  (STANDING):  atorvastatin 20 milliGRAM(s) Oral at bedtime  chlorhexidine 2% Cloths 1 Application(s) Topical <User Schedule>  doxazosin 1 milliGRAM(s) Oral at bedtime  droxidopa 100 milliGRAM(s) Oral three times a day  finasteride 5 milliGRAM(s) Oral daily  insulin lispro (ADMELOG) corrective regimen sliding scale   SubCutaneous every 6 hours  lidocaine   4% Patch 1 Patch Transdermal every 24 hours  metoprolol tartrate Injectable 5 milliGRAM(s) IV Push every 6 hours  norepinephrine Infusion 0.04 MICROgram(s)/kG/Min (4.63 mL/Hr) IV Continuous <Continuous>  pantoprazole  Injectable 40 milliGRAM(s) IV Push daily  piperacillin/tazobactam IVPB.. 3.375 Gram(s) IV Intermittent every 8 hours  polyethylene glycol 3350 17 Gram(s) Oral daily  senna 2 Tablet(s) Oral at bedtime    MEDICATIONS  (PRN):  albuterol/ipratropium for Nebulization 3 milliLiter(s) Nebulizer every 6 hours PRN Shortness of Breath and/or Wheezing  OLANZapine Injectable 2.5 milliGRAM(s) IntraMuscular every 6 hours PRN Agitation  oxyCODONE    IR 2.5 milliGRAM(s) Oral every 4 hours PRN Moderate Pain (4 - 6)  oxyCODONE    IR 5 milliGRAM(s) Oral every 4 hours PRN Severe Pain (7 - 10)      ITEMS UNCHECKED ARE NOT PRESENT    PRESENT SYMPTOMS: [x ]Unable to self-report - see [ ] CPOT [x ] PAINADS [x ] RDOS  Source if other than patient:  [ ]Family   [ ]Team       PCSSQ[Palliative Care Spiritual Screening Question]   Severity (0-10):  Score of 4 or > indicate consideration of Chaplaincy referral.  Chaplaincy Referral: [ ] yes [ ] refused [ ] following [ x] Deferred     Caregiver [place]? : [ ] yes [x ] no [ ] Deferred [ ] Declined             Social work referral [ ] Patient & Family Centered Care Referral [ ]     Anticipatory Grief present?:  [x ] yes [ ] no  [ ] Deferred                  Social work referral [ ] Chaplaincy Referral[ ]    Other Symptoms:  [x ]All other review of systems negative - Unable to obtain due to mental status     Palliative Performance Status Version 2:     10    %      http://npcrc.org/files/news/palliative_performance_scale_ppsv2.pdf    PHYSICAL EXAM:  Vital Signs Last 24 Hrs  T(C): 36.6 (14 Feb 2025 14:29), Max: 38.3 (14 Feb 2025 12:33)  T(F): 97.8 (14 Feb 2025 14:29), Max: 100.9 (14 Feb 2025 12:33)  HR: 72 (14 Feb 2025 14:29) (72 - 138)  BP: 90/50 (14 Feb 2025 14:29) (90/50 - 125/75)  BP(mean): --  RR: 18 (14 Feb 2025 14:29) (18 - 87)  SpO2: 92% (14 Feb 2025 14:29) (92% - 99%)    Parameters below as of 14 Feb 2025 14:29  Patient On (Oxygen Delivery Method): nasal cannula, high flow     I&O's Summary    13 Feb 2025 07:01  -  14 Feb 2025 07:00  --------------------------------------------------------  IN: 1659 mL / OUT: 975 mL / NET: 684 mL    14 Feb 2025 07:01  -  14 Feb 2025 16:20  --------------------------------------------------------  IN: 690 mL / OUT: 350 mL / NET: 340 mL       GENERAL: [ ]Cachexia    [ ]Alert  [ ]Oriented x   [x ]Lethargic  [ ]Unarousable  [ ]Verbal  [x ]Non-Verbal  Behavioral:   [ ]Anxiety  [ ]Delirium [ ]Agitation [ ]Other  HEENT:  [ ]Normal   [ ]Dry mouth   [ ]ET Tube/Trach  [ ]Oral lesions  PULMONARY:   [ ]Clear [ ]Tachypnea  [ ]Audible excessive secretions   [ ]Rhonchi        [ ]Right [ ]Left [ ]Bilateral  [ ]Crackles        [ ]Right [ ]Left [ ]Bilateral  [ ]Wheezing     [ ]Right [ ]Left [ ]Bilateral  [ x]Diminished BS [ ] Right [ ]Left [ x]Bilateral  CARDIOVASCULAR:    [ ]Regular [ ]Irregular [ x]Tachy  [ ]Hayes [ ]Murmur [ ]Other  GASTROINTESTINAL:  [ x]Soft  [ ]Distended   [x ]+BS  [ ]Non tender [ ]Tender  [ ]Other [ ]PEG [ ]OGT/ NGT   Last BM:   GENITOURINARY:  [ ]Normal [x ]Incontinent   [ ]Oliguria/Anuria   [ ]Allen  MUSCULOSKELETAL:   [ ]Normal   [ ]Weakness  [x ]Bed/Wheelchair bound [ ]Edema  NEUROLOGIC:   [ ]No focal deficits  [x ] Cognitive impairment  [ ] Dysphagia [ ]Dysarthria [ ] Paresis [ ]Other   SKIN:   [ ]Normal  [ ]Rash  [ ]Other  [ ]Pressure ulcer(s) [ ]y [ ]n present on admission    CRITICAL CARE:  [ ]Shock Present  [ ]Septic [ ]Cardiogenic [ ]Neurologic [ ]Hypovolemic  [ ]Vasopressors [ ]Inotropes  [ ]Respiratory failure present [ ]Mechanical Ventilation [ ]Non-invasive ventilatory support [ ]High-Flow   [ ]Acute  [ ]Chronic [ ]Hypoxic  [ ]Hypercarbic [ ]Other  [ ]Other organ failure     LABS:                        12.8   8.03  )-----------( 117      ( 14 Feb 2025 12:15 )             40.8   02-14    152[H]  |  115[H]  |  32[H]  ----------------------------<  153[H]  3.7   |  24  |  1.15    Ca    9.9      14 Feb 2025 10:00  Phos  2.7     02-14  Mg     1.8     02-14      Urinalysis Basic - ( 14 Feb 2025 10:00 )    Color: x / Appearance: x / SG: x / pH: x  Gluc: 153 mg/dL / Ketone: x  / Bili: x / Urobili: x   Blood: x / Protein: x / Nitrite: x   Leuk Esterase: x / RBC: x / WBC x   Sq Epi: x / Non Sq Epi: x / Bacteria: x      RADIOLOGY & ADDITIONAL STUDIES: reviewed     Protein Calorie Malnutrition Present: [ ]mild [ ]moderate [ ]severe [ ]underweight [ ]morbid obesity  https://www.andeal.org/vault/2440/web/files/ONC/Table_Clinical%20Characteristics%20to%20Document%20Malnutrition-White%20JV%20et%20al%202012.pdf      Weight (kg): 61.7 (02-06-25 @ 06:40)    [ ]PPSV2 < or = 30%  [ ]significant weight loss [ ]poor nutritional intake [ ]anasarca[ ]Artificial Nutrition    Other REFERRALS:  [ ]Hospice  [ ]Child Life  [ ]Social Work  [ ]Case management [ ]Holistic Therapy

## 2025-02-14 NOTE — PROGRESS NOTE ADULT - PROBLEM SELECTOR PLAN 6
- Geriatrics and Palliative Medicine Team will continue to follow for support and guidance on GOC. PCU transfer discussed but family is not ready. They have our contact information should their decisions change over the weekend    Jane Chu MD  [facility] Team Consults  Please call if we can be of assistance, [phone]

## 2025-02-14 NOTE — PROGRESS NOTE ADULT - SUBJECTIVE AND OBJECTIVE BOX
PROGRESS NOTE:   Authored by Dr. Durga Padron MD, Available on MS Teams    Patient is a 84y old  Male who presents with a chief complaint of Fall (14 Feb 2025 13:21)      SUBJECTIVE / OVERNIGHT EVENTS: Patient lethargic this am, unable to obtain hx. RRT called for hypotensive, now with worsening hypoxia.     ADDITIONAL REVIEW OF SYSTEMS:    MEDICATIONS  (STANDING):  atorvastatin 20 milliGRAM(s) Oral at bedtime  chlorhexidine 2% Cloths 1 Application(s) Topical <User Schedule>  doxazosin 1 milliGRAM(s) Oral at bedtime  droxidopa 100 milliGRAM(s) Oral three times a day  finasteride 5 milliGRAM(s) Oral daily  insulin lispro (ADMELOG) corrective regimen sliding scale   SubCutaneous every 6 hours  lidocaine   4% Patch 1 Patch Transdermal every 24 hours  metoprolol tartrate Injectable 5 milliGRAM(s) IV Push every 6 hours  midodrine. 20 milliGRAM(s) Oral every 8 hours  norepinephrine Infusion 0.04 MICROgram(s)/kG/Min (4.63 mL/Hr) IV Continuous <Continuous>  pantoprazole  Injectable 40 milliGRAM(s) IV Push daily  piperacillin/tazobactam IVPB.. 3.375 Gram(s) IV Intermittent every 8 hours  polyethylene glycol 3350 17 Gram(s) Oral daily  senna 2 Tablet(s) Oral at bedtime    MEDICATIONS  (PRN):  albuterol/ipratropium for Nebulization 3 milliLiter(s) Nebulizer every 6 hours PRN Shortness of Breath and/or Wheezing  OLANZapine Injectable 2.5 milliGRAM(s) IntraMuscular every 6 hours PRN Agitation      CAPILLARY BLOOD GLUCOSE      POCT Blood Glucose.: 162 mg/dL (14 Feb 2025 11:17)  POCT Blood Glucose.: 202 mg/dL (14 Feb 2025 06:08)  POCT Blood Glucose.: 170 mg/dL (14 Feb 2025 00:19)  POCT Blood Glucose.: 230 mg/dL (13 Feb 2025 17:57)    I&O's Summary    13 Feb 2025 07:01  -  14 Feb 2025 07:00  --------------------------------------------------------  IN: 1659 mL / OUT: 975 mL / NET: 684 mL    14 Feb 2025 07:01  -  14 Feb 2025 17:32  --------------------------------------------------------  IN: 690 mL / OUT: 350 mL / NET: 340 mL        PHYSICAL EXAM:  Vital Signs Last 24 Hrs  T(C): 36.6 (14 Feb 2025 14:29), Max: 38.3 (14 Feb 2025 12:33)  T(F): 97.8 (14 Feb 2025 14:29), Max: 100.9 (14 Feb 2025 12:33)  HR: 72 (14 Feb 2025 14:29) (72 - 138)  BP: 90/50 (14 Feb 2025 14:29) (90/50 - 125/75)  BP(mean): --  RR: 18 (14 Feb 2025 14:29) (18 - 87)  SpO2: 92% (14 Feb 2025 14:29) (92% - 99%)    Parameters below as of 14 Feb 2025 14:29  Patient On (Oxygen Delivery Method): nasal cannula, high flow        CONSTITUTIONAL: ill appearing, lethargic   RESPIRATORY: coarse breath sounds, no wheezing   CARDIOVASCULAR: Regular rate and rhythm, normal S1 and S2, no murmur/rub/gallop; No lower extremity edema  ABDOMEN: Nontender to palpation, normoactive bowel sounds, no rebound/guarding  MUSCLOSKELETAL: no clubbing or cyanosis of digits; no joint swelling or tenderness to palpation  PSYCH: A+Ox0    LABS:                        12.8   8.03  )-----------( 117      ( 14 Feb 2025 12:15 )             40.8     02-14    152[H]  |  115[H]  |  32[H]  ----------------------------<  153[H]  3.7   |  24  |  1.15    Ca    9.9      14 Feb 2025 10:00  Phos  2.7     02-14  Mg     1.8     02-14            Urinalysis Basic - ( 14 Feb 2025 10:00 )    Color: x / Appearance: x / SG: x / pH: x  Gluc: 153 mg/dL / Ketone: x  / Bili: x / Urobili: x   Blood: x / Protein: x / Nitrite: x   Leuk Esterase: x / RBC: x / WBC x   Sq Epi: x / Non Sq Epi: x / Bacteria: x          RADIOLOGY & ADDITIONAL TESTS:  Results Reviewed:   Imaging Personally Reviewed:  Electrocardiogram Personally Reviewed:    COORDINATION OF CARE:  Care Discussed with Consultants/Other Providers [Y/N]:  Prior or Outpatient Records Reviewed [Y/N]:

## 2025-02-14 NOTE — RAPID RESPONSE TEAM SUMMARY - NSSITUATIONBACKGROUNDRRT_GEN_ALL_CORE
84M pmhx of HTN/HLD, T2DM, (?) AFib not on AC, BPH p/w fall at home. At Baseline patient is A/Ox2 Cantonese speaking. Fall c/b L1 burst fracture with mild bony retropulsion, acute mild compression fracture of L1 and L4 and a left 6th rib fracture requiring admission to SICU for q1h neurochecks. No plan for spinal surgical intervention. GOC established at that time as DNR/DNI. RRT called on  for Hypotension

## 2025-02-14 NOTE — CONSULT NOTE ADULT - ASSESSMENT
84M pmhx of HTN/HLD, T2DM, (?) AFib not on AC, BPH p/w fall at home. At Baseline patient is A/Ox2 Cantonese speaking w/ RRT for Hypotension. Briefly on Levophed during RRT now weaned off. Hemodynamics improved s/p 2L IVF, midodrine 20, and droxidopa 200. Abx broadened during RRT.     c/w Vancomycin and Zosyn  c/w Midodrine 20, Uptitrate PRN  c/w Droxidopa  f/u GOC/Palliative conversation  No indication for MICU at present, reconsult as clinically indicated.

## 2025-02-15 NOTE — PROGRESS NOTE ADULT - PROBLEM SELECTOR PLAN 2
Patient with acute hypoxic respiratory failure now requiring HFNC, likely in the setting of aspiration  - continue abx above  - wean down as tolerated

## 2025-02-15 NOTE — PROGRESS NOTE ADULT - SUBJECTIVE AND OBJECTIVE BOX
Patient is a 84y old  Male who presents with a chief complaint of Fall  Patient s/p right hip fracture fixation with IM nail POD#2  Patient comfortable  No complaints    T(C): 37.1 (02-15-25 @ 03:57), Max: 38.3 (02-14-25 @ 12:33)  HR: 68 (02-15-25 @ 03:57) (68 - 138)  BP: 103/66 (02-15-25 @ 03:57) (90/50 - 107/67)  RR: 16 (02-15-25 @ 03:57) (16 - 87)  SpO2: 96% (02-15-25 @ 03:57) (92% - 100%)      PHYSICAL EXAM:  NAD, Alert  [Right ] Hip: Dressings C/D/I; sensation grossly intact to light touch; (+) DF/PF; (+) Distal Pulses; No Calf tenderness B/L, PAS     LABS:                     12.8   8.03  )-----------( 117      ( 14 Feb 2025 12:15 )             40.8   02-14  152[H]  |  115[H]  |  32[H]  ----------------------------<  153[H]  3.7   |  24  |  1.15    Ca    9.9      14 Feb 2025 10:00  Phos  2.7     02-14  Mg     1.8     02-14

## 2025-02-15 NOTE — PROGRESS NOTE ADULT - ASSESSMENT
84M PMHx of of HTN/HLD, DM2, BPH, AF presents to the ED brought in by EMS s/p fall. Course complicated by sepsis and AHRF 2/2 PNA, now with positive blood cultures

## 2025-02-15 NOTE — PROGRESS NOTE ADULT - NSPROGADDITIONALINFOA_GEN_ALL_CORE
continue GOC with family  d/w palliative team
D/w ACP Fernanda
attempted to call son Jamil today, no response
d/w son Je at bedside
Time-based billing (NON-critical care).     55 minutes spent on total encounter. The necessity of the time spent during the encounter on this date of service was due to:     - Reviewing, and interpreting labs, testing, and imaging.  - Independently obtaining a review of systems and performing a physical exam  - Reviewing prior records and where necessary, outpatient records.  - Counselling and educating patient regarding interpretation of aforementioned items and plan of care.      d/w ACP

## 2025-02-15 NOTE — PROGRESS NOTE ADULT - PROBLEM SELECTOR PLAN 1
Patient febrile to 100.9 on 2/14 with tachycardia and hypotension concerning for sepsis  - BCx 2/14 growing ESBL Ecoli  - switched abx to ertapenem  - repeat BCx ordered  - trend lactate

## 2025-02-15 NOTE — PROVIDER CONTACT NOTE (CRITICAL VALUE NOTIFICATION) - ASSESSMENT
Pt A&Ox0,  pt on NGT feeding, mitten restraints in place, critical blood culture shows growth aerobic and anaerobic: gram neg rods

## 2025-02-15 NOTE — PROGRESS NOTE ADULT - SUBJECTIVE AND OBJECTIVE BOX
PROGRESS NOTE:   Authored by Dr. Durga Padron MD, Available on MS Teams    Patient is a 84y old  Male who presents with a chief complaint of Fall (14 Feb 2025 17:32)      SUBJECTIVE / OVERNIGHT EVENTS: BCx growing gram negative rods. Son Je at bedside says patient has been trying to talk to him. Patient with some muffled words, per son Je, patient says he is okay. Has some discomfort from the tube in his nose.     ADDITIONAL REVIEW OF SYSTEMS:    MEDICATIONS  (STANDING):  atorvastatin 20 milliGRAM(s) Oral at bedtime  chlorhexidine 2% Cloths 1 Application(s) Topical <User Schedule>  doxazosin 1 milliGRAM(s) Oral at bedtime  ertapenem  IVPB 1000 milliGRAM(s) IV Intermittent every 24 hours  finasteride 5 milliGRAM(s) Oral daily  insulin lispro (ADMELOG) corrective regimen sliding scale   SubCutaneous every 6 hours  lidocaine   4% Patch 1 Patch Transdermal every 24 hours  midodrine. 20 milliGRAM(s) Oral every 8 hours  pantoprazole  Injectable 40 milliGRAM(s) IV Push daily  polyethylene glycol 3350 17 Gram(s) Oral daily  senna 2 Tablet(s) Oral at bedtime    MEDICATIONS  (PRN):  acetaminophen     Tablet .. 650 milliGRAM(s) Oral every 6 hours PRN Mild Pain (1 - 3)  albuterol/ipratropium for Nebulization 3 milliLiter(s) Nebulizer every 6 hours PRN Shortness of Breath and/or Wheezing  OLANZapine Injectable 2.5 milliGRAM(s) IntraMuscular every 6 hours PRN Agitation      CAPILLARY BLOOD GLUCOSE      POCT Blood Glucose.: 175 mg/dL (15 Feb 2025 11:27)  POCT Blood Glucose.: 194 mg/dL (15 Feb 2025 08:42)  POCT Blood Glucose.: 250 mg/dL (15 Feb 2025 05:50)  POCT Blood Glucose.: 217 mg/dL (15 Feb 2025 00:09)  POCT Blood Glucose.: 253 mg/dL (14 Feb 2025 18:04)    I&O's Summary    14 Feb 2025 07:01  -  15 Feb 2025 07:00  --------------------------------------------------------  IN: 750 mL / OUT: 500 mL / NET: 250 mL    15 Feb 2025 07:01  -  15 Feb 2025 15:13  --------------------------------------------------------  IN: 0 mL / OUT: 400 mL / NET: -400 mL        PHYSICAL EXAM:  Vital Signs Last 24 Hrs  T(C): 36.3 (15 Feb 2025 13:35), Max: 37.1 (15 Feb 2025 03:57)  T(F): 97.4 (15 Feb 2025 13:35), Max: 98.8 (15 Feb 2025 03:57)  HR: 86 (15 Feb 2025 13:35) (68 - 100)  BP: 111/72 (15 Feb 2025 13:35) (99/69 - 111/72)  BP(mean): --  RR: 18 (15 Feb 2025 13:35) (16 - 19)  SpO2: 97% (15 Feb 2025 13:35) (94% - 100%)    Parameters below as of 15 Feb 2025 13:35  Patient On (Oxygen Delivery Method): nasal cannula, high flow  O2 Flow (L/min): 40  O2 Concentration (%): 40    CONSTITUTIONAL: ill appearing   HEENT: NGT in place   RESPIRATORY: coarse breath sounds b/l, no wheezing  CARDIOVASCULAR: Regular rate and rhythm, normal S1 and S2, no murmur/rub/gallop; b/l LE edema   ABDOMEN: Nontender to palpation, normoactive bowel sounds, no rebound/guarding  MUSCLOSKELETAL: no clubbing or cyanosis of digits; no joint swelling or tenderness to palpation  PSYCH: A+Ox0    LABS:                        12.8   8.03  )-----------( 117      ( 14 Feb 2025 12:15 )             40.8     02-14    152[H]  |  115[H]  |  32[H]  ----------------------------<  153[H]  3.7   |  24  |  1.15    Ca    9.9      14 Feb 2025 10:00  Phos  2.7     02-14  Mg     1.8     02-14            Urinalysis Basic - ( 14 Feb 2025 10:00 )    Color: x / Appearance: x / SG: x / pH: x  Gluc: 153 mg/dL / Ketone: x  / Bili: x / Urobili: x   Blood: x / Protein: x / Nitrite: x   Leuk Esterase: x / RBC: x / WBC x   Sq Epi: x / Non Sq Epi: x / Bacteria: x        Culture - Blood (collected 14 Feb 2025 12:05)  Source: .Blood BLOOD  Gram Stain (15 Feb 2025 04:29):    Growth in aerobic bottle: Gram Negative Rods    Growth in anaerobic bottle: Gram Negative Rods  Preliminary Report (15 Feb 2025 04:29):    Growth in aerobic bottle: Gram Negative Rods    Growth in anaerobic bottle: Gram Negative Rods    Culture - Blood (collected 14 Feb 2025 11:45)  Source: .Blood BLOOD  Gram Stain (15 Feb 2025 03:07):    Growth in anaerobic bottle: Gram Negative Rods    Growth in aerobic bottle: Gram Negative Rods  Preliminary Report (15 Feb 2025 03:06):    Growth in anaerobic bottle: Gram Negative Rods    Growth in aerobic bottle: Gram Negative Rods    Direct identification is available within approximately 3-5    hours either by Blood Panel Multiplexed PCR or Direct    MALDI-TOF. Details: [email or web address]  Organism: Blood Culture PCR (15 Feb 2025 04:38)  Organism: Blood Culture PCR (15 Feb 2025 04:38)

## 2025-02-15 NOTE — PROGRESS NOTE ADULT - PROBLEM/PLAN-10
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

## 2025-02-15 NOTE — PROVIDER CONTACT NOTE (CRITICAL VALUE NOTIFICATION) - ACTION/TREATMENT ORDERED:
No further interventions ordered. Pa states will continue with antibiotics. No further interventions ordered.

## 2025-02-15 NOTE — PROVIDER CONTACT NOTE (CRITICAL VALUE NOTIFICATION) - SITUATION
Pt A&Ox0,  pt on NGT feeding, mitten restraints in place, critical blood culture shows growth in aerobic and anaerobic: gram neg rods.

## 2025-02-15 NOTE — PROGRESS NOTE ADULT - ASSESSMENT
85 y/o M  s/p right hip fracture fixation with IM nail POD#2, f/u am labs, PT, OT, d/c planning  Lanie Edmondson PA-C  Orthopaedic Surgery  Team pager [phone]  [facility] [phone]  [phone]

## 2025-02-15 NOTE — PROGRESS NOTE ADULT - PROBLEM SELECTOR PLAN 7
Pt with GGO on imaging   Pt with leukocytosis and hypoxia sating 92% on 2L NC -> now on room air   s/p 5 days of ceftriaxone for pna, now on ertapenem for pna  Blood Clx NGTD

## 2025-02-16 NOTE — CONSULT NOTE ADULT - CONSULT REQUESTED DATE/TIME
16-Feb-2025 19:50
06-Feb-2025 03:17
06-Feb-2025 02:51
06-Feb-2025 05:45
14-Feb-2025 11:21
11-Feb-2025 00:00

## 2025-02-16 NOTE — PROGRESS NOTE ADULT - PROBLEM SELECTOR PLAN 7
Pt with GGO on imaging   Pt with leukocytosis and hypoxia sating 92% on 2L NC -> now on room air   s/p 5 days of ceftriaxone for pna, now on ertapenem for pna  Blood Clx NGTD Pt with GGO on imaging   Pt with leukocytosis and hypoxia sating 92% on 2L NC -> now on room air   s/p 5 days of ceftriaxone for pna, now on ertapenem for bacteremia   Blood Clx NGTD

## 2025-02-16 NOTE — PROGRESS NOTE ADULT - PROBLEM SELECTOR PLAN 6
Hypernatremia  Likely due to poor oral intake as patient has decreased po due to mental status   Increase free water pushes with tube feeds   Continue to monitor Hypernatremia resolved   Likely due to poor oral intake as patient has decreased po due to mental status   Increased free water pushes with tube feeds   Continue to monitor

## 2025-02-16 NOTE — CONSULT NOTE ADULT - SUBJECTIVE AND OBJECTIVE BOX
[facility]  Reg Wright MD PhD, Margarita Pena MD, Dinah Gillette MD, Jay Stanley MD, Frank Hernandez MD  and providing coverage with Georgie Sullivan MD  Providing Infectious Disease Consultations at [facility], [facility], [facility], [facility]'s    Office# [phone] to schedule follow up appointments  Answering Service for urgent calls or New Consults [phone]  Cell# to text for urgent issues Reg Wright [phone]     infectious diseases consult note:    KAITLIN CALDERON is a 84y y. o. Male patient     HPI:  84M w HTN, HLD, DM2, BPH, AF presented to the ED brought in by EMS for evaluation of fall.  Patient is Cantonese speaking, poor historian, could not verbalize what has happened. As per daughter patient has had more frequent falls recently, fell while walking to the bathroom.  Patient unable to provide any other history. In the ED VSS except for oxygen saturation of 91%. Labs notable for W13.4, VBG lac 8.1, and pan scan findings notable for acute moderate burst fracture of L1 with mild bony retropulsion, acute mild compression fracture of L4, and an acute nondisplaced L 6th rib fracture. Adm 2/6) ID now called 2/16 with Bacteremia      PAST MEDICAL & SURGICAL HISTORY:  DM (diabetes mellitus)      HTN (hypertension)      Hypercholesterolemia      CAD (coronary artery disease)      HTN (hypertension)      DM (diabetes mellitus)      S/P TURP      S/P gastrectomy          Allergies    No Known Allergies    Intolerances        ANTIBIOTICS/RELEVANT:  antimicrobials  ertapenem  IVPB 1000 milliGRAM(s) IV Intermittent every 24 hours    immunologic:    OTHER:  acetaminophen     Tablet .. 650 milliGRAM(s) Oral every 6 hours PRN  albuterol/ipratropium for Nebulization 3 milliLiter(s) Nebulizer every 6 hours PRN  atorvastatin 20 milliGRAM(s) Oral at bedtime  chlorhexidine 2% Cloths 1 Application(s) Topical <User Schedule>  doxazosin 1 milliGRAM(s) Oral at bedtime  finasteride 5 milliGRAM(s) Oral daily  insulin lispro (ADMELOG) corrective regimen sliding scale   SubCutaneous every 6 hours  lidocaine   4% Patch 1 Patch Transdermal every 24 hours  midodrine. 10 milliGRAM(s) Oral every 8 hours  OLANZapine Injectable 2.5 milliGRAM(s) IntraMuscular every 6 hours PRN  pantoprazole  Injectable 40 milliGRAM(s) IV Push daily  polyethylene glycol 3350 17 Gram(s) Oral daily  senna 2 Tablet(s) Oral at bedtime      Social History: no toxic habits reported  Social History:        Family History: noncontrib  FAMILY HISTORY:  No pertinent family history in first degree relatives          ROS:    EYES:  Negative  blurry vision or double vision  GASTROINTESTINAL:  Negative for nausea, vomiting, diarrhea  -otherwise negative except for subjective    Objective:  Last 24-Vital Signs Last 24 Hrs  T(C): 36.3 (16 Feb 2025 16:17), Max: 36.6 (16 Feb 2025 05:20)  T(F): 97.4 (16 Feb 2025 16:17), Max: 97.8 (16 Feb 2025 05:20)  HR: 98 (16 Feb 2025 16:17) (69 - 102)  BP: 132/71 (16 Feb 2025 16:17) (111/69 - 132/71)  BP(mean): --  RR: 18 (16 Feb 2025 16:17) (18 - 20)  SpO2: 99% (16 Feb 2025 16:17) (94% - 99%)    Parameters below as of 16 Feb 2025 16:17  Patient On (Oxygen Delivery Method): nasal cannula  O2 Flow (L/min): 4      T(C): 36.3 (02-16-25 @ 16:17), Max: 37.1 (02-15-25 @ 03:57)  T(F): 97.4 (02-16-25 @ 16:17), Max: 98.8 (02-15-25 @ 03:57)  T(C): 36.3 (02-16-25 @ 16:17), Max: 38.3 (02-14-25 @ 12:33)  T(F): 97.4 (02-16-25 @ 16:17), Max: 100.9 (02-14-25 @ 12:33)  T(C): 36.3 (02-16-25 @ 16:17), Max: 38.3 (02-14-25 @ 12:33)  T(F): 97.4 (02-16-25 @ 16:17), Max: 100.9 (02-14-25 @ 12:33)    PHYSICAL EXAM:  Constitutional: NAD  Eyes: PERRLA, EOMI  Ear/Nose/Throat: oropharynx normal	  Neck: no JVD, no lymphadenopathy, supple  Respiratory: no accessory muscle use, lung fields bilaterally clear  Cardiovascular: distant  Gastrointestinal: soft, NT, no HSM, BS-normal  Extremities: no clubbing, no cyanosis, edema absent  Neuro: patient alert, oriented and appropriate  Skin: no sig lesions        LABS:                        12.6   14.33 )-----------( 109      ( 16 Feb 2025 09:56 )             38.7       WBC 14.33  02-16 @ 09:56  WBC 15.73  02-15 @ 16:42  WBC 8.03  02-14 @ 12:15  WBC 1.59  02-14 @ 10:00  WBC 8.28  02-13 @ 10:19  WBC 8.38  02-12 @ 14:18  WBC 8.86  02-10 @ 06:18      02-16    145  |  110[H]  |  38[H]  ----------------------------<  146[H]  3.9   |  24  |  1.14    Ca    9.3      16 Feb 2025 09:56  Phos  3.5     02-16  Mg     2.1     02-16        Creatinine: 1.14 mg/dL (02-16-25 @ 09:56)  Creatinine: 1.25 mg/dL (02-15-25 @ 16:42)  Creatinine: 1.15 mg/dL (02-14-25 @ 10:00)  Creatinine: 1.14 mg/dL (02-13-25 @ 10:17)  Creatinine: 1.09 mg/dL (02-12-25 @ 14:18)  Creatinine: 1.10 mg/dL (02-11-25 @ 06:58)  Creatinine: 1.25 mg/dL (02-10-25 @ 06:18)        Urinalysis Basic - ( 16 Feb 2025 09:56 )    Color: x / Appearance: x / SG: x / pH: x  Gluc: 146 mg/dL / Ketone: x  / Bili: x / Urobili: x   Blood: x / Protein: x / Nitrite: x   Leuk Esterase: x / RBC: x / WBC x   Sq Epi: x / Non Sq Epi: x / Bacteria: x            MICROBIOLOGY:    Culture - Blood (02.14.25 @ 11:45)    -  ESBL: Detec   -  Escherichia coli: Detec   Gram Stain:   Growth in anaerobic bottle: Gram Negative Rods  Growth in aerobic bottle: Gram Negative Rods   -  CTX-M Resistance Marker: Detec   Specimen Source: .Blood BLOOD   Organism: Blood Culture PCR   Culture Results:   Growth in aerobic and anaerobic bottles: Escherichia coli  Direct identification is available within approximately 3-5  hours either by Blood Panel Multiplexed PCR or Direct  MALDI-TOF. Details: [email or web address]   Organism Identification: Blood Culture PCR   Method Type: PCR        RADIOLOGY & ADDITIONAL STUDIES:   [facility] INFECTIOUS DISEASE  Reg Wright MD PhD, Margarita Pena MD, Dinah Gillette MD, Jay Stanley MD, Frank Hernandez MD  and providing coverage with Georgie Sullivan MD  Providing Infectious Disease Consultations at [facility], [facility], [facility], [facility]'s    Office# [phone] to schedule follow up appointments  Answering Service for urgent calls or New Consults [phone]  Cell# to text for urgent issues Reg Wright [phone]     infectious diseases consult note:    KAITLIN CALDERON is a 84y y. o. Male patient     HPI:  84M w HTN, HLD, DM2, BPH, AF presented to the ED brought in by EMS for evaluation of fall.  Patient is Cantonese speaking, poor historian, could not verbalize what has happened. As per daughter patient has had more frequent falls recently, fell while walking to the bathroom.  Patient unable to provide any other history. In the ED VSS except for oxygen saturation of 91%. Labs notable for W13.4, VBG lac 8.1, and pan scan findings notable for acute moderate burst fracture of L1 with mild bony retropulsion, acute mild compression fracture of L4, and an acute nondisplaced L 6th rib fracture. Adm 2/6) ID now called 2/16 with Bacteremia      PAST MEDICAL & SURGICAL HISTORY:  DM (diabetes mellitus)      HTN (hypertension)      Hypercholesterolemia      CAD (coronary artery disease)      HTN (hypertension)      DM (diabetes mellitus)      S/P TURP      S/P gastrectomy          Allergies    No Known Allergies    Intolerances        ANTIBIOTICS/RELEVANT:  antimicrobials  ertapenem  IVPB 1000 milliGRAM(s) IV Intermittent every 24 hours    immunologic:    OTHER:  acetaminophen     Tablet .. 650 milliGRAM(s) Oral every 6 hours PRN  albuterol/ipratropium for Nebulization 3 milliLiter(s) Nebulizer every 6 hours PRN  atorvastatin 20 milliGRAM(s) Oral at bedtime  chlorhexidine 2% Cloths 1 Application(s) Topical <User Schedule>  doxazosin 1 milliGRAM(s) Oral at bedtime  finasteride 5 milliGRAM(s) Oral daily  insulin lispro (ADMELOG) corrective regimen sliding scale   SubCutaneous every 6 hours  lidocaine   4% Patch 1 Patch Transdermal every 24 hours  midodrine. 10 milliGRAM(s) Oral every 8 hours  OLANZapine Injectable 2.5 milliGRAM(s) IntraMuscular every 6 hours PRN  pantoprazole  Injectable 40 milliGRAM(s) IV Push daily  polyethylene glycol 3350 17 Gram(s) Oral daily  senna 2 Tablet(s) Oral at bedtime      Social History: no toxic habits reported  Social History:        Family History: noncontrib  FAMILY HISTORY:  No pertinent family history in first degree relatives          ROS:    EYES:  Negative  blurry vision or double vision  GASTROINTESTINAL:  Negative for nausea, vomiting, diarrhea  -otherwise negative except for subjective    Objective:  Last 24-Vital Signs Last 24 Hrs  T(C): 36.3 (16 Feb 2025 16:17), Max: 36.6 (16 Feb 2025 05:20)  T(F): 97.4 (16 Feb 2025 16:17), Max: 97.8 (16 Feb 2025 05:20)  HR: 98 (16 Feb 2025 16:17) (69 - 102)  BP: 132/71 (16 Feb 2025 16:17) (111/69 - 132/71)  BP(mean): --  RR: 18 (16 Feb 2025 16:17) (18 - 20)  SpO2: 99% (16 Feb 2025 16:17) (94% - 99%)    Parameters below as of 16 Feb 2025 16:17  Patient On (Oxygen Delivery Method): nasal cannula  O2 Flow (L/min): 4      T(C): 36.3 (02-16-25 @ 16:17), Max: 37.1 (02-15-25 @ 03:57)  T(F): 97.4 (02-16-25 @ 16:17), Max: 98.8 (02-15-25 @ 03:57)  T(C): 36.3 (02-16-25 @ 16:17), Max: 38.3 (02-14-25 @ 12:33)  T(F): 97.4 (02-16-25 @ 16:17), Max: 100.9 (02-14-25 @ 12:33)  T(C): 36.3 (02-16-25 @ 16:17), Max: 38.3 (02-14-25 @ 12:33)  T(F): 97.4 (02-16-25 @ 16:17), Max: 100.9 (02-14-25 @ 12:33)    PHYSICAL EXAM:  Constitutional: NAD  Eyes: PERRLA, EOMI  Ear/Nose/Throat: oropharynx normal	  Neck: no JVD, no lymphadenopathy, supple  Respiratory: no accessory muscle use, lung fields bilaterally with scattered crackles  Cardiovascular: distant  Gastrointestinal: soft, NT, no HSM, BS-normal  Extremities: no clubbing, no cyanosis, edema absent  Neuro: patient limited        LABS:                        12.6   14.33 )-----------( 109      ( 16 Feb 2025 09:56 )             38.7       WBC 14.33  02-16 @ 09:56  WBC 15.73  02-15 @ 16:42  WBC 8.03  02-14 @ 12:15  WBC 1.59  02-14 @ 10:00  WBC 8.28  02-13 @ 10:19  WBC 8.38  02-12 @ 14:18  WBC 8.86  02-10 @ 06:18      02-16    145  |  110[H]  |  38[H]  ----------------------------<  146[H]  3.9   |  24  |  1.14    Ca    9.3      16 Feb 2025 09:56  Phos  3.5     02-16  Mg     2.1     02-16        Creatinine: 1.14 mg/dL (02-16-25 @ 09:56)  Creatinine: 1.25 mg/dL (02-15-25 @ 16:42)  Creatinine: 1.15 mg/dL (02-14-25 @ 10:00)  Creatinine: 1.14 mg/dL (02-13-25 @ 10:17)  Creatinine: 1.09 mg/dL (02-12-25 @ 14:18)  Creatinine: 1.10 mg/dL (02-11-25 @ 06:58)  Creatinine: 1.25 mg/dL (02-10-25 @ 06:18)        Urinalysis Basic - ( 16 Feb 2025 09:56 )    Color: x / Appearance: x / SG: x / pH: x  Gluc: 146 mg/dL / Ketone: x  / Bili: x / Urobili: x   Blood: x / Protein: x / Nitrite: x   Leuk Esterase: x / RBC: x / WBC x   Sq Epi: x / Non Sq Epi: x / Bacteria: x            MICROBIOLOGY:    Culture - Blood (02.14.25 @ 11:45)    -  ESBL: Detec   -  Escherichia coli: Detec   Gram Stain:   Growth in anaerobic bottle: Gram Negative Rods  Growth in aerobic bottle: Gram Negative Rods   -  CTX-M Resistance Marker: Detec   Specimen Source: .Blood BLOOD   Organism: Blood Culture PCR   Culture Results:   Growth in aerobic and anaerobic bottles: Escherichia coli  Direct identification is available within approximately 3-5  hours either by Blood Panel Multiplexed PCR or Direct  MALDI-TOF. Details: [email or web address]   Organism Identification: Blood Culture PCR   Method Type: PCR        RADIOLOGY & ADDITIONAL STUDIES:

## 2025-02-16 NOTE — CONSULT NOTE ADULT - ASSESSMENT
84M w HTN, HLD, DM2, BPH, AF presented to the ED brought in by EMS for evaluation of fall.  Patient is Cantonese speaking, poor historian, could not verbalize what has happened. As per daughter patient has had more frequent falls recently, fell while walking to the bathroom.  Patient unable to provide any other history. In the ED VSS except for oxygen saturation of 91%. Labs notable for W13.4, VBG lac 8.1, and pan scan findings notable for acute moderate burst fracture of L1 with mild bony retropulsion, acute mild compression fracture of L4, and an acute nondisplaced L 6th rib fracture. Adm 2/6) ID now called 2/16 with Bacteremia    RECOMMENDATIONS  ESBL E coli Bacteremia  Culture - Blood (02.14.25 @ 11:45) -  ESBL: Detec -  Escherichia coli: Detec -  CTX-M Resistance Marker: Detec  Ertapenem started 2/15-continue for now  no clear source so depending on GOC may want to consider chest, abd, pelvis CT w contrast  repeat blood cultures collected 2/16 am to check for clearance    Thank you for consulting us and involving us in the management of this most interesting and challenging case.  In addition to reviewing history, imaging, documents, labs, microbiology, took into account antibiotic stewardship, local antibiogram and infection control strategies and potential transmission issues.    We will follow along in the care of this patient. Please contact me by texting me directly on my cell# at [phone] using TEAMS or call our answering service at [phone] with any concerns.

## 2025-02-16 NOTE — PROGRESS NOTE ADULT - PROBLEM SELECTOR PLAN 11
Not on any medications at home   BP acceptable Not on any medications at home   BP acceptable, wean off midodrine

## 2025-02-16 NOTE — PROVIDER CONTACT NOTE (SEPSIS SCREENING) - ACTION/TREATMENT ORDERED:
Denisha Juárez NP made aware of mews score no new nursing orders placed. Blood cultures drawn from pt on 2/16 in AM. Safety precautions monitored.

## 2025-02-16 NOTE — PROGRESS NOTE ADULT - SUBJECTIVE AND OBJECTIVE BOX
PROGRESS NOTE:   Authored by Dr. Durga Padron MD, Available on MS Teams    Patient is a 84y old  Male who presents with a chief complaint of Fall (15 Feb 2025 15:13)      SUBJECTIVE / OVERNIGHT EVENTS: Language line solutions ID [id number]. Unable to understand patient, patient is too lethargic to answer     ADDITIONAL REVIEW OF SYSTEMS:    MEDICATIONS  (STANDING):  atorvastatin 20 milliGRAM(s) Oral at bedtime  chlorhexidine 2% Cloths 1 Application(s) Topical <User Schedule>  doxazosin 1 milliGRAM(s) Oral at bedtime  ertapenem  IVPB 1000 milliGRAM(s) IV Intermittent every 24 hours  finasteride 5 milliGRAM(s) Oral daily  insulin lispro (ADMELOG) corrective regimen sliding scale   SubCutaneous every 6 hours  lidocaine   4% Patch 1 Patch Transdermal every 24 hours  midodrine. 10 milliGRAM(s) Oral every 8 hours  pantoprazole  Injectable 40 milliGRAM(s) IV Push daily  polyethylene glycol 3350 17 Gram(s) Oral daily  senna 2 Tablet(s) Oral at bedtime    MEDICATIONS  (PRN):  acetaminophen     Tablet .. 650 milliGRAM(s) Oral every 6 hours PRN Mild Pain (1 - 3)  albuterol/ipratropium for Nebulization 3 milliLiter(s) Nebulizer every 6 hours PRN Shortness of Breath and/or Wheezing  OLANZapine Injectable 2.5 milliGRAM(s) IntraMuscular every 6 hours PRN Agitation      CAPILLARY BLOOD GLUCOSE      POCT Blood Glucose.: 162 mg/dL (16 Feb 2025 12:01)  POCT Blood Glucose.: 112 mg/dL (16 Feb 2025 06:30)  POCT Blood Glucose.: 156 mg/dL (15 Feb 2025 23:52)  POCT Blood Glucose.: 150 mg/dL (15 Feb 2025 17:28)    I&O's Summary    15 Feb 2025 07:01  -  16 Feb 2025 07:00  --------------------------------------------------------  IN: 0 mL / OUT: 1050 mL / NET: -1050 mL    16 Feb 2025 07:01  -  16 Feb 2025 13:16  --------------------------------------------------------  IN: 0 mL / OUT: 0 mL / NET: 0 mL        PHYSICAL EXAM:  Vital Signs Last 24 Hrs  T(C): 36.4 (16 Feb 2025 08:08), Max: 36.6 (16 Feb 2025 05:20)  T(F): 97.5 (16 Feb 2025 08:08), Max: 97.8 (16 Feb 2025 05:20)  HR: 74 (16 Feb 2025 12:04) (69 - 102)  BP: 116/76 (16 Feb 2025 12:04) (100/68 - 122/75)  BP(mean): --  RR: 18 (16 Feb 2025 12:04) (16 - 20)  SpO2: 96% (16 Feb 2025 12:04) (94% - 99%)    Parameters below as of 16 Feb 2025 12:04  Patient On (Oxygen Delivery Method): nasal cannula w/ humidification        CONSTITUTIONAL: ill appearing   RESPIRATORY: coarse b/l, no wheezing   CARDIOVASCULAR: Regular rate and rhythm, normal S1 and S2, no murmur/rub/gallop; trace b/l LE edema  ABDOMEN: Nontender to palpation, normoactive bowel sounds, no rebound/guarding  MUSCLOSKELETAL: no clubbing or cyanosis of digits; no joint swelling or tenderness to palpation  PSYCH: A+Ox0    LABS:                        12.6   14.33 )-----------( 109      ( 16 Feb 2025 09:56 )             38.7     02-16    145  |  110[H]  |  38[H]  ----------------------------<  146[H]  3.9   |  24  |  1.14    Ca    9.3      16 Feb 2025 09:56  Phos  3.5     02-16  Mg     2.1     02-16            Urinalysis Basic - ( 16 Feb 2025 09:56 )    Color: x / Appearance: x / SG: x / pH: x  Gluc: 146 mg/dL / Ketone: x  / Bili: x / Urobili: x   Blood: x / Protein: x / Nitrite: x   Leuk Esterase: x / RBC: x / WBC x   Sq Epi: x / Non Sq Epi: x / Bacteria: x        Culture - Blood (collected 14 Feb 2025 12:05)  Source: .Blood BLOOD  Gram Stain (15 Feb 2025 04:29):    Growth in aerobic bottle: Gram Negative Rods    Growth in anaerobic bottle: Gram Negative Rods  Preliminary Report (16 Feb 2025 11:50):    Growth in aerobic and anaerobic bottles: Escherichia coli    See previous culture [id number]    Culture - Blood (collected 14 Feb 2025 11:45)  Source: .Blood BLOOD  Gram Stain (15 Feb 2025 03:07):    Growth in anaerobic bottle: Gram Negative Rods    Growth in aerobic bottle: Gram Negative Rods  Preliminary Report (15 Feb 2025 20:23):    Growth in aerobic and anaerobic bottles: Escherichia coli    Direct identification is available within approximately 3-5    hours either by Blood Panel Multiplexed PCR or Direct    MALDI-TOF. Details: [email or web address]  Organism: Blood Culture PCR (15 Feb 2025 04:38)  Organism: Blood Culture PCR (15 Feb 2025 04:38)

## 2025-02-16 NOTE — PROGRESS NOTE ADULT - PROBLEM SELECTOR PLAN 1
Patient febrile to 100.9 on 2/14 with tachycardia and hypotension concerning for sepsis  - BCx 2/14 growing ESBL Ecoli  - switched abx to ertapenem  - repeat BCx sent  - trend lactate Patient febrile to 100.9 on 2/14 with tachycardia and hypotension concerning for sepsis  - BCx 2/14 growing ESBL Ecoli  - switched abx to ertapenem  - repeat BCx sent  - trend lactate  - f/u ID recs

## 2025-02-16 NOTE — PROGRESS NOTE ADULT - PROBLEM SELECTOR PLAN 2
Patient with acute hypoxic respiratory failure requiring HFNC, likely in the setting of aspiration, now weaned to 6L NC  - continue abx above  - wean down O2 as tolerated

## 2025-02-17 NOTE — PROGRESS NOTE ADULT - SUBJECTIVE AND OBJECTIVE BOX
PROGRESS NOTE:   Authored by Dr. Durga Padron MD, Available on MS Teams    Patient is a 84y old  Male who presents with a chief complaint of Fall (17 Feb 2025 09:34)      SUBJECTIVE / OVERNIGHT EVENTS: Language line solutions [place] ID [id number]. Patient unable to cooperate due to mental status     ADDITIONAL REVIEW OF SYSTEMS:    MEDICATIONS  (STANDING):  atorvastatin 20 milliGRAM(s) Oral at bedtime  chlorhexidine 2% Cloths 1 Application(s) Topical <User Schedule>  doxazosin 1 milliGRAM(s) Oral at bedtime  ertapenem  IVPB 1000 milliGRAM(s) IV Intermittent every 24 hours  finasteride 5 milliGRAM(s) Oral daily  heparin   Injectable 5000 Unit(s) SubCutaneous every 8 hours  insulin lispro (ADMELOG) corrective regimen sliding scale   SubCutaneous every 6 hours  lidocaine   4% Patch 1 Patch Transdermal every 24 hours  midodrine. 10 milliGRAM(s) Oral every 8 hours  pantoprazole  Injectable 40 milliGRAM(s) IV Push daily  polyethylene glycol 3350 17 Gram(s) Oral daily  senna 2 Tablet(s) Oral at bedtime    MEDICATIONS  (PRN):  acetaminophen     Tablet .. 650 milliGRAM(s) Oral every 6 hours PRN Mild Pain (1 - 3)  albuterol/ipratropium for Nebulization 3 milliLiter(s) Nebulizer every 6 hours PRN Shortness of Breath and/or Wheezing  OLANZapine Injectable 2.5 milliGRAM(s) IntraMuscular every 6 hours PRN Agitation      CAPILLARY BLOOD GLUCOSE      POCT Blood Glucose.: 188 mg/dL (17 Feb 2025 13:16)  POCT Blood Glucose.: 205 mg/dL (17 Feb 2025 11:53)  POCT Blood Glucose.: 217 mg/dL (17 Feb 2025 05:02)  POCT Blood Glucose.: 188 mg/dL (16 Feb 2025 23:59)  POCT Blood Glucose.: 186 mg/dL (16 Feb 2025 17:56)    I&O's Summary    16 Feb 2025 07:01  -  17 Feb 2025 07:00  --------------------------------------------------------  IN: 720 mL / OUT: 450 mL / NET: 270 mL    17 Feb 2025 07:01  -  17 Feb 2025 15:38  --------------------------------------------------------  IN: 0 mL / OUT: 700 mL / NET: -700 mL        PHYSICAL EXAM:  Vital Signs Last 24 Hrs  T(C): 36.6 (17 Feb 2025 12:15), Max: 36.7 (17 Feb 2025 00:05)  T(F): 97.8 (17 Feb 2025 12:15), Max: 98 (17 Feb 2025 00:05)  HR: 105 (17 Feb 2025 12:15) (73 - 105)  BP: 145/90 (17 Feb 2025 12:15) (118/79 - 145/90)  BP(mean): --  RR: 20 (17 Feb 2025 12:15) (18 - 20)  SpO2: 97% (17 Feb 2025 12:15) (96% - 100%)    Parameters below as of 17 Feb 2025 12:15  Patient On (Oxygen Delivery Method): mask, nonrebreather  O2 Flow (L/min): 15      CONSTITUTIONAL: ill appearing, intermittently opens eyes   RESPIRATORY: Normal respiratory effort; coarse breath sounds b/l, no wheezing   CARDIOVASCULAR: Regular rate and rhythm, normal S1 and S2, no murmur/rub/gallop; b/l LE edemea  ABDOMEN: Nontender to palpation, normoactive bowel sounds, no rebound/guarding  MUSCLOSKELETAL: no clubbing or cyanosis of digits; no joint swelling or tenderness to palpation  PSYCH: A+O x0    LABS:                        11.0   10.18 )-----------( 95       ( 17 Feb 2025 11:37 )             34.5     02-17    141  |  106  |  34[H]  ----------------------------<  204[H]  3.9   |  27  |  1.01    Ca    8.9      17 Feb 2025 11:37  Phos  3.4     02-17  Mg     2.2     02-17            Urinalysis Basic - ( 17 Feb 2025 11:37 )    Color: x / Appearance: x / SG: x / pH: x  Gluc: 204 mg/dL / Ketone: x  / Bili: x / Urobili: x   Blood: x / Protein: x / Nitrite: x   Leuk Esterase: x / RBC: x / WBC x   Sq Epi: x / Non Sq Epi: x / Bacteria: x        Culture - Blood (collected 16 Feb 2025 09:30)  Source: .Blood BLOOD  Preliminary Report (17 Feb 2025 15:01):    No growth at 24 hours    Culture - Blood (collected 16 Feb 2025 09:00)  Source: .Blood BLOOD  Preliminary Report (17 Feb 2025 15:01):    No growth at 24 hours        RADIOLOGY & ADDITIONAL TESTS:  Results Reviewed:   Imaging Personally Reviewed:  Electrocardiogram Personally Reviewed:    COORDINATION OF CARE:  Care Discussed with Consultants/Other Providers [Y/N]:  Prior or Outpatient Records Reviewed [Y/N]:

## 2025-02-17 NOTE — PROGRESS NOTE ADULT - PROBLEM SELECTOR PLAN 7
Pt with GGO on imaging   Pt with leukocytosis and hypoxia sating 92% on 2L NC -> now on room air   s/p 5 days of ceftriaxone for pna, now on ertapenem for bacteremia   Blood Clx NGTD

## 2025-02-17 NOTE — PROGRESS NOTE ADULT - PROBLEM SELECTOR PLAN 1
Patient febrile to 100.9 on 2/14 with tachycardia and hypotension concerning for sepsis  - BCx 2/14 growing ESBL Ecoli  - switched abx to ertapenem  - repeat BCx sent  - trend lactate  - f/u ID recs

## 2025-02-17 NOTE — PROGRESS NOTE ADULT - PROBLEM SELECTOR PLAN 6
Hypernatremia resolved   Likely due to poor oral intake as patient has decreased po due to mental status   Increased free water pushes with tube feeds   Continue to monitor

## 2025-02-17 NOTE — PROGRESS NOTE ADULT - ASSESSMENT
84M w HTN, HLD, DM2, BPH, AF presented to the ED brought in by EMS for evaluation of fall.  Patient is Cantonese speaking, poor historian, could not verbalize what has happened. As per daughter patient has had more frequent falls recently, fell while walking to the bathroom.  Patient unable to provide any other history. In the ED VSS except for oxygen saturation of 91%. Labs notable for W13.4, VBG lac 8.1, and pan scan findings notable for acute moderate burst fracture of L1 with mild bony retropulsion, acute mild compression fracture of L4, and an acute nondisplaced L 6th rib fracture. Adm 2/6) ID now called 2/16 with Bacteremia    ESBL E. coli Bacteremia  Culture - Blood (02.14.25 @ 11:45) -  ESBL: Detec -  Escherichia coli: Detec -  CTX-M Resistance Marker: Detec  leukocytosis resolved, afebrile   no clear source but possible , if any worsening, can consider chest, abd, pelvis CT w contrast if c/w GOC  repeat blood cultures collected 2/16 am to check for clearance, in process   2/16 UA noted with pyuria, urine culture ordered earlier in am-- now in process   Ertapenem started 2/15-continue for now  Monitor temps/WBC  Continue rest of care per primary team     Jay Stanley M.D.  [facility]  Available on [employer] TEAMS - *PREFERRED*  [phone]  After 5pm on weekdays and all day on weekends - please call [phone]    Thank you for consulting us and involving us in the management of this patients case. In addition to reviewing history, imaging, documents, labs, microbiology, took into account antibiotic stewardship, local antibiogram and infection control strategies and potential transmission issues.

## 2025-02-17 NOTE — PROGRESS NOTE ADULT - SUBJECTIVE AND OBJECTIVE BOX
[facility]  MIGDALIA Art Y. Patel, S. Shah, G. Casimir  [phone]  ([phone] - weekdays after 5pm and weekends)    Name: KAITLIN CALDERON  Age/Gender: 84y Male  MRN: [id number]    Interval History:  Patient seen and examined this morning.   Lethargic, unable to obtain ROS.   Notes reviewed  No concerning overnight events  Afebrile   Allergies: No Known Allergies      Objective:  Vitals:   T(F): 97.8 (02-17-25 @ 12:15), Max: 98 (02-17-25 @ 00:05)  HR: 105 (02-17-25 @ 12:15) (73 - 105)  BP: 145/90 (02-17-25 @ 12:15) (118/79 - 145/90)  RR: 20 (02-17-25 @ 12:15) (18 - 20)  SpO2: 97% (02-17-25 @ 12:15) (96% - 100%)  Physical Examination:  General: no acute distress  HEENT: normocephalic, atraumatic  Respiratory: decreased breath sounds b/l   Cardiovascular: S1 and S2 present, normal rate  Gastrointestinal: soft, nontender, nondistended  Extremities: RLE edema, b/l offloading boots  Skin: no visible rash    Laboratory Studies:  CBC:                       11.0   10.18 )-----------( 95       ( 17 Feb 2025 11:37 )             34.5     WBC Trend:  10.18 02-17-25 @ 11:37  14.33 02-16-25 @ 09:56  15.73 02-15-25 @ 16:42  8.03 02-14-25 @ 12:15  1.59 02-14-25 @ 10:00  8.28 02-13-25 @ 10:19  8.38 02-12-25 @ 14:18    CMP: 02-17    141  |  106  |  34[H]  ----------------------------<  204[H]  3.9   |  27  |  1.01    Ca    8.9      17 Feb 2025 11:37  Phos  3.4     02-17  Mg     2.2     02-17      Creatinine: 1.01 mg/dL (02-17-25 @ 11:37)  Creatinine: 1.14 mg/dL (02-16-25 @ 09:56)  Creatinine: 1.25 mg/dL (02-15-25 @ 16:42)  Creatinine: 1.15 mg/dL (02-14-25 @ 10:00)  Creatinine: 1.14 mg/dL (02-13-25 @ 10:17)  Creatinine: 1.09 mg/dL (02-12-25 @ 14:18)  Creatinine: 1.10 mg/dL (02-11-25 @ 06:58)    Microbiology: reviewed   Culture - Blood (collected 02-14-25 @ 12:05)  Source: .Blood BLOOD  Gram Stain (02-15-25 @ 04:29):    Growth in aerobic bottle: Gram Negative Rods    Growth in anaerobic bottle: Gram Negative Rods  Preliminary Report (02-16-25 @ 11:50):    Growth in aerobic and anaerobic bottles: Escherichia coli    See previous culture [id number]    Culture - Blood (collected 02-14-25 @ 11:45)  Source: .Blood BLOOD  Gram Stain (02-15-25 @ 03:07):    Growth in anaerobic bottle: Gram Negative Rods    Growth in aerobic bottle: Gram Negative Rods  Preliminary Report (02-15-25 @ 20:23):    Growth in aerobic and anaerobic bottles: Escherichia coli    Direct identification is available within approximately 3-5    hours either by Blood Panel Multiplexed PCR or Direct    MALDI-TOF. Details: [email or web address]  Organism: Blood Culture PCR (02-15-25 @ 04:38)  Organism: Blood Culture PCR (02-15-25 @ 04:38)      Method Type: PCR      -  Escherichia coli: Detec      -  ESBL: Detec      -  CTX-M Resistance Marker: Detec    Culture - Blood (collected 02-06-25 @ 07:48)  Source: .Blood BLOOD  Final Report (02-11-25 @ 14:00):    No growth at 5 days    Culture - Blood (collected 02-06-25 @ 07:42)  Source: .Blood BLOOD  Final Report (02-11-25 @ 14:00):    No growth at 5 days    Culture - Urine (collected 02-05-25 @ 20:47)  Source: Clean Catch Clean Catch (Midstream)  Final Report (02-06-25 @ 21:50):    No growth    Radiology: reviewed     Medications:  acetaminophen     Tablet .. 650 milliGRAM(s) Oral every 6 hours PRN  albuterol/ipratropium for Nebulization 3 milliLiter(s) Nebulizer every 6 hours PRN  atorvastatin 20 milliGRAM(s) Oral at bedtime  chlorhexidine 2% Cloths 1 Application(s) Topical <User Schedule>  doxazosin 1 milliGRAM(s) Oral at bedtime  ertapenem  IVPB 1000 milliGRAM(s) IV Intermittent every 24 hours  finasteride 5 milliGRAM(s) Oral daily  heparin   Injectable 5000 Unit(s) SubCutaneous every 8 hours  insulin lispro (ADMELOG) corrective regimen sliding scale   SubCutaneous every 6 hours  lidocaine   4% Patch 1 Patch Transdermal every 24 hours  midodrine. 10 milliGRAM(s) Oral every 8 hours  OLANZapine Injectable 2.5 milliGRAM(s) IntraMuscular every 6 hours PRN  pantoprazole  Injectable 40 milliGRAM(s) IV Push daily  polyethylene glycol 3350 17 Gram(s) Oral daily  senna 2 Tablet(s) Oral at bedtime    Current Antimicrobials:  ertapenem  IVPB 1000 milliGRAM(s) IV Intermittent every 24 hours    Prior/Completed Antimicrobials:  azithromycin  IVPB  azithromycin  IVPB  cefTRIAXone   IVPB  cefTRIAXone   IVPB  piperacillin/tazobactam IVPB.  piperacillin/tazobactam IVPB.-  vancomycin  IVPB

## 2025-02-17 NOTE — PROGRESS NOTE ADULT - PROBLEM SELECTOR PLAN 2
Patient with acute hypoxic respiratory failure requiring HFNC, likely in the setting of aspiration, weaned to 6L NC, now on NRB  - continue abx above  - wean down O2 as tolerated  - CTA chest ordered to assess pna and r/o PE

## 2025-02-18 NOTE — PROGRESS NOTE ADULT - SUBJECTIVE AND OBJECTIVE BOX
[facility]  MIGDALIA Art Y. Patel, S. Shah, G. Casimir  [phone]  ([phone] - weekdays after 5pm and weekends)    Name: KAITLIN CALDERON  Age/Gender: 84y Male  MRN: [id number]    Interval History:  Patient seen and examined this morning.   Currently on HFNC, unable to obtain ROS.   Notes reviewed, overnight events noted.   Afebrile.   Allergies: No Known Allergies      Objective:  Vitals:   T(F): 97.7 (02-18-25 @ 08:00), Max: 97.8 (02-17-25 @ 12:15)  HR: 101 (02-18-25 @ 08:00) (89 - 114)  BP: 114/76 (02-18-25 @ 08:00) (114/76 - 145/90)  RR: 18 (02-18-25 @ 08:00) (18 - 20)  SpO2: 100% (02-18-25 @ 08:00) (90% - 100%)  Physical Examination:  General: no acute distress, HFNC  HEENT: normocephalic, atraumatic  Respiratory: decreased breath sounds b/l   Cardiovascular: S1 and S2 present, tachycardia   Gastrointestinal: soft, nontender, nondistended  Extremities: RLE edema, offloading boots  Skin: no visible rash    Laboratory Studies:  CBC:                       12.7   12.60 )-----------( 92       ( 18 Feb 2025 06:22 )             39.8     WBC Trend:  12.60 02-18-25 @ 06:22  10.18 02-17-25 @ 11:37  14.33 02-16-25 @ 09:56  15.73 02-15-25 @ 16:42  8.03 02-14-25 @ 12:15  1.59 02-14-25 @ 10:00  8.28 02-13-25 @ 10:19  8.38 02-12-25 @ 14:18    CMP: 02-18    142  |  107  |  30[H]  ----------------------------<  259[H]  4.4   |  24  |  0.99    Ca    9.3      18 Feb 2025 06:21  Phos  4.2     02-18  Mg     2.1     02-18      Creatinine: 0.99 mg/dL (02-18-25 @ 06:21)  Creatinine: 1.01 mg/dL (02-17-25 @ 11:37)  Creatinine: 1.14 mg/dL (02-16-25 @ 09:56)  Creatinine: 1.25 mg/dL (02-15-25 @ 16:42)  Creatinine: 1.15 mg/dL (02-14-25 @ 10:00)  Creatinine: 1.14 mg/dL (02-13-25 @ 10:17)  Creatinine: 1.09 mg/dL (02-12-25 @ 14:18)    Microbiology: reviewed   Culture - Blood (collected 02-16-25 @ 09:30)  Source: .Blood BLOOD  Preliminary Report (02-17-25 @ 15:01):    No growth at 24 hours    Culture - Blood (collected 02-16-25 @ 09:00)  Source: .Blood BLOOD  Preliminary Report (02-17-25 @ 15:01):    No growth at 24 hours    Culture - Blood (collected 02-14-25 @ 12:05)  Source: .Blood BLOOD  Gram Stain (02-15-25 @ 04:29):    Growth in aerobic bottle: Gram Negative Rods    Growth in anaerobic bottle: Gram Negative Rods  Preliminary Report (02-16-25 @ 11:50):    Growth in aerobic and anaerobic bottles: Escherichia coli    See previous culture [id number]    Culture - Blood (collected 02-14-25 @ 11:45)  Source: .Blood BLOOD  Gram Stain (02-15-25 @ 03:07):    Growth in anaerobic bottle: Gram Negative Rods    Growth in aerobic bottle: Gram Negative Rods  Preliminary Report (02-15-25 @ 20:23):    Growth in aerobic and anaerobic bottles: Escherichia coli    Direct identification is available within approximately 3-5    hours either by Blood Panel Multiplexed PCR or Direct    MALDI-TOF. Details: [email or web address]  Organism: Blood Culture PCR (02-15-25 @ 04:38)  Organism: Blood Culture PCR (02-15-25 @ 04:38)      Method Type: PCR      -  Escherichia coli: Detec      -  ESBL: Detec      -  CTX-M Resistance Marker: Detec    Culture - Blood (collected 02-06-25 @ 07:48)  Source: .Blood BLOOD  Final Report (02-11-25 @ 14:00):    No growth at 5 days    Culture - Blood (collected 02-06-25 @ 07:42)  Source: .Blood BLOOD  Final Report (02-11-25 @ 14:00):    No growth at 5 days    Culture - Urine (collected 02-05-25 @ 20:47)  Source: Clean Catch Clean Catch (Midstream)  Final Report (02-06-25 @ 21:50):    No growth    Radiology: reviewed     Medications:  acetaminophen     Tablet .. 650 milliGRAM(s) Oral every 6 hours PRN  albuterol/ipratropium for Nebulization 3 milliLiter(s) Nebulizer every 6 hours PRN  atorvastatin 20 milliGRAM(s) Oral at bedtime  chlorhexidine 2% Cloths 1 Application(s) Topical <User Schedule>  doxazosin 1 milliGRAM(s) Oral at bedtime  ertapenem  IVPB 1000 milliGRAM(s) IV Intermittent every 24 hours  finasteride 5 milliGRAM(s) Oral daily  heparin   Injectable 5000 Unit(s) SubCutaneous every 8 hours  insulin lispro (ADMELOG) corrective regimen sliding scale   SubCutaneous every 6 hours  lidocaine   4% Patch 1 Patch Transdermal every 24 hours  midodrine. 10 milliGRAM(s) Oral every 8 hours  OLANZapine Injectable 2.5 milliGRAM(s) IntraMuscular every 6 hours PRN  pantoprazole  Injectable 40 milliGRAM(s) IV Push daily  polyethylene glycol 3350 17 Gram(s) Oral daily  senna 2 Tablet(s) Oral at bedtime    Current Antimicrobials:  ertapenem  IVPB 1000 milliGRAM(s) IV Intermittent every 24 hours    Prior/Completed Antimicrobials:  azithromycin  IVPB  azithromycin  IVPB  cefTRIAXone   IVPB  cefTRIAXone   IVPB  piperacillin/tazobactam IVPB.  piperacillin/tazobactam IVPB.-  vancomycin  IVPB

## 2025-02-18 NOTE — PROGRESS NOTE ADULT - SUBJECTIVE AND OBJECTIVE BOX
[facility] Division of Hospital Medicine  Torri Caba MD  Available via MS Teams    SUBJECTIVE / OVERNIGHT EVENTS: Patient seen and examined at bedside.    ADDITIONAL REVIEW OF SYSTEMS:     MEDICATIONS  (STANDING):  chlorhexidine 2% Cloths 1 Application(s) Topical <User Schedule>  ertapenem  IVPB 1000 milliGRAM(s) IV Intermittent every 24 hours  lidocaine   4% Patch 1 Patch Transdermal every 24 hours  pantoprazole  Injectable 40 milliGRAM(s) IV Push daily    MEDICATIONS  (PRN):  acetaminophen     Tablet .. 650 milliGRAM(s) Oral every 6 hours PRN Mild Pain (1 - 3)  albuterol/ipratropium for Nebulization 3 milliLiter(s) Nebulizer every 6 hours PRN Shortness of Breath and/or Wheezing  glycopyrrolate Injectable 0.4 milliGRAM(s) IV Push every 6 hours PRN secretions  HYDROmorphone  Injectable 0.3 milliGRAM(s) IV Push every 2 hours PRN dyspnea, RR>22, increased work of breathing  HYDROmorphone  Injectable 0.3 milliGRAM(s) IV Push every 2 hours PRN Severe Pain (7 - 10)  LORazepam   Injectable 0.5 milliGRAM(s) IV Push every 4 hours PRN Anxiety/agitation      I&O's Summary    17 Feb 2025 07:01  -  18 Feb 2025 07:00  --------------------------------------------------------  IN: 0 mL / OUT: 1350 mL / NET: -1350 mL        PHYSICAL EXAM:  Vital Signs Last 24 Hrs  T(C): 36.5 (18 Feb 2025 13:37), Max: 36.5 (18 Feb 2025 08:00)  T(F): 97.7 (18 Feb 2025 13:37), Max: 97.7 (18 Feb 2025 08:00)  HR: 93 (18 Feb 2025 13:37) (89 - 114)  BP: 127/86 (18 Feb 2025 13:37) (114/76 - 133/80)  BP(mean): --  RR: 18 (18 Feb 2025 13:37) (18 - 20)  SpO2: 100% (18 Feb 2025 13:37) (90% - 100%)    Parameters below as of 18 Feb 2025 13:37  Patient On (Oxygen Delivery Method): room air      CONSTITUTIONAL: NAD, well-developed, well-groomed  EYES: PERRLA; conjunctiva and sclera clear  ENMT: Moist oral mucosa, no pharyngeal injection or exudates; normal dentition  NECK: Supple, no palpable masses; no thyromegaly  RESPIRATORY: Normal respiratory effort; lungs are clear to auscultation bilaterally  CARDIOVASCULAR: Regular rate and rhythm, normal S1 and S2, no murmur/rub/gallop; No lower extremity edema; Peripheral pulses are 2+ bilaterally  ABDOMEN: Nontender to palpation, normoactive bowel sounds, no rebound/guarding; No hepatosplenomegaly  MUSCULOSKELETAL:   no clubbing or cyanosis of digits; no joint swelling or tenderness to palpation  PSYCH: A+O to person, place, and time; affect appropriate  NEUROLOGY: CN 2-12 are intact and symmetric; no gross sensory deficits   SKIN: No rashes; no palpable lesions    LABS:                        12.7   12.60 )-----------( 92       ( 18 Feb 2025 06:22 )             39.8     02-18    142  |  107  |  30[H]  ----------------------------<  259[H]  4.4   |  24  |  0.99    Ca    9.3      18 Feb 2025 06:21  Phos  4.2     02-18  Mg     2.1     02-18            Urinalysis Basic - ( 18 Feb 2025 06:21 )    Color: x / Appearance: x / SG: x / pH: x  Gluc: 259 mg/dL / Ketone: x  / Bili: x / Urobili: x   Blood: x / Protein: x / Nitrite: x   Leuk Esterase: x / RBC: x / WBC x   Sq Epi: x / Non Sq Epi: x / Bacteria: x        Culture - Urine (collected 17 Feb 2025 11:06)  Source: Clean Catch Clean Catch (Midstream)  Final Report (18 Feb 2025 12:34):    <10,000 CFU/mL Normal Urogenital Betty    Culture - Blood (collected 16 Feb 2025 09:30)  Source: .Blood BLOOD  Preliminary Report (17 Feb 2025 15:01):    No growth at 24 hours    Culture - Blood (collected 16 Feb 2025 09:00)  Source: .Blood BLOOD  Preliminary Report (17 Feb 2025 15:01):    No growth at 24 hours          RADIOLOGY & ADDITIONAL TESTS:  New Results Reviewed Today:   New Imaging Personally Reviewed Today:  New Electrocardiogram Personally Reviewed Today:  Prior or Outpatient Records Reviewed Today:    COMMUNICATION:  Care Discussed with Consultants/Other Providers and Details of Discussion:  Discussions with Patient/Family:  PCP Communication:     [facility] Division of Hospital Medicine  Torri Caba MD  Available via MS Teams    SUBJECTIVE / OVERNIGHT EVENTS: Patient seen and examined at bedside. Patient overnight had increased oxygen demand, poor historian, remains in critical condition.      ADDITIONAL REVIEW OF SYSTEMS:  n/a     MEDICATIONS  (STANDING):  chlorhexidine 2% Cloths 1 Application(s) Topical <User Schedule>  ertapenem  IVPB 1000 milliGRAM(s) IV Intermittent every 24 hours  lidocaine   4% Patch 1 Patch Transdermal every 24 hours  pantoprazole  Injectable 40 milliGRAM(s) IV Push daily    MEDICATIONS  (PRN):  acetaminophen     Tablet .. 650 milliGRAM(s) Oral every 6 hours PRN Mild Pain (1 - 3)  albuterol/ipratropium for Nebulization 3 milliLiter(s) Nebulizer every 6 hours PRN Shortness of Breath and/or Wheezing  glycopyrrolate Injectable 0.4 milliGRAM(s) IV Push every 6 hours PRN secretions  HYDROmorphone  Injectable 0.3 milliGRAM(s) IV Push every 2 hours PRN dyspnea, RR>22, increased work of breathing  HYDROmorphone  Injectable 0.3 milliGRAM(s) IV Push every 2 hours PRN Severe Pain (7 - 10)  LORazepam   Injectable 0.5 milliGRAM(s) IV Push every 4 hours PRN Anxiety/agitation      I&O's Summary    17 Feb 2025 07:01  -  18 Feb 2025 07:00  --------------------------------------------------------  IN: 0 mL / OUT: 1350 mL / NET: -1350 mL        PHYSICAL EXAM:  Vital Signs Last 24 Hrs  T(C): 36.5 (18 Feb 2025 13:37), Max: 36.5 (18 Feb 2025 08:00)  T(F): 97.7 (18 Feb 2025 13:37), Max: 97.7 (18 Feb 2025 08:00)  HR: 93 (18 Feb 2025 13:37) (89 - 114)  BP: 127/86 (18 Feb 2025 13:37) (114/76 - 133/80)  BP(mean): --  RR: 18 (18 Feb 2025 13:37) (18 - 20)  SpO2: 100% (18 Feb 2025 13:37) (90% - 100%)    Parameters below as of 18 Feb 2025 13:37  Patient On (Oxygen Delivery Method): room air      CONSTITUTIONAL:frail elderly male   RESPIRATORY: coarse breath sounds   CARDIOVASCULAR: Regular rate and rhythm  ABDOMEN: Nontender to palpation,  PSYCH: A+Ox0      LABS:                        12.7   12.60 )-----------( 92       ( 18 Feb 2025 06:22 )             39.8     02-18    142  |  107  |  30[H]  ----------------------------<  259[H]  4.4   |  24  |  0.99    Ca    9.3      18 Feb 2025 06:21  Phos  4.2     02-18  Mg     2.1     02-18            Urinalysis Basic - ( 18 Feb 2025 06:21 )    Color: x / Appearance: x / SG: x / pH: x  Gluc: 259 mg/dL / Ketone: x  / Bili: x / Urobili: x   Blood: x / Protein: x / Nitrite: x   Leuk Esterase: x / RBC: x / WBC x   Sq Epi: x / Non Sq Epi: x / Bacteria: x        Culture - Urine (collected 17 Feb 2025 11:06)  Source: Clean Catch Clean Catch (Midstream)  Final Report (18 Feb 2025 12:34):    <10,000 CFU/mL Normal Urogenital Betty    Culture - Blood (collected 16 Feb 2025 09:30)  Source: .Blood BLOOD  Preliminary Report (17 Feb 2025 15:01):    No growth at 24 hours    Culture - Blood (collected 16 Feb 2025 09:00)  Source: .Blood BLOOD  Preliminary Report (17 Feb 2025 15:01):    No growth at 24 hours          RADIOLOGY & ADDITIONAL TESTS:  New Results Reviewed Today:   < from: CT Angio Chest PE Protocol w/ IV Cont (02.18.25 @ 11:19) >  IMPRESSION:  No pulmonary embolism.  Mucous plug in right main stem bronchus with complete   collapse/atelectasis of right middle lobe and near complete atelectasis   of left lower lobe. Airway associated groundglass opacities in bilateral   lungs, likely due to aspiration pneumonia. Small bibasilar pleural   effusions  Ascending thoracic aorta ectasia      < end of copied text >    New Imaging Personally Reviewed Today:  New Electrocardiogram Personally Reviewed Today:  Prior or Outpatient Records Reviewed Today:    COMMUNICATION:  Care Discussed with Consultants/Other Providers and Details of Discussion:  Discussions with Patient/Family: Family at bedside, see GOC conversation   PCP Communication:

## 2025-02-18 NOTE — PROGRESS NOTE ADULT - PROBLEM SELECTOR PROBLEM 2
Dementia
Acute hypoxic respiratory failure
Dementia
Dementia
Acute hypoxic respiratory failure
Sepsis
Acute hypoxic respiratory failure
Dementia
Acute hypoxic respiratory failure

## 2025-02-18 NOTE — PROGRESS NOTE ADULT - PROBLEM SELECTOR PROBLEM 11
BPH (benign prostatic hyperplasia)
Chronic atrial fibrillation
HTN (hypertension)
Chronic atrial fibrillation
HTN (hypertension)
HTN (hypertension)
Chronic atrial fibrillation
HTN (hypertension)
HTN (hypertension)
Prophylactic measure
Chronic atrial fibrillation

## 2025-02-18 NOTE — PROGRESS NOTE ADULT - PROBLEM SELECTOR PROBLEM 8
Addended by: Fredy Maya on: 1/14/2023 07:36 AM     Modules accepted: Level of Service
HTN (hypertension)
HTN (hypertension)
Fracture of one rib
Fracture of one rib
Hypercholesterolemia
Fracture of one rib
HTN (hypertension)
DM (diabetes mellitus)
HTN (hypertension)
Fracture of one rib
Fracture of one rib

## 2025-02-18 NOTE — PROGRESS NOTE ADULT - PROBLEM SELECTOR PLAN 6
REFILL REQUEST    Requested Medication(s):  Tamoxifen    Last office visit:  3/11/2020    Next follow up scheduled:  6/10/2020    Refill Auth sent to Dr. Hood to sign.    CHUCK AllenN, RN, OCN  Oncology Care Coordinator  [facility]     - Pt will transfer to PCU when a bed opens up  - urinary traore insertion for patient comfort     Jane Chu MD  [facility] Team Consults  Please call if we can be of assistance, [phone]

## 2025-02-18 NOTE — PROGRESS NOTE ADULT - PROBLEM SELECTOR PLAN 10
Continue Lipitor 20mg
SS  Monitor FS
SS  Monitor FS
Continue Flomax 0.8mg QHS, Proscar 5mg daily
SS  Monitor FS
SS  Monitor FS
Not on AC  Started on IV lopressor in the SICU, continue for now
Continue Flomax 0.8mg QHS, Proscar 5mg daily
SS  Monitor FS

## 2025-02-18 NOTE — PROGRESS NOTE ADULT - PROBLEM SELECTOR PLAN 4
Hypernatremia  Likely due to poor oral intake as patient has decreased po due to mental status   Increase free water pushes with tube feeds   Continue to monitor
Pt with GGO on imaging   Pt with leukocytosis and hypoxia sating 92% on 2L NC -> now on room air   Continue Ceftraixone, d/c azithromycin as legionella is negative. Consider transitioning to PO Abx on discharge  Blood Clx NGTD
Pt with GGO on imaging   Pt with leukocytosis and hypoxia sating 92% on 2L NC -> now on room air   Start IV Ceftraixone and azithromycin (2/8-). Consider transitioning to PO Abx on discharge  Blood Clx NGTD  Follow-up urine legionella
Pt with GGO on imaging   Pt with leukocytosis and hypoxia sating 92% on 2L NC -> now on room air   Continue Ceftraixone, d/c azithromycin as legionella is negative, last day of abx today  Blood Clx NGTD
At baseline, pt is AAOx2 recognizes and communicates with family   Per family, patient eats soft food/noodles/soup at baseline  Prior hospitalist discussed nutritional GOC with both son and daughter in law, agree for trial of puree diet, understand risk of aspiration. Initiated pureed diet however, patient failed bedside dysphagia screen  Continue NGT and GOC for now
At baseline, pt is AAOx2 recognizes and communicates with family   Per family, patient eats soft food/noodles/soup at baseline  Prior hospitalist discussed nutritional GOC with both son and daughter in law, agree for trial of puree diet, understand risk of aspiration   Initiated pureed diet however, patient failed bedside dysphagia screen  Discussed with palliative, will trial NGT with feeds to see if patient has any recovery in mental status
Pt with GGO on imaging   Pt with leukocytosis and hypoxia sating 92% on 2L NC -> now on room air   Continue Ceftraixone, d/c azithromycin as legionella is negative. Consider transitioning to PO Abx on discharge  Blood Clx NGTD
At baseline, pt is AAOx2 recognizes and communicates with family   Per family, patient eats soft food/noodles/soup at baseline  Prior hospitalist discussed nutritional GOC with both son and daughter in law, agree for trial of puree diet, understand risk of aspiration. Initiated pureed diet however, patient failed bedside dysphagia screen  Continue NGT and GOC for now
At baseline, pt is AAOx2 recognizes and communicates with family   Per family, patient eats soft food/noodles/soup at baseline  Prior hospitalist discussed nutritional GOC with both son and daughter in law, agree for trial of puree diet, understand risk of aspiration. Initiated pureed diet however, patient failed bedside dysphagia screen  Continue NGT and GOC for now
Pt with R 6th rib fracture   Pain control with tylenol and lidocaine patch   Wean off O2   PT eval
At baseline, pt is AAOx2 recognizes and communicates with family   Per family, patient eats soft food/noodles/soup at baseline  Prior hospitalist discussed nutritional GOC with both son and daughter in law, agree for trial of puree diet, understand risk of aspiration. Initiated pureed diet however, patient failed bedside dysphagia screen  Continue NGT and GOC for now
- Order IV dilaudid 0.3 mg q2 prn severe pain
- pt has oxycodone 2.5-5 mg q4 prn ordered for mod-severe pain, however given clinical decompensation this morning after several doses of oxyIR 5 mg used over night, would discontinue this for now as family wishing for continued clinical management with hopes for improvement   - Would advise adding tylenol prn, lidocaine patch prn for pain control for now

## 2025-02-18 NOTE — PROGRESS NOTE ADULT - CONVERSATION DETAILS
Discussion held with pt's 2 sons Je and Jamil Escobar, as well as his dtr in law and the primary team. Medical updates discussed from the weekend, including CTA results from this morning. Discussed that pulmonary can be consulted for tx of mucus plug, however his conditions remain tenuous and that the mucus plug can recur given his debilitated state. I once again counselled that his overall trajectory has worsened since my initial following, and that my concern is he won't make the recovery as hoped and will continue to suffer from the burdens ongoing medical interventions. Our suggestion is to transition the focus of care to comfort and transfer to PCU.    Family allowed some time to privately discuss and decided to transition to full comfort measures. We discussed continuing HFNC as tolerated by the patient, along with de-escalation of NGT, TFs, fingersticks, daily labs, and non-essential medications to optimize comfort. Will order PRNs to treat his work of breathing and any other signs of distress he may demonstrate. At family request, I shared an anticipated prognosis of hours to day. Much support provided to family during this encounter.
This morning I was able to speak to both dtr in law Yulissa (son Je's wife) and son Jamil Escobar. Brief updates provided to the family and they opted for escalation of care to ICU (if required) in order for family to arrive to visit. Code status of DNR/I re-confirmed.     On return visit this afternoon, note that rapid response team determined pt was stable to remain on the floor after receiving midodrine, droxydopa and IVFs. Son Jamil Escobar arrived from out of state to see his father. I discussed the potential etiology for his acute deterioration: aspiration +/- opiate use for pain over night. Advised the deterioration raises concern for further NGT trials and his ability to tolerate more treatment. I advised for transition to focus on comfort and transfer to PCU. Details of the comfort plan of care was discussed today. Jamil Escobar, after discussion with his brother, shared that they are not ready for this transition, and that they wish for patient to be further monitored for now. Should pt demonstrate continued stable conditions, they would want to re-attempt NGT TFs. I advised for removal of opiates in case it was contributing to his deterioration. I provided family with my contact information should they decide for PCU.
Patient seen by S&S, rec nonoral nutrition  Updated son Jamil, explained the risks/benefits of PEG, will opt for pleasure feeds for now and reassess
GOC discussed over the phone with son   Resuscitative measures including CPR and intubation explained and discussed   Son verbalized understanding   Pt made DNR/DNI
Patient was seen at bedside with Palliative, patient's prognosis discussed. Patient had increased oxygen needs overnight, repeat CT chest angio was reviewed, which showed a mucous plug. Palliative Dr. Chu discussed in detail about how patient's new finding further worsens patient's clinical condition. Family was understanding. Given patient's lack of improvement with several failed S/S tests, AAOx0, unable to clear secretions and protect airways, family opted to have patient be in comfort  care. Patient is a PCU candidate, waiting for bed.

## 2025-02-18 NOTE — PROGRESS NOTE ADULT - TIME BILLING
Please note that over 35 minutes of time was spent in care of this patient including  - pre visit preparation  - in person visit  - post visit documentation  - review of imaging  - discussion with colleagues
Symptom assessment and management, supportive counseling, coordination of care
Symptom assessment and management, supportive counseling, coordination of care
- Ordering, reviewing, and interpreting labs, testing, and imaging  - Independently obtaining a review of systems and performing a physical exam  - Reviewing consultant documentation/recommendations in addition to discussing plan of care with consultants  - Counselling and educating patient regarding interpretation of aforementioned items and plan of care
- Ordering, reviewing, and interpreting labs, testing, and imaging  - Independently obtaining a review of systems and performing a physical exam  - Reviewing consultant documentation/recommendations in addition to discussing plan of care with consultants  - Counselling and educating patient and family regarding interpretation of aforementioned items and plan of care
chart reviewing, history taking, physical exam, assessment and documentation, including speaking to specialist/SW/CM regarding the management.

## 2025-02-18 NOTE — PROGRESS NOTE ADULT - SUBJECTIVE AND OBJECTIVE BOX
SUBJECTIVE AND OBJECTIVE  Indication for Geriatrics and Palliative Care Services/INTERVAL HPI: GOC    OVERNIGHT EVENTS: patient seen this morning, remains lethargic with minimal response, note increased work of breathing at rest. Over the weekend, pt was transitioned to NC with desat, requiring HFNC again. CTA chest done this morning showing mucus plug on R lung with collapse. Please see GOC section below for discussion details with pt's 2 sons, dtr in law and primary team.     DNR on chart:DNI  DNI      Allergies    No Known Allergies    Intolerances    MEDICATIONS  (STANDING):  chlorhexidine 2% Cloths 1 Application(s) Topical <User Schedule>  ertapenem  IVPB 1000 milliGRAM(s) IV Intermittent every 24 hours  lidocaine   4% Patch 1 Patch Transdermal every 24 hours  pantoprazole  Injectable 40 milliGRAM(s) IV Push daily    MEDICATIONS  (PRN):  acetaminophen     Tablet .. 650 milliGRAM(s) Oral every 6 hours PRN Mild Pain (1 - 3)  albuterol/ipratropium for Nebulization 3 milliLiter(s) Nebulizer every 6 hours PRN Shortness of Breath and/or Wheezing  glycopyrrolate Injectable 0.4 milliGRAM(s) IV Push every 6 hours PRN secretions  HYDROmorphone  Injectable 0.3 milliGRAM(s) IV Push every 2 hours PRN dyspnea, RR>22, increased work of breathing  HYDROmorphone  Injectable 0.3 milliGRAM(s) IV Push every 2 hours PRN Severe Pain (7 - 10)  LORazepam   Injectable 0.5 milliGRAM(s) IV Push every 4 hours PRN Anxiety/agitation       ITEMS UNCHECKED ARE NOT PRESENT    PRESENT SYMPTOMS: [x ]Unable to self-report - see [ ] CPOT [x ] PAINADS [x ] RDOS  Source if other than patient:  [ ]Family   [ ]Team       PCSSQ[Palliative Care Spiritual Screening Question]   Severity (0-10):  Score of 4 or > indicate consideration of Chaplaincy referral.  Chaplaincy Referral: [ ] yes [ ] refused [ ] following [ x] Deferred     Caregiver [place]? : [ ] yes [x ] no [ ] Deferred [ ] Declined             Social work referral [ ] Patient & Family Centered Care Referral [ ]     Anticipatory Grief present?:  [x ] yes [ ] no  [ ] Deferred                  Social work referral [ ] Chaplaincy Referral[ ]    Other Symptoms:  [x ]All other review of systems negative - Unable to obtain due to mental status     Palliative Performance Status Version 2:     10    %      http://npcrc.org/files/news/palliative_performance_scale_[email or web address].pdf    PHYSICAL EXAM:  Vital Signs Last 24 Hrs  T(C): 36.5 (18 Feb 2025 13:37), Max: 36.5 (18 Feb 2025 08:00)  T(F): 97.7 (18 Feb 2025 13:37), Max: 97.7 (18 Feb 2025 08:00)  HR: 93 (18 Feb 2025 13:37) (89 - 114)  BP: 127/86 (18 Feb 2025 13:37) (114/76 - 133/80)  BP(mean): --  RR: 18 (18 Feb 2025 13:37) (18 - 20)  SpO2: 100% (18 Feb 2025 13:37) (90% - 100%)    Parameters below as of 18 Feb 2025 13:37  Patient On (Oxygen Delivery Method): room air    I&O's Summary    17 Feb 2025 07:01  -  18 Feb 2025 07:00  --------------------------------------------------------  IN: 0 mL / OUT: 1350 mL / NET: -1350 mL      GENERAL: [ ]Cachexia    [ ]Alert  [ ]Oriented x   [x ]Lethargic  [ ]Unarousable  [ ]Verbal  [x ]Non-Verbal  Behavioral:   [ ]Anxiety  [ ]Delirium [ ]Agitation [ ]Other  HEENT:  [ ]Normal   [ ]Dry mouth   [ ]ET Tube/Trach  [ ]Oral lesions  PULMONARY:   [ ]Clear [ ]Tachypnea  [ ]Audible excessive secretions [x] increased WOB  [ ]Rhonchi        [ ]Right [ ]Left [ ]Bilateral  [ ]Crackles        [ ]Right [ ]Left [ ]Bilateral  [ ]Wheezing     [ ]Right [ ]Left [ ]Bilateral  [ x]Diminished BS [ ] Right [ ]Left [ x]Bilateral  CARDIOVASCULAR:    [ ]Regular [ ]Irregular [ x]Tachy  [ ]Hayes [ ]Murmur [ ]Other  GASTROINTESTINAL:  [ x]Soft  [ ]Distended   [x ]+BS  [ ]Non tender [ ]Tender  [ ]Other [ ]PEG [ ]OGT/ NGT   Last BM:   GENITOURINARY:  [ ]Normal [x ]Incontinent   [ ]Oliguria/Anuria   [ ]Allen  MUSCULOSKELETAL:   [ ]Normal   [ ]Weakness  [x ]Bed/Wheelchair bound [ ]Edema  NEUROLOGIC:   [ ]No focal deficits  [x ] Cognitive impairment  [ ] Dysphagia [ ]Dysarthria [ ] Paresis [ ]Other   SKIN:   [ ]Normal  [ ]Rash  [ ]Other  [ ]Pressure ulcer(s) [ ]y [ ]n present on admission    CRITICAL CARE:  [ ]Shock Present  [ ]Septic [ ]Cardiogenic [ ]Neurologic [ ]Hypovolemic  [ ]Vasopressors [ ]Inotropes  [ ]Respiratory failure present [ ]Mechanical Ventilation [ ]Non-invasive ventilatory support [ ]High-Flow   [ ]Acute  [ ]Chronic [ ]Hypoxic  [ ]Hypercarbic [ ]Other  [ ]Other organ failure     LABS:                                   12.7   12.60 )-----------( 92       ( 18 Feb 2025 06:22 )             39.8     02-18    142  |  107  |  30[H]  ----------------------------<  259[H]  4.4   |  24  |  0.99    Ca    9.3      18 Feb 2025 06:21  Phos  4.2     02-18  Mg     2.1     02-18      RADIOLOGY & ADDITIONAL STUDIES: reviewed     Protein Calorie Malnutrition Present: [ ]mild [ ]moderate [ ]severe [ ]underweight [ ]morbid obesity  https://www.andeal.org/vault/2440/web/files/ONC/Table_Clinical%20Characteristics%20to%20Document%20Malnutrition-White%20JV%20et%20al%202012.pdf      Weight (kg): 61.7 (02-06-25 @ 06:40)    [ ]PPSV2 < or = 30%  [ ]significant weight loss [ ]poor nutritional intake [ ]anasarca[ ]Artificial Nutrition    Other REFERRALS:  [ ]Hospice  [ ]Child Life  [ ]Social Work  [ ]Case management [ ]Holistic Therapy    SUBJECTIVE AND OBJECTIVE  Indication for Geriatrics and Palliative Care Services/INTERVAL HPI: GOC    OVERNIGHT EVENTS: patient seen this morning, remains lethargic with minimal response, note increased work of breathing at rest. Over the weekend, pt was transitioned to NC with desat, requiring HFNC again. CTA chest done this morning showing mucus plug on R lung with collapse. Please see GOC section below for discussion details with pt's 2 sons, dtr in law and primary team.     DNR on chart: yes  DNI      Allergies    No Known Allergies    Intolerances    MEDICATIONS  (STANDING):  chlorhexidine 2% Cloths 1 Application(s) Topical <User Schedule>  ertapenem  IVPB 1000 milliGRAM(s) IV Intermittent every 24 hours  lidocaine   4% Patch 1 Patch Transdermal every 24 hours  pantoprazole  Injectable 40 milliGRAM(s) IV Push daily    MEDICATIONS  (PRN):  acetaminophen     Tablet .. 650 milliGRAM(s) Oral every 6 hours PRN Mild Pain (1 - 3)  albuterol/ipratropium for Nebulization 3 milliLiter(s) Nebulizer every 6 hours PRN Shortness of Breath and/or Wheezing  glycopyrrolate Injectable 0.4 milliGRAM(s) IV Push every 6 hours PRN secretions  HYDROmorphone  Injectable 0.3 milliGRAM(s) IV Push every 2 hours PRN dyspnea, RR>22, increased work of breathing  HYDROmorphone  Injectable 0.3 milliGRAM(s) IV Push every 2 hours PRN Severe Pain (7 - 10)  LORazepam   Injectable 0.5 milliGRAM(s) IV Push every 4 hours PRN Anxiety/agitation       ITEMS UNCHECKED ARE NOT PRESENT    PRESENT SYMPTOMS: [x ]Unable to self-report - see [ ] CPOT [x ] PAINADS [x ] RDOS  Source if other than patient:  [ ]Family   [ ]Team       PCSSQ[Palliative Care Spiritual Screening Question]   Severity (0-10):  Score of 4 or > indicate consideration of Chaplaincy referral.  Chaplaincy Referral: [ ] yes [ ] refused [ ] following [ x] Deferred     Caregiver [place]? : [ ] yes [x ] no [ ] Deferred [ ] Declined             Social work referral [ ] Patient & Family Centered Care Referral [ ]     Anticipatory Grief present?:  [x ] yes [ ] no  [ ] Deferred                  Social work referral [ ] Chaplaincy Referral[ ]    Other Symptoms:  [x ]All other review of systems negative - Unable to obtain due to mental status     Palliative Performance Status Version 2:     10    %      [email or web address]    PHYSICAL EXAM:  Vital Signs Last 24 Hrs  T(C): 36.5 (18 Feb 2025 13:37), Max: 36.5 (18 Feb 2025 08:00)  T(F): 97.7 (18 Feb 2025 13:37), Max: 97.7 (18 Feb 2025 08:00)  HR: 93 (18 Feb 2025 13:37) (89 - 114)  BP: 127/86 (18 Feb 2025 13:37) (114/76 - 133/80)  BP(mean): --  RR: 18 (18 Feb 2025 13:37) (18 - 20)  SpO2: 100% (18 Feb 2025 13:37) (90% - 100%)    Parameters below as of 18 Feb 2025 13:37  Patient On (Oxygen Delivery Method): room air    I&O's Summary    17 Feb 2025 07:01  -  18 Feb 2025 07:00  --------------------------------------------------------  IN: 0 mL / OUT: 1350 mL / NET: -1350 mL      GENERAL: [ ]Cachexia    [ ]Alert  [ ]Oriented x   [x ]Lethargic  [ ]Unarousable  [ ]Verbal  [x ]Non-Verbal  Behavioral:   [ ]Anxiety  [ ]Delirium [ ]Agitation [ ]Other  HEENT:  [ ]Normal   [ ]Dry mouth   [ ]ET Tube/Trach  [ ]Oral lesions  PULMONARY:   [ ]Clear [ ]Tachypnea  [ ]Audible excessive secretions [x] increased WOB  [ ]Rhonchi        [ ]Right [ ]Left [ ]Bilateral  [ ]Crackles        [ ]Right [ ]Left [ ]Bilateral  [ ]Wheezing     [ ]Right [ ]Left [ ]Bilateral  [ x]Diminished BS [ ] Right [ ]Left [ x]Bilateral  CARDIOVASCULAR:    [ ]Regular [ ]Irregular [ x]Tachy  [ ]Hayes [ ]Murmur [ ]Other  GASTROINTESTINAL:  [ x]Soft  [ ]Distended   [x ]+BS  [ ]Non tender [ ]Tender  [ ]Other [ ]PEG [ ]OGT/ NGT   Last BM:   GENITOURINARY:  [ ]Normal [x ]Incontinent   [ ]Oliguria/Anuria   [ ]Allen  MUSCULOSKELETAL:   [ ]Normal   [ ]Weakness  [x ]Bed/Wheelchair bound [ ]Edema  NEUROLOGIC:   [ ]No focal deficits  [x ] Cognitive impairment  [ ] Dysphagia [ ]Dysarthria [ ] Paresis [ ]Other   SKIN:   [ ]Normal  [ ]Rash  [ ]Other  [ ]Pressure ulcer(s) [ ]y [ ]n present on admission    CRITICAL CARE:  [ ]Shock Present  [ ]Septic [ ]Cardiogenic [ ]Neurologic [ ]Hypovolemic  [ ]Vasopressors [ ]Inotropes  [ ]Respiratory failure present [ ]Mechanical Ventilation [ ]Non-invasive ventilatory support [ ]High-Flow   [ ]Acute  [ ]Chronic [ ]Hypoxic  [ ]Hypercarbic [ ]Other  [ ]Other organ failure     LABS:                                   12.7   12.60 )-----------( 92       ( 18 Feb 2025 06:22 )             39.8     02-18    142  |  107  |  30[H]  ----------------------------<  259[H]  4.4   |  24  |  0.99    Ca    9.3      18 Feb 2025 06:21  Phos  4.2     02-18  Mg     2.1     02-18      RADIOLOGY & ADDITIONAL STUDIES: reviewed     Protein Calorie Malnutrition Present: [ ]mild [ ]moderate [ ]severe [ ]underweight [ ]morbid obesity  https://www.andeal.org/vault/2440/web/files/ONC/Table_Clinical%20Characteristics%20to%20Document%20Malnutrition-White%20JV%20et%20al%202012.pdf      Weight (kg): 61.7 (02-06-25 @ 06:40)    [ ]PPSV2 < or = 30%  [ ]significant weight loss [ ]poor nutritional intake [ ]anasarca[ ]Artificial Nutrition    Other REFERRALS:  [ ]Hospice  [ ]Child Life  [ ]Social Work  [ ]Case management [ ]Holistic Therapy

## 2025-02-18 NOTE — PROGRESS NOTE ADULT - ASSESSMENT
84M PMHx of of HTN/HLD, DM2, BPH, AF presents to the ED brought in by EMS s/p fall. Course complicated by sepsis and AHRF 2/2 PNA, now with positive blood cultures 84M PMHx of of HTN/HLD, DM2, BPH, AF presents to the ED brought in by EMS s/p fall. Course complicated by sepsis and AHRF 2/2 PNA, now with positive blood cultures, now comfort care.

## 2025-02-18 NOTE — PROGRESS NOTE ADULT - PROBLEM SELECTOR PLAN 3
Patient intermittently retains urine requiring straight cath, possibly 2/2 bph  - removed traore earlier in the hospitalization  - check bladder scans q8  - straight cath as needed  - continue doxazosin and finasteride
Pt with acute encephalopathy superimposed on dementia in the setting of trauma/fall vs PNA vs retention, now bacteremia  Frequent reassurance and verbal orientation  Promote sleep wake cycle and reorientation as indicated  Minimize use of benzodiazepines, opioids, anticholinergics and other deliriogenic agents whenever possible  Zyprexa PRN Agitation  Mental status waxing and waning
Na 150, also with ANTON  Likely due to poor oral intake as patient has been NPO since admission, failed bedside dysphagia.   Gentle fluids while NPO  Continue to monitor
continue abx as per primary team
Na 150, also with ANTON  Likely due to poor oral intake as patient has been NPO since admission  Gentle fluids for hypernatremia  Continue to monitor
Pt with GGO on imaging   Pt with leukocytosis and hypoxia sating 92% on 2L NC  Start IV ceftraixone and azithromycin   Blood cx ngtd   Check urine legionella   Wean down/off O2
continue abx as per primary team, EOT 2/23  continue HFNC as tolerated  -  IV dilaudid 0.3 mg q2 prn dyspnea, IV glycopyrrolate 0.4 q6 prn secretions
Hypernatremia and ANTON  Likely due to poor oral intake as patient has decreased po due to mental status   Gentle fluids for hypernatremia  Continue to monitor
Pt with acute encephalopathy superimposed on dementia in the setting of trauma/fall vs PNA vs retention  Frequent reassurance and verbal orientation  Monitor for constipation, urinary retention, pain, hunger, thirst etc.  Promote sleep wake cycle and reorientation as indicated  Minimize use of benzodiazepines, opioids, anticholinergics and other deliriogenic agents whenever possible  Zyprexa PRN Agitation  Mental status waxing and waning
Pt with acute encephalopathy superimposed on dementia in the setting of trauma/fall vs PNA vs retention, now bacteremia  Frequent reassurance and verbal orientation  Promote sleep wake cycle and reorientation as indicated  Minimize use of benzodiazepines, opioids, anticholinergics and other deliriogenic agents whenever possible  Zyprexa PRN Agitation  Mental status waxing and waning
Pt with acute encephalopathy superimposed on dementia in the setting of trauma/fall vs PNA vs retention, now bacteremia  Frequent reassurance and verbal orientation  Promote sleep wake cycle and reorientation as indicated  Minimize use of benzodiazepines, opioids, anticholinergics and other deliriogenic agents whenever possible  Zyprexa PRN Agitation  Mental status waxing and waning
Hypernatremia and ANTON  Likely due to poor oral intake as patient has decreased po due to mental status   Gentle fluids for hypernatremia, start free water pushes   Continue to monitor
Pt with acute encephalopathy superimposed on dementia in the setting of trauma/fall vs PNA vs retention, now bacteremia  Frequent reassurance and verbal orientation  Promote sleep wake cycle and reorientation as indicated  Minimize use of benzodiazepines, opioids, anticholinergics and other deliriogenic agents whenever possible  Zyprexa PRN Agitation  Mental status waxing and waning

## 2025-02-18 NOTE — PROGRESS NOTE ADULT - PROBLEM SELECTOR PROBLEM 7
Pneumonia
Pneumonia
DM (diabetes mellitus)
Compression fracture of L1 vertebra
Pneumonia
Pneumonia
HTN (hypertension)
DM (diabetes mellitus)
Pneumonia

## 2025-02-18 NOTE — PROGRESS NOTE ADULT - PROBLEM SELECTOR PLAN 9
Not on any medications at home   BP acceptable
Continue Lipitor 20mg
Continue Lipitor 20mg
C/w TLSO brace when out of bed  Pain control with Tylenol ATC and lidocaine patch    MRI T/L Spine - Per orthopedics, no more orthopedic work up indicated while inpatient. No acute orthopedic intervention indicated at this time. Please have patient FU with Dr. Galicia outpatient 1 week after discharge, call office to make an appointment
Continue Lipitor 20mg
Flomax 0.8  Proscar 5mg
C/w TLSO brace when out of bed  Pain control with Tylenol ATC and lidocaine patch    MRI T/L Spine - Per orthopedics, no more orthopedic work up indicated while inpatient. No acute orthopedic intervention indicated at this time. Please have patient FU with Dr. Galicia outpatient 1 week after discharge, call office to make an appointment
Continue Lipitor 20mg

## 2025-02-18 NOTE — PROGRESS NOTE ADULT - CONVERSATION/DISCUSSION
Diagnosis/Prognosis/MOLST Discussed/Treatment Options
Prognosis/Treatment Options
Diagnosis/Prognosis/MOLST Discussed/Treatment Options
Diagnosis/Prognosis
Prognosis/Palliative Care Referral

## 2025-02-18 NOTE — PROGRESS NOTE ADULT - PROBLEM SELECTOR PROBLEM 10
BPH (benign prostatic hyperplasia)
DM (diabetes mellitus)
BPH (benign prostatic hyperplasia)
Chronic atrial fibrillation
Hypercholesterolemia
DM (diabetes mellitus)
DM (diabetes mellitus)

## 2025-02-18 NOTE — PROGRESS NOTE ADULT - NUTRITIONAL ASSESSMENT
This patient has been assessed with a concern for Malnutrition and has been determined to have a diagnosis/diagnoses of Mild protein-calorie malnutrition and Underweight (BMI < 19).    This patient is being managed with:   Diet NPO with Tube Feed-  Tube Feeding Modality: Nasogastric  Glucerna 1.2 Claudy (GLUCERNARTH)  Total Volume for 24 Hours (mL): 1440  Continuous  Starting Tube Feed Rate {mL per Hour}: 10  Increase Tube Feed Rate by (mL): 10     Every 6 hours  Until Goal Tube Feed Rate (mL per Hour): 60  Tube Feed Duration (in Hours): 24  Tube Feed Start Time: 00:00  Free Water Flush  Bolus   Total Volume per Flush (mL): 250   Frequency: Every 6 Hours    Start Time: 12:00  Free Water Flush Instructions:  defer to team  Entered: Feb 12 2025 11:56AM  
This patient has been assessed with a concern for Malnutrition and has been determined to have a diagnosis/diagnoses of Mild protein-calorie malnutrition and Underweight (BMI < 19).    This patient is being managed with:   Diet Pureed-  Moderately Thick Liquids (MODTHICKLIQS)  Entered: Feb 10 2025  5:40PM  
This patient has been assessed with a concern for Malnutrition and has been determined to have a diagnosis/diagnoses of Mild protein-calorie malnutrition and Underweight (BMI < 19).    This patient is being managed with:   Diet NPO with Tube Feed-  Tube Feeding Modality: Nasogastric  Glucerna 1.2 Claudy (GLUCERNARTH)  Total Volume for 24 Hours (mL): 1440  Continuous  Starting Tube Feed Rate {mL per Hour}: 10  Increase Tube Feed Rate by (mL): 10     Every 6 hours  Until Goal Tube Feed Rate (mL per Hour): 60  Tube Feed Duration (in Hours): 24  Tube Feed Start Time: 00:00  Free Water Flush  Bolus   Total Volume per Flush (mL): 250   Frequency: Every 6 Hours    Start Time: 12:00  Free Water Flush Instructions:  defer to team  Entered: Feb 12 2025 11:56AM  
This patient has been assessed with a concern for Malnutrition and has been determined to have a diagnosis/diagnoses of Mild protein-calorie malnutrition and Underweight (BMI < 19).    This patient is being managed with:   Diet NPO with Tube Feed-  Tube Feeding Modality: Nasogastric  Glucerna 1.2 Claudy (GLUCERNARTH)  Total Volume for 24 Hours (mL): 1440  Continuous  Starting Tube Feed Rate {mL per Hour}: 10  Increase Tube Feed Rate by (mL): 10     Every 6 hours  Until Goal Tube Feed Rate (mL per Hour): 60  Tube Feed Duration (in Hours): 24  Tube Feed Start Time: 00:00  Free Water Flush  Bolus   Total Volume per Flush (mL): 250   Frequency: Every 6 Hours    Start Time: 12:00  Free Water Flush Instructions:  defer to team  Entered: Feb 12 2025 11:56AM  
This patient has been assessed with a concern for Malnutrition and has been determined to have a diagnosis/diagnoses of Mild protein-calorie malnutrition and Underweight (BMI < 19).  
This patient has been assessed with a concern for Malnutrition and has been determined to have a diagnosis/diagnoses of Mild protein-calorie malnutrition and Underweight (BMI < 19).    This patient is being managed with:   Diet NPO with Tube Feed-  Tube Feeding Modality: Nasogastric  Glucerna 1.2 Claudy (GLUCERNARTH)  Total Volume for 24 Hours (mL): 1440  Continuous  Starting Tube Feed Rate {mL per Hour}: 10  Increase Tube Feed Rate by (mL): 10     Every 6 hours  Until Goal Tube Feed Rate (mL per Hour): 60  Tube Feed Duration (in Hours): 24  Tube Feed Start Time: 00:00  Free Water Flush  Bolus   Total Volume per Flush (mL): 250   Frequency: Every 6 Hours    Start Time: 12:00  Free Water Flush Instructions:  defer to team  Entered: Feb 12 2025 11:56AM  
This patient has been assessed with a concern for Malnutrition and has been determined to have a diagnosis/diagnoses of Mild protein-calorie malnutrition and Underweight (BMI < 19).    This patient is being managed with:   Diet NPO with Tube Feed-  Tube Feeding Modality: Nasogastric  Glucerna 1.2 Claudy (GLUCERNARTH)  Total Volume for 24 Hours (mL): 1440  Continuous  Starting Tube Feed Rate {mL per Hour}: 10  Increase Tube Feed Rate by (mL): 10     Every 6 hours  Until Goal Tube Feed Rate (mL per Hour): 60  Tube Feed Duration (in Hours): 24  Tube Feed Start Time: 00:00  Free Water Flush  Bolus   Total Volume per Flush (mL): 250   Frequency: Every 6 Hours    Start Time: 12:00  Free Water Flush Instructions:  defer to team  Entered: Feb 12 2025 11:56AM  
This patient has been assessed with a concern for Malnutrition and has been determined to have a diagnosis/diagnoses of Mild protein-calorie malnutrition and Underweight (BMI < 19).    This patient is being managed with:   Diet NPO-  Entered: Feb 9 2025 12:23PM  
This patient has been assessed with a concern for Malnutrition and has been determined to have a diagnosis/diagnoses of Mild protein-calorie malnutrition and Underweight (BMI < 19).    This patient is being managed with:   Diet NPO with Tube Feed-  Tube Feeding Modality: Nasogastric  Glucerna 1.2 Claudy (GLUCERNARTH)  Total Volume for 24 Hours (mL): 1440  Continuous  Starting Tube Feed Rate {mL per Hour}: 10  Increase Tube Feed Rate by (mL): 10     Every 6 hours  Until Goal Tube Feed Rate (mL per Hour): 60  Tube Feed Duration (in Hours): 24  Tube Feed Start Time: 00:00  Free Water Flush  Bolus   Total Volume per Flush (mL): 250   Frequency: Every 6 Hours    Start Time: 12:00  Free Water Flush Instructions:  defer to team  Entered: Feb 12 2025 11:56AM  
This patient has been assessed with a concern for Malnutrition and has been determined to have a diagnosis/diagnoses of Mild protein-calorie malnutrition and Underweight (BMI < 19).    This patient is being managed with:   Diet NPO with Tube Feed-  Tube Feeding Modality: Nasogastric  Glucerna 1.2 Claudy (GLUCERNARTH)  Total Volume for 24 Hours (mL): 1440  Continuous  Starting Tube Feed Rate {mL per Hour}: 10  Increase Tube Feed Rate by (mL): 10     Every 6 hours  Until Goal Tube Feed Rate (mL per Hour): 60  Tube Feed Duration (in Hours): 24  Tube Feed Start Time: 00:00  Free Water Flush  Bolus   Total Volume per Flush (mL): 250   Frequency: Every 6 Hours    Start Time: 12:00  Free Water Flush Instructions:  defer to team  Entered: Feb 12 2025 11:56AM  
This patient has been assessed with a concern for Malnutrition and has been determined to have a diagnosis/diagnoses of Mild protein-calorie malnutrition and Underweight (BMI < 19).    This patient is being managed with:   Diet Pureed-  Entered: Feb 9 2025 10:43AM

## 2025-02-18 NOTE — PROGRESS NOTE ADULT - PROBLEM SELECTOR PROBLEM 3
Metabolic encephalopathy
Metabolic encephalopathy
Pneumonia
Metabolic encephalopathy
Pneumonia
Urinary retention
Metabolic encephalopathy
Metabolic encephalopathy
Hypernatremia
Pneumonia
Hypernatremia

## 2025-02-18 NOTE — PROGRESS NOTE ADULT - PROBLEM SELECTOR PROBLEM 5
Compression fracture of L1 vertebra
Pneumonia
Urinary retention
Fracture of one rib
Advance care planning
Fracture of one rib
Advance care planning

## 2025-02-18 NOTE — PROGRESS NOTE ADULT - FAMILY/CHILD(REN)
Jamil Escobar, dtr in law Yulissa
Gideon Evans [phone]
saba Wooten and Jamil Escobar
Je, and 2 other family member

## 2025-02-18 NOTE — PROGRESS NOTE ADULT - PROBLEM SELECTOR PROBLEM 9
Compression fracture of L1 vertebra
Hypercholesterolemia
Hypercholesterolemia
Compression fracture of L1 vertebra
Compression fracture of L1 vertebra
Hypercholesterolemia
Hypercholesterolemia
BPH (benign prostatic hyperplasia)
HTN (hypertension)

## 2025-02-18 NOTE — PROGRESS NOTE ADULT - PROBLEM SELECTOR PLAN 8
Pt with R 6th rib fracture   Pain control with Tylenol ATC and lidocaine patch  Patient unlikely to cooperate with incentive spirometer   PT Eval: [facility] vs home
Not on any medications at home   BP acceptable
Not on any medications at home   BP acceptable
Pt with R 6th rib fracture   Pain control with Tylenol ATC and lidocaine patch  Patient unlikely to cooperate with incentive spirometer   PT Eval: [facility] vs home
Pt with R 6th rib fracture   Pain control with Tylenol ATC and lidocaine patch  Patient unlikely to cooperate with incentive spirometer   PT Eval: [facility] vs home
Not on any medications at home   BP acceptable
Pt with R 6th rib fracture   Pain control with Tylenol ATC and lidocaine patch  Patient unlikely to cooperate with incentive spirometer   PT Eval: [facility] vs home
Lipitor 20mg
Not on any medications at home   BP acceptable
SS  Monitor FS
Pt with R 6th rib fracture   Pain control with Tylenol ATC and lidocaine patch. Oxycodone PRN with bowel regimen.  Patient unlikely to cooperate with incentive spirometer   PT Eval: [facility] vs home

## 2025-02-18 NOTE — PROGRESS NOTE ADULT - PROBLEM SELECTOR PLAN 1
Patient febrile to 100.9 on 2/14 with tachycardia and hypotension concerning for sepsis  - BCx 2/14 growing ESBL Ecoli  - switched abx to ertapenem  - repeat BCx sent  - trend lactate  - f/u ID recs Comfort care measures only   - C/w Dilaudid, Ativan,   - May continue with IV abx for bacteremia as per family's wishes   - Supplemental O2 as needed   - Patient waiting PCU placement

## 2025-02-18 NOTE — PROGRESS NOTE ADULT - PROBLEM SELECTOR PROBLEM 6
Compression fracture of L1 vertebra
Hypernatremia
Hypernatremia
Palliative care encounter
Hypernatremia
DM (diabetes mellitus)
Compression fracture of L1 vertebra
Palliative care encounter
Hypernatremia
Fracture of one rib
Hypernatremia
Compression fracture of L1 vertebra
Compression fracture of L1 vertebra

## 2025-02-18 NOTE — PROGRESS NOTE ADULT - ASSESSMENT
84M w HTN, HLD, DM2, BPH, AF presented to the ED brought in by EMS for evaluation of fall.  Patient is Cantonese speaking, poor historian, could not verbalize what has happened. As per daughter patient has had more frequent falls recently, fell while walking to the bathroom.  Patient unable to provide any other history. In the ED VSS except for oxygen saturation of 91%. Labs notable for W13.4, VBG lac 8.1, and pan scan findings notable for acute moderate burst fracture of L1 with mild bony retropulsion, acute mild compression fracture of L4, and an acute nondisplaced L 6th rib fracture. Adm 2/6) ID now called 2/16 with Bacteremia    ESBL E. coli Bacteremia, no clear source, possible   2/14 Bcx with ESBL E.coli, sensitivities reviewed   leukocytosis-WBC noted, afebrile, overnight hypoxic and tachycardic, now on HFNC   CTA chest pending to rule out PE, possible aspiration  would obtain CTAP with contrast also if able   2/16 repeat Bcx NGTD x2 (24h)   2/16 UA noted with pyuria, follow up Ucx in process   Continue Ertapenem (started 2/15--)  Monitor temps/WBC  Continue rest of care per primary team       Jay Stanley M.D.  [facility]  Available on [employer] TEAMS - *PREFERRED*  [phone]  After 5pm on weekdays and all day on weekends - please call [phone]    Thank you for consulting us and involving us in the management of this patients case. In addition to reviewing history, imaging, documents, labs, microbiology, took into account antibiotic stewardship, local antibiogram and infection control strategies and potential transmission issues.

## 2025-02-18 NOTE — PROGRESS NOTE ADULT - PROBLEM SELECTOR PLAN 1
In setting of elderly age, hospitalization, pna, fractures  - Delirium precautions  - Given goals for full comfort, stop restraints, NGT to optimize comfort  - IV ativan 0.5 mg q4 hours prn anxiety/agitation

## 2025-02-18 NOTE — PROGRESS NOTE ADULT - PROBLEM SELECTOR PROBLEM 4
Dementia
Pneumonia
Pneumonia
Fracture of one rib
Dementia
Hypernatremia
Pneumonia
Fracture of one rib
Fracture of one rib
Pneumonia

## 2025-02-18 NOTE — CHART NOTE - NSCHARTNOTEFT_GEN_A_CORE
KAITLIN CALDERON    Notified by RN patient with O2 sat drop to 70% on 6L NC. Seen at bedside in resp distress. SOB on exertion. on response to verbal stimulin. Called both pt's older son Pio at [phone] and younger son Farida at [phone] and informed pt's condition, both aware of DNR, no intubation.         Interventions taken     Place pt on O2 HFNC, O2 sat back to 86%  Duoneb nebs tx  c/w IV ATB's  CTA of chest to r/o PE  Hold NGT feeding for now  pulm consult  cont assess and monitor  f/u with am team.                    Delia Wells ANP-BC  [facility] #[id number] KAITLIN CALDERON    Notified by RN patient with O2 sat drop to 70% on 6L NC. Seen at bedside in resp distress. SOB on exertion. on response to verbal stimulin. Called both pt's older son Pio at [phone] and younger son Farida at [phone] and informed pt's condition, both aware of DNR, no intubation.         Interventions taken     Place pt on O2 HFNC, O2 sat back to 86-90%  Duoneb nebs tx  c/w IV ATB's  CTA of chest to r/o PE  Hold NGT feeding for now  pulm consult  cont assess and monitor  f/u with am team.                    Delia Wells ANP-BC  [facility] #[id number]

## 2025-02-19 NOTE — PROVIDER CONTACT NOTE (OTHER) - ACTION/TREATMENT ORDERED:
Monitor penis site for further bleeds and contact Medicine team once trauma from Allen removal subsides.
See patient expiration note
Provider made aware. Hold PO medications as per provider.
Straight cath Pt as ordered.
Urgent CXR ordered. pt care ongoing.
Lucinda Nails NP notified and aware. NP agreed to come and see pt around 11 am.
Provider made aware continue to monitor pt and continue w/ q6 BS. No further interventions. Safety precautions monitored.
Provider made aware. Provider ordered X-ray of chest. Flu/covid/RSV test ordered and done as ordered. IV tylenol given as ordered.
As per NP ok to recheck bladder scan in 6 hours. Safety precautions monitored. Hourly rounding in progress.
Provider made aware. As per provider, continue to monitor pt voids.
Denisha Juárez NP made aware stated pts mental status is going to wax and wean. Continue to monitor pt. Hourly rounding in progress.
Duoneb and high flow oxygen ordered. No other orders at this time.

## 2025-02-19 NOTE — PROVIDER CONTACT NOTE (OTHER) - ASSESSMENT
Pt. A&Ox1, Cantonese speaking only. Pt. does not follow commands or instructions of . Pt. is a high risk of aspirating. Pt. failed speech and swallow today. Diet ordered is mod thick liquids but pt. does not have the mental capacity to be able to follow instructions to swallow to prevent from aspirating.
Pt retaining 475 mL of urine upon BS pt is not showing any signs or symptoms of distress. Pt has condom catheter on and is voiding in the condom cath as well.
Pt. A&Ox0, VSS. Pt. is dtv. Bladder scan showed 397.
Pt A&Ox1, VSS on 2L NC. Pt resting comfortably in bed showing no s/sx of pain or distress at this time. Routine bladder scan q8 showed Pt was retaining 480, condom cath in place at this time. Mittens in place at this time, Monae Nails NP made aware of retention, nursing care ongoing.
No Response to external Stimuli  No spontaneous respirations  No apical Heart rate  Negative papillary response
Pt A&Ox1, pt on venturi mask on 8L satting @ 95%, , temp 97.5, /80. pt has audible gurgling-suction given with no relief.
A&Ox0-1, increased lethargy but arousable to stimuli . VSS except 2L non-rebreather. B/L wrist restraints d/c. Pt is not alert enough for PO intake. Pt does not seem to be in any pain or discomfort. + condom catheter. LBM unknown.
Pt A&Ox 0-1, pt 8L venturi mask satting in the low 80s, HR ranging from 100-140. pt has increased work of breathing and gurgling, suction provided with little relief.
Pt does not follow commands at baseline and has dementia pt was lethargic today and was responding with grunts and arm movements rather than opening his eyes. FS is WDL and VSS.
Pt VSS, A&O status unknown due to confusion. No distress noted. Pt has small bleed from penis which has now subsided
Pt hooked up to condom cath and voiding. No signs or symptoms of distress.
Pt. A&Ox0, VSS except O2 89% on room air. Pt. placed on NC to bring O2 back up. Pt. seems like he his having a hard time getting good breaths in. Pt. was sat up in bed and suctioned PRN.

## 2025-02-19 NOTE — PROVIDER CONTACT NOTE (OTHER) - BACKGROUND
See H&P
Patient has DNR
see h&p
Pt has history of dementia. Pt on B/L wrist restraints to prevent pull of lines.
see h&p
s/p Fall  Acute moderate burst fracture of L1 with mild bony retropulsion  Acute mild compression fracture of L4,   Acute nondisplaced L 6th rib fracture.
see H&P

## 2025-02-19 NOTE — DISCHARGE NOTE FOR THE EXPIRED PATIENT - HOSPITAL COURSE
84M w HTN, HLD, DM2, BPH, AF presented to the ED brought in by EMS for evaluation of fall.  Patient is Cantonese speaking, poor historian, could not verbalize what has happened. As per daughter patient has had more frequent falls recently, fell while walking to the bathroom.  Patient unable to provide any other history. In the ED VSS except for oxygen saturation of 91%. Labs notable for W13.4, VBG lac 8.1, and pan scan findings notable for acute moderate burst fracture of L1 with mild bony retropulsion, acute mild compression fracture of L4, and an acute nondisplaced L 6th rib fracture. Patient was also found to have pneumonia during his hospital course and was treated with antibiotics, CTPE was done which showed mucus plug in R main bronchus and collapse of R Middle lobe and L lower lobe, BL GGO likely 2/2 aspiration pneumonia. Patient was transitioned to comfort care after family discussion. Placed on HFNC. Transferred to PCU on . Patient  on 25 at 9:35 am.

## 2025-02-19 NOTE — PROVIDER CONTACT NOTE (OTHER) - RECOMMENDATIONS
Notify the provider
Patient pronounced dead @3059 and daughter Rosa Isela notified @1860.
Notify the provider
Come to bedside to assess pt.
straight cath?
Contact provider.
Notify provider.
Notify the provider
Verify placement with CXR
NP Delia Wells notified
Contact provider. Hold PO medications.

## 2025-02-19 NOTE — PROGRESS NOTE ADULT - PROVIDER SPECIALTY LIST ADULT
Orthopedics
SICU
Orthopedics
Trauma Surgery
Orthopedics
Hospitalist
Infectious Disease
Internal Medicine
Hospitalist
Palliative Care
Internal Medicine
Palliative Care

## 2025-02-19 NOTE — PROVIDER CONTACT NOTE (OTHER) - SITUATION
Pt pulled out Allen catheter
Pt retaining 433 on bladder
Pt. O2 satting low.
pt desatting and tachy
Pt. is high risk of aspirating. Meds are ordered as PO.
possible NTG migration
q8 bladder scan showed 480ml
Patient without spontaneous respirations or pulse x1 minute
Pt found to have possible inguinal hernia, found to have bulging in R groin; lethargic but arousable to stimuli
Pt retaining 475 on bladder scan
Pt. bladder scan was 397
Pt is lethargic

## 2025-02-19 NOTE — PROVIDER CONTACT NOTE (OTHER) - DATE AND TIME:
09-Feb-2025 21:46
11-Feb-2025 09:40
18-Feb-2025 23:53
10-Feb-2025 00:11
15-Feb-2025 18:07
16-Feb-2025 18:35
18-Feb-2025 03:19
08-Feb-2025 18:57
10-Feb-2025 20:30
12-Feb-2025 08:57
16-Feb-2025 17:42
19-Feb-2025 09:35

## 2025-02-19 NOTE — PROGRESS NOTE ADULT - SUBJECTIVE AND OBJECTIVE BOX
[facility]  MIGDALIA Art Y. Patel, S. Shah, G. Casimir  [phone]  ([phone] - weekdays after 5pm and weekends)    Name: KAITLIN CALDERON  Age/Gender: 84y Male  MRN: [id number]    Interval History:  Patient seen and examined this morning.   Resting on HFNC, unable to obtain ROS.   Notes reviewed, transferred to PCU.   Allergies: No Known Allergies      Objective:  Vitals:   T(F): 98.4 (02-19-25 @ 09:05), Max: 98.5 (02-18-25 @ 21:10)  HR: 105 (02-19-25 @ 09:05) (93 - 114)  BP: 94/56 (02-19-25 @ 09:05) (94/56 - 127/86)  RR: 19 (02-19-25 @ 09:05) (18 - 22)  SpO2: 75% (02-19-25 @ 09:05) (75% - 100%)  Physical Examination:  General: no acute distress, HFNC  HEENT: normocephalic, atraumatic  Respiratory: decreased breath sounds  Cardiovascular: S1 and S2 present  Gastrointestinal: soft, nondistended  Extremities: RLE edema, no cyanosis  Skin: no visible rash    Laboratory Studies:  CBC:                       12.7   12.60 )-----------( 92       ( 18 Feb 2025 06:22 )             39.8     WBC Trend:  12.60 02-18-25 @ 06:22  10.18 02-17-25 @ 11:37  14.33 02-16-25 @ 09:56  15.73 02-15-25 @ 16:42  8.03 02-14-25 @ 12:15  1.59 02-14-25 @ 10:00  8.28 02-13-25 @ 10:19  8.38 02-12-25 @ 14:18    CMP: 02-18    142  |  107  |  30[H]  ----------------------------<  259[H]  4.4   |  24  |  0.99    Ca    9.3      18 Feb 2025 06:21  Phos  4.2     02-18  Mg     2.1     02-18      Creatinine: 0.99 mg/dL (02-18-25 @ 06:21)  Creatinine: 1.01 mg/dL (02-17-25 @ 11:37)  Creatinine: 1.14 mg/dL (02-16-25 @ 09:56)  Creatinine: 1.25 mg/dL (02-15-25 @ 16:42)  Creatinine: 1.15 mg/dL (02-14-25 @ 10:00)  Creatinine: 1.14 mg/dL (02-13-25 @ 10:17)  Creatinine: 1.09 mg/dL (02-12-25 @ 14:18)    Microbiology: reviewed   Culture - Urine (collected 02-17-25 @ 11:06)  Source: Clean Catch Clean Catch (Midstream)  Final Report (02-18-25 @ 12:34):    <10,000 CFU/mL Normal Urogenital Betty    Culture - Blood (collected 02-16-25 @ 09:30)  Source: .Blood BLOOD  Preliminary Report (02-18-25 @ 15:00):    No growth at 48 Hours    Culture - Blood (collected 02-16-25 @ 09:00)  Source: .Blood BLOOD  Preliminary Report (02-18-25 @ 15:00):    No growth at 48 Hours    Culture - Blood (collected 02-14-25 @ 12:05)  Source: .Blood BLOOD  Gram Stain (02-15-25 @ 04:29):    Growth in aerobic bottle: Gram Negative Rods    Growth in anaerobic bottle: Gram Negative Rods  Final Report (02-18-25 @ 17:49):    Growth in aerobic and anaerobic bottles: Escherichia coli ESBL    See previous culture [id number]    Culture - Blood (collected 02-14-25 @ 11:45)  Source: .Blood BLOOD  Gram Stain (02-15-25 @ 03:07):    Growth in anaerobic bottle: Gram Negative Rods    Growth in aerobic bottle: Gram Negative Rods  Final Report (02-18-25 @ 17:46):    Growth in aerobic and anaerobic bottles: Escherichia coli ESBL    Direct identification is available within approximately 3-5    hours either by Blood Panel Multiplexed PCR or Direct    MALDI-TOF. Details: [email or web address]  Organism: Blood Culture PCR  Escherichia coli ESBL (02-18-25 @ 17:46)  Organism: Escherichia coli ESBL (02-18-25 @ 17:46)      Method Type: JONATHAN      -  Ampicillin: R >16 These ampicillin results predict results for amoxicillin      -  Ampicillin/Sulbactam: R >16/8      -  Aztreonam: R >16      -  Cefazolin: R >16      -  Cefepime: R >16      -  Cefoxitin: R >16      -  Ceftriaxone: R >32      -  Ciprofloxacin: R >2      -  Ertapenem: S <=0.5      -  Gentamicin: S <=2      -  Imipenem: S <=1      -  Levofloxacin: R >4      -  Meropenem: S <=1      -  Piperacillin/Tazobactam: R 32      -  Tobramycin: S <=2      -  Trimethoprim/Sulfamethoxazole: R >2/38  Organism: Blood Culture PCR (02-18-25 @ 17:46)      Method Type: PCR      -  Escherichia coli: Detec      -  ESBL: Detec      -  CTX-M Resistance Marker: Detec    Culture - Blood (collected 02-06-25 @ 07:48)  Source: .Blood BLOOD  Final Report (02-11-25 @ 14:00):    No growth at 5 days    Culture - Blood (collected 02-06-25 @ 07:42)  Source: .Blood BLOOD  Final Report (02-11-25 @ 14:00):    No growth at 5 days    Culture - Urine (collected 02-05-25 @ 20:47)  Source: Clean Catch Clean Catch (Midstream)  Final Report (02-06-25 @ 21:50):    No growth    Radiology: reviewed   < from: CT Angio Chest PE Protocol w/ IV Cont (02.18.25 @ 11:19) >  IMPRESSION:  No pulmonary embolism.  Mucous plug in right main stem bronchus with complete   collapse/atelectasis of right middle lobe and near complete atelectasis   of left lower lobe. Airway associated groundglass opacities in bilateral   lungs, likely due to aspiration pneumonia. Small bibasilar pleural   effusions  Ascending thoracic aorta ectasia    --- End of Report ---    < end of copied text >    < from: Xray Chest 1 View- PORTABLE-Urgent (Xray Chest 1 View- PORTABLE-Urgent .) (02.18.25 @ 03:12) >  IMPRESSION:  Nasogastric tube terminating within the proximal stomach.  Bibasilar patchy opacities, increased on the right    < end of copied text >      Medications:  acetaminophen  Suppository .. 650 milliGRAM(s) Rectal every 6 hours PRN  albuterol/ipratropium for Nebulization 3 milliLiter(s) Nebulizer every 6 hours PRN  bisacodyl Suppository 10 milliGRAM(s) Rectal daily PRN  chlorhexidine 2% Cloths 1 Application(s) Topical <User Schedule>  ertapenem  IVPB 1000 milliGRAM(s) IV Intermittent every 24 hours  glycopyrrolate Injectable 0.4 milliGRAM(s) IV Push every 6 hours PRN  HYDROmorphone  Injectable 0.5 milliGRAM(s) IV Push every 1 hour PRN  HYDROmorphone  Injectable 0.2 milliGRAM(s) IV Push every 1 hour PRN  HYDROmorphone  Injectable 0.5 milliGRAM(s) IV Push every 1 hour PRN  LORazepam   Injectable 0.5 milliGRAM(s) IV Push every 1 hour PRN  pantoprazole  Injectable 40 milliGRAM(s) IV Push daily    Current Antimicrobials:  ertapenem  IVPB 1000 milliGRAM(s) IV Intermittent every 24 hours    Prior/Completed Antimicrobials:  azithromycin  IVPB  azithromycin  IVPB  cefTRIAXone   IVPB  cefTRIAXone   IVPB  piperacillin/tazobactam IVPB.  piperacillin/tazobactam IVPB.-  vancomycin  IVPB

## 2025-02-19 NOTE — PROVIDER CONTACT NOTE (OTHER) - NAME OF MD/NP/PA/DO NOTIFIED:
MICHELLE Wells
Denisha Juárez NP
Fernanda Hamm NP
MICHELLE Wells
Amber Coronado NP
Olga Lidia Ashton NP
Amber Coronado NP
Lucinda Nails NP
PCU Team
Amber Coronado NP
Denisha Juárez NP
Monae Nails NP

## 2025-02-19 NOTE — PROGRESS NOTE ADULT - ASSESSMENT
84M w HTN, HLD, DM2, BPH, AF presented to the ED brought in by EMS for evaluation of fall.  Patient is Cantonese speaking, poor historian, could not verbalize what has happened. As per daughter patient has had more frequent falls recently, fell while walking to the bathroom.  Patient unable to provide any other history. In the ED VSS except for oxygen saturation of 91%. Labs notable for W13.4, VBG lac 8.1, and pan scan findings notable for acute moderate burst fracture of L1 with mild bony retropulsion, acute mild compression fracture of L4, and an acute nondisplaced L 6th rib fracture. Adm 2/6) ID now called 2/16 with Bacteremia    ESBL E. coli Bacteremia, no clear source, possible   Acute hypoxic respiratory failure, aspiration pneumonia   2/14 Bcx with ESBL E.coli, sensitivities reviewed   2/16 repeat Bcx NGTD x2 (48h)  2/16 UA noted with pyuria, Ucx negative   2/18 overnight hypoxic and tachycardic, now on HFNC, WBC noted, afebrile   CTA chest with no PE, note with mucus plugging and atelectasis, findings c/w aspiration pneumonia   GOC discussions noted, patient transferred to PCU for comfort oriented care   Can continue Ertapenem (started 2/15--) until 2/22/25 if c/w GOC   Continue rest of care per primary team       Jay Stanley M.D.  [facility]  Available on [employer] TEAMS - *PREFERRED*  [phone]  After 5pm on weekdays and all day on weekends - please call [phone]    Thank you for consulting us and involving us in the management of this patients case. In addition to reviewing history, imaging, documents, labs, microbiology, took into account antibiotic stewardship, local antibiogram and infection control strategies and potential transmission issues.

## 2025-02-19 NOTE — PROVIDER CONTACT NOTE (OTHER) - REASON
Pt is lethargic
Pt. is DTV, bladder scan was 397.
q8 bladder scan showed 480ml
Pt found to have possible inguinal hernia, found to have bulging in R groin; lethargic but arousable to stimuli
Pt retaining 475 on bladder scan
Pt pulled out Allen catheter
Pt retaining 433 on bladder
Pt. O2 satting low.
pt desatting and tachy
Pronouncement
Pt. is high risk of aspirating. Meds are ordered as PO.
possible NTG migration

## 2025-03-20 NOTE — PROGRESS NOTE ADULT - PROBLEM SELECTOR PLAN 8
This surgery should be canceled along with PAT.  Patient should follow-up as scheduled on April 10 or sooner for postop appointment schedule if sooner if available.   DVT ppx: Heparin Subq   Diet: Pureed/mildly thickened liquid per Speech and Swallow  Dispo: [facility]; pending COVID course until 11/13 and Abx until 11/15  Code: Full

## 2025-04-08 NOTE — DISCHARGE NOTE PROVIDER - CARE PROVIDERS DIRECT ADDRESSES
IF YOUR HEART RATE IS LESS THAN 60, DO NOT TAKE THE DIGOXIN  
,DirectAddress_Unknown,DirectAddress_Unknown

## 2025-07-14 NOTE — PROGRESS NOTE ADULT - PROBLEM/PLAN-3
DISPLAY PLAN FREE TEXT
Sent new patient forms for completion to the address on file  
DISPLAY PLAN FREE TEXT